# Patient Record
Sex: FEMALE | Race: WHITE | NOT HISPANIC OR LATINO | Employment: OTHER | URBAN - METROPOLITAN AREA
[De-identification: names, ages, dates, MRNs, and addresses within clinical notes are randomized per-mention and may not be internally consistent; named-entity substitution may affect disease eponyms.]

---

## 2017-02-10 ENCOUNTER — HOSPITAL ENCOUNTER (OUTPATIENT)
Dept: RADIOLOGY | Facility: HOSPITAL | Age: 82
Discharge: HOME | End: 2017-02-10
Attending: SURGERY
Payer: MEDICARE

## 2017-02-10 DIAGNOSIS — I25.10 ATHEROSCLEROTIC HEART DISEASE OF NATIVE CORONARY ARTERY WITHOUT ANGINA PECTORIS: ICD-10-CM

## 2017-02-10 DIAGNOSIS — I65.22 OCCLUSION AND STENOSIS OF LEFT CAROTID ARTERY: ICD-10-CM

## 2017-02-10 DIAGNOSIS — J44.9 CHRONIC OBSTRUCTIVE PULMONARY DISEASE (HCC): ICD-10-CM

## 2017-02-10 PROCEDURE — 93880 EXTRACRANIAL BILAT STUDY: CPT

## 2017-02-21 ENCOUNTER — GENERIC CONVERSION - ENCOUNTER (OUTPATIENT)
Dept: OTHER | Facility: OTHER | Age: 82
End: 2017-02-21

## 2017-03-13 ENCOUNTER — HOSPITAL ENCOUNTER (INPATIENT)
Facility: HOSPITAL | Age: 82
LOS: 3 days | Discharge: HOME | DRG: 203 | End: 2017-03-16
Attending: INTERNAL MEDICINE | Admitting: INTERNAL MEDICINE
Payer: MEDICARE

## 2017-03-13 ENCOUNTER — APPOINTMENT (EMERGENCY)
Dept: RADIOLOGY | Facility: HOSPITAL | Age: 82
DRG: 203 | End: 2017-03-13
Payer: MEDICARE

## 2017-03-13 DIAGNOSIS — J45.901 ACUTE BRONCHITIS WITH ASTHMA WITH ACUTE EXACERBATION: Primary | ICD-10-CM

## 2017-03-13 DIAGNOSIS — J20.9 ACUTE BRONCHITIS WITH ASTHMA WITH ACUTE EXACERBATION: Primary | ICD-10-CM

## 2017-03-13 PROBLEM — I48.91 ATRIAL FIBRILLATION (HCC): Status: ACTIVE | Noted: 2017-03-13

## 2017-03-13 PROBLEM — K21.9 GERD (GASTROESOPHAGEAL REFLUX DISEASE): Status: ACTIVE | Noted: 2017-03-13

## 2017-03-13 PROBLEM — E78.5 HYPERLIPEMIA: Status: ACTIVE | Noted: 2017-03-13

## 2017-03-13 LAB
ALBUMIN SERPL BCP-MCNC: 3.2 G/DL (ref 3.5–5)
ALP SERPL-CCNC: 121 U/L (ref 46–116)
ALT SERPL W P-5'-P-CCNC: 18 U/L (ref 12–78)
ANION GAP SERPL CALCULATED.3IONS-SCNC: 9 MMOL/L (ref 4–13)
AST SERPL W P-5'-P-CCNC: 26 U/L (ref 5–45)
BASOPHILS # BLD AUTO: 0 THOUSANDS/ΜL (ref 0–0.1)
BASOPHILS NFR BLD AUTO: 0 % (ref 0–1)
BILIRUB SERPL-MCNC: 0.2 MG/DL (ref 0.2–1)
BUN SERPL-MCNC: 7 MG/DL (ref 5–25)
CALCIUM SERPL-MCNC: 9.4 MG/DL (ref 8.3–10.1)
CHLORIDE SERPL-SCNC: 105 MMOL/L (ref 100–108)
CO2 SERPL-SCNC: 29 MMOL/L (ref 21–32)
CREAT SERPL-MCNC: 0.97 MG/DL (ref 0.6–1.3)
EOSINOPHIL # BLD AUTO: 0.1 THOUSAND/ΜL (ref 0–0.61)
EOSINOPHIL NFR BLD AUTO: 3 % (ref 0–6)
ERYTHROCYTE [DISTWIDTH] IN BLOOD BY AUTOMATED COUNT: 15.6 % (ref 11.6–15.1)
GFR SERPL CREATININE-BSD FRML MDRD: 55.3 ML/MIN/1.73SQ M
GLUCOSE SERPL-MCNC: 122 MG/DL (ref 65–140)
HCT VFR BLD AUTO: 39.3 % (ref 37–47)
HGB BLD-MCNC: 13 G/DL (ref 12–16)
LYMPHOCYTES # BLD AUTO: 0.8 THOUSANDS/ΜL (ref 0.6–4.47)
LYMPHOCYTES NFR BLD AUTO: 17 % (ref 14–44)
MCH RBC QN AUTO: 26.5 PG (ref 27–31)
MCHC RBC AUTO-ENTMCNC: 33 G/DL (ref 31.4–37.4)
MCV RBC AUTO: 81 FL (ref 82–98)
MONOCYTES # BLD AUTO: 0.3 THOUSAND/ΜL (ref 0.17–1.22)
MONOCYTES NFR BLD AUTO: 6 % (ref 4–12)
NEUTROPHILS # BLD AUTO: 3.7 THOUSANDS/ΜL (ref 1.85–7.62)
NEUTS SEG NFR BLD AUTO: 74 % (ref 43–75)
NRBC BLD AUTO-RTO: 0 /100 WBCS
PLATELET # BLD AUTO: 185 THOUSANDS/UL (ref 130–400)
PMV BLD AUTO: 9.3 FL (ref 8.9–12.7)
POTASSIUM SERPL-SCNC: 3.9 MMOL/L (ref 3.5–5.3)
PROT SERPL-MCNC: 7 G/DL (ref 6.4–8.2)
RBC # BLD AUTO: 4.89 MILLION/UL (ref 4.2–5.4)
SODIUM SERPL-SCNC: 143 MMOL/L (ref 136–145)
TROPONIN I SERPL-MCNC: <0.02 NG/ML
WBC # BLD AUTO: 4.9 THOUSAND/UL (ref 4.8–10.8)

## 2017-03-13 PROCEDURE — 94640 AIRWAY INHALATION TREATMENT: CPT

## 2017-03-13 PROCEDURE — 99285 EMERGENCY DEPT VISIT HI MDM: CPT

## 2017-03-13 PROCEDURE — 94760 N-INVAS EAR/PLS OXIMETRY 1: CPT

## 2017-03-13 PROCEDURE — 84484 ASSAY OF TROPONIN QUANT: CPT

## 2017-03-13 PROCEDURE — 96374 THER/PROPH/DIAG INJ IV PUSH: CPT

## 2017-03-13 PROCEDURE — 80053 COMPREHEN METABOLIC PANEL: CPT

## 2017-03-13 PROCEDURE — 93005 ELECTROCARDIOGRAM TRACING: CPT

## 2017-03-13 PROCEDURE — 71020 HB CHEST X-RAY 2VW FRONTAL&LATL: CPT

## 2017-03-13 PROCEDURE — 85025 COMPLETE CBC W/AUTO DIFF WBC: CPT

## 2017-03-13 PROCEDURE — 36415 COLL VENOUS BLD VENIPUNCTURE: CPT

## 2017-03-13 RX ORDER — METHYLPREDNISOLONE SODIUM SUCCINATE 125 MG/2ML
80 INJECTION, POWDER, LYOPHILIZED, FOR SOLUTION INTRAMUSCULAR; INTRAVENOUS ONCE
Status: COMPLETED | OUTPATIENT
Start: 2017-03-13 | End: 2017-03-13

## 2017-03-13 RX ORDER — CLOPIDOGREL BISULFATE 75 MG/1
75 TABLET ORAL DAILY
Status: DISCONTINUED | OUTPATIENT
Start: 2017-03-13 | End: 2017-03-16 | Stop reason: HOSPADM

## 2017-03-13 RX ORDER — HEPARIN SODIUM 5000 [USP'U]/ML
5000 INJECTION, SOLUTION INTRAVENOUS; SUBCUTANEOUS EVERY 8 HOURS SCHEDULED
Status: DISCONTINUED | OUTPATIENT
Start: 2017-03-13 | End: 2017-03-16 | Stop reason: HOSPADM

## 2017-03-13 RX ORDER — PREGABALIN 100 MG/1
300 CAPSULE ORAL 2 TIMES DAILY
Status: DISCONTINUED | OUTPATIENT
Start: 2017-03-13 | End: 2017-03-16 | Stop reason: HOSPADM

## 2017-03-13 RX ORDER — ALPRAZOLAM 0.5 MG/1
0.5 TABLET ORAL
Status: DISCONTINUED | OUTPATIENT
Start: 2017-03-13 | End: 2017-03-16 | Stop reason: HOSPADM

## 2017-03-13 RX ORDER — POLYETHYLENE GLYCOL 3350 17 G/17G
17 POWDER, FOR SOLUTION ORAL DAILY
Status: DISCONTINUED | OUTPATIENT
Start: 2017-03-14 | End: 2017-03-16 | Stop reason: HOSPADM

## 2017-03-13 RX ORDER — CLOPIDOGREL BISULFATE 75 MG/1
75 TABLET ORAL DAILY
COMMUNITY
End: 2018-01-10 | Stop reason: HOSPADM

## 2017-03-13 RX ORDER — ASPIRIN 81 MG/1
81 TABLET ORAL DAILY
Status: DISCONTINUED | OUTPATIENT
Start: 2017-03-13 | End: 2017-03-16 | Stop reason: HOSPADM

## 2017-03-13 RX ORDER — TOLTERODINE 2 MG/1
4 CAPSULE, EXTENDED RELEASE ORAL DAILY
Status: DISCONTINUED | OUTPATIENT
Start: 2017-03-13 | End: 2017-03-16 | Stop reason: HOSPADM

## 2017-03-13 RX ORDER — IPRATROPIUM BROMIDE AND ALBUTEROL SULFATE 2.5; .5 MG/3ML; MG/3ML
3 SOLUTION RESPIRATORY (INHALATION)
Status: DISCONTINUED | OUTPATIENT
Start: 2017-03-13 | End: 2017-03-14

## 2017-03-13 RX ORDER — HYDROCODONE BITARTRATE AND ACETAMINOPHEN 5; 325 MG/1; MG/1
1 TABLET ORAL EVERY 6 HOURS PRN
Status: DISCONTINUED | OUTPATIENT
Start: 2017-03-13 | End: 2017-03-16 | Stop reason: HOSPADM

## 2017-03-13 RX ORDER — METHYLPREDNISOLONE SODIUM SUCCINATE 40 MG/ML
40 INJECTION, POWDER, LYOPHILIZED, FOR SOLUTION INTRAMUSCULAR; INTRAVENOUS EVERY 8 HOURS SCHEDULED
Status: DISCONTINUED | OUTPATIENT
Start: 2017-03-13 | End: 2017-03-13

## 2017-03-13 RX ORDER — PANTOPRAZOLE SODIUM 40 MG/1
40 TABLET, DELAYED RELEASE ORAL
Status: DISCONTINUED | OUTPATIENT
Start: 2017-03-14 | End: 2017-03-16 | Stop reason: HOSPADM

## 2017-03-13 RX ORDER — ONDANSETRON 2 MG/ML
4 INJECTION INTRAMUSCULAR; INTRAVENOUS EVERY 6 HOURS PRN
Status: DISCONTINUED | OUTPATIENT
Start: 2017-03-13 | End: 2017-03-16 | Stop reason: HOSPADM

## 2017-03-13 RX ORDER — ATORVASTATIN CALCIUM 10 MG/1
10 TABLET, FILM COATED ORAL
Status: DISCONTINUED | OUTPATIENT
Start: 2017-03-13 | End: 2017-03-16 | Stop reason: HOSPADM

## 2017-03-13 RX ORDER — DOCUSATE SODIUM 100 MG/1
100 CAPSULE, LIQUID FILLED ORAL 2 TIMES DAILY
Status: DISCONTINUED | OUTPATIENT
Start: 2017-03-13 | End: 2017-03-16 | Stop reason: HOSPADM

## 2017-03-13 RX ORDER — METHYLPREDNISOLONE SODIUM SUCCINATE 40 MG/ML
20 INJECTION, POWDER, LYOPHILIZED, FOR SOLUTION INTRAMUSCULAR; INTRAVENOUS EVERY 8 HOURS SCHEDULED
Status: DISCONTINUED | OUTPATIENT
Start: 2017-03-13 | End: 2017-03-15

## 2017-03-13 RX ORDER — POTASSIUM CHLORIDE 750 MG/1
10 TABLET, EXTENDED RELEASE ORAL
Status: DISCONTINUED | OUTPATIENT
Start: 2017-03-13 | End: 2017-03-16 | Stop reason: HOSPADM

## 2017-03-13 RX ORDER — GUAIFENESIN 600 MG
1200 TABLET, EXTENDED RELEASE 12 HR ORAL 2 TIMES DAILY
Status: DISCONTINUED | OUTPATIENT
Start: 2017-03-13 | End: 2017-03-16 | Stop reason: HOSPADM

## 2017-03-13 RX ORDER — BENZONATATE 100 MG/1
100 CAPSULE ORAL 3 TIMES DAILY PRN
Status: DISCONTINUED | OUTPATIENT
Start: 2017-03-13 | End: 2017-03-16 | Stop reason: HOSPADM

## 2017-03-13 RX ADMIN — TOLTERODINE TARTRATE 4 MG: 2 CAPSULE, EXTENDED RELEASE ORAL at 18:37

## 2017-03-13 RX ADMIN — DOCUSATE SODIUM 100 MG: 100 CAPSULE, LIQUID FILLED ORAL at 18:35

## 2017-03-13 RX ADMIN — HEPARIN SODIUM 5000 UNITS: 5000 INJECTION, SOLUTION INTRAVENOUS; SUBCUTANEOUS at 18:36

## 2017-03-13 RX ADMIN — POTASSIUM CHLORIDE 10 MEQ: 750 TABLET, EXTENDED RELEASE ORAL at 18:39

## 2017-03-13 RX ADMIN — METHYLPREDNISOLONE SODIUM SUCCINATE 20 MG: 40 INJECTION, POWDER, FOR SOLUTION INTRAMUSCULAR; INTRAVENOUS at 18:46

## 2017-03-13 RX ADMIN — BENZONATATE 100 MG: 100 CAPSULE ORAL at 22:43

## 2017-03-13 RX ADMIN — GUAIFENESIN 1200 MG: 600 TABLET, EXTENDED RELEASE ORAL at 18:36

## 2017-03-13 RX ADMIN — ASPIRIN 81 MG: 81 TABLET, COATED ORAL at 18:34

## 2017-03-13 RX ADMIN — IPRATROPIUM BROMIDE AND ALBUTEROL SULFATE 3 ML: .5; 3 SOLUTION RESPIRATORY (INHALATION) at 14:26

## 2017-03-13 RX ADMIN — CLOPIDOGREL BISULFATE 75 MG: 75 TABLET, FILM COATED ORAL at 18:35

## 2017-03-13 RX ADMIN — ALPRAZOLAM 0.5 MG: 0.5 TABLET ORAL at 22:43

## 2017-03-13 RX ADMIN — IPRATROPIUM BROMIDE AND ALBUTEROL SULFATE 3 ML: .5; 3 SOLUTION RESPIRATORY (INHALATION) at 09:43

## 2017-03-13 RX ADMIN — METHYLPREDNISOLONE SODIUM SUCCINATE 80 MG: 125 INJECTION, POWDER, FOR SOLUTION INTRAMUSCULAR; INTRAVENOUS at 11:35

## 2017-03-13 RX ADMIN — METOPROLOL TARTRATE 25 MG: 25 TABLET ORAL at 18:46

## 2017-03-13 RX ADMIN — ATORVASTATIN CALCIUM 10 MG: 10 TABLET, FILM COATED ORAL at 18:35

## 2017-03-13 RX ADMIN — AZITHROMYCIN MONOHYDRATE 500 MG: 500 INJECTION, POWDER, LYOPHILIZED, FOR SOLUTION INTRAVENOUS at 18:35

## 2017-03-13 RX ADMIN — PREGABALIN 300 MG: 100 CAPSULE ORAL at 18:36

## 2017-03-13 RX ADMIN — HEPARIN SODIUM 5000 UNITS: 5000 INJECTION, SOLUTION INTRAVENOUS; SUBCUTANEOUS at 23:30

## 2017-03-14 ENCOUNTER — APPOINTMENT (INPATIENT)
Dept: PHYSICAL THERAPY | Facility: HOSPITAL | Age: 82
DRG: 203 | End: 2017-03-14
Payer: MEDICARE

## 2017-03-14 ENCOUNTER — APPOINTMENT (INPATIENT)
Dept: OCCUPATIONAL THERAPY | Facility: HOSPITAL | Age: 82
DRG: 203 | End: 2017-03-14
Payer: MEDICARE

## 2017-03-14 PROBLEM — E87.6 HYPOKALEMIA: Status: ACTIVE | Noted: 2017-03-14

## 2017-03-14 PROBLEM — M79.7 FIBROMYALGIA: Status: ACTIVE | Noted: 2017-03-14

## 2017-03-14 LAB
ALBUMIN SERPL BCP-MCNC: 3 G/DL (ref 3.5–5)
ALP SERPL-CCNC: 103 U/L (ref 46–116)
ALT SERPL W P-5'-P-CCNC: 17 U/L (ref 12–78)
ANION GAP SERPL CALCULATED.3IONS-SCNC: 8 MMOL/L (ref 4–13)
AST SERPL W P-5'-P-CCNC: 20 U/L (ref 5–45)
BASOPHILS # BLD AUTO: 0 THOUSANDS/ΜL (ref 0–0.1)
BASOPHILS NFR BLD AUTO: 0 % (ref 0–1)
BILIRUB SERPL-MCNC: 0.3 MG/DL (ref 0.2–1)
BILIRUB UR QL STRIP: ABNORMAL
BUN SERPL-MCNC: 12 MG/DL (ref 5–25)
CALCIUM SERPL-MCNC: 8.2 MG/DL (ref 8.3–10.1)
CHLORIDE SERPL-SCNC: 104 MMOL/L (ref 100–108)
CLARITY UR: CLEAR
CO2 SERPL-SCNC: 29 MMOL/L (ref 21–32)
COLOR UR: YELLOW
CREAT SERPL-MCNC: 1.01 MG/DL (ref 0.6–1.3)
EOSINOPHIL # BLD AUTO: 0 THOUSAND/ΜL (ref 0–0.61)
EOSINOPHIL NFR BLD AUTO: 0 % (ref 0–6)
ERYTHROCYTE [DISTWIDTH] IN BLOOD BY AUTOMATED COUNT: 16 % (ref 11.6–15.1)
FLUAV AG SPEC QL: NORMAL
FLUBV AG SPEC QL: NORMAL
GFR SERPL CREATININE-BSD FRML MDRD: 52.7 ML/MIN/1.73SQ M
GLUCOSE SERPL-MCNC: 129 MG/DL (ref 65–140)
GLUCOSE UR STRIP-MCNC: NEGATIVE MG/DL
HCT VFR BLD AUTO: 35.4 % (ref 37–47)
HGB BLD-MCNC: 11.5 G/DL (ref 12–16)
HGB UR QL STRIP.AUTO: NEGATIVE
KETONES UR STRIP-MCNC: ABNORMAL MG/DL
L PNEUMO1 AG UR QL IA.RAPID: NEGATIVE
LEUKOCYTE ESTERASE UR QL STRIP: NEGATIVE
LYMPHOCYTES # BLD AUTO: 0.9 THOUSANDS/ΜL (ref 0.6–4.47)
LYMPHOCYTES NFR BLD AUTO: 16 % (ref 14–44)
MAGNESIUM SERPL-MCNC: 1.7 MG/DL (ref 1.6–2.6)
MCH RBC QN AUTO: 26.1 PG (ref 27–31)
MCHC RBC AUTO-ENTMCNC: 32.6 G/DL (ref 31.4–37.4)
MCV RBC AUTO: 80 FL (ref 82–98)
MONOCYTES # BLD AUTO: 0.5 THOUSAND/ΜL (ref 0.17–1.22)
MONOCYTES NFR BLD AUTO: 9 % (ref 4–12)
NEUTROPHILS # BLD AUTO: 4.3 THOUSANDS/ΜL (ref 1.85–7.62)
NEUTS SEG NFR BLD AUTO: 75 % (ref 43–75)
NITRITE UR QL STRIP: NEGATIVE
NRBC BLD AUTO-RTO: 0 /100 WBCS
PH UR STRIP.AUTO: 5.5 [PH] (ref 5–9)
PLATELET # BLD AUTO: 201 THOUSANDS/UL (ref 130–400)
PMV BLD AUTO: 9.1 FL (ref 8.9–12.7)
POTASSIUM SERPL-SCNC: 3.4 MMOL/L (ref 3.5–5.3)
PROT SERPL-MCNC: 6.3 G/DL (ref 6.4–8.2)
PROT UR STRIP-MCNC: NEGATIVE MG/DL
RBC # BLD AUTO: 4.43 MILLION/UL (ref 4.2–5.4)
RSV B RNA SPEC QL NAA+PROBE: NORMAL
S PNEUM AG UR QL: NEGATIVE
SODIUM SERPL-SCNC: 141 MMOL/L (ref 136–145)
SP GR UR STRIP.AUTO: >=1.03 (ref 1–1.03)
UROBILINOGEN UR QL STRIP.AUTO: 0.2 E.U./DL
WBC # BLD AUTO: 5.7 THOUSAND/UL (ref 4.8–10.8)

## 2017-03-14 PROCEDURE — 87798 DETECT AGENT NOS DNA AMP: CPT | Performed by: NURSE PRACTITIONER

## 2017-03-14 PROCEDURE — 97161 PT EVAL LOW COMPLEX 20 MIN: CPT

## 2017-03-14 PROCEDURE — G8987 SELF CARE CURRENT STATUS: HCPCS

## 2017-03-14 PROCEDURE — G8979 MOBILITY GOAL STATUS: HCPCS

## 2017-03-14 PROCEDURE — 94640 AIRWAY INHALATION TREATMENT: CPT

## 2017-03-14 PROCEDURE — G8988 SELF CARE GOAL STATUS: HCPCS

## 2017-03-14 PROCEDURE — 87449 NOS EACH ORGANISM AG IA: CPT | Performed by: NURSE PRACTITIONER

## 2017-03-14 PROCEDURE — 81003 URINALYSIS AUTO W/O SCOPE: CPT | Performed by: NURSE PRACTITIONER

## 2017-03-14 PROCEDURE — 97165 OT EVAL LOW COMPLEX 30 MIN: CPT

## 2017-03-14 PROCEDURE — 83735 ASSAY OF MAGNESIUM: CPT | Performed by: NURSE PRACTITIONER

## 2017-03-14 PROCEDURE — 80053 COMPREHEN METABOLIC PANEL: CPT | Performed by: NURSE PRACTITIONER

## 2017-03-14 PROCEDURE — 85025 COMPLETE CBC W/AUTO DIFF WBC: CPT | Performed by: NURSE PRACTITIONER

## 2017-03-14 PROCEDURE — G8978 MOBILITY CURRENT STATUS: HCPCS

## 2017-03-14 PROCEDURE — 94760 N-INVAS EAR/PLS OXIMETRY 1: CPT

## 2017-03-14 RX ORDER — POTASSIUM CHLORIDE 20 MEQ/1
20 TABLET, EXTENDED RELEASE ORAL ONCE
Status: COMPLETED | OUTPATIENT
Start: 2017-03-14 | End: 2017-03-14

## 2017-03-14 RX ORDER — IPRATROPIUM BROMIDE AND ALBUTEROL SULFATE 2.5; .5 MG/3ML; MG/3ML
3 SOLUTION RESPIRATORY (INHALATION) EVERY 6 HOURS PRN
Status: DISCONTINUED | OUTPATIENT
Start: 2017-03-14 | End: 2017-03-16 | Stop reason: HOSPADM

## 2017-03-14 RX ADMIN — GUAIFENESIN 1200 MG: 600 TABLET, EXTENDED RELEASE ORAL at 17:41

## 2017-03-14 RX ADMIN — CLOPIDOGREL BISULFATE 75 MG: 75 TABLET, FILM COATED ORAL at 10:41

## 2017-03-14 RX ADMIN — HEPARIN SODIUM 5000 UNITS: 5000 INJECTION, SOLUTION INTRAVENOUS; SUBCUTANEOUS at 05:59

## 2017-03-14 RX ADMIN — AZITHROMYCIN MONOHYDRATE 500 MG: 500 INJECTION, POWDER, LYOPHILIZED, FOR SOLUTION INTRAVENOUS at 17:58

## 2017-03-14 RX ADMIN — HEPARIN SODIUM 5000 UNITS: 5000 INJECTION, SOLUTION INTRAVENOUS; SUBCUTANEOUS at 22:03

## 2017-03-14 RX ADMIN — METOPROLOL TARTRATE 25 MG: 25 TABLET ORAL at 10:40

## 2017-03-14 RX ADMIN — HEPARIN SODIUM 5000 UNITS: 5000 INJECTION, SOLUTION INTRAVENOUS; SUBCUTANEOUS at 14:55

## 2017-03-14 RX ADMIN — PANTOPRAZOLE SODIUM 40 MG: 40 TABLET, DELAYED RELEASE ORAL at 05:59

## 2017-03-14 RX ADMIN — TOLTERODINE TARTRATE 4 MG: 2 CAPSULE, EXTENDED RELEASE ORAL at 10:42

## 2017-03-14 RX ADMIN — GUAIFENESIN 1200 MG: 600 TABLET, EXTENDED RELEASE ORAL at 10:41

## 2017-03-14 RX ADMIN — METHYLPREDNISOLONE SODIUM SUCCINATE 20 MG: 40 INJECTION, POWDER, FOR SOLUTION INTRAMUSCULAR; INTRAVENOUS at 14:55

## 2017-03-14 RX ADMIN — HYDROCODONE BITARTRATE AND ACETAMINOPHEN 1 TABLET: 5; 325 TABLET ORAL at 02:53

## 2017-03-14 RX ADMIN — METOPROLOL TARTRATE 25 MG: 25 TABLET ORAL at 17:42

## 2017-03-14 RX ADMIN — METHYLPREDNISOLONE SODIUM SUCCINATE 20 MG: 40 INJECTION, POWDER, FOR SOLUTION INTRAMUSCULAR; INTRAVENOUS at 22:03

## 2017-03-14 RX ADMIN — ALPRAZOLAM 0.5 MG: 0.5 TABLET ORAL at 23:59

## 2017-03-14 RX ADMIN — POTASSIUM CHLORIDE 20 MEQ: 1500 TABLET, EXTENDED RELEASE ORAL at 15:11

## 2017-03-14 RX ADMIN — POTASSIUM CHLORIDE 10 MEQ: 750 TABLET, EXTENDED RELEASE ORAL at 10:41

## 2017-03-14 RX ADMIN — ATORVASTATIN CALCIUM 10 MG: 10 TABLET, FILM COATED ORAL at 17:40

## 2017-03-14 RX ADMIN — METHYLPREDNISOLONE SODIUM SUCCINATE 20 MG: 40 INJECTION, POWDER, FOR SOLUTION INTRAMUSCULAR; INTRAVENOUS at 05:59

## 2017-03-14 RX ADMIN — IPRATROPIUM BROMIDE AND ALBUTEROL SULFATE 3 ML: .5; 3 SOLUTION RESPIRATORY (INHALATION) at 07:39

## 2017-03-14 RX ADMIN — ASPIRIN 81 MG: 81 TABLET, COATED ORAL at 10:41

## 2017-03-14 RX ADMIN — IPRATROPIUM BROMIDE AND ALBUTEROL SULFATE 3 ML: .5; 3 SOLUTION RESPIRATORY (INHALATION) at 14:10

## 2017-03-14 RX ADMIN — PREGABALIN 300 MG: 100 CAPSULE ORAL at 10:42

## 2017-03-14 RX ADMIN — PREGABALIN 300 MG: 100 CAPSULE ORAL at 17:45

## 2017-03-15 PROCEDURE — 94150 VITAL CAPACITY TEST: CPT

## 2017-03-15 PROCEDURE — 94640 AIRWAY INHALATION TREATMENT: CPT

## 2017-03-15 PROCEDURE — 94760 N-INVAS EAR/PLS OXIMETRY 1: CPT

## 2017-03-15 RX ORDER — METHYLPREDNISOLONE 4 MG/1
4 TABLET ORAL 3 TIMES DAILY
Status: DISCONTINUED | OUTPATIENT
Start: 2017-03-15 | End: 2017-03-16 | Stop reason: HOSPADM

## 2017-03-15 RX ADMIN — POTASSIUM CHLORIDE 10 MEQ: 750 TABLET, EXTENDED RELEASE ORAL at 08:53

## 2017-03-15 RX ADMIN — GUAIFENESIN 1200 MG: 600 TABLET, EXTENDED RELEASE ORAL at 17:15

## 2017-03-15 RX ADMIN — HYDROCODONE BITARTRATE AND ACETAMINOPHEN 1 TABLET: 5; 325 TABLET ORAL at 06:02

## 2017-03-15 RX ADMIN — HEPARIN SODIUM 5000 UNITS: 5000 INJECTION, SOLUTION INTRAVENOUS; SUBCUTANEOUS at 05:58

## 2017-03-15 RX ADMIN — CLOPIDOGREL BISULFATE 75 MG: 75 TABLET, FILM COATED ORAL at 08:53

## 2017-03-15 RX ADMIN — HEPARIN SODIUM 5000 UNITS: 5000 INJECTION, SOLUTION INTRAVENOUS; SUBCUTANEOUS at 21:08

## 2017-03-15 RX ADMIN — ASPIRIN 81 MG: 81 TABLET, COATED ORAL at 08:53

## 2017-03-15 RX ADMIN — PANTOPRAZOLE SODIUM 40 MG: 40 TABLET, DELAYED RELEASE ORAL at 05:59

## 2017-03-15 RX ADMIN — METHYLPREDNISOLONE 4 MG: 4 TABLET ORAL at 16:54

## 2017-03-15 RX ADMIN — METOPROLOL TARTRATE 25 MG: 25 TABLET ORAL at 08:53

## 2017-03-15 RX ADMIN — GUAIFENESIN 1200 MG: 600 TABLET, EXTENDED RELEASE ORAL at 08:53

## 2017-03-15 RX ADMIN — PREGABALIN 300 MG: 100 CAPSULE ORAL at 17:15

## 2017-03-15 RX ADMIN — METHYLPREDNISOLONE 4 MG: 4 TABLET ORAL at 21:08

## 2017-03-15 RX ADMIN — AZITHROMYCIN MONOHYDRATE 500 MG: 500 INJECTION, POWDER, LYOPHILIZED, FOR SOLUTION INTRAVENOUS at 17:10

## 2017-03-15 RX ADMIN — IPRATROPIUM BROMIDE AND ALBUTEROL SULFATE 3 ML: .5; 3 SOLUTION RESPIRATORY (INHALATION) at 07:57

## 2017-03-15 RX ADMIN — ATORVASTATIN CALCIUM 10 MG: 10 TABLET, FILM COATED ORAL at 16:00

## 2017-03-15 RX ADMIN — METHYLPREDNISOLONE SODIUM SUCCINATE 20 MG: 40 INJECTION, POWDER, FOR SOLUTION INTRAMUSCULAR; INTRAVENOUS at 05:58

## 2017-03-15 RX ADMIN — ALPRAZOLAM 0.5 MG: 0.5 TABLET ORAL at 21:08

## 2017-03-15 RX ADMIN — PREGABALIN 300 MG: 100 CAPSULE ORAL at 08:53

## 2017-03-15 RX ADMIN — TOLTERODINE TARTRATE 4 MG: 2 CAPSULE, EXTENDED RELEASE ORAL at 08:53

## 2017-03-15 RX ADMIN — METOPROLOL TARTRATE 25 MG: 25 TABLET ORAL at 17:15

## 2017-03-15 RX ADMIN — HEPARIN SODIUM 5000 UNITS: 5000 INJECTION, SOLUTION INTRAVENOUS; SUBCUTANEOUS at 14:55

## 2017-03-16 VITALS
WEIGHT: 185.63 LBS | HEART RATE: 72 BPM | BODY MASS INDEX: 34.16 KG/M2 | TEMPERATURE: 97.5 F | SYSTOLIC BLOOD PRESSURE: 152 MMHG | RESPIRATION RATE: 18 BRPM | OXYGEN SATURATION: 92 % | HEIGHT: 62 IN | DIASTOLIC BLOOD PRESSURE: 78 MMHG

## 2017-03-16 PROBLEM — J45.41 MODERATE PERSISTENT ASTHMA WITH ACUTE EXACERBATION: Status: ACTIVE | Noted: 2017-03-16

## 2017-03-16 PROBLEM — J45.21 MILD INTERMITTENT ASTHMA WITH ACUTE EXACERBATION: Status: ACTIVE | Noted: 2017-03-16

## 2017-03-16 LAB
ATRIAL RATE: 81 BPM
P AXIS: 42 DEGREES
PR INTERVAL: 200 MS
QRS AXIS: -27 DEGREES
QRSD INTERVAL: 90 MS
QT INTERVAL: 366 MS
QTC INTERVAL: 425 MS
T WAVE AXIS: 35 DEGREES
VENTRICULAR RATE: 81 BPM

## 2017-03-16 PROCEDURE — 94760 N-INVAS EAR/PLS OXIMETRY 1: CPT

## 2017-03-16 RX ORDER — PREDNISONE 10 MG/1
TABLET ORAL
Qty: 9 TABLET | Refills: 0 | Status: ON HOLD | OUTPATIENT
Start: 2017-03-17 | End: 2017-12-16

## 2017-03-16 RX ORDER — ASPIRIN 81 MG/1
81 TABLET ORAL DAILY
Qty: 30 TABLET | Refills: 0 | Status: SHIPPED | OUTPATIENT
Start: 2017-03-16 | End: 2018-01-10 | Stop reason: HOSPADM

## 2017-03-16 RX ORDER — ALBUTEROL SULFATE 90 UG/1
2 AEROSOL, METERED RESPIRATORY (INHALATION) EVERY 4 HOURS PRN
Qty: 1 INHALER | Refills: 0 | Status: SHIPPED | OUTPATIENT
Start: 2017-03-16 | End: 2017-03-16 | Stop reason: HOSPADM

## 2017-03-16 RX ORDER — ATORVASTATIN CALCIUM 10 MG/1
10 TABLET, FILM COATED ORAL
Qty: 30 TABLET | Refills: 0 | Status: SHIPPED | OUTPATIENT
Start: 2017-03-16 | End: 2018-02-26 | Stop reason: ALTCHOICE

## 2017-03-16 RX ADMIN — PREGABALIN 300 MG: 100 CAPSULE ORAL at 08:38

## 2017-03-16 RX ADMIN — TOLTERODINE TARTRATE 4 MG: 2 CAPSULE, EXTENDED RELEASE ORAL at 08:38

## 2017-03-16 RX ADMIN — POTASSIUM CHLORIDE 10 MEQ: 750 TABLET, EXTENDED RELEASE ORAL at 08:38

## 2017-03-16 RX ADMIN — DOCUSATE SODIUM 100 MG: 100 CAPSULE, LIQUID FILLED ORAL at 08:38

## 2017-03-16 RX ADMIN — CLOPIDOGREL BISULFATE 75 MG: 75 TABLET, FILM COATED ORAL at 08:38

## 2017-03-16 RX ADMIN — GUAIFENESIN 1200 MG: 600 TABLET, EXTENDED RELEASE ORAL at 08:38

## 2017-03-16 RX ADMIN — METOPROLOL TARTRATE 25 MG: 25 TABLET ORAL at 08:38

## 2017-03-16 RX ADMIN — METHYLPREDNISOLONE 4 MG: 4 TABLET ORAL at 08:38

## 2017-03-16 RX ADMIN — HYDROCODONE BITARTRATE AND ACETAMINOPHEN 1 TABLET: 5; 325 TABLET ORAL at 03:26

## 2017-03-16 RX ADMIN — PANTOPRAZOLE SODIUM 40 MG: 40 TABLET, DELAYED RELEASE ORAL at 05:22

## 2017-03-16 RX ADMIN — HEPARIN SODIUM 5000 UNITS: 5000 INJECTION, SOLUTION INTRAVENOUS; SUBCUTANEOUS at 05:22

## 2017-03-16 RX ADMIN — ASPIRIN 81 MG: 81 TABLET, COATED ORAL at 08:38

## 2017-03-17 ENCOUNTER — GENERIC CONVERSION - ENCOUNTER (OUTPATIENT)
Dept: OTHER | Facility: OTHER | Age: 82
End: 2017-03-17

## 2017-08-02 ENCOUNTER — ALLSCRIPTS OFFICE VISIT (OUTPATIENT)
Dept: OTHER | Facility: OTHER | Age: 82
End: 2017-08-02

## 2017-08-25 ENCOUNTER — GENERIC CONVERSION - ENCOUNTER (OUTPATIENT)
Dept: OTHER | Facility: OTHER | Age: 82
End: 2017-08-25

## 2017-09-25 ENCOUNTER — TRANSCRIBE ORDERS (OUTPATIENT)
Dept: ADMINISTRATIVE | Facility: HOSPITAL | Age: 82
End: 2017-09-25

## 2017-09-25 ENCOUNTER — APPOINTMENT (OUTPATIENT)
Dept: LAB | Facility: HOSPITAL | Age: 82
End: 2017-09-25
Attending: INTERNAL MEDICINE
Payer: MEDICARE

## 2017-09-25 ENCOUNTER — ALLSCRIPTS OFFICE VISIT (OUTPATIENT)
Dept: OTHER | Facility: OTHER | Age: 82
End: 2017-09-25

## 2017-09-25 DIAGNOSIS — I89.0 OTHER LYMPHEDEMA: Primary | ICD-10-CM

## 2017-09-25 DIAGNOSIS — T14.8XXA OTHER INJURY OF UNSPECIFIED BODY REGION, INITIAL ENCOUNTER (CODE): ICD-10-CM

## 2017-09-25 DIAGNOSIS — I89.0 OTHER LYMPHEDEMA: ICD-10-CM

## 2017-09-25 DIAGNOSIS — I89.0 LYMPHEDEMA, NOT ELSEWHERE CLASSIFIED: ICD-10-CM

## 2017-09-25 DIAGNOSIS — Z01.818 ENCOUNTER FOR OTHER PREPROCEDURAL EXAMINATION: ICD-10-CM

## 2017-09-25 LAB
ALBUMIN SERPL BCP-MCNC: 3.4 G/DL (ref 3.5–5)
ALP SERPL-CCNC: 126 U/L (ref 46–116)
ALT SERPL W P-5'-P-CCNC: 19 U/L (ref 12–78)
ANION GAP SERPL CALCULATED.3IONS-SCNC: 8 MMOL/L (ref 4–13)
AST SERPL W P-5'-P-CCNC: 17 U/L (ref 5–45)
BASOPHILS # BLD AUTO: 0.1 THOUSANDS/ΜL (ref 0–0.1)
BASOPHILS NFR BLD AUTO: 1 % (ref 0–1)
BILIRUB SERPL-MCNC: 0.4 MG/DL (ref 0.2–1)
BUN SERPL-MCNC: 17 MG/DL (ref 5–25)
CALCIUM SERPL-MCNC: 8.7 MG/DL (ref 8.3–10.1)
CHLORIDE SERPL-SCNC: 100 MMOL/L (ref 100–108)
CO2 SERPL-SCNC: 28 MMOL/L (ref 21–32)
CREAT SERPL-MCNC: 0.74 MG/DL (ref 0.6–1.3)
EOSINOPHIL # BLD AUTO: 0.4 THOUSAND/ΜL (ref 0–0.61)
EOSINOPHIL NFR BLD AUTO: 6 % (ref 0–6)
ERYTHROCYTE [DISTWIDTH] IN BLOOD BY AUTOMATED COUNT: 15.5 % (ref 11.6–15.1)
GFR SERPL CREATININE-BSD FRML MDRD: 76 ML/MIN/1.73SQ M
GLUCOSE SERPL-MCNC: 89 MG/DL (ref 65–140)
HCT VFR BLD AUTO: 38.3 % (ref 37–47)
HGB BLD-MCNC: 12.3 G/DL (ref 12–16)
LYMPHOCYTES # BLD AUTO: 1.4 THOUSANDS/ΜL (ref 0.6–4.47)
LYMPHOCYTES NFR BLD AUTO: 22 % (ref 14–44)
MCH RBC QN AUTO: 25.9 PG (ref 27–31)
MCHC RBC AUTO-ENTMCNC: 32 G/DL (ref 31.4–37.4)
MCV RBC AUTO: 81 FL (ref 82–98)
MONOCYTES # BLD AUTO: 0.5 THOUSAND/ΜL (ref 0.17–1.22)
MONOCYTES NFR BLD AUTO: 8 % (ref 4–12)
NEUTROPHILS # BLD AUTO: 4 THOUSANDS/ΜL (ref 1.85–7.62)
NEUTS SEG NFR BLD AUTO: 63 % (ref 43–75)
NRBC BLD AUTO-RTO: 0 /100 WBCS
PLATELET # BLD AUTO: 185 THOUSANDS/UL (ref 130–400)
PMV BLD AUTO: 9.6 FL (ref 8.9–12.7)
POTASSIUM SERPL-SCNC: 4 MMOL/L (ref 3.5–5.3)
PROT SERPL-MCNC: 6.6 G/DL (ref 6.4–8.2)
RBC # BLD AUTO: 4.73 MILLION/UL (ref 4.2–5.4)
SODIUM SERPL-SCNC: 136 MMOL/L (ref 136–145)
WBC # BLD AUTO: 6.3 THOUSAND/UL (ref 4.8–10.8)

## 2017-09-25 PROCEDURE — 36415 COLL VENOUS BLD VENIPUNCTURE: CPT

## 2017-09-25 PROCEDURE — 85025 COMPLETE CBC W/AUTO DIFF WBC: CPT

## 2017-09-25 PROCEDURE — 80053 COMPREHEN METABOLIC PANEL: CPT

## 2017-10-02 ENCOUNTER — HOSPITAL ENCOUNTER (OUTPATIENT)
Dept: RADIOLOGY | Facility: HOSPITAL | Age: 82
Discharge: HOME | End: 2017-10-02
Attending: INTERNAL MEDICINE
Payer: MEDICARE

## 2017-10-02 ENCOUNTER — ALLSCRIPTS OFFICE VISIT (OUTPATIENT)
Dept: OTHER | Facility: OTHER | Age: 82
End: 2017-10-02

## 2017-10-02 DIAGNOSIS — I89.0 OTHER LYMPHEDEMA: ICD-10-CM

## 2017-10-02 PROCEDURE — A9585 GADOBUTROL INJECTION: HCPCS | Performed by: INTERNAL MEDICINE

## 2017-10-02 PROCEDURE — 73720 MRI LWR EXTREMITY W/O&W/DYE: CPT

## 2017-10-02 PROCEDURE — 74177 CT ABD & PELVIS W/CONTRAST: CPT

## 2017-10-02 PROCEDURE — 71260 CT THORAX DX C+: CPT

## 2017-10-02 RX ADMIN — IOHEXOL 100 ML: 350 INJECTION, SOLUTION INTRAVENOUS at 15:02

## 2017-10-02 RX ADMIN — GADOBUTROL 9 ML: 604.72 INJECTION INTRAVENOUS at 16:46

## 2017-10-03 NOTE — CONSULTS
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)   2  Lymphedema (457 1) (I89 0)   3  Drainage from wound (879 8) (T14 8XXA)    Plan  Several possibilities exist here with the draining sinus  This may be a sinus due to poor wound healing and may be draining lymphatic fluid given her history of lymph node sampling and rads  Lower on the differential is a fistulous process either to the bladder or the bowel - I do not think this is the case as neither urine nor fecal matter has come from the wound  She does not have any record of a foreign body in the area either  She is currently pending a CT scan of the abdomen and pelvis this afternoon  I will follow up these results on her next visit - she will make an appointment for either this upcoming Thursday or Monday  In the mean time, we have given her a sterile sample cup to collect any discharge when it happens as today, she did not have any fluid coming from the wound  Chief Complaint  Chief Complaint Free Text Note Form: Patient being seen for consult of lymphoderma and leaking incision  Call received from Dr Karlie Goncalves regarding draining sinus from lower abdomen  Consultation for surgical evaluation today  History of Present Illness  HPI: 81year old female with a history of 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, followed by resection and lymph node biopsy/sampling and chemo-rads, presenting with an area of drainage from the lower abdomen  Gordon Crenshaw states that this is an intermittent drainage and varies in color from clear to yellow sometimes  It is never a copious amount, but does not appear either urine or stool-like, but does have an odor to it  She states it has never yielded purulence or become infected  Otherwise, notes that the left swelling on the left is chronic  Review of Systems  Complete-Female:   Constitutional: no fever-and-no chills     Eyes: No complaints of eye pain, no red eyes, no eyesight problems, no History of Atelectasis (518 0) (J98 11)   3  History of Cellulitis (682 9) (L03 90)   4  History of Cervical Cancer (V10 41)   5  History of Fibromyalgia (729 1) (M79 7)   6  History of cardiomegaly (V12 59) (Z86 79)   7  History of cholelithiasis (V12 79) (Z87 19)   8  History of hiatal hernia (V12 79) (Z87 19)   9  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)   10  History of Pleural Effusion (511 9)   11  History of Pneumonia (V12 61)   12  History of Renal cyst, acquired (593 2) (N28 1)   13  History of Respiratory Failure With Hypercapnia (518 81)   14  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)   15  History of Septicemia (038 9) (A41 9)  Active Problems And Past Medical History Reviewed: The active problems and past medical history were reviewed and updated today  Surgical History  1  History of Appendectomy   2  History of Bronchoscopy (Diagnostic)   3  History of Enteroscopic Polypectomy   4  History of Hip Surgery   5  History of Knee Arthroplasty   6  History of Percutaneous Portal Vein Catheter Placement   7  History of Radiation Therapy   8  History of Vaginal Hysterectomy  Surgical History Reviewed: The surgical history was reviewed and updated today  Family History  Mother    1  Family history of Acute Myocardial Infarction (V17 3)   2  Family history of Pneumonia  Father    3  Family history of Prostate Cancer (V16 42)  Sister    4  Family history of Breast Cancer (V16 3)  Family History Reviewed: The family history was reviewed and updated today  Social History   · Denied: History of Alcohol use   · Always uses seat belt   · Never A Smoker   · Non-smoker (V49 89) (Z78 9)   · Retired from employment   ·  (V61 07) (Z63 4)  Social History Reviewed: The social history was reviewed and updated today  The social history was reviewed and is unchanged  Current Meds   1   Atorvastatin Calcium 10 MG Oral Tablet; TAKE 1 TABLET DAILY AS DIRECTED; Therapy: 44HQO7464 to (Hiram Smalls)  Requested for: 93TXX9970; Last   Rx:11Oct2016 Ordered   2  Lyrica 300 MG Oral Capsule; Take two times daily; Therapy: (Francis Blackmon) to Recorded   3  NexIUM 40 MG Oral Capsule Delayed Release; TAKE 1 CAPSULE Daily; Therapy: (Francis Blackmon) to Recorded   4  Plavix 75 MG Oral Tablet; TAKE 1 TABLET DAILY; Therapy: (Francis Blackmon) to Recorded   5  Potassium Chloride ER 10 MEQ Oral Capsule Extended Release; Take one capsule   daily; Therapy: (Recorded:99Fsb9569) to Recorded  Medication List Reviewed: The medication list was reviewed and updated today  Allergies  1  Penicillins   2  Adhesive Tape TAPE   3  fentanyl   4  Latex Gloves MISC   5  Penicillins  6  Latex    Vitals  Vital Signs    Recorded: 92BLA6888 08:18AM   Temperature 98 7 F   Respiration 18   Systolic 393   Diastolic 82   Height 5 ft 2 in   Weight 206 lb    BMI Calculated 37 68   BSA Calculated 1 94     Physical Exam    Constitutional   General appearance: No acute distress, well appearing and well nourished  Eyes   Conjunctiva and lids: No swelling, erythema or discharge  Ears, Nose, Mouth, and Throat   External inspection of ears and nose: Normal     Neck   Supple, symmetric, trachea midline, no masses   Pulmonary   Respiratory effort: No increased work of breathing or signs of respiratory distress  Auscultation of lungs: Clear to auscultation, equal breath sounds bilaterally, no wheezes, no rales, no rhonci  Cardiovascular   Auscultation of heart: Normal rate and rhythm, normal S1 and S2, without murmurs  Abdomen   Abdomen: Abnormal  -soft, ND, NT, but on the lower aspect just to the left of the midline is a 0 7cm x 0 7cm opening that is not currently draining, even when milked  No signs of redness or tenderness  Musculoskeletal   Extremities: Abnormal-Significant left lower extremity lymphedema with overlying skin changes     Skin   Skin and subcutaneous tissue: Abnormal  -As above  Neurologic   Cranial nerves: Cranial nerves 2-12 intact  Psychiatric   Orientation to person, place, and time: Normal     Mood and affect: Normal     Additional Exam:  Gait- walks with cane  Results/Data  Diagnostic Studies Reviewed:   CT Scan Review CT scan of abdomen and pelvis is pending  Diagnostic Review Cytology not collected yet        Future Appointments    Date/Time Provider Specialty Site   10/17/2017 03:15 PM Steffani Brittle, Micaela Stake, MD Hematology [facility]   10/12/2017 03:00 PM Nya Edwards MD General Surgery [address]     Signatures   Electronically signed by : Stacey Liu MD; Oct  2 2017  9:28AM EST                       (Author)

## 2017-10-09 ENCOUNTER — HOSPITAL ENCOUNTER (OUTPATIENT)
Dept: INFUSION CENTER | Facility: HOSPITAL | Age: 82
Discharge: HOME | End: 2017-10-09
Payer: MEDICARE

## 2017-10-09 PROCEDURE — 96523 IRRIG DRUG DELIVERY DEVICE: CPT

## 2017-10-09 RX ADMIN — Medication 300 UNITS: at 08:11

## 2017-10-10 ENCOUNTER — GENERIC CONVERSION - ENCOUNTER (OUTPATIENT)
Dept: OTHER | Facility: OTHER | Age: 82
End: 2017-10-10

## 2017-10-11 ENCOUNTER — GENERIC CONVERSION - ENCOUNTER (OUTPATIENT)
Dept: OTHER | Facility: OTHER | Age: 82
End: 2017-10-11

## 2017-10-12 ENCOUNTER — APPOINTMENT (OUTPATIENT)
Dept: LAB | Facility: HOSPITAL | Age: 82
End: 2017-10-12
Attending: SURGERY
Payer: MEDICARE

## 2017-10-12 ENCOUNTER — ALLSCRIPTS OFFICE VISIT (OUTPATIENT)
Dept: OTHER | Facility: OTHER | Age: 82
End: 2017-10-12

## 2017-10-12 ENCOUNTER — GENERIC CONVERSION - ENCOUNTER (OUTPATIENT)
Dept: OTHER | Facility: OTHER | Age: 82
End: 2017-10-12

## 2017-10-12 ENCOUNTER — HOSPITAL ENCOUNTER (OUTPATIENT)
Dept: RADIOLOGY | Facility: HOSPITAL | Age: 82
Discharge: HOME | End: 2017-10-12
Attending: SURGERY
Payer: MEDICARE

## 2017-10-12 ENCOUNTER — TRANSCRIBE ORDERS (OUTPATIENT)
Dept: ADMINISTRATIVE | Facility: HOSPITAL | Age: 82
End: 2017-10-12

## 2017-10-12 DIAGNOSIS — Z01.818 PREOP EXAMINATION: Primary | ICD-10-CM

## 2017-10-12 DIAGNOSIS — Z01.818 ENCOUNTER FOR OTHER PREPROCEDURAL EXAMINATION: ICD-10-CM

## 2017-10-12 DIAGNOSIS — Z01.818 PREOP EXAMINATION: ICD-10-CM

## 2017-10-12 LAB
ANION GAP SERPL CALCULATED.3IONS-SCNC: 7 MMOL/L (ref 4–13)
BUN SERPL-MCNC: 14 MG/DL (ref 5–25)
CALCIUM SERPL-MCNC: 8.8 MG/DL (ref 8.3–10.1)
CHLORIDE SERPL-SCNC: 102 MMOL/L (ref 100–108)
CO2 SERPL-SCNC: 28 MMOL/L (ref 21–32)
CREAT SERPL-MCNC: 0.85 MG/DL (ref 0.6–1.3)
ERYTHROCYTE [DISTWIDTH] IN BLOOD BY AUTOMATED COUNT: 15.9 % (ref 11.6–15.1)
GFR SERPL CREATININE-BSD FRML MDRD: 64 ML/MIN/1.73SQ M
GLUCOSE SERPL-MCNC: 114 MG/DL (ref 65–140)
HCT VFR BLD AUTO: 39.3 % (ref 37–47)
HGB BLD-MCNC: 12.7 G/DL (ref 12–16)
MCH RBC QN AUTO: 26.2 PG (ref 27–31)
MCHC RBC AUTO-ENTMCNC: 32.2 G/DL (ref 31.4–37.4)
MCV RBC AUTO: 82 FL (ref 82–98)
PLATELET # BLD AUTO: 170 THOUSANDS/UL (ref 130–400)
PMV BLD AUTO: 9.1 FL (ref 8.9–12.7)
POTASSIUM SERPL-SCNC: 3.8 MMOL/L (ref 3.5–5.3)
RBC # BLD AUTO: 4.82 MILLION/UL (ref 4.2–5.4)
SODIUM SERPL-SCNC: 137 MMOL/L (ref 136–145)
WBC # BLD AUTO: 5.9 THOUSAND/UL (ref 4.8–10.8)

## 2017-10-12 PROCEDURE — 93005 ELECTROCARDIOGRAM TRACING: CPT

## 2017-10-12 PROCEDURE — 36415 COLL VENOUS BLD VENIPUNCTURE: CPT

## 2017-10-12 PROCEDURE — 80048 BASIC METABOLIC PNL TOTAL CA: CPT

## 2017-10-12 PROCEDURE — 85027 COMPLETE CBC AUTOMATED: CPT

## 2017-10-12 PROCEDURE — 71010 HB CHEST X-RAY 1 VIEW FRONTAL: CPT

## 2017-10-13 LAB
ATRIAL RATE: 77 BPM
P AXIS: 34 DEGREES
PR INTERVAL: 202 MS
QRS AXIS: -23 DEGREES
QRSD INTERVAL: 88 MS
QT INTERVAL: 384 MS
QTC INTERVAL: 434 MS
T WAVE AXIS: 24 DEGREES
VENTRICULAR RATE: 77 BPM

## 2017-10-17 ENCOUNTER — ALLSCRIPTS OFFICE VISIT (OUTPATIENT)
Dept: OTHER | Facility: OTHER | Age: 82
End: 2017-10-17

## 2017-10-20 ENCOUNTER — ALLSCRIPTS OFFICE VISIT (OUTPATIENT)
Dept: OTHER | Facility: OTHER | Age: 82
End: 2017-10-20

## 2017-10-21 NOTE — PROGRESS NOTES
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)   2  Atherosclerotic peripheral vascular disease (440 20) (I70 209)    Plan   · Follow-up visit in 1 month Evaluation and Treatment  Follow-up  Status: Hold For -  Scheduling  Requested for: 81HPV1978    Discussion/Summary    Patient to continue with Neosporin and dressing daily patient call if any pain or signs of infection  The patient was counseled regarding instructions for management,-- patient and family education,-- importance of compliance with treatment  Chief Complaint  Patient is follow-up for infected ingrown nail  Finish course of antibiotics  Toe is dry there is no drainage  Pain is mostly resolved  History of Present Illness  HPI: [facility] [place] patient has been living in assisted living facility  Is unable to get to office  Cannot walk for long periodsis a history of vascular disease  Patient has significant edema left leg secondary to removal of lymph nodes  Patient has history of Merkel cell cancer  Patient has been on Lyrica for fibromyalgia  Has some burning and numbness in feet  Active Problems  1  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) (M21 969)   2  Allergic rhinitis (477 9) (J30 9)   3  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,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)   4  Asthma (493 90) (J45 909)   5  Atherosclerotic peripheral vascular disease (440 20) (I70 209)   6  Callus (700) (L84)   7  Cellulitis of toe of right foot (681 10) (L03 031)   8  Chronic obstructive pulmonary disease (496) (J44 9)   9  Drainage from wound (879 8) (T14 8XXA)   10  Edema (782 3) (R60 9)   11  Esophageal reflux (530 81) (K21 9)   12  Hyperlipidemia (272 4) (E78 5)   13  Ingrowing nail (703 0) (L60 0)   14  Limb pain (729 5) (M79 609)   15  Lymphedema (457 1) (I89 0)   16  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209 36) (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)   17  Obstructive sleep apnea (327 23) (G47 33)   18  Onychomycosis (110 1) (B35 1)   19   Open wound of abdomen (030 66 62 83  2) (S31 109A)   20  Pre-op testing (V72 84) (Z01 818)   21  Stenosis of left carotid artery (433 10) (I65 22)   22  Xerosis cutis (706 8) (L85 3)    Past Medical History   · History of Aortic sclerosis   · History of Atelectasis (518 0) (J98 11)   · History of Cellulitis (682 9) (L03 90)   · History of Cervical Cancer (V10 41)   · History of Fibromyalgia (729 1) (M79 7)   · History of cardiomegaly (V12 59) (Z86 79)   · History of cholelithiasis (V12 79) (Z87 19)   · History of hiatal hernia (V12 79) (Z87 19)   · 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)   · History of Pleural Effusion (511 9)   · History of Pneumonia (V12 61)   · History of Renal cyst, acquired (593 2) (N28 1)   · History of Respiratory Failure With Hypercapnia (518 81)   · 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)   · History of Septicemia (038 9) (A41 9)    Surgical History   · History of Appendectomy   · History of Bronchoscopy (Diagnostic)   · History of Enteroscopic Polypectomy   · History of Hip Surgery   · History of Knee Arthroplasty   · History of Percutaneous Portal Vein Catheter Placement   · History of Radiation Therapy   · History of Vaginal Hysterectomy    Family History  Mother    · Family history of Acute Myocardial Infarction (V17 3)   · Family history of Pneumonia  Father    · Family history of Prostate Cancer (V16 42)  Sister    · Family history of Breast Cancer (V16 3)    Social History   · Denied: History of Alcohol use   · Always uses seat belt   · Never A Smoker   · Non-smoker (V49 89) (Z78 9)   · Retired from employment   ·  (V61 07) (Z63 4)    Current Meds   1  Atorvastatin Calcium 10 MG Oral Tablet; TAKE 1 TABLET DAILY AS DIRECTED; Therapy: 69QJG6190 to (4566 57 51 26)  Requested for: 45UED3416; Last   Rx:51Vhc0985 Ordered   2  Doxycycline Hyclate 100 MG Oral Capsule; TAKE 1 CAPSULE EVERY 12 HOURS   DAILY;    Therapy: 41MCL2394 to [phone])  Requested for: 55NGI7303; Last   Rx:11Oct2017 Ordered   3  Lyrica 300 MG Oral Capsule; Take two times daily; Therapy: (Cassie Stager) to Recorded   4  NexIUM 40 MG Oral Capsule Delayed Release; TAKE 1 CAPSULE Daily; Therapy: (Cassie Stager) to Recorded   5  Plavix 75 MG Oral Tablet; TAKE 1 TABLET DAILY; Therapy: (Cassie Stager) to Recorded   6  Potassium Chloride ER 10 MEQ Oral Capsule Extended Release; Take one capsule   daily; Therapy: (Recorded:89Glk2060) to Recorded    The medication list was reviewed and updated today  Allergies  1  Penicillins   2  Adhesive Tape TAPE   3  fentanyl   4  Latex Gloves MISC   5  Penicillins  6  Latex    Vitals   Recorded: 70ACH4065 76:95LH   Systolic 939   Diastolic 93     Physical Exam    Constitutional: no acute distress, well appearing and well nourished  Cardiovascular: abnormal dorsalis pedis pulse,-- abnormal posterior tibialis pulse,-- dependence rubor,-- abnormal capillary refill-- and-- edema  Orthopedic/Biomechanical: abnormal foot type,-- hammertoe(s),-- discolored nails,-- subungual debris-- and-- nail tenderness  Skin: keratoses  Neurologic: decreased vibration sensation  Psychiatric: oriented to person, place, and time  Left Foot: Appearance: Normal except as noted: pes planus  Second toe deformities include hammer toe  Third toe deformities include hammer toe  Forth toe deformities include hammer toe  Fifth toe deformities include hammer toe  Evaluation of the great toe nail demonstrates an ingrown nail  All nails thick discolored dystrophic, positive subungual debris, positive pain on palpation  Special Tests: left leg +3 pitting edema right leg no edema negative calf tenderness negative Homans sign significant lymphedema left leg  Right Foot: Appearance: Normal except as noted: Marina Greene Second toe deformities include hammer toe  Third toe deformities include hammer toe  Forth toe deformities include hammer toe  Fifth toe deformities include hammer toe  Evaluation of the great toe nail demonstrates an ingrown nail  Right hallux resolving ingrown had no signs of infection no exudate no purulence no abscess  Special Tests: pus lymphedema right leg significant tenderness negative Homans sign bilateral legs  Skin is hairless and atrophic  Neurological Exam: Light touch was intact bilaterally  Vibratory sensation was decreased in both ankles  Response to monofilament test was intact bilaterally  Vascular Exam: performed Dorsalis pedis pulses were 1/4 bilaterally  Posterior tibial pulses were absent bilaterally  Capillary refill time was greater than 3 seconds bilaterally  Edema: moderate bilaterally,-- moderate on the right-- and-- severe on the left  Toenails: All of the toenails were elongated,-- hypertrophied,-- discolored,-- shown to have subungual debris-- and-- tender  Both first toenails were ingrown  Hyperkeratosis: present on both first sub metatarsals  Shoe Gear Evaluation: performed ()  Right Foot: width: d-- and-- length: 9  Left Foot: width: d-- and-- length: 9  Recommendation(s): athletic shoes-- and-- SAS style  Future Appointments    Date/Time Provider Specialty Site   10/25/2017 02:20 PM CORA Melchor  Cardiology [facility] CARDIOLOG[facility] [place]    02/20/2018 10:15 AM Selma Ny MD Hematology [facility]   11/09/2017 03:00 PM Shaheed Lester MD General Surgery [address]     Signatures   Electronically signed by :  Sami Mcguire DPM; Oct 20 2017  1:37PM EST                       (Author)

## 2017-10-25 ENCOUNTER — ALLSCRIPTS OFFICE VISIT (OUTPATIENT)
Dept: OTHER | Facility: OTHER | Age: 82
End: 2017-10-25

## 2017-10-26 NOTE — PROGRESS NOTES
Surgery Scheduling Form  Pre-Operative Risk Assessment:   Date Last Seen: 10/25/2017   Date of Surgery: 10/27/2017   [facility]   Surgeon Name: Dr Selvin Shaikh Office Number: [phone]  Fax Number: [phone]  Pulmonary: No Pulmonary Contraindication for planned surgical procedure   Cardiac: No Cardiac Contraindication for planned surgical procedure   Vascular: No Vascular Contraindication for planned surgical procedure   Physician Comments: No cardiac contraindication to proposed surgery on 10/27/2017   Further Testing Required: No   Anticoagulation: No, Please note patient is on clopidogrel   Anesthesia: Choice   Patient Approved for Surgery: Yes   Clearing Doctor: Delaney Becerril MD   Comments: Patient with known asymptomatic PVCs        Signatures   Electronically signed by : CORA Hopkins ; Oct 26 2017 12:27AM EST                       (Author)    Electronically signed by : CORA Hopkins ; Oct 26 2017 12:29AM EST                       (Author)

## 2017-10-26 NOTE — PROGRESS NOTES
Assessment  1  Preoperative cardiovascular examination (V72 81) (Z01 810)   2  Drainage from wound (879 8) (T14 8XXA)   3  Open wound of abdomen (879 2) (S31 109A)   4  Asymptomatic PVCs (427 69) (I49 3)   5  Paroxysmal atrial fibrillation (427 31) (I48 0)   6  Asthma (493 90) (J45 909)   7  Chronic obstructive pulmonary disease (496) (J44 9)   8  Esophageal reflux (530 81) (K21 9)   9  Hyperlipidemia (272 4) (E78 5)   10  Class 2 obesity in adult (278 00) (E66 9)   11  Class 2 obesity due to excess calories with body mass index (BMI) of 38 0 to 38 9 in    adult, unspecified whether serious comorbidity present (278 00,V85 38) (E66 09,Z68 38)   12  Atherosclerotic peripheral vascular disease (440 20) (I70 209)   13  Lymphedema (457 1) (I89 0)    Plan    1  No cardiac contraindication to anticipated surgery on 10/27/2017  2   Check with surgeon regarding adequate holding duration for clopidogrel  3   No need for PVC workup or other cardiac testing prior to surgery  Discussion/Summary    Low cardiac risk for proposed surgery on 10/27/2017  Asymptomatic PVCsRemote history of paroxysmal atrial fibrillation 31 years agoWell controlled essential hypertensionHistory of dyslipidemiaHistory of mild LVH with grade 1 diastolic dysfunction on 93/19/2603 echocardiogram and previously nonischemic nuclear stress tests  History of previously negative cardiac catheterization  History of metastatic Merkel cell carcinoma diagnosed 12 years ago with subsequent chemotherapy, radiation therapy, lymphadenectomy of left groin and complicating chronic lymphedema of left lower extremity for approximately 10 years  Chronic obesity, class 2History of bladder cancerHistory of fibromyalgia and degenerative joint diseaseHistory of chronic asthma with occasional exacerbation and bronchitisHistory of GERD/hiatal herniaHistory of severe stenosis of left internal carotid arteryKnown cerebral meningiomaKnown cholelithiasisCervical spondylosis and foraminal narrowing        The patient was counseled regarding diagnostic results,-- instructions for management,-- risk factor reductions,-- prognosis,-- patient and family education,-- impressions,-- risks and benefits of treatment options,-- importance of compliance with treatment  The patient has the current Goals: Assess cardiovascular risk for upcoming surgery  The patent has the current Barriers: None  Patient is able to Self-Care  The treatment plan was reviewed with the patient/guardian  The patient/guardian understands and agrees with the treatment plan     Self Referrals: No Referred by Dr Nela Sen      Chief Complaint  Patient is here today for Cardiac Clearance for Sinus Excision  Patient c/o hx of Left Leg Swelling  Patient said she has a history of AFIB  She recently had a Xray, Blood work and EKG  History of Present Illness  HPI: 81-year-old female is referred by Dr Nela Sen for preoperative cardiovascular assessment prior to wound exploration, resection of sinus, and wound closure at site of previous abdominal hysterectomy, which has been draining for several months  This is scheduled for 10/27/2017  denies any current chest discomfort, palpitations, dizziness, syncope, PND, orthopnea, new edema, recent sputum production, fever, or chills  She has had a recent increase in exertional dyspnea and wheezing, which she has been attributing to seasonal allergies, for which she uses nebulizers as needed, albuterol inhaler as needed, and Advair daily  The patient has had lymphedema of her left lower extremity for approximately 10 years following left inguinal lymphadenectomy for Merkel cell cancer done approximately 10 years ago  She also has chronic pain in her left lower extremity  patient claims a history of atrial fibrillation 31 years ago with family history of atrial fibrillation   She has no known history of CAD and has had prior stress testing and echocardiography with details listed below       Review of Systems    ROS reviewed  Positive for easy fatigue and lack of energy, progressive weight gain, intermittent heartburn, generalized osteoarthritis with chronic low back pain, chronic gait dysfunction, chronic urinary incontinence with occasional UTI last occurring about 1 year ago  All other systems negative, except as noted in the history of present illness  Active Problems  1  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) (M21 969)   2  Allergic rhinitis (477 9) (J30 9)   3  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,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)   4  Asthma (493 90) (J45 909)   5  Atherosclerotic peripheral vascular disease (440 20) (I70 209)   6  Callus (700) (L84)   7  Cellulitis of toe of right foot (681 10) (L03 031)   8  Chronic obstructive pulmonary disease (496) (J44 9)   9  Drainage from wound (879 8) (T14 8XXA)   10  Edema (782 3) (R60 9)   11  Esophageal reflux (530 81) (K21 9)   12  Hyperlipidemia (272 4) (E78 5)   13  Ingrowing nail (703 0) (L60 0)   14  Limb pain (729 5) (M79 609)   15  Lymphedema (457 1) (I89 0)   16  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209 36) (C4A 9)   17  Obstructive sleep apnea (327 23) (G47 33)   18  Onychomycosis (110 1) (B35 1)   19  Open wound of abdomen (879 2) (S31 109A)   20  Pre-op testing (V72 84) (Z01 818)   21  Stenosis of left carotid artery (433 10) (I65 22)   22   Xerosis cutis (706 8) (L85 3)    Past Medical History   · History of Aortic sclerosis   · History of Atelectasis (518 0) (J98 11)   · History of Cellulitis (682 9) (L03 90)   · History of Cervical Cancer (V10 41)   · History of Fibromyalgia (729 1) (M79 7)   · History of arthritis (V13 4) (Z87 39)   · History of atrial fibrillation (V12 59) (Z86 79)   · History of cardiomegaly (V12 59) (Z86 79)   · History of cataract (V12 49) (Z86 69)   · History of cholelithiasis (V12 79) (Z87 19)   · History of gastroesophageal reflux (GERD) (V12 79) (Z87 19)   · History of hiatal hernia (V12 79) (Z87 19)   · History of hypertension (V12 59) (Z86 79)   · History of malignant neoplasm (V10 90) (Z85 9)   · 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)   · History of Pleural Effusion (511 9)   · History of Pneumonia (V12 61)   · History of Renal cyst, acquired (593 2) (N28 1)   · History of Respiratory Failure With Hypercapnia (518 81)   · 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)   · History of Septicemia (038 9) (A41 9)    Hospitalized   The active problems and past medical history were reviewed and updated today  The patient was admitted to [address] [place] [address] March, 2017 with acute bronchitis causing exacerbation of asthma; history of hypertension, dyslipidemia, left carotid artery occlusion, chronic obesity, paroxysmal atrial fibrillation many years ago, GERD  Also with longstanding history of asthma and fibromyalgia  Patient was also admitted to [address] [place] [address] on 09/21/2016 with left ankle swelling and pain as well as syncope  She was found to have a meningioma on brain MRI with ultrasound of abdomen showing thickening of gallbladder with multiple gallstones and positive ultrasound Salvador sign  A cholecystectomy was recommended but the patient refused  CTA of the neck and brain showed near complete occlusion at origin of left internal carotid artery and degenerative spondylosis and bilateral foraminal stenosis C5-6 and C6-7  The the patient had had a recent echocardiogram which was normal and had a normal nuclear stress test   The patient was also admitted to [address] [place] [address] on 04/26/2016 with chest pain with history of negative cardiac catheterization several years earlier  She had prior chest pain evaluation in 2013 and October, 2011 she also had syncope in November, 2011 caused by septic shock with respiratory failure and type 2 non ST elevation myocardial infarction    Old records suggest a history of bladder cancer, penicillin allergy, hiatal hernia, anxiety-depression, questionable obstructive sleep apnea  Also with history of cervical cancer resulting hysterectomy in 1978 March, 2017 with acute bronchitis causing exacerbation of her asthma        Surgical History   · History of Appendectomy   · History of Bronchoscopy (Diagnostic)   · History of Enteroscopic Polypectomy   · History of Hip Surgery   · History of Knee Arthroplasty   · History of Percutaneous Portal Vein Catheter Placement   · History of Radiation Therapy   · History of Vaginal Hysterectomy    The surgical history was reviewed and updated today  appendectomy, Merkel cell carcinoma diagnosed 12 years ago with subsequent metastases to liver and kidneys treated with chemotherapy and radiation  Had recurrence in the the left groin with lymph node dissection with complicating chronic lymphedema and left lower extremity pain with more chemotherapy and radiation at that time  Family History  Mother    · Family history of Acute Myocardial Infarction (V17 3)   · Family history of hypertension (V17 49) (Z82 49)   · Family history of Pneumonia  Father    · Family history of malignant neoplasm (V16 9) (Z80 9)   · Family history of Prostate Cancer (V16 42)  Sister    · Family history of Breast Cancer (V16 3)   · Family history of hypertension (V17 49) (Z82 49)   · Family history of malignant neoplasm (V16 9) (Z80 9)    The family history was reviewed and updated today  Family history includes hypertension, cancer in multiple relatives, arthritis, neurofibromatosis diagnosis in grandson  Social History   · Denied: History of Alcohol use   · Always uses seat belt   · Never A Smoker   · Non-smoker (V49 89) (Z78 9)   · Retired from employment   · Sleeps 8 - 10 hours a day   ·  (V61 07) (Z63 4)  The social history was reviewed and updated today  No tobacco, alcohol, or illicit drug use  She is  for 2-1/2 years and resides at [facility]  Illicit drug abuse    She is a  for 2-1/2 years in resides at [facility]        Current Meds   1  Advair Diskus 250-50 MCG/DOSE Inhalation Aerosol Powder Breath Activated; INHALE 1   PUFF TWICE DAILY; Therapy: 75WYI3482 to Recorded   2  Aleve 220 MG Oral Tablet; Take 1 tablet daily; Therapy: 30CMX7045 to Recorded   3  Aspirin 81 MG Oral Tablet Delayed Release; TAKE 1 TABLET DAILY; Therapy: 50QEF2038 to Recorded   4  Atorvastatin Calcium 10 MG Oral Tablet; TAKE 1 TABLET DAILY AS DIRECTED; Therapy: 69TUL7576 to (Lex Casey)  Requested for: 47NMQ5904; Last   Rx:11Oct2016 Ordered   5  Claritin 10 MG Oral Tablet; TAKE 1 TABLET DAILY; Therapy: 36DKX0380 to Recorded   6  Lyrica 300 MG Oral Capsule; Take two times daily; Therapy: (Cheyanne Oliveira) to Recorded   7  Metoprolol Tartrate 25 MG Oral Tablet; TAKE 1 TABLET TWICE DAILY; Therapy: 42NKS9619 to ([phone] 41 68) Recorded   8  NexIUM 40 MG Oral Capsule Delayed Release; TAKE 1 CAPSULE Daily; Therapy: (Cheyanne Oliveira) to Recorded   9  Plavix 75 MG Oral Tablet; TAKE 1 TABLET DAILY; Therapy: (Cheyanne Oliveira) to Recorded   10  Ventolin  (90 Base) MCG/ACT Inhalation Aerosol Solution; INHALE 1 TO 2 PUFFS    EVERY 6 HOURS AS NEEDED; Therapy: 66RYW7275 to Recorded    The medication list was reviewed and updated today  Allergies  1  Penicillins   2  Adhesive Tape TAPE   3  fentanyl   4  Latex Gloves MISC   5  Penicillins  6  Latex    Vitals  Signs   Heart Rate: 97, Apical  Systolic: 588, LUE, Sitting  Diastolic: 64, LUE, Sitting  Height: 5 ft 2 in  Weight: 208 lb 6 oz  BMI Calculated: 38 11  BSA Calculated: 1 95  O2 Saturation: 97    Physical Exam    Constitutional   General appearance: Abnormal  -- Markedly obese white female in no acute distress  She is alert, oriented, and cooperative  Eyes   Conjunctiva and Sclera examination: Conjunctiva pink, sclera anicteric      Ears, Nose, Mouth, and Throat - Oropharynx: Clear, nares are clear, mucous membranes are moist    Neck   Neck and thyroid: Normal, supple, trachea midline, no thyromegaly  Pulmonary   Respiratory effort: No increased work of breathing or signs of respiratory distress  Auscultation of lungs: Abnormal  -- Diminished breath sounds with no rales, rhonchi, or wheezes  Cardiovascular   Auscultation of heart: Normal rate and rhythm, normal S1 and S2, no murmurs  Carotid pulses: Normal, 2+ bilaterally  Peripheral vascular exam: Normal pulses throughout, no tenderness, erythema or swelling  Pedal pulses: Abnormal  -- Normal pulses on right and difficult to palpate on the left  Examination of extremities for edema and/or varicosities: Abnormal  -- Diffuse lymphedema of left lower extremity  Abdomen   Abdomen: Abnormal  -- Markedly obese with old vertical hysterectomy scar  No tenderness, organomegaly, guarding  Liver and spleen: No hepatomegaly or splenomegaly  Musculoskeletal Gait and station: Normal gait  -- Digits and nails: Normal without clubbing or cyanosis  -- Inspection/palpation of joints, bones, and muscles: Normal, ROM normal     Skin - Skin and subcutaneous tissue: Normal without rashes or lesions  Skin is warm and well perfused, normal turgor  Neurologic - Cranial nerves: II - XII intact  -- Speech: Normal     Psychiatric - Orientation to person, place, and time: Normal -- Mood and affect: Normal       Results/Data  10/12/2017sinus rhythm with PVCsfrontal plane QRS axisfor LVHnormal EKGto 03/13/2017, new PVCs    (1) CBC/ PLT (NO DIFF) 12Oct2017 03:53PM Felecia Jackson Order Number: [id number]     Test Name Result Flag Reference   HEMATOCRIT 39 3 %  37 0-47 0   HEMOGLOBIN 12 7 g/dL  12 0-16 0   MCHC 32 2 g/dL  31 4-37 4   MCH 26 2 pg L 27 0-31 0   MCV 82 fL  82-98   PLATELET COUNT 540 Thousands/uL  130-400   RBC COUNT 4 82 Million/uL  4 20-5  40   RDW 15 9 % H 11 6-15 1   WBC COUNT 5 90 Thousand/uL  4 80-10 80   MPV 9 1 fL  8 9-12 7   HEMATOCRIT 39 3 %  37 0-47 0   HEMOGLOBIN 12 7 g/dL  12 0-16 0   MCHC 32 2 g/dL  31 4-37 4   MCH 26 2 pg L 27 0-31 0   MCV 82 fL  82-98   PLATELET COUNT 819 Thousands/uL  130-400   RBC COUNT 4 82 Million/uL  4 20-5  40   RDW 15 9 % H 11 6-15 1   WBC COUNT 5 90 Thousand/uL  4 80-10 80   MPV 9 1 fL  8 9-12 7                 (1) BASIC METABOLIC PROFILE [id number] 03:53PM Francy Hunt Order Number: [id number]     Test Name Result Flag Reference   GLUCOSE,RANDM 114 mg/dL     If the patient is fasting, the ADA then defines impaired fasting glucose as > 100 mg/dL and diabetes as > or equal to 123 mg/dL  Specimen collection should occur prior to Sulfasalazine administration due to the potential for falsely depressed results  Specimen collection should occur prior to Sulfapyridine administration due to the potential for falsely elevated results  SODIUM 137 mmol/L  136-145   POTASSIUM 3 8 mmol/L  3 5-5 3   CHLORIDE 102 mmol/L  100-108   CARBON DIOXIDE 28 mmol/L  21-32   ANION GAP (CALC) 7 mmol/L  4-13   BLOOD UREA NITROGEN 14 mg/dL  5-25   CREATININE 0 85 mg/dL  0 60-1 30   Standardized to IDMS reference method   CALCIUM 8 8 mg/dL  8 3-10 1   eGFR 64 ml/min/1 73[facility] Disease Education Program recommendations are as follows:  GFR calculation is accurate only with a steady state creatinine  Chronic Kidney disease less than 60 ml/min/1 73 sq  meters  Kidney failure less than 15 ml/min/1 73 sq  meters  GLUCOSE,RANDM 114 mg/dL     If the patient is fasting, the ADA then defines impaired fasting glucose as > 100 mg/dL and diabetes as > or equal to 123 mg/dL  Specimen collection should occur prior to Sulfasalazine administration due to the potential for falsely depressed results  Specimen collection should occur prior to Sulfapyridine administration due to the potential for falsely elevated results     SODIUM 137 mmol/L  136-145   POTASSIUM 3 8 mmol/L  3 5-5 3   CHLORIDE 102 mmol/L  100-108   CARBON DIOXIDE 28 mmol/L  21-32 ANION GAP (CALC) 7 mmol/L  4-13   BLOOD UREA NITROGEN 14 mg/dL  5-25   CREATININE 0 85 mg/dL  0 60-1 30   Standardized to IDMS reference method   CALCIUM 8 8 mg/dL  8 3-10 1   eGFR 64 ml/min/1 [address] [facility] [place] Disease Education Program recommendations are as follows:  GFR calculation is accurate only with a steady state creatinine  Chronic Kidney disease less than 60 ml/min/1 73 sq  meters  Kidney failure less than 15 ml/min/1 73 sq  meters  XR CHEST PA ONLY 12Oct2017 12:00AM Nydia Scales     Test Name Result Flag Reference   XR CHEST PA ONLY (Report)     CHEST- PA VIEW     INDICATION: Preoperative clearance      COMPARISON: 3/13/2017     VIEWS:  PA view     IMAGES: 3     FINDINGS: A right central catheter terminates at the caval atrial junction  Heart shadow is enlarged but unchanged from prior exam      The lungs are clear  No pneumothorax or pleural effusion  Visualized osseous structures appear within normal limits for the patient's age  IMPRESSION:     No active pulmonary disease  Workstation performed: [id number]     Signed by:   Micky Booker MD   10/13/17     * MRI TIBIA FIBULA LEFT W WO CONTRAST [id number] 03:12PM Aaronarmond Lawrence     Test Name Result Flag Reference   MRI TIBIA FIBULA LEFT W WO CONTRAST (Report)     MRI LEFT LOWER EXTREMITY WITH AND WITHOUT CONTRAST (Extremity)     INDICATION: I89 0: Lymphedema, not elsewhere classified  History taken directly from the electronic ordering system  81YEAR OLD FEMALE WITH HX OF MERKEL CELL OF POSTERIOR LEFT KNEE, STATUS POST RESECTION, RADIATION THERAPY AND CHEMOTHERAPY  PT WITH LT    KNEE, LOWER THIGH AN CALF SWELLING (?LYMPHEDEMA VS RECURRENCE)  Tumor was removed 10 years ago  COMPARISON: None       TECHNIQUE: MR examination of the left tibia fibula was performed with and without contrast  Precontrast pulse sequences:Coronal STIR, coronal T1-weighted, axial T1-weighted, axial T2 fat sat, axial T1 fat sat  Postcontrast pulse sequences: Axial T1 fat    sat, and sagittal T1 fat sat  (Please note the coronal images also include the contralateral lower extremity )      IV Contrast: 9 mL of Gadobutrol injection (SINGLE-DOSE)      FINDINGS:     SUBCUTANEOUS TISSUES: There is severe, diffuse subcutaneous edema in the left lower leg  A marker was placed on the left posterior lateral aspect of the proximal calf where tumor was resected  In this area, there is atrophy of the subcutaneous fat, and   nonenhancing T1 and T2 hypointense tissue consistent with scar  There is no evidence of tumor recurrence  (Series 9 images 10 through 13, and series 5 images 9 through 13 )     OSSEOUS STRUCTURES AND MARROW SIGNAL:   No fracture, dislocation or destructive osseous lesion  VISUALIZED MUSCULATURE: Unremarkable  OTHER SOFT TISSUES: Unremarkable  OTHER PERTINENT FINDINGS: None  PARTIALLY IMAGED CONTRALATERAL LOWER EXTREMITY: There are no abnormalities in the partially imaged contralateral lower extremity  IMPRESSION:     In the left lower leg, there is expected postop changes and scar in the proximal posterior lateral calf (series 9 images 10 through 13 and series 5 image 9 through 13  There is no evidence of tumor recurrence  There is diffuse subcutaneous edema throughout the left lower leg  Workstation performed: [id number]     Signed by:   Yessica Arizmendi MD   10/5/17     * CT CHEST ABDOMEN PELVIS W CONTRAST [id number] 02:30PM Conner Pratt     Test Name Result Flag Reference   CT CHEST ABDOMEN PELVIS W CONTRAST (Report)     CT CHEST, ABDOMEN AND PELVIS WITH IV CONTRAST     INDICATION: 81-year-old female with lymphedema and history of leaking from the hysterectomy incision site and Merkel cell carcinoma  COMPARISON: None  TECHNIQUE: CT examination of the chest, abdomen and pelvis was performed  Reformatted images were created in axial, sagittal, and coronal planes        Radiation dose length product (DLP) for this visit: 1027 1 mGy-cm   This examination, like all CT scans performed in the [facility], was performed utilizing techniques to minimize radiation dose exposure, including the use of iterative   reconstruction and automated exposure control  IV Contrast: 100 mL of iohexol (OMNIPAQUE)      Enteric Contrast: Enteric contrast was administered  FINDINGS:     CHEST     LUNGS: Bibasilar scarring versus atelectasis noted  No suspicious parenchymal or endobronchial lesions  PLEURA: No pleural effusions  Left hemidiaphragm elevation noted  HEART/GREAT VESSELS: Unremarkable for patient's age  MEDIASTINUM AND JASWINDER: No pathologic lymphadenopathy with small hiatal hernia noted  CHEST WALL AND LOWER NECK:  A right IJ catheter terminates in the distal SVC  No axillary lymphadenopathy  ABDOMEN     LIVER/BILIARY TREE: Unremarkable  GALLBLADDER: There are gallstone(s) within the gallbladder, without pericholecystic inflammatory changes  SPLEEN: Unremarkable  PANCREAS: Unremarkable  ADRENAL GLANDS: Unremarkable  KIDNEYS/URETERS: One or more simple renal cyst(s) is noted  Otherwise unremarkable kidneys  No hydronephrosis  STOMACH AND BOWEL: There is colonic diverticulosis without evidence of acute diverticulitis  Small duodenal diverticulum noted with no bowel obstruction  APPENDIX: The appendix is surgically absent  ABDOMINOPELVIC CAVITY: No ascites or free intraperitoneal air  No lymphadenopathy  VESSELS: Atherosclerotic changes are present  No evidence of aneurysm  PELVIS     REPRODUCTIVE ORGANS: Patient is status post hysterectomy  URINARY BLADDER: Unremarkable  ABDOMINAL WALL/INGUINAL REGIONS: No significant 3rd spacing of fluid throughout the subcutaneous soft tissues  No fluid collection in the lower abdominal wall the site of hysterectomy with evidence of a left inguinal herniorrhaphy       OSSEOUS STRUCTURES: Left hip arthroplasty noted  No suspicious lytic or blastic lesions  IMPRESSION:       1  No metastatic disease in the chest, abdomen, or pelvis  2  No significant anasarca throughout the subcutaneous fat of the abdomen or pelvis nor discrete fluid collection in the lower ventral abdominal wall  3  Gallstones  4  Scattered diverticulosis               Workstation performed: [id number]     Signed by:   Daisy Giron MD   10/6/17     Future Appointments    Date/Time Provider Specialty Site   11/06/2017 01:30 PM Chitra Mejia MD General Surgery [facility]   02/20/2018 10:15 AM Mounika Guerra MD Hematology [facility]     Signatures   Electronically signed by : CORA Arzate ; Oct 26 2017 12:19AM EST                       (Author)

## 2017-10-27 ENCOUNTER — ANESTHESIA (OUTPATIENT)
Dept: PERIOP | Facility: HOSPITAL | Age: 82
End: 2017-10-27
Payer: MEDICARE

## 2017-10-27 ENCOUNTER — ANESTHESIA EVENT (OUTPATIENT)
Dept: PERIOP | Facility: HOSPITAL | Age: 82
End: 2017-10-27
Payer: MEDICARE

## 2017-10-27 ENCOUNTER — HOSPITAL ENCOUNTER (OUTPATIENT)
Facility: HOSPITAL | Age: 82
Setting detail: OUTPATIENT SURGERY
Discharge: HOME | End: 2017-10-27
Attending: SURGERY | Admitting: SURGERY
Payer: MEDICARE

## 2017-10-27 VITALS
SYSTOLIC BLOOD PRESSURE: 178 MMHG | RESPIRATION RATE: 18 BRPM | OXYGEN SATURATION: 94 % | DIASTOLIC BLOOD PRESSURE: 85 MMHG | HEART RATE: 84 BPM | TEMPERATURE: 98.1 F

## 2017-10-27 DIAGNOSIS — S31.109A OPEN WOUND OF ABDOMINAL WALL WITHOUT PENETRATION INTO PERITONEAL CAVITY: ICD-10-CM

## 2017-10-27 LAB
ABO GROUP BLD: NORMAL
BLD GP AB SCN SERPL QL: NEGATIVE
RH BLD: NEGATIVE
SPECIMEN EXPIRATION DATE: NORMAL

## 2017-10-27 PROCEDURE — 86850 RBC ANTIBODY SCREEN: CPT | Performed by: SURGERY

## 2017-10-27 PROCEDURE — 86900 BLOOD TYPING SEROLOGIC ABO: CPT | Performed by: SURGERY

## 2017-10-27 PROCEDURE — 86901 BLOOD TYPING SEROLOGIC RH(D): CPT | Performed by: SURGERY

## 2017-10-27 PROCEDURE — 88305 TISSUE EXAM BY PATHOLOGIST: CPT | Performed by: SURGERY

## 2017-10-27 RX ORDER — BUPIVACAINE HYDROCHLORIDE 5 MG/ML
INJECTION, SOLUTION EPIDURAL; INTRACAUDAL AS NEEDED
Status: DISCONTINUED | OUTPATIENT
Start: 2017-10-27 | End: 2017-10-27 | Stop reason: HOSPADM

## 2017-10-27 RX ORDER — DIPHENHYDRAMINE HYDROCHLORIDE 50 MG/ML
12.5 INJECTION INTRAMUSCULAR; INTRAVENOUS ONCE AS NEEDED
Status: DISCONTINUED | OUTPATIENT
Start: 2017-10-27 | End: 2017-10-27 | Stop reason: HOSPADM

## 2017-10-27 RX ORDER — FENTANYL CITRATE/PF 50 MCG/ML
25 SYRINGE (ML) INJECTION AS NEEDED
Status: DISCONTINUED | OUTPATIENT
Start: 2017-10-27 | End: 2017-10-27 | Stop reason: HOSPADM

## 2017-10-27 RX ORDER — LIDOCAINE HYDROCHLORIDE 10 MG/ML
INJECTION, SOLUTION INFILTRATION; PERINEURAL AS NEEDED
Status: DISCONTINUED | OUTPATIENT
Start: 2017-10-27 | End: 2017-10-27 | Stop reason: SURG

## 2017-10-27 RX ORDER — ONDANSETRON 2 MG/ML
4 INJECTION INTRAMUSCULAR; INTRAVENOUS ONCE AS NEEDED
Status: COMPLETED | OUTPATIENT
Start: 2017-10-27 | End: 2017-10-27

## 2017-10-27 RX ORDER — GLYCOPYRROLATE 0.2 MG/ML
INJECTION INTRAMUSCULAR; INTRAVENOUS AS NEEDED
Status: DISCONTINUED | OUTPATIENT
Start: 2017-10-27 | End: 2017-10-27 | Stop reason: SURG

## 2017-10-27 RX ORDER — ALBUTEROL SULFATE 0.63 MG/3ML
1 SOLUTION RESPIRATORY (INHALATION) EVERY 6 HOURS PRN
COMMUNITY
End: 2018-03-15 | Stop reason: CLARIF

## 2017-10-27 RX ORDER — SODIUM CHLORIDE, SODIUM LACTATE, POTASSIUM CHLORIDE, CALCIUM CHLORIDE 600; 310; 30; 20 MG/100ML; MG/100ML; MG/100ML; MG/100ML
INJECTION, SOLUTION INTRAVENOUS CONTINUOUS PRN
Status: DISCONTINUED | OUTPATIENT
Start: 2017-10-27 | End: 2017-10-27 | Stop reason: SURG

## 2017-10-27 RX ORDER — HYDROMORPHONE HCL 110MG/55ML
0.2 PATIENT CONTROLLED ANALGESIA SYRINGE INTRAVENOUS
Status: DISCONTINUED | OUTPATIENT
Start: 2017-10-27 | End: 2017-10-27 | Stop reason: HOSPADM

## 2017-10-27 RX ORDER — KETAMINE HYDROCHLORIDE 50 MG/ML
INJECTION, SOLUTION, CONCENTRATE INTRAMUSCULAR; INTRAVENOUS AS NEEDED
Status: DISCONTINUED | OUTPATIENT
Start: 2017-10-27 | End: 2017-10-27 | Stop reason: SURG

## 2017-10-27 RX ORDER — EPHEDRINE SULFATE 50 MG/ML
INJECTION, SOLUTION INTRAVENOUS AS NEEDED
Status: DISCONTINUED | OUTPATIENT
Start: 2017-10-27 | End: 2017-10-27 | Stop reason: SURG

## 2017-10-27 RX ORDER — ALBUTEROL SULFATE 90 UG/1
2 AEROSOL, METERED RESPIRATORY (INHALATION) EVERY 6 HOURS PRN
COMMUNITY
End: 2017-12-16 | Stop reason: HOSPADM

## 2017-10-27 RX ORDER — SODIUM CHLORIDE, SODIUM LACTATE, POTASSIUM CHLORIDE, CALCIUM CHLORIDE 600; 310; 30; 20 MG/100ML; MG/100ML; MG/100ML; MG/100ML
75 INJECTION, SOLUTION INTRAVENOUS CONTINUOUS
Status: DISCONTINUED | OUTPATIENT
Start: 2017-10-27 | End: 2017-10-27 | Stop reason: HOSPADM

## 2017-10-27 RX ORDER — CLINDAMYCIN PHOSPHATE 600 MG/50ML
600 INJECTION INTRAVENOUS ONCE
Status: COMPLETED | OUTPATIENT
Start: 2017-10-27 | End: 2017-10-27

## 2017-10-27 RX ORDER — FENTANYL CITRATE 50 UG/ML
INJECTION, SOLUTION INTRAMUSCULAR; INTRAVENOUS AS NEEDED
Status: DISCONTINUED | OUTPATIENT
Start: 2017-10-27 | End: 2017-10-27 | Stop reason: SURG

## 2017-10-27 RX ORDER — ONDANSETRON 2 MG/ML
INJECTION INTRAMUSCULAR; INTRAVENOUS AS NEEDED
Status: DISCONTINUED | OUTPATIENT
Start: 2017-10-27 | End: 2017-10-27 | Stop reason: SURG

## 2017-10-27 RX ORDER — PROPOFOL 10 MG/ML
INJECTION, EMULSION INTRAVENOUS AS NEEDED
Status: DISCONTINUED | OUTPATIENT
Start: 2017-10-27 | End: 2017-10-27 | Stop reason: SURG

## 2017-10-27 RX ORDER — ROCURONIUM BROMIDE 10 MG/ML
INJECTION, SOLUTION INTRAVENOUS AS NEEDED
Status: DISCONTINUED | OUTPATIENT
Start: 2017-10-27 | End: 2017-10-27 | Stop reason: SURG

## 2017-10-27 RX ADMIN — CLINDAMYCIN PHOSPHATE 600 MG: 12 INJECTION, SOLUTION INTRAMUSCULAR; INTRAVENOUS at 08:27

## 2017-10-27 RX ADMIN — DEXAMETHASONE SODIUM PHOSPHATE 10 MG: 10 INJECTION INTRAMUSCULAR; INTRAVENOUS at 08:47

## 2017-10-27 RX ADMIN — KETAMINE HYDROCHLORIDE 50 MG: 50 INJECTION INTRAMUSCULAR; INTRAVENOUS at 08:50

## 2017-10-27 RX ADMIN — EPHEDRINE SULFATE 10 MG: 50 INJECTION, SOLUTION INTRAMUSCULAR; INTRAVENOUS; SUBCUTANEOUS at 08:35

## 2017-10-27 RX ADMIN — ONDANSETRON 4 MG: 2 INJECTION INTRAMUSCULAR; INTRAVENOUS at 09:05

## 2017-10-27 RX ADMIN — GLYCOPYRROLATE 0.8 MG: 0.2 INJECTION, SOLUTION INTRAMUSCULAR; INTRAVENOUS at 09:12

## 2017-10-27 RX ADMIN — FENTANYL CITRATE 50 MCG: 50 INJECTION, SOLUTION INTRAMUSCULAR; INTRAVENOUS at 08:40

## 2017-10-27 RX ADMIN — LIDOCAINE HYDROCHLORIDE 50 MG: 10 INJECTION, SOLUTION INFILTRATION; PERINEURAL at 08:28

## 2017-10-27 RX ADMIN — ROCURONIUM BROMIDE 50 MG: 10 INJECTION, SOLUTION INTRAVENOUS at 08:28

## 2017-10-27 RX ADMIN — KETAMINE HYDROCHLORIDE 50 MG: 50 INJECTION INTRAMUSCULAR; INTRAVENOUS at 08:27

## 2017-10-27 RX ADMIN — SUGAMMADEX 200 MG: 100 INJECTION, SOLUTION INTRAVENOUS at 09:32

## 2017-10-27 RX ADMIN — ONDANSETRON 4 MG: 2 INJECTION INTRAMUSCULAR; INTRAVENOUS at 10:37

## 2017-10-27 RX ADMIN — PROPOFOL 250 MG: 10 INJECTION, EMULSION INTRAVENOUS at 08:28

## 2017-10-27 RX ADMIN — FENTANYL CITRATE 50 MCG: 50 INJECTION, SOLUTION INTRAMUSCULAR; INTRAVENOUS at 08:27

## 2017-10-27 RX ADMIN — NEOSTIGMINE METHYLSULFATE 5 MG: 1 INJECTION, SOLUTION INTRAMUSCULAR; INTRAVENOUS; SUBCUTANEOUS at 09:12

## 2017-10-27 RX ADMIN — SODIUM CHLORIDE, SODIUM LACTATE, POTASSIUM CHLORIDE, AND CALCIUM CHLORIDE: .6; .31; .03; .02 INJECTION, SOLUTION INTRAVENOUS at 07:03

## 2017-10-27 NOTE — ANESTHESIA PREPROCEDURE EVALUATION
Review of Systems/Medical History  Patient summary reviewed  Chart reviewed  History of anesthetic complications (prolonged awakening)     Cardiovascular  EKG reviewed, Hyperlipidemia, Hypertension , Dysrhythmias (paroxsymal A-Fib), atrial fibrillation, PVD (carotid artery disease (left with severe stenosis)),   Comment: EF 60-70%, no valvular abnormalities noted on limited study  ,  Pulmonary  COPD , Asthma: poorly controlled , ,        GI/Hepatic    GERD ,   Comment: S/p appy          Endo/Other    Comment: H/o bryant cell cancer s/p resection and chemo x2   GYN    Hysterectomy,        Hematology    Coagulation disorder (plavix stopped 4 days ago) currently taking oral anticoagulants,    Musculoskeletal  Obesity ,   Comment: S/p left hip replacement      Neurology    Neuromuscular disease (fibromyalgia) ,    Psychology           Physical Exam    Airway    Mallampati score: III  TM Distance: <3 FB  Neck ROM: full     Dental       Cardiovascular  Rhythm: regular, Rate: normal,     Pulmonary  Breath sounds clear to auscultation,     Other Findings        Anesthesia Plan  ASA Score- 3       Anesthesia Type- general with ASA Monitors  Additional Monitors:   Airway Plan: ETT  Induction- intravenous  Informed Consent- Anesthetic plan and risks discussed with patient  I personally reviewed this patient with the CRNA  Discussed and agreed on the Anesthesia Plan with the CRNA  Sandee Ferrera

## 2017-10-27 NOTE — PROGRESS NOTES
100 mcg of ketamine administered  Unable to waste in [address] [place] [address] system -- patient discharged  8 mL ketamine 50mg/mL wasted in pharmacy by Gregory Johnson and Fernanda Loya 10/27/17 1773

## 2017-10-27 NOTE — ANESTHESIA POSTPROCEDURE EVALUATION
Post-Op Assessment Note      CV Status:  Stable    Mental Status:  Alert and awake    Hydration Status:  Stable    PONV Controlled:  Controlled    Airway Patency:  Patent and adequate    Post Op Vitals Reviewed: Yes          Staff: CRNA       Comments: oxygen NC 3 Liters          BP (!) (P) 175/89 (10/27/17 0945)    Temp (!) (P) 97 °F (36 1 °C) (10/27/17 0945)    Pulse (P) 88 (10/27/17 0945)   Resp (P) 18 (10/27/17 0945)    SpO2 (P) 94 % (10/27/17 0945)

## 2017-10-27 NOTE — OP NOTE
OPERATIVE REPORT  PATIENT NAME: Samuel Knutson    :  1935  MRN: [id number]  Pt Location: [facility] OR ROOM 01    SURGERY DATE: 10/27/2017    Surgeon(s) and Role:     * Sandee Cullen MD - Primary     * DEJA Ang - Assisting    Preop Diagnosis:  Open wound of abdominal wall without penetration into peritoneal cavity [S31 109A]    Post-Op Diagnosis Codes:     * Open wound of abdominal wall without penetration into peritoneal cavity [S31 109A]    Procedure(s) (LRB):  SINUS EXCISION AND REMOVAL OF FOREIGN BODY, ABDOMEN (WOUND EXPLORATION) (N/A)    Specimen(s):  ID Type Source Tests Collected by Time Destination   1 : abdominal sinus tract Tissue Soft Tissue, Other TISSUE EXAM Sandee Cullen MD 10/27/2017 0848        Estimated Blood Loss:   Minimal    Drains:  None    Anesthesia Type:   Choice    Operative Indications:  Open wound of abdominal wall without penetration into peritoneal cavity [S31 109A]  Sinus of the abdominal wall without penetration into peritoneal cavity    Operative Findings:  Mesh like foreign body at the very bottom of the sinus  Strange, as she has never had a mesh placed in the vicinity  Complications:   None    Procedure and Technique:  A brief time-out was held to ensure that the patient was indeed Franklyn Castleman  We noted that she had stopped her Plavix 3 days ago  We noted that she had taken her beta-blockers this morning  She was noted to be allergic to fentanyl latex and penicillin therefore she received 600 mg of IV clindamycin prior to the operation  We noted that she had her SCDs on and in place  We started with prepping and draping with the retraction of the large pannus that she had  We then anesthetized the area around the sinus, with Marcaine 0 5% without epinephrine  I then followed with a elliptical excision of the skin colon going down to the deepest part of the sinus    I noted at the very bottom of this sinus a material that the Bovie cautery could not penetrate very well and noted it had the consistency of a mesh  I did not note that there was any other foreign body in the area and carefully palpated the whole region and also noted that there was definitely no penetration into the peritoneal cavity  Total excision carried down to fascia with excision of total tissue of [id number] including skin, subcutaneous tissue, and sinus (having used scalpel and electrocautery)  I thoroughly irrigated the wound afterward then closed the wound in 3 layers using 3 0 Vicryl suture in a running fashion  I closed the skin using 4-0 Monocryl in a subcuticular fashion  I sealed the skin using skin glue  The patient tolerated the procedure well  I was present for the entire case  The sinus was sent off for permanent fixation and was the specimen      Patient Disposition:  PACU     SIGNATURE: Chang Han MD  DATE: October 27, 2017  TIME: 9:17 AM

## 2017-11-02 DIAGNOSIS — I89.0 LYMPHEDEMA, NOT ELSEWHERE CLASSIFIED: ICD-10-CM

## 2017-11-06 ENCOUNTER — GENERIC CONVERSION - ENCOUNTER (OUTPATIENT)
Dept: OTHER | Facility: OTHER | Age: 82
End: 2017-11-06

## 2017-11-20 ENCOUNTER — HOSPITAL ENCOUNTER (OUTPATIENT)
Dept: INFUSION CENTER | Facility: HOSPITAL | Age: 82
Discharge: HOME | End: 2017-11-20
Payer: MEDICARE

## 2017-11-20 PROCEDURE — 96523 IRRIG DRUG DELIVERY DEVICE: CPT

## 2017-11-20 RX ADMIN — Medication 300 UNITS: at 10:32

## 2017-12-11 ENCOUNTER — APPOINTMENT (EMERGENCY)
Dept: RADIOLOGY | Facility: HOSPITAL | Age: 82
DRG: 202 | End: 2017-12-11
Payer: MEDICARE

## 2017-12-11 ENCOUNTER — APPOINTMENT (INPATIENT)
Dept: RADIOLOGY | Facility: HOSPITAL | Age: 82
DRG: 202 | End: 2017-12-11
Attending: INTERNAL MEDICINE
Payer: MEDICARE

## 2017-12-11 ENCOUNTER — HOSPITAL ENCOUNTER (INPATIENT)
Facility: HOSPITAL | Age: 82
LOS: 5 days | Discharge: HOME | DRG: 202 | End: 2017-12-16
Attending: EMERGENCY MEDICINE | Admitting: FAMILY MEDICINE
Payer: MEDICARE

## 2017-12-11 DIAGNOSIS — J45.901 ASTHMA EXACERBATION: ICD-10-CM

## 2017-12-11 DIAGNOSIS — J18.9 PNEUMONIA: Primary | ICD-10-CM

## 2017-12-11 DIAGNOSIS — I89.0 LYMPHEDEMA OF LEFT LEG: ICD-10-CM

## 2017-12-11 DIAGNOSIS — J96.01 ACUTE RESPIRATORY FAILURE WITH HYPOXIA (HCC): ICD-10-CM

## 2017-12-11 DIAGNOSIS — Z95.828 PORT-A-CATH IN PLACE: ICD-10-CM

## 2017-12-11 DIAGNOSIS — R78.81 BACTEREMIA: ICD-10-CM

## 2017-12-11 DIAGNOSIS — J45.909 ASTHMA: ICD-10-CM

## 2017-12-11 LAB
ALBUMIN SERPL BCP-MCNC: 2.9 G/DL (ref 3.5–5)
ALP SERPL-CCNC: 117 U/L (ref 46–116)
ALT SERPL W P-5'-P-CCNC: 16 U/L (ref 12–78)
ANION GAP SERPL CALCULATED.3IONS-SCNC: 6 MMOL/L (ref 4–13)
APTT PPP: 43 SECONDS (ref 24–33)
AST SERPL W P-5'-P-CCNC: 16 U/L (ref 5–45)
ATRIAL RATE: 110 BPM
BASOPHILS # BLD AUTO: 0 THOUSANDS/ΜL (ref 0–0.1)
BASOPHILS NFR BLD AUTO: 0 % (ref 0–1)
BILIRUB SERPL-MCNC: 0.3 MG/DL (ref 0.2–1)
BUN SERPL-MCNC: 12 MG/DL (ref 5–25)
CALCIUM SERPL-MCNC: 8.6 MG/DL (ref 8.3–10.1)
CHLORIDE SERPL-SCNC: 105 MMOL/L (ref 100–108)
CO2 SERPL-SCNC: 33 MMOL/L (ref 21–32)
CREAT SERPL-MCNC: 0.99 MG/DL (ref 0.6–1.3)
EOSINOPHIL # BLD AUTO: 0.4 THOUSAND/ΜL (ref 0–0.61)
EOSINOPHIL NFR BLD AUTO: 6 % (ref 0–6)
ERYTHROCYTE [DISTWIDTH] IN BLOOD BY AUTOMATED COUNT: 16.5 % (ref 11.6–15.1)
FLUAV AG SPEC QL IA: NEGATIVE
FLUAV AG SPEC QL: NORMAL
FLUBV AG SPEC QL IA: NEGATIVE
FLUBV AG SPEC QL: NORMAL
GFR SERPL CREATININE-BSD FRML MDRD: 54 ML/MIN/1.73SQ M
GLUCOSE SERPL-MCNC: 108 MG/DL (ref 65–140)
HCT VFR BLD AUTO: 41.2 % (ref 37–47)
HGB BLD-MCNC: 13.3 G/DL (ref 12–16)
INR PPP: 0.98 (ref 0.86–1.16)
LACTATE SERPL-SCNC: 1 MMOL/L (ref 0.5–2)
LIPASE SERPL-CCNC: 73 U/L (ref 73–393)
LYMPHOCYTES # BLD AUTO: 0.7 THOUSANDS/ΜL (ref 0.6–4.47)
LYMPHOCYTES NFR BLD AUTO: 11 % (ref 14–44)
MCH RBC QN AUTO: 25.9 PG (ref 27–31)
MCHC RBC AUTO-ENTMCNC: 32.3 G/DL (ref 31.4–37.4)
MCV RBC AUTO: 80 FL (ref 82–98)
MONOCYTES # BLD AUTO: 0.4 THOUSAND/ΜL (ref 0.17–1.22)
MONOCYTES NFR BLD AUTO: 6 % (ref 4–12)
NEUTROPHILS # BLD AUTO: 4.8 THOUSANDS/ΜL (ref 1.85–7.62)
NEUTS SEG NFR BLD AUTO: 76 % (ref 43–75)
NRBC BLD AUTO-RTO: 0 /100 WBCS
PLATELET # BLD AUTO: 165 THOUSANDS/UL (ref 130–400)
PMV BLD AUTO: 9.5 FL (ref 8.9–12.7)
POTASSIUM SERPL-SCNC: 3.5 MMOL/L (ref 3.5–5.3)
PROT SERPL-MCNC: 6.5 G/DL (ref 6.4–8.2)
PROTHROMBIN TIME: 10.3 SECONDS (ref 9.4–11.7)
QRS AXIS: -16 DEGREES
QRSD INTERVAL: 82 MS
QT INTERVAL: 286 MS
QTC INTERVAL: 387 MS
RBC # BLD AUTO: 5.14 MILLION/UL (ref 4.2–5.4)
RSV B RNA SPEC QL NAA+PROBE: NORMAL
SODIUM SERPL-SCNC: 144 MMOL/L (ref 136–145)
T WAVE AXIS: 67 DEGREES
TROPONIN I SERPL-MCNC: 0.02 NG/ML
VENTRICULAR RATE: 110 BPM
WBC # BLD AUTO: 6.2 THOUSAND/UL (ref 4.8–10.8)

## 2017-12-11 PROCEDURE — 36415 COLL VENOUS BLD VENIPUNCTURE: CPT | Performed by: EMERGENCY MEDICINE

## 2017-12-11 PROCEDURE — 87081 CULTURE SCREEN ONLY: CPT | Performed by: INTERNAL MEDICINE

## 2017-12-11 PROCEDURE — 84484 ASSAY OF TROPONIN QUANT: CPT | Performed by: EMERGENCY MEDICINE

## 2017-12-11 PROCEDURE — 83605 ASSAY OF LACTIC ACID: CPT | Performed by: EMERGENCY MEDICINE

## 2017-12-11 PROCEDURE — 96374 THER/PROPH/DIAG INJ IV PUSH: CPT

## 2017-12-11 PROCEDURE — 87798 DETECT AGENT NOS DNA AMP: CPT | Performed by: EMERGENCY MEDICINE

## 2017-12-11 PROCEDURE — 99285 EMERGENCY DEPT VISIT HI MDM: CPT

## 2017-12-11 PROCEDURE — 94664 DEMO&/EVAL PT USE INHALER: CPT

## 2017-12-11 PROCEDURE — 93005 ELECTROCARDIOGRAM TRACING: CPT

## 2017-12-11 PROCEDURE — 87147 CULTURE TYPE IMMUNOLOGIC: CPT | Performed by: EMERGENCY MEDICINE

## 2017-12-11 PROCEDURE — 87186 SC STD MICRODIL/AGAR DIL: CPT | Performed by: EMERGENCY MEDICINE

## 2017-12-11 PROCEDURE — 93005 ELECTROCARDIOGRAM TRACING: CPT | Performed by: EMERGENCY MEDICINE

## 2017-12-11 PROCEDURE — 94640 AIRWAY INHALATION TREATMENT: CPT

## 2017-12-11 PROCEDURE — 87040 BLOOD CULTURE FOR BACTERIA: CPT | Performed by: EMERGENCY MEDICINE

## 2017-12-11 PROCEDURE — 83690 ASSAY OF LIPASE: CPT | Performed by: EMERGENCY MEDICINE

## 2017-12-11 PROCEDURE — 85025 COMPLETE CBC W/AUTO DIFF WBC: CPT | Performed by: EMERGENCY MEDICINE

## 2017-12-11 PROCEDURE — 94760 N-INVAS EAR/PLS OXIMETRY 1: CPT

## 2017-12-11 PROCEDURE — 96361 HYDRATE IV INFUSION ADD-ON: CPT

## 2017-12-11 PROCEDURE — 87400 INFLUENZA A/B EACH AG IA: CPT | Performed by: EMERGENCY MEDICINE

## 2017-12-11 PROCEDURE — 80053 COMPREHEN METABOLIC PANEL: CPT | Performed by: EMERGENCY MEDICINE

## 2017-12-11 PROCEDURE — 85610 PROTHROMBIN TIME: CPT | Performed by: EMERGENCY MEDICINE

## 2017-12-11 PROCEDURE — 85730 THROMBOPLASTIN TIME PARTIAL: CPT | Performed by: EMERGENCY MEDICINE

## 2017-12-11 PROCEDURE — 93971 EXTREMITY STUDY: CPT

## 2017-12-11 PROCEDURE — 71010 HB CHEST X-RAY 1 VIEW FRONTAL (PORTABLE): CPT

## 2017-12-11 PROCEDURE — 87077 CULTURE AEROBIC IDENTIFY: CPT | Performed by: EMERGENCY MEDICINE

## 2017-12-11 PROCEDURE — 96375 TX/PRO/DX INJ NEW DRUG ADDON: CPT

## 2017-12-11 RX ORDER — ACETAMINOPHEN 325 MG/1
650 TABLET ORAL EVERY 6 HOURS PRN
Status: DISCONTINUED | OUTPATIENT
Start: 2017-12-11 | End: 2017-12-16 | Stop reason: HOSPADM

## 2017-12-11 RX ORDER — METHYLPREDNISOLONE SODIUM SUCCINATE 125 MG/2ML
60 INJECTION, POWDER, LYOPHILIZED, FOR SOLUTION INTRAMUSCULAR; INTRAVENOUS EVERY 8 HOURS SCHEDULED
Status: DISCONTINUED | OUTPATIENT
Start: 2017-12-11 | End: 2017-12-14

## 2017-12-11 RX ORDER — PANTOPRAZOLE SODIUM 40 MG/1
40 TABLET, DELAYED RELEASE ORAL
Status: DISCONTINUED | OUTPATIENT
Start: 2017-12-12 | End: 2017-12-16 | Stop reason: HOSPADM

## 2017-12-11 RX ORDER — ONDANSETRON 2 MG/ML
4 INJECTION INTRAMUSCULAR; INTRAVENOUS EVERY 4 HOURS PRN
Status: DISCONTINUED | OUTPATIENT
Start: 2017-12-11 | End: 2017-12-16 | Stop reason: HOSPADM

## 2017-12-11 RX ORDER — PREGABALIN 100 MG/1
300 CAPSULE ORAL 2 TIMES DAILY
Status: DISCONTINUED | OUTPATIENT
Start: 2017-12-11 | End: 2017-12-16 | Stop reason: HOSPADM

## 2017-12-11 RX ORDER — ASPIRIN 81 MG/1
81 TABLET ORAL DAILY
Status: DISCONTINUED | OUTPATIENT
Start: 2017-12-11 | End: 2017-12-16 | Stop reason: HOSPADM

## 2017-12-11 RX ORDER — POTASSIUM CHLORIDE 750 MG/1
10 TABLET, EXTENDED RELEASE ORAL DAILY
Status: DISCONTINUED | OUTPATIENT
Start: 2017-12-11 | End: 2017-12-12

## 2017-12-11 RX ORDER — ALPRAZOLAM 0.5 MG/1
0.5 TABLET ORAL
Status: DISCONTINUED | OUTPATIENT
Start: 2017-12-11 | End: 2017-12-16 | Stop reason: HOSPADM

## 2017-12-11 RX ORDER — POLYETHYLENE GLYCOL 3350 17 G/17G
17 POWDER, FOR SOLUTION ORAL DAILY
Status: DISCONTINUED | OUTPATIENT
Start: 2017-12-11 | End: 2017-12-16 | Stop reason: HOSPADM

## 2017-12-11 RX ORDER — METHYLPREDNISOLONE SODIUM SUCCINATE 125 MG/2ML
125 INJECTION, POWDER, LYOPHILIZED, FOR SOLUTION INTRAMUSCULAR; INTRAVENOUS ONCE
Status: COMPLETED | OUTPATIENT
Start: 2017-12-11 | End: 2017-12-11

## 2017-12-11 RX ORDER — IPRATROPIUM BROMIDE AND ALBUTEROL SULFATE 2.5; .5 MG/3ML; MG/3ML
3 SOLUTION RESPIRATORY (INHALATION) ONCE
Status: COMPLETED | OUTPATIENT
Start: 2017-12-11 | End: 2017-12-11

## 2017-12-11 RX ORDER — CLOPIDOGREL BISULFATE 75 MG/1
75 TABLET ORAL DAILY
Status: DISCONTINUED | OUTPATIENT
Start: 2017-12-11 | End: 2017-12-16 | Stop reason: HOSPADM

## 2017-12-11 RX ORDER — ATORVASTATIN CALCIUM 10 MG/1
10 TABLET, FILM COATED ORAL
Status: DISCONTINUED | OUTPATIENT
Start: 2017-12-11 | End: 2017-12-16 | Stop reason: HOSPADM

## 2017-12-11 RX ORDER — IPRATROPIUM BROMIDE AND ALBUTEROL SULFATE 2.5; .5 MG/3ML; MG/3ML
3 SOLUTION RESPIRATORY (INHALATION)
Status: DISCONTINUED | OUTPATIENT
Start: 2017-12-11 | End: 2017-12-16 | Stop reason: HOSPADM

## 2017-12-11 RX ORDER — GUAIFENESIN/DEXTROMETHORPHAN 100-10MG/5
10 SYRUP ORAL EVERY 4 HOURS PRN
Status: DISCONTINUED | OUTPATIENT
Start: 2017-12-11 | End: 2017-12-16 | Stop reason: HOSPADM

## 2017-12-11 RX ORDER — MAGNESIUM HYDROXIDE/ALUMINUM HYDROXICE/SIMETHICONE 120; 1200; 1200 MG/30ML; MG/30ML; MG/30ML
30 SUSPENSION ORAL EVERY 6 HOURS PRN
Status: DISCONTINUED | OUTPATIENT
Start: 2017-12-11 | End: 2017-12-16 | Stop reason: HOSPADM

## 2017-12-11 RX ADMIN — POTASSIUM CHLORIDE 10 MEQ: 750 TABLET, EXTENDED RELEASE ORAL at 15:28

## 2017-12-11 RX ADMIN — METHYLPREDNISOLONE SODIUM SUCCINATE 125 MG: 125 INJECTION, POWDER, FOR SOLUTION INTRAMUSCULAR; INTRAVENOUS at 10:01

## 2017-12-11 RX ADMIN — ENOXAPARIN SODIUM 40 MG: 40 INJECTION SUBCUTANEOUS at 15:28

## 2017-12-11 RX ADMIN — IPRATROPIUM BROMIDE AND ALBUTEROL SULFATE 3 ML: .5; 3 SOLUTION RESPIRATORY (INHALATION) at 14:30

## 2017-12-11 RX ADMIN — ATORVASTATIN CALCIUM 10 MG: 10 TABLET, FILM COATED ORAL at 17:57

## 2017-12-11 RX ADMIN — GUAIFENESIN AND DEXTROMETHORPHAN 10 ML: 100; 10 SYRUP ORAL at 21:07

## 2017-12-11 RX ADMIN — METHYLPREDNISOLONE SODIUM SUCCINATE 60 MG: 125 INJECTION, POWDER, FOR SOLUTION INTRAMUSCULAR; INTRAVENOUS at 21:07

## 2017-12-11 RX ADMIN — ALPRAZOLAM 0.5 MG: 0.5 TABLET ORAL at 21:25

## 2017-12-11 RX ADMIN — SODIUM CHLORIDE 1000 ML: 0.9 INJECTION, SOLUTION INTRAVENOUS at 09:59

## 2017-12-11 RX ADMIN — CEFTRIAXONE 1000 MG: 1 INJECTION, SOLUTION INTRAVENOUS at 10:41

## 2017-12-11 RX ADMIN — AZITHROMYCIN MONOHYDRATE 500 MG: 500 INJECTION, POWDER, LYOPHILIZED, FOR SOLUTION INTRAVENOUS at 11:18

## 2017-12-11 RX ADMIN — IPRATROPIUM BROMIDE AND ALBUTEROL SULFATE 3 ML: .5; 3 SOLUTION RESPIRATORY (INHALATION) at 09:51

## 2017-12-11 RX ADMIN — METOPROLOL TARTRATE 25 MG: 25 TABLET ORAL at 17:57

## 2017-12-11 RX ADMIN — ASPIRIN 81 MG: 81 TABLET, COATED ORAL at 15:29

## 2017-12-11 RX ADMIN — IPRATROPIUM BROMIDE AND ALBUTEROL SULFATE 3 ML: .5; 3 SOLUTION RESPIRATORY (INHALATION) at 20:22

## 2017-12-11 RX ADMIN — CLOPIDOGREL BISULFATE 75 MG: 75 TABLET ORAL at 15:29

## 2017-12-11 RX ADMIN — PREGABALIN 300 MG: 100 CAPSULE ORAL at 17:57

## 2017-12-11 RX ADMIN — ALBUTEROL SULFATE 2.5 MG: 2.5 SOLUTION RESPIRATORY (INHALATION) at 09:50

## 2017-12-11 NOTE — PROGRESS NOTES
Sarah Leon had complained to the nursing staff that there was some drainage from her wound  There was some blue staining on her underwear  Upon clarification with Stacie Killian, she states that she did not have discharge from the wound but that she felt a little bit wet in the intertriginous regions today  She denies any discharge or any discoloration onto her underwear and states that the blue stain on her underwear today is when she accidentally spilled some laundry detergent on it  I have examined her wound in detail under multiple lights and also milked the wound and see that it is actually well-healing  I do not see any additional sinuses or other signs of drainage  Certainly the intertriginous region is a bit sweaty and wet and upon smelling the glove does not have any malodor characteristic of any infection, but certainly has that musty and sweaty odor that is often in countered in these regions  There is no need for any acute surgical interventions  I recommend some nystatin powder in the area of the intertriginous fold to keep it free of any Candida which she seems to have in the lateral aspects of the fold and also keep it dry  I have discussed these findings with Dr Chet Alfredo

## 2017-12-11 NOTE — RESPIRATORY THERAPY NOTE
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    Cardiac disease     a fib    Fibromyalgia     Fibromyalgia, primary     GERD (gastroesophageal reflux disease)     Hypokalemia      Social History     Social History    Marital status:      Spouse name: N/A    Number of children: N/A    Years of education: N/A     Social History Main Topics    Smoking status: Never Smoker    Smokeless tobacco: Never Used    Alcohol use No    Drug use: No    Sexual activity: Not Asked     Other Topics Concern    None     Social History Narrative    None       Subjective    Subjective Data: pt has been using neb at home 4 times a day     Objective    Physical Exam:   Assessment Type: Pre-treatment  General Appearance: Alert, Awake  Respiratory Pattern: Normal  Chest Assessment: Chest expansion symmetrical  Bilateral Breath Sounds: Clear, Diminished, Expiratory wheezes  R Breath Sounds: Diminished  L Breath Sounds: Clear, Diminished, Expiratory wheezes  Cough: Non-productive  O2 Device: 2l/m    Vitals:  Blood pressure 130/75, pulse 85, temperature (!) 97 4 °F (36 3 °C), temperature source Tympanic, resp  rate 17, height 5' 2" (1 575 m), weight 97 5 kg (214 lb 15 2 oz), SpO2 96 %, not currently breastfeeding  Imaging and other studies: I have personally reviewed pertinent reports        O2 Device: 2l/m     Plan    Respiratory Plan: Home Bronchodilator Patient pathway        Resp Comments: pt awake and alert no distress noted oseas tx well

## 2017-12-11 NOTE — H&P
History and Physical - [facility]    Patient Information: Sweta Hernandez 81 y o  female MRN: [id number]  Unit/Bed#: [address] [facility] Encounter: [id number]  Admitting Physician: Juan Antonio Beth MD  PCP: Renato Aguiar DO  Date of Admission:  12/11/17        Chief Complaint:   Shortness of breath    History of Present Illness:    Sweta Hernandez is a 81 y o  female with past medical history of Merkel cell lymphoma, atrial fibrillation, GERD, asthma, fibromyalgia, hypokalemia  Patient presented to the ER with worsening shortness of breath that started today when she woke up  Patient stated that she had been short of breath, coughing, and wheezy  No phlegm production  She had been having bilateral chest pain from the intensity of cough  No palpitations  Afebrile  No abd pain, nausea, vomiting, or diarrhea  No dysuria or polyuria  No headaches or dizziness  Patient will be admitted for management of asthma exacerbation  Review of Systems:  All 10 point review of systems was discussed and otherwise negative    Past Medical and Surgical History:     Past Medical History:   Diagnosis Date    Asthma     Cancer ([facility] [address] [facility] )     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    Cardiac disease     a fib    Fibromyalgia     Fibromyalgia, primary     GERD (gastroesophageal reflux disease)     Hypokalemia        Past Surgical History:   Procedure Laterality Date    APPENDECTOMY      CATARACT EXTRACTION      CHEST WALL BIOPSY N/A 10/27/2017    Procedure: SINUS EXCISION AND REMOVAL OF FOREIGN BODY, ABDOMEN (WOUND EXPLORATION); Surgeon: Loi Xiong MD;  Location: [address] [place] [address];  Service: General    EYE SURGERY Bilateral     cataracts     HYSTERECTOMY      JOINT REPLACEMENT Left     hip    LYMPHADENECTOMY         Meds/Allergies:    Prior to Admission medications    Medication Sig Start Date End Date Taking?  Authorizing Provider   albuterol (ACCUNEB) 0 63 MG/3ML nebulizer solution Take 1 ampule by nebulization every 6 (six) hours as needed for wheezing   Yes Historical Provider, MD   albuterol (PROVENTIL HFA,VENTOLIN HFA) 90 mcg/act inhaler Inhale 2 puffs every 6 (six) hours as needed for wheezing   Yes Historical Provider, MD   ALPRAZolam Soraya Dubonnet) 0 5 mg tablet Take 0 5 mg by mouth daily at bedtime as needed for anxiety  Yes Historical Provider, MD   clopidogrel (PLAVIX) 75 mg tablet Take 75 mg by mouth daily   Yes Historical Provider, MD   esomeprazole (NexIUM) 40 MG capsule Take 40 mg by mouth daily  Yes Historical Provider, MD   Fesoterodine Fumarate ER (TOVIAZ) 8 MG TB24 Take by mouth daily at bedtime     Yes Historical Provider, MD   metoprolol tartrate (LOPRESSOR) 25 mg tablet Take 25 mg by mouth 2 (two) times a day  Yes Historical Provider, MD   potassium chloride (K-DUR) 10 mEq tablet Take 10 mEq by mouth daily  Yes Historical Provider, MD   pregabalin (LYRICA) 300 MG capsule Take 300 mg by mouth 2 (two) times a day  Yes Historical Provider, MD   aspirin (ECOTRIN LOW STRENGTH) 81 mg EC tablet Take 1 tablet by mouth daily for 30 days  Patient taking differently: Take 81 mg by mouth daily Last dose 10/25/17  3/16/17 10/25/17  GILDA Miller   atorvastatin (LIPITOR) 10 mg tablet Take 1 tablet by mouth daily with dinner for 30 days 3/16/17 10/27/17  GILDA Miller   predniSONE 10 mg tablet 20mg PO daily for three days, then 10mg PO daily for 3 days 3/17/17   GILDA Miller     I have reviewed home medications with patient personally  Allergies: Allergies   Allergen Reactions    Fentanyl And Related Other (See Comments)     Passed out    Latex     Penicillins        Social History:     Marital Status:     History   Alcohol Use No     History   Smoking Status    Never Smoker   Smokeless Tobacco    Never Used     History   Drug Use No       Family History:  Asked and noncontributory    Physical Exam:     Vitals:   Blood Pressure: 130/75 (12/11/17 1228)  Pulse: 80 (12/11/17 1228)  Temperature: (!) 97 4 °F (36 3 °C) (12/11/17 1228)  Temp Source: Tympanic (12/11/17 1228)  Respirations: 18 (12/11/17 1228)  Height: 5' 2" (157 5 cm) (12/11/17 1228)  Weight - Scale: 97 5 kg (214 lb 15 2 oz) (12/11/17 1228)  SpO2: 93 % (12/11/17 1228)    General: Alert , Ox3  Head: Normocephalic atraumatic  Eyes: EDSON  Anicteric sclera  H&N: Supple neck  No palpable lymphadenopathy  Chest wheezy bilaterally  Heart: S1, S2 RRR  Abd: soft, Non tender, non distended  Extremities:  Left lower extremity edema  Skin: Intact, no rash  Neuro: Moving all 4 extremities  Additional Data:     Lab Results: I have personally reviewed pertinent reports  Results from last 7 days  Lab Units 12/11/17  0944   WBC Thousand/uL 6 20   HEMOGLOBIN g/dL 13 3   HEMATOCRIT % 41 2   PLATELETS Thousands/uL 165   NEUTROS PCT % 76*   LYMPHS PCT % 11*   MONOS PCT % 6   EOS PCT % 6       Results from last 7 days  Lab Units 12/11/17  0944   SODIUM mmol/L 144   POTASSIUM mmol/L 3 5   CHLORIDE mmol/L 105   CO2 mmol/L 33*   BUN mg/dL 12   CREATININE mg/dL 0 99   CALCIUM mg/dL 8 6   TOTAL PROTEIN g/dL 6 5   BILIRUBIN TOTAL mg/dL 0 30   ALK PHOS U/L 117*   ALT U/L 16   AST U/L 16   GLUCOSE RANDOM mg/dL 108       Results from last 7 days  Lab Units 12/11/17  0944   INR  0 98       Imaging: I have personally reviewed pertinent reports  Xr Chest 1 View Portable    Result Date: 12/11/2017  Narrative: CHEST INDICATION:  Chest pain and productive cough  COMPARISON:  10/12/2017  VIEWS:   AP frontal IMAGES:  1 FINDINGS:  A right-sided catheter terminates at the caval atrial junction  No pneumothorax  Cardiomediastinal silhouette appears unremarkable  Left basilar subsegmental atelectasis with hemidiaphragm elevation noted  Tiny left pleural effusion suspected  Upper lobe and right lung are clear  Visualized osseous structures appear within normal limits for the patient's age       Impression: Left basilar atelectasis with tiny pleural effusion and hemidiaphragm elevation  Workstation performed: [id number]     Assessment/Plan:    Hospital Problem List:     Active Problems:    * No active hospital problems  *      Plan for the Primary Problem(s):    Asthma exacerbation  Will start patient on IV Solu-Medrol, IV antibiotics, pulmonary toileting with albuterol Atrovent  Oxygen delivery to be titrated for pulse ox above 90%  Mucinex b i d     Carotid artery disease  Patient refused carotid end arterectomy  Continue aspirin and Plavix  Atrial fibrillation  Patient refused anticoagulation  Continue aspirin Plavix  Rate control with Lopressor  Left lower extremity edema  Patient is known to have history of lymphedema  Will check venous Doppler to rule out DVT  Hyperlipidemia  Continue Lipitor    GERD  Continue Protonix    Neuropathy  Continue Lyrica    VTE Prophylaxis: Enoxaparin (Lovenox)  / sequential compression device   Code Status:   POLST: POLST form is not discussed and not completed at this time  Anticipated Length of Stay:  Patient will be admitted on an Inpatient basis with an anticipated length of stay of  > 2 midnights  Justification for Hospital Stay:  Management of asthma exacerbation with IV steroids    Total Time for Visit, including Counseling / Coordination of Care: 30 minutes  Greater than 50% of this total time spent on direct patient counseling and coordination of care  ** Please Note: Dragon 360 Dictation voice to text software may have been used in the creation of this document   **

## 2017-12-12 PROBLEM — R78.81 BACTEREMIA: Status: ACTIVE | Noted: 2017-12-12

## 2017-12-12 PROBLEM — J45.901 ASTHMA EXACERBATION: Status: ACTIVE | Noted: 2017-12-12

## 2017-12-12 PROBLEM — I89.0 LYMPHEDEMA OF LEFT LEG: Status: ACTIVE | Noted: 2017-12-12

## 2017-12-12 PROBLEM — J96.01 ACUTE RESPIRATORY FAILURE WITH HYPOXIA (HCC): Status: ACTIVE | Noted: 2017-12-12

## 2017-12-12 LAB
ANION GAP SERPL CALCULATED.3IONS-SCNC: 6 MMOL/L (ref 4–13)
BUN SERPL-MCNC: 20 MG/DL (ref 5–25)
CALCIUM SERPL-MCNC: 8.6 MG/DL (ref 8.3–10.1)
CHLORIDE SERPL-SCNC: 106 MMOL/L (ref 100–108)
CO2 SERPL-SCNC: 32 MMOL/L (ref 21–32)
CREAT SERPL-MCNC: 0.95 MG/DL (ref 0.6–1.3)
ERYTHROCYTE [DISTWIDTH] IN BLOOD BY AUTOMATED COUNT: 15.8 % (ref 11.6–15.1)
GFR SERPL CREATININE-BSD FRML MDRD: 56 ML/MIN/1.73SQ M
GLUCOSE SERPL-MCNC: 187 MG/DL (ref 65–140)
HCT VFR BLD AUTO: 36.4 % (ref 37–47)
HGB BLD-MCNC: 11.7 G/DL (ref 12–16)
MCH RBC QN AUTO: 25.6 PG (ref 27–31)
MCHC RBC AUTO-ENTMCNC: 32.2 G/DL (ref 31.4–37.4)
MCV RBC AUTO: 80 FL (ref 82–98)
PLATELET # BLD AUTO: 155 THOUSANDS/UL (ref 130–400)
PMV BLD AUTO: 9.6 FL (ref 8.9–12.7)
POTASSIUM SERPL-SCNC: 3.5 MMOL/L (ref 3.5–5.3)
RBC # BLD AUTO: 4.58 MILLION/UL (ref 4.2–5.4)
SODIUM SERPL-SCNC: 144 MMOL/L (ref 136–145)
WBC # BLD AUTO: 7 THOUSAND/UL (ref 4.8–10.8)

## 2017-12-12 PROCEDURE — 80048 BASIC METABOLIC PNL TOTAL CA: CPT | Performed by: INTERNAL MEDICINE

## 2017-12-12 PROCEDURE — 85027 COMPLETE CBC AUTOMATED: CPT | Performed by: INTERNAL MEDICINE

## 2017-12-12 PROCEDURE — 94640 AIRWAY INHALATION TREATMENT: CPT

## 2017-12-12 PROCEDURE — 94760 N-INVAS EAR/PLS OXIMETRY 1: CPT

## 2017-12-12 RX ORDER — LEVALBUTEROL INHALATION SOLUTION 0.63 MG/3ML
0.63 SOLUTION RESPIRATORY (INHALATION) EVERY 2 HOUR PRN
Status: DISCONTINUED | OUTPATIENT
Start: 2017-12-12 | End: 2017-12-16 | Stop reason: HOSPADM

## 2017-12-12 RX ORDER — VANCOMYCIN HYDROCHLORIDE 1 G/200ML
15 INJECTION, SOLUTION INTRAVENOUS EVERY 12 HOURS
Status: DISCONTINUED | OUTPATIENT
Start: 2017-12-12 | End: 2017-12-13

## 2017-12-12 RX ORDER — POTASSIUM CHLORIDE 20 MEQ/1
20 TABLET, EXTENDED RELEASE ORAL DAILY
Status: DISCONTINUED | OUTPATIENT
Start: 2017-12-12 | End: 2017-12-16 | Stop reason: HOSPADM

## 2017-12-12 RX ADMIN — METHYLPREDNISOLONE SODIUM SUCCINATE 60 MG: 125 INJECTION, POWDER, FOR SOLUTION INTRAMUSCULAR; INTRAVENOUS at 14:24

## 2017-12-12 RX ADMIN — CLOPIDOGREL BISULFATE 75 MG: 75 TABLET ORAL at 09:58

## 2017-12-12 RX ADMIN — ENOXAPARIN SODIUM 40 MG: 40 INJECTION SUBCUTANEOUS at 09:58

## 2017-12-12 RX ADMIN — PREGABALIN 300 MG: 100 CAPSULE ORAL at 09:57

## 2017-12-12 RX ADMIN — POTASSIUM CHLORIDE 20 MEQ: 1500 TABLET, EXTENDED RELEASE ORAL at 09:57

## 2017-12-12 RX ADMIN — ASPIRIN 81 MG: 81 TABLET, COATED ORAL at 09:58

## 2017-12-12 RX ADMIN — GUAIFENESIN AND DEXTROMETHORPHAN 10 ML: 100; 10 SYRUP ORAL at 10:20

## 2017-12-12 RX ADMIN — VANCOMYCIN HYDROCHLORIDE 1000 MG: 1 INJECTION, SOLUTION INTRAVENOUS at 17:57

## 2017-12-12 RX ADMIN — METOPROLOL TARTRATE 25 MG: 25 TABLET ORAL at 09:58

## 2017-12-12 RX ADMIN — GUAIFENESIN AND DEXTROMETHORPHAN 10 ML: 100; 10 SYRUP ORAL at 22:02

## 2017-12-12 RX ADMIN — PREGABALIN 300 MG: 100 CAPSULE ORAL at 17:57

## 2017-12-12 RX ADMIN — IPRATROPIUM BROMIDE AND ALBUTEROL SULFATE 3 ML: .5; 3 SOLUTION RESPIRATORY (INHALATION) at 20:41

## 2017-12-12 RX ADMIN — ATORVASTATIN CALCIUM 10 MG: 10 TABLET, FILM COATED ORAL at 16:25

## 2017-12-12 RX ADMIN — IPRATROPIUM BROMIDE AND ALBUTEROL SULFATE 3 ML: .5; 3 SOLUTION RESPIRATORY (INHALATION) at 14:41

## 2017-12-12 RX ADMIN — AZITHROMYCIN FOR INJECTION INJECTION, POWDER, LYOPHILIZED, FOR SOLUTION 500 MG: 500 INJECTION INTRAVENOUS at 11:00

## 2017-12-12 RX ADMIN — ACETAMINOPHEN 650 MG: 325 TABLET, FILM COATED ORAL at 16:32

## 2017-12-12 RX ADMIN — PANTOPRAZOLE SODIUM 40 MG: 40 TABLET, DELAYED RELEASE ORAL at 06:23

## 2017-12-12 RX ADMIN — ACETAMINOPHEN 650 MG: 325 TABLET, FILM COATED ORAL at 09:57

## 2017-12-12 RX ADMIN — ALPRAZOLAM 0.5 MG: 0.5 TABLET ORAL at 22:26

## 2017-12-12 RX ADMIN — METOPROLOL TARTRATE 25 MG: 25 TABLET ORAL at 17:57

## 2017-12-12 RX ADMIN — METHYLPREDNISOLONE SODIUM SUCCINATE 60 MG: 125 INJECTION, POWDER, FOR SOLUTION INTRAMUSCULAR; INTRAVENOUS at 21:39

## 2017-12-12 RX ADMIN — IPRATROPIUM BROMIDE AND ALBUTEROL SULFATE 3 ML: .5; 3 SOLUTION RESPIRATORY (INHALATION) at 07:20

## 2017-12-12 RX ADMIN — METHYLPREDNISOLONE SODIUM SUCCINATE 60 MG: 125 INJECTION, POWDER, FOR SOLUTION INTRAMUSCULAR; INTRAVENOUS at 06:23

## 2017-12-12 RX ADMIN — Medication 300 UNITS: at 23:38

## 2017-12-12 NOTE — CASE MANAGEMENT
Initial Clinical Review    Admission: Date/Time/Statement: 12/11/17 @ 1048     Orders Placed This Encounter   Procedures    Inpatient Admission (expected length of stay for this patient is greater than two midnights)     Standing Status:   Standing     Number of Occurrences:   1     Order Specific Question:   Admitting Physician     Answer:   Rendell Meckel [[id number]]     Order Specific Question:   Level of Care     Answer:   Med Surg [16]     Order Specific Question:   Estimated length of stay     Answer:   More than 2 Midnights     Order Specific Question:   Certification     Answer:   I certify that inpatient services are medically necessary for this patient for a duration of greater than two midnights  See H&P and MD Progress Notes for additional information about the patient's course of treatment  ED: Date/Time/Mode of Arrival:   ED Arrival Information     Expected Arrival Acuity Means of Arrival Escorted By Service Admission Type    - 12/11/2017 08:57 Emergent Ambulance [facility] Emergency    Arrival Complaint    DIFFICULTY BREATHING          Chief Complaint:   Chief Complaint   Patient presents with    Shortness of Breath     weak, cough       History of Illness:  81 y o  female with past medical history of Merkel cell lymphoma, atrial fibrillation, GERD, asthma, fibromyalgia, hypokalemia  Patient presented to the ER with worsening shortness of breath that started today when she woke up  Patient stated that she had been short of breath, coughing, and wheezy  No phlegm production  She had been having bilateral chest pain from the intensity of cough  No palpitations  Afebrile  No abd pain, nausea, vomiting, or diarrhea  No dysuria or polyuria  No headaches or dizziness  Patient will be admitted for management of asthma exacerbation    ED Vital Signs:   ED Triage Vitals   Temperature Pulse Respirations Blood Pressure SpO2   12/11/17 0906 12/11/17 0901 12/11/17 0906 12/11/17 6051 12/11/17 0909   97 7 °F (36 5 °C) (!) 137 (!) 29 (!) 142/114 97 %      Temp Source Heart Rate Source Patient Position - Orthostatic VS BP Location FiO2 (%)   12/11/17 0906 12/11/17 1002 12/11/17 0909 12/11/17 0909 --   Tympanic Monitor Lying Left arm       Pain Score       12/11/17 1002       4        Wt Readings from Last 1 Encounters:   12/11/17 97 5 kg (214 lb 15 2 oz)       Abnormal Labs/Diagnostic Test Results: ALB 2 9   CO2 mmol/L 33*     ALK PHOS U/L 117*   CXR  Left basilar atelectasis with tiny pleural effusion and hemidiaphragm elevation  VASCULAR LOWER LIMB B/L NEG FOR DVT    ED Treatment:   Medication Administration from 12/11/2017 0857 to 12/11/2017 1224       Date/Time Order Dose Route Action Action by Comments     12/11/2017 0959 sodium chloride 0 9 % bolus 1,000 mL 1,000 mL [facility]venous [facility]      12/11/2017 4916 ipratropium-albuterol (DUO-NEB) 0 5-2 5 mg/mL inhalation solution 3 mL 3 mL Nebulization Given Flor Bernabe RN      12/11/2017 1001 methylPREDNISolone sodium succinate (Solu-MEDROL) injection 125 mg 125 mg Intravenous Given Flor Brenabe RN      12/11/2017 0950 albuterol inhalation solution 2 5 mg 2 5 mg Nebulization Given Flor Bernabe RN      12/11/2017 1118 azithromycin (ZITHROMAX) 500 mg in sodium chloride 0 9% 250mL IVPB 500 mg 500 mg Intravenous New Bag Flor Bernabe RN      12/11/2017 1118 cefTRIAXone (ROCEPHIN) IVPB (premix) 1,000 mg 0 mg Intravenous Stopped Flor Bernabe RN      12/11/2017 1041 cefTRIAXone (ROCEPHIN) IVPB (premix) 1,000 mg 1,000 mg Intravenous New Bag Mary Beth Baltazar RN           Past Medical/Surgical History:    Active Ambulatory Problems     Diagnosis Date Noted    Chest pain 04/26/2016    Hypertension 04/27/2016    Acute cholecystitis 09/21/2016    Abdominal pain 09/21/2016    Meningi[address] ([facility] [address] ) 09/21/2016    Syncope 09/21/2016    Hypertension, accelerated 09/24/2016    Sepsis ([facility] [address] ) 09/24/2016    Carotid stenosis, left 09/25/2016  Morbid obesity with BMI of 50 0-59 9, adult ([facility] ) 09/25/2016    Acute bronchitis 03/13/2017    GERD (gastroesophageal reflux disease) 03/13/2017    Hyperlipemia 03/13/2017    Atrial fibrillation ([facility] ) 03/13/2017    Fibromyalgia 03/14/2017    Hypokalemia 03/14/2017    Moderate persistent asthma with acute exacerbation 03/16/2017    Open wound of abdominal wall without penetration into peritoneal cavity 10/27/2017     Resolved Ambulatory Problems     Diagnosis Date Noted    No Resolved Ambulatory Problems     Past Medical History:   Diagnosis Date    Asthma     Cancer [facility])     Cardiac disease     Fibromyalgia     Fibromyalgia, primary     GERD (gastroesophageal reflux disease)     Hypokalemia        Admitting Diagnosis: Pneumonia [J18 9]  Asthma [J45 909]  Difficulty breathing [R06 89]    Age/Sex: 81 y o  female    [facility] for the Primary Problem(s):  Asthma exacerbation  Will start patient on IV Solu-Medrol, IV antibiotics, pulmonary toileting with albuterol Atrovent  Oxygen delivery to be titrated for pulse ox above 90%  Mucinex b i d   Carotid artery disease  Patient refused carotid end arterectomy  Continue aspirin and Plavix  Atrial fibrillation  Patient refused anticoagulation  Continue aspirin Plavix  Rate control with Lopressor  Left lower extremity edema  Patient is known to have history of lymphedema  Will check venous Doppler to rule out DVT  Hyperlipidemia  Continue Lipitor  GERD  Continue Protonix  Neuropathy  Continue Lyrica  VTE Prophylaxis: Enoxaparin (Lovenox)  / sequential compression device   Code Status: Fc  POLST: POLST form is not discussed and not completed at this time  Anticipated Length of Stay:  Patient will be admitted on an Inpatient basis with an anticipated length of stay of  > 2 midnights     Justification for Hospital Stay:  Management of asthma exacerbation with IV steroids       Admission Orders:  TELE MON  CBC BMP  AIRWAY CLEARANCE PROTOCOL  RESPIRATORY PROTOCOL  ACCES PORT A CATH  Scheduled Meds:   aspirin 81 mg Oral Daily   atorvastatin 10 mg Oral Daily With Dinner   azithromycin 500 mg Intravenous Q24H   clopidogrel 75 mg Oral Daily   enoxaparin 40 mg Subcutaneous Daily   ipratropium-albuterol 3 mL Nebulization Q6H   methylPREDNISolone sodium succinate 60 mg Intravenous Q8H [address]   metoprolol tartrate 25 mg Oral BID   pantoprazole 40 mg Oral Early Morning   polyethylene glycol 17 g Oral Daily   potassium chloride 20 mEq Oral Daily   pregabalin 300 mg Oral BID     Continuous Infusions:    PRN Meds:   acetaminophen    ALPRAZolam    aluminum-magnesium hydroxide-simethicone    dextromethorphan-guaiFENesin    levalbuterol    ondansetron

## 2017-12-12 NOTE — CONSULTS
Consultation - Pulmonary Medicine   Jose Alberto Han 81 y o  female MRN: [id number]  Unit/Bed#: [address] [address] Encounter: [id number]      Assessment:  Acute exacerbation of mild persistent asthma  Suspect this may been viral given her lack of improvement with outpatient azithromycin and Levaquin  Positive blood cultures possibly contaminant as patient not show any evidence of sepsis  History of Merkel cell cancer    Plan:   I concur with methylprednisolone 60 mg IV every 8 hours nebulizer treatments with DuoNeb every 6 hours  I discussed with Dr Delvin Spencer, hospitalist   Patient with reported positive blood cultures for gram-positive cocci in clusters  Check urine for strep pneumonia antigen and start IV vancomycin  Possibility this could be contaminant versus true bacteremia  Patient also has indwelling right Port-A-Cath  Patient also started on azithromycin  Will order peak flow measurements    History of Present Illness   Physician Requesting Consult: Carlos Sadler MD  Reason for Consult / Principal Problem: dyspnea, asthma exacerbation  Hx and PE limited by: none  HPI: Jose Alberto Han is a 81y o  year old female who presents with complaints of persistent cough sore throat wheezing for over 1 week  She was seen by her family physician last week and given a Z-Kenn and course of prednisone therapy no improvement then more recently she went to the doctor's in and was given Levaquin with no improvement  She denies any fever chills or chest pain  She has had wheezing and shortness of breath with any minimal activity  Chest x-ray was reviewed shows some slight chronic elevation of left diaphragm and no infiltrates patient has a history of mild persistent asthma  She does have a history of Merkel cell carcinoma diagnosed several years ago when she had a mass 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  She underwent radiation for that  She then was found have some metastasis in the groin region and perhaps lung    She was treated with chemotherapy and radiation for approximately 2 years  She has a history of asthma for several years  Also has history of hypertension and GERD  She states her Merkel cell tumor also involves the bladder  Patient is afebrile  White blood cell count is 6 2  Blood cultures reported gram-positive cocci in clusters  She does have a right-sided Port-A-Cath in place  Patient has been started on IV azithromycin and because the blood cultures are positive  vancomycin IV has been initiated pending results  This possibly could be a contaminant  On nebulizer treatments with DuoNeb every 6 hours have been ordered as well as methylprednisone 60 mg IV every 8 hours  Review of Systems   Constitutional:        Complains of wheezing, cough, shortness of breath  Cough is productive for white mucus  No hemoptysis or chest pain  No fever at home   HENT: Positive for sore throat  Negative for rhinorrhea  Eyes: Negative for pain and redness  Respiratory: Positive for cough, shortness of breath and wheezing  Cardiovascular: Negative for chest pain  Gastrointestinal: Negative for abdominal pain, diarrhea and nausea  Endocrine: Negative for polydipsia and polyphagia  Genitourinary: Negative for dysuria  Musculoskeletal: Negative for myalgias  Skin: Negative for rash  Neurological: Negative for dizziness and syncope  Psychiatric/Behavioral: Negative for confusion and hallucinations  Historical Information   Past Medical History:   Diagnosis Date    Asthma     Cancer ([facility] [address] )     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    Cardiac disease     a fib    Fibromyalgia     Fibromyalgia, primary     GERD (gastroesophageal reflux disease)     Hypokalemia     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, left groin, lung and bladder    Patient treated with chemotherapy and radiation     Past Surgical History:   Procedure Laterality Date    APPENDECTOMY      CATARACT EXTRACTION      CHEST WALL BIOPSY N/A 10/27/2017    Procedure: SINUS EXCISION AND REMOVAL OF FOREIGN BODY, ABDOMEN (WOUND EXPLORATION);   Surgeon: Anahy Page MD;  Location: [address] [place] [address];  Service: General    EYE SURGERY Bilateral     cataracts     HIP FRACTURE SURGERY Left     HYSTERECTOMY      JOINT REPLACEMENT Left     hip    LYMPHADENECTOMY      PORTACATH PLACEMENT Right      Social History   History   Alcohol Use No     History   Drug Use No     History   Smoking Status    Never Smoker   Smokeless Tobacco    Never Used         Family History:   Family History   Problem Relation Age of Onset    Pneumonia Mother     Heart disease Father        Meds/Allergies     Current Facility-Administered Medications:     acetaminophen (TYLENOL) tablet 650 mg, 650 mg, Oral, Q6H PRN, Esperanza Branham MD, 650 mg at 12/12/17 1632    ALPRAZolam (XANAX) tablet 0 5 mg, 0 5 mg, Oral, HS PRN, Esperanza Branham MD, 0 5 mg at 12/11/17 2125    aluminum-magnesium hydroxide-simethicone (MYLANTA) 200-200-20 mg/5 mL oral suspension 30 mL, 30 mL, Oral, Q6H PRN, Esperanza Branham MD    aspirin (ECOTRIN LOW STRENGTH) EC tablet 81 mg, 81 mg, Oral, Daily, Esperanza Branham MD, 81 mg at 12/12/17 0958    atorvastatin (LIPITOR) tablet 10 mg, 10 mg, Oral, Daily With Huey Galloway MD, 10 mg at 12/12/17 1625    azithromycin (ZITHROMAX) 500 mg in sodium chloride 0 9 % 250 mL IVPB, 500 mg, Intravenous, Q24H, Esperanza Branham MD, Last Rate: 250 mL/hr at 12/12/17 1100, 500 mg at 12/12/17 1100    clopidogrel (PLAVIX) tablet 75 mg, 75 mg, Oral, Daily, Esperanza Branham MD, 75 mg at 12/12/17 0958    dextromethorphan-guaiFENesin (ROBITUSSIN DM)  mg/5 mL oral syrup 10 mL, 10 mL, Oral, Q4H PRN, GILDA Melgar, 10 mL at 12/12/17 1020    enoxaparin (LOVENOX) subcutaneous injection 40 mg, 40 mg, Subcutaneous, Daily, Esperanza Branham MD, 40 mg at 12/12/17 0958    ipratropium-albuterol (DUO-NEB) 0 5-2 5 mg/mL inhalation solution 3 mL, 3 mL, Nebulization, Q6H, Esperanza Branham MD, 3 mL at 12/12/17 1441    levalbuterol (Leanord Chill) inhalation solution 0 63 mg, 0 63 mg, Nebulization, Q2H PRN, Bryanna Nelson MD    methylPREDNISolone sodium succinate (Solu-MEDROL) injection 60 mg, 60 mg, Intravenous, Q8H [facility], Esperanza Branham MD, 60 mg at 12/12/17 1424    metoprolol tartrate (LOPRESSOR) tablet 25 mg, 25 mg, Oral, BID, Esperanza Branham MD, 25 mg at 12/12/17 1757    ondansetron (ZOFRAN) injection 4 mg, 4 mg, Intravenous, Q4H PRN, Esperanza Branham MD    pantoprazole (PROTONIX) EC tablet 40 mg, 40 mg, Oral, Early Morning, Esperanza Branham MD, 40 mg at 12/12/17 9870    polyethylene glycol (MIRALAX) packet 17 g, 17 g, Oral, Daily, Esperanza Branham MD    potassium chloride (K-DUR,KLOR-CON) CR tablet 20 mEq, 20 mEq, Oral, Daily, Bryanna Nelson MD, 20 mEq at 12/12/17 0957    pregabalin (LYRICA) capsule 300 mg, 300 mg, Oral, BID, Esperanza Branham MD, 300 mg at 12/12/17 1757    vancomycin (VANCOCIN) IVPB (premix) 1,000 mg, 15 mg/kg (Adjusted), Intravenous, Q12H, Bryanna Nelson MD, Last Rate: 200 mL/hr at 12/12/17 1757, 1,000 mg at 12/12/17 1757    Prior to Admission medications    Medication Sig Start Date End Date Taking? Authorizing Provider   albuterol (ACCUNEB) 0 63 MG/3ML nebulizer solution Take 1 ampule by nebulization every 6 (six) hours as needed for wheezing   Yes Historical Provider, MD   albuterol (PROVENTIL HFA,VENTOLIN HFA) 90 mcg/act inhaler Inhale 2 puffs every 6 (six) hours as needed for wheezing   Yes Historical Provider, MD   ALPRAZolam Derek Hinds) 0 5 mg tablet Take 0 5 mg by mouth daily at bedtime as needed for anxiety  Yes Historical Provider, MD   clopidogrel (PLAVIX) 75 mg tablet Take 75 mg by mouth daily   Yes Historical Provider, MD   esomeprazole (NexIUM) 40 MG capsule Take 40 mg by mouth daily     Yes Historical Provider, MD   Fesoterodine Fumarate ER (TOVIAZ) 8 MG TB24 Take by mouth daily at bedtime     Yes Historical Provider, MD   metoprolol tartrate (LOPRESSOR) 25 mg tablet Take 25 mg by mouth 2 (two) times a day  Yes Historical Provider, MD   potassium chloride (K-DUR) 10 mEq tablet Take 10 mEq by mouth daily  Yes Historical Provider, MD   pregabalin (LYRICA) 300 MG capsule Take 300 mg by mouth 2 (two) times a day  Yes Historical Provider, MD   aspirin (ECOTRIN LOW STRENGTH) 81 mg EC tablet Take 1 tablet by mouth daily for 30 days  Patient taking differently: Take 81 mg by mouth daily Last dose 10/25/17  3/16/17 10/25/17  [facility], GILDA   atorvastatin (LIPITOR) 10 mg tablet Take 1 tablet by mouth daily with dinner for 30 days 3/16/17 10/27/17  [facility], GILDA   predniSONE 10 mg tablet 20mg PO daily for three days, then 10mg PO daily for 3 days 3/17/17   [facility], CRNP       Allergies   Allergen Reactions    Fentanyl And Related Other (See Comments)     Passed out    Latex     Penicillins        Objective   Vitals: Blood pressure 145/67, pulse 77, temperature 97 7 °F (36 5 °C), temperature source Oral, resp  rate 18, height 5' 2" (1 575 m), weight 97 5 kg (214 lb 15 2 oz), SpO2 94 %, not currently breastfeeding  ,Body mass index is 39 31 kg/m²  Intake/Output Summary (Last 24 hours) at 12/12/17 1910  Last data filed at 12/12/17 1300   Gross per 24 hour   Intake              720 ml   Output                0 ml   Net              720 ml     Invasive Devices     Central Venous Catheter Line            Port A Cath 12/11/17 Right Chest 1 day                Physical Exam   Constitutional: She is oriented to person, place, and time  Obese female who is awake alert no distress  On 3 L of oxygen O2 saturation 94%   HENT:   Head: Normocephalic  Nose: Nose normal    Mouth/Throat: Oropharynx is clear and moist  No oropharyngeal exudate  Eyes: Conjunctivae are normal    Neck: Neck supple  No JVD present  Cardiovascular: Normal rate, regular rhythm and normal heart sounds      Pulmonary/Chest: Effort normal    Lung sounds reveal expiratory wheezes bilaterally and few rhonchi   Abdominal: Soft  She exhibits no distension  There is no tenderness  There is no guarding  Musculoskeletal:   Mild edema lower extremities   Lymphadenopathy:     She has no cervical adenopathy  Neurological: She is alert and oriented to person, place, and time  Skin: Skin is warm and dry  Psychiatric: She has a normal mood and affect  Her behavior is normal        Lab Results: ABG: No results found for: PHART, CBB9RXR, PO2ART, GCE1GMG, M5MIVCIT, BEART, SOURCE, BNP: No results found for: BNP, CBC:   Lab Results   Component Value Date    WBC 7 00 12/12/2017    HGB 11 7 (L) 12/12/2017    HCT 36 4 (L) 12/12/2017    MCV 80 (L) 12/12/2017     12/12/2017    ADJUSTEDWBC 4 00 (L) 09/25/2016    MCH 25 6 (L) 12/12/2017    MCHC 32 2 12/12/2017    RDW 15 8 (H) 12/12/2017    MPV 9 6 12/12/2017    NRBC 0 12/11/2017   , CMP:   Lab Results   Component Value Date     12/12/2017    K 3 5 12/12/2017     12/12/2017    CO2 32 12/12/2017    ANIONGAP 6 12/12/2017    BUN 20 12/12/2017    CREATININE 0 95 12/12/2017    GLUCOSE 187 (H) 12/12/2017    CALCIUM 8 6 12/12/2017    AST 16 12/11/2017    ALT 16 12/11/2017    ALKPHOS 117 (H) 12/11/2017    PROT 6 5 12/11/2017    ALBUMIN 2 9 (L) 12/11/2017    BILITOT 0 30 12/11/2017    EGFR 56 12/12/2017   , PT/INR:   Lab Results   Component Value Date    INR 0 98 12/11/2017   , Troponin: No components found for: TROPONIN    Imaging Studies: I have personally reviewed pertinent reports  and I have personally reviewed pertinent films in PACS    EKG, Pathology, and Other Studies: I have personally reviewed pertinent reports  VTE Prophylaxis: Enoxaparin (Lovenox)    Code Status: Level 1 - Full Code    Counseling/Coordination of Care: Total floor / unit time spent today 35 minutes  Greater than 50% of total time was spent with the patient and / or family counseling and / or coordination of care   A description of the counseling / coordination of care:  I discussed diagnosis and treatment with patient  I personally reviewed patient's radiograph

## 2017-12-12 NOTE — SOCIAL WORK
DASH discussion completed  Discussed goals of making sure pt's  needs are met upon discharge, pt's preferences are taken into account, pt understands health condition, medications and symptoms to watch for after returning home and pt is aware of any follow up appointments recommended by hospital physician  PT LIVES INDEPENDENTLY, USES A CANE, USES [facility] FOR RX NEEDS, DOES NOT DRIVE,  RELIES ON FRIENDS OR THE BUS FOR TRANSPORTATION  DCP IS TO HOME WHEN STABLE  CM WILL FOLLOW FOR D/C NEEDS  BUNDLE LETTER GIVEN

## 2017-12-12 NOTE — PLAN OF CARE
Problem: DISCHARGE PLANNING - CARE MANAGEMENT  Goal: Discharge to post-acute care or home with appropriate resources  INTERVENTIONS:  - Conduct assessment to determine patient/family and health care team treatment goals, and need for post-acute services based on payer coverage, community resources, and patient preferences, and barriers to discharge  - Address psychosocial, clinical, and financial barriers to discharge as identified in assessment in conjunction with the patient/family and health care team  - Arrange appropriate level of post-acute services according to patient's   needs and preference and payer coverage in collaboration with the physician and health care team  - Communicate with and update the patient/family, physician, and health care team regarding progress on the discharge plan  - Arrange appropriate transportation to post-acute venues  dcp is to home when stable  Outcome: Progressing

## 2017-12-12 NOTE — PROGRESS NOTES
[facility] Internal Medicine Progress Note  Patient: Hank Sierra 81 y o  female   MRN: [id number]  PCP: Benito Aguilera DO  Unit/Bed#: [address] [place] [address] Encounter: [id number]  Date Of Visit: 17    Subjective:   Feeling similar cough/dyspena/not feeling well compared to yesterday  No chest pain, fever, chills, abd pain, N/V, dysuria, diarrhea    Objective:   Vitals:   Temp (24hrs), Av 1 °F (36 7 °C), Min:97 7 °F (36 5 °C), Max:98 7 °F (37 1 °C)    HR:  [52-94] 77  Resp:  [18-20] 18  BP: (131-187)/(63-92) 145/67  SpO2:  [90 %-96 %] 94 %  Body mass index is 39 31 kg/m²         Intake/Output Summary (Last 24 hours) at 17 1850  Last data filed at 17 1300   Gross per 24 hour   Intake              720 ml   Output                0 ml   Net              720 ml       Physical Exam:   General: Obese, NAD, Port A Cath at right chest - no sign of surround infx  HEENT: EOMI, anicteric, oral moist, no oral thrush or mucosal lesion, neck supple, no mass or JVD  Chest: CTAB, diffuse expiratory wheezes  Cardiac: RRR, S1/S2, No murmur  Abd: S/ND/NT/BS+  MSK: LLE chronic edema from lymphedema, distal pulses intact  Neuro: AAOx3, moving all extremities  Psychiatric: Mood with normal affect      Additional Data:     Labs:    Results from last 7 days  Lab Units 17  0633 17  0944   WBC Thousand/uL 7 00 6 20   HEMOGLOBIN g/dL 11 7* 13 3   HEMATOCRIT % 36 4* 41 2   PLATELETS Thousands/uL 155 165   NEUTROS PCT %  --  76*   LYMPHS PCT %  --  11*   MONOS PCT %  --  6   EOS PCT %  --  6       Results from last 7 days  Lab Units 17  0633 17  0944   SODIUM mmol/L 144 144   POTASSIUM mmol/L 3 5 3 5   CHLORIDE mmol/L 106 105   CO2 mmol/L 32 33*   BUN mg/dL 20 12   CREATININE mg/dL 0 95 0 99   CALCIUM mg/dL 8 6 8 6   TOTAL PROTEIN g/dL  --  6 5   BILIRUBIN TOTAL mg/dL  --  0 30   ALK PHOS U/L  --  117*   ALT U/L  --  16   AST U/L  --  16   GLUCOSE RANDOM mg/dL 187* 108       Results from last 7 days  Lab Units 12/11/17  0944   INR  0 98       Recent Results (from the past 24 hour(s))   Basic metabolic panel    Collection Time: 12/12/17  6:33 AM   Result Value Ref Range    Sodium 144 136 - 145 mmol/L    Potassium 3 5 3 5 - 5 3 mmol/L    Chloride 106 100 - 108 mmol/L    CO2 32 21 - 32 mmol/L    Anion Gap 6 4 - 13 mmol/L    BUN 20 5 - 25 mg/dL    Creatinine 0 95 0 60 - 1 30 mg/dL    Glucose 187 (H) 65 - 140 mg/dL    Calcium 8 6 8 3 - 10 1 mg/dL    eGFR 56 ml/min/1 73sq m   CBC (With Platelets)    Collection Time: 12/12/17  6:33 AM   Result Value Ref Range    WBC 7 00 4 80 - 10 80 Thousand/uL    RBC 4 58 4 20 - 5 40 Million/uL    Hemoglobin 11 7 (L) 12 0 - 16 0 g/dL    Hematocrit 36 4 (L) 37 0 - 47 0 %    MCV 80 (L) 82 - 98 fL    MCH 25 6 (L) 27 0 - 31 0 pg    MCHC 32 2 31 4 - 37 4 g/dL    RDW 15 8 (H) 11 6 - 15 1 %    Platelets 794 602 - 140 Thousands/uL    MPV 9 6 8 9 - 12 7 fL       Cultures:     Results from last 7 days  Lab Units 12/11/17  1004 12/11/17  0947 12/11/17  0944   GRAM STAIN RESULT   --  Gram positive cocci in clusters Gram positive cocci in clusters   INFLUENZA A PCR  None Detected  --   --    INFLUENZA B PCR  None Detected  --   --    RSV PCR  None Detected  --   --        Imaging:  Xr Chest 1 View Portable    Result Date: 12/11/2017  Narrative: CHEST INDICATION:  Chest pain and productive cough  COMPARISON:  10/12/2017  VIEWS:   AP frontal IMAGES:  1 FINDINGS:  A right-sided catheter terminates at the caval atrial junction  No pneumothorax  Cardiomediastinal silhouette appears unremarkable  Left basilar subsegmental atelectasis with hemidiaphragm elevation noted  Tiny left pleural effusion suspected  Upper lobe and right lung are clear  Visualized osseous structures appear within normal limits for the patient's age  Impression: Left basilar atelectasis with tiny pleural effusion and hemidiaphragm elevation   Workstation performed: [id number]     Vas Lower Limb Venous Duplex Study, Unilateral/limited    Result Date: 12/11/2017  Narrative:  [facility] REPORT CLINICAL: Indications: Edema, unspecified [R60 9]  Patient presents with left lower extremity edema x several months  The patient has history of malignancy  Clinical:  FINDINGS:  Left     Impression       CFV      Normal (Patent)     CONCLUSION:  Impression: RIGHT LOWER LIMB LIMITED: NORMAL Evaluation shows no evidence of thrombus in the common femoral vein  Doppler evaluation shows a normal response to augmentation maneuvers  LEFT LOWER LIMB: NORMAL No evidence of acute or chronic deep vein thrombosis No evidence of superficial thrombophlebitis noted  Doppler evaluation shows a normal response to augmentation maneuvers  Popliteal, posterior tibial and anterior tibial arterial Doppler waveforms are triphasic    SIGNATURE: Electronically Signed by: Crispin Serrano on 2017-12-11 06:10:35 PM    Imaging Reports Reviewed by myself    Last 24 Hours Medication List:     Current Facility-Administered Medications:     acetaminophen (TYLENOL) tablet 650 mg, 650 mg, Oral, Q6H PRN, Esperanza Branham MD, 650 mg at 12/12/17 1632    ALPRAZolam (XANAX) tablet 0 5 mg, 0 5 mg, Oral, HS PRN, Esperanza Branham MD, 0 5 mg at 12/11/17 2125    aluminum-magnesium hydroxide-simethicone (MYLANTA) 200-200-20 mg/5 mL oral suspension 30 mL, 30 mL, Oral, Q6H PRN, Esperanza Branham MD    aspirin (ECOTRIN LOW STRENGTH) EC tablet 81 mg, 81 mg, Oral, Daily, Esperanza Branham MD, 81 mg at 12/12/17 0958    atorvastatin (LIPITOR) tablet 10 mg, 10 mg, Oral, Daily With Alvin Kidd MD, 10 mg at 12/12/17 1625    azithromycin (ZITHROMAX) 500 mg in sodium chloride 0 9 % 250 mL IVPB, 500 mg, Intravenous, Q24H, Esperanza Branham MD, Last Rate: 250 mL/hr at 12/12/17 1100, 500 mg at 12/12/17 1100    clopidogrel (PLAVIX) tablet 75 mg, 75 mg, Oral, Daily, Esperanza Branham MD, 75 mg at 12/12/17 0958    dextromethorphan-guaiFENesin (ROBITUSSIN DM)  mg/5 mL oral syrup 10 mL, 10 mL, Oral, Q4H PRN, YAHIR MelgarNP, 10 mL at 12/12/17 1020    enoxaparin (LOVENOX) subcutaneous injection 40 mg, 40 mg, Subcutaneous, Daily, Esperanza Branham MD, 40 mg at 12/12/17 0958    ipratropium-albuterol (DUO-NEB) 0 5-2 5 mg/mL inhalation solution 3 mL, 3 mL, Nebulization, Q6H, Esperanza Branham MD, 3 mL at 12/12/17 1441    levalbuterol (XOPENEX) inhalation solution 0 63 mg, 0 63 mg, Nebulization, Q2H PRN, Deloria Ganser, MD    methylPREDNISolone sodium succinate (Solu-MEDROL) injection 60 mg, 60 mg, Intravenous, Q8H [address], Esperanza Branham MD, 60 mg at 12/12/17 1424    metoprolol tartrate (LOPRESSOR) tablet 25 mg, 25 mg, Oral, BID, Esperanza Branham MD, 25 mg at 12/12/17 1757    ondansetron (ZOFRAN) injection 4 mg, 4 mg, Intravenous, Q4H PRN, Esperanza Branham MD    pantoprazole (PROTONIX) EC tablet 40 mg, 40 mg, Oral, Early Morning, Esperanza Branham MD, 40 mg at 12/12/17 7413    polyethylene glycol (MIRALAX) packet 17 g, 17 g, Oral, Daily, Esperanza Branham MD    potassium chloride (K-DUR,KLOR-CON) CR tablet 20 mEq, 20 mEq, Oral, Daily, Deloria Ganser, MD, 20 mEq at 12/12/17 0957    pregabalin (LYRICA) capsule 300 mg, 300 mg, Oral, BID, Esperanza Branham MD, 300 mg at 12/12/17 1757    vancomycin (VANCOCIN) IVPB (premix) 1,000 mg, 15 mg/kg (Adjusted), Intravenous, Q12H, Deloria Ganser, MD, Last Rate: 200 mL/hr at 12/12/17 1757, 1,000 mg at 12/12/17 1757     Assessment and Plans:  Hospital Problem List:   Principal Problem:    Asthma exacerbation  Active Problems:    Acute bronchitis    Bacteremia    Acute respiratory failure with hypoxia (HCC)    Hypertension    Carotid stenosis, left    Hyperlipemia    Atrial fibrillation (HCC)    Lymphedema of left leg    81year old female with a history of Merkel cell lymphoma, Asthma, Atrial fibrillation, GERD, Fibromyalgia, Hypokalemia, who presented with worsening shortness of breath, coughing and wheezing       GPC bacteremia  Blood stains showing GPC in clutters from 2 bottles  Given her Port A Cath at right chest, will start vancomycin iv to cover port associated bacteremia until blood cx is available    Asthma exacerbation  Likely precipitated by acute bronchitis  Continue IV Solu-Medrol, bronchodilators, IV azithromycin, O2  Pulmonology consult appreciated     Atrial fibrillation  Pt refused anticoagulation  Continue metoprolol, aspirin, Plavix      Left lower extremity edema  Pt is known to have history of lymphedema  US LE venous no DVT    Left carotid stenosis  Pt refused carotid end arterectomy  Continue aspirin and Plavix      Hyperlipidemia  Continue Lipitor     GERD  Continue PPT     Neuropathy  Continue Lyrica    DVT Prophylaxis: on Lovenox sc, SCD  Code Status: Level 1 - Full Code  Disposition: anticipate d/c home once clinically improved  Patient Centered Rounds: I have performed bedside rounds with nursing staff today  Discussions with Specialists or Other Care Team Provider: Yes    Education and Discussions with Family / Patient:Yes    Time Spent for Care: 20 minutes  More than 50% of total time spent on counseling and coordination of care as described above      Current Length of Stay: 1 day(s)    Current Patient Status: Inpatient     Signed by:  Musa Still MD  Attending Hospitalist  Page#: [phone] (Hours 7am to 7pm)  12/12/2017 6:50 PM

## 2017-12-13 PROBLEM — Z85.821 HISTORY OF MERKEL CELL CARCINOMA: Status: ACTIVE | Noted: 2017-12-13

## 2017-12-13 PROBLEM — Z95.828 PORT-A-CATH IN PLACE: Status: ACTIVE | Noted: 2017-12-13

## 2017-12-13 LAB
ANION GAP SERPL CALCULATED.3IONS-SCNC: 4 MMOL/L (ref 4–13)
BUN SERPL-MCNC: 23 MG/DL (ref 5–25)
CALCIUM SERPL-MCNC: 8.4 MG/DL (ref 8.3–10.1)
CHLORIDE SERPL-SCNC: 107 MMOL/L (ref 100–108)
CO2 SERPL-SCNC: 31 MMOL/L (ref 21–32)
CREAT SERPL-MCNC: 0.95 MG/DL (ref 0.6–1.3)
ERYTHROCYTE [DISTWIDTH] IN BLOOD BY AUTOMATED COUNT: 16.4 % (ref 11.6–15.1)
GFR SERPL CREATININE-BSD FRML MDRD: 56 ML/MIN/1.73SQ M
GLUCOSE SERPL-MCNC: 184 MG/DL (ref 65–140)
HCT VFR BLD AUTO: 37.6 % (ref 37–47)
HGB BLD-MCNC: 12.1 G/DL (ref 12–16)
MCH RBC QN AUTO: 25.7 PG (ref 27–31)
MCHC RBC AUTO-ENTMCNC: 32.1 G/DL (ref 31.4–37.4)
MCV RBC AUTO: 80 FL (ref 82–98)
MRSA NOSE QL CULT: NORMAL
PLATELET # BLD AUTO: 171 THOUSANDS/UL (ref 130–400)
PMV BLD AUTO: 9.9 FL (ref 8.9–12.7)
POTASSIUM SERPL-SCNC: 4.2 MMOL/L (ref 3.5–5.3)
RBC # BLD AUTO: 4.69 MILLION/UL (ref 4.2–5.4)
SODIUM SERPL-SCNC: 142 MMOL/L (ref 136–145)
WBC # BLD AUTO: 11.9 THOUSAND/UL (ref 4.8–10.8)

## 2017-12-13 PROCEDURE — 87040 BLOOD CULTURE FOR BACTERIA: CPT | Performed by: INTERNAL MEDICINE

## 2017-12-13 PROCEDURE — 94668 MNPJ CHEST WALL SBSQ: CPT

## 2017-12-13 PROCEDURE — 85027 COMPLETE CBC AUTOMATED: CPT | Performed by: INTERNAL MEDICINE

## 2017-12-13 PROCEDURE — 94640 AIRWAY INHALATION TREATMENT: CPT

## 2017-12-13 PROCEDURE — 80048 BASIC METABOLIC PNL TOTAL CA: CPT | Performed by: INTERNAL MEDICINE

## 2017-12-13 PROCEDURE — 94760 N-INVAS EAR/PLS OXIMETRY 1: CPT

## 2017-12-13 RX ADMIN — METOPROLOL TARTRATE 25 MG: 25 TABLET ORAL at 17:37

## 2017-12-13 RX ADMIN — PREGABALIN 300 MG: 100 CAPSULE ORAL at 17:38

## 2017-12-13 RX ADMIN — POLYETHYLENE GLYCOL 3350 17 G: 17 POWDER, FOR SOLUTION ORAL at 10:02

## 2017-12-13 RX ADMIN — IPRATROPIUM BROMIDE AND ALBUTEROL SULFATE 3 ML: .5; 3 SOLUTION RESPIRATORY (INHALATION) at 13:01

## 2017-12-13 RX ADMIN — VANCOMYCIN HYDROCHLORIDE 1000 MG: 1 INJECTION, SOLUTION INTRAVENOUS at 05:31

## 2017-12-13 RX ADMIN — ACETAMINOPHEN 650 MG: 325 TABLET, FILM COATED ORAL at 00:07

## 2017-12-13 RX ADMIN — GUAIFENESIN AND DEXTROMETHORPHAN 10 ML: 100; 10 SYRUP ORAL at 22:06

## 2017-12-13 RX ADMIN — ASPIRIN 81 MG: 81 TABLET, COATED ORAL at 10:01

## 2017-12-13 RX ADMIN — ALPRAZOLAM 0.5 MG: 0.5 TABLET ORAL at 22:06

## 2017-12-13 RX ADMIN — GUAIFENESIN AND DEXTROMETHORPHAN 10 ML: 100; 10 SYRUP ORAL at 17:38

## 2017-12-13 RX ADMIN — METOPROLOL TARTRATE 25 MG: 25 TABLET ORAL at 10:00

## 2017-12-13 RX ADMIN — ENOXAPARIN SODIUM 40 MG: 40 INJECTION SUBCUTANEOUS at 10:02

## 2017-12-13 RX ADMIN — IPRATROPIUM BROMIDE AND ALBUTEROL SULFATE 3 ML: .5; 3 SOLUTION RESPIRATORY (INHALATION) at 07:27

## 2017-12-13 RX ADMIN — PANTOPRAZOLE SODIUM 40 MG: 40 TABLET, DELAYED RELEASE ORAL at 05:31

## 2017-12-13 RX ADMIN — ATORVASTATIN CALCIUM 10 MG: 10 TABLET, FILM COATED ORAL at 17:38

## 2017-12-13 RX ADMIN — METHYLPREDNISOLONE SODIUM SUCCINATE 60 MG: 125 INJECTION, POWDER, FOR SOLUTION INTRAMUSCULAR; INTRAVENOUS at 22:07

## 2017-12-13 RX ADMIN — POTASSIUM CHLORIDE 20 MEQ: 1500 TABLET, EXTENDED RELEASE ORAL at 10:01

## 2017-12-13 RX ADMIN — PREGABALIN 300 MG: 100 CAPSULE ORAL at 10:01

## 2017-12-13 RX ADMIN — GUAIFENESIN AND DEXTROMETHORPHAN 10 ML: 100; 10 SYRUP ORAL at 12:49

## 2017-12-13 RX ADMIN — IPRATROPIUM BROMIDE AND ALBUTEROL SULFATE 3 ML: .5; 3 SOLUTION RESPIRATORY (INHALATION) at 21:50

## 2017-12-13 RX ADMIN — METHYLPREDNISOLONE SODIUM SUCCINATE 60 MG: 125 INJECTION, POWDER, FOR SOLUTION INTRAMUSCULAR; INTRAVENOUS at 05:32

## 2017-12-13 RX ADMIN — CLOPIDOGREL BISULFATE 75 MG: 75 TABLET ORAL at 10:02

## 2017-12-13 RX ADMIN — METHYLPREDNISOLONE SODIUM SUCCINATE 60 MG: 125 INJECTION, POWDER, FOR SOLUTION INTRAMUSCULAR; INTRAVENOUS at 17:40

## 2017-12-13 NOTE — PHYSICIAN ADVISOR
Current patient class: Inpatient  The patient is currently on Hospital Day: 2      The patient was admitted to the hospital at [phone] on 12/11/17 for the following diagnosis:  Pneumonia [J18 9]  Asthma [J45 909]  Difficulty breathing [R06 89]       There is documentation in the medical record of an expected length of stay of at least 2 midnights  The patient is therefore expected to satisfy the 2 midnight benchmark and given the 2 midnight presumption is appropriate for INPATIENT ADMISSION  Given this expectation of a satisfying stay, CMS instructs us that the patient is most often appropriate for inpatient admission under part A provided medical necessity is documented in the chart  After review of the relevant documentation, labs, vital signs and test results, the patient is appropriate for INPATIENT ADMISSION  Admission to the hospital as an inpatient is a complex decision making process which requires the practitioner to consider the patients presenting complaint, history and physical examination and all relevant testing  With this in mind, in this case, the patient was deemed appropriate for INPATIENT ADMISSION  After review of the documentation and testing available at the time of the admission I concur with this clinical determination of medical necessity  Rationale is as follows: The patient is a 81 yrs old Female who presented to the ED at 12/11/2017  9:00 AM with a chief complaint of Shortness of Breath (weak, cough)     Patient is admitted to the hospital for exacerbation of asthma, with positive blood cultures  Patient was noted to require IV steroids, nebulizer treatments  Patient was admitted to the hospital with an expected length of stay at least 2 midnights, and the patient was placed on IV antibiotics for positive blood cultures and a indwelling catheter    Given all this, the patient is appropriate for inpatient admission    The patients vitals on arrival were ED Triage Vitals Temperature Pulse Respirations Blood Pressure SpO2   12/11/17 0906 12/11/17 0901 12/11/17 0906 12/11/17 0909 12/11/17 0909   97 7 °F (36 5 °C) (!) 137 (!) 29 (!) 142/114 97 %      Temp Source Heart Rate Source Patient Position - Orthostatic VS BP Location FiO2 (%)   12/11/17 0906 12/11/17 1002 12/11/17 0909 12/11/17 0909 --   Tympanic Monitor Lying Left arm       Pain Score       12/11/17 1002       4           Past Medical History:   Diagnosis Date    Asthma     Cancer ([facility] [address] )     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    Cardiac disease     a fib    Fibromyalgia     Fibromyalgia, primary     GERD (gastroesophageal reflux disease)     Hypokalemia     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, left groin, lung and bladder  Patient treated with chemotherapy and radiation     Past Surgical History:   Procedure Laterality Date    APPENDECTOMY      CATARACT EXTRACTION      CHEST WALL BIOPSY N/A 10/27/2017    Procedure: SINUS EXCISION AND REMOVAL OF FOREIGN BODY, ABDOMEN (WOUND EXPLORATION);   Surgeon: Viviana Marcano MD;  Location: [address] [place] [address];  Service: General    EYE SURGERY Bilateral     cataracts     HIP FRACTURE SURGERY Left     HYSTERECTOMY      JOINT REPLACEMENT Left     hip    LYMPHADENECTOMY      PORTACATH PLACEMENT Right            Consults have been placed to:   IP CONSULT TO PULMONOLOGY    Vitals:    12/12/17 1751 12/12/17 2000 12/12/17 2042 12/12/17 2240   BP: 145/67 162/76  154/63   Pulse: 77 75  80   Resp: 18 18  18   Temp: 97 7 °F (36 5 °C) 98 3 °F (36 8 °C)  98 °F (36 7 °C)   TempSrc: Oral Tympanic  Tympanic   SpO2: 94% 94% 90% 95%   Weight:       Height:           Most recent labs:    Recent Labs      12/11/17   0944  12/12/17   0633   WBC  6 20  7 00   HGB  13 3  11 7*   HCT  41 2  36 4*   PLT  165  155   K  3 5  3 5   NA  144  144   CALCIUM  8 6  8 6   BUN  12  20   CREATININE  0 99  0 95   LIPASE  73   --    INR  0 98   -- TROPONINI  0 02   --    AST  16   --    ALT  16   --    ALKPHOS  117*   --    BILITOT  0 30   --        Scheduled Meds:  aspirin 81 mg Oral Daily   atorvastatin 10 mg Oral Daily With Dinner   azithromycin 500 mg Intravenous Q24H   clopidogrel 75 mg Oral Daily   enoxaparin 40 mg Subcutaneous Daily   ipratropium-albuterol 3 mL Nebulization Q6H   methylPREDNISolone sodium succinate 60 mg Intravenous Q8H [address]   metoprolol tartrate 25 mg Oral BID   pantoprazole 40 mg Oral Early Morning   polyethylene glycol 17 g Oral Daily   potassium chloride 20 mEq Oral Daily   pregabalin 300 mg Oral BID   vancomycin 15 mg/kg (Adjusted) Intravenous Q12H     Continuous Infusions:   PRN Meds:   acetaminophen    ALPRAZolam    aluminum-magnesium hydroxide-simethicone    dextromethorphan-guaiFENesin    heparin lock flush    levalbuterol    ondansetron    Surgical procedures (if appropriate):

## 2017-12-13 NOTE — RESPIRATORY THERAPY NOTE
RT Protocol Note  Reynold Garcia 81 y o  female MRN: [id number]  Unit/Bed#: [address] [place] [address] Encounter: [id number]    Assessment    Principal Problem:    Asthma exacerbation  Active Problems:    Hypertension    Carotid stenosis, left    Acute bronchitis    Hyperlipemia    Atrial fibrillation ([facility] )    Lymphedema of left leg    Acute respiratory failure with hypoxia (HCC)    Bacteremia      Home Pulmonary Medications:  Albuterol mdi and actuator at home  Pt may benefit from acapella as she stated she cannot get up secretions  Md aware  Airway clearance plan completed    Past Medical History:   Diagnosis Date    Asthma     Cancer ([facility] )     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    Cardiac disease     a fib    Fibromyalgia     Fibromyalgia, primary     GERD (gastroesophageal reflux disease)     Hypokalemia     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, left groin, lung and bladder  Patient treated with chemotherapy and radiation     Social History     Social History    Marital status:      Spouse name: N/A    Number of children: N/A    Years of education: N/A     Social History Main Topics    Smoking status: Never Smoker    Smokeless tobacco: Never Used    Alcohol use No    Drug use: No    Sexual activity: Not Asked     Other Topics Concern    None     Social History Narrative    None       Subjective    Subjective Data: pt has been using neb at home 4 times a day     Objective    Physical Exam:        Vitals:  Blood pressure 145/67, pulse 77, temperature 97 7 °F (36 5 °C), temperature source Oral, resp  rate 18, height 5' 2" (1 575 m), weight 97 5 kg (214 lb 15 2 oz), SpO2 94 %, not currently breastfeeding  Imaging and other studies: I have personally reviewed pertinent reports        O2 Device: 2l/m     Plan    Respiratory Plan: No distress/Pulmonary history (md notified )  Airway Clearance Plan: Flutter     Resp Comments: no distress noted pt will call for prn tx if needed

## 2017-12-13 NOTE — ED PROVIDER NOTES
History  Chief Complaint   Patient presents with    Shortness of Breath     weak, cough     82F hx athma, nonsmoker c/o worsening cough and SOB despite 2 days of levaquin started at urgent care for clinical pneumonia  Prior to Admission Medications   Prescriptions Last Dose Informant Patient Reported? Taking? ALPRAZolam (XANAX) 0 5 mg tablet 12/10/2017 at Unknown time  Yes Yes   Sig: Take 0 5 mg by mouth daily at bedtime as needed for anxiety  Fesoterodine Fumarate ER (TOVIAZ) 8 MG TB24 12/10/2017 at Unknown time  Yes Yes   Sig: Take by mouth daily at bedtime     albuterol (ACCUNEB) 0 63 MG/3ML nebulizer solution 2017 at Unknown time Self Yes Yes   Sig: Take 1 ampule by nebulization every 6 (six) hours as needed for wheezing   albuterol (PROVENTIL HFA,VENTOLIN HFA) 90 mcg/act inhaler 12/10/2017 at Unknown time Self Yes Yes   Sig: Inhale 2 puffs every 6 (six) hours as needed for wheezing   aspirin (ECOTRIN LOW STRENGTH) 81 mg EC tablet   No No   Sig: Take 1 tablet by mouth daily for 30 days   Patient taking differently: Take 81 mg by mouth daily Last dose 10/25/17    atorvastatin (LIPITOR) 10 mg tablet   No No   Sig: Take 1 tablet by mouth daily with dinner for 30 days   clopidogrel (PLAVIX) 75 mg tablet 12/10/2017 at Unknown time  Yes Yes   Sig: Take 75 mg by mouth daily   esomeprazole (NexIUM) 40 MG capsule 2017 at Unknown time  Yes Yes   Sig: Take 40 mg by mouth daily  metoprolol tartrate (LOPRESSOR) 25 mg tablet 12/10/2017 at Unknown time  Yes Yes   Sig: Take 25 mg by mouth 2 (two) times a day  potassium chloride (K-DUR) 10 mEq tablet 12/10/2017 at Unknown time  Yes Yes   Sig: Take 10 mEq by mouth daily  predniSONE 10 mg tablet Unknown at Unknown time  No No   Simg PO daily for three days, then 10mg PO daily for 3 days   pregabalin (LYRICA) 300 MG capsule 12/10/2017 at Unknown time  Yes Yes   Sig: Take 300 mg by mouth 2 (two) times a day        Facility-Administered Medications: None       Past Medical History:   Diagnosis Date    Asthma     Cancer ([facility] [address] )     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    Cardiac disease     a fib    Fibromyalgia     Fibromyalgia, primary     GERD (gastroesophageal reflux disease)     Hypokalemia     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, left groin, lung and bladder  Patient treated with chemotherapy and radiation       Past Surgical History:   Procedure Laterality Date    APPENDECTOMY      CATARACT EXTRACTION      CHEST WALL BIOPSY N/A 10/27/2017    Procedure: SINUS EXCISION AND REMOVAL OF FOREIGN BODY, ABDOMEN (WOUND EXPLORATION); Surgeon: Amina France MD;  Location: [address] [place] [address];  Service: General    EYE SURGERY Bilateral     cataracts     HIP FRACTURE SURGERY Left     HYSTERECTOMY      JOINT REPLACEMENT Left     hip    LYMPHADENECTOMY      PORTACATH PLACEMENT Right        Family History   Problem Relation Age of Onset    Pneumonia Mother     Heart disease Father      I have reviewed and agree with the history as documented  Social History   Substance Use Topics    Smoking status: Never Smoker    Smokeless tobacco: Never Used    Alcohol use No        Review of Systems   Constitutional: Positive for chills and fever  Respiratory: Positive for cough and shortness of breath  Cardiovascular: Negative for chest pain  Gastrointestinal: Negative for abdominal pain  Musculoskeletal: Negative for back pain  Neurological: Negative for headaches  All other systems reviewed and are negative        Physical Exam  ED Triage Vitals   Temperature Pulse Respirations Blood Pressure SpO2   12/11/17 0906 12/11/17 0901 12/11/17 0906 12/11/17 0909 12/11/17 0909   97 7 °F (36 5 °C) (!) 137 (!) 29 (!) 142/114 97 %      Temp Source Heart Rate Source Patient Position - Orthostatic VS BP Location FiO2 (%)   12/11/17 0906 12/11/17 1002 12/11/17 0909 12/11/17 0909 -- Tympanic Monitor Lying Left arm       Pain Score       12/11/17 1002       4           Orthostatic Vital Signs  Vitals:    12/13/17 0727 12/13/17 1000 12/13/17 1106 12/13/17 1327   BP: 153/70 153/70 (!) 185/81 142/66   Pulse: 78 78 73 77   Patient Position - Orthostatic VS: Lying  Lying Lying       Physical Exam   Constitutional: She is oriented to person, place, and time  She appears well-developed  HENT:   Mouth/Throat: Oropharynx is clear and moist    Eyes: Conjunctivae are normal    Neck: Neck supple  Cardiovascular: Normal rate and regular rhythm  Pulmonary/Chest: She has wheezes (diffuse exp)  Slightly tachypneic   Abdominal: Soft  Bowel sounds are normal    Neurological: She is alert and oriented to person, place, and time  Skin: Skin is warm and dry  Psychiatric: She has a normal mood and affect  Vitals reviewed        ED Medications  Medications   ALPRAZolam (XANAX) tablet 0 5 mg (0 5 mg Oral Given 12/12/17 2226)   aspirin (ECOTRIN LOW STRENGTH) EC tablet 81 mg (81 mg Oral Given 12/13/17 1001)   atorvastatin (LIPITOR) tablet 10 mg (10 mg Oral Given 12/13/17 1738)   clopidogrel (PLAVIX) tablet 75 mg (75 mg Oral Given 12/13/17 1002)   pantoprazole (PROTONIX) EC tablet 40 mg (40 mg Oral Given 12/13/17 0531)   metoprolol tartrate (LOPRESSOR) tablet 25 mg (25 mg Oral Given 12/13/17 1737)   pregabalin (LYRICA) capsule 300 mg (300 mg Oral Given 12/13/17 1738)   methylPREDNISolone sodium succinate (Solu-MEDROL) injection 60 mg (60 mg Intravenous Given 12/13/17 1740)   ipratropium-albuterol (DUO-NEB) 0 5-2 5 mg/mL inhalation solution 3 mL (3 mL Nebulization Given 12/13/17 1301)   polyethylene glycol (MIRALAX) packet 17 g (17 g Oral Not Given 12/13/17 1003)   ondansetron (ZOFRAN) injection 4 mg (not administered)   aluminum-magnesium hydroxide-simethicone (MYLANTA) 200-200-20 mg/5 mL oral suspension 30 mL (not administered)   acetaminophen (TYLENOL) tablet 650 mg (650 mg Oral Given 12/13/17 0007) enoxaparin (LOVENOX) subcutaneous injection 40 mg (40 mg Subcutaneous Given 12/13/17 1002)   dextromethorphan-guaiFENesin (ROBITUSSIN DM)  mg/5 mL oral syrup 10 mL (10 mL Oral Given 12/13/17 1738)   potassium chloride (K-DUR,KLOR-CON) CR tablet 20 mEq (20 mEq Oral Given 12/13/17 1001)   levalbuterol (XOPENEX) inhalation solution 0 63 mg (not administered)   heparin lock flush 100 units/mL injection 300 Units (300 Units Intracatheter Given 12/12/17 2338)   sodium chloride 0 9 % bolus 1,000 mL (0 mL Intravenous Stopped 12/12/17 1900)   ipratropium-albuterol (DUO-NEB) 0 5-2 5 mg/mL inhalation solution 3 mL (3 mL Nebulization Given 12/11/17 0951)   methylPREDNISolone sodium succinate (Solu-MEDROL) injection 125 mg (125 mg Intravenous Given 12/11/17 1001)   albuterol inhalation solution 2 5 mg (2 5 mg Nebulization Given 12/11/17 0950)   azithromycin (ZITHROMAX) 500 mg in sodium chloride 0 9% 250mL IVPB 500 mg (0 mg Intravenous Stopped 12/12/17 1900)   cefTRIAXone (ROCEPHIN) IVPB (premix) 1,000 mg (0 mg Intravenous Stopped 12/11/17 1118)       Diagnostic Studies  Results Reviewed     Procedure Component Value Units Date/Time    Blood culture #1 [[id number]]  (Abnormal) Collected:  12/11/17 0944    Lab Status:  Preliminary result Specimen:  Blood from Line, Venous Updated:  12/13/17 1150     Blood Culture --      Staphylococcus coagulase negative (A)     Gram Stain Result Gram positive cocci in clusters    Narrative: Too young-will reincubate      Blood culture #2 [[id number]]  (Abnormal) Collected:  12/11/17 0947    Lab Status:  Preliminary result Specimen:  Blood from Line, Venous Updated:  12/13/17 1148     Blood Culture --      Staphylococcus coagulase negative (A)     Gram Stain Result Gram positive cocci in clusters    Narrative:        Too young-will reincubate    Influenza A/B and RSV by PCR (Indicated for patients > 2 mo of age) [[id number]]  (Normal) Collected:  12/11/17 1004    Lab Status:  Final result Specimen:  Nasopharyngeal from Nasopharyngeal Swab Updated:  12/11/17 1628     INFLU A PCR None Detected     INFLU B PCR None Detected     RSV PCR None Detected    Rapid Influenza Screen with Reflex PCR (indicated for patients <2mo of age) [[id number]]  (Normal) Collected:  12/11/17 1004    Lab Status:  Final result Specimen:  Nasopharyngeal from Nasopharyngeal Swab Updated:  12/11/17 1035     Rapid Influenza A Ag Negative     Rapid Influenza B Ag Negative    Lactic acid, plasma [[id number]]  (Normal) Collected:  12/11/17 0944    Lab Status:  Final result Specimen:  Blood from Line, Venous Updated:  12/11/17 1025     LACTIC ACID 1 0 mmol/L     Narrative:         Result may be elevated if tourniquet was used during collection  Comprehensive metabolic panel [[id number]]  (Abnormal) Collected:  12/11/17 0944    Lab Status:  Final result Specimen:  Blood from Line, Venous Updated:  12/11/17 1019     Sodium 144 mmol/L      Potassium 3 5 mmol/L      Chloride 105 mmol/L      CO2 33 (H) mmol/L      Anion Gap 6 mmol/L      BUN 12 mg/dL      Creatinine 0 99 mg/dL      Glucose 108 mg/dL      Calcium 8 6 mg/dL      AST 16 U/L      ALT 16 U/L      Alkaline Phosphatase 117 (H) U/L      Total Protein 6 5 g/dL      Albumin 2 9 (L) g/dL      Total Bilirubin 0 30 mg/dL      eGFR 54 ml/min/1 73sq m     Narrative:         National Kidney Disease Education Program recommendations are as follows:  GFR calculation is accurate only with a steady state creatinine  Chronic Kidney disease less than 60 ml/min/1 73 sq  meters  Kidney failure less than 15 ml/min/1 73 sq  meters      Lipase [[id number]]  (Normal) Collected:  12/11/17 0944    Lab Status:  Final result Specimen:  Blood from Line, Venous Updated:  12/11/17 1019     Lipase 73 u/L     Troponin I [[id number]]  (Normal) Collected:  12/11/17 0944    Lab Status:  Final result Specimen:  Blood from Line, Venous Updated:  12/11/17 1019     Troponin I 0 02 ng/mL     Narrative:         [employer] Chemistry analyzer 99% cutoff is > 0 04 ng/mL in network labs    o cTnI 99% cutoff is useful only when applied to patients in the clinical setting of myocardial ischemia  o cTnI 99% cutoff should be interpreted in the context of clinical history, ECG findings and possibly cardiac imaging to establish correct diagnosis  o cTnI 99% cutoff may be suggestive but clearly not indicative of a coronary event without the clinical setting of myocardial ischemia  Jorge Barrientos [[id number]]  (Normal) Collected:  12/11/17 0944    Lab Status:  Final result Specimen:  Blood from Line, Venous Updated:  12/11/17 1011     Protime 10 3 seconds      INR 0 98    APTT [[id number]]  (Abnormal) Collected:  12/11/17 0944    Lab Status:  Final result Specimen:  Blood from Line, Venous Updated:  12/11/17 1011     PTT 43 (H) seconds     Narrative: Therapeutic Heparin Range = 60-90 seconds    CBC and differential [[id number]]  (Abnormal) Collected:  12/11/17 0944    Lab Status:  Final result Specimen:  Blood from Line, Venous Updated:  12/11/17 0954     WBC 6 20 Thousand/uL      RBC 5 14 Million/uL      Hemoglobin 13 3 g/dL      Hematocrit 41 2 %      MCV 80 (L) fL      MCH 25 9 (L) pg      MCHC 32 3 g/dL      RDW 16 5 (H) %      MPV 9 5 fL      Platelets 600 Thousands/uL      nRBC 0 /100 WBCs      Neutrophils Relative 76 (H) %      Lymphocytes Relative 11 (L) %      Monocytes Relative 6 %      Eosinophils Relative 6 %      Basophils Relative 0 %      Neutrophils Absolute 4 80 Thousands/µL      Lymphocytes Absolute 0 70 Thousands/µL      Monocytes Absolute 0 40 Thousand/µL      Eosinophils Absolute 0 40 Thousand/µL      Basophils Absolute 0 00 Thousands/µL                  VAS lower limb venous duplex study, unilateral/limited   Final Result by Coco Shahid DO (12/11 1810)      XR chest 1 view portable   Final Result by Abby Stanford MD (12/11 1021)      Left basilar atelectasis with tiny pleural effusion and hemidiaphragm elevation  Workstation performed: [id number]                    Procedures  Procedures       Phone Contacts  ED Phone Contact    ED Course  ED Course                                MDM  Number of Diagnoses or Management Options  Asthma:   Pneumonia:   Diagnosis management comments: Pt started on steroids, nebs  CXR shows possible LLL infiltrate / small effusion  Started azithro/ctx after discussion with hospitalist    Admitted for further mgmt  The patient presented with a condition in which there was a high probability of imminent or life-threatening deterioration, and critical care services (excluding separately billable procedures) totalled 30-74 minutes  Disposition  Final diagnoses:   Pneumonia   Asthma     Time reflects when diagnosis was documented in both MDM as applicable and the Disposition within this note     Time User Action Codes Description Comment    12/11/2017 10:46 AM Cortez Moise Add [J18 9] Pneumonia     12/11/2017 10:46 AM Cortez Moise Add [J45 909] Asthma     12/12/2017  2:10 PM Trudee Divine Add [J96 01] Acute respiratory failure with hypoxia ([address] )     12/12/2017  2:10 PM Trudee Divine Add [J45 901] Asthma exacerbation     12/13/2017  8:54 AM Trudee Divine Add [R78 81] Bacteremia     12/13/2017  8:54 AM Trudee Divine Add [Q42 635] Port-a-cath in place       ED Disposition     ED Disposition Condition Comment    Admit  Case was discussed with Dr Yvon Palacios and the patient's admission status was agreed to be full inpt tele to the service of Dr Yvon Palacios          Follow-up Information    None       Current Discharge Medication List      CONTINUE these medications which have NOT CHANGED    Details   albuterol (ACCUNEB) 0 63 MG/3ML nebulizer solution Take 1 ampule by nebulization every 6 (six) hours as needed for wheezing      albuterol (PROVENTIL HFA,VENTOLIN HFA) 90 mcg/act inhaler Inhale 2 puffs every 6 (six) hours as needed for wheezing      ALPRAZolam (XANAX) 0 5 mg tablet Take 0 5 mg by mouth daily at bedtime as needed for anxiety  clopidogrel (PLAVIX) 75 mg tablet Take 75 mg by mouth daily      esomeprazole (NexIUM) 40 MG capsule Take 40 mg by mouth daily  Fesoterodine Fumarate ER (TOVIAZ) 8 MG TB24 Take by mouth daily at bedtime        metoprolol tartrate (LOPRESSOR) 25 mg tablet Take 25 mg by mouth 2 (two) times a day  potassium chloride (K-DUR) 10 mEq tablet Take 10 mEq by mouth daily  pregabalin (LYRICA) 300 MG capsule Take 300 mg by mouth 2 (two) times a day  aspirin (ECOTRIN LOW STRENGTH) 81 mg EC tablet Take 1 tablet by mouth daily for 30 days  Qty: 30 tablet, Refills: 0      atorvastatin (LIPITOR) 10 mg tablet Take 1 tablet by mouth daily with dinner for 30 days  Qty: 30 tablet, Refills: 0      predniSONE 10 mg tablet 20mg PO daily for three days, then 10mg PO daily for 3 days  Qty: 9 tablet, Refills: 0           No discharge procedures on file      ED Provider  Electronically Signed by           Casa Iyer DO  12/13/17 6893

## 2017-12-13 NOTE — PROGRESS NOTES
[facility] Internal Medicine Progress Note  Patient: Ben Martinze 81 y o  female   MRN: [id number]  PCP: Franky Valdes DO  Unit/Bed#: [address] [place] [address] Encounter: [id number]  Date Of Visit: 17    Subjective:   Feeling better of cough and dyspnea  Remain afebrile    Objective:   Vitals:   Temp (24hrs), Av 9 °F (36 6 °C), Min:96 9 °F (36 1 °C), Max:98 7 °F (37 1 °C)    HR:  [52-82] 73  Resp:  [18-20] 20  BP: (144-185)/(63-81) 185/81  SpO2:  [90 %-96 %] 90 %  Body mass index is 39 31 kg/m²  Intake/Output Summary (Last 24 hours) at 17 1113  Last data filed at 17 1300   Gross per 24 hour   Intake              480 ml   Output                0 ml   Net              480 ml       Physical Exam:   General: Obese, NAD, Ionxy-X-Ytbd in place at right chest, no surrounding infx sign  HEENT: EOMI, anicteric, oral moist, neck supple, no mass or JVD  Chest: CTAB, mild expiratory wheezes  Cardiac: RRR, S1/S2, No murmur  Abd: S/ND/NT/BS+  MSK: LLE chronic edema from lymphedema, pulses intact  Neuro: AAOx3, moving all extremities  Psychiatric: Mood with normal affect      Additional Data:     Labs:    Results from last 7 days  Lab Units 17  0505  17  0944   WBC Thousand/uL 11 90*  < > 6 20   HEMOGLOBIN g/dL 12 1  < > 13 3   HEMATOCRIT % 37 6  < > 41 2   PLATELETS Thousands/uL 171  < > 165   NEUTROS PCT %  --   --  76*   LYMPHS PCT %  --   --  11*   MONOS PCT %  --   --  6   EOS PCT %  --   --  6   < > = values in this interval not displayed      Results from last 7 days  Lab Units 17  0505  17  0944   SODIUM mmol/L 142  < > 144   POTASSIUM mmol/L 4 2  < > 3 5   CHLORIDE mmol/L 107  < > 105   CO2 mmol/L 31  < > 33*   BUN mg/dL 23  < > 12   CREATININE mg/dL 0 95  < > 0 99   CALCIUM mg/dL 8 4  < > 8 6   TOTAL PROTEIN g/dL  --   --  6 5   BILIRUBIN TOTAL mg/dL  --   --  0 30   ALK PHOS U/L  --   --  117*   ALT U/L  --   --  16   AST U/L  --   --  16   GLUCOSE RANDOM mg/dL 184*  < > 108   < > = values in this interval not displayed  Results from last 7 days  Lab Units 12/11/17  0944   INR  0 98       Recent Results (from the past 24 hour(s))   Basic metabolic panel    Collection Time: 12/13/17  5:05 AM   Result Value Ref Range    Sodium 142 136 - 145 mmol/L    Potassium 4 2 3 5 - 5 3 mmol/L    Chloride 107 100 - 108 mmol/L    CO2 31 21 - 32 mmol/L    Anion Gap 4 4 - 13 mmol/L    BUN 23 5 - 25 mg/dL    Creatinine 0 95 0 60 - 1 30 mg/dL    Glucose 184 (H) 65 - 140 mg/dL    Calcium 8 4 8 3 - 10 1 mg/dL    eGFR 56 ml/min/1 73sq m   CBC (With Platelets)    Collection Time: 12/13/17  5:05 AM   Result Value Ref Range    WBC 11 90 (H) 4 80 - 10 80 Thousand/uL    RBC 4 69 4 20 - 5 40 Million/uL    Hemoglobin 12 1 12 0 - 16 0 g/dL    Hematocrit 37 6 37 0 - 47 0 %    MCV 80 (L) 82 - 98 fL    MCH 25 7 (L) 27 0 - 31 0 pg    MCHC 32 1 31 4 - 37 4 g/dL    RDW 16 4 (H) 11 6 - 15 1 %    Platelets 025 692 - 500 Thousands/uL    MPV 9 9 8 9 - 12 7 fL       Cultures:     Results from last 7 days  Lab Units 12/11/17  1004 12/11/17  0947 12/11/17  0944   GRAM STAIN RESULT   --  Gram positive cocci in clusters Gram positive cocci in clusters   INFLUENZA A PCR  None Detected  --   --    INFLUENZA B PCR  None Detected  --   --    RSV PCR  None Detected  --   --        Imaging:  Xr Chest 1 View Portable    Result Date: 12/11/2017  Narrative: CHEST INDICATION:  Chest pain and productive cough  COMPARISON:  10/12/2017  VIEWS:   AP frontal IMAGES:  1 FINDINGS:  A right-sided catheter terminates at the caval atrial junction  No pneumothorax  Cardiomediastinal silhouette appears unremarkable  Left basilar subsegmental atelectasis with hemidiaphragm elevation noted  Tiny left pleural effusion suspected  Upper lobe and right lung are clear  Visualized osseous structures appear within normal limits for the patient's age  Impression: Left basilar atelectasis with tiny pleural effusion and hemidiaphragm elevation   Workstation performed: [id number]     Vas Lower Limb Venous Duplex Study, Unilateral/limited    Result Date: 12/11/2017  Narrative:  [facility] REPORT [facility]INICAL: Indications: Edema, unspecified [R60 9]  Patient presents with left lower extremity edema x several months  The patient has history of malignancy  Clinical:  FINDINGS:  Left     Impression       CFV      Normal (Patent)     CONCLUSION:  Impression: RIGHT LOWER LIMB LIMITED: NORMAL Evaluation shows no evidence of thrombus in the common femoral vein  Doppler evaluation shows a normal response to augmentation maneuvers  LEFT LOWER LIMB: NORMAL No evidence of acute or chronic deep vein thrombosis No evidence of superficial thrombophlebitis noted  Doppler evaluation shows a normal response to augmentation maneuvers  Popliteal, posterior tibial and anterior tibial arterial Doppler waveforms are triphasic    SIGNATURE: Electronically Signed by: Ronny Hwang on 2017-12-11 06:10:35 PM    Imaging Reports Reviewed by myself    Last 24 Hours Medication List:     Current Facility-Administered Medications:     acetaminophen (TYLENOL) tablet 650 mg, 650 mg, Oral, Q6H PRN, Esperanza Branham MD, 650 mg at 12/13/17 0007    ALPRAZolam (XANAX) tablet 0 5 mg, 0 5 mg, Oral, HS PRN, Esperanza Branham MD, 0 5 mg at 12/12/17 2226    aluminum-magnesium hydroxide-simethicone (MYLANTA) 200-200-20 mg/5 mL oral suspension 30 mL, 30 mL, Oral, Q6H PRN, Esperanza Branham MD    aspirin (ECOTRIN LOW STRENGTH) EC tablet 81 mg, 81 mg, Oral, Daily, Esperanza Branham MD, 81 mg at 12/13/17 1001    atorvastatin (LIPITOR) tablet 10 mg, 10 mg, Oral, Daily With Lauryn Chacko MD, 10 mg at 12/12/17 1625    azithromycin (ZITHROMAX) 500 mg in sodium chloride 0 9 % 250 mL IVPB, 500 mg, Intravenous, Q24H, Esperanza Branham MD, Last Rate: 250 mL/hr at 12/12/17 1100, 500 mg at 12/12/17 1100    clopidogrel (PLAVIX) tablet 75 mg, 75 mg, Oral, Daily, Esperanza Branham MD, 75 mg at 12/13/17 1002   dextromethorphan-guaiFENesin (ROBITUSSIN DM)  mg/5 mL oral syrup 10 mL, 10 mL, Oral, Q4H PRN, GILDA Melgar, 10 mL at 12/12/17 2202    enoxaparin (LOVENOX) subcutaneous injection 40 mg, 40 mg, Subcutaneous, Daily, Esperanza Branham MD, 40 mg at 12/13/17 1002    heparin lock flush 100 units/mL injection 300 Units, 300 Units, Intracatheter, Q1H PRN, Ce Hutchins MD, 300 Units at 12/12/17 2338    ipratropium-albuterol (DUO-NEB) 0 5-2 5 mg/mL inhalation solution 3 mL, 3 mL, Nebulization, Q6H, Esperanza Branham MD, 3 mL at 12/13/17 0727    levalbuterol (Norvel Peaks) inhalation solution 0 63 mg, 0 63 mg, Nebulization, Q2H PRN, Ce Hutchins MD    methylPREDNISolone sodium succinate (Solu-MEDROL) injection 60 mg, 60 mg, Intravenous, Q8H [facility], Esperanza Branham MD, 60 mg at 12/13/17 0532    metoprolol tartrate (LOPRESSOR) tablet 25 mg, 25 mg, Oral, BID, Esperanza Branham MD, 25 mg at 12/13/17 1000    ondansetron (ZOFRAN) injection 4 mg, 4 mg, Intravenous, Q4H PRN, Esperanza Branham MD    pantoprazole (PROTONIX) EC tablet 40 mg, 40 mg, Oral, Early Morning, Esperanza Branham MD, 40 mg at 12/13/17 0531    polyethylene glycol (MIRALAX) packet 17 g, 17 g, Oral, Daily, Esperanza Branham MD, 17 g at 12/13/17 1002    potassium chloride (K-DUR,KLOR-CON) CR tablet 20 mEq, 20 mEq, Oral, Daily, Ce Hutchins MD, 20 mEq at 12/13/17 1001    pregabalin (LYRICA) capsule 300 mg, 300 mg, Oral, BID, Esperanza Branham MD, 300 mg at 12/13/17 1001    vancomycin (VANCOCIN) IVPB (premix) 1,000 mg, 15 mg/kg (Adjusted), Intravenous, Q12H, Ce Hutchins MD, Last Rate: 200 mL/hr at 12/13/17 0531, 1,000 mg at 12/13/17 0531     Assessment and Plans:  Hospital Problem List:   Principal Problem:    Asthma exacerbation  Active Problems:    Acute bronchitis    Bacteremia    Acute respiratory failure with hypoxia (HCC)    Hypertension    Carotid stenosis, left    Hyperlipemia    Atrial fibrillation (HCC)    Lymphedema of left leg    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    81year old female with a history of Neuroendocrine tumor/Merkel cell carcinoma approximately 10 years ago with resection, lymph node sampling, radiation and chemotherapy, Port-a-Cath in place (for 10 years), Asthma, Atrial fibrillation, GERD, Fibromyalgia, Hypokalemia, who presented with worsening shortness of breath, coughing and wheezing  GPC bacteremia  Blood stains showing GPC in clutters from 2 bottles  Given her Port A Cath at right chest - vancomycin iv started on 12/12  F/u blood culture  Repeat blood culture on 12/13  Will obtain ID consult    Asthma exacerbation  Likely precipitated by acute bronchitis  Clinically improving with IV Solu-Medrol, bronchodilators, IV azithromycin, O2  Pulmonology consult appreciated     Atrial fibrillation  Pt refused anticoagulation  Continue metoprolol, aspirin, Plavix      Left lower extremity edema  Pt is known to have history of lymphedema  US LE venous no DVT    Left carotid stenosis  Pt refused carotid end arterectomy  Continue aspirin and Plavix      Hyperlipidemia  Continue Lipitor     GERD  Continue PPT     Neuropathy  Continue Lyrica    History of Neuroendocrine tumor/Merkel cell carcinoma   Seen by Hem/Onc Dr Alpa Ballesteros in 9/2017 - no evidence of recurrence    DVT Prophylaxis: on Lovenox sc, SCD  Code Status: Level 1 - Full Code  Disposition: anticipate d/c home once clinically improved  Patient Centered Rounds: I have performed bedside rounds with nursing staff today  Discussions with Specialists or Other Care Team Provider: Yes    Education and Discussions with Family / Patient:Yes    Time Spent for Care: 20 minutes  More than 50% of total time spent on counseling and coordination of care as described above      Current Length of Stay: 2 day(s)    Current Patient Status: Inpatient     Signed by:  Mere Dos Santos MD  Attending Hospitalist  Page#: [phone] (Hours 7am to 7pm)  12/13/2017 11:13 AM

## 2017-12-13 NOTE — CONSULTS
Consultation - Infectious Disease   Bassem Martinez 81 y o  female MRN: [id number]  Unit/Bed#: [address] [place] [address] Encounter: [id number]        History of Present Illness   Physician Requesting Consult: Andrew Ngo MD  Reason for Consult / Principal Problem:  Bacteremia in patient with the Port-A-Cath    HPI: Bassem Martinez is a 81y o  year old female who presented to [facility], on December 11, 2017, with complaints of worsening shortness of breath  Thought to have acute exacerbation of asthma, was hospitalized  Treated with Solu-Medrol, bronchodilators, and IV azithromycin/oxygen  The hospital course has been remarkable for lack of fever  Yesterday, Two of 2 blood cultures done on admission, were reported to show growth of gram-positive cocci in clusters  Vancomycin was added to her antimicrobial coverage  This morning, 2 more blood cultures were obtained  And Infectious Disease consult for further intervention  Patient reports feeling better since admission  She admits to having acute exacerbation of asthma whenever the weather changes  Two or 3 weeks ago she had been evaluated by her primary physician for one such episode, she was started on prednisone and given a Z-Kenn  She took the treatment as prescribed and felt better  However, she started to experience more wheezing, some chest discomfort, then went to an urgent care clinic on December 9  She was prescribed Levaquin  She took 2 doses did not feel better, decided to seek medical attention at [facility] on December 11  Throughout these episodes, she has had no fever, chills had some periods of diaphoresis  She denies any phlegm, hemoptysis or ongoing chest pain  She has had no nausea, vomiting or diarrhea  She has had urinary incontinence for several years, since her radiation treatment, denies any dysuria or hematuria now    Discussion with her nurse reveals that patient has had very difficult venipuncture, all or most of the blood withdrawal list through the Port-A-Cath  Today, he could not verify the site of withdrawal of the blood cultures, obtained on admission  The 2 sets of blood cultures done today have also been difficult to withdraw from peripheral veins  Recently, on October 27, 2017, she had surgery for open wound of abdominal wall, excision of the sinus and removal of a foreign body, done here at [facility]   She reports no symptoms pertaining to this surgery  The histopathology had not shown presence of any foreign body instead fibrous tissue  Patient has obesity, asthma, hypertension, dyslipidemia, severe left carotid artery disease, atrial fibrillation  About 8 years ago, 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, 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, had undergone resection of the tumor and excision of the regional lymph nodes , followed by cancer chemotherapy and radiation treatment  She reports that in the groin area, she had sustained burns from radiation  Following her treatment for this cancer, she has developed lymphedema of the left leg and urinary incontinence  About 5 years ago she had sustained left hip fracture, has had left hip arthroplasty  She has remote history of cancer of the cervix for which hysterectomy was done about 39 years ago  She had a positive methicillin-resistant Staphylococcus aureus surveillance culture in September 2016  She has remote allergy to penicillin which had caused a rash  She had become unresponsive with fentanyl patch and has history of latex allergy  Inpatient consult to Infectious Diseases  Consult performed by: Tania Arnett ordered by: Roland Pena          Review of Systems   Constitutional: Positive for activity change and diaphoresis  HENT: Positive for dental problem  Eyes: Negative  Respiratory: Positive for cough, shortness of breath and wheezing  Cardiovascular: Negative  Gastrointestinal: Negative  Endocrine: Negative  Genitourinary:        Urinary incontinence   Musculoskeletal: Negative  Skin: Positive for color change  Allergic/Immunologic: Negative  Neurological: Negative  Hematological: Negative  Psychiatric/Behavioral: Negative  Historical Information   Past Medical History:   Diagnosis Date    Asthma     Cancer ([address] )     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    Cardiac disease     a fib    Fibromyalgia     Fibromyalgia, primary     GERD (gastroesophageal reflux disease)     Hypokalemia     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, left groin, lung and bladder  Patient treated with chemotherapy and radiation     Past Surgical History:   Procedure Laterality Date    APPENDECTOMY      CATARACT EXTRACTION      CHEST WALL BIOPSY N/A 10/27/2017    Procedure: SINUS EXCISION AND REMOVAL OF FOREIGN BODY, ABDOMEN (WOUND EXPLORATION);   Surgeon: Dianna Love MD;  Location: [address] [place] [address];  Service: General    EYE SURGERY Bilateral     cataracts     HIP FRACTURE SURGERY Left     HYSTERECTOMY      JOINT REPLACEMENT Left     hip    LYMPHADENECTOMY      PORTACATH PLACEMENT Right      Social History   History   Alcohol Use No     History   Drug Use No     History   Smoking Status    Never Smoker   Smokeless Tobacco    Never Used     Family History:   Family History   Problem Relation Age of Onset    Pneumonia Mother     Heart disease Father        Meds/Allergies     Current Facility-Administered Medications:     acetaminophen (TYLENOL) tablet 650 mg, 650 mg, Oral, Q6H PRN, Esperanza Branham MD, 650 mg at 12/13/17 0007    ALPRAZolam (XANAX) tablet 0 5 mg, 0 5 mg, Oral, HS PRN, Esperanza Branham MD, 0 5 mg at 12/12/17 2226    aluminum-magnesium hydroxide-simethicone (MYLANTA) 200-200-20 mg/5 mL oral suspension 30 mL, 30 mL, Oral, Q6H PRN, Esperanza Branham MD    aspirin (ECOTRIN LOW STRENGTH) EC tablet 81 mg, 81 mg, Oral, Daily, Esperanza Branham MD, 81 mg at 12/13/17 1001    atorvastatin (LIPITOR) tablet 10 mg, 10 mg, Oral, Daily With Dinner, Esperanza Branham MD, 10 mg at 12/12/17 1625    azithromycin (ZITHROMAX) 500 mg in sodium chloride 0 9 % 250 mL IVPB, 500 mg, Intravenous, Q24H, Esperanza Branham MD, Last Rate: 250 mL/hr at 12/12/17 1100, 500 mg at 12/12/17 1100    clopidogrel (PLAVIX) tablet 75 mg, 75 mg, Oral, Daily, Esperanza Branham MD, 75 mg at 12/13/17 1002    dextromethorphan-guaiFENesin (ROBITUSSIN DM)  mg/5 mL oral syrup 10 mL, 10 mL, Oral, Q4H PRN, GILDA Melgar, 10 mL at 12/12/17 2202    enoxaparin (LOVENOX) subcutaneous injection 40 mg, 40 mg, Subcutaneous, Daily, Esperanza Branham MD, 40 mg at 12/13/17 1002    heparin lock flush 100 units/mL injection 300 Units, 300 Units, Intracatheter, Q1H PRN, Angelo Liu MD, 300 Units at 12/12/17 2338    ipratropium-albuterol (DUO-NEB) 0 5-2 5 mg/mL inhalation solution 3 mL, 3 mL, Nebulization, Q6H, Esperanza Branham MD, 3 mL at 12/13/17 0727    levalbuterol (XOPENEX) inhalation solution 0 63 mg, 0 63 mg, Nebulization, Q2H PRN, Angelo Liu MD    methylPREDNISolone sodium succinate (Solu-MEDROL) injection 60 mg, 60 mg, Intravenous, Q8H [address], Esperanza Branham MD, 60 mg at 12/13/17 0532    metoprolol tartrate (LOPRESSOR) tablet 25 mg, 25 mg, Oral, BID, Esperanza Branham MD, 25 mg at 12/13/17 1000    ondansetron (ZOFRAN) injection 4 mg, 4 mg, Intravenous, Q4H PRN, Esperanza Branham MD    pantoprazole (PROTONIX) EC tablet 40 mg, 40 mg, Oral, Early Morning, Esperanza Branham MD, 40 mg at 12/13/17 0531    polyethylene glycol (MIRALAX) packet 17 g, 17 g, Oral, Daily, Esperanza Branham MD, 17 g at 12/13/17 1002    potassium chloride (K-DUR,KLOR-CON) CR tablet 20 mEq, 20 mEq, Oral, Daily, Angelo Liu MD, 20 mEq at 12/13/17 1001    pregabalin (LYRICA) capsule 300 mg, 300 mg, Oral, BID, Esperanza Branham MD, 300 mg at 12/13/17 1001    vancomycin (VANCOCIN) IVPB (premix) 1,000 mg, 15 mg/kg (Adjusted), Intravenous, Q12H, Angel Luis Bright MD, Last Rate: 200 mL/hr at 12/13/17 0531, 1,000 mg at 12/13/17 0531  Allergies   Allergen Reactions    Fentanyl And Related Other (See Comments)     Passed out    Latex     Penicillins      all current active meds have been reviewed    PTA meds:    Prescriptions Prior to Admission   Medication Sig Dispense Refill Last Dose    albuterol (ACCUNEB) 0 63 MG/3ML nebulizer solution Take 1 ampule by nebulization every 6 (six) hours as needed for wheezing   12/11/2017 at Unknown time    albuterol (PROVENTIL HFA,VENTOLIN HFA) 90 mcg/act inhaler Inhale 2 puffs every 6 (six) hours as needed for wheezing   12/10/2017 at Unknown time    ALPRAZolam (XANAX) 0 5 mg tablet Take 0 5 mg by mouth daily at bedtime as needed for anxiety  12/10/2017 at Unknown time    clopidogrel (PLAVIX) 75 mg tablet Take 75 mg by mouth daily   12/10/2017 at Unknown time    esomeprazole (NexIUM) 40 MG capsule Take 40 mg by mouth daily  12/11/2017 at Unknown time    Fesoterodine Fumarate ER (TOVIAZ) 8 MG TB24 Take by mouth daily at bedtime     12/10/2017 at Unknown time    metoprolol tartrate (LOPRESSOR) 25 mg tablet Take 25 mg by mouth 2 (two) times a day  12/10/2017 at Unknown time    potassium chloride (K-DUR) 10 mEq tablet Take 10 mEq by mouth daily  12/10/2017 at Unknown time    pregabalin (LYRICA) 300 MG capsule Take 300 mg by mouth 2 (two) times a day     12/10/2017 at Unknown time    aspirin (ECOTRIN LOW STRENGTH) 81 mg EC tablet Take 1 tablet by mouth daily for 30 days (Patient taking differently: Take 81 mg by mouth daily Last dose 10/25/17 ) 30 tablet 0 10/24/2017 at 0800    atorvastatin (LIPITOR) 10 mg tablet Take 1 tablet by mouth daily with dinner for 30 days 30 tablet 0 10/27/2017 at 0630    predniSONE 10 mg tablet 20mg PO daily for three days, then 10mg PO daily for 3 days 9 tablet 0 Unknown at Unknown time    and discontinued meds:   Medications Discontinued During This Encounter   Medication Reason    albuterol inhalation solution 5 mg     potassium chloride (K-DUR,KLOR-CON) CR tablet 10 mEq     cefTRIAXone (ROCEPHIN) IVPB (premix) 2,000 mg        PHYSICAL EXAM:  Vitals:    12/13/17 0405 12/13/17 0727 12/13/17 1000 12/13/17 1106   BP: 153/70 153/70 153/70 (!) 185/81   Pulse: 82 78 78 73   Resp: 18 18  20   Temp: 97 6 °F (36 4 °C) 98 2 °F (36 8 °C)  (!) 96 9 °F (36 1 °C)   TempSrc: Tympanic Oral  Tympanic   SpO2: 92% 95%  90%   Weight:       Height:         Body mass index is 39 31 kg/m²  Weight (last 2 days)     Date/Time   Weight    12/11/17 1228  97 5 (214 95)            Physical Exam   This is an obese elderly woman who is alert and pleasant  She is oriented to time place and person  She has been afebrile throughout the hospitalization and the vital signs are stable  HEENT exam:  Normocephalic head, no scleral icterus or conjunctivitis  No facial swelling or tenderness  Her teeth are in very poor repair, areas, broken and with periodontal disease  There are no other oropharyngeal mucosal lesions  Neck:  Is supple with no jugular venous distention, lymphadenopathy, thyromegaly or focal tenderness  Chest:  There is a right infraclavicular Port-A-Cath in place, there is no surrounding inflammation, the catheter has been functioning well  The heart sounds are regular, there is no heart murmur  The lungs are clear  Abdomen:  Is obese and soft and nontender  There are multiple scars from previous surgeries  The suprapubic wound appears to be healing  There is no focal tenderness or palpable organomegaly  Bowel sounds are active  Lower extremities: There is a large scar in the left inguinal/femoral area, and over the lateral thigh  There is lymphedema of the left lower extremity, most prominent over the leg below-knee  There is hint of erythema, query chronic  There is no interdigital tinea pedis  The calf appears supple    There is no acute arthritis  Right lower extremity reveals no acute inflammation  Upper extremities: There is no acute arthritis of phlebitis  Intake/Output Summary (Last 24 hours) at 12/13/17 1124  Last data filed at 12/12/17 1300   Gross per 24 hour   Intake              480 ml   Output                0 ml   Net              480 ml       Invasive Devices:   Port A Cath 12/11/17 Right Chest (Active)   Reasons to continue Port Access Meds require central line;Treatment Therapy 12/13/2017  8:52 AM   Line Necessity Reviewed Yes, reviewed with provider 12/12/2017  8:00 PM   Site Assessment Clean;Dry; Intact 12/12/2017  8:00 PM   Line Status Blood return noted; Flushed;Saline locked 12/12/2017  8:00 PM   Dressing Type Chlorhexidine dressing 12/12/2017  8:00 PM   Dressing Status Clean;Dry; Intact 12/12/2017  8:00 PM   Dressing Intervention 7 day central line dressing changed 12/13/2017 12:26 AM   Dressing Change Due 12/21/17 12/13/2017 12:26 AM         Lab Results:   Recent Results (from the past 96 hour(s))   ECG 12 lead    Collection Time: 12/11/17  9:09 AM   Result Value Ref Range    Ventricular Rate 110 BPM    Atrial Rate 110 BPM    WV Interval  ms    QRSD Interval 82 ms    QT Interval 286 ms    QTC Interval 387 ms    P Axis  degrees    QRS Axis -16 degrees    T Wave Axis 67 degrees   CBC and differential    Collection Time: 12/11/17  9:44 AM   Result Value Ref Range    WBC 6 20 4 80 - 10 80 Thousand/uL    RBC 5 14 4 20 - 5 40 Million/uL    Hemoglobin 13 3 12 0 - 16 0 g/dL    Hematocrit 41 2 37 0 - 47 0 %    MCV 80 (L) 82 - 98 fL    MCH 25 9 (L) 27 0 - 31 0 pg    MCHC 32 3 31 4 - 37 4 g/dL    RDW 16 5 (H) 11 6 - 15 1 %    MPV 9 5 8 9 - 12 7 fL    Platelets 622 083 - 946 Thousands/uL    nRBC 0 /100 WBCs    Neutrophils Relative 76 (H) 43 - 75 %    Lymphocytes Relative 11 (L) 14 - 44 %    Monocytes Relative 6 4 - 12 %    Eosinophils Relative 6 0 - 6 %    Basophils Relative 0 0 - 1 %    Neutrophils Absolute 4 80 1 85 - 7 62 Thousands/µL    Lymphocytes Absolute 0 70 0 60 - 4 47 Thousands/µL    Monocytes Absolute 0 40 0 17 - 1 22 Thousand/µL    Eosinophils Absolute 0 40 0 00 - 0 61 Thousand/µL    Basophils Absolute 0 00 0 00 - 0 10 Thousands/µL   Comprehensive metabolic panel    Collection Time: 12/11/17  9:44 AM   Result Value Ref Range    Sodium 144 136 - 145 mmol/L    Potassium 3 5 3 5 - 5 3 mmol/L    Chloride 105 100 - 108 mmol/L    CO2 33 (H) 21 - 32 mmol/L    Anion Gap 6 4 - 13 mmol/L    BUN 12 5 - 25 mg/dL    Creatinine 0 99 0 60 - 1 30 mg/dL    Glucose 108 65 - 140 mg/dL    Calcium 8 6 8 3 - 10 1 mg/dL    AST 16 5 - 45 U/L    ALT 16 12 - 78 U/L    Alkaline Phosphatase 117 (H) 46 - 116 U/L    Total Protein 6 5 6 4 - 8 2 g/dL    Albumin 2 9 (L) 3 5 - 5 0 g/dL    Total Bilirubin 0 30 0 20 - 1 00 mg/dL    eGFR 54 ml/min/1 73sq m   Protime-INR    Collection Time: 12/11/17  9:44 AM   Result Value Ref Range    Protime 10 3 9 4 - 11 7 seconds    INR 0 98 0 86 - 1 16   APTT    Collection Time: 12/11/17  9:44 AM   Result Value Ref Range    PTT 43 (H) 24 - 33 seconds   Blood culture #1    Collection Time: 12/11/17  9:44 AM   Result Value Ref Range    Blood Culture      Gram Stain Result Gram positive cocci in clusters    Lactic acid, plasma    Collection Time: 12/11/17  9:44 AM   Result Value Ref Range    LACTIC ACID 1 0 0 5 - 2 0 mmol/L   Lipase    Collection Time: 12/11/17  9:44 AM   Result Value Ref Range    Lipase 73 73 - 393 u/L   Troponin I    Collection Time: 12/11/17  9:44 AM   Result Value Ref Range    Troponin I 0 02 <=0 04 ng/mL   Blood culture #2    Collection Time: 12/11/17  9:47 AM   Result Value Ref Range    Blood Culture      Gram Stain Result Gram positive cocci in clusters    Rapid Influenza Screen with Reflex PCR (indicated for patients <2mo of age)    Collection Time: 12/11/17 10:04 AM   Result Value Ref Range    Rapid Influenza A Ag Negative Negative, Indeterminate    Rapid Influenza B Ag Negative Negative, Indeterminate   Influenza A/B and RSV by PCR (Indicated for patients > 2 mo of age)    Collection Time: 12/11/17 10:04 AM   Result Value Ref Range    INFLU A PCR None Detected None Detected    INFLU B PCR None Detected None Detected    RSV PCR None Detected None Detected   Basic metabolic panel    Collection Time: 12/12/17  6:33 AM   Result Value Ref Range    Sodium 144 136 - 145 mmol/L    Potassium 3 5 3 5 - 5 3 mmol/L    Chloride 106 100 - 108 mmol/L    CO2 32 21 - 32 mmol/L    Anion Gap 6 4 - 13 mmol/L    BUN 20 5 - 25 mg/dL    Creatinine 0 95 0 60 - 1 30 mg/dL    Glucose 187 (H) 65 - 140 mg/dL    Calcium 8 6 8 3 - 10 1 mg/dL    eGFR 56 ml/min/1 73sq m   CBC (With Platelets)    Collection Time: 12/12/17  6:33 AM   Result Value Ref Range    WBC 7 00 4 80 - 10 80 Thousand/uL    RBC 4 58 4 20 - 5 40 Million/uL    Hemoglobin 11 7 (L) 12 0 - 16 0 g/dL    Hematocrit 36 4 (L) 37 0 - 47 0 %    MCV 80 (L) 82 - 98 fL    MCH 25 6 (L) 27 0 - 31 0 pg    MCHC 32 2 31 4 - 37 4 g/dL    RDW 15 8 (H) 11 6 - 15 1 %    Platelets 895 185 - 560 Thousands/uL    MPV 9 6 8 9 - 12 7 fL   Basic metabolic panel    Collection Time: 12/13/17  5:05 AM   Result Value Ref Range    Sodium 142 136 - 145 mmol/L    Potassium 4 2 3 5 - 5 3 mmol/L    Chloride 107 100 - 108 mmol/L    CO2 31 21 - 32 mmol/L    Anion Gap 4 4 - 13 mmol/L    BUN 23 5 - 25 mg/dL    Creatinine 0 95 0 60 - 1 30 mg/dL    Glucose 184 (H) 65 - 140 mg/dL    Calcium 8 4 8 3 - 10 1 mg/dL    eGFR 56 ml/min/1 73sq m   CBC (With Platelets)    Collection Time: 12/13/17  5:05 AM   Result Value Ref Range    WBC 11 90 (H) 4 80 - 10 80 Thousand/uL    RBC 4 69 4 20 - 5 40 Million/uL    Hemoglobin 12 1 12 0 - 16 0 g/dL    Hematocrit 37 6 37 0 - 47 0 %    MCV 80 (L) 82 - 98 fL    MCH 25 7 (L) 27 0 - 31 0 pg    MCHC 32 1 31 4 - 37 4 g/dL    RDW 16 4 (H) 11 6 - 15 1 %    Platelets 360 880 - 077 Thousands/uL    MPV 9 9 8 9 - 12 7 fL     Cultures    Results from last 7 days  Lab Units 12/11/17  1004 12/11/17  0947 12/11/17  0944   GRAM STAIN RESULT   --  Gram positive cocci in clusters Gram positive cocci in clusters   INFLUENZA A PCR  None Detected  --   --    INFLUENZA B PCR  None Detected  --   --    RSV PCR  None Detected  --   --            HEPATITIS: Lab Results   Component Value Date    HEPAIGM Non-reactive 09/22/2016    HEPBIGM Non-reactive 09/22/2016    HEPCAB Non-reactive 09/22/2016       PPD: No results found for: PPD    Urine Tox Screen: No results found for: PCP, TCA    I have personally reviewed pertinent labs  Imaging Studies:  Xr Chest 1 View Portable    Result Date: 12/11/2017  Narrative: CHEST INDICATION:  Chest pain and productive cough  COMPARISON:  10/12/2017  VIEWS:   AP frontal IMAGES:  1 FINDINGS:  A right-sided catheter terminates at the caval atrial junction  No pneumothorax  Cardiomediastinal silhouette appears unremarkable  Left basilar subsegmental atelectasis with hemidiaphragm elevation noted  Tiny left pleural effusion suspected  Upper lobe and right lung are clear  Visualized osseous structures appear within normal limits for the patient's age  Impression: Left basilar atelectasis with tiny pleural effusion and hemidiaphragm elevation  Workstation performed: [id number]     Vas Lower Limb Venous Duplex Study, Unilateral/limited    Result Date: 12/11/2017  Narrative:  [facility] REPORT [facility]ICAL: Indications: Edema, unspecified [R60 9]  Patient presents with left lower extremity edema x several months  The patient has history of malignancy  Clinical:  FINDINGS:  Left     Impression       CFV      Normal (Patent)     CONCLUSION:  Impression: RIGHT LOWER LIMB LIMITED: NORMAL Evaluation shows no evidence of thrombus in the common femoral vein  Doppler evaluation shows a normal response to augmentation maneuvers  LEFT LOWER LIMB: NORMAL No evidence of acute or chronic deep vein thrombosis No evidence of superficial thrombophlebitis noted   Doppler evaluation shows a normal response to augmentation maneuvers  Popliteal, posterior tibial and anterior tibial arterial Doppler waveforms are triphasic  SIGNATURE: Electronically Signed by: Jesús Saenz on 2017-12-11 06:10:35 PM      EKG, Pathology, and Other Studies: I have personally reviewed pertinent reports  Principal Problem:    Asthma exacerbation  Active Problems:    Hypertension    Carotid stenosis, left    Acute bronchitis    Hyperlipemia    Atrial fibrillation (HCC)    Lymphedema of left leg    Acute respiratory failure with hypoxia (HCC)    Bacteremia    History of Merkel cell carcinoma    Port-a-cath in place      Assessment/Plan   This is an elderly woman with hypertension, hyperlipidemia, vascular disease, left carotid artery disease, chronic atrial fibrillation, asthma, obesity, 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, status post resection and dissection of regional lymph nodes, with subsequent development of left lower extremity lymphedema, status post left hip replacement following fracture  Remote cancer of the cervix status post hysterectomy, 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, which has recently been excised, with gradual wound healing  She presented to the hospital with acute exacerbation of asthma  The blood cultures have yielded growth of gram-positive cocci in clusters, suggestive of staphylococcal infection  However, lack of signs of systemic sepsis, difficult lizbet punctures, make me suspect skin contamination rather than true infection  She has already had 2 different antimicrobial agents for treatment of bronchitis, in last 2 or 3 weeks  I recommend stopping azithromycin as well as vancomycin  Monitor her clinical course to  need for further intervention  Discussed with Daniele Frey, the hospitalist   Thank you for the consultation  The patient was counseled  Addendum:  I had requested the nurse to check about the original blood cultures  Apparently, both these cultures were done in the ER, prior to accessing the Port-A-Cath  The charge nurse could not reach the emergency room nurse who had taken care of her, on the day of admission  Since we know that peripheral venipuncture is challenging in her,  it is safe to interpret that there was skin contamination, which has resulted in the positive blood cultures

## 2017-12-13 NOTE — PROGRESS NOTES
affect  Her behavior is normal  Thought content normal         Labs: I have personally reviewed pertinent lab results  , ABG: No results found for: PHART, CBF5PYD, PO2ART, SWX0KBW, Z0MOSWHP, BEART, SOURCE, BNP: No results found for: BNP, CBC:   Lab Results   Component Value Date    WBC 11 90 (H) 12/13/2017    HGB 12 1 12/13/2017    HCT 37 6 12/13/2017    MCV 80 (L) 12/13/2017     12/13/2017    ADJUSTEDWBC 4 00 (L) 09/25/2016    MCH 25 7 (L) 12/13/2017    MCHC 32 1 12/13/2017    RDW 16 4 (H) 12/13/2017    MPV 9 9 12/13/2017    NRBC 0 12/11/2017   , CMP:   Lab Results   Component Value Date     12/13/2017    K 4 2 12/13/2017     12/13/2017    CO2 31 12/13/2017    ANIONGAP 4 12/13/2017    BUN 23 12/13/2017    CREATININE 0 95 12/13/2017    GLUCOSE 184 (H) 12/13/2017    CALCIUM 8 4 12/13/2017    AST 16 12/11/2017    ALT 16 12/11/2017    ALKPHOS 117 (H) 12/11/2017    PROT 6 5 12/11/2017    ALBUMIN 2 9 (L) 12/11/2017    BILITOT 0 30 12/11/2017    EGFR 56 12/13/2017   , PT/INR:   Lab Results   Component Value Date    INR 0 98 12/11/2017   , Troponin: No components found for: TROPONIN    Imaging and other studies: I have personally reviewed pertinent reports     and I have personally reviewed pertinent films in PACS

## 2017-12-14 LAB
ANION GAP SERPL CALCULATED.3IONS-SCNC: 4 MMOL/L (ref 4–13)
BUN SERPL-MCNC: 21 MG/DL (ref 5–25)
CALCIUM SERPL-MCNC: 8.5 MG/DL (ref 8.3–10.1)
CHLORIDE SERPL-SCNC: 105 MMOL/L (ref 100–108)
CO2 SERPL-SCNC: 32 MMOL/L (ref 21–32)
CREAT SERPL-MCNC: 1.05 MG/DL (ref 0.6–1.3)
ERYTHROCYTE [DISTWIDTH] IN BLOOD BY AUTOMATED COUNT: 16.9 % (ref 11.6–15.1)
GFR SERPL CREATININE-BSD FRML MDRD: 50 ML/MIN/1.73SQ M
GLUCOSE SERPL-MCNC: 163 MG/DL (ref 65–140)
HCT VFR BLD AUTO: 39.6 % (ref 37–47)
HGB BLD-MCNC: 12.7 G/DL (ref 12–16)
MCH RBC QN AUTO: 25.8 PG (ref 27–31)
MCHC RBC AUTO-ENTMCNC: 32.1 G/DL (ref 31.4–37.4)
MCV RBC AUTO: 80 FL (ref 82–98)
PLATELET # BLD AUTO: 171 THOUSANDS/UL (ref 130–400)
PMV BLD AUTO: 9.7 FL (ref 8.9–12.7)
POTASSIUM SERPL-SCNC: 4.3 MMOL/L (ref 3.5–5.3)
RBC # BLD AUTO: 4.93 MILLION/UL (ref 4.2–5.4)
SODIUM SERPL-SCNC: 141 MMOL/L (ref 136–145)
WBC # BLD AUTO: 9.5 THOUSAND/UL (ref 4.8–10.8)

## 2017-12-14 PROCEDURE — 85027 COMPLETE CBC AUTOMATED: CPT | Performed by: INTERNAL MEDICINE

## 2017-12-14 PROCEDURE — 94760 N-INVAS EAR/PLS OXIMETRY 1: CPT

## 2017-12-14 PROCEDURE — 80048 BASIC METABOLIC PNL TOTAL CA: CPT | Performed by: INTERNAL MEDICINE

## 2017-12-14 PROCEDURE — 94668 MNPJ CHEST WALL SBSQ: CPT

## 2017-12-14 PROCEDURE — 94640 AIRWAY INHALATION TREATMENT: CPT

## 2017-12-14 RX ORDER — AMLODIPINE BESYLATE 5 MG/1
5 TABLET ORAL DAILY
Status: DISCONTINUED | OUTPATIENT
Start: 2017-12-14 | End: 2017-12-16 | Stop reason: HOSPADM

## 2017-12-14 RX ORDER — METHYLPREDNISOLONE SODIUM SUCCINATE 40 MG/ML
40 INJECTION, POWDER, LYOPHILIZED, FOR SOLUTION INTRAMUSCULAR; INTRAVENOUS EVERY 8 HOURS SCHEDULED
Status: DISCONTINUED | OUTPATIENT
Start: 2017-12-14 | End: 2017-12-16 | Stop reason: HOSPADM

## 2017-12-14 RX ORDER — BENZONATATE 100 MG/1
100 CAPSULE ORAL 3 TIMES DAILY PRN
Status: DISCONTINUED | OUTPATIENT
Start: 2017-12-14 | End: 2017-12-16 | Stop reason: HOSPADM

## 2017-12-14 RX ADMIN — PANTOPRAZOLE SODIUM 40 MG: 40 TABLET, DELAYED RELEASE ORAL at 05:24

## 2017-12-14 RX ADMIN — ACETAMINOPHEN 650 MG: 325 TABLET, FILM COATED ORAL at 05:36

## 2017-12-14 RX ADMIN — ATORVASTATIN CALCIUM 10 MG: 10 TABLET, FILM COATED ORAL at 17:18

## 2017-12-14 RX ADMIN — METHYLPREDNISOLONE SODIUM SUCCINATE 40 MG: 40 INJECTION, POWDER, FOR SOLUTION INTRAMUSCULAR; INTRAVENOUS at 22:19

## 2017-12-14 RX ADMIN — METHYLPREDNISOLONE SODIUM SUCCINATE 40 MG: 40 INJECTION, POWDER, FOR SOLUTION INTRAMUSCULAR; INTRAVENOUS at 14:42

## 2017-12-14 RX ADMIN — PREGABALIN 300 MG: 100 CAPSULE ORAL at 17:18

## 2017-12-14 RX ADMIN — PREGABALIN 300 MG: 100 CAPSULE ORAL at 11:03

## 2017-12-14 RX ADMIN — METOPROLOL TARTRATE 25 MG: 25 TABLET ORAL at 11:04

## 2017-12-14 RX ADMIN — IPRATROPIUM BROMIDE AND ALBUTEROL SULFATE 3 ML: .5; 3 SOLUTION RESPIRATORY (INHALATION) at 19:45

## 2017-12-14 RX ADMIN — GUAIFENESIN AND DEXTROMETHORPHAN 10 ML: 100; 10 SYRUP ORAL at 05:36

## 2017-12-14 RX ADMIN — GUAIFENESIN AND DEXTROMETHORPHAN 10 ML: 100; 10 SYRUP ORAL at 22:19

## 2017-12-14 RX ADMIN — IPRATROPIUM BROMIDE AND ALBUTEROL SULFATE 3 ML: .5; 3 SOLUTION RESPIRATORY (INHALATION) at 08:17

## 2017-12-14 RX ADMIN — GUAIFENESIN AND DEXTROMETHORPHAN 10 ML: 100; 10 SYRUP ORAL at 11:20

## 2017-12-14 RX ADMIN — POTASSIUM CHLORIDE 20 MEQ: 1500 TABLET, EXTENDED RELEASE ORAL at 11:04

## 2017-12-14 RX ADMIN — ACETAMINOPHEN 650 MG: 325 TABLET, FILM COATED ORAL at 11:03

## 2017-12-14 RX ADMIN — CLOPIDOGREL BISULFATE 75 MG: 75 TABLET ORAL at 11:04

## 2017-12-14 RX ADMIN — ENOXAPARIN SODIUM 40 MG: 40 INJECTION SUBCUTANEOUS at 11:11

## 2017-12-14 RX ADMIN — ASPIRIN 81 MG: 81 TABLET, COATED ORAL at 11:04

## 2017-12-14 RX ADMIN — AMLODIPINE BESYLATE 5 MG: 5 TABLET ORAL at 11:11

## 2017-12-14 RX ADMIN — Medication 300 UNITS: at 05:24

## 2017-12-14 RX ADMIN — BENZONATATE 100 MG: 100 CAPSULE ORAL at 17:18

## 2017-12-14 RX ADMIN — METOPROLOL TARTRATE 25 MG: 25 TABLET ORAL at 17:18

## 2017-12-14 RX ADMIN — IPRATROPIUM BROMIDE AND ALBUTEROL SULFATE 3 ML: .5; 3 SOLUTION RESPIRATORY (INHALATION) at 13:53

## 2017-12-14 RX ADMIN — METHYLPREDNISOLONE SODIUM SUCCINATE 60 MG: 125 INJECTION, POWDER, FOR SOLUTION INTRAMUSCULAR; INTRAVENOUS at 05:24

## 2017-12-14 NOTE — PROGRESS NOTES
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  Patient had chemo radiation therapy  Blood cultures on admission with coag-negative staph likely contaminant    Plan: Will decrease methylprednisolone to 40 mg IV every 8 hours  I did order benzonatate 100 mg t i d  as needed for cough  Continue nebulizer treatments with DuoNeb  Peak flow rate measurements ordered      Subjective:   Patient feels better  She still has a cough that is primarily nonproductive but she is less short of breath    Objective:     Vitals: Blood pressure (!) 203/91, pulse 64, temperature 97 6 °F (36 4 °C), temperature source Oral, resp  rate 19, height 5' 2" (1 575 m), weight 97 5 kg (214 lb 15 2 oz), SpO2 95 %, not currently breastfeeding  ,Body mass index is 39 31 kg/m²  Intake/Output Summary (Last 24 hours) at 12/14/17 0956  Last data filed at 12/14/17 4769   Gross per 24 hour   Intake                0 ml   Output              300 ml   Net             -300 ml       Physical Exam: Physical Exam   Constitutional: She is oriented to person, place, and time  Patient is awake, alert, no distress, room air O2 saturation is 91%   HENT:   Head: Normocephalic  Nose: Nose normal    Mouth/Throat: Oropharynx is clear and moist  No oropharyngeal exudate  Eyes: Conjunctivae are normal    Neck: Neck supple  No JVD present  Cardiovascular: Normal rate, regular rhythm and normal heart sounds  Pulmonary/Chest: Effort normal  She has no rales  There are some expiratory wheezes in the lower  lung zones   Abdominal: Soft  She exhibits no distension   There is no tenderness  There is no guarding  Musculoskeletal:   Has edema left lower extremity  Patient with history of Merkel cell cancer in her left leg and past   Lymphadenopathy:     She has no cervical adenopathy  Neurological: She is alert and oriented to person, place, and time  Skin: Skin is warm and dry  Psychiatric: She has a normal mood and affect  Her behavior is normal  Thought content normal         Labs: I have personally reviewed pertinent lab results  , ABG: No results found for: PHART, KED3IEP, PO2ART, VWV6VPF, V6VAKVHS, BEART, SOURCE, BNP: No results found for: BNP, CBC: Lab Results   Component Value Date    WBC 9 50 12/14/2017    HGB 12 7 12/14/2017    HCT 39 6 12/14/2017    MCV 80 (L) 12/14/2017     12/14/2017    ADJUSTEDWBC 4 00 (L) 09/25/2016    MCH 25 8 (L) 12/14/2017    MCHC 32 1 12/14/2017    RDW 16 9 (H) 12/14/2017    MPV 9 7 12/14/2017    NRBC 0 12/11/2017   , CMP: Lab Results   Component Value Date     12/14/2017    K 4 3 12/14/2017     12/14/2017    CO2 32 12/14/2017    ANIONGAP 4 12/14/2017    BUN 21 12/14/2017    CREATININE 1 05 12/14/2017    GLUCOSE 163 (H) 12/14/2017    CALCIUM 8 5 12/14/2017    AST 16 12/11/2017    ALT 16 12/11/2017    ALKPHOS 117 (H) 12/11/2017    PROT 6 5 12/11/2017    ALBUMIN 2 9 (L) 12/11/2017    BILITOT 0 30 12/11/2017    EGFR 50 12/14/2017   , PT/INR:   Lab Results   Component Value Date    INR 0 98 12/11/2017   , Troponin: No components found for: TROPONIN    Imaging and other studies: I have personally reviewed pertinent reports     and I have personally reviewed pertinent films in PACS

## 2017-12-14 NOTE — PROGRESS NOTES
[facility] Progress Note  Patient: Modesto Lowry 81 y o  female   MRN: [id number]  PCP: Leti Drummond DO  Unit/Bed#: 2 [place] [address] Encounter: [id number]  Date Of Visit: 17    Subjective:   Dyspnea and cough improving   Afebrile  She does not use O2 at home    Objective:   Vitals:   Temp (24hrs), Av 9 °F (36 6 °C), Min:97 6 °F (36 4 °C), Max:98 2 °F (36 8 °C)    HR:  [64-77] 64  Resp:  [19-20] 19  BP: (142-203)/(66-91) 203/91  SpO2:  [93 %-96 %] 95 %  Body mass index is 39 31 kg/m²  Intake/Output Summary (Last 24 hours) at 17 1110  Last data filed at 17 1001   Gross per 24 hour   Intake                0 ml   Output              700 ml   Net             -700 ml       Physical Exam:  General: Obese, NAD, Ttvra-D-Hlnw in place at right chest, no surrounding infx sign  HEENT: EOMI, anicteric, oral moist, neck supple, no mass or JVD  Chest: CTAB, mild expiratory wheeze  Cardiac: RRR, S1/S2, No murmur  Abd: S/ND/NT/BS+  MSK: Chronic LLE edema from lymphedema, pedal pulses intact  Neuro: AAOx3, moving all extremities  Psychiatric: Mood with normal affect    Additional Data:     Labs:    Results from last 7 days  Lab Units 17  0532  17  0944   WBC Thousand/uL 9 50  < > 6 20   HEMOGLOBIN g/dL 12 7  < > 13 3   HEMATOCRIT % 39 6  < > 41 2   PLATELETS Thousands/uL 171  < > 165   NEUTROS PCT %  --   --  76*   LYMPHS PCT %  --   --  11*   MONOS PCT %  --   --  6   EOS PCT %  --   --  6   < > = values in this interval not displayed      Results from last 7 days  Lab Units 17  0532  17  0944   SODIUM mmol/L 141  < > 144   POTASSIUM mmol/L 4 3  < > 3 5   CHLORIDE mmol/L 105  < > 105   CO2 mmol/L 32  < > 33*   BUN mg/dL 21  < > 12   CREATININE mg/dL 1 05  < > 0 99   CALCIUM mg/dL 8 5  < > 8 6   TOTAL PROTEIN g/dL  --   --  6 5   BILIRUBIN TOTAL mg/dL  --   --  0 30   ALK PHOS U/L  --   --  117*   ALT U/L  --   --  16   AST U/L  --   --  16   GLUCOSE RANDOM mg/dL 163*  < > 108   < > = values in this interval not displayed  Results from last 7 days  Lab Units 12/11/17  0944   INR  0 98       Recent Results (from the past 24 hour(s))   Basic metabolic panel    Collection Time: 12/14/17  5:32 AM   Result Value Ref Range    Sodium 141 136 - 145 mmol/L    Potassium 4 3 3 5 - 5 3 mmol/L    Chloride 105 100 - 108 mmol/L    CO2 32 21 - 32 mmol/L    Anion Gap 4 4 - 13 mmol/L    BUN 21 5 - 25 mg/dL    Creatinine 1 05 0 60 - 1 30 mg/dL    Glucose 163 (H) 65 - 140 mg/dL    Calcium 8 5 8 3 - 10 1 mg/dL    eGFR 50 ml/min/1 73sq m   CBC (With Platelets)    Collection Time: 12/14/17  5:32 AM   Result Value Ref Range    WBC 9 50 4 80 - 10 80 Thousand/uL    RBC 4 93 4 20 - 5 40 Million/uL    Hemoglobin 12 7 12 0 - 16 0 g/dL    Hematocrit 39 6 37 0 - 47 0 %    MCV 80 (L) 82 - 98 fL    MCH 25 8 (L) 27 0 - 31 0 pg    MCHC 32 1 31 4 - 37 4 g/dL    RDW 16 9 (H) 11 6 - 15 1 %    Platelets 504 639 - 389 Thousands/uL    MPV 9 7 8 9 - 12 7 fL       Cultures:     Results from last 7 days  Lab Units 12/11/17  1004 12/11/17  0947 12/11/17  0944   BLOOD CULTURE   --    Staphylococcus coagulase negative*   Staphylococcus coagulase negative*   GRAM STAIN RESULT   --  Gram positive cocci in clusters Gram positive cocci in clusters   INFLUENZA A PCR  None Detected  --   --    INFLUENZA B PCR  None Detected  --   --    RSV PCR  None Detected  --   --        Imaging:  Xr Chest 1 View Portable    Result Date: 12/11/2017  Narrative: CHEST INDICATION:  Chest pain and productive cough  COMPARISON:  10/12/2017  VIEWS:   AP frontal IMAGES:  1 FINDINGS:  A right-sided catheter terminates at the caval atrial junction  No pneumothorax  Cardiomediastinal silhouette appears unremarkable  Left basilar subsegmental atelectasis with hemidiaphragm elevation noted  Tiny left pleural effusion suspected  Upper lobe and right lung are clear  Visualized osseous structures appear within normal limits for the patient's age  Impression: Left basilar atelectasis with tiny pleural effusion and hemidiaphragm elevation  Workstation performed: [id number]     Vas Lower Limb Venous Duplex Study, Unilateral/limited    Result Date: 12/11/2017  Narrative:  [facility] REPORT [facility]INICAL: Indications: Edema, unspecified [R60 9]  Patient presents with left lower extremity edema x several months  The patient has history of malignancy  Clinical:  FINDINGS:  Left     Impression       CFV      Normal (Patent)     CONCLUSION:  Impression: RIGHT LOWER LIMB LIMITED: NORMAL Evaluation shows no evidence of thrombus in the common femoral vein  Doppler evaluation shows a normal response to augmentation maneuvers  LEFT LOWER LIMB: NORMAL No evidence of acute or chronic deep vein thrombosis No evidence of superficial thrombophlebitis noted  Doppler evaluation shows a normal response to augmentation maneuvers  Popliteal, posterior tibial and anterior tibial arterial Doppler waveforms are triphasic    SIGNATURE: Electronically Signed by: Murali Bennett on 2017-12-11 06:10:35 PM    Imaging Reports Reviewed by myself    Last 24 Hours Medication List:     Current Facility-Administered Medications:     acetaminophen (TYLENOL) tablet 650 mg, 650 mg, Oral, Q6H PRN, Esperanza Branham MD, 650 mg at 12/14/17 1103    ALPRAZolam (XANAX) tablet 0 5 mg, 0 5 mg, Oral, HS PRN, Esperanza Branham MD, 0 5 mg at 12/13/17 2206    aluminum-magnesium hydroxide-simethicone (MYLANTA) 200-200-20 mg/5 mL oral suspension 30 mL, 30 mL, Oral, Q6H PRN, Esperanza Branham MD    amLODIPine (NORVASC) tablet 5 mg, 5 mg, Oral, Daily, Melanie Pettit MD    aspirin (ECOTRIN LOW STRENGTH) EC tablet 81 mg, 81 mg, Oral, Daily, Esperanza Branham MD, 81 mg at 12/14/17 1104    atorvastatin (LIPITOR) tablet 10 mg, 10 mg, Oral, Daily With Dinner, Esperanza Branham MD, 10 mg at 12/13/17 1738    benzonatate (TESSALON PERLES) capsule 100 mg, 100 mg, Oral, TID PRN, Mara Mendes DO    clopidogrel (PLAVIX) tablet 75 mg, 75 mg, Oral, Daily, Esperanza Branham MD, 75 mg at 12/14/17 1104    dextromethorphan-guaiFENesin (ROBITUSSIN DM)  mg/5 mL oral syrup 10 mL, 10 mL, Oral, Q4H PRN, YAHIR MelgarNP, 10 mL at 12/14/17 0536    enoxaparin (LOVENOX) subcutaneous injection 40 mg, 40 mg, Subcutaneous, Daily, Esperanza Branham MD, 40 mg at 12/13/17 1002    heparin lock flush 100 units/mL injection 300 Units, 300 Units, Intracatheter, Q1H PRN, Rivka Sparks MD, 300 Units at 12/14/17 0524    ipratropium-albuterol (DUO-NEB) 0 5-2 5 mg/mL inhalation solution 3 mL, 3 mL, Nebulization, Q6H, Esperanza Branham MD, 3 mL at 12/14/17 0817    levalbuterol (XOPENEX) inhalation solution 0 63 mg, 0 63 mg, Nebulization, Q2H PRN, Rivka Sparks MD    methylPREDNISolone sodium succinate (Solu-MEDROL) injection 40 mg, 40 mg, Intravenous, Q8H [address], Noemy Cons, DO    metoprolol tartrate (LOPRESSOR) tablet 25 mg, 25 mg, Oral, BID, Esperanza Branham MD, 25 mg at 12/14/17 1104    ondansetron (ZOFRAN) injection 4 mg, 4 mg, Intravenous, Q4H PRN, Esperanza Branham MD    pantoprazole (PROTONIX) EC tablet 40 mg, 40 mg, Oral, Early Morning, Esperanza Branham MD, 40 mg at 12/14/17 0524    polyethylene glycol (MIRALAX) packet 17 g, 17 g, Oral, Daily, Esperanza Branham MD, 17 g at 12/13/17 1002    potassium chloride (K-DUR,KLOR-CON) CR tablet 20 mEq, 20 mEq, Oral, Daily, Rivka Sparks MD, 20 mEq at 12/14/17 1104    pregabalin (LYRICA) capsule 300 mg, 300 mg, Oral, BID, Esperanza Branham MD, 300 mg at 12/14/17 1103     Assessment and Plans:  Hospital Problem List:   Principal Problem:    Asthma exacerbation  Active Problems:    Acute bronchitis    Bacteremia    Acute respiratory failure with hypoxia (HCC)    Hypertension    Carotid stenosis, left    Hyperlipemia    Atrial fibrillation (HCC)    Lymphedema of left leg    History of Merkel cell carcinoma    Port-a-cath in place    81year old female with a history of Neuroendocrine tumor/Merkel cell carcinoma approximately 8 years ago with resection, lymph node sampling, radiation and chemotherapy, Port-a-Cath in place (for 10 years), Asthma, Atrial fibrillation, GERD, Fibromyalgia, Hypokalemia, who presented with worsening shortness of breath, coughing and wheezing  Asthma exacerbation  Likely precipitated by acute bronchitis  Clinically improving with IV Solu-Medrol taper down to 40mg iv q8h, bronchodilators, O2 to keep SpO2>90%  Off azithromycin  C/w Tessalon, Robitussin DM  Encourage ambulation  Anticipate d/c home w/ prednisone taper within 24h if further improving and stabilized   Pulmonology consult appreciated    GPC bacteremia  Likely 2/2 contaminant from collection (poor iv access)  Blood stains showing staph coag neg from 2/2 bottles  Monitor off abx - she remains afebrile and normal WBC  ID consult appreciated     Atrial fibrillation  Pt refused anticoagulation  Continue metoprolol, aspirin, Plavix      Left lower extremity edema  Pt is known to have history of lymphedema  US LE venous no evidence of DVT    Left carotid stenosis  Pt refused carotid end arterectomy  Continue aspirin and Plavix      Hyperlipidemia  Continue Lipitor     GERD  Continue PPT     Neuropathy  Continue Lyrica    History of Neuroendocrine tumor/Merkel cell carcinoma   Seen by Hem/Onc Dr Toro Garcia in 9/2017 - no evidence of recurrence    DVT Prophylaxis: on Lovenox sc, SCD  Code Status: Level 1 - Full Code  Disposition: anticipate d/c home once clinically improved  Patient Centered Rounds: I have performed bedside rounds with nursing staff today  Discussions with Specialists or Other Care Team Provider: Yes    Education and Discussions with Family / Patient:Yes    Time Spent for Care: 20 minutes  More than 50% of total time spent on counseling and coordination of care as described above      Current Length of Stay: 3 day(s)    Current Patient Status: Inpatient     Signed by:  Susannah Sharif MD  Attending Hospitalist  Page#: [phone] (Hours 7am to 7pm)  12/14/2017 11:10 AM

## 2017-12-14 NOTE — PROGRESS NOTES
Progress Note - Infectious Disease   Donohue Precise 81 y o  female MRN: [id number]  Unit/Bed#: 2 [place] [address] Encounter: [id number]        Assessment:  Acute Exacerbation of Asthma ? Secondary to URI  BC x 2 with CNS, likely contaminants  Obesity    Plan:  Observe off abx  On Steroids    Subjective/Objective     Subjective:  Wheezing improved    HR:  [64-77] 76  Resp:  [19-20] 20  BP: (142-203)/(66-91) 166/75  SpO2:  [93 %-96 %] 93 %      Objective: see PE    Meds/Allergies     Current Facility-Administered Medications:     acetaminophen (TYLENOL) tablet 650 mg, 650 mg, Oral, Q6H PRN, Esperanza Branham MD, 650 mg at 12/14/17 1103    ALPRAZolam (XANAX) tablet 0 5 mg, 0 5 mg, Oral, HS PRN, Esperanza Branham MD, 0 5 mg at 12/13/17 2206    aluminum-magnesium hydroxide-simethicone (MYLANTA) 200-200-20 mg/5 mL oral suspension 30 mL, 30 mL, Oral, Q6H PRN, Esperanza Branham MD    amLODIPine (NORVASC) tablet 5 mg, 5 mg, Oral, Daily, Lydia Olson MD, 5 mg at 12/14/17 1111    aspirin (ECOTRIN LOW STRENGTH) EC tablet 81 mg, 81 mg, Oral, Daily, Esperanza Branham MD, 81 mg at 12/14/17 1104    atorvastatin (LIPITOR) tablet 10 mg, 10 mg, Oral, Daily With Ben Jennings MD, 10 mg at 12/13/17 1738    benzonatate (TESSALON PERLES) capsule 100 mg, 100 mg, Oral, TID PRN, Ok Preston, DO    clopidogrel (PLAVIX) tablet 75 mg, 75 mg, Oral, Daily, Esperanza Branham MD, 75 mg at 12/14/17 1104    dextromethorphan-guaiFENesin (ROBITUSSIN DM)  mg/5 mL oral syrup 10 mL, 10 mL, Oral, Q4H PRN, GILDA Melgar, 10 mL at 12/14/17 1120    enoxaparin (LOVENOX) subcutaneous injection 40 mg, 40 mg, Subcutaneous, Daily, Esperanza Branham MD, 40 mg at 12/14/17 1111    heparin lock flush 100 units/mL injection 300 Units, 300 Units, Intracatheter, Q1H PRN, Lydia Olson MD, 300 Units at 12/14/17 0524    ipratropium-albuterol (DUO-NEB) 0 5-2 5 mg/mL inhalation solution 3 mL, 3 mL, Nebulization, Q6H, Esperanza Branham MD, 3 mL at 12/14/17 0817   levalbuterol (XOPENEX) inhalation solution 0 63 mg, 0 63 mg, Nebulization, Q2H PRN, Angel Nunn MD    methylPREDNISolone sodium succinate (Solu-MEDROL) injection 40 mg, 40 mg, Intravenous, Q8H [facility], Rosalio Sood DO    metoprolol tartrate (LOPRESSOR) tablet 25 mg, 25 mg, Oral, BID, Esperanza Branham MD, 25 mg at 17 1104    ondansetron (ZOFRAN) injection 4 mg, 4 mg, Intravenous, Q4H PRN, Esperanza Branham MD    pantoprazole (PROTONIX) EC tablet 40 mg, 40 mg, Oral, Early Morning, Esperanza Branham MD, 40 mg at 17 0524    polyethylene glycol (MIRALAX) packet 17 g, 17 g, Oral, Daily, Esperanza Branham MD, 17 g at 17 1002    potassium chloride (K-DUR,KLOR-CON) CR tablet 20 mEq, 20 mEq, Oral, Daily, Angel Nunn MD, 20 mEq at 17 1104    pregabalin (LYRICA) capsule 300 mg, 300 mg, Oral, BID, Esperanza Branham MD, 300 mg at 17 1103  Allergies   Allergen Reactions    Fentanyl And Related Other (See Comments)     Passed out    Latex     Penicillins      all current active meds have been reviewed    all current active meds have been reviewed    Physical Exam: Physical Exam   Constitutional: She appears well-developed and well-nourished  obese   HENT:   Head: Normocephalic and atraumatic  Eyes: Pupils are equal, round, and reactive to light  No scleral icterus  Neck: Neck supple  Thyromegaly present  Cardiovascular: Normal heart sounds  Pulmonary/Chest: No respiratory distress  Abdominal: Soft  Musculoskeletal: Normal range of motion  Neurological: She is alert  Skin: Skin is warm and dry  Psychiatric: She has a normal mood and affect           Temp (24hrs), Av 1 °F (36 7 °C), Min:97 6 °F (36 4 °C), Max:98 8 °F (37 1 °C)  Current: Temperature: 98 8 °F (37 1 °C)    Invasive Devices     Central Venous Catheter Line            Port A Cath 17 Right Chest 3 days                            Lab, Imaging and other studies:      CBC: Lab Results   Component Value Date    WBC 9 50 12/14/2017    RBC 4 93 12/14/2017       Results from last 7 days  Lab Units 12/14/17  0532 12/13/17  0505 12/12/17  0633 12/11/17  0944   PLATELETS Thousands/uL 171 171 155 165     CMP: Lab Results   Component Value Date     12/14/2017     12/14/2017    CO2 32 12/14/2017    ANIONGAP 4 12/14/2017    BUN 21 12/14/2017    CREATININE 1 05 12/14/2017    GLUCOSE 163 (H) 12/14/2017    CALCIUM 8 5 12/14/2017    AST 16 12/11/2017    ALT 16 12/11/2017    ALKPHOS 117 (H) 12/11/2017    PROT 6 5 12/11/2017    ALBUMIN 2 9 (L) 12/11/2017    BILITOT 0 30 12/11/2017    EGFR 50 12/14/2017       Urinalysis: Lab Results   Component Value Date    COLORU Yellow 03/14/2017    CLARITYU Clear 03/14/2017    SPECGRAV >=1 030 03/14/2017    PHUR 5 5 03/14/2017    LEUKOCYTESUR Negative 03/14/2017    NITRITE Negative 03/14/2017    PROTEINUA Negative 03/14/2017    GLUCOSEU Negative 03/14/2017    KETONESU 15 (1+) (A) 03/14/2017    BILIRUBINUR Interference- unable to analyze (A) 03/14/2017    BLOODU Negative 03/14/2017       Lactic Acid:   Lab Results   Component Value Date    LACTICACID 1 0 12/11/2017        Cultures    Results from last 7 days  Lab Units 12/13/17  0653 12/13/17  0505 12/11/17  1525 12/11/17  1004 12/11/17  0947 12/11/17  0944   BLOOD CULTURE  No Growth at 24 hrs  No Growth at 24 hrs   --   --    Staphylococcus coagulase negative*   Staphylococcus coagulase negative*   GRAM STAIN RESULT   --   --   --   --  Gram positive cocci in clusters Gram positive cocci in clusters   MRSA CULTURE ONLY   --   --  No Methicillin Resistant Staphlyococcus aureus (MRSA) isolated  --   --   --    INFLUENZA A PCR   --   --   --  None Detected  --   --    INFLUENZA B PCR   --   --   --  None Detected  --   --    RSV PCR   --   --   --  None Detected  --   --        I have personally reviewed pertinent reports        Principal Problem:    Asthma exacerbation  Active Problems:    Hypertension    Carotid stenosis, left    Acute bronchitis Hyperlipemia    Atrial fibrillation (HCC)    Lymphedema of left leg    Acute respiratory failure with hypoxia (HCC)    Bacteremia    History of Merkel cell carcinoma    Port-a-cath in place

## 2017-12-15 PROCEDURE — 94761 N-INVAS EAR/PLS OXIMETRY MLT: CPT

## 2017-12-15 PROCEDURE — 94640 AIRWAY INHALATION TREATMENT: CPT

## 2017-12-15 PROCEDURE — 94668 MNPJ CHEST WALL SBSQ: CPT

## 2017-12-15 PROCEDURE — 94760 N-INVAS EAR/PLS OXIMETRY 1: CPT

## 2017-12-15 RX ADMIN — PANTOPRAZOLE SODIUM 40 MG: 40 TABLET, DELAYED RELEASE ORAL at 05:56

## 2017-12-15 RX ADMIN — ENOXAPARIN SODIUM 40 MG: 40 INJECTION SUBCUTANEOUS at 10:20

## 2017-12-15 RX ADMIN — ATORVASTATIN CALCIUM 10 MG: 10 TABLET, FILM COATED ORAL at 17:53

## 2017-12-15 RX ADMIN — IPRATROPIUM BROMIDE AND ALBUTEROL SULFATE 3 ML: .5; 3 SOLUTION RESPIRATORY (INHALATION) at 21:18

## 2017-12-15 RX ADMIN — BENZONATATE 100 MG: 100 CAPSULE ORAL at 17:53

## 2017-12-15 RX ADMIN — METOPROLOL TARTRATE 25 MG: 25 TABLET ORAL at 17:53

## 2017-12-15 RX ADMIN — PREGABALIN 300 MG: 100 CAPSULE ORAL at 10:19

## 2017-12-15 RX ADMIN — METHYLPREDNISOLONE SODIUM SUCCINATE 40 MG: 40 INJECTION, POWDER, FOR SOLUTION INTRAMUSCULAR; INTRAVENOUS at 05:56

## 2017-12-15 RX ADMIN — IPRATROPIUM BROMIDE AND ALBUTEROL SULFATE 3 ML: .5; 3 SOLUTION RESPIRATORY (INHALATION) at 07:22

## 2017-12-15 RX ADMIN — PREGABALIN 300 MG: 100 CAPSULE ORAL at 17:53

## 2017-12-15 RX ADMIN — ASPIRIN 81 MG: 81 TABLET, COATED ORAL at 10:19

## 2017-12-15 RX ADMIN — CLOPIDOGREL BISULFATE 75 MG: 75 TABLET ORAL at 10:19

## 2017-12-15 RX ADMIN — ACETAMINOPHEN 650 MG: 325 TABLET, FILM COATED ORAL at 00:44

## 2017-12-15 RX ADMIN — IPRATROPIUM BROMIDE AND ALBUTEROL SULFATE 3 ML: .5; 3 SOLUTION RESPIRATORY (INHALATION) at 14:01

## 2017-12-15 RX ADMIN — Medication 300 UNITS: at 15:25

## 2017-12-15 RX ADMIN — METHYLPREDNISOLONE SODIUM SUCCINATE 40 MG: 40 INJECTION, POWDER, FOR SOLUTION INTRAMUSCULAR; INTRAVENOUS at 21:47

## 2017-12-15 RX ADMIN — METHYLPREDNISOLONE SODIUM SUCCINATE 40 MG: 40 INJECTION, POWDER, FOR SOLUTION INTRAMUSCULAR; INTRAVENOUS at 15:12

## 2017-12-15 RX ADMIN — POTASSIUM CHLORIDE 20 MEQ: 1500 TABLET, EXTENDED RELEASE ORAL at 10:19

## 2017-12-15 RX ADMIN — METOPROLOL TARTRATE 25 MG: 25 TABLET ORAL at 10:20

## 2017-12-15 RX ADMIN — AMLODIPINE BESYLATE 5 MG: 5 TABLET ORAL at 10:20

## 2017-12-15 NOTE — CASE MANAGEMENT
Continued Stay Review    Date: 12/15/17    Vital Signs: BP (!) 180/91   Pulse 80   Temp 97 9 °F (36 6 °C) (Oral)   Resp 19   Ht 5' 2" (1 575 m)   Wt 97 5 kg (214 lb 15 2 oz)   SpO2 94%   BMI 39 31 kg/m²     Medications:   Scheduled Meds:   amLODIPine 5 mg Oral Daily   aspirin 81 mg Oral Daily   atorvastatin 10 mg Oral Daily With Dinner   clopidogrel 75 mg Oral Daily   enoxaparin 40 mg Subcutaneous Daily   ipratropium-albuterol 3 mL Nebulization Q6H   methylPREDNISolone sodium succinate 40 mg Intravenous Q8H [facility]   metoprolol tartrate 25 mg Oral BID   pantoprazole 40 mg Oral Early Morning   polyethylene glycol 17 g Oral Daily   potassium chloride 20 mEq Oral Daily   pregabalin 300 mg Oral BID     Continuous Infusions:    PRN Meds:   acetaminophen    ALPRAZolam    aluminum-magnesium hydroxide-simethicone    benzonatate    dextromethorphan-guaiFENesin    heparin lock flush    levalbuterol    ondansetron    Abnormal Labs/Diagnostic Results:     Age/Sex: 81 y o  female        81year old female with a history of Neuroendocrine tumor/Merkel cell carcinoma approximately 10 years ago with resection, lymph node sampling, radiation and chemotherapy, Port-a-Cath in place, Asthma, Atrial fibrillation, GERD, Fibromyalgia, Hypokalemia, who presented with worsening shortness of breath, coughing and wheezing       Asthma exacerbation  Likely precipitated by acute bronchitis  Clinically improving with IV Solu-Medrol taper down to 40mg iv q8h, bronchodilators, O2  Off azithromycin  C/w Eyad Pa DM  Encourage ambulation  Anticipate d/c within 24h  She may need home O2 after discharge, however, her insurer will not cover the first month O2  Wean O2, keep SpO2>90%  Pulmonology consult appreciated     GPC bacteremia  Likely 2/2 contaminant from collection (poor iv access)  Blood stains showing staph coag neg from 2/2 bottles  Monitor off abx - she remains afebrile and normal WBC  ID consult appreciated     Atrial fibrillation  Pt refused anticoagulation  Continue metoprolol, aspirin, Plavix      Left lower extremity edema  Pt is known to have history of lymphedema  US LE venous no evidence of DVT     Left carotid stenosis  Pt refused carotid end arterectomy  Continue aspirin and Plavix      Hyperlipidemia  Continue Lipitor     GERD  Continue PPT     Neuropathy  Continue Lyrica     History of Neuroendocrine tumor/Merkel cell carcinoma   Seen by Hem/Onc Dr Sayda Dash in 9/2017 - no evidence of recurrence     DVT Prophylaxis: on Lovenox sc, SCD  Code Status: Level 1 - Full Code  Disposition: anticipate d/c home once clinically improved

## 2017-12-15 NOTE — SOCIAL WORK
SPOKE WITH PT AT THE BEDSIDE WITH DR Keon Nieto THIS AM   PT IN NEED OF O2 LAST NIGHT AND IS CURRENTLY ON NC RIGHT NOW  WE DISCUSSED POSS NEED FOR HOME O2 FOR A BRIEF PERIOD OF TIME  D/W THEM THE POSS  THAT O2 WOULD BE DECLINED BY INSURANCE FOR THE ACUTE CAUSE FOR HYPOXIA  PT ADVISED OF $175 COST FOR 30 DAYS WITH POSS RETEST FOR MEDICARE COVERAGE  PT ADVISED THAT SHE IS UNABLE TO AFFORD THAT BUT THAT SHE HAS THE [facility] PROGRAM AND THAT THEY MAY BE ABLE TO HELP  CM CALLED THE CM AT THE [facility], THEY LOOKED INTO THE POSS  OF THIS BUT WILL NEED TO GO THROUGH FINANCIAL EVALUATIONS AND COULD TAKE TIME AND AT THE END OF THE DAY MAY BE THAT THEY DENY HER COVERAGE FOR THIS  CM WENT BACK TO SPEAK WITH THE PT, SHE IS CURRENTLY NOT WEARING O2  WILL CONTINUE TO FOLLOW IF O2 NEEDED

## 2017-12-15 NOTE — RESPIRATORY THERAPY NOTE
Home Oxygen Qualifying Test       Patient name: Nancy Alcantar        : 1935   Date of Test:  December 15, 2017  Diagnosis:      Home Oxygen Test:    **Medicare Guidelines require item(s) 1-5 on all ambulatory patients or 1 and 2 on on-ambulatory patients  1   Baseline SPO2 on Room Air at rest 93 %  If = or < 88% on room air add O2 via NC and titrate patient  Patient must be ambulated with O2 and titrated to > 88% with exertion  2   SPO2 on Oxygen at rest  95 %  3   SPO2 during exercise on Room Air 91 %  4   SPO2 during exercise on Oxygen  % at a liter flow of  lpm     5   Exercise performed:    [x] Walking     [] Stairs     [] Duration 6 (min )     [] Distance 500(ft )       []  Supplemental Home Oxygen is indicated  [x]  Client does not qualify for home oxygen        Enzo Cervantes RT

## 2017-12-16 VITALS
RESPIRATION RATE: 20 BRPM | TEMPERATURE: 97.9 F | HEART RATE: 78 BPM | DIASTOLIC BLOOD PRESSURE: 77 MMHG | WEIGHT: 214.95 LBS | SYSTOLIC BLOOD PRESSURE: 152 MMHG | BODY MASS INDEX: 39.56 KG/M2 | HEIGHT: 62 IN | OXYGEN SATURATION: 90 %

## 2017-12-16 LAB
BACTERIA BLD CULT: ABNORMAL
BACTERIA BLD CULT: ABNORMAL
GRAM STN SPEC: ABNORMAL
GRAM STN SPEC: ABNORMAL

## 2017-12-16 PROCEDURE — 94640 AIRWAY INHALATION TREATMENT: CPT

## 2017-12-16 PROCEDURE — 94760 N-INVAS EAR/PLS OXIMETRY 1: CPT

## 2017-12-16 PROCEDURE — 94668 MNPJ CHEST WALL SBSQ: CPT

## 2017-12-16 RX ORDER — GUAIFENESIN/DEXTROMETHORPHAN 100-10MG/5
10 SYRUP ORAL EVERY 4 HOURS PRN
Qty: 236 ML | Refills: 0 | Status: SHIPPED | OUTPATIENT
Start: 2017-12-16 | End: 2018-01-05

## 2017-12-16 RX ORDER — IPRATROPIUM BROMIDE AND ALBUTEROL SULFATE 2.5; .5 MG/3ML; MG/3ML
3 SOLUTION RESPIRATORY (INHALATION)
Qty: 360 ML | Refills: 0 | Status: SHIPPED | OUTPATIENT
Start: 2017-12-16 | End: 2018-01-15

## 2017-12-16 RX ORDER — AMLODIPINE BESYLATE 5 MG/1
5 TABLET ORAL DAILY
Qty: 30 TABLET | Refills: 0 | Status: SHIPPED | OUTPATIENT
Start: 2017-12-17 | End: 2018-02-26 | Stop reason: ALTCHOICE

## 2017-12-16 RX ORDER — PREDNISONE 10 MG/1
TABLET ORAL
Qty: 30 TABLET | Refills: 0 | Status: SHIPPED | OUTPATIENT
Start: 2017-12-16 | End: 2018-01-05

## 2017-12-16 RX ADMIN — Medication 300 UNITS: at 12:29

## 2017-12-16 RX ADMIN — BENZONATATE 100 MG: 100 CAPSULE ORAL at 07:44

## 2017-12-16 RX ADMIN — ASPIRIN 81 MG: 81 TABLET, COATED ORAL at 08:57

## 2017-12-16 RX ADMIN — GUAIFENESIN AND DEXTROMETHORPHAN 10 ML: 100; 10 SYRUP ORAL at 07:44

## 2017-12-16 RX ADMIN — IPRATROPIUM BROMIDE AND ALBUTEROL SULFATE 3 ML: .5; 3 SOLUTION RESPIRATORY (INHALATION) at 07:23

## 2017-12-16 RX ADMIN — ENOXAPARIN SODIUM 40 MG: 40 INJECTION SUBCUTANEOUS at 08:54

## 2017-12-16 RX ADMIN — METHYLPREDNISOLONE SODIUM SUCCINATE 40 MG: 40 INJECTION, POWDER, FOR SOLUTION INTRAMUSCULAR; INTRAVENOUS at 06:39

## 2017-12-16 RX ADMIN — PANTOPRAZOLE SODIUM 40 MG: 40 TABLET, DELAYED RELEASE ORAL at 06:39

## 2017-12-16 RX ADMIN — POTASSIUM CHLORIDE 20 MEQ: 1500 TABLET, EXTENDED RELEASE ORAL at 08:56

## 2017-12-16 RX ADMIN — AMLODIPINE BESYLATE 5 MG: 5 TABLET ORAL at 08:55

## 2017-12-16 RX ADMIN — METOPROLOL TARTRATE 25 MG: 25 TABLET ORAL at 08:54

## 2017-12-16 RX ADMIN — CLOPIDOGREL BISULFATE 75 MG: 75 TABLET ORAL at 08:55

## 2017-12-16 RX ADMIN — PREGABALIN 300 MG: 100 CAPSULE ORAL at 08:55

## 2017-12-16 NOTE — PROGRESS NOTES
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  This started in her left lower extremity  She does have chronic edema left lower leg  Blood cultures admission showed coag-negative staph which appears to have been a contaminant  Has some intermittent mild oxygen desaturation  O2 saturation at rest now 89%  Plan:  Patient is on IV methylprednisone 40 mg every 8 hours depending how she is doing tomorrow this can either be decreased or may be other consider can transition to oral prednisone  Continue nebulized treatments with DuoNeb every 6 hours        Subjective:   Patient states she is gradually improving  Still has some wheezing and cough    Objective:     Vitals: Blood pressure 165/72, pulse 77, temperature (!) 97 3 °F (36 3 °C), temperature source Tympanic, resp  rate 18, height 5' 2" (1 575 m), weight 97 5 kg (214 lb 15 2 oz), SpO2 95 %, not currently breastfeeding  ,Body mass index is 39 31 kg/m²  Intake/Output Summary (Last 24 hours) at 12/15/17 1937  Last data filed at 12/15/17 9505   Gross per 24 hour   Intake              240 ml   Output                0 ml   Net              240 ml       Physical Exam: Physical Exam   Constitutional: She is oriented to person, place, and time  Patient is awake, alert, no distress  HENT:   Head: Normocephalic  Nose: Nose normal    Mouth/Throat: Oropharynx is clear and moist  No oropharyngeal exudate  Eyes: Conjunctivae are normal    Neck: Neck supple  No JVD present  Cardiovascular: Normal rate, regular rhythm and normal heart sounds  Pulmonary/Chest: Effort normal    Lung sounds reveal some coarse expiratory wheezes in lower lung zones   Abdominal: Soft   She exhibits no distension  There is no guarding  Musculoskeletal:   Has mild edema left leg   Lymphadenopathy:     She has no cervical adenopathy  Neurological: She is alert and oriented to person, place, and time  Skin: Skin is warm  Psychiatric: She has a normal mood and affect  Her behavior is normal  Thought content normal         Labs: I have personally reviewed pertinent lab results  , ABG: No results found for: PHART, BSQ8UDE, PO2ART, RGE7GON, M8BWOJSD, BEART, SOURCE, BNP: No results found for: BNP, CBC: Lab Results   Component Value Date    WBC 9 50 12/14/2017    HGB 12 7 12/14/2017    HCT 39 6 12/14/2017    MCV 80 (L) 12/14/2017     12/14/2017    ADJUSTEDWBC 4 00 (L) 09/25/2016    MCH 25 8 (L) 12/14/2017    MCHC 32 1 12/14/2017    RDW 16 9 (H) 12/14/2017    MPV 9 7 12/14/2017    NRBC 0 12/11/2017   , CMP: Lab Results   Component Value Date     12/14/2017    K 4 3 12/14/2017     12/14/2017    CO2 32 12/14/2017    ANIONGAP 4 12/14/2017    BUN 21 12/14/2017    CREATININE 1 05 12/14/2017    GLUCOSE 163 (H) 12/14/2017    CALCIUM 8 5 12/14/2017    AST 16 12/11/2017    ALT 16 12/11/2017    ALKPHOS 117 (H) 12/11/2017    PROT 6 5 12/11/2017    ALBUMIN 2 9 (L) 12/11/2017    BILITOT 0 30 12/11/2017    EGFR 50 12/14/2017   , PT/INR:   Lab Results   Component Value Date    INR 0 98 12/11/2017   , Troponin: No components found for: TROPONIN    Imaging and other studies: I have personally reviewed pertinent reports     and I have personally reviewed pertinent films in PACS

## 2017-12-16 NOTE — SOCIAL WORK
CM SPOKE WITH PCP, PT STABLE FOR DC TO HOME TODAY  PER PT, SHE NEEDS TO BE HOME BEFORE 2PM OTHERWISE SHE WONT BE ABLE TO GET HER MEDICATION FROM THE PHARMACY  PT USES [facility]  PT ALSO NOTED THAT SHE NEEDS A ROLLING WALKER FOR GAIT INSTABILITY AND WEAKNESS SHE DOES NOT FEEL HER CANE WILL BE SAFE FOR HER  CM RECEIVED A SCRIPT FOR THE RW AND PROVIDED THIS FROM [facility]  DELIVERED TO THE PT ROOM  CM CALLED [facility], UNABLE TO PROVIDE WC SERVICES TODAY, WILL NEED ALTERNATE RIDE  [facility] AND [facility]EDULED  AT 1230  RECEIVED CALL FROM [employer], THEY ARE RUNNING BEHIND  WILL BE HERE PRIOR TO 2 AND WILL TAKE PT TO PHARMACY IF NEEDED ON THE WAY HOME    RN MADE AWARE OF THE DELAY

## 2017-12-16 NOTE — PROGRESS NOTES
Progress Note - Pulmonary   Tonny Trivedi 81 y o  female MRN: [id number]  Unit/Bed#: 2 [place] [address] Encounter: [id number]    Assessment:  Acute exacerbation of mild persistent asthma likely triggered by viral URI  Patient has improved  History of Merkel cell cancer shortness he started left lower extremity 10 years ago  Patient status post chemo and radiation therapy    Plan:  Patient will resume Advair 250 mcg Diskus 1 puff b i d  when she goes home and I discussed prednisone taper with [place] for when patient is discharged  She does have a ProAir inhaler at home as well as nebulizer with albuterol 2 5 mg and can use this 4 times a day  Patient can follow up in our office      Subjective:   Patient feels better  She is not have any shortness of breath at rest and cough has improved    Objective:     Vitals: Blood pressure 152/77, pulse 78, temperature 97 9 °F (36 6 °C), temperature source Tympanic, resp  rate 20, height 5' 2" (1 575 m), weight 97 5 kg (214 lb 15 2 oz), SpO2 90 %, not currently breastfeeding  ,Body mass index is 39 31 kg/m²  Intake/Output Summary (Last 24 hours) at 12/16/17 1405  Last data filed at 12/16/17 0010   Gross per 24 hour   Intake              120 ml   Output              300 ml   Net             -180 ml       Physical Exam: Physical Exam   Constitutional: She is oriented to person, place, and time  Patient is awake, alert, no distress  Room air O2 saturation is 92%   HENT:   Head: Normocephalic  Nose: Nose normal    Mouth/Throat: Oropharynx is clear and moist    Eyes: Conjunctivae are normal    Neck: Neck supple  No JVD present  Cardiovascular: Normal rate, regular rhythm and normal heart sounds  Pulmonary/Chest: Effort normal    Lung sounds reveal few faint expiratory wheezes lower lobes   Abdominal: Soft  She exhibits no distension  There is no rebound  Musculoskeletal: She exhibits no deformity  Lymphadenopathy:     She has no cervical adenopathy     Neurological: She is alert and oriented to person, place, and time  Skin: Skin is warm and dry  Psychiatric: She has a normal mood and affect  Her behavior is normal  Thought content normal         Labs: I have personally reviewed pertinent lab results  , ABG: No results found for: PHART, TQL2LUH, PO2ART, FSC6JTG, V8WARAPE, BEART, SOURCE, BNP: No results found for: BNP, CBC: Lab Results   Component Value Date    WBC 9 50 12/14/2017    HGB 12 7 12/14/2017    HCT 39 6 12/14/2017    MCV 80 (L) 12/14/2017     12/14/2017    ADJUSTEDWBC 4 00 (L) 09/25/2016    MCH 25 8 (L) 12/14/2017    MCHC 32 1 12/14/2017    RDW 16 9 (H) 12/14/2017    MPV 9 7 12/14/2017    NRBC 0 12/11/2017   , CMP: Lab Results   Component Value Date     12/14/2017    K 4 3 12/14/2017     12/14/2017    CO2 32 12/14/2017    ANIONGAP 4 12/14/2017    BUN 21 12/14/2017    CREATININE 1 05 12/14/2017    GLUCOSE 163 (H) 12/14/2017    CALCIUM 8 5 12/14/2017    AST 16 12/11/2017    ALT 16 12/11/2017    ALKPHOS 117 (H) 12/11/2017    PROT 6 5 12/11/2017    ALBUMIN 2 9 (L) 12/11/2017    BILITOT 0 30 12/11/2017    EGFR 50 12/14/2017   , PT/INR:   Lab Results   Component Value Date    INR 0 98 12/11/2017   , Troponin: No components found for: TROPONIN    Imaging and other studies: I have personally reviewed pertinent reports     and I have personally reviewed pertinent films in PACS

## 2017-12-16 NOTE — DISCHARGE SUMMARY
Discharge Summary - [facility] Internal Medicine  Patient Information: Ben Martinez 81 y o  female MRN: [id number]  Unit/Bed#: [address] Encounter: [id number]    Admission Date: 12/11/2017  Discharge Date: 12/16/2017    Admitting Physician: Amanuel Banuelos DO  Discharging Physician/Practitioner: Maday Ruff MD  PCP: Franky Valdes DO    Reason for Admission: Shortness of Breath (weak, cough)    Admission Diagnoses:  Pneumonia [J18 9]  Asthma [J45 909]  Difficulty breathing [R06 89]    Discharge Diagnoses:    Asthma exacerbation    Acute bronchitis    Acute respiratory failure with hypoxia ([facility] )    Hypertension    Carotid stenosis, left    Hyperlipemia    Atrial fibrillation ([facility] )    Lymphedema of left leg    History of Merkel cell carcinoma    Port-a-cath in place    Consultations During Hospital Stay:  Kaycee 5454 Course:     81year old female with a history of Neuroendocrine tumor/Merkel cell carcinoma approximately 10 years ago with resection, lymph node sampling, radiation and chemotherapy, Port-a-Cath in place, Asthma, Atrial fibrillation, GERD, Fibromyalgia, Hypokalemia, who presented with worsening shortness of breath, coughing and wheezing       Asthma exacerbation  Likely precipitated by acute bronchitis  Pulmonology consulted  Clinically improving with IV Solu-Medrol taper, bronchodilators  Wean off O2     Positive blood culture - Staphylococcus epidermidis  Likely 2/2 contaminant from collection (poor iv access)  ID consulted  Blood stains showing staph coag neg from 2/2 bottles  Monitor off abx - she remains afebrile and normal WBC     Atrial fibrillation  Pt refused anticoagulation  Continue metoprolol, aspirin, Plavix      Left lower extremity edema  Pt is known to have history of lymphedema  US LE venous no evidence of DVT     Left carotid stenosis  Pt refused carotid end arterectomy    Continue aspirin and Plavix      Hyperlipidemia  Continue Lipitor     GERD  Continue PPT     Neuropathy  Continue Lyrica     History of Neuroendocrine tumor/Merkel cell carcinoma   Seen by Hem/Onc Dr Sayda Dash in 9/2017 - no evidence of recurrence     DVT Prophylaxis: on Lovenox sc, SCD  Code Status: Level 1 - Full Code    Imaging while in hospital:  Xr Chest 1 View Portable    Result Date: 12/11/2017  Narrative: CHEST INDICATION:  Chest pain and productive cough  COMPARISON:  10/12/2017  VIEWS:   AP frontal IMAGES:  1 FINDINGS:  A right-sided catheter terminates at the caval atrial junction  No pneumothorax  Cardiomediastinal silhouette appears unremarkable  Left basilar subsegmental atelectasis with hemidiaphragm elevation noted  Tiny left pleural effusion suspected  Upper lobe and right lung are clear  Visualized osseous structures appear within normal limits for the patient's age  Impression: Left basilar atelectasis with tiny pleural effusion and hemidiaphragm elevation  Workstation performed: [id number]     Vas Lower Limb Venous Duplex Study, Unilateral/limited    Result Date: 12/11/2017  Narrative:  [facility] REPORT CLINICAL: Indications: Edema, unspecified [R60 9]  Patient presents with left lower extremity edema x several months  The patient has history of malignancy  Clinical:  FINDINGS:  Left     Impression       CFV      Normal (Patent)     CONCLUSION:  Impression: RIGHT LOWER LIMB LIMITED: NORMAL Evaluation shows no evidence of thrombus in the common femoral vein  Doppler evaluation shows a normal response to augmentation maneuvers  LEFT LOWER LIMB: NORMAL No evidence of acute or chronic deep vein thrombosis No evidence of superficial thrombophlebitis noted  Doppler evaluation shows a normal response to augmentation maneuvers  Popliteal, posterior tibial and anterior tibial arterial Doppler waveforms are triphasic    SIGNATURE: Electronically Signed by: Fidencio Bloch on 2017-12-11 06:10:35 PM      Allergies: Allergies   Allergen Reactions    Fentanyl And Related Other (See Comments)     Passed out    Latex     Penicillins        Discharge Medications:  Current Discharge Medication List      START taking these medications    Details   amLODIPine (NORVASC) 5 mg tablet Take 1 tablet by mouth daily for 30 days  Qty: 30 tablet, Refills: 0      dextromethorphan-guaiFENesin (ROBITUSSIN DM)  mg/5 mL syrup Take 10 mL by mouth every 4 (four) hours as needed for cough  Qty: 236 mL, Refills: 0      ipratropium-albuterol (DUO-NEB) 0 5-2 5 mg/mL Take 3 mL by nebulization every 6 (six) hours for 30 days  Qty: 360 mL, Refills: 0           Current Discharge Medication List      CONTINUE these medications which have CHANGED    Details   predniSONE 10 mg tablet 40mg PO daily for three days, 20mg PO daily for three days, then 10mg PO daily for 3 days  Qty: 30 tablet, Refills: 0           Current Discharge Medication List      CONTINUE these medications which have NOT CHANGED    Details   albuterol (ACCUNEB) 0 63 MG/3ML nebulizer solution Take 1 ampule by nebulization every 6 (six) hours as needed for wheezing      ALPRAZolam (XANAX) 0 5 mg tablet Take 0 5 mg by mouth daily at bedtime as needed for anxiety  clopidogrel (PLAVIX) 75 mg tablet Take 75 mg by mouth daily      esomeprazole (NexIUM) 40 MG capsule Take 40 mg by mouth daily  Fesoterodine Fumarate ER (TOVIAZ) 8 MG TB24 Take by mouth daily at bedtime        metoprolol tartrate (LOPRESSOR) 25 mg tablet Take 25 mg by mouth 2 (two) times a day  potassium chloride (K-DUR) 10 mEq tablet Take 10 mEq by mouth daily  pregabalin (LYRICA) 300 MG capsule Take 300 mg by mouth 2 (two) times a day        aspirin (ECOTRIN LOW STRENGTH) 81 mg EC tablet Take 1 tablet by mouth daily for 30 days  Qty: 30 tablet, Refills: 0      atorvastatin (LIPITOR) 10 mg tablet Take 1 tablet by mouth daily with dinner for 30 days  Qty: 30 tablet, Refills: 0 Current Discharge Medication List      STOP taking these medications       albuterol (PROVENTIL HFA,VENTOLIN HFA) 90 mcg/act inhaler Comments:   Reason for Stopping:               Medications were reconciliated and instructions were given to Ms Mariella Francois at bedside prior to the discharge  Discharge Day Visit/Exam:   Subjective:  /80   Pulse 58   Temp 98 1 °F (36 7 °C) (Oral)   Resp 20   Ht 5' 2" (1 575 m)   Wt 97 5 kg (214 lb 15 2 oz)   SpO2 95%   BMI 39 31 kg/m²   General: Obese, NAD, Port-A-Cath in place at right chest  HEENT: EOMI, anicteric, oral moist, neck supple, no mass or JVD  Chest: CTAB, mild expiratory wheeze  Cardiac: RRR, S1/S2, No murmur  Abd: S/ND/NT/BS+  MSK: Chronic LLE edema from lymphedema, pedal pulses intact  Neuro: AAOx3, moving all extremities  Psychiatric: Mood with normal affect    Patient Instructions: Activity: activity as tolerated  Diet: cardiac diet  Wound Care: none needed    Discharge instructions/Information to patient and family:(Discharge Medications and Follow up):  See after visit summary for information provided to patient and family  Provisions for Follow-Up Care:  See after visit summary for information related to follow-up care and any pertinent home health orders  Follow-up Informations:  Tj Valenzuela DO  [address]  [phone]    Follow up in 1 week(s)  Asthma exacerbation    Ruth Ann Srinivasan DO  [address] [facility]  [phone]    Follow up in 1 week(s)  Asthma exacerbation       Disposition: Home    Planned Readmission: No     Discharge Statement:  I spent 20 minutes discharging the patient  This time was spent on the day of discharge  I had direct contact with the patient on the day of discharge   Greater than 50% of the total time was spent examining patient, answering all patient questions, arranging and discussing plan of care with patient as well as directly providing post-discharge instructions  Additional time then spent on discharge activities  Discharge Medications:  See after visit summary for reconciled discharge medications provided to patient and family

## 2017-12-18 LAB
BACTERIA BLD CULT: NORMAL
BACTERIA BLD CULT: NORMAL

## 2018-01-05 ENCOUNTER — APPOINTMENT (EMERGENCY)
Dept: RADIOLOGY | Facility: HOSPITAL | Age: 83
DRG: 291 | End: 2018-01-05
Payer: MEDICARE

## 2018-01-05 ENCOUNTER — HOSPITAL ENCOUNTER (INPATIENT)
Facility: HOSPITAL | Age: 83
LOS: 5 days | Discharge: HOME HEALTH | DRG: 291 | End: 2018-01-10
Attending: EMERGENCY MEDICINE | Admitting: FAMILY MEDICINE
Payer: MEDICARE

## 2018-01-05 DIAGNOSIS — I50.9 CHF (CONGESTIVE HEART FAILURE) (HCC): Primary | ICD-10-CM

## 2018-01-05 DIAGNOSIS — I50.9 CONGESTIVE HEART FAILURE, UNSPECIFIED CONGESTIVE HEART FAILURE CHRONICITY, UNSPECIFIED CONGESTIVE HEART FAILURE TYPE: ICD-10-CM

## 2018-01-05 DIAGNOSIS — I50.9 ACUTE CONGESTIVE HEART FAILURE (HCC): ICD-10-CM

## 2018-01-05 DIAGNOSIS — J45.40 MODERATE PERSISTENT ASTHMA: ICD-10-CM

## 2018-01-05 DIAGNOSIS — J96.01 ACUTE RESPIRATORY FAILURE WITH HYPOXIA (HCC): ICD-10-CM

## 2018-01-05 LAB
ALBUMIN SERPL BCP-MCNC: 2.7 G/DL (ref 3.5–5)
ALP SERPL-CCNC: 97 U/L (ref 46–116)
ALT SERPL W P-5'-P-CCNC: 21 U/L (ref 12–78)
ANION GAP SERPL CALCULATED.3IONS-SCNC: 7 MMOL/L (ref 4–13)
APTT PPP: 25 SECONDS (ref 23–35)
AST SERPL W P-5'-P-CCNC: 20 U/L (ref 5–45)
ATRIAL RATE: 91 BPM
BASOPHILS # BLD AUTO: 0 THOUSANDS/ΜL (ref 0–0.1)
BASOPHILS NFR BLD AUTO: 0 % (ref 0–1)
BILIRUB SERPL-MCNC: 0.4 MG/DL (ref 0.2–1)
BUN SERPL-MCNC: 22 MG/DL (ref 5–25)
CALCIUM SERPL-MCNC: 9.2 MG/DL (ref 8.3–10.1)
CHLORIDE SERPL-SCNC: 105 MMOL/L (ref 100–108)
CO2 SERPL-SCNC: 30 MMOL/L (ref 21–32)
CREAT SERPL-MCNC: 1.01 MG/DL (ref 0.6–1.3)
DEPRECATED D DIMER PPP: 1920 NG/ML (FEU) (ref 190–520)
EOSINOPHIL # BLD AUTO: 0.6 THOUSAND/ΜL (ref 0–0.61)
EOSINOPHIL NFR BLD AUTO: 12 % (ref 0–6)
ERYTHROCYTE [DISTWIDTH] IN BLOOD BY AUTOMATED COUNT: 17.1 % (ref 11.6–15.1)
FLUAV AG SPEC QL IA: NEGATIVE
FLUBV AG SPEC QL IA: NEGATIVE
GFR SERPL CREATININE-BSD FRML MDRD: 52 ML/MIN/1.73SQ M
GLUCOSE SERPL-MCNC: 105 MG/DL (ref 65–140)
HCT VFR BLD AUTO: 39.6 % (ref 37–47)
HGB BLD-MCNC: 12.9 G/DL (ref 12–16)
INR PPP: 1.02 (ref 0.86–1.16)
LACTATE SERPL-SCNC: 1 MMOL/L (ref 0.5–2)
LYMPHOCYTES # BLD AUTO: 0.7 THOUSANDS/ΜL (ref 0.6–4.47)
LYMPHOCYTES NFR BLD AUTO: 14 % (ref 14–44)
MAGNESIUM SERPL-MCNC: 1.9 MG/DL (ref 1.6–2.6)
MCH RBC QN AUTO: 25.8 PG (ref 27–31)
MCHC RBC AUTO-ENTMCNC: 32.7 G/DL (ref 31.4–37.4)
MCV RBC AUTO: 79 FL (ref 82–98)
MONOCYTES # BLD AUTO: 0.4 THOUSAND/ΜL (ref 0.17–1.22)
MONOCYTES NFR BLD AUTO: 9 % (ref 4–12)
NEUTROPHILS # BLD AUTO: 3 THOUSANDS/ΜL (ref 1.85–7.62)
NEUTS SEG NFR BLD AUTO: 65 % (ref 43–75)
NRBC BLD AUTO-RTO: 0 /100 WBCS
NT-PROBNP SERPL-MCNC: 1170 PG/ML
P AXIS: 50 DEGREES
PLATELET # BLD AUTO: 152 THOUSANDS/UL (ref 130–400)
PLATELET # BLD AUTO: 160 THOUSANDS/UL (ref 130–400)
PMV BLD AUTO: 9.2 FL (ref 8.9–12.7)
PMV BLD AUTO: 9.4 FL (ref 8.9–12.7)
POTASSIUM SERPL-SCNC: 3.9 MMOL/L (ref 3.5–5.3)
PR INTERVAL: 162 MS
PROT SERPL-MCNC: 6.7 G/DL (ref 6.4–8.2)
PROTHROMBIN TIME: 10.7 SECONDS (ref 9.4–11.7)
QRS AXIS: -29 DEGREES
QRSD INTERVAL: 84 MS
QT INTERVAL: 346 MS
QTC INTERVAL: 425 MS
RBC # BLD AUTO: 5.03 MILLION/UL (ref 4.2–5.4)
SODIUM SERPL-SCNC: 142 MMOL/L (ref 136–145)
T WAVE AXIS: 49 DEGREES
TROPONIN I SERPL-MCNC: <0.02 NG/ML
TROPONIN I SERPL-MCNC: <0.02 NG/ML
TSH SERPL DL<=0.05 MIU/L-ACNC: 0.21 UIU/ML (ref 0.36–3.74)
VENTRICULAR RATE: 91 BPM
WBC # BLD AUTO: 4.7 THOUSAND/UL (ref 4.8–10.8)

## 2018-01-05 PROCEDURE — 94640 AIRWAY INHALATION TREATMENT: CPT

## 2018-01-05 PROCEDURE — 84484 ASSAY OF TROPONIN QUANT: CPT | Performed by: EMERGENCY MEDICINE

## 2018-01-05 PROCEDURE — 99285 EMERGENCY DEPT VISIT HI MDM: CPT

## 2018-01-05 PROCEDURE — 85025 COMPLETE CBC W/AUTO DIFF WBC: CPT | Performed by: EMERGENCY MEDICINE

## 2018-01-05 PROCEDURE — 87081 CULTURE SCREEN ONLY: CPT | Performed by: FAMILY MEDICINE

## 2018-01-05 PROCEDURE — 87798 DETECT AGENT NOS DNA AMP: CPT | Performed by: EMERGENCY MEDICINE

## 2018-01-05 PROCEDURE — 87040 BLOOD CULTURE FOR BACTERIA: CPT | Performed by: EMERGENCY MEDICINE

## 2018-01-05 PROCEDURE — 71275 CT ANGIOGRAPHY CHEST: CPT

## 2018-01-05 PROCEDURE — 84484 ASSAY OF TROPONIN QUANT: CPT | Performed by: NURSE PRACTITIONER

## 2018-01-05 PROCEDURE — 83735 ASSAY OF MAGNESIUM: CPT | Performed by: EMERGENCY MEDICINE

## 2018-01-05 PROCEDURE — 93005 ELECTROCARDIOGRAM TRACING: CPT

## 2018-01-05 PROCEDURE — 83605 ASSAY OF LACTIC ACID: CPT | Performed by: EMERGENCY MEDICINE

## 2018-01-05 PROCEDURE — 94760 N-INVAS EAR/PLS OXIMETRY 1: CPT

## 2018-01-05 PROCEDURE — 85730 THROMBOPLASTIN TIME PARTIAL: CPT | Performed by: EMERGENCY MEDICINE

## 2018-01-05 PROCEDURE — 85049 AUTOMATED PLATELET COUNT: CPT | Performed by: NURSE PRACTITIONER

## 2018-01-05 PROCEDURE — 71045 X-RAY EXAM CHEST 1 VIEW: CPT

## 2018-01-05 PROCEDURE — 80053 COMPREHEN METABOLIC PANEL: CPT | Performed by: EMERGENCY MEDICINE

## 2018-01-05 PROCEDURE — 85379 FIBRIN DEGRADATION QUANT: CPT | Performed by: EMERGENCY MEDICINE

## 2018-01-05 PROCEDURE — 36415 COLL VENOUS BLD VENIPUNCTURE: CPT | Performed by: EMERGENCY MEDICINE

## 2018-01-05 PROCEDURE — 84443 ASSAY THYROID STIM HORMONE: CPT | Performed by: NURSE PRACTITIONER

## 2018-01-05 PROCEDURE — 96374 THER/PROPH/DIAG INJ IV PUSH: CPT

## 2018-01-05 PROCEDURE — 83880 ASSAY OF NATRIURETIC PEPTIDE: CPT | Performed by: EMERGENCY MEDICINE

## 2018-01-05 PROCEDURE — 87400 INFLUENZA A/B EACH AG IA: CPT | Performed by: EMERGENCY MEDICINE

## 2018-01-05 PROCEDURE — 85610 PROTHROMBIN TIME: CPT | Performed by: EMERGENCY MEDICINE

## 2018-01-05 RX ORDER — PREGABALIN 100 MG/1
300 CAPSULE ORAL 2 TIMES DAILY
Status: DISCONTINUED | OUTPATIENT
Start: 2018-01-05 | End: 2018-01-10 | Stop reason: HOSPADM

## 2018-01-05 RX ORDER — IPRATROPIUM BROMIDE AND ALBUTEROL SULFATE 2.5; .5 MG/3ML; MG/3ML
3 SOLUTION RESPIRATORY (INHALATION) ONCE
Status: COMPLETED | OUTPATIENT
Start: 2018-01-05 | End: 2018-01-05

## 2018-01-05 RX ORDER — POLYETHYLENE GLYCOL 3350 17 G/17G
17 POWDER, FOR SOLUTION ORAL DAILY
Status: DISCONTINUED | OUTPATIENT
Start: 2018-01-05 | End: 2018-01-10 | Stop reason: HOSPADM

## 2018-01-05 RX ORDER — ACETAMINOPHEN 325 MG/1
650 TABLET ORAL EVERY 6 HOURS PRN
Status: DISCONTINUED | OUTPATIENT
Start: 2018-01-05 | End: 2018-01-10 | Stop reason: HOSPADM

## 2018-01-05 RX ORDER — IPRATROPIUM BROMIDE AND ALBUTEROL SULFATE 2.5; .5 MG/3ML; MG/3ML
3 SOLUTION RESPIRATORY (INHALATION)
Status: DISCONTINUED | OUTPATIENT
Start: 2018-01-05 | End: 2018-01-07

## 2018-01-05 RX ORDER — CLOPIDOGREL BISULFATE 75 MG/1
75 TABLET ORAL DAILY
Status: DISCONTINUED | OUTPATIENT
Start: 2018-01-05 | End: 2018-01-08

## 2018-01-05 RX ORDER — ALPRAZOLAM 0.5 MG/1
0.5 TABLET ORAL
Status: DISCONTINUED | OUTPATIENT
Start: 2018-01-05 | End: 2018-01-10 | Stop reason: HOSPADM

## 2018-01-05 RX ORDER — IPRATROPIUM BROMIDE AND ALBUTEROL SULFATE 2.5; .5 MG/3ML; MG/3ML
3 SOLUTION RESPIRATORY (INHALATION)
Status: DISCONTINUED | OUTPATIENT
Start: 2018-01-05 | End: 2018-01-05

## 2018-01-05 RX ORDER — TOLTERODINE 2 MG/1
4 CAPSULE, EXTENDED RELEASE ORAL DAILY
Status: DISCONTINUED | OUTPATIENT
Start: 2018-01-06 | End: 2018-01-10 | Stop reason: HOSPADM

## 2018-01-05 RX ORDER — METHYLPREDNISOLONE SODIUM SUCCINATE 125 MG/2ML
125 INJECTION, POWDER, LYOPHILIZED, FOR SOLUTION INTRAMUSCULAR; INTRAVENOUS ONCE
Status: COMPLETED | OUTPATIENT
Start: 2018-01-05 | End: 2018-01-05

## 2018-01-05 RX ORDER — FUROSEMIDE 10 MG/ML
40 INJECTION INTRAMUSCULAR; INTRAVENOUS ONCE
Status: COMPLETED | OUTPATIENT
Start: 2018-01-05 | End: 2018-01-05

## 2018-01-05 RX ORDER — ALBUTEROL SULFATE 2.5 MG/3ML
0.63 SOLUTION RESPIRATORY (INHALATION) EVERY 6 HOURS PRN
Status: DISCONTINUED | OUTPATIENT
Start: 2018-01-05 | End: 2018-01-05 | Stop reason: RX

## 2018-01-05 RX ORDER — POTASSIUM CHLORIDE 750 MG/1
10 TABLET, EXTENDED RELEASE ORAL DAILY
Status: DISCONTINUED | OUTPATIENT
Start: 2018-01-05 | End: 2018-01-10

## 2018-01-05 RX ORDER — ATORVASTATIN CALCIUM 10 MG/1
10 TABLET, FILM COATED ORAL
Status: DISCONTINUED | OUTPATIENT
Start: 2018-01-05 | End: 2018-01-10 | Stop reason: HOSPADM

## 2018-01-05 RX ORDER — AMLODIPINE BESYLATE 5 MG/1
5 TABLET ORAL DAILY
Status: DISCONTINUED | OUTPATIENT
Start: 2018-01-05 | End: 2018-01-08

## 2018-01-05 RX ORDER — PANTOPRAZOLE SODIUM 40 MG/1
40 TABLET, DELAYED RELEASE ORAL
Status: DISCONTINUED | OUTPATIENT
Start: 2018-01-06 | End: 2018-01-10 | Stop reason: HOSPADM

## 2018-01-05 RX ORDER — ASPIRIN 81 MG/1
81 TABLET ORAL DAILY
Status: DISCONTINUED | OUTPATIENT
Start: 2018-01-05 | End: 2018-01-08

## 2018-01-05 RX ORDER — LEVALBUTEROL INHALATION SOLUTION 0.63 MG/3ML
0.63 SOLUTION RESPIRATORY (INHALATION) EVERY 6 HOURS PRN
Status: DISCONTINUED | OUTPATIENT
Start: 2018-01-05 | End: 2018-01-10 | Stop reason: HOSPADM

## 2018-01-05 RX ORDER — DOCUSATE SODIUM 100 MG/1
100 CAPSULE, LIQUID FILLED ORAL 2 TIMES DAILY
Status: DISCONTINUED | OUTPATIENT
Start: 2018-01-05 | End: 2018-01-10 | Stop reason: HOSPADM

## 2018-01-05 RX ADMIN — IPRATROPIUM BROMIDE AND ALBUTEROL SULFATE 3 ML: .5; 3 SOLUTION RESPIRATORY (INHALATION) at 19:49

## 2018-01-05 RX ADMIN — ASPIRIN 81 MG: 81 TABLET, COATED ORAL at 16:56

## 2018-01-05 RX ADMIN — FLUTICASONE PROPIONATE AND SALMETEROL 1 PUFF: 50; 250 POWDER RESPIRATORY (INHALATION) at 21:58

## 2018-01-05 RX ADMIN — CLOPIDOGREL BISULFATE 75 MG: 75 TABLET ORAL at 16:56

## 2018-01-05 RX ADMIN — ATORVASTATIN CALCIUM 10 MG: 10 TABLET, FILM COATED ORAL at 16:56

## 2018-01-05 RX ADMIN — IPRATROPIUM BROMIDE AND ALBUTEROL SULFATE 3 ML: .5; 3 SOLUTION RESPIRATORY (INHALATION) at 11:29

## 2018-01-05 RX ADMIN — AMLODIPINE BESYLATE 5 MG: 5 TABLET ORAL at 16:56

## 2018-01-05 RX ADMIN — ALPRAZOLAM 0.5 MG: 0.5 TABLET ORAL at 21:58

## 2018-01-05 RX ADMIN — IPRATROPIUM BROMIDE AND ALBUTEROL SULFATE 3 ML: .5; 3 SOLUTION RESPIRATORY (INHALATION) at 12:24

## 2018-01-05 RX ADMIN — METHYLPREDNISOLONE SODIUM SUCCINATE 125 MG: 125 INJECTION, POWDER, FOR SOLUTION INTRAMUSCULAR; INTRAVENOUS at 11:29

## 2018-01-05 RX ADMIN — POTASSIUM CHLORIDE 10 MEQ: 750 TABLET, EXTENDED RELEASE ORAL at 16:56

## 2018-01-05 RX ADMIN — ENOXAPARIN SODIUM 40 MG: 40 INJECTION SUBCUTANEOUS at 17:02

## 2018-01-05 RX ADMIN — IOHEXOL 100 ML: 350 INJECTION, SOLUTION INTRAVENOUS at 12:12

## 2018-01-05 RX ADMIN — FUROSEMIDE 40 MG: 10 INJECTION, SOLUTION INTRAMUSCULAR; INTRAVENOUS at 13:02

## 2018-01-05 RX ADMIN — PREGABALIN 300 MG: 100 CAPSULE ORAL at 21:59

## 2018-01-05 RX ADMIN — METOPROLOL TARTRATE 25 MG: 25 TABLET ORAL at 21:58

## 2018-01-05 NOTE — ED NOTES
Pt reports feeling better than prior to hospital   O2 on, pt reports comfort with O2 on         Aziza Destin, RN  01/05/18 2305

## 2018-01-05 NOTE — RESPIRATORY THERAPY NOTE
RT Protocol Note  Santino Moctezuma 81 y o  female MRN: [id number]  Unit/Bed#: ICU 07 Encounter: [id number]    Assessment    Principal Problem:    CHF (congestive heart failure) ([facility] )  Active Problems:    Hypertension    Morbid obesity with BMI of 50 0-59 9, adult (HCC)    GERD (gastroesophageal reflux disease)    Hyperlipemia    Atrial fibrillation (HCC)    Fibromyalgia    Lymphedema of left leg    History of Merkel cell carcinoma    Moderate persistent asthma      Home Pulmonary Medications:  Albuterol neb    Past Medical History:   Diagnosis Date    Asthma     Cancer ([facility] )     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    Cardiac disease     a fib    Fibromyalgia     Fibromyalgia, primary     GERD (gastroesophageal reflux disease)     Hypertension     Hypokalemia     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[place] [address] )     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, left groin, lung and bladder  Patient treated with chemotherapy and radiation     Social History     Social History    Marital status:      Spouse name: N/A    Number of children: N/A    Years of education: N/A     Social History Main Topics    Smoking status: Never Smoker    Smokeless tobacco: Never Used      Comment: never smoke    Alcohol use No    Drug use: No    Sexual activity: Not Asked     Other Topics Concern    None     Social History Narrative    None       Subjective         Objective    Physical Exam:   Assessment Type: Assess only  General Appearance: Alert, Awake  Respiratory Pattern: Normal, Symmetrical, Spontaneous  Chest Assessment: Chest expansion symmetrical  Bilateral Breath Sounds: Clear, Diminished  Cough: None  O2 Device: NC    Vitals:  Blood pressure 169/74, pulse 95, temperature (!) 96 6 °F (35 9 °C), temperature source Temporal, resp  rate 20, height 5' 2" (1 575 m), weight 85 5 kg (188 lb 7 9 oz), SpO2 94 %, not currently breastfeeding            Imaging and other studies: I have personally reviewed pertinent reports        O2 Device: NC     Plan    Respiratory Plan: Mild Distress pathway (Change neb to QID pt said she will bite us if we wake her)

## 2018-01-05 NOTE — ED NOTES
O2 sat 88 % as patient is falling asleep  Oxygen turned up to 4 lpm via NC and patient reminded to breathe through nose  Sat increased to 94%       Antonio Brenner RN  01/05/18 0820

## 2018-01-05 NOTE — H&P
History and Physical - [facility] Internal Medicine    Patient Information: Jovanny Sigala 81 y o  female MRN: [id number]  Unit/Bed#: ICU 07 Encounter: [id number]  Admitting Physician: GILDA Kirk  PCP: Monica Chavez DO  Date of Admission:  01/05/18    Assessment/Plan:    Hospital Problem List:     Principal Problem:    CHF (congestive heart failure) ([address] )  Active Problems:    Hypertension    Morbid obesity with BMI of 50 0-59 9, adult (HCC)    GERD (gastroesophageal reflux disease)    Hyperlipemia    Atrial fibrillation (HCC)    Fibromyalgia    Lymphedema of left leg    History of Merkel cell carcinoma    Moderate persistent asthma      Plan for the Primary Problem(s):  SOB likely 2/2 Acute diastolic heart failure,  IN ED received Lasix 40mg IV x1 dose, will hold off anymore lasix for tonight and re-evaluate in a m  PCXR, mild congestive heart failure  BNP, 1170  Consult cardiology  First troponin <0 02, complete serial troponins  9/22/16 ECHO, EF 60-70% no regional wall motion abnormalities, mild concentric positive feet  Grade 1 diastolic dysfunction  Ordered repeat echo  Daily weights and strict I&Os  Ordered TSH and HbgA1C  Blood cultures , pending  Influenza/RSV pending        Plan for Additional Problems:   Hypertension,  Continue Norvasc and metoprolol    Hyperlipidemia,  Check lipid profile in a m  Continue atorvastatin    GERD,  Continue PPI    Fibromyalgia,  Continue supportive care  Continue Lyrica    Moderate persistent asthma without acute exacerbation,  In the ED she did receive Solu-Medrol 125 IV x1 and duo nebs x2  Continue albuterol  Reviewed the Dr Sofy Jenkins progress notes she is to be on Advair 250/50mcg 1 puff b i d        Atrial fibrillation,  She refuses anticoagulation  Continue metoprolol, aspirin and Plavix    Left lower extremity edema,  She states this is chronic due to her Merkel cell lymphoma resection    Left carotid stenosis,  In the past she has refused carotid endarterectomy  Continue aspirin and Plavix    History of Merkel cell carcinoma,  Seen by heme/onc in 09/2017, no evidence of recurrence    VTE Prophylaxis: Enoxaparin (Lovenox)  / sequential compression device   Code Status: full  POLST: POLST form is not discussed and not completed at this time  Anticipated Length of Stay:  Patient will be admitted on an Inpatient basis with an anticipated length of stay of  >2 midnights  Justification for Hospital Stay:  Acute CHF requiring IV Lasix    Total Time for Visit, including Counseling / Coordination of Care: 30 minutes  Greater than 50% of this total time spent on direct patient counseling and coordination of care  Chief Complaint:   Shortness of breath for 1 week with decreased p o  intake    History of Present Illness:    Pia Cantu is a 81 y o  female with PMH of fibromyalgia, 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, lymph node dissection sampling and radiation and chemotherapy, atrial fibrillation, GERD, moderate persistent asthma, and hypertension who presents with shortness of breath and decreased p o  intake for the last 7 days  She states she attempted to try to walk downstairs and had trouble breathing at the time in which she called 911  She states she is able to lie flat with 1 pillow on her left side  She complains of a nonproductive cough  She denies chest pain, abdominal pain, or headache  Denies nausea, vomiting, or diarrhea  Denies fevers, chills or rigor  She states her left leg is chronically edematous from her Merkel cell cancer  Review of Systems:    Review of Systems   Constitutional: Positive for activity change and appetite change  Negative for chills, diaphoresis, fatigue and fever  HENT: Positive for rhinorrhea  Negative for congestion, ear discharge, ear pain, facial swelling, nosebleeds, postnasal drip, sinus pain, sinus pressure, sore throat, trouble swallowing and voice change      Eyes: Negative for pain and redness  Respiratory: Positive for cough and shortness of breath  Negative for choking, chest tightness and wheezing  Cardiovascular: Negative for chest pain, palpitations and leg swelling  Gastrointestinal: Negative for abdominal distention, abdominal pain, constipation, diarrhea, nausea and vomiting  Genitourinary: Negative for difficulty urinating, dysuria, flank pain, frequency and urgency  Musculoskeletal: Negative for back pain, gait problem, neck pain and neck stiffness  Skin: Negative for color change, pallor, rash and wound  Neurological: Positive for dizziness  Negative for tremors, weakness, light-headedness and headaches  Psychiatric/Behavioral: Negative for agitation, behavioral problems, confusion, sleep disturbance and suicidal ideas  Past Medical and Surgical History:     Past Medical History:   Diagnosis Date    Asthma     Cancer ([facility] [address] )     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    Cardiac disease     a fib    Fibromyalgia     Fibromyalgia, primary     GERD (gastroesophageal reflux disease)     Hypertension     Hypokalemia     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, left groin, lung and bladder  Patient treated with chemotherapy and radiation       Past Surgical History:   Procedure Laterality Date    APPENDECTOMY      CATARACT EXTRACTION      CHEST WALL BIOPSY N/A 10/27/2017    Procedure: SINUS EXCISION AND REMOVAL OF FOREIGN BODY, ABDOMEN (WOUND EXPLORATION); Surgeon: Louann Jacome MD;  Location: [address] [place] [address];  Service: General    EYE SURGERY Bilateral     cataracts     HIP FRACTURE SURGERY Left     HYSTERECTOMY      JOINT REPLACEMENT Left     hip    LYMPHADENECTOMY      PORTACATH PLACEMENT Right        Meds/Allergies:    Prior to Admission medications    Medication Sig Start Date End Date Taking?  Authorizing Provider   albuterol (ACCUNEB) 0 63 MG/3ML nebulizer solution Take 1 ampule by nebulization every 6 (six) hours as needed for wheezing   Yes Historical Provider, MD   ALPRAZolam Jailene [place]) 0 5 mg tablet Take 0 5 mg by mouth daily at bedtime as needed for anxiety  Yes Historical Provider, MD   amLODIPine (NORVASC) 5 mg tablet Take 1 tablet by mouth daily for 30 days 12/17/17 1/16/18 Yes David Maya MD   clopidogrel (PLAVIX) 75 mg tablet Take 75 mg by mouth daily   Yes Historical Provider, MD   esomeprazole (NexIUM) 40 MG capsule Take 40 mg by mouth daily  Yes Historical Provider, MD   Fesoterodine Fumarate ER (TOVIAZ) 8 MG TB24 Take by mouth daily at bedtime     Yes Historical Provider, MD   ipratropium-albuterol (DUO-NEB) 0 5-2 5 mg/mL Take 3 mL by nebulization every 6 (six) hours for 30 days 12/16/17 1/15/18 Yes David Maya MD   metoprolol tartrate (LOPRESSOR) 25 mg tablet Take 25 mg by mouth 2 (two) times a day  Yes Historical Provider, MD   potassium chloride (K-DUR) 10 mEq tablet Take 10 mEq by mouth daily  Yes Historical Provider, MD   pregabalin (LYRICA) 300 MG capsule Take 300 mg by mouth 2 (two) times a day  Yes Historical Provider, MD   aspirin (ECOTRIN LOW STRENGTH) 81 mg EC tablet Take 1 tablet by mouth daily for 30 days  Patient taking differently: Take 81 mg by mouth daily Last dose 10/25/17  3/16/17 10/25/17  GILDA Ko   atorvastatin (LIPITOR) 10 mg tablet Take 1 tablet by mouth daily with dinner for 30 days 3/16/17 10/27/17  GILDA Ko   dextromethorphan-guaiFENesin (ROBITUSSIN DM)  mg/5 mL syrup Take 10 mL by mouth every 4 (four) hours as needed for cough 12/16/17 1/5/18  David Maya MD   predniSONE 10 mg tablet 40mg PO daily for three days, 20mg PO daily for three days, then 10mg PO daily for 3 days 12/16/17 1/5/18  David Maya MD     I have reviewed home medications with patient personally  Allergies:    Allergies   Allergen Reactions    Fentanyl And Related Other (See Comments)     Passed out    Latex     Penicillins Social History:     Marital Status:    Occupation: retired  Patient Pre-hospital Living Situation: lives in an apartment  Patient Pre-hospital Level of Mobility: walk with a cane  Patient Pre-hospital Diet Restrictions: none  Substance Use History:   History   Alcohol Use No     History   Smoking Status    Never Smoker   Smokeless Tobacco    Never Used     Comment: never smoke     History   Drug Use No       Family History:    Family History   Problem Relation Age of Onset    Pneumonia Mother     Heart disease Father        Physical Exam:     Vitals:   Blood Pressure: 169/74 (01/05/18 1500)  Pulse: 95 (01/05/18 1531)  Temperature: (!) 96 6 °F (35 9 °C) (01/05/18 1600)  Temp Source: Temporal (01/05/18 1600)  Respirations: 20 (01/05/18 1531)  Height: 5' 2" (157 5 cm) (01/05/18 1431)  Weight - Scale: 85 5 kg (188 lb 7 9 oz) (01/05/18 1431)  SpO2: 94 % (01/05/18 1531)    Physical Exam   Constitutional: She is oriented to person, place, and time  She appears well-developed and well-nourished  No distress  obese   HENT:   Head: Normocephalic and atraumatic  Neck: Normal range of motion  Neck supple  No JVD present  Cardiovascular: Normal rate, regular rhythm and normal heart sounds  Exam reveals no gallop and no friction rub  No murmur heard  Pulmonary/Chest: Effort normal  She has rales  Bibasilar rales and decreased throughout   Abdominal: Soft  Bowel sounds are normal  She exhibits no distension and no mass  There is no tenderness  There is no rebound and no guarding  Musculoskeletal: Normal range of motion  She exhibits edema  She exhibits no tenderness or deformity  LLE with chronic edema  RLE no edema noted   Neurological: She is alert and oriented to person, place, and time  Skin: Skin is warm and dry  No rash noted  She is not diaphoretic  No erythema  No pallor  Psychiatric: She has a normal mood and affect   Her behavior is normal  Judgment and thought content normal  Additional Data:     Lab Results: I have personally reviewed pertinent reports  Results from last 7 days  Lab Units 01/05/18  1108   WBC Thousand/uL 4 70*   HEMOGLOBIN g/dL 12 9   HEMATOCRIT % 39 6   PLATELETS Thousands/uL 160   NEUTROS PCT % 65   LYMPHS PCT % 14   MONOS PCT % 9   EOS PCT % 12*       Results from last 7 days  Lab Units 01/05/18  1108   SODIUM mmol/L 142   POTASSIUM mmol/L 3 9   CHLORIDE mmol/L 105   CO2 mmol/L 30   BUN mg/dL 22   CREATININE mg/dL 1 01   CALCIUM mg/dL 9 2   TOTAL PROTEIN g/dL 6 7   BILIRUBIN TOTAL mg/dL 0 40   ALK PHOS U/L 97   ALT U/L 21   AST U/L 20   GLUCOSE RANDOM mg/dL 105       Results from last 7 days  Lab Units 01/05/18  1121   INR  1 02       Imaging: I have personally reviewed pertinent reports  Xr Chest 1 View Portable    Result Date: 1/5/2018  Narrative: CHEST INDICATION:  Shortness of breath  Asthma  COMPARISON:  Chest radiographs December 11, 2017 VIEWS:   AP frontal IMAGES:  1 FINDINGS:     The heart is enlarged  Atherosclerotic changes in the aorta  Right internal jugular [id number] with tip at cavoatrial junction  Lung volumes diminished  Small bilateral pleural effusions  Elevation of left hemidiaphragm  Nita and pulmonary vessels diffusely enlarged  Visualized osseous structures appear within normal limits for the patient's age  Impression: Mild congestive heart failure  Workstation performed: [id number]     Xr Chest 1 View Portable    Result Date: 12/11/2017  Narrative: CHEST INDICATION:  Chest pain and productive cough  COMPARISON:  10/12/2017  VIEWS:   AP frontal IMAGES:  1 FINDINGS:  A right-sided catheter terminates at the caval atrial junction  No pneumothorax  Cardiomediastinal silhouette appears unremarkable  Left basilar subsegmental atelectasis with hemidiaphragm elevation noted  Tiny left pleural effusion suspected  Upper lobe and right lung are clear   Visualized osseous structures appear within normal limits for the patient's age  Impression: Left basilar atelectasis with tiny pleural effusion and hemidiaphragm elevation  Workstation performed: [id number]     [facility] Ed Chest Pe Study    Result Date: 1/5/2018  Narrative: CTA - CHEST WITH IV CONTRAST - PULMONARY ANGIOGRAM INDICATION: Shortness of breath  COMPARISON: 10/02/2017 TECHNIQUE: CTA examination of the chest was performed using angiographic technique according to a protocol specifically tailored to evaluate for pulmonary embolism  Reformatted images were created in axial, sagittal, and coronal planes  In addition, coronal 3D MIP postprocessing was performed on the acquisition scanner  Radiation dose length product (DLP) for this visit:  748 06 mGy-cm   This examination, like all CT scans performed in the [facility], was performed utilizing techniques to minimize radiation dose exposure, including the use of iterative  reconstruction and automated exposure control  IV Contrast:  100 mL of iohexol (OMNIPAQUE)          FINDINGS: PULMONARY ARTERIAL TREE:  No pulmonary embolus is seen  LUNGS:  Dependent atelectasis in the posterior aspects of both upper lobes and in both lower lobes  PLEURA:  Unremarkable  HEART/AORTA:  Unremarkable for patient's age  MEDIASTINUM AND JASWINDER:  Unremarkable  CHEST WALL AND LOWER NECK:       Normal  VISUALIZED STRUCTURES IN THE UPPER ABDOMEN:  Right renal cyst  OSSEOUS STRUCTURES:  No acute fracture or destructive osseous lesion  Degenerative changes in the shoulders  Impression: 1  No pulmonary embolism  2   Dependent atelectasis  Workstation performed: [id number]     Vas Lower Limb Venous Duplex Study, Unilateral/limited    Result Date: 12/11/2017  Narrative:  [facility] REPORT CLINICAL: Indications: Edema, unspecified [R60 9]  Patient presents with left lower extremity edema x several months  The patient has history of malignancy   Clinical:  FINDINGS:  Left     Impression       CFV      Normal (Patent) CONCLUSION:  Impression: RIGHT LOWER LIMB LIMITED: NORMAL Evaluation shows no evidence of thrombus in the common femoral vein  Doppler evaluation shows a normal response to augmentation maneuvers  LEFT LOWER LIMB: NORMAL No evidence of acute or chronic deep vein thrombosis No evidence of superficial thrombophlebitis noted  Doppler evaluation shows a normal response to augmentation maneuvers  Popliteal, posterior tibial and anterior tibial arterial Doppler waveforms are triphasic  SIGNATURE: Electronically Signed by: Ligia Kapadia on 2017-12-11 06:10:35 PM      EKG, Pathology, and Other Studies Reviewed on Admission:   · EKG: NSR    All[employer] Records Reviewed: Yes     ** Please Note: Dragon 360 Dictation voice to text software may have been used in the creation of this document   **

## 2018-01-05 NOTE — PLAN OF CARE
Plan of care initiated    [address] Discharge to home or other facility with appropriate resources Progressing        INFECTION - ADULT     Absence or prevention of progression during hospitalization Progressing     Absence of fever/infection during neutropenic period Progressing        Knowledge Deficit     Patient/family/caregiver demonstrates understanding of disease process, treatment plan, medications, and discharge instructions Progressing        PAIN - ADULT     Verbalizes/displays adequate comfort level or baseline comfort level Progressing        SAFETY ADULT     Patient will remain free of falls Progressing     Maintain or return to baseline ADL function Progressing     Maintain or return mobility status to optimal level Progressing

## 2018-01-05 NOTE — ED PROVIDER NOTES
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 Taking? ALPRAZolam (XANAX) 0 5 mg tablet   Yes Yes   Sig: Take 0 5 mg by mouth daily at bedtime as needed for anxiety  Fesoterodine Fumarate ER (TOVIAZ) 8 MG TB24   Yes Yes   Sig: Take by mouth daily at bedtime     albuterol (ACCUNEB) 0 63 MG/3ML nebulizer solution  Self Yes Yes   Sig: Take 1 ampule by nebulization every 6 (six) hours as needed for wheezing   amLODIPine (NORVASC) 5 mg tablet   No Yes   Sig: Take 1 tablet by mouth daily for 30 days   aspirin (ECOTRIN LOW STRENGTH) 81 mg EC tablet   No No   Sig: Take 1 tablet by mouth daily for 30 days   Patient taking differently: Take 81 mg by mouth daily Last dose 10/25/17    atorvastatin (LIPITOR) 10 mg tablet   No No   Sig: Take 1 tablet by mouth daily with dinner for 30 days   clopidogrel (PLAVIX) 75 mg tablet   Yes Yes   Sig: Take 75 mg by mouth daily   esomeprazole (NexIUM) 40 MG capsule   Yes Yes   Sig: Take 40 mg by mouth daily     ipratropium-albuterol (DUO-NEB) 0 5-2 5 mg/mL   No Yes   Sig: Take 3 mL by nebulization every 6 (six) hours for 30 days   metoprolol tartrate (LOPRESSOR) 25 mg tablet   Yes Yes   Sig: Take 25 mg by mouth 2 (two) times a day  potassium chloride (K-DUR) 10 mEq tablet   Yes Yes   Sig: Take 10 mEq by mouth daily  pregabalin (LYRICA) 300 MG capsule   Yes Yes   Sig: Take 300 mg by mouth 2 (two) times a day  Facility-Administered Medications: None       Past Medical History:   Diagnosis Date    Asthma     Cancer ([facility] [address] )     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    Cardiac disease     a fib    Fibromyalgia     Fibromyalgia, primary     GERD (gastroesophageal reflux disease)     Hypertension     Hypokalemia     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, left groin, lung and bladder  Patient treated with chemotherapy and radiation       Past Surgical History:   Procedure Laterality Date    APPENDECTOMY      CATARACT EXTRACTION      CHEST WALL BIOPSY N/A 10/27/2017    Procedure: SINUS EXCISION AND REMOVAL OF FOREIGN BODY, ABDOMEN (WOUND EXPLORATION); Surgeon: Pati Cosme MD;  Location: [address] [place] [address];  Service: General    EYE SURGERY Bilateral     cataracts     HIP FRACTURE SURGERY Left     HYSTERECTOMY      JOINT REPLACEMENT Left     hip    LYMPHADENECTOMY      PORTACATH PLACEMENT Right        Family History   Problem Relation Age of Onset    Pneumonia Mother     Heart disease Father      I have reviewed and agree with the history as documented  Social History   Substance Use Topics    Smoking status: Never Smoker    Smokeless tobacco: Never Used      Comment: never smoke    Alcohol use No        Review of Systems   All other systems reviewed and are negative        Physical Exam  ED Triage Vitals [01/05/18 1040]   Temperature Pulse Respirations Blood Pressure SpO2   98 °F (36 7 °C) 80 20 165/96 93 %      Temp Source Heart Rate Source Patient Position - Orthostatic VS BP Location FiO2 (%)   Oral Monitor Sitting Right arm --      Pain Score       7           Orthostatic Vital Signs  Vitals:    01/05/18 1800 01/05/18 1824 01/05/18 1949 01/05/18 2000   BP: 164/70      Pulse: 97 96 89    Patient Position - Orthostatic VS:    Sitting       Physical Exam   Constitutional: She is oriented to person, place, and time  She appears well-developed and well-nourished  HENT:   Head: Normocephalic and atraumatic  Eyes: EOM are normal  Pupils are equal, round, and reactive to light  Neck: Normal range of motion  Neck supple  Cardiovascular: Normal rate and regular rhythm  Pulmonary/Chest: Breath sounds normal  She is in respiratory distress  She has no wheezes  She has no rales  She exhibits no tenderness  Decreased breath sounds bilaterally  Abdominal: Soft  Bowel sounds are normal    Musculoskeletal: Normal range of motion  Neurological: She is alert and oriented to person, place, and time  Skin: Skin is warm and dry  Psychiatric: She has a normal mood and affect  Nursing note and vitals reviewed        ED Medications  Medications   ALPRAZolam (XANAX) tablet 0 5 mg (not administered)   amLODIPine (NORVASC) tablet 5 mg (5 mg Oral Given 1/5/18 1656)   aspirin (ECOTRIN LOW STRENGTH) EC tablet 81 mg (81 mg Oral Given 1/5/18 1656)   atorvastatin (LIPITOR) tablet 10 mg (10 mg Oral Given 1/5/18 1656)   clopidogrel (PLAVIX) tablet 75 mg (75 mg Oral Given 1/5/18 1656)   pantoprazole (PROTONIX) EC tablet 40 mg (not administered)   metoprolol tartrate (LOPRESSOR) tablet 25 mg (not administered)   potassium chloride (K-DUR,KLOR-CON) CR tablet 10 mEq (10 mEq Oral Given 1/5/18 1656)   pregabalin (LYRICA) capsule 300 mg (not administered)   tolterodine (DETROL LA) 24 hr capsule 4 mg (not administered)   docusate sodium (COLACE) capsule 100 mg (100 mg Oral Not Given 1/5/18 1702)   polyethylene glycol (MIRALAX) packet 17 g (17 g Oral Not Given 1/5/18 1657)   enoxaparin (LOVENOX) subcutaneous injection 40 mg (40 mg Subcutaneous Given 1/5/18 1702)   acetaminophen (TYLENOL) tablet 650 mg (not administered)   fluticasone-salmeterol (ADVAIR) 250-50 mcg/dose inhaler 1 puff (not administered)   ipratropium-albuterol (DUO-NEB) 0 5-2 5 mg/mL inhalation solution 3 mL (3 mL Nebulization Given 1/5/18 1949)   levalbuterol (XOPENEX) inhalation solution 0 63 mg (not administered)   ipratropium-albuterol (DUO-NEB) 0 5-2 5 mg/mL inhalation solution 3 mL (3 mL Nebulization Given 1/5/18 1224)   methylPREDNISolone sodium succinate (Solu-MEDROL) injection 125 mg (125 mg Intravenous Given 1/5/18 1129)   ipratropium-albuterol (DUO-NEB) 0 5-2 5 mg/mL inhalation solution 3 mL (3 mL Nebulization Given 1/5/18 1129)   iohexol (OMNIPAQUE) 350 MG/ML injection (MULTI-DOSE) 100 mL (100 mL Intravenous Given 1/5/18 1212)   furosemide (LASIX) injection 40 mg (40 mg Intravenous Given 1/5/18 1302)       Diagnostic Studies  Results Reviewed     Procedure Component Value Units Date/Time    Rapid Influenza Screen with Reflex PCR (indicated for patients <2mo of age) [[id number]]  (Normal) Collected:  01/05/18 1122    Lab Status:  Final result Specimen:  Nasopharyngeal from Nasopharyngeal Swab Updated:  01/05/18 1157     Rapid Influenza A Ag Negative     Rapid Influenza B Ag Negative    Influenza A/B and RSV by PCR (Indicated for patients > 2 mo of age) [[id number]] Collected:  01/05/18 1122    Lab Status:   In process Specimen:  Nasopharyngeal from Nasopharyngeal Swab Updated:  01/05/18 1157    Magnesium [[id number]]  (Normal) Collected:  01/05/18 1108    Lab Status:  Final result Specimen:  Blood from Arm, Left Updated:  01/05/18 1153     Magnesium 1 9 mg/dL     B-type natriuretic peptide [[id number]]  (Abnormal) Collected:  01/05/18 1108    Lab Status:  Final result Specimen:  Blood from Arm, Left Updated:  01/05/18 1153     NT-proBNP 1,170 (H) pg/mL     Troponin I [[id number]]  (Normal) Collected:  01/05/18 1108    Lab Status:  Final result Specimen:  Blood from Arm, Left Updated:  01/05/18 1153     Troponin I <0 02 ng/mL     Narrative:         [employer] Chemistry analyzer 99% cutoff is > 0 04 ng/mL in network labs    o cTnI 99% cutoff is useful only when applied to patients in the clinical setting of myocardial ischemia  o cTnI 99% cutoff should be interpreted in the context of clinical history, ECG findings and possibly cardiac imaging to establish correct diagnosis  o cTnI 99% cutoff may be suggestive but clearly not indicative of a coronary event without the clinical setting of myocardial ischemia  Lactic acid, plasma [[id number]]  (Normal) Collected:  01/05/18 1108    Lab Status:  Final result Specimen:  Blood from Arm, Left Updated:  01/05/18 1153     LACTIC ACID 1 0 mmol/L     Narrative:         Result may be elevated if tourniquet was used during collection  Comprehensive metabolic panel [[id number]]  (Abnormal) Collected:  01/05/18 1108    Lab Status:  Final result Specimen:  Blood from Arm, Left Updated:  01/05/18 1146     Sodium 142 mmol/L      Potassium 3 9 mmol/L      Chloride 105 mmol/L      CO2 30 mmol/L      Anion Gap 7 mmol/L      BUN 22 mg/dL      Creatinine 1 01 mg/dL      Glucose 105 mg/dL      Calcium 9 2 mg/dL      AST 20 U/L      ALT 21 U/L      Alkaline Phosphatase 97 U/L      Total Protein 6 7 g/dL      Albumin 2 7 (L) g/dL      Total Bilirubin 0 40 mg/dL      eGFR 52 ml/min/1 73sq m     Narrative:         National Kidney Disease Education Program recommendations are as follows:  GFR calculation is accurate only with a steady state creatinine  Chronic Kidney disease less than 60 ml/min/1 73 sq  meters  Kidney failure less than 15 ml/min/1 73 sq  meters      Protime-INR [[id number]]  (Normal) Collected:  01/05/18 1121    Lab Status:  Final result Specimen:  Blood from Arm, Left Updated:  01/05/18 1145     Protime 10 7 seconds      INR 1 02    D-Dimer [[id number]]  (Abnormal) Collected:  01/05/18 1121    Lab Status:  Final result Specimen:  Blood from Arm, Left Updated:  01/05/18 1145     D-Dimer, Quant 1,920 (H) ng/ml (FEU)     APTT [[id number]]  (Normal) Collected:  01/05/18 1121    Lab Status:  Final result Specimen:  Blood from Arm, Left Updated:  01/05/18 1145     PTT 25 seconds     Narrative: Therapeutic Heparin Range = 60-90 seconds    CBC and differential [[id number]]  (Abnormal) Collected:  01/05/18 1108    Lab Status:  Final result Specimen:  Blood from Arm, Left Updated:  01/05/18 1128     WBC 4 70 (L) Thousand/uL      RBC 5 03 Million/uL      Hemoglobin 12 9 g/dL      Hematocrit 39 6 %      MCV 79 (L) fL      MCH 25 8 (L) pg      MCHC 32 7 g/dL      RDW 17 1 (H) %      MPV 9 2 fL      Platelets 677 Thousands/uL      nRBC 0 /100 WBCs      Neutrophils Relative 65 %      Lymphocytes Relative 14 %      Monocytes Relative 9 %      Eosinophils Relative 12 (H) %      Basophils Relative 0 %      Neutrophils Absolute 3 00 Thousands/µL      Lymphocytes Absolute 0 70 Thousands/µL      Monocytes Absolute 0 40 Thousand/µL      Eosinophils Absolute 0 60 Thousand/µL      Basophils Absolute 0 00 Thousands/µL     Blood culture #2 [[id number]] Collected:  01/05/18 1115    Lab Status: In process Specimen:  Blood from Arm, Left Updated:  01/05/18 1126    Blood culture #1 [[id number]] Collected:  01/05/18 1107    Lab Status: In process Specimen:  Blood from Hand, Left Updated:  01/05/18 1117                 CTA ED chest PE study   Final Result by Bird Hughes MD (01/05 1225)         1  No pulmonary embolism  2   Dependent atelectasis  Workstation performed: [id number]         XR chest 1 view portable   Final Result by Jovanny Ortega DO (01/05 1217)   Mild congestive heart failure           Workstation performed: [id number]                    Procedures  ECG 12 Lead Documentation  Date/Time: 1/5/2018 10:55 AM  Performed by: Vaishnavi Garrett by: Marcela Puente     ECG reviewed by me, the ED Provider: yes    Patient location:  ED  Previous ECG:     Previous ECG:  Unavailable    Comparison to cardiac monitor: Yes    Interpretation:     Interpretation: non-specific    Rate:     ECG rate assessment: normal    Rhythm:     Rhythm: sinus rhythm    Ectopy:     Ectopy: none    QRS:     QRS axis:  Normal  Conduction:     Conduction: normal    ST segments:     ST segments:  Normal  T waves:     T waves: normal                 Phone Contacts  ED Phone Contact    ED Course  ED Course                                MDM  Number of Diagnoses or Management Options  CHF (congestive heart failure) ([facility] ):   Diagnosis management comments: AMI,NSTEMI, CHF, ANEMIA, PNEUMONIA, PNEUMOTHORAX, INFLUENZA, DYSPNEA,        Amount and/or Complexity of Data Reviewed  Clinical lab tests: ordered and reviewed  Tests in the radiology section of CPT®: ordered and reviewed  Tests in the medicine section of CPT®: reviewed and ordered    Patient Progress  Patient progress: stable    CritCare Time    Disposition  Final diagnoses:   CHF (congestive heart failure) ([facility] )     Time reflects when diagnosis was documented in both MDM as applicable and the Disposition within this note     Time User Action Codes Description Comment    1/5/2018 12:52 PM Lynda Arellano Add [I50 9] CHF (congestive heart failure) [facility])       ED Disposition     ED Disposition Condition Comment    Admit         Follow-up Information    None       Current Discharge Medication List      CONTINUE these medications which have NOT CHANGED    Details   albuterol (ACCUNEB) 0 63 MG/3ML nebulizer solution Take 1 ampule by nebulization every 6 (six) hours as needed for wheezing      ALPRAZolam (XANAX) 0 5 mg tablet Take 0 5 mg by mouth daily at bedtime as needed for anxiety  amLODIPine (NORVASC) 5 mg tablet Take 1 tablet by mouth daily for 30 days  Qty: 30 tablet, Refills: 0      clopidogrel (PLAVIX) 75 mg tablet Take 75 mg by mouth daily      esomeprazole (NexIUM) 40 MG capsule Take 40 mg by mouth daily        Fesoterodine Fumarate ER (TOVIAZ) 8 MG TB24 Take by mouth daily at bedtime        ipratropium-albuterol (DUO-NEB) 0 5-2 5 mg/mL Take 3 mL by nebulization every 6 (six) hours for 30 days  Qty: 360 mL, Refills: 0      metoprolol tartrate (LOPRESSOR) 25 mg tablet Take 25 mg by mouth 2 (two) times a day  potassium chloride (K-DUR) 10 mEq tablet Take 10 mEq by mouth daily  pregabalin (LYRICA) 300 MG capsule Take 300 mg by mouth 2 (two) times a day  aspirin (ECOTRIN LOW STRENGTH) 81 mg EC tablet Take 1 tablet by mouth daily for 30 days  Qty: 30 tablet, Refills: 0      atorvastatin (LIPITOR) 10 mg tablet Take 1 tablet by mouth daily with dinner for 30 days  Qty: 30 tablet, Refills: 0           No discharge procedures on file      ED Provider  Electronically Signed by           Sadie Merrill DO  01/05/18 6675

## 2018-01-05 NOTE — PLAN OF CARE
CARDIOVASCULAR - ADULT     Maintains optimal cardiac output and hemodynamic stability Progressing     Absence of cardiac dysrhythmias or at baseline rhythm Progressing        DISCHARGE PLANNING     Discharge to home or other facility with appropriate resources Progressing        INFECTION - ADULT     Absence or prevention of progression during hospitalization Progressing     Absence of fever/infection during neutropenic period Progressing        Knowledge Deficit     Patient/family/caregiver demonstrates understanding of disease process, treatment plan, medications, and discharge instructions Progressing        METABOLIC, FLUID AND ELECTROLYTES - ADULT     Electrolytes maintained within normal limits Progressing     Fluid balance maintained Progressing        PAIN - ADULT     Verbalizes/displays adequate comfort level or baseline comfort level Progressing        RESPIRATORY - ADULT     Achieves optimal ventilation and oxygenation Progressing        SAFETY ADULT     Patient will remain free of falls Progressing     Maintain or return to baseline ADL function Progressing     Maintain or return mobility status to optimal level Progressing

## 2018-01-06 ENCOUNTER — APPOINTMENT (INPATIENT)
Dept: NON INVASIVE DIAGNOSTICS | Facility: HOSPITAL | Age: 83
DRG: 291 | End: 2018-01-06
Payer: MEDICARE

## 2018-01-06 LAB
ANION GAP SERPL CALCULATED.3IONS-SCNC: 4 MMOL/L (ref 4–13)
BUN SERPL-MCNC: 26 MG/DL (ref 5–25)
CALCIUM SERPL-MCNC: 9.1 MG/DL (ref 8.3–10.1)
CHLORIDE SERPL-SCNC: 105 MMOL/L (ref 100–108)
CHOLEST SERPL-MCNC: 167 MG/DL (ref 50–200)
CO2 SERPL-SCNC: 34 MMOL/L (ref 21–32)
CREAT SERPL-MCNC: 1.19 MG/DL (ref 0.6–1.3)
ERYTHROCYTE [DISTWIDTH] IN BLOOD BY AUTOMATED COUNT: 17 % (ref 11.6–15.1)
EST. AVERAGE GLUCOSE BLD GHB EST-MCNC: 131 MG/DL
FLUAV AG SPEC QL: NORMAL
FLUBV AG SPEC QL: NORMAL
GFR SERPL CREATININE-BSD FRML MDRD: 43 ML/MIN/1.73SQ M
GLUCOSE SERPL-MCNC: 196 MG/DL (ref 65–140)
HBA1C MFR BLD: 6.2 % (ref 4.2–6.3)
HCT VFR BLD AUTO: 38.8 % (ref 37–47)
HDLC SERPL-MCNC: 53 MG/DL (ref 40–60)
HGB BLD-MCNC: 12.7 G/DL (ref 12–16)
LDLC SERPL CALC-MCNC: 97 MG/DL (ref 0–100)
MAGNESIUM SERPL-MCNC: 1.9 MG/DL (ref 1.6–2.6)
MCH RBC QN AUTO: 25.7 PG (ref 27–31)
MCHC RBC AUTO-ENTMCNC: 32.7 G/DL (ref 31.4–37.4)
MCV RBC AUTO: 79 FL (ref 82–98)
PHOSPHATE SERPL-MCNC: 3.2 MG/DL (ref 2.3–4.1)
PLATELET # BLD AUTO: 174 THOUSANDS/UL (ref 130–400)
PMV BLD AUTO: 9 FL (ref 8.9–12.7)
POTASSIUM SERPL-SCNC: 3.6 MMOL/L (ref 3.5–5.3)
RBC # BLD AUTO: 4.93 MILLION/UL (ref 4.2–5.4)
RSV B RNA SPEC QL NAA+PROBE: NORMAL
SODIUM SERPL-SCNC: 143 MMOL/L (ref 136–145)
TRIGL SERPL-MCNC: 84 MG/DL
WBC # BLD AUTO: 4.8 THOUSAND/UL (ref 4.8–10.8)

## 2018-01-06 PROCEDURE — 83036 HEMOGLOBIN GLYCOSYLATED A1C: CPT | Performed by: NURSE PRACTITIONER

## 2018-01-06 PROCEDURE — 80048 BASIC METABOLIC PNL TOTAL CA: CPT | Performed by: NURSE PRACTITIONER

## 2018-01-06 PROCEDURE — G8987 SELF CARE CURRENT STATUS: HCPCS

## 2018-01-06 PROCEDURE — 94760 N-INVAS EAR/PLS OXIMETRY 1: CPT

## 2018-01-06 PROCEDURE — 83735 ASSAY OF MAGNESIUM: CPT | Performed by: NURSE PRACTITIONER

## 2018-01-06 PROCEDURE — 97167 OT EVAL HIGH COMPLEX 60 MIN: CPT

## 2018-01-06 PROCEDURE — 85027 COMPLETE CBC AUTOMATED: CPT | Performed by: NURSE PRACTITIONER

## 2018-01-06 PROCEDURE — 84100 ASSAY OF PHOSPHORUS: CPT | Performed by: NURSE PRACTITIONER

## 2018-01-06 PROCEDURE — 80061 LIPID PANEL: CPT | Performed by: NURSE PRACTITIONER

## 2018-01-06 PROCEDURE — 94640 AIRWAY INHALATION TREATMENT: CPT

## 2018-01-06 PROCEDURE — 93306 TTE W/DOPPLER COMPLETE: CPT

## 2018-01-06 PROCEDURE — G8988 SELF CARE GOAL STATUS: HCPCS

## 2018-01-06 RX ORDER — MAGNESIUM SULFATE HEPTAHYDRATE 40 MG/ML
2 INJECTION, SOLUTION INTRAVENOUS ONCE
Status: COMPLETED | OUTPATIENT
Start: 2018-01-06 | End: 2018-01-08

## 2018-01-06 RX ORDER — FUROSEMIDE 10 MG/ML
40 INJECTION INTRAMUSCULAR; INTRAVENOUS ONCE
Status: COMPLETED | OUTPATIENT
Start: 2018-01-06 | End: 2018-01-06

## 2018-01-06 RX ADMIN — FLUTICASONE PROPIONATE AND SALMETEROL 1 PUFF: 50; 250 POWDER RESPIRATORY (INHALATION) at 21:48

## 2018-01-06 RX ADMIN — ASPIRIN 81 MG: 81 TABLET, COATED ORAL at 08:35

## 2018-01-06 RX ADMIN — IPRATROPIUM BROMIDE AND ALBUTEROL SULFATE 3 ML: .5; 3 SOLUTION RESPIRATORY (INHALATION) at 07:20

## 2018-01-06 RX ADMIN — PREGABALIN 300 MG: 100 CAPSULE ORAL at 21:40

## 2018-01-06 RX ADMIN — METOPROLOL TARTRATE 25 MG: 25 TABLET ORAL at 21:41

## 2018-01-06 RX ADMIN — POTASSIUM CHLORIDE 10 MEQ: 750 TABLET, EXTENDED RELEASE ORAL at 08:35

## 2018-01-06 RX ADMIN — Medication 300 UNITS: at 16:04

## 2018-01-06 RX ADMIN — ATORVASTATIN CALCIUM 10 MG: 10 TABLET, FILM COATED ORAL at 16:04

## 2018-01-06 RX ADMIN — CLOPIDOGREL BISULFATE 75 MG: 75 TABLET ORAL at 08:34

## 2018-01-06 RX ADMIN — FLUTICASONE PROPIONATE AND SALMETEROL 1 PUFF: 50; 250 POWDER RESPIRATORY (INHALATION) at 08:38

## 2018-01-06 RX ADMIN — DOCUSATE SODIUM 100 MG: 100 CAPSULE, LIQUID FILLED ORAL at 16:05

## 2018-01-06 RX ADMIN — FUROSEMIDE 40 MG: 10 INJECTION, SOLUTION INTRAMUSCULAR; INTRAVENOUS at 16:04

## 2018-01-06 RX ADMIN — IPRATROPIUM BROMIDE AND ALBUTEROL SULFATE 3 ML: .5; 3 SOLUTION RESPIRATORY (INHALATION) at 11:36

## 2018-01-06 RX ADMIN — IPRATROPIUM BROMIDE AND ALBUTEROL SULFATE 3 ML: .5; 3 SOLUTION RESPIRATORY (INHALATION) at 20:35

## 2018-01-06 RX ADMIN — TOLTERODINE TARTRATE 4 MG: 2 CAPSULE, EXTENDED RELEASE ORAL at 08:35

## 2018-01-06 RX ADMIN — METOPROLOL TARTRATE 25 MG: 25 TABLET ORAL at 08:35

## 2018-01-06 RX ADMIN — PREGABALIN 300 MG: 100 CAPSULE ORAL at 08:34

## 2018-01-06 RX ADMIN — AMLODIPINE BESYLATE 5 MG: 5 TABLET ORAL at 08:35

## 2018-01-06 RX ADMIN — ENOXAPARIN SODIUM 40 MG: 40 INJECTION SUBCUTANEOUS at 08:35

## 2018-01-06 RX ADMIN — IPRATROPIUM BROMIDE AND ALBUTEROL SULFATE 3 ML: .5; 3 SOLUTION RESPIRATORY (INHALATION) at 15:13

## 2018-01-06 RX ADMIN — ALPRAZOLAM 0.5 MG: 0.5 TABLET ORAL at 20:33

## 2018-01-06 RX ADMIN — MAGNESIUM SULFATE HEPTAHYDRATE 2 G: 40 INJECTION, SOLUTION INTRAVENOUS at 23:45

## 2018-01-06 RX ADMIN — PANTOPRAZOLE SODIUM 40 MG: 40 TABLET, DELAYED RELEASE ORAL at 06:30

## 2018-01-06 NOTE — PROGRESS NOTES
Report called to accepting 2[facility] nurse Mitesh Fischer  Informed that patient was admitted to ICU for tele overfllow  Pt to transfer on 3 l n/c with telemetry box [id number] applied

## 2018-01-06 NOTE — OCCUPATIONAL THERAPY NOTE
OT EVALUATION   01/06/18 1122   Pain Assessment   Pain Assessment No/denies pain   Pain Score No Pain   Diversional Activities Television   Home Living   Type of Home Apartment   Home Layout One level;Elevator;Performs ADLs on one level; Able to live on main level with bedroom/bathroom  ([address])   Bathroom Shower/Tub Tub/shower unit   Bathroom Toilet Standard   Bathroom Equipment Grab bars around [employer]; Other (Comment); Sock aid;Reacher  (rollator; does not use)   Additional Comments pt reports that she can drive but she does not have a car, relies on transportation or friends  Prior Function   Level of [place] Independent with ADLs and functional mobility   Lives With Alone   Receives Help From Friend(s)   ADL Assistance Independent   IADLs Independent   Falls in the last 6 months 0   Subjective   Subjective I feel so much better than when I came in  ADL   Where Assessed Chair   Eating Assistance 7  Independent   Grooming Assistance 5  Supervision/Setup   UB Dressing Assistance 4  Minimal Assistance   LB Dressing Assistance 2  Maximal Assistance   Bed Mobility   Rolling R Unable to assess  (pt OOB in B/S chair)   Supine to Sit Unable to assess  (pt OOB in B/S chair)   Transfers   Sit to Stand 4  Minimal assistance   Stand to Sit 4  Minimal assistance   Stand pivot 4  Minimal assistance   Functional Mobility   Functional Mobility 4  Minimal assistance   Additional Comments 10 ft   Additional items SPC   Balance   Static Sitting Fair +   Dynamic Sitting Fair   Static Standing Fair   Dynamic Standing Fair   Activity Tolerance   Activity Tolerance Patient tolerated treatment well   RUE Assessment   RUE Assessment (ROM: grossly WFL MMT: 4/5)   LUE Assessment   LUE Assessment (ROM: grossly WFL MMT: 4/5)   Cognition   Overall Cognitive Status WFL   Arousal/Participation Alert; Cooperative   Attention Within functional limits   Orientation Level Oriented X4   Following Commands Follows all commands and directions without difficulty   Assessment   Limitation Decreased ADL status; Decreased UE ROM; Decreased UE strength;Decreased endurance;Decreased self-care trans;Decreased high-level ADLs   Prognosis Good   Assessment Patient evaluated by Occupational Therapy  Patient admitted with Acute congestive heart failure ([facility] [address] )  The patients occupational profile, medical and therapy history includes a extensive additional review of physical, cognitive, or psychosocial history related to current functional performance  Comorbidities affecting functional mobility and ADLS include: afib, CHF, GERD and hyperlipidemia, L LE lymphedema, asthma, Merkel cell Cancer, fibromylagia and obesity  Prior to admission, patient was requiring assist for functional mobility with cane, independent with ADLS, independent with IADLS, living alone in single story home with zero steps to enter and ambulating household distance  The evaluation identifies the following performance deficits: weakness, decreased ROM, impaired balance, decreased endurance, increased fall risk, decreased ADLS, decreased activity tolerance and decreased strength, that result in activity limitations and/or participation restrictions  This evaluation requires clinical decision making of high complexity, because the patient presents with comorbidites that affect occupational performance and required significant modification of tasks or assistance with consideration of multiple treatment options  The Barthel Index was used as a functional outcome tool presenting with a score of 50, indicating marked limitations of functional mobility and ADLS  Patient will benefit from skilled Occupational Therapy services to address above deficits and facilitate a safe return to prior level of function     Goals   Patient Goals get better   STG Time Frame (1-7 days)   Short Term Goal #1 Patient will increase standing tolerance to 3 minutes during functional activity; Patient will increase bed mobility to supervision; Patient will increase functional mobility to and from bathroom with single point cane with min assist to increase performance with ADLS; Patient will tolerate 5 minutes of UE ROM/strengthening to increase general activity tolerance and performance in ADLS/IADLS; Patient will improve functional activity tolerance to 8 minutes of sustained functional tasks to increase participation in basic self-care and decrease assistance level  LTG Time Frame (8-14 days)   Long Term Goal #1 Patient will increase standing tolerance to 5 minutes during functional activity; Patient will increase bed mobility to independent; Patient will increase functional mobility to and from bathroom with single point cane with supervision to increase performance with ADLS; Patient will tolerate 8 minutes of UE ROM/strengthening to increase general activity tolerance and performance in ADLS/IADLS; Patient will improve functional activity tolerance to 12 minutes of sustained functional tasks to increase participation in basic self-care and decrease assistance level  Functional Transfer Goals   Pt Will Perform All Functional Transfers (STG: supervision LTG: independent)   ADL Goals   Pt Will Perform UE Dressing (STG: supervision LTG: independent)   Pt Will Perform LE Dressing (STG: MIN assist LTG: supervision; LE AE)   Plan   Treatment Interventions ADL retraining;Functional transfer training;UE strengthening/ROM; Endurance training;Patient/family training;Equipment evaluation/education; Activityengagement; Energy conservation   OT Frequency 2-3x/wk   Recommendation   OT Discharge Recommendation (STR )   Barthel Index   Feeding 10   Bathing 0   Grooming Score 0   Dressing Score 5   Bladder Score 10   Bowels Score 10   Toilet Use Score 5   Transfers (Bed/Chair) Score 10   Mobility (Level Surface) Score 0   Stairs Score 0   Barthel Index Score 50

## 2018-01-06 NOTE — PROGRESS NOTES
[facility] Progress Note  Patient: Inocencia Salinas 81 y o  female   MRN: [id number]  PCP: Reyna Edward DO  Unit/Bed#: [address] [place] [address] Encounter: [id number]  Date Of Visit: 01/06/18    Assessment:  Principal Problem:    Acute congestive heart failure ([facility] [address] )  Active Problems:    Hypertension    Morbid obesity with BMI of 50 0-59 9, adult (HCC)    GERD (gastroesophageal reflux disease)    Hyperlipemia    Atrial fibrillation (HCC)    Fibromyalgia    Lymphedema of left leg    History of Merkel cell carcinoma    Moderate persistent asthma    Plan:  · Acute on chronic diastolic heart failure:  Chest x-ray revealed mild congestive heart failure  BNP 1170  Troponin levels negative x3  Repeat echo revealed EF 70% with grade 1 diastolic dysfunction  Cardiology following, appreciate input  I&Os inaccurate due to urinary incontinence  Closely monitor daily weights  Per Cardiology, given additional dose of IV Lasix today and monitor results  Continue cardiac low sodium, fluid-restricted diet  · Dyspnea:  Likely due to acute on chronic diastolic heart failure  Improved with IV Lasix  Troponins negative x3  Continue supplemental oxygen at 3 liters/minute  Patient does not have supplemental oxygen at home, therefore, will need a home O2 eval prior to discharge  · Moderate persistent asthma:  No evidence of acute exacerbation  Continue Advair 1 puff BID, nebulizer treatments as needed and supplemental oxygen  · Hypertension:  Continue Norvasc and metoprolol  · Hyperlipidemia:  Lipid panel reviewed, LDL 97  Continue statin  · Atrial fibrillation:  Rate controlled with metoprolol  Patient refused anticoagulation  Continue aspirin and Plavix  · Left lower extremity chronic lymphedema:  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  Continue Venodyne compressive pumps as tolerated    · Left carotid stenosis:  Patient has refused carotid endarterectomy in the past   Continue on aspirin and Plavix  · History of Merkel cell carcinoma:  Seen by him on in 2017  No evidence of recurrence  Continue outpatient follow-up  VTE Pharmacologic Prophylaxis:   Pharmacologic: Enoxaparin (Lovenox)  Mechanical: Mechanical VTE prophylaxis in place  Patient Centered Rounds: I have performed bedside rounds with nursing staff today  Discussions with Specialists or Other Care Team Provider:  Nursing, case management, cardiology  Education and Discussions with Family / Patient:  I have answered all questions to the best of my ability  Time Spent for Care: 20 minutes  More than 50% of total time spent on counseling and coordination of care as described above  Current Length of Stay: 1 day(s)  Current Patient Status: Inpatient   Certification Statement: The patient will continue to require additional inpatient hospital stay due to Acute on chronic diastolic heart failure    Discharge Plan:  Patient is not medically stable for discharge today, likely in 24-48 hours pending progress  Code Status: Level 1 - Full Code    Subjective:   Overall, patient feels like she is improving after medications from yesterday  Reports mild shortness of breath with exertion  Requiring continuous supplemental oxygen  States she does not have oxygen at home  States left lower extremity is chronically swollen due to lymphedema and would like to have a venodyne pump applied  Requesting her right sided anterior chest wall Port-A-Cath be accessed after she had an IV infiltrate earlier today  Denies headache, lightheadedness, chest pain, abdominal pain, nausea, vomiting, diarrhea  Objective:   Vitals:   Temp (24hrs), Av °F (36 1 °C), Min:95 8 °F (35 4 °C), Max:97 7 °F (36 5 °C)    HR:  [74-97] 74  Resp:  [16-25] 20  BP: (118-184)/(58-78) 118/58  SpO2:  [92 %-95 %] 92 %  Body mass index is 34 48 kg/m²  Input and Output Summary (last 24 hours):        Intake/Output Summary (Last 24 hours) at 18 8085  Last data filed at 01/06/18 0801   Gross per 24 hour   Intake              840 ml   Output                0 ml   Net              840 ml       Physical Exam:     Physical Exam   Constitutional: She is oriented to person, place, and time  She appears well-developed  No distress  Obese pleasant female resting in bed on supplemental oxygen at 3 liters/minute   HENT:   Head: Normocephalic  Neck: Normal range of motion  Cardiovascular: Normal rate and regular rhythm  Pulmonary/Chest: Effort normal  No respiratory distress  She has decreased breath sounds in the right lower field and the left lower field  She has no wheezes  She has no rhonchi  She has no rales  Abdominal: Soft  Bowel sounds are normal  She exhibits no distension  There is no tenderness  Musculoskeletal: She exhibits edema (+3 LLE, trace RLE)  She exhibits no tenderness  Neurological: She is alert and oriented to person, place, and time  Skin: Skin is warm and dry  No rash noted  She is not diaphoretic  There is erythema (Left lower extremity from shin down)  Right-sided anterior chest wall Port-A-Cath, not accessed   Psychiatric: She has a normal mood and affect  Judgment normal    Nursing note and vitals reviewed  Additional Data:   Labs:    Results from last 7 days  Lab Units 01/06/18  0619  01/05/18  1108   WBC Thousand/uL 4 80  --  4 70*   HEMOGLOBIN g/dL 12 7  --  12 9   HEMATOCRIT % 38 8  --  39 6   PLATELETS Thousands/uL 174  < > 160   NEUTROS PCT %  --   --  65   LYMPHS PCT %  --   --  14   MONOS PCT %  --   --  9   EOS PCT %  --   --  12*   < > = values in this interval not displayed      Results from last 7 days  Lab Units 01/06/18  0619 01/05/18  1108   SODIUM mmol/L 143 142   POTASSIUM mmol/L 3 6 3 9   CHLORIDE mmol/L 105 105   CO2 mmol/L 34* 30   BUN mg/dL 26* 22   CREATININE mg/dL 1 19 1 01   CALCIUM mg/dL 9 1 9 2   TOTAL PROTEIN g/dL  --  6 7   BILIRUBIN TOTAL mg/dL  --  0 40   ALK PHOS U/L  --  97   ALT U/L  --  21   AST U/L  --  20   GLUCOSE RANDOM mg/dL 196* 105       Results from last 7 days  Lab Units 01/05/18  1121   INR  1 02       * I Have Reviewed All Lab Data Listed Above  * Additional Pertinent Lab Tests Reviewed: All Labs Within Last 24 Hours Reviewed    Imaging:    Imaging Reports Reviewed Today Include: Procedure: Xr Chest 1 View Portable    Result Date: 1/5/2018  Narrative: CHEST INDICATION:  Shortness of breath  Asthma  COMPARISON:  Chest radiographs December 11, 2017 VIEWS:   AP frontal IMAGES:  1 FINDINGS:     The heart is enlarged  Atherosclerotic changes in the aorta  Right internal jugular [id number] with tip at cavoatrial junction  Lung volumes diminished  Small bilateral pleural effusions  Elevation of left hemidiaphragm  Nita and pulmonary vessels diffusely enlarged  Visualized osseous structures appear within normal limits for the patient's age  Impression: Mild congestive heart failure  Workstation performed: [id number]     Procedure: Cta Ed Chest Pe Study    Result Date: 1/5/2018  Narrative: CTA - CHEST WITH IV CONTRAST - PULMONARY ANGIOGRAM INDICATION: Shortness of breath  COMPARISON: 10/02/2017 TECHNIQUE: CTA examination of the chest was performed using angiographic technique according to a protocol specifically tailored to evaluate for pulmonary embolism  Reformatted images were created in axial, sagittal, and coronal planes  In addition, coronal 3D MIP postprocessing was performed on the acquisition scanner  Radiation dose length product (DLP) for this visit:  748 06 mGy-cm   This examination, like all CT scans performed in the [facility], was performed utilizing techniques to minimize radiation dose exposure, including the use of iterative  reconstruction and automated exposure control  IV Contrast:  100 mL of iohexol (OMNIPAQUE)          FINDINGS: PULMONARY ARTERIAL TREE:  No pulmonary embolus is seen   LUNGS:  Dependent atelectasis in the posterior aspects of both upper lobes and in both lower lobes  PLEURA:  Unremarkable  HEART/AORTA:  Unremarkable for patient's age  MEDIASTINUM AND JASWINDER:  Unremarkable  CHEST WALL AND LOWER NECK:       Normal  VISUALIZED STRUCTURES IN THE UPPER ABDOMEN:  Right renal cyst  OSSEOUS STRUCTURES:  No acute fracture or destructive osseous lesion  Degenerative changes in the shoulders  Impression: 1  No pulmonary embolism  2   Dependent atelectasis  Workstation performed: [id number]       Cultures:   Blood Culture:   Lab Results   Component Value Date    BLOODCX No Growth at 24 hrs  01/05/2018    BLOODCX No Growth at 24 hrs  01/05/2018    BLOODCX No Growth After 5 Days  12/13/2017    BLOODCX No Growth After 5 Days  12/13/2017    BLOODCX Staphylococcus epidermidis (A) 12/11/2017    BLOODCX Staphylococcus epidermidis (A) 12/11/2017    BLOODCX No Growth After 5 Days  09/22/2016     Urine Culture:   Lab Results   Component Value Date    URINECX >100,000 cfu/ml Mixed Contaminants X5 09/21/2016     Sputum Culture: No components found for: SPUTUMCX  Wound Culture: No results found for: WOUNDCULT    Last 24 Hours Medication List:     amLODIPine 5 mg Oral Daily   aspirin 81 mg Oral Daily   atorvastatin 10 mg Oral Daily With Dinner   clopidogrel 75 mg Oral Daily   docusate sodium 100 mg Oral BID   enoxaparin 40 mg Subcutaneous Daily   fluticasone-salmeterol 1 puff Inhalation Q12H CORBIN   furosemide 40 mg Intravenous Once   ipratropium-albuterol 3 mL Nebulization 4x Daily   metoprolol tartrate 25 mg Oral BID   pantoprazole 40 mg Oral Early Morning   polyethylene glycol 17 g Oral Daily   potassium chloride 10 mEq Oral Daily   pregabalin 300 mg Oral BID   tolterodine 4 mg Oral Daily        Today, Patient Was Seen By: GILDA Lagunas    ** Please Note: Dragon 360 Dictation voice to text software may have been used in the creation of this document   **

## 2018-01-06 NOTE — CONSULTS
Consultation - Cardiology   Jb Orta 81 y o  female MRN: [id number]  Unit/Bed#: ICU 07 Encounter: [id number]  01/06/18  10:52 AM          Physician Requesting Consult: Myriam Rice DO  Reason for Consult / Principal Problem: CHF      Assessment:  1  Dyspnea - possible CHF with diastolic dysfunction  Chest Xray showed mild CHF  EKG was normal and troponin has been negative  - will review echocardiogram    Received IV Lasix yesterday  Will give additional dose now  Monitor daily weights and I+O's    2  Hypertension - amlodipine  3  Paroxysmal atrial fibrillation - currently in sinus rhythm  Continue ASA  4  ? 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  Was recently admitted for pneumonia  Also had a previous hospitalization in 11/2011 for septic shock/respiratory failure requiring mechanical ventilation  She has a history of paroxysmal atrial fibrillation but is not on anticouagulation  No known history of CAD or CHF  Echocardiogram in 9/2016 showed normal EF with grade 1 diastolic dysfunction  Stress test in 2016 showed no ischemia or infarction  CT chest done in ER because of elevated d-dimer showed no pulmonary embolism  Chest Xray showed mild CHF  EKG was normal and troponin has been negative  Review of Systems:    Review of Systems   Constitutional: Positive for diaphoresis  Negative for chills and fever     Respiratory: Positive for cough and shortness of breath  Cardiovascular: Positive for leg swelling  Negative for chest pain and palpitations  Musculoskeletal: Positive for gait problem  Skin: Negative for rash  All other systems reviewed and are negative  Historical Information   Past Medical History:   Diagnosis Date    Asthma     Cancer ([address] )     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    Cardiac disease     a fib    Fibromyalgia     Fibromyalgia, primary     GERD (gastroesophageal reflux disease)     Hypertension     Hypokalemia     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, left groin, lung and bladder  Patient treated with chemotherapy and radiation     Past Surgical History:   Procedure Laterality Date    APPENDECTOMY      CATARACT EXTRACTION      CHEST WALL BIOPSY N/A 10/27/2017    Procedure: SINUS EXCISION AND REMOVAL OF FOREIGN BODY, ABDOMEN (WOUND EXPLORATION);   Surgeon: Darin Germain MD;  Location: [address] [place] [address];  Service: General    EYE SURGERY Bilateral     cataracts     HIP FRACTURE SURGERY Left     HYSTERECTOMY      JOINT REPLACEMENT Left     hip    LYMPHADENECTOMY      PORTACATH PLACEMENT Right      History   Alcohol Use No     History   Drug Use No     History   Smoking Status    Never Smoker   Smokeless Tobacco    Never Used     Comment: never smoke       Family History:   Family History   Problem Relation Age of Onset    Pneumonia Mother     Heart disease Father        Meds/Allergies   current meds:   Current Facility-Administered Medications   Medication Dose Route Frequency    acetaminophen (TYLENOL) tablet 650 mg  650 mg Oral Q6H PRN    ALPRAZolam (XANAX) tablet 0 5 mg  0 5 mg Oral HS PRN    amLODIPine (NORVASC) tablet 5 mg  5 mg Oral Daily    aspirin (ECOTRIN LOW STRENGTH) EC tablet 81 mg  81 mg Oral Daily    atorvastatin (LIPITOR) tablet 10 mg  10 mg Oral Daily With Dinner    clopidogrel (PLAVIX) tablet 75 mg  75 mg Oral Daily    docusate sodium (COLACE) capsule 100 mg  100 mg Oral BID    enoxaparin (LOVENOX) subcutaneous injection 40 mg  40 mg Subcutaneous Daily    fluticasone-salmeterol (ADVAIR) 250-50 mcg/dose inhaler 1 puff  1 puff Inhalation Q12H [address]    ipratropium-albuterol (DUO-NEB) 0 5-2 5 mg/mL inhalation solution 3 mL  3 mL Nebulization 4x Daily    levalbuterol (XOPENEX) inhalation solution 0 63 mg  0 63 mg Nebulization Q6H PRN    metoprolol tartrate (LOPRESSOR) tablet 25 mg  25 mg Oral BID    pantoprazole (PROTONIX) EC tablet 40 mg  40 mg Oral Early Morning    polyethylene glycol (MIRALAX) packet 17 g  17 g Oral Daily    potassium chloride (K-DUR,KLOR-CON) CR tablet 10 mEq  10 mEq Oral Daily    pregabalin (LYRICA) capsule 300 mg  300 mg Oral BID    tolterodine (DETROL LA) 24 hr capsule 4 mg  4 mg Oral Daily     Allergies   Allergen Reactions    Fentanyl And Related Other (See Comments)     Passed out    Latex     Penicillins        Objective   Vitals: Blood pressure 134/59, pulse 74, temperature (!) 97 1 °F (36 2 °C), temperature source Temporal, resp  rate 20, height 5' 2" (1 575 m), weight 85 5 kg (188 lb 7 9 oz), SpO2 92 %, not currently breastfeeding , Body mass index is 34 48 kg/m²  Physical Exam   Constitutional: She appears healthy  No distress  HENT:   Nose: Nose normal    Mouth/Throat: Oropharynx is clear  Eyes: Conjunctivae are normal  Pupils are equal, round, and reactive to light  Neck: Neck supple  Cardiovascular: Normal rate and regular rhythm  Murmur heard  Systolic murmur is present with a grade of 2/6  at the upper left sternal border  Pulmonary/Chest: She has bibasilar rales and scattered wheezes  Abdominal: Soft  She exhibits no distension  There is no tenderness  Musculoskeletal: She exhibits edema (left leg non-pitting)  Neurological: She is alert and oriented to person, place, and time  Skin: Skin is warm and dry  No rash noted         Lab Results:     Troponins:   Results from last 7 days  Lab Units 18  1735 18  1108   TROPONIN I ng/mL <0 02 <0 02       CBC with diff:   Results from last 7 days  Lab Units 18  0619 18  1735 18  1108   WBC Thousand/uL 4 80  --  4 70*   HEMOGLOBIN g/dL 12 7  --  12 9   HEMATOCRIT % 38 8  --  39 6   MCV fL 79*  --  79*   PLATELETS Thousands/uL 174 152 160   MCH pg 25 7*  --  25 8*   MCHC g/dL 32 7  --  32 7   RDW % 17 0*  --  17 1*   MPV fL 9 0 9 4 9 2   NRBC AUTO /100 WBCs  --   --  0       CMP:   Results from last 7 days  Lab Units 18  0619 18  1108   SODIUM mmol/L 143 142   POTASSIUM mmol/L 3 6 3 9   CHLORIDE mmol/L 105 105   CO2 mmol/L 34* 30   ANION GAP mmol/L 4 7   BUN mg/dL 26* 22   CREATININE mg/dL 1 19 1 01   GLUCOSE RANDOM mg/dL 196* 105   CALCIUM mg/dL 9 1 9 2   AST U/L  --  20   ALT U/L  --  21   ALK PHOS U/L  --  97   TOTAL PROTEIN g/dL  --  6 7   ALBUMIN g/dL  --  2 7*   BILIRUBIN TOTAL mg/dL  --  0 40   EGFR ml/min/1 73sq m 43 52       Magnesium:   Results from last 7 days  Lab Units 18  0619 18  1108   MAGNESIUM mg/dL 1 9 1 9       Coags:   Results from last 7 days  Lab Units 18  1121   PTT seconds 25   INR  1 02       Lipid Profile:   Results from last 7 days  Lab Units 18  0619   CHOLESTEROL mg/dL 167   TRIGLYCERIDES mg/dL 84   HDL mg/dL 53   LDL CALC mg/dL 97         Cardiac testing:   Results for orders placed during the hospital encounter of 16   Echo complete with contrast if indicated    [address]  [address] [facility]  [phone]    Transthoracic Echocardiogram  2D, M-mode, Doppler, and Color Doppler    Study date:  22-Sep-2016    Patient: Jose Callejas  MR number: [id number]  Account number: [de-identified]  : 1935  Age: [de-identified] years  Gender: Female  Status: Routine  Location: Bedside  Height: 62 in  Weight: 196 7 lb  BP: 151/ 67 mmHg    Indications: Syncope    Diagnoses: R55  - Syncope and [facility]    Sonographer:  RAISA Becker  Primary Physician:  Roberto Novoa DO  Group:  [facility]  Interpreting Physician:  Juan J Price MD    SUMMARY    SUMMARY:  intact interatrial septum    LEFT VENTRICLE:  Systolic function was normal  Ejection fraction was estimated in the range of  60 % to 70 %  There were no regional wall motion abnormalities  There was mild concentric hypertrophy  Doppler parameters were consistent with abnormal left ventricular relaxation  (grade 1 diastolic dysfunction)  ATRIAL SEPTUM:  intact    MITRAL VALVE:  There was mild annular calcification  HISTORY: PRIOR HISTORY: Cancer, Asthma, Fibromyalgia, Cardiac disease, GERD,  Hypokalemia,    PROCEDURE: The procedure was performed at the bedside  This was a routine  study  The transthoracic approach was used  The study included complete 2D  imaging, M-mode, complete spectral Doppler, and color Doppler  The heart rate  was 70 bpm, at the start of the study  Images were obtained from the  parasternal, apical, subcostal, and suprasternal notch acoustic windows  Echocardiographic views were limited due to restricted patient mobility, poor  patient compliance, poor acoustic window availability, and decreased  penetration  This was a technically difficult study  LEFT VENTRICLE: Size was normal  Systolic function was normal  Ejection  fraction was estimated in the range of 60 % to 70 %  There were no regional  wall motion abnormalities  There was mild concentric hypertrophy  DOPPLER:  Doppler parameters were consistent with abnormal left ventricular relaxation  (grade 1 diastolic dysfunction)  RIGHT VENTRICLE: The size was normal  Systolic function was normal  Wall  thickness was normal     LEFT ATRIUM: Size was at the upper limits of normal     ATRIAL SEPTUM: intact    RIGHT ATRIUM: Size was normal     MITRAL VALVE: There was mild annular calcification  There was normal leaflet  separation   DOPPLER: There was no significant regurgitation  AORTIC VALVE: The valve was trileaflet  Leaflets exhibited mildly increased  thickness  DOPPLER: There was no evidence for stenosis  There was no  regurgitation  TRICUSPID VALVE: The valve structure was normal  There was normal leaflet  separation  DOPPLER: The transtricuspid velocity was within the normal range  There was no evidence for stenosis  There was no regurgitation  PULMONIC VALVE: Leaflets exhibited normal thickness, no calcification, and  normal cuspal separation  DOPPLER: The transpulmonic velocity was within the  normal range  There was no regurgitation  PERICARDIUM: There was no pericardial effusion  The pericardium was normal in  appearance  AORTA: The root exhibited normal size  SYSTEM MEASUREMENT TABLES    2D mode  AoR Diam 2D: 2 8 cm  LA Diam (2D): 4 cm  LA/Ao (2D): 1 43  FS (2D Teich): 25 %  IVSd (2D): 1 29 cm  LVDEV: 73 4 cm³  LVESV: 36 7 cm³  LVIDd(2D): 4 08 cm  LVISd (2D): 3 06 cm  LVOT Area 2D: 3 46 cm squared  LVPWd (2D): 1 16 cm  SV (Teich): 36 7 cm³    Apical four chamber  LVEF A4C: 58 %    Unspecified Scan Mode  ANNIE Cont Eq (Peak Pacheco): 2 93 cm squared  LVOT Diam : 2 1 cm  LVOT Vmax: 1210 mm/s  LVOT Vmax; Mean: 1210 mm/s  Peak Grad ; Mean: 6 mm[Hg]  MV Peak A Pacheco: 1100 mm/s  MV Peak E Pacheco   Mean: 809 mm/s  MVA (PHT): 3 44 cm squared  PHT: 64 ms  Max P mm[Hg]  V Max: 2430 mm/s  Vmax: 2250 mm/s  RA Area: 18 cm squared  RA Volume: 47 4 cm³  TAPSE: 2 1 cm    [employer] Accredited Echocardiography Laboratory    Prepared and electronically signed by    Marily Franco MD  Signed 23-Sep-2016 11:54:19       Results for orders placed during the hospital encounter of 16   NM myocardial perfusion spect (rx stress and/or rest)    [facility] [address]  [address] [facility] [phone]    Rest/Stress Gated SPECT Myocardial Perfusion Imaging After Regadenoson    Patient: Laquita Gutierrez SAVANNAH  MR number: [id number]  Account number: [de-identified]  : 1935  Age: 79 years  Gender: Female  Status: Outpatient  Location: Stress lab  Height: 65 in  Weight: 195 lb  BP: 160/ 90 mmHg    Allergies: FENTANYL AND RELATED, LATEX, PENICILLINS    Diagnosis: R07 9 - Chest pain, unspecified    Primary Physician:  Sonam Simmons DO  RN:  Luke Hou RN  Group:  [facility]  Report Prepared By[de-identified]  Luke Hou RN  Interpreting Physician:  Yanira Jain MD    INDICATIONS: Detection of coronary artery disease  HISTORY: The patient is a 79year old  female  Coronary artery disease  risk factors: family history of premature coronary artery disease  Co-morbidity: history of lung disease- asthma Medications: a beta blocker  PHYSICAL EXAM: Baseline physical exam screening: no wheezes audible  REST ECG: Normal sinus rhythm at 63 bpm  LAD noted  No ST-T changes present  The ECG showed 1° AV block  PROCEDURE: The study was performed in the stress lab  A regadenoson infusion  pharmacologic stress test was performed  Gated SPECT myocardial perfusion  imaging was performed after stress and at rest  Systolic blood pressure was 160  mmHg, at the start of the study  Diastolic blood pressure was 90 mmHg, at the  start of the study  The heart rate was 67 bpm, at the start of the study  IV  double checked  Regadenoson protocol:  Time HR bpm SBP mmHg DBP mmHg Symptoms ST change Rhythm/conduct  Baseline 09:38 63 160 90 none -- --  Immediate 09:52 78 130 70 [place] none --  1 min 09:53 79 164 80 subsiding none same as above  2 min 09:54 70 170 90 none -- --  3 min 09:55 71 166 90 none -- same as above  IV aminophylline ( 150 mg) was administered at the 2nd minute of  STRESS SUMMARY: Duration of pharmacologic stress was 3 min  Maximal heart rate  during stress was 79 bpm  The rate-pressure product for the peak heart rate and  blood pressure was 77990   There was no chest pain during stress  The stress  test was terminated due to protocol completion  Pre oxygen saturation: 88 %  Peak oxygen saturation: 93 %  The stress ECG was negative for ischemia and  normal  There were no stress arrhythmias or conduction abnormalities  ISOTOPE ADMINISTRATION:  Resting isotope administration Stress isotope administration  Agent Tetrofosmin Tetrofosmin  Dose 10 8 mCi 30 6 mCi  Date 04/27/2016 04/27/2016  Injection time 08:15 10:00  Imaging time 08:55 12:05  Injection-image interval 40 min 125 min  Time from 1st injection -- 3 5 hours    The radiopharmaceutical was injected at the peak effect of pharmacologic  stress  MYOCARDIAL PERFUSION IMAGING:  The image quality was fair  The patient kept both arms on the side during image  acquisition  Left ventricular size was normal     PERFUSION DEFECTS:  -  There was a moderate-sized, mild intensity, fixed myocardial perfusion  defect of the inferior and anterolateral wall likely due to diaphragm  attenuation  GATED SPECT:  The calculated left ventricular ejection fraction was 75 %  LVED was 62 mL  LVES was 16 mL  Left ventricular ejection fraction was within normal limits by  visual estimate  There was no left ventricular regional abnormality  SUMMARY:  -  Stress results: There was no chest pain during stress  -  ECG conclusions: The stress ECG was negative for ischemia and normal   -  Perfusion imaging: The image quality was fair  The patient kept both arms on  the side during image acquisition  Left ventricular size was normal  There was  a moderate-sized, mild intensity, fixed myocardial perfusion defect of the  inferior and anterolateral wall likely due to diaphragm attenuation  -  Gated  SPECT: The calculated left ventricular ejection fraction was 75 %  LVED was 62  mL  LVES was 16 mL  Left ventricular ejection fraction was within normal limits  by visual estimate  There was no left ventricular regional abnormality      IMPRESSIONS: Normal study  There was image artifact, without diagnostic  evidence for perfusion abnormality  Left ventricular systolic function was  normal     Prepared and signed by    Uyen Chamberlain MD  Signed 04/27/2016 13:59:19         EKG: Personally reviewed: NSR with PAC's and Q waves inferiorly      Imaging: I have personally reviewed pertinent films in PACS

## 2018-01-07 PROBLEM — D50.9 IRON DEFICIENCY ANEMIA: Status: ACTIVE | Noted: 2018-01-07

## 2018-01-07 LAB
ANION GAP SERPL CALCULATED.3IONS-SCNC: 0 MMOL/L (ref 4–13)
BUN SERPL-MCNC: 28 MG/DL (ref 5–25)
CALCIUM SERPL-MCNC: 8.7 MG/DL (ref 8.3–10.1)
CHLORIDE SERPL-SCNC: 103 MMOL/L (ref 100–108)
CO2 SERPL-SCNC: 38 MMOL/L (ref 21–32)
CREAT SERPL-MCNC: 1 MG/DL (ref 0.6–1.3)
ERYTHROCYTE [DISTWIDTH] IN BLOOD BY AUTOMATED COUNT: 17.3 % (ref 11.6–15.1)
FERRITIN SERPL-MCNC: 36 NG/ML (ref 8–388)
GFR SERPL CREATININE-BSD FRML MDRD: 53 ML/MIN/1.73SQ M
GLUCOSE SERPL-MCNC: 125 MG/DL (ref 65–140)
HCT VFR BLD AUTO: 35.4 % (ref 37–47)
HGB BLD-MCNC: 11.3 G/DL (ref 12–16)
IRON SATN MFR SERPL: 7 %
IRON SERPL-MCNC: 19 UG/DL (ref 50–170)
MAGNESIUM SERPL-MCNC: 2.6 MG/DL (ref 1.6–2.6)
MCH RBC QN AUTO: 25.3 PG (ref 27–31)
MCHC RBC AUTO-ENTMCNC: 32 G/DL (ref 31.4–37.4)
MCV RBC AUTO: 79 FL (ref 82–98)
MRSA NOSE QL CULT: NORMAL
PLATELET # BLD AUTO: 170 THOUSANDS/UL (ref 130–400)
PMV BLD AUTO: 9.3 FL (ref 8.9–12.7)
POTASSIUM SERPL-SCNC: 3.4 MMOL/L (ref 3.5–5.3)
RBC # BLD AUTO: 4.47 MILLION/UL (ref 4.2–5.4)
SODIUM SERPL-SCNC: 141 MMOL/L (ref 136–145)
T4 FREE SERPL-MCNC: 0.85 NG/DL (ref 0.76–1.46)
TIBC SERPL-MCNC: 264 UG/DL (ref 250–450)
TSH SERPL DL<=0.05 MIU/L-ACNC: 0.39 UIU/ML (ref 0.36–3.74)
WBC # BLD AUTO: 7 THOUSAND/UL (ref 4.8–10.8)

## 2018-01-07 PROCEDURE — G8979 MOBILITY GOAL STATUS: HCPCS

## 2018-01-07 PROCEDURE — 84443 ASSAY THYROID STIM HORMONE: CPT | Performed by: NURSE PRACTITIONER

## 2018-01-07 PROCEDURE — 93005 ELECTROCARDIOGRAM TRACING: CPT

## 2018-01-07 PROCEDURE — 85027 COMPLETE CBC AUTOMATED: CPT | Performed by: NURSE PRACTITIONER

## 2018-01-07 PROCEDURE — 94640 AIRWAY INHALATION TREATMENT: CPT

## 2018-01-07 PROCEDURE — 83735 ASSAY OF MAGNESIUM: CPT | Performed by: STUDENT IN AN ORGANIZED HEALTH CARE EDUCATION/TRAINING PROGRAM

## 2018-01-07 PROCEDURE — 80048 BASIC METABOLIC PNL TOTAL CA: CPT | Performed by: NURSE PRACTITIONER

## 2018-01-07 PROCEDURE — 84439 ASSAY OF FREE THYROXINE: CPT | Performed by: NURSE PRACTITIONER

## 2018-01-07 PROCEDURE — G8978 MOBILITY CURRENT STATUS: HCPCS

## 2018-01-07 PROCEDURE — 83550 IRON BINDING TEST: CPT | Performed by: NURSE PRACTITIONER

## 2018-01-07 PROCEDURE — 94760 N-INVAS EAR/PLS OXIMETRY 1: CPT

## 2018-01-07 PROCEDURE — 82728 ASSAY OF FERRITIN: CPT | Performed by: NURSE PRACTITIONER

## 2018-01-07 PROCEDURE — 83540 ASSAY OF IRON: CPT | Performed by: NURSE PRACTITIONER

## 2018-01-07 PROCEDURE — 97162 PT EVAL MOD COMPLEX 30 MIN: CPT

## 2018-01-07 RX ORDER — METOPROLOL TARTRATE 50 MG/1
50 TABLET, FILM COATED ORAL 2 TIMES DAILY
Status: DISCONTINUED | OUTPATIENT
Start: 2018-01-07 | End: 2018-01-10 | Stop reason: HOSPADM

## 2018-01-07 RX ORDER — ASCORBIC ACID 500 MG
500 TABLET ORAL DAILY
Status: DISCONTINUED | OUTPATIENT
Start: 2018-01-08 | End: 2018-01-10 | Stop reason: HOSPADM

## 2018-01-07 RX ORDER — LEVALBUTEROL 1.25 MG/.5ML
1.25 SOLUTION, CONCENTRATE RESPIRATORY (INHALATION)
Status: DISCONTINUED | OUTPATIENT
Start: 2018-01-07 | End: 2018-01-10 | Stop reason: HOSPADM

## 2018-01-07 RX ORDER — FERROUS SULFATE 325(65) MG
325 TABLET ORAL 2 TIMES DAILY WITH MEALS
Status: DISCONTINUED | OUTPATIENT
Start: 2018-01-07 | End: 2018-01-10 | Stop reason: HOSPADM

## 2018-01-07 RX ORDER — FUROSEMIDE 20 MG/1
20 TABLET ORAL DAILY
Status: DISCONTINUED | OUTPATIENT
Start: 2018-01-07 | End: 2018-01-10 | Stop reason: HOSPADM

## 2018-01-07 RX ORDER — POTASSIUM CHLORIDE 20 MEQ/1
40 TABLET, EXTENDED RELEASE ORAL EVERY 4 HOURS
Status: COMPLETED | OUTPATIENT
Start: 2018-01-07 | End: 2018-01-07

## 2018-01-07 RX ORDER — FOLIC ACID 1 MG/1
1 TABLET ORAL DAILY
Status: DISCONTINUED | OUTPATIENT
Start: 2018-01-08 | End: 2018-01-10 | Stop reason: HOSPADM

## 2018-01-07 RX ORDER — METOPROLOL TARTRATE 5 MG/5ML
5 INJECTION INTRAVENOUS ONCE
Status: COMPLETED | OUTPATIENT
Start: 2018-01-07 | End: 2018-01-07

## 2018-01-07 RX ORDER — POLYETHYLENE GLYCOL 3350 17 G/17G
17 POWDER, FOR SOLUTION ORAL DAILY PRN
Status: DISCONTINUED | OUTPATIENT
Start: 2018-01-07 | End: 2018-01-07

## 2018-01-07 RX ADMIN — METOROPROLOL TARTRATE 5 MG: 5 INJECTION, SOLUTION INTRAVENOUS at 16:53

## 2018-01-07 RX ADMIN — IPRATROPIUM BROMIDE 0.5 MG: 0.5 SOLUTION RESPIRATORY (INHALATION) at 21:27

## 2018-01-07 RX ADMIN — LEVALBUTEROL 1.25 MG: 1.25 SOLUTION, CONCENTRATE RESPIRATORY (INHALATION) at 14:20

## 2018-01-07 RX ADMIN — IPRATROPIUM BROMIDE AND ALBUTEROL SULFATE 3 ML: .5; 3 SOLUTION RESPIRATORY (INHALATION) at 11:24

## 2018-01-07 RX ADMIN — PANTOPRAZOLE SODIUM 40 MG: 40 TABLET, DELAYED RELEASE ORAL at 05:32

## 2018-01-07 RX ADMIN — ALPRAZOLAM 0.5 MG: 0.5 TABLET ORAL at 21:54

## 2018-01-07 RX ADMIN — PREGABALIN 300 MG: 100 CAPSULE ORAL at 09:43

## 2018-01-07 RX ADMIN — ATORVASTATIN CALCIUM 10 MG: 10 TABLET, FILM COATED ORAL at 16:44

## 2018-01-07 RX ADMIN — Medication 300 UNITS: at 16:52

## 2018-01-07 RX ADMIN — FLUTICASONE PROPIONATE AND SALMETEROL 1 PUFF: 50; 250 POWDER RESPIRATORY (INHALATION) at 09:42

## 2018-01-07 RX ADMIN — FLUTICASONE PROPIONATE AND SALMETEROL 1 PUFF: 50; 250 POWDER RESPIRATORY (INHALATION) at 21:46

## 2018-01-07 RX ADMIN — ENOXAPARIN SODIUM 40 MG: 40 INJECTION SUBCUTANEOUS at 09:41

## 2018-01-07 RX ADMIN — POTASSIUM CHLORIDE 40 MEQ: 1500 TABLET, EXTENDED RELEASE ORAL at 12:15

## 2018-01-07 RX ADMIN — LEVALBUTEROL 1.25 MG: 1.25 SOLUTION, CONCENTRATE RESPIRATORY (INHALATION) at 21:27

## 2018-01-07 RX ADMIN — FUROSEMIDE 20 MG: 20 TABLET ORAL at 16:52

## 2018-01-07 RX ADMIN — TOLTERODINE TARTRATE 4 MG: 2 CAPSULE, EXTENDED RELEASE ORAL at 09:43

## 2018-01-07 RX ADMIN — AMLODIPINE BESYLATE 5 MG: 5 TABLET ORAL at 09:41

## 2018-01-07 RX ADMIN — METOPROLOL TARTRATE 25 MG: 25 TABLET ORAL at 09:42

## 2018-01-07 RX ADMIN — CLOPIDOGREL BISULFATE 75 MG: 75 TABLET ORAL at 09:41

## 2018-01-07 RX ADMIN — POTASSIUM CHLORIDE 40 MEQ: 1500 TABLET, EXTENDED RELEASE ORAL at 16:45

## 2018-01-07 RX ADMIN — METOPROLOL TARTRATE 50 MG: 50 TABLET ORAL at 21:46

## 2018-01-07 RX ADMIN — ASPIRIN 81 MG: 81 TABLET, COATED ORAL at 09:41

## 2018-01-07 RX ADMIN — DOCUSATE SODIUM 100 MG: 100 CAPSULE, LIQUID FILLED ORAL at 09:41

## 2018-01-07 RX ADMIN — IPRATROPIUM BROMIDE 0.5 MG: 0.5 SOLUTION RESPIRATORY (INHALATION) at 14:20

## 2018-01-07 RX ADMIN — DOCUSATE SODIUM 100 MG: 100 CAPSULE, LIQUID FILLED ORAL at 16:45

## 2018-01-07 RX ADMIN — POTASSIUM CHLORIDE 10 MEQ: 750 TABLET, EXTENDED RELEASE ORAL at 09:43

## 2018-01-07 RX ADMIN — PREGABALIN 300 MG: 100 CAPSULE ORAL at 21:46

## 2018-01-07 RX ADMIN — IPRATROPIUM BROMIDE AND ALBUTEROL SULFATE 3 ML: .5; 3 SOLUTION RESPIRATORY (INHALATION) at 07:33

## 2018-01-07 NOTE — PHYSICAL THERAPY NOTE
PT EVALUATION       01/07/18 0830   Pain Assessment   Pain Assessment No/denies pain   Home Living   Type of Home Apartment   Home Layout One level;Elevator   Home Equipment (hurry cane, rollator)   Prior Function   Lives With Alone   Restrictions/Precautions   Other Precautions Fall Risk  (02)   Cognition   Overall Cognitive Status WFL   Orientation Level Oriented X4   RLE Assessment   RLE Assessment (ROM WFL, MMT 4/5)   LLE Assessment   LLE Assessment (ROM WFL, MMT 4/5)   Bed Mobility   Supine to Sit 7  Independent   Transfers   Sit to Stand 7  Independent   Stand to Sit 7  Independent   Stand pivot 5  Supervision   Ambulation/Elevation   Gait pattern (wide SHASHI )   Gait Assistance 5  Supervision   Distance 150 feet   Balance   Static Sitting Good   Static Standing Fair   Dynamic Standing Fair   Ambulatory Fair   Endurance Deficit   Endurance Deficit (Sp02 92-93% on 3 L 02 before and after exercise)   Assessment   Prognosis Good   Problem List Decreased strength;Decreased endurance; Impaired balance;Decreased mobility   Assessment Patient seen for Physical Therapy evaluation  Patient admitted with Acute congestive heart failure ([facility] [address] )  Comorbidities affecting patient's physical performance include: CHF, asthma, htn, afib, L LE lymphedema  Personal factors affecting patient at time of initial evaluation include: ambulating with assistive device  Prior to admission, patient was independent with functional mobility with cane  Please find objective findings from Physical Therapy assessment regarding body systems outlined above with impairments and limitations including weakness, impaired balance, decreased endurance, decreased activity tolerance and decreased functional mobility tolerance  The Barthel Index was used as a functional outcome tool presenting with a score of 70 today indicating moderate limitations of functional mobility and ADLS    Patient's clinical presentation is currently evolving as seen in patient's presentation of increased fall risk, new onset of impairment of functional mobility, decreased endurance and new onset of weakness  Pt would benefit from continued Physical Therapy treatment to address deficits as defined above and maximize level of functional mobility  As demonstrated by objective findings, the assigned level of complexity for this evaluation is moderate  Goals   Patient Goals "go home"   STG Expiration Date (1-7 days)   Short Term Goal #1 independent ambulation 250 feet with cane indoor surfaces   Short Term Goal #2 improve standing static balance to good  (improve transfers to independent)   LTG Expiration Date (1-2 weeks)   Long Term Goal #1 independent ambulation with cane 300 feet outdoor surfaces   Plan   Treatment/Interventions Functional transfer training;ADL retraining;LE strengthening/ROM; Therapeutic exercise; Endurance training;Patient/family training;Equipment eval/education; Bed mobility;Gait training   PT Frequency (3-5x/week)   Recommendation   Recommendation Other (Comment)  (home)   Equipment Recommended (pt has a cane and walker)   Barthel Index   Feeding 10   Bathing 0   Grooming Score 5   Dressing Score 5   Bladder Score 10   Bowels Score 10   Toilet Use Score 5   Transfers (Bed/Chair) Score 10   Mobility (Level Surface) Score 10   Stairs Score 5   Barthel Index Score 70

## 2018-01-07 NOTE — SOCIAL WORK
DASH discussion completed  Discussed goals of making sure pt's needs are met upon discharge, pt's preferences are taken into account, pt understands her health condition, medications and symptoms to watch for after returning home and pt is aware of any follow up appointments recommended by hospital physician  CM SPOKE WITH PT AT THE BEDSIDE  PT LIVES ALONE AND HAS A CANE AND WALKER AT HOME  SHE HAS THE [facility], BETTY WORTHINGTON IS HER CM  PT DOES NOT DRIVE BUT USES BUS OR HER FRIEND TAKERS HER WHERE SHE NEEDS TO GO   PT LEFT LAST STAY, DID NOT QUALIFY FOR O2 BUT MAY NEED IT THIS TIME  PT DOES NOT HAVE HHC THROUGH [facility], SEEN BY PT/OT AND RECOMMENDATION FOR HOME, NO SERVICES NEEDED AT THIS TIME  PT USES [facility] FOR HER MEDICATION  PT NOTED THAT SHE MAY NEED TRANSPORTATION HOME IF HER FRIEND IS UNABLE TO PICK HER UP, USED [facility] IN THE PAST AND SHE STATED THAT WORKED OUT FINE FOR HER THEN AND AGREEABLE IF NEEDED

## 2018-01-07 NOTE — PROGRESS NOTES
[facility] Progress Note  Patient: Harry Tate 81 y o  female   MRN: [id number]  PCP: Urmila Graves DO  Unit/Bed#: [address] [place] [address] Encounter: [id number]  Date Of Visit: 01/07/18    Assessment:  Principal Problem:    Acute congestive heart failure ([facility] [address] )  Active Problems:    Hypertension    Morbid obesity with BMI of 50 0-59 9, adult (HCC)    GERD (gastroesophageal reflux disease)    Hyperlipemia    Atrial fibrillation (HCC)    Fibromyalgia    Lymphedema of left leg    History of Merkel cell carcinoma    Moderate persistent asthma    Iron deficiency anemia    Plan:  · Acute on chronic diastolic heart failure:  Chest x-ray revealed mild congestive heart failure  BNP 1170  Troponin levels negative x3  Repeat echo revealed EF 70% with grade 1 diastolic dysfunction  Cardiology following, appreciate input  I&Os inaccurate due to urinary incontinence  Closely monitor daily weights  Per Cardiology, start Lasix 20 mg p o  Daily  Continue cardiac low sodium, fluid-restricted diet  · Dyspnea:  Multifactorial, likely due to acute on chronic diastolic heart failure and obesity  Improved with Lasix  Continue supplemental oxygen  Patient does not have supplemental oxygen at home, therefore, will need a home O2 eval prior to discharge  · Atrial fibrillation:  Rate uncontrolled with metoprolol 25mg BID  Continuous telemetry monitoring revealing atrial fibrillation  EKG revealed normal sinus rhythm with multiple PACs  Per Cardiology, give metoprolol 5 mg IV x1 and increase the Metoprolol to 50 mg BID  Patient refused anticoagulation  Continue aspirin and Plavix  · Hypokalemia:  K 3 4  Replete and monitor K in am    · Iron deficiency anemia:  Iron panel reviewed, iron 19, ferritin 36, iron sat 7  Hemoglobin 11 3, MCV 79, RDW 17 3  Initiate iron supplementation with ferrous sulfate 1 tab BID with meals as well as vitamin C and folate  Bowel regimen    · Left lower extremity chronic lymphedema:  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  Continue Venodyne compressive pumps as tolerated  · Moderate persistent asthma:  No evidence of acute exacerbation  Continue Advair 1 puff BID, nebulizer treatments as needed and supplemental oxygen  · Hypertension:  Continue Norvasc and metoprolol  · Hyperlipidemia:  Lipid panel reviewed, LDL 97  Continue statin  · Left carotid stenosis:  Patient has refused carotid endarterectomy in the past   Continue on aspirin and Plavix  · History of Merkel cell carcinoma:  Seen by him on in 2017  No evidence of recurrence  Continue outpatient follow-up  VTE Pharmacologic Prophylaxis:   Pharmacologic: Enoxaparin (Lovenox)  Mechanical: Mechanical VTE prophylaxis in place  Patient Centered Rounds: I have performed bedside rounds with nursing staff today  Discussions with Specialists or Other Care Team Provider:  Nursing, case management, cardiology  Education and Discussions with Family / Patient:  I have answered all questions to the best of my ability  Time Spent for Care: 20 minutes  More than 50% of total time spent on counseling and coordination of care as described above  Current Length of Stay: 2 day(s)  Current Patient Status: Inpatient   Certification Statement: The patient will continue to require additional inpatient hospital stay due to acute on chronic diastolic heart failure and uncontrolled atrial fibrillation    Discharge Plan:  Patient is not medically stable for discharge today, likely in 24-48 hours pending progress  Code Status: Level 1 - Full Code    Subjective:   Reported episode of shortness of breath after ambulating to and from the bathroom for which she required supplemental oxygen  Declines cough, headache, chest pain, abdominal pain  States she qualified for oxygen at home but does not have any due to insurance/pain mid issues      Objective:   Vitals:   Temp (24hrs), Av 6 °F (36 4 °C), Min:97 °F (36 1 °C), Max:98 1 °F (36 7 °C)    HR:  [] 115  Resp:  [20] 20  BP: (114-140)/(56-65) 114/56  SpO2:  [94 %-98 %] 96 %  Body mass index is 34 48 kg/m²  Input and Output Summary (last 24 hours): Intake/Output Summary (Last 24 hours) at 01/07/18 1655  Last data filed at 01/07/18 1640   Gross per 24 hour   Intake              350 ml   Output             2240 ml   Net            -1890 ml       Physical Exam:     Physical Exam   Constitutional: She is oriented to person, place, and time  She appears well-developed  No distress  A pleasant, obese female observed sitting upright in bed on 3 L via nasal cannula   HENT:   Head: Normocephalic  Neck: Normal range of motion  Cardiovascular: Normal rate and regular rhythm  Pulmonary/Chest: Effort normal  No respiratory distress  She has decreased breath sounds in the right upper field, the right lower field, the left upper field and the left lower field  She has no wheezes  She has no rhonchi  She has no rales  Abdominal: Soft  Bowel sounds are normal  She exhibits no distension  There is no tenderness  Obese   Musculoskeletal: Normal range of motion  She exhibits edema (+2 nonpitting LLE edema, trace RLE)  She exhibits no tenderness  Neurological: She is alert and oriented to person, place, and time  Skin: Skin is warm and dry  No rash noted  She is not diaphoretic  There is erythema (Chronic erythema to left lower extremity from the knee down)  Psychiatric: She has a normal mood and affect  Judgment normal    Nursing note and vitals reviewed  Additional Data:   Labs:    Results from last 7 days  Lab Units 01/07/18  0535  01/05/18  1108   WBC Thousand/uL 7 00  < > 4 70*   HEMOGLOBIN g/dL 11 3*  < > 12 9   HEMATOCRIT % 35 4*  < > 39 6   PLATELETS Thousands/uL 170  < > 160   NEUTROS PCT %  --   --  65   LYMPHS PCT %  --   --  14   MONOS PCT %  --   --  9   EOS PCT %  --   --  12*   < > = values in this interval not displayed      Results from last 7 days  Lab Units 01/07/18  0535  01/05/18  1108   SODIUM mmol/L 141  < > 142   POTASSIUM mmol/L 3 4*  < > 3 9   CHLORIDE mmol/L 103  < > 105   CO2 mmol/L 38*  < > 30   BUN mg/dL 28*  < > 22   CREATININE mg/dL 1 00  < > 1 01   CALCIUM mg/dL 8 7  < > 9 2   TOTAL PROTEIN g/dL  --   --  6 7   BILIRUBIN TOTAL mg/dL  --   --  0 40   ALK PHOS U/L  --   --  97   ALT U/L  --   --  21   AST U/L  --   --  20   GLUCOSE RANDOM mg/dL 125  < > 105   < > = values in this interval not displayed  Results from last 7 days  Lab Units 01/05/18  1121   INR  1 02       * I Have Reviewed All Lab Data Listed Above  * Additional Pertinent Lab Tests Reviewed: All Labs Within Last 24 Hours Reviewed    Imaging:    Imaging Reports Reviewed Today Include: Procedure: Xr Chest 1 View Portable    Result Date: 1/5/2018  Narrative: CHEST INDICATION:  Shortness of breath  Asthma  COMPARISON:  Chest radiographs December 11, 2017 VIEWS:   AP frontal IMAGES:  1 FINDINGS:     The heart is enlarged  Atherosclerotic changes in the aorta  Right internal jugular [id number] with tip at cavoatrial junction  Lung volumes diminished  Small bilateral pleural effusions  Elevation of left hemidiaphragm  Nita and pulmonary vessels diffusely enlarged  Visualized osseous structures appear within normal limits for the patient's age  Impression: Mild congestive heart failure  Workstation performed: [id number]     Procedure: Cta Ed Chest Pe Study    Result Date: 1/5/2018  Narrative: CTA - CHEST WITH IV CONTRAST - PULMONARY ANGIOGRAM INDICATION: Shortness of breath  COMPARISON: 10/02/2017 TECHNIQUE: CTA examination of the chest was performed using angiographic technique according to a protocol specifically tailored to evaluate for pulmonary embolism  Reformatted images were created in axial, sagittal, and coronal planes  In addition, coronal 3D MIP postprocessing was performed on the acquisition scanner    Radiation dose length product (DLP) for this visit:  748 06 mGy-cm    This examination, like all CT scans performed in the [facility], was performed utilizing techniques to minimize radiation dose exposure, including the use of iterative  reconstruction and automated exposure control  IV Contrast:  100 mL of iohexol (OMNIPAQUE)          FINDINGS: PULMONARY ARTERIAL TREE:  No pulmonary embolus is seen  LUNGS:  Dependent atelectasis in the posterior aspects of both upper lobes and in both lower lobes  PLEURA:  Unremarkable  HEART/AORTA:  Unremarkable for patient's age  MEDIASTINUM AND JASWINDER:  Unremarkable  CHEST WALL AND LOWER NECK:       Normal  VISUALIZED STRUCTURES IN THE UPPER ABDOMEN:  Right renal cyst  OSSEOUS STRUCTURES:  No acute fracture or destructive osseous lesion  Degenerative changes in the shoulders  Impression: 1  No pulmonary embolism  2   Dependent atelectasis  Workstation performed: [id number]       Cultures:   Blood Culture:   Lab Results   Component Value Date    BLOODCX No Growth at 48 hrs  01/05/2018    BLOODCX No Growth at 48 hrs  01/05/2018    BLOODCX No Growth After 5 Days  12/13/2017    BLOODCX No Growth After 5 Days  12/13/2017    BLOODCX Staphylococcus epidermidis (A) 12/11/2017    BLOODCX Staphylococcus epidermidis (A) 12/11/2017    BLOODCX No Growth After 5 Days   09/22/2016     Urine Culture:   Lab Results   Component Value Date    URINECX >100,000 cfu/ml Mixed Contaminants X5 09/21/2016     Sputum Culture: No components found for: SPUTUMCX  Wound Culture: No results found for: WOUNDCULT    Last 24 Hours Medication List:     amLODIPine 5 mg Oral Daily   aspirin 81 mg Oral Daily   atorvastatin 10 mg Oral Daily With Dinner   clopidogrel 75 mg Oral Daily   docusate sodium 100 mg Oral BID   enoxaparin 40 mg Subcutaneous Daily   ferrous sulfate 325 mg Oral BID With Meals   fluticasone-salmeterol 1 puff Inhalation Q12H [facility]   furosemide 20 mg Oral Daily   ipratropium 0 5 mg Nebulization TID   levalbuterol 1 25 mg Nebulization TID metoprolol tartrate 50 mg Oral BID   pantoprazole 40 mg Oral Early Morning   polyethylene glycol 17 g Oral Daily   potassium chloride 10 mEq Oral Daily   pregabalin 300 mg Oral BID   tolterodine 4 mg Oral Daily        Today, Patient Was Seen By: GILDA Nelson    ** Please Note: Dragon 360 Dictation voice to text software may have been used in the creation of this document   **

## 2018-01-07 NOTE — PROGRESS NOTES
Progress Note - Cardiology   Jb Orta 81 y o  female MRN: [id number]  Unit/Bed#: [address] [place] [address] Encounter: [id number]  01/07/18  5:37 PM        Assessment:  1  Dyspnea - possible CHF with diastolic dysfunction  Chest Xray showed mild CHF  EKG was normal and troponin has been negative  - echocardiogram showed a hyperdynamic left ventricle with ejection fraction of 70-75% with no regional wall motion abnormalities and elevated filling pressures  Mild pulmonary hypertension  Received IV Lasix yesterday with improvement of symptoms  Monitor daily weights and I+O's   2  Hypertension - amlodipine  3  Paroxysmal atrial fibrillation - returned to atrial fibrillation overnight  Will give IV Lopressor now and increase metoprolol to 50 mg b i d  May discontinue amlodipine if blood pressure reduces  4  ? 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  6  COPD - on advair and nebs        Plan:  As above      Subjective: Jb Orta denies any chest pain  Shortness of breath improving  Patient developed atrial fibrillation overnight         Meds/Allergies   current meds:   Current Facility-Administered Medications   Medication Dose Route Frequency    acetaminophen (TYLENOL) tablet 650 mg  650 mg Oral Q6H PRN    ALPRAZolam (XANAX) tablet 0 5 mg  0 5 mg Oral HS PRN    amLODIPine (NORVASC) tablet 5 mg  5 mg Oral Daily    [START ON 1/8/2018] ascorbic acid (VITAMIN C) tablet 500 mg  500 mg Oral Daily    aspirin (ECOTRIN LOW STRENGTH) EC tablet 81 mg  81 mg Oral Daily    atorvastatin (LIPITOR) tablet 10 mg  10 mg Oral Daily With Dinner    clopidogrel (PLAVIX) tablet 75 mg  75 mg Oral Daily    docusate sodium (COLACE) capsule 100 mg  100 mg Oral BID    enoxaparin (LOVENOX) subcutaneous injection 40 mg  40 mg Subcutaneous Daily    ferrous sulfate tablet 325 mg  325 mg Oral BID With Meals    fluticasone-salmeterol (ADVAIR) 250-50 mcg/dose inhaler 1 puff  1 puff Inhalation Q12H [facility]    [START ON 0/3/4577] folic acid (FOLVITE) tablet 1 mg  1 mg Oral Daily    furosemide (LASIX) tablet 20 mg  20 mg Oral Daily    heparin lock flush 100 units/mL injection 300 Units  300 Units Intracatheter Q1H PRN    ipratropium (ATROVENT) 0 02 % inhalation solution 0 5 mg  0 5 mg Nebulization TID    levalbuterol (XOPENEX) inhalation solution 0 63 mg  0 63 mg Nebulization Q6H PRN    levalbuterol (XOPENEX) inhalation solution 1 25 mg  1 25 mg Nebulization TID    metoprolol tartrate (LOPRESSOR) tablet 50 mg  50 mg Oral BID    pantoprazole (PROTONIX) EC tablet 40 mg  40 mg Oral Early Morning    polyethylene glycol (MIRALAX) packet 17 g  17 g Oral Daily    potassium chloride (K-DUR,KLOR-CON) CR tablet 10 mEq  10 mEq Oral Daily    pregabalin (LYRICA) capsule 300 mg  300 mg Oral BID    tolterodine (DETROL LA) 24 hr capsule 4 mg  4 mg Oral Daily     Allergies   Allergen Reactions    Fentanyl And Related Other (See Comments)     Passed out    Latex     Penicillins        Objective   Vitals: Blood pressure 114/56, pulse (!) 115, temperature 98 °F (36 7 °C), temperature source Oral, resp  rate 20, height 5' 2" (1 575 m), weight 85 5 kg (188 lb 7 9 oz), SpO2 96 %, not currently breastfeeding , Body mass index is 34 48 kg/m²  Intake/Output Summary (Last 24 hours) at 01/07/18 1737  Last data filed at 01/07/18 1640   Gross per 24 hour   Intake              350 ml   Output             2240 ml   Net            -1890 ml       Physical Exam   Constitutional: She appears healthy  No distress  HENT:   Nose: Nose normal    Mouth/Throat: Oropharynx is clear  Eyes: Conjunctivae are normal  Pupils are equal, round, and reactive to light  Neck: Neck supple  No JVD present  Cardiovascular: An irregularly irregular rhythm present  Tachycardia present  Exam reveals no distant heart sounds and no friction rub  Murmur heard     Systolic murmur is present with a grade of 2/6  at the upper left sternal border  Pulmonary/Chest: Effort normal and breath sounds normal  She has no wheezes  She has no rales  Abdominal: Soft  She exhibits no distension  There is no tenderness  Musculoskeletal: She exhibits edema (left leg lymphedema)  Neurological: She is alert and oriented to person, place, and time  Skin: Skin is warm and dry  No rash noted         Lab Results:     Troponins:   Results from last 7 days  Lab Units 01/05/18  1735 01/05/18  1108   TROPONIN I ng/mL <0 02 <0 02       Recent Results (from the past 24 hour(s))   TSH, 3rd generation    Collection Time: 01/07/18  5:35 AM   Result Value Ref Range    TSH 3RD GENERATON 0 386 0 358 - 3 740 uIU/mL   T4, free    Collection Time: 01/07/18  5:35 AM   Result Value Ref Range    Free T4 0 85 0 76 - 1 46 ng/dL   Basic metabolic panel    Collection Time: 01/07/18  5:35 AM   Result Value Ref Range    Sodium 141 136 - 145 mmol/L    Potassium 3 4 (L) 3 5 - 5 3 mmol/L    Chloride 103 100 - 108 mmol/L    CO2 38 (H) 21 - 32 mmol/L    Anion Gap 0 (L) 4 - 13 mmol/L    BUN 28 (H) 5 - 25 mg/dL    Creatinine 1 00 0 60 - 1 30 mg/dL    Glucose 125 65 - 140 mg/dL    Calcium 8 7 8 3 - 10 1 mg/dL    eGFR 53 ml/min/1 73sq m   CBC    Collection Time: 01/07/18  5:35 AM   Result Value Ref Range    WBC 7 00 4 80 - 10 80 Thousand/uL    RBC 4 47 4 20 - 5 40 Million/uL    Hemoglobin 11 3 (L) 12 0 - 16 0 g/dL    Hematocrit 35 4 (L) 37 0 - 47 0 %    MCV 79 (L) 82 - 98 fL    MCH 25 3 (L) 27 0 - 31 0 pg    MCHC 32 0 31 4 - 37 4 g/dL    RDW 17 3 (H) 11 6 - 15 1 %    Platelets 658 842 - 423 Thousands/uL    MPV 9 3 8 9 - 12 7 fL   Iron Saturation %    Collection Time: 01/07/18  5:35 AM   Result Value Ref Range    Iron Saturation 7 %    TIBC 264 250 - 450 ug/dL    Iron 19 (L) 50 - 170 ug/dL   Ferritin    Collection Time: 01/07/18  5:35 AM   Result Value Ref Range    Ferritin 36 8 - 388 ng/mL   Magnesium    Collection Time: 01/07/18  5:37 AM   Result Value Ref Range Magnesium 2 6 1 6 - 2 6 mg/dL   ECG 12 lead    Collection Time: 18 11:06 AM   Result Value Ref Range    Ventricular Rate 104 BPM    Atrial Rate 104 BPM    TN Interval  ms    QRSD Interval 88 ms    QT Interval 334 ms    QTC Interval 439 ms    P Axis  degrees    QRS Axis -15 degrees    T Wave Axis 40 degrees          Cardiac testing:   Results for orders placed during the hospital encounter of 18   Echo complete with contrast if indicated    Narrative [address]  [address] [phone]    Transthoracic Echocardiogram  2D, M-mode, Doppler, and Color Doppler    Study date:  2018    Patient: Arlen Sawyer  MR number: [id number]  Account number: [de-identified]  : 1935  Age: 81 years  Gender: Female  Status: Routine  Location: Bedside  Height: 62 in  Weight: 187 7 lb  BP: 134/ 59 mmHg    Indications: Shortness of Breath Evaluate for suspected cardiac source of embolism  Diagnoses: 786 05 - SHORTNESS OF BREATH    Sonographer:  Stephani Murry  Interpreting Physician:  Eber Weaver DO  Primary Physician:  Iesha Pride DO  Group:  [facility]    SUMMARY    LEFT VENTRICLE:  The cavity was small  Systolic function was hyperdynamic by visual assessment  Ejection fraction was estimated in the range of 70 % to 75 %  There were no regional wall motion abnormalities  There was mild concentric hypertrophy  Doppler parameters were consistent with abnormal left ventricular relaxation (grade 1 diastolic dysfunction)  Doppler parameters were consistent with elevated mean left atrial filling pressure  MITRAL VALVE:  There was mild regurgitation  AORTIC VALVE:  There was no evidence for stenosis  There was no regurgitation  TRICUSPID VALVE:  There was mild regurgitation  Pulmonary artery systolic pressure was mildly increased      HISTORY: PRIOR HISTORY: Shortness of Breath    PROCEDURE: The procedure was performed at the bedside  This was a routine study  The transthoracic approach was used  The study included complete 2D imaging, M-mode, complete spectral Doppler, and color Doppler  The heart rate was 77 bpm,  at the start of the study  This was a technically difficult study  LEFT VENTRICLE: The cavity was small  Systolic function was hyperdynamic by visual assessment  Ejection fraction was estimated in the range of 70 % to 75 %  There were no regional wall motion abnormalities  There was mild concentric  hypertrophy  DOPPLER: Doppler parameters were consistent with abnormal left ventricular relaxation (grade 1 diastolic dysfunction)  Doppler parameters were consistent with elevated mean left atrial filling pressure  RIGHT VENTRICLE: The size was normal  Systolic function was normal  DOPPLER: Systolic pressure was within the normal range  LEFT ATRIUM: Size was normal  No thrombus was identified  RIGHT ATRIUM: Size was normal     MITRAL VALVE: Valve structure was normal  There was normal leaflet separation  No echocardiographic evidence for prolapse  DOPPLER: The transmitral velocity was within the normal range  There was no evidence for stenosis  There was mild  regurgitation  AORTIC VALVE: The valve was trileaflet  Leaflets exhibited normal cuspal separation and sclerosis  DOPPLER: Transaortic velocity was within the normal range  There was no evidence for stenosis  There was no regurgitation  TRICUSPID VALVE: The valve structure was normal  There was normal leaflet separation  DOPPLER: The transtricuspid velocity was within the normal range  There was mild regurgitation  Pulmonary artery systolic pressure was mildly increased  Estimated peak PA pressure was 42 mmHg  PULMONIC VALVE: Leaflets exhibited normal thickness, no calcification, and normal cuspal separation  DOPPLER: The transpulmonic velocity was within the normal range  There was mild regurgitation  PERICARDIUM: There was no thickening   There was no pericardial effusion  AORTA: The root exhibited normal size  PULMONARY ARTERY: The size was normal  The morphology appeared normal     SYSTEM MEASUREMENT TABLES    2D mode  AoR Diam 2D: 3 1 cm  LA Diam (2D): 4 2 cm  LA/Ao (2D): 1 35  FS (2D Teich): 53 9 %  IVSd (2D): 1 08 cm  LVDEV: 73 4 cm³  LVESV: 10 9 cm³  LVIDd(2D): 4 08 cm  LVISd (2D): 1 88 cm  LVOT Area 2D: 3 46 cm squared  LVPWd (2D): 1 05 cm  SV (Teich): 62 5 cm³    Apical four chamber  LVEF A4C: 84 %    Unspecified Scan Mode  ANNIE Cont Eq (Peak Pacheco): 1 94 cm squared  ANNIE Cont Eq (VTI): 1 81 cm squared  LVOT (VTI): 26 8 cm  LVOT Diam : 2 1 cm  LVOT Vmax: 1210 mm/s  LVOT Vmax; Mean: 996 mm/s  Peak Grad ; Mean: 4 mm[Hg]  SV (LVOT): 81 cm³  VTI;Mean: 4 mm[Hg]  MV Peak A Pacheco: 1240 mm/s  MV Peak E Pacheco  Mean: 731 mm/s  RVSP: 36 mm[Hg]  Max P mm[Hg]  V Max: 2790 mm/s  Vmax: 2800 mm/s  TAPSE: 2 2 cm    [facility]    Prepared and electronically signed by    Snow Torres DO  Signed 2018 11:30:10       Results for orders placed during the hospital encounter of 16   NM myocardial perfusion spect (rx stress and/or rest)    [address] 6 [phone]    Rest/Stress Gated SPECT Myocardial Perfusion Imaging After Regadenoson    Patient: Josue Gomez  MR number: [id number]  Account number: [de-identified]  : 1935  Age: 79 years  Gender: Female  Status: Outpatient  Location: Stress lab  Height: 65 in  Weight: 195 lb  BP: 160/ 90 mmHg    Allergies: FENTANYL AND RELATED, LATEX, PENICILLINS    Diagnosis: R07 9 - Chest pain, unspecified    Primary Physician:  Leanna Montes De Oca DO  RN:  Abi Gutierrez RN  Group:  [facility]  Report Prepared By[de-identified]  Abi Gutierrez, RN  Interpreting Physician:  Becca Lopez MD    INDICATIONS: Detection of coronary artery disease      HISTORY: The patient is a 79year old  female  Coronary artery disease  risk factors: family history of premature coronary artery disease  Co-morbidity: history of lung disease- asthma Medications: a beta blocker  PHYSICAL EXAM: Baseline physical exam screening: no wheezes audible  REST ECG: Normal sinus rhythm at 63 bpm  LAD noted  No ST-T changes present  The ECG showed 1° AV block  PROCEDURE: The study was performed in the stress lab  A regadenoson infusion  pharmacologic stress test was performed  Gated SPECT myocardial perfusion  imaging was performed after stress and at rest  Systolic blood pressure was 160  mmHg, at the start of the study  Diastolic blood pressure was 90 mmHg, at the  start of the study  The heart rate was 67 bpm, at the start of the study  IV  double checked  Regadenoson protocol:  Time HR bpm SBP mmHg DBP mmHg Symptoms ST change Rhythm/conduct  Baseline 09:38 63 160 90 none -- --  Immediate 09:52 78 130 70 [place] none --  1 min 09:53 79 164 80 subsiding none same as above  2 min 09:54 70 170 90 none -- --  3 min 09:55 71 166 90 none -- same as above  IV aminophylline ( 150 mg) was administered at the 2nd minute of  STRESS SUMMARY: Duration of pharmacologic stress was 3 min  Maximal heart rate  during stress was 79 bpm  The rate-pressure product for the peak heart rate and  blood pressure was 97179  There was no chest pain during stress  The stress  test was terminated due to protocol completion  Pre oxygen saturation: 88 %  Peak oxygen saturation: 93 %  The stress ECG was negative for ischemia and  normal  There were no stress arrhythmias or conduction abnormalities      ISOTOPE ADMINISTRATION:  Resting isotope administration Stress isotope administration  Agent Tetrofosmin Tetrofosmin  Dose 10 8 mCi 30 6 mCi  Date 04/27/2016 04/27/2016  Injection time 08:15 10:00  Imaging time 08:55 12:05  Injection-image interval 40 min 125 min  Time from 1st injection -- 3 5 hours    The radiopharmaceutical was injected at the peak effect of pharmacologic  stress  MYOCARDIAL PERFUSION IMAGING:  The image quality was fair  The patient kept both arms on the side during image  acquisition  Left ventricular size was normal     PERFUSION DEFECTS:  -  There was a moderate-sized, mild intensity, fixed myocardial perfusion  defect of the inferior and anterolateral wall likely due to diaphragm  attenuation  GATED SPECT:  The calculated left ventricular ejection fraction was 75 %  LVED was 62 mL  LVES was 16 mL  Left ventricular ejection fraction was within normal limits by  visual estimate  There was no left ventricular regional abnormality  SUMMARY:  -  Stress results: There was no chest pain during stress  -  ECG conclusions: The stress ECG was negative for ischemia and normal   -  Perfusion imaging: The image quality was fair  The patient kept both arms on  the side during image acquisition  Left ventricular size was normal  There was  a moderate-sized, mild intensity, fixed myocardial perfusion defect of the  inferior and anterolateral wall likely due to diaphragm attenuation  -  Gated  SPECT: The calculated left ventricular ejection fraction was 75 %  LVED was 62  mL  LVES was 16 mL  Left ventricular ejection fraction was within normal limits  by visual estimate  There was no left ventricular regional abnormality  IMPRESSIONS: Normal study  There was image artifact, without diagnostic  evidence for perfusion abnormality  Left ventricular systolic function was  normal     Prepared and signed by    Uyen Chamberlain MD  Signed 04/27/2016 13:59:19           EKG/TELE: Personally reviewed  Atrial fibrillation with ventricular rate of 110-120    Imaging: I have personally reviewed pertinent reports

## 2018-01-07 NOTE — PLAN OF CARE
CARDIOVASCULAR - ADULT     Maintains optimal cardiac output and hemodynamic stability Progressing     Absence of cardiac dysrhythmias or at baseline rhythm Progressing        DISCHARGE PLANNING     Discharge to home or other facility with appropriate resources Progressing        INFECTION - ADULT     Absence or prevention of progression during hospitalization Progressing     Absence of fever/infection during neutropenic period Progressing        Knowledge Deficit     Patient/family/caregiver demonstrates understanding of disease process, treatment plan, medications, and discharge instructions Progressing        METABOLIC, FLUID AND ELECTROLYTES - ADULT     Electrolytes maintained within normal limits Progressing     Fluid balance maintained Progressing        PAIN - ADULT     Verbalizes/displays adequate comfort level or baseline comfort level Progressing        Potential for Falls     Patient will remain free of falls Progressing        RESPIRATORY - ADULT     Achieves optimal ventilation and oxygenation Progressing        SAFETY ADULT     Patient will remain free of falls Progressing     Maintain or return to baseline ADL function Progressing     Maintain or return mobility status to optimal level Progressing

## 2018-01-08 ENCOUNTER — APPOINTMENT (INPATIENT)
Dept: RADIOLOGY | Facility: HOSPITAL | Age: 83
DRG: 291 | End: 2018-01-08
Payer: MEDICARE

## 2018-01-08 PROBLEM — R06.02 SHORTNESS OF BREATH: Status: ACTIVE | Noted: 2018-01-08

## 2018-01-08 LAB
ANION GAP SERPL CALCULATED.3IONS-SCNC: 6 MMOL/L (ref 4–13)
ATRIAL RATE: 104 BPM
BILIRUB UR QL STRIP: NEGATIVE
BUN SERPL-MCNC: 19 MG/DL (ref 5–25)
CALCIUM SERPL-MCNC: 8.6 MG/DL (ref 8.3–10.1)
CHLORIDE SERPL-SCNC: 102 MMOL/L (ref 100–108)
CLARITY UR: NORMAL
CO2 SERPL-SCNC: 34 MMOL/L (ref 21–32)
COLOR UR: NORMAL
CREAT SERPL-MCNC: 0.98 MG/DL (ref 0.6–1.3)
ERYTHROCYTE [DISTWIDTH] IN BLOOD BY AUTOMATED COUNT: 17.4 % (ref 11.6–15.1)
GFR SERPL CREATININE-BSD FRML MDRD: 54 ML/MIN/1.73SQ M
GLUCOSE SERPL-MCNC: 98 MG/DL (ref 65–140)
GLUCOSE UR STRIP-MCNC: NEGATIVE MG/DL
HCT VFR BLD AUTO: 36.3 % (ref 37–47)
HGB BLD-MCNC: 11.8 G/DL (ref 12–16)
HGB UR QL STRIP.AUTO: NEGATIVE
KETONES UR STRIP-MCNC: NEGATIVE MG/DL
LEUKOCYTE ESTERASE UR QL STRIP: NEGATIVE
MCH RBC QN AUTO: 25.7 PG (ref 27–31)
MCHC RBC AUTO-ENTMCNC: 32.4 G/DL (ref 31.4–37.4)
MCV RBC AUTO: 79 FL (ref 82–98)
NITRITE UR QL STRIP: NEGATIVE
PH UR STRIP.AUTO: 5.5 [PH] (ref 5–9)
PLATELET # BLD AUTO: 180 THOUSANDS/UL (ref 130–400)
PMV BLD AUTO: 9.5 FL (ref 8.9–12.7)
POTASSIUM SERPL-SCNC: 4 MMOL/L (ref 3.5–5.3)
PROT UR STRIP-MCNC: NEGATIVE MG/DL
QRS AXIS: -15 DEGREES
QRSD INTERVAL: 88 MS
QT INTERVAL: 334 MS
QTC INTERVAL: 439 MS
RBC # BLD AUTO: 4.58 MILLION/UL (ref 4.2–5.4)
SODIUM SERPL-SCNC: 142 MMOL/L (ref 136–145)
SP GR UR STRIP.AUTO: <=1.005 (ref 1–1.03)
T WAVE AXIS: 40 DEGREES
UROBILINOGEN UR QL STRIP.AUTO: 0.2 E.U./DL
VENTRICULAR RATE: 104 BPM
WBC # BLD AUTO: 5.7 THOUSAND/UL (ref 4.8–10.8)

## 2018-01-08 PROCEDURE — 94640 AIRWAY INHALATION TREATMENT: CPT

## 2018-01-08 PROCEDURE — 81003 URINALYSIS AUTO W/O SCOPE: CPT | Performed by: NURSE PRACTITIONER

## 2018-01-08 PROCEDURE — 71045 X-RAY EXAM CHEST 1 VIEW: CPT

## 2018-01-08 PROCEDURE — 94760 N-INVAS EAR/PLS OXIMETRY 1: CPT

## 2018-01-08 PROCEDURE — 80048 BASIC METABOLIC PNL TOTAL CA: CPT | Performed by: NURSE PRACTITIONER

## 2018-01-08 PROCEDURE — 97110 THERAPEUTIC EXERCISES: CPT

## 2018-01-08 PROCEDURE — 94761 N-INVAS EAR/PLS OXIMETRY MLT: CPT

## 2018-01-08 PROCEDURE — 85027 COMPLETE CBC AUTOMATED: CPT | Performed by: NURSE PRACTITIONER

## 2018-01-08 RX ORDER — METHYLPREDNISOLONE SODIUM SUCCINATE 40 MG/ML
40 INJECTION, POWDER, LYOPHILIZED, FOR SOLUTION INTRAMUSCULAR; INTRAVENOUS EVERY 12 HOURS SCHEDULED
Status: DISCONTINUED | OUTPATIENT
Start: 2018-01-08 | End: 2018-01-08

## 2018-01-08 RX ORDER — METHYLPREDNISOLONE SODIUM SUCCINATE 40 MG/ML
20 INJECTION, POWDER, LYOPHILIZED, FOR SOLUTION INTRAMUSCULAR; INTRAVENOUS EVERY 12 HOURS SCHEDULED
Status: DISCONTINUED | OUTPATIENT
Start: 2018-01-08 | End: 2018-01-10 | Stop reason: HOSPADM

## 2018-01-08 RX ADMIN — ATORVASTATIN CALCIUM 10 MG: 10 TABLET, FILM COATED ORAL at 17:46

## 2018-01-08 RX ADMIN — IPRATROPIUM BROMIDE 0.5 MG: 0.5 SOLUTION RESPIRATORY (INHALATION) at 15:35

## 2018-01-08 RX ADMIN — ALPRAZOLAM 0.5 MG: 0.5 TABLET ORAL at 22:40

## 2018-01-08 RX ADMIN — ENOXAPARIN SODIUM 40 MG: 40 INJECTION SUBCUTANEOUS at 08:56

## 2018-01-08 RX ADMIN — FLUTICASONE PROPIONATE AND SALMETEROL 1 PUFF: 50; 250 POWDER RESPIRATORY (INHALATION) at 20:11

## 2018-01-08 RX ADMIN — IPRATROPIUM BROMIDE 0.5 MG: 0.5 SOLUTION RESPIRATORY (INHALATION) at 21:49

## 2018-01-08 RX ADMIN — FUROSEMIDE 20 MG: 20 TABLET ORAL at 08:55

## 2018-01-08 RX ADMIN — METOPROLOL TARTRATE 50 MG: 50 TABLET ORAL at 08:55

## 2018-01-08 RX ADMIN — AMLODIPINE BESYLATE 5 MG: 5 TABLET ORAL at 08:55

## 2018-01-08 RX ADMIN — PREGABALIN 300 MG: 100 CAPSULE ORAL at 20:11

## 2018-01-08 RX ADMIN — PANTOPRAZOLE SODIUM 40 MG: 40 TABLET, DELAYED RELEASE ORAL at 05:10

## 2018-01-08 RX ADMIN — DOCUSATE SODIUM 100 MG: 100 CAPSULE, LIQUID FILLED ORAL at 17:46

## 2018-01-08 RX ADMIN — Medication 300 UNITS: at 22:41

## 2018-01-08 RX ADMIN — CLOPIDOGREL BISULFATE 75 MG: 75 TABLET ORAL at 08:54

## 2018-01-08 RX ADMIN — Medication 325 MG: at 17:46

## 2018-01-08 RX ADMIN — LEVALBUTEROL 1.25 MG: 1.25 SOLUTION, CONCENTRATE RESPIRATORY (INHALATION) at 07:29

## 2018-01-08 RX ADMIN — OXYCODONE HYDROCHLORIDE AND ACETAMINOPHEN 500 MG: 500 TABLET ORAL at 08:55

## 2018-01-08 RX ADMIN — IPRATROPIUM BROMIDE 0.5 MG: 0.5 SOLUTION RESPIRATORY (INHALATION) at 07:29

## 2018-01-08 RX ADMIN — METHYLPREDNISOLONE SODIUM SUCCINATE 20 MG: 40 INJECTION, POWDER, FOR SOLUTION INTRAMUSCULAR; INTRAVENOUS at 20:11

## 2018-01-08 RX ADMIN — FLUTICASONE PROPIONATE AND SALMETEROL 1 PUFF: 50; 250 POWDER RESPIRATORY (INHALATION) at 08:54

## 2018-01-08 RX ADMIN — TOLTERODINE TARTRATE 4 MG: 2 CAPSULE, EXTENDED RELEASE ORAL at 08:54

## 2018-01-08 RX ADMIN — POTASSIUM CHLORIDE 10 MEQ: 750 TABLET, EXTENDED RELEASE ORAL at 08:55

## 2018-01-08 RX ADMIN — LEVALBUTEROL 1.25 MG: 1.25 SOLUTION, CONCENTRATE RESPIRATORY (INHALATION) at 21:49

## 2018-01-08 RX ADMIN — Medication 300 UNITS: at 05:11

## 2018-01-08 RX ADMIN — PREGABALIN 300 MG: 100 CAPSULE ORAL at 08:54

## 2018-01-08 RX ADMIN — METHYLPREDNISOLONE SODIUM SUCCINATE 40 MG: 40 INJECTION, POWDER, FOR SOLUTION INTRAMUSCULAR; INTRAVENOUS at 11:44

## 2018-01-08 RX ADMIN — Medication 325 MG: at 08:55

## 2018-01-08 RX ADMIN — ASPIRIN 81 MG: 81 TABLET, COATED ORAL at 08:55

## 2018-01-08 RX ADMIN — FOLIC ACID 1 MG: 1 TABLET ORAL at 08:55

## 2018-01-08 RX ADMIN — Medication 300 UNITS: at 11:48

## 2018-01-08 RX ADMIN — LEVALBUTEROL 1.25 MG: 1.25 SOLUTION, CONCENTRATE RESPIRATORY (INHALATION) at 15:35

## 2018-01-08 RX ADMIN — APIXABAN 5 MG: 5 TABLET, FILM COATED ORAL at 20:11

## 2018-01-08 RX ADMIN — METOPROLOL TARTRATE 50 MG: 50 TABLET ORAL at 20:11

## 2018-01-08 NOTE — PHYSICAL THERAPY NOTE
PT TREATMENT     01/08/18 1517   Pain Assessment   Pain Assessment No/denies pain   Restrictions/Precautions   Other Precautions Fall Risk   General   Chart Reviewed Yes   Family/Caregiver Present No   Subjective   Subjective "I feel shaky today"   Bed Mobility   Supine to Sit 5  Supervision   Sit to Supine 5  Supervision   Transfers   Sit to Stand 5  Supervision   Stand to Sit 5  Supervision   Additional Comments Pt first amb 15 feet to bathroom  Pt stood at sink to clean calazime ointment from her face with assist from PT  Pt mistakenly put the ointment on her face thinking it was a moisturizer - RN aware  Ambulation/Elevation   Gait pattern (generally unsteady)   Gait Assistance (SBA)   Additional items Verbal cues   Assistive Device (pt's hurrycane)   Distance 100 feet;pt using her hurrycane and holding onto rail in hallway  Balance   Static Sitting Fair +   Static Standing Fair   Dynamic Standing Fair -   Ambulatory Fair -   Assessment   Assessment Pt needing slightly increase assist today with amb due to "feeling shaky" - pt holding onto rail in hallway and needing stand by assist  Recommend trial of RW to pt, but pt declined  Pt will cont to benefit from skilled PT services  Plan   Treatment/Interventions ADL retraining;Functional transfer training;LE strengthening/ROM; Therapeutic exercise; Endurance training;Patient/family training;Bed mobility;Gait training   PT Frequency (3-5x/wk)   Recommendation   Recommendation (Home/home PT)   Equipment Recommended (cont amb with SPC or RW)

## 2018-01-08 NOTE — PLAN OF CARE
CARDIOVASCULAR - ADULT     Maintains optimal cardiac output and hemodynamic stability Progressing     Absence of cardiac dysrhythmias or at baseline rhythm Progressing        DISCHARGE PLANNING     Discharge to home or other facility with appropriate resources Progressing        DISCHARGE PLANNING - CARE MANAGEMENT     Discharge to post-acute care or home with appropriate resources Progressing        INFECTION - ADULT     Absence or prevention of progression during hospitalization Progressing     Absence of fever/infection during neutropenic period Progressing        Knowledge Deficit     Patient/family/caregiver demonstrates understanding of disease process, treatment plan, medications, and discharge instructions Progressing        METABOLIC, FLUID AND ELECTROLYTES - ADULT     Electrolytes maintained within normal limits Progressing     Fluid balance maintained Progressing        PAIN - ADULT     Verbalizes/displays adequate comfort level or baseline comfort level Progressing        Potential for Falls     Patient will remain free of falls Progressing        RESPIRATORY - ADULT     Achieves optimal ventilation and oxygenation Progressing        SAFETY ADULT     Patient will remain free of falls Progressing     Maintain or return to baseline ADL function Progressing     Maintain or return mobility status to optimal level Progressing

## 2018-01-08 NOTE — PROGRESS NOTES
[facility] Internal Medicine Progress Note  Patient: Sandie Cunningham 81 y o  female   MRN: [id number]  PCP: Marylene Pinion, DO  Unit/Bed#: [address] [place] [address] Encounter: [id number]  Date Of Visit: 01/08/18    Assessment:  Principal Problem:    Acute congestive heart failure ([facility] [address] )  Active Problems:    Hypertension    Morbid obesity with BMI of 50 0-59 9, adult (HCC)    GERD (gastroesophageal reflux disease)    Hyperlipemia    Atrial fibrillation (HCC)    Fibromyalgia    Lymphedema of left leg    History of Merkel cell carcinoma    Moderate persistent asthma    Iron deficiency anemia    Shortness of breath    Plan:  · Acute respiratory failure with hypoxia: In the setting of CHF exacerbation, dependent atelectasis, asthma, and poor ventilation due to obesity evidenced by worsening shortness of breath, respiratory distress, tachypnea  Mild improvement with IV Lasix and DuoNeb  Initiated low-dose IV steroids  Repeat CXR revealed diminished inspiration, no active pulmonary disease  Patient does not have supplemental oxygen at home, therefore, will need a home O2 eval prior to discharge  Encourage mobility and use of IS  · Acute on chronic diastolic heart failure: Initial CXR revealed mild congestive heart failure  BNP 1170  Troponin levels negative x3  Repeat echo revealed EF 70% with grade 1 diastolic dysfunction  Cardiology following, appreciate input  Now on Lasix 20 mg po daily  Repeat CXR revealed no active disease  Continue cardiac low sodium, fluid-restricted diet  · Paroxysmal Atrial fibrillation:  Continuous telemetry monitor with uncontrolled atrial fibrillation yesterday  Rate improved s/p Metoprolol 5 mg IV x1 yesterday  Increased Metoprolol to 50 mg BID  Initiate Eliquis 5 mg BID  Discontinue Aspirin and Plavix  Check an overnight sleep screen to evaluate for DAMON  · Left lower extremity chronic lymphedema:  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    Continue Venodyne compressive pumps as tolerated  · Moderate persistent asthma:  Continues with shortness of breath  Initiated low-dose IV steroids  Continue Advair 1 puff BID, nebulizer treatments as needed and supplemental oxygen  · Iron deficiency anemia:  Iron panel reviewed, iron 19, ferritin 36, iron sat 7  Hemoglobin 11 3, MCV 79, RDW 17 3  Initiated iron supplementation with ferrous sulfate 1 tab BID with meals as well as vitamin C and folate  Bowel regimen  · Hypokalemia:  Resolved  · Hypertension:  Continue Norvasc and metoprolol  · Hyperlipidemia:  Lipid panel reviewed, LDL 97  Continue statin  · Left carotid stenosis:  Patient has refused carotid endarterectomy in the past   Continue on aspirin and Plavix  · History of Merkel cell carcinoma:  Seen by heme/onc in September 2017  No evidence of recurrence  Continue outpatient follow-up  VTE Pharmacologic Prophylaxis:   Pharmacologic: Apixaban (Eliquis)  Mechanical: Mechanical VTE prophylaxis in place  Patient Centered Rounds: I have performed bedside rounds with nursing staff today  Discussions with Specialists or Other Care Team Provider:  Nursing, case management, cardiology  Education and Discussions with Family / Patient:  I have answered all questions to the best of my ability  Time Spent for Care: 20 minutes  More than 50% of total time spent on counseling and coordination of care as described above  Current Length of Stay: 3 day(s)  Current Patient Status: Inpatient   Certification Statement: The patient will continue to require additional inpatient hospital stay due to acute on chronic diastolic heart failure and uncontrolled atrial fibrillation    Discharge Plan:  Patient is not medically stable for discharge today, likely in 24-48 hours pending progress  Code Status: Level 1 - Full Code    Subjective:   Continues to report that she just does not feel right  States she has difficulty taking deep inspiration and feels like she can't get full breath    Declines any episodes of shortness of breath with ambulation  Intermittently using supplemental oxygen  Declines headache, lightheadedness, cough, chest pain, abdominal pain  Objective:   Vitals:   Temp (24hrs), Av °F (37 2 °C), Min:98 2 °F (36 8 °C), Max:100 1 °F (37 8 °C)    HR:  [] 88  Resp:  [18-20] 18  BP: (124-192)/(59-85) 124/59  SpO2:  [91 %-93 %] 91 %  Body mass index is 37 34 kg/m²  Input and Output Summary (last 24 hours): Intake/Output Summary (Last 24 hours) at 18 1737  Last data filed at 18 1401   Gross per 24 hour   Intake              780 ml   Output             1500 ml   Net             -720 ml       Physical Exam:     Physical Exam   Constitutional: She is oriented to person, place, and time  She appears well-developed  No distress  Pleasant, morbidly obese female resting out of bed in chair intermittently using supplemental oxygen at 2 liters/minute via nasal cannula   HENT:   Head: Normocephalic  Neck: Normal range of motion  Cardiovascular: Normal rate and regular rhythm  Pulmonary/Chest: No accessory muscle usage  No respiratory distress  She has decreased breath sounds in the right upper field, the right lower field, the left upper field and the left lower field  She has wheezes  She has no rhonchi  She has no rales  Very tight, diminished lung sounds      Abdominal: Soft  Bowel sounds are normal  She exhibits no distension  There is no tenderness  Musculoskeletal: Normal range of motion  She exhibits edema (+3 LLE 2/2 lymphedema, +1 RLE)  She exhibits no tenderness  Neurological: She is alert and oriented to person, place, and time  Skin: Skin is warm and dry  No rash noted  She is not diaphoretic  There is erythema (chronic LLE 2/2 lymphedema )  Psychiatric: She has a normal mood and affect  Judgment normal    Nursing note and vitals reviewed        Additional Data:   Labs:    Results from last 7 days  Lab Units 18  0521  18  1108 WBC Thousand/uL 5 70  < > 4 70*   HEMOGLOBIN g/dL 11 8*  < > 12 9   HEMATOCRIT % 36 3*  < > 39 6   PLATELETS Thousands/uL 180  < > 160   NEUTROS PCT %  --   --  65   LYMPHS PCT %  --   --  14   MONOS PCT %  --   --  9   EOS PCT %  --   --  12*   < > = values in this interval not displayed  Results from last 7 days  Lab Units 01/08/18  0521  01/05/18  1108   SODIUM mmol/L 142  < > 142   POTASSIUM mmol/L 4 0  < > 3 9   CHLORIDE mmol/L 102  < > 105   CO2 mmol/L 34*  < > 30   BUN mg/dL 19  < > 22   CREATININE mg/dL 0 98  < > 1 01   CALCIUM mg/dL 8 6  < > 9 2   TOTAL PROTEIN g/dL  --   --  6 7   BILIRUBIN TOTAL mg/dL  --   --  0 40   ALK PHOS U/L  --   --  97   ALT U/L  --   --  21   AST U/L  --   --  20   GLUCOSE RANDOM mg/dL 98  < > 105   < > = values in this interval not displayed  Results from last 7 days  Lab Units 01/05/18  1121   INR  1 02       * I Have Reviewed All Lab Data Listed Above  * Additional Pertinent Lab Tests Reviewed: All Labs Within Last 24 Hours Reviewed    Imaging:    Imaging Reports Reviewed Today Include: Procedure: Xr Chest 1 View Portable    Result Date: 1/5/2018  Narrative: CHEST INDICATION:  Shortness of breath  Asthma  COMPARISON:  Chest radiographs December 11, 2017 VIEWS:   AP frontal IMAGES:  1 FINDINGS:     The heart is enlarged  Atherosclerotic changes in the aorta  Right internal jugular [id number]-m-[id number] with tip at cavoatrial junction  Lung volumes diminished  Small bilateral pleural effusions  Elevation of left hemidiaphragm  Nita and pulmonary vessels diffusely enlarged  Visualized osseous structures appear within normal limits for the patient's age  Impression: Mild congestive heart failure  Workstation performed: [id number]     Procedure: Cta Ed Chest Pe Study    Result Date: 1/5/2018  Narrative: CTA - CHEST WITH IV CONTRAST - PULMONARY ANGIOGRAM INDICATION: Shortness of breath   COMPARISON: 10/02/2017 TECHNIQUE: CTA examination of the chest was performed using angiographic technique according to a protocol specifically tailored to evaluate for pulmonary embolism  Reformatted images were created in axial, sagittal, and coronal planes  In addition, coronal 3D MIP postprocessing was performed on the acquisition scanner  Radiation dose length product (DLP) for this visit:  748 06 mGy-cm   This examination, like all CT scans performed in the [facility], was performed utilizing techniques to minimize radiation dose exposure, including the use of iterative  reconstruction and automated exposure control  IV Contrast:  100 mL of iohexol (OMNIPAQUE)          FINDINGS: PULMONARY ARTERIAL TREE:  No pulmonary embolus is seen  LUNGS:  Dependent atelectasis in the posterior aspects of both upper lobes and in both lower lobes  PLEURA:  Unremarkable  HEART/AORTA:  Unremarkable for patient's age  MEDIASTINUM AND JASWINDER:  Unremarkable  CHEST WALL AND LOWER NECK:       Normal  VISUALIZED STRUCTURES IN THE UPPER ABDOMEN:  Right renal cyst  OSSEOUS STRUCTURES:  No acute fracture or destructive osseous lesion  Degenerative changes in the shoulders  Impression: 1  No pulmonary embolism  2   Dependent atelectasis  Workstation performed: [id number]       Cultures:   Blood Culture:   Lab Results   Component Value Date    BLOODCX No Growth at 72 hrs  01/05/2018    BLOODCX No Growth at 72 hrs  01/05/2018    BLOODCX No Growth After 5 Days  12/13/2017    BLOODCX No Growth After 5 Days  12/13/2017    BLOODCX Staphylococcus epidermidis (A) 12/11/2017    BLOODCX Staphylococcus epidermidis (A) 12/11/2017    BLOODCX No Growth After 5 Days   09/22/2016     Urine Culture:   Lab Results   Component Value Date    URINECX >100,000 cfu/ml Mixed Contaminants X5 09/21/2016     Sputum Culture: No components found for: SPUTUMCX  Wound Culture: No results found for: WOUNDCULT    Last 24 Hours Medication List:     amLODIPine 5 mg Oral Daily   ascorbic acid 500 mg Oral Daily   aspirin 81 mg Oral Daily   atorvastatin 10 mg Oral Daily With Dinner   clopidogrel 75 mg Oral Daily   docusate sodium 100 mg Oral BID   enoxaparin 40 mg Subcutaneous Daily   ferrous sulfate 325 mg Oral BID With Meals   fluticasone-salmeterol 1 puff Inhalation G14G [address]   folic acid 1 mg Oral Daily   furosemide 20 mg Oral Daily   ipratropium 0 5 mg Nebulization TID   levalbuterol 1 25 mg Nebulization TID   methylPREDNISolone sodium succinate 20 mg Intravenous Q12H CORBIN   metoprolol tartrate 50 mg Oral BID   pantoprazole 40 mg Oral Early Morning   polyethylene glycol 17 g Oral Daily   potassium chloride 10 mEq Oral Daily   pregabalin 300 mg Oral BID   tolterodine 4 mg Oral Daily        Today, Patient Was Seen By: GILDA Cummings    ** Please Note: Dragon 360 Dictation voice to text software may have been used in the creation of this document   **

## 2018-01-08 NOTE — PHYSICIAN ADVISOR
Current patient class: Inpatient  The patient is currently on Hospital Day: 4      The patient was admitted to the hospital  on 1/5/18 at [phone] for the following diagnosis:  CHF (congestive heart failure) ([facility]) [I50 9]  Difficulty breathing [R06 89]       There is documentation in the medical record of an expected length of stay of at least 2 midnights  The patient is therefore expected to satisfy the 2 midnight benchmark and given the 2 midnight presumption is appropriate for INPATIENT ADMISSION  Given this expectation of a satisfying stay, CMS instructs us that the patient is most often appropriate for inpatient admission under part A provided medical necessity is documented in the chart  After review of the relevant documentation, labs, vital signs and test results, the patient is appropriate for INPATIENT ADMISSION  Admission to the hospital as an inpatient is a complex decision making process which requires the practitioner to consider the patients presenting complaint, history and physical examination and all relevant testing  With this in mind, in this case, the patient was deemed appropriate for INPATIENT ADMISSION  After review of the documentation and testing available at the time of the admission I concur with this clinical determination of medical necessity  The patient does have an inpatient admission within the previous 30 days  The patient was admitted on 12/11/17 and discharged on 12/16/17 as an inpatient  The patient therefore required readmission review  In this case the patient should be considered a SEPARATE and UNRELATED INPATIENT ADMISSION  The patient had been discharged in stable condition with a completed care plan  There were no unresolved acute medical issues at the time of discharged which would have reasonably been expected to prompt this readmission      The patient's previous hospitalization from 12/11/17 through 12/16/17 was for an acute exacerbation of asthma as well as acute tracheobronchitis  The current admission is for acute on chronic diastolic CHF  These inpatient admissions are considered separate and unrelated inpatient admissions  Rationale is as follows: The patient is an 81year-old female who presented to the ED with the complaint of shortness of breath  She was diagnosed with acute on chronic diastolic CHF  She was treated with IV Lasix and was seen in consultation by Cardiology  She continues to require supplemental oxygen to main adequate oxygen saturation levels  She continues to require nebulizer treatments  The patient is appropriate for an inpatient admission  The patients vitals on arrival were ED Triage Vitals [01/05/18 1040]   Temperature Pulse Respirations Blood Pressure SpO2   98 °F (36 7 °C) 80 20 165/96 93 %      Temp Source Heart Rate Source Patient Position - Orthostatic VS BP Location FiO2 (%)   Oral Monitor Sitting Right arm --      Pain Score       7           Past Medical History:   Diagnosis Date    Asthma     Cancer (HCC)     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    Cardiac disease     a fib    Fibromyalgia     Fibromyalgia, primary     GERD (gastroesophageal reflux disease)     Hypertension     Hypokalemia     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, left groin, lung and bladder  Patient treated with chemotherapy and radiation     Past Surgical History:   Procedure Laterality Date    APPENDECTOMY      CATARACT EXTRACTION      CHEST WALL BIOPSY N/A 10/27/2017    Procedure: SINUS EXCISION AND REMOVAL OF FOREIGN BODY, ABDOMEN (WOUND EXPLORATION);   Surgeon: Rodríguez Rose MD;  Location: [address] [place] [address];  Service: General    EYE SURGERY Bilateral     cataracts     HIP FRACTURE SURGERY Left     HYSTERECTOMY      JOINT REPLACEMENT Left     hip    LYMPHADENECTOMY      PORTACATH PLACEMENT Right            Consults have been placed to:   IP CONSULT TO CARDIOLOGY  IP CONSULT TO NUTRITION SERVICES    Vitals:    01/08/18 0445 01/08/18 0718 01/08/18 0729 01/08/18 1346   BP: (!) 192/85 147/78  124/59   Pulse: 79 83  88   Resp: 20 18 18   Temp: 100 1 °F (37 8 °C) 98 8 °F (37 1 °C)  98 2 °F (36 8 °C)   TempSrc: Oral Oral  Oral   SpO2: 93% 91% 91% 91%   Weight: 92 6 kg (204 lb 2 3 oz)      Height:           Most recent labs:    Recent Labs      01/05/18   1735   01/08/18   0521   WBC   --    < >  5 70   HGB   --    < >  11 8*   HCT   --    < >  36 3*   PLT  152   < >  180   K   --    < >  4 0   NA   --    < >  142   CALCIUM   --    < >  8 6   BUN   --    < >  19   CREATININE   --    < >  0 98   TROPONINI  <0 02   --    --     < > = values in this interval not displayed         Scheduled Meds:  amLODIPine 5 mg Oral Daily   ascorbic acid 500 mg Oral Daily   aspirin 81 mg Oral Daily   atorvastatin 10 mg Oral Daily With Dinner   clopidogrel 75 mg Oral Daily   docusate sodium 100 mg Oral BID   enoxaparin 40 mg Subcutaneous Daily   ferrous sulfate 325 mg Oral BID With Meals   fluticasone-salmeterol 1 puff Inhalation OD [facility]   folic acid 1 mg Oral Daily   furosemide 20 mg Oral Daily   ipratropium 0 5 mg Nebulization TID   levalbuterol 1 25 mg Nebulization TID   methylPREDNISolone sodium succinate 20 mg Intravenous Q12H CORBIN   metoprolol tartrate 50 mg Oral BID   pantoprazole 40 mg Oral Early Morning   polyethylene glycol 17 g Oral Daily   potassium chloride 10 mEq Oral Daily   pregabalin 300 mg Oral BID   tolterodine 4 mg Oral Daily     Continuous Infusions:   PRN Meds:   acetaminophen    ALPRAZolam    heparin lock flush    levalbuterol    Surgical procedures (if appropriate):

## 2018-01-08 NOTE — RESPIRATORY THERAPY NOTE
Home Oxygen Qualifying Test       Patient name: Diana Jensen        : 1935   Date of Test:  2018  Diagnosis:      Home Oxygen Test:    **Medicare Guidelines require item(s) 1-5 on all ambulatory patients or 1 and 2 on on-ambulatory patients  1   Baseline SPO2 on Room Air at rest  94 %  If = or < 88% on room air add O2 via NC and titrate patient  Patient must be ambulated with O2 and titrated to > 88% with exertion  2   SPO2 on Oxygen at rest NA %  3   SPO2 during exercise on Room Air 92 %  4   SPO2 during exercise on Oxygen  NA % at a liter flow of NA lpm     5   Exercise performed:    [x] Walking     [] Stairs     [x] Duration 4 (min )     [] Distance  (ft )       []  Supplemental Home Oxygen is indicated  [x]  Client does not qualify for home oxygen        Luis Manuel Petersen, RT RRT/ACCS

## 2018-01-08 NOTE — CASE MANAGEMENT
Initial Clinical Review    Admission: Date/Time/Statement: 1/5/18 @ 1252     Orders Placed This Encounter   Procedures    Inpatient Admission (expected length of stay for this patient is greater than two midnights)     Standing Status:   Standing     Number of Occurrences:   1     Order Specific Question:   Admitting Physician     Answer:   Lisa Joshi [[id number]]     Order Specific Question:   Level of Care     Answer:   Med Surg [16]     Order Specific Question:   Estimated length of stay     Answer:   More than 2 Midnights     Order Specific Question:   Certification     Answer:   I certify that inpatient services are medically necessary for this patient for a duration of greater than two midnights  See H&P and MD Progress Notes for additional information about the patient's course of treatment  ED: Date/Time/Mode of Arrival:   ED Arrival Information     Expected Arrival Acuity Means of Arrival Escorted By Service Admission Type    - 1/5/2018 10:33 Urgent Ambulance [address] Urgent    Arrival Complaint    difficulty breathing          Chief Complaint:   Chief Complaint   Patient presents with    Shortness of Breath     pt c/o SOB x 1 week, worsening last night  pt states here 2 months ago for pneumonia  History of Illness:  81 y o  female with PMH of fibromyalgia, 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, lymph node dissection sampling and radiation and chemotherapy, atrial fibrillation, GERD, moderate persistent asthma, and hypertension who presents with shortness of breath and decreased p o  intake for the last 7 days  She states she attempted to try to walk downstairs and had trouble breathing at the time in which she called 911  She states she is able to lie flat with 1 pillow on her left side  She complains of a nonproductive cough  She denies chest pain, abdominal pain, or headache  Denies nausea, vomiting, or diarrhea    Denies fevers, chills or rigor  She states her left leg is chronically edematous from her Merkel cell cancer  ED Vital Signs:   ED Triage Vitals [01/05/18 1040]   Temperature Pulse Respirations Blood Pressure SpO2   98 °F (36 7 °C) 80 20 165/96 93 %      Temp Source Heart Rate Source Patient Position - Orthostatic VS BP Location FiO2 (%)   Oral Monitor Sitting Right arm --      Pain Score       7        Wt Readings from Last 1 Encounters:   01/08/18 92 6 kg (204 lb 2 3 oz)       Vital Signs (abnormal): Abnormal Labs/Diagnostic Test Results:   WBC Thousand/uL 4 70*   cxr  Mild congestive heart failure  cta  No pulmonary embolism  2   Dependent atelectasis  ED Treatment:   Medication Administration from 01/05/2018 1033 to 01/05/2018 1419       Date/Time Order Dose Route Action Action by Comments     01/05/2018 1224 ipratropium-albuterol (DUO-NEB) 0 5-2 5 mg/mL inhalation solution 3 mL 3 mL Nebulization Given Geri Arvizu RN      01/05/2018 1129 methylPREDNISolone sodium succinate (Solu-MEDROL) injection 125 mg 125 mg Intravenous Given Zahida Le RN      01/05/2018 1129 ipratropium-albuterol (DUO-NEB) 0 5-2 5 mg/mL inhalation solution 3 mL 3 mL Nebulization Given Zahida Le RN      01/05/2018 1212 iohexol (OMNIPAQUE) 350 MG/ML injection (MULTI-DOSE) 100 mL 100 mL Intravenous Given Ayde Sipel V      01/05/2018 1302 furosemide (LASIX) injection 40 mg 40 mg Intravenous Given Marianna Martin RN           Past Medical/Surgical History:    Active Ambulatory Problems     Diagnosis Date Noted    Chest pain 04/26/2016    Hypertension 04/27/2016    Acute cholecystitis 09/21/2016    Abdominal pain 09/21/2016    Meningioma ([facility] ) 09/21/2016    Syncope 09/21/2016    Hypertension, accelerated 09/24/2016    Sepsis ([facility] ) 09/24/2016    Carotid stenosis, left 09/25/2016    Morbid obesity with BMI of 50 0-59 9, adult ([facility] ) 09/25/2016    Acute bronchitis 03/13/2017    GERD (gastroesophageal reflux disease) 03/13/2017  Hyperlipemia 03/13/2017    Atrial fibrillation ([facility] ) 03/13/2017    Fibromyalgia 03/14/2017    Hypokalemia 03/14/2017    Moderate persistent asthma with acute exacerbation 03/16/2017    Open wound of abdominal wall without penetration into peritoneal cavity 10/27/2017    Asthma exacerbation 12/12/2017    Lymphedema of left leg 12/12/2017    Acute respiratory failure with hypoxia (HCC) 12/12/2017    Bacteremia 12/12/2017    History of Merkel cell carcinoma 12/13/2017    Port-a-cath in place 12/13/2017     Resolved Ambulatory Problems     Diagnosis Date Noted    No Resolved Ambulatory Problems     Past Medical History:   Diagnosis Date    Asthma     Cancer ([facility] )     Cardiac disease     Fibromyalgia     Fibromyalgia, primary     GERD (gastroesophageal reflux disease)     Hypertension     Hypokalemia     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HCC)        Admitting Diagnosis: CHF (congestive heart failure) ([facility]) [I50 9]  Difficulty breathing [R06 89]    Age/Sex: 81 y o  female    Assessment/Plan:   Principal Problem:    CHF (congestive heart failure) ([facility])  Active Problems:    Hypertension    Morbid obesity with BMI of 50 0-59 9, adult (HCC)    GERD (gastroesophageal reflux disease)    Hyperlipemia    Atrial fibrillation ([facility])    Fibromyalgia    Lymphedema of left leg    History of Merkel cell carcinoma    Moderate persistent asthma  Plan for the Primary Problem(s):  SOB likely 2/2 Acute diastolic heart failure,  IN ED received Lasix 40mg IV x1 dose, will hold off anymore lasix for tonight and re-evaluate in a m  PCXR, mild congestive heart failure  BNP, 1170  Consult cardiology  First troponin <0 02, complete serial troponins  9/22/16 ECHO, EF 60-70% no regional wall motion abnormalities, mild concentric positive feet  Grade 1 diastolic dysfunction     Ordered repeat echo  Daily weights and strict I&Os  Ordered TSH and HbgA1C  Blood cultures , pending  Influenza/RSV pending  Plan for Additional Problems: Hypertension,  Continue Norvasc and metoprolol  Hyperlipidemia,  Check lipid profile in a m  Continue atorvastatin  GERD,  Continue PPI  Fibromyalgia,  Continue supportive care  Continue Lyrica  Moderate persistent asthma without acute exacerbation,  In the ED she did receive Solu-Medrol 125 IV x1 and duo nebs x2  Continue albuterol  Reviewed the Dr Corby Colbert progress notes she is to be on Advair 250/50mcg 1 puff b i d  Atrial fibrillation,  She refuses anticoagulation  Continue metoprolol, aspirin and Plavix  Left lower extremity edema,  She states this is chronic due to her Merkel cell lymphoma resection   Left carotid stenosis,  In the past she has refused carotid endarterectomy  Continue aspirin and Plavix  History of Merkel cell carcinoma,  Seen by heme/onc in 09/2017, no evidence of recurrence  VTE Prophylaxis: Enoxaparin (Lovenox)  / sequential compression device   Code Status: full  POLST: POLST form is not discussed and not completed at this time  Anticipated Length of Stay:  Patient will be admitted on an Inpatient basis with an anticipated length of stay of  >2 midnights     Justification for Hospital Stay:  Acute CHF requiring IV Lasix     Admission Orders:  Tele mon  Bmp cbc ua cs  cxr  Consult  Cardio  oxygen  Scheduled Meds:   amLODIPine 5 mg Oral Daily   ascorbic acid 500 mg Oral Daily   aspirin 81 mg Oral Daily   atorvastatin 10 mg Oral Daily With Dinner   clopidogrel 75 mg Oral Daily   docusate sodium 100 mg Oral BID   enoxaparin 40 mg Subcutaneous Daily   ferrous sulfate 325 mg Oral BID With Meals   fluticasone-salmeterol 1 puff Inhalation R47M [address]   folic acid 1 mg Oral Daily   furosemide 20 mg Oral Daily   ipratropium 0 5 mg Nebulization TID   levalbuterol 1 25 mg Nebulization TID   methylPREDNISolone sodium succinate 40 mg Intravenous Q12H CORBIN   metoprolol tartrate 50 mg Oral BID   pantoprazole 40 mg Oral Early Morning   polyethylene glycol 17 g Oral Daily   potassium chloride 10 mEq Oral Daily   pregabalin 300 mg Oral BID   tolterodine 4 mg Oral Daily     Continuous Infusions:    PRN Meds:   acetaminophen    ALPRAZolam    heparin lock flush    levalbuterol

## 2018-01-08 NOTE — CONSULTS
Consult received and appreciated  Pt noted with 100% intakes per documentation, sleeping at multiple visits and receiving breathing tx  Will assess further po intakes and need for diet education at review date  Recommend continuing current Cardiac Step 1 diet d/t REL+1,  LLE+3 edema noted       Paula Lewis, [address] [place] [address], [address] [place] [address] [phone]

## 2018-01-08 NOTE — PROGRESS NOTES
Progress Note - Cardiology   Santino Moctezuma 81 y o  female MRN: [id number]  Unit/Bed#: [address] Encounter: [id number]    Assessment and Plan:   1  Shortness of breath  Probably multifactorial causes  The most likely etiology is underlying obesity hypoventilation syndrome along with some asthma  She does have high LV filling pressure but she has normal LV systolic function  Agree with keeping her on diuretics  Recurrent episodes of atrial fibrillation may be causing some shortness of breath  2   Recurrent multiple episodes of atrial fibrillation  She need to be on antithrombotic therapy  Metoprolol was increased yesterday  May benefit from low-dose amiodarone if continues to have episodes of atrial fibrillation  Discussed with patient about antithrombotic therapy  After extensive discussion she is willing to try newer drugs  She does not want to take warfarin due to previous bad experience by her father  3  Morbid obesity  Rule out sleep apnea consider sleep screen  4  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  5  History of COPD and asthma  Plan  Since she has no history of stents will DC Plavix  Start her on Eliquis 5 mg twice a day  Issues related to Eliquis discussed with patient at length  She understand the risk of bleeding and its expense and no antidote  Continue metoprolol  And titrate up the dose  DC Norvasc  If continues to have episodes of AFib will add amiodarone  Continue other Rx as before  Subjective / Objective:   Patient seen and evaluated  Patient monitor shows multiple episodes of atrial fibrillation  Currently she is sinus with frequent PACs  Patient not on antithrombotic therapy as her father many years ago at had spinal hemorrhage  She does not want to go back on Coumadin but would consider other medications  She is breathing better but still not close to her baseline      Vitals:    01/08/18 1346   BP: 124/59   Pulse: 88   Resp: 18   Temp: 98 2 °F (36 8 °C)   SpO2: 91%     Vitals:    01/05/18 1559 01/08/18 0445   Weight: 85 5 kg (188 lb 7 9 oz) 92 6 kg (204 lb 2 3 oz)     Orthostatic Blood Pressures    Flowsheet Row Most Recent Value   Blood Pressure  124/59 filed at 01/08/2018 1346   Patient Position - Orthostatic VS  Lying filed at 01/08/2018 1346             Intake/Output Summary (Last 24 hours) at 01/08/18 1823  Last data filed at 01/08/18 1801   Gross per 24 hour   Intake              780 ml   Output             1750 ml   Net             -970 ml        Invasive Devices     Central Venous Catheter Line            Port A Cath 01/06/18 Right Chest 2 days              Body mass index is 37 34 kg/m²  Physical Exam:   Physical Exam    Neurologic:  Alert & oriented x 3,  no focal deficits noted   Constitutional:  Well developed, well nourished,  With no acute distress moderately obese  Eyes:  PERRL, conjunctiva normal   HENT:  Atraumatic, external ears normal, nose normal,   NECK: Normal range of motion, no tenderness, neck is supple , No JVP  Respiratory:  Bilateral air entry mostly clear to auscultation  Cardiovascular: S1-S2 regular with a 2/6 ejection systolic murmur  S4 is heard  GI:  Soft, nondistended, normal bowel sounds, nontender, no hepatosplenomegaly appreciated  Musculoskeletal:  No tenderness, no deformities      Extremities:  Mild edema and distal pulses are present  Psychiatric:  Speech and behavior appropriate           Medications, Allergies:       amLODIPine 5 mg Oral Daily   apixaban 2 5 mg Oral BID   ascorbic acid 500 mg Oral Daily   aspirin 81 mg Oral Daily   atorvastatin 10 mg Oral Daily With Dinner   docusate sodium 100 mg Oral BID   ferrous sulfate 325 mg Oral BID With Meals   fluticasone-salmeterol 1 puff Inhalation H39U [address]   folic acid 1 mg Oral Daily   furosemide 20 mg Oral Daily   ipratropium 0 5 mg Nebulization TID   levalbuterol 1 25 mg Nebulization TID   methylPREDNISolone sodium succinate 20 mg Intravenous Q12H [address] metoprolol tartrate 50 mg Oral BID   pantoprazole 40 mg Oral Early Morning   polyethylene glycol 17 g Oral Daily   potassium chloride 10 mEq Oral Daily   pregabalin 300 mg Oral BID   tolterodine 4 mg Oral Daily       acetaminophen 650 mg Q6H PRN   ALPRAZolam 0 5 mg HS PRN   heparin lock flush 300 Units Q1H PRN   levalbuterol 0 63 mg Q6H PRN     Allergies   Allergen Reactions    Fentanyl And Related Other (See Comments)     Passed out    Latex     Penicillins        VTE Pharmacologic Prophylaxis:   Eliquis    Labs:   Troponins:    Results from last 7 days  Lab Units 01/05/18  1735 01/05/18  1108   TROPONIN I ng/mL <0 02 <0 02       CBC with diff:    Results from last 7 days  Lab Units 01/08/18  0521 01/07/18  0535 01/06/18  0619 01/05/18  1735 01/05/18  1108   WBC Thousand/uL 5 70 7 00 4 80  --  4 70*   HEMOGLOBIN g/dL 11 8* 11 3* 12 7  --  12 9   HEMATOCRIT % 36 3* 35 4* 38 8  --  39 6   MCV fL 79* 79* 79*  --  79*   PLATELETS Thousands/uL 180 170 174 152 160   MCH pg 25 7* 25 3* 25 7*  --  25 8*   MCHC g/dL 32 4 32 0 32 7  --  32 7   RDW % 17 4* 17 3* 17 0*  --  17 1*   MPV fL 9 5 9 3 9 0 9 4 9 2   NRBC AUTO /100 WBCs  --   --   --   --  0       CMP:    Results from last 7 days  Lab Units 01/08/18  0521 01/07/18  0535 01/06/18  0619 01/05/18  1108   SODIUM mmol/L 142 141 143 142   POTASSIUM mmol/L 4 0 3 4* 3 6 3 9   CHLORIDE mmol/L 102 103 105 105   CO2 mmol/L 34* 38* 34* 30   ANION GAP mmol/L 6 0* 4 7   BUN mg/dL 19 28* 26* 22   CREATININE mg/dL 0 98 1 00 1 19 1 01   GLUCOSE RANDOM mg/dL 98 125 196* 105   CALCIUM mg/dL 8 6 8 7 9 1 9 2   AST U/L  --   --   --  20   ALT U/L  --   --   --  21   ALK PHOS U/L  --   --   --  97   TOTAL PROTEIN g/dL  --   --   --  6 7   ALBUMIN g/dL  --   --   --  2 7*   BILIRUBIN TOTAL mg/dL  --   --   --  0 40   EGFR ml/min/1 73sq m 54 53 43 52       Magnesium:    Results from last 7 days  Lab Units 01/07/18  0537 01/06/18  0619 01/05/18  1108   MAGNESIUM mg/dL 2 6 1 9 1 9 Coags:    Results from last 7 days  Lab Units 01/05/18  1121   PTT seconds 25   INR  1 02     TSH:    Results from last 7 days  Lab Units 01/07/18  0535 01/05/18  1735   TSH 3RD GENERATON uIU/mL 0 386 0 214*     Lipid Profile:    Results from last 7 days  Lab Units 01/06/18  0619   CHOLESTEROL mg/dL 167   TRIGLYCERIDES mg/dL 84   HDL mg/dL 53   LDL CALC mg/dL 97     Hgb A1c:    Results from last 7 days  Lab Units 01/06/18  0619   HEMOGLOBIN A1C % 6 2       Imaging & Testing   I have personally reviewed pertinent reports  Xr Chest Portable    Result Date: 1/8/2018  Narrative: CHEST INDICATION:  Shortness of breath COMPARISON:  Chest radiographs January 5, 2018 and CT pulmonary angiogram January 5, 2018  VIEWS:   AP frontal IMAGES:  1 FINDINGS:     The heart is enlarged  Atherosclerotic changes in the aorta  Right internal jugular [id number] with tip at cavoatrial junction  Lung volumes diminished  Elevation of left hemidiaphragm  No focal pneumonia  Visualized osseous structures appear within normal limits for the patient's age  Impression: Diminished inspiration  No active pulmonary disease  Workstation performed: [id number]     Xr Chest 1 View Portable    Result Date: 1/5/2018  Narrative: CHEST INDICATION:  Shortness of breath  Asthma  COMPARISON:  Chest radiographs December 11, 2017 VIEWS:   AP frontal IMAGES:  1 FINDINGS:     The heart is enlarged  Atherosclerotic changes in the aorta  Right internal jugular Asbblm-[id number] with tip at cavoatrial junction  Lung volumes diminished  Small bilateral pleural effusions  Elevation of left hemidiaphragm  Nita and pulmonary vessels diffusely enlarged  Visualized osseous structures appear within normal limits for the patient's age  Impression: Mild congestive heart failure  Workstation performed: [id number]     Xr Chest 1 View Portable    Result Date: 12/11/2017  Narrative: CHEST INDICATION:  Chest pain and productive cough   COMPARISON: 10/12/2017  VIEWS:   AP frontal IMAGES:  1 FINDINGS:  A right-sided catheter terminates at the caval atrial junction  No pneumothorax  Cardiomediastinal silhouette appears unremarkable  Left basilar subsegmental atelectasis with hemidiaphragm elevation noted  Tiny left pleural effusion suspected  Upper lobe and right lung are clear  Visualized osseous structures appear within normal limits for the patient's age  Impression: Left basilar atelectasis with tiny pleural effusion and hemidiaphragm elevation  Workstation performed: [id number]     [address] Ed Chest Pe Study    Result Date: 1/5/2018  Narrative: CTA - CHEST WITH IV CONTRAST - PULMONARY ANGIOGRAM INDICATION: Shortness of breath  COMPARISON: 10/02/2017 TECHNIQUE: CTA examination of the chest was performed using angiographic technique according to a protocol specifically tailored to evaluate for pulmonary embolism  Reformatted images were created in axial, sagittal, and coronal planes  In addition, coronal 3D MIP postprocessing was performed on the acquisition scanner  Radiation dose length product (DLP) for this visit:  748 06 mGy-cm   This examination, like all CT scans performed in the [facility], was performed utilizing techniques to minimize radiation dose exposure, including the use of iterative  reconstruction and automated exposure control  IV Contrast:  100 mL of iohexol (OMNIPAQUE)          FINDINGS: PULMONARY ARTERIAL TREE:  No pulmonary embolus is seen  LUNGS:  Dependent atelectasis in the posterior aspects of both upper lobes and in both lower lobes  PLEURA:  Unremarkable  HEART/AORTA:  Unremarkable for patient's age  MEDIASTINUM AND JASWINDER:  Unremarkable  CHEST WALL AND LOWER NECK:       Normal  VISUALIZED STRUCTURES IN THE UPPER ABDOMEN:  Right renal cyst  OSSEOUS STRUCTURES:  No acute fracture or destructive osseous lesion  Degenerative changes in the shoulders  Impression: 1  No pulmonary embolism   2   Dependent atelectasis  Workstation performed: [id number]     Vas Lower Limb Venous Duplex Study, Unilateral/limited    Result Date: 2017  Narrative:  [facility] REPORT CLINICAL: Indications: Edema, unspecified [R60 9]  Patient presents with left lower extremity edema x several months  The patient has history of malignancy  Clinical:  FINDINGS:  Left     Impression       CFV      Normal (Patent)     CONCLUSION:  Impression: RIGHT LOWER LIMB LIMITED: NORMAL Evaluation shows no evidence of thrombus in the common femoral vein  Doppler evaluation shows a normal response to augmentation maneuvers  LEFT LOWER LIMB: NORMAL No evidence of acute or chronic deep vein thrombosis No evidence of superficial thrombophlebitis noted  Doppler evaluation shows a normal response to augmentation maneuvers  Popliteal, posterior tibial and anterior tibial arterial Doppler waveforms are triphasic  SIGNATURE: Electronically Signed by: Eugenie Nielsen on 2017 06:10:35 PM    EKG / Monitor: Personally reviewed  Currently she is sinus with PACs  She has multiple episodes overnight with AFib with rapid ventricular rate  Cardiac testing:   Results for orders placed during the hospital encounter of 18   Echo complete with contrast if indicated    Narrative [address]  [address] [facility] [address]  [phone]    Transthoracic Echocardiogram  2D, M-mode, Doppler, and Color Doppler    Study date:  2018    Patient: Jennifer Austin  MR number: [id number]  Account number: [de-identified]  : 1935  Age: 81 years  Gender: Female  Status: Routine  Location: Bedside  Height: 62 in  Weight: 187 7 lb  BP: 134/ 59 mmHg    Indications: Shortness of Breath Evaluate for suspected cardiac source of embolism      Diagnoses: 786 05 - SHORTNESS OF BREATH    Sonographer:  Zhang Altamirano  Interpreting Physician:  Abel Street DO  Primary Physician:  Israel Paz DO  Group:    [facility]    SUMMARY    LEFT VENTRICLE:  The cavity was small  Systolic function was hyperdynamic by visual assessment  Ejection fraction was estimated in the range of 70 % to 75 %  There were no regional wall motion abnormalities  There was mild concentric hypertrophy  Doppler parameters were consistent with abnormal left ventricular relaxation (grade 1 diastolic dysfunction)  Doppler parameters were consistent with elevated mean left atrial filling pressure  MITRAL VALVE:  There was mild regurgitation  AORTIC VALVE:  There was no evidence for stenosis  There was no regurgitation  TRICUSPID VALVE:  There was mild regurgitation  Pulmonary artery systolic pressure was mildly increased  HISTORY: PRIOR HISTORY: Shortness of Breath    PROCEDURE: The procedure was performed at the bedside  This was a routine study  The transthoracic approach was used  The study included complete 2D imaging, M-mode, complete spectral Doppler, and color Doppler  The heart rate was 77 bpm,  at the start of the study  This was a technically difficult study  LEFT VENTRICLE: The cavity was small  Systolic function was hyperdynamic by visual assessment  Ejection fraction was estimated in the range of 70 % to 75 %  There were no regional wall motion abnormalities  There was mild concentric  hypertrophy  DOPPLER: Doppler parameters were consistent with abnormal left ventricular relaxation (grade 1 diastolic dysfunction)  Doppler parameters were consistent with elevated mean left atrial filling pressure  RIGHT VENTRICLE: The size was normal  Systolic function was normal  DOPPLER: Systolic pressure was within the normal range  LEFT ATRIUM: Size was normal  No thrombus was identified  RIGHT ATRIUM: Size was normal     MITRAL VALVE: Valve structure was normal  There was normal leaflet separation  No echocardiographic evidence for prolapse  DOPPLER: The transmitral velocity was within the normal range  There was no evidence for stenosis  There was mild  regurgitation  AORTIC VALVE: The valve was trileaflet  Leaflets exhibited normal cuspal separation and sclerosis  DOPPLER: Transaortic velocity was within the normal range  There was no evidence for stenosis  There was no regurgitation  TRICUSPID VALVE: The valve structure was normal  There was normal leaflet separation  DOPPLER: The transtricuspid velocity was within the normal range  There was mild regurgitation  Pulmonary artery systolic pressure was mildly increased  Estimated peak PA pressure was 42 mmHg  PULMONIC VALVE: Leaflets exhibited normal thickness, no calcification, and normal cuspal separation  DOPPLER: The transpulmonic velocity was within the normal range  There was mild regurgitation  PERICARDIUM: There was no thickening  There was no pericardial effusion  AORTA: The root exhibited normal size  PULMONARY ARTERY: The size was normal  The morphology appeared normal     SYSTEM MEASUREMENT TABLES    2D mode  AoR Diam 2D: 3 1 cm  LA Diam (2D): 4 2 cm  LA/Ao (2D): 1 35  FS (2D Teich): 53 9 %  IVSd (2D): 1 08 cm  LVDEV: 73 4 cm³  LVESV: 10 9 cm³  LVIDd(2D): 4 08 cm  LVISd (2D): 1 88 cm  LVOT Area 2D: 3 46 cm squared  LVPWd (2D): 1 05 cm  SV (Teich): 62 5 cm³    Apical four chamber  LVEF A4C: 84 %    Unspecified Scan Mode  ANNIE Cont Eq (Peak Pacheco): 1 94 cm squared  ANNIE Cont Eq (VTI): 1 81 cm squared  LVOT (VTI): 26 8 cm  LVOT Diam : 2 1 cm  LVOT Vmax: 1210 mm/s  LVOT Vmax; Mean: 996 mm/s  Peak Grad ; Mean: 4 mm[Hg]  SV (LVOT): 81 cm³  VTI;Mean: 4 mm[Hg]  MV Peak A Pacheco: 1240 mm/s  MV Peak E Pacheco   Mean: 731 mm/s  RVSP: 36 mm[Hg]  Max P mm[Hg]  V Max: 2790 mm/s  Vmax: 2800 mm/s  TAPSE: 2 2 cm    [employer] Accredited Echocardiography Laboratory    Prepared and electronically signed by    Jodie Beltran DO  Signed 2018 11:30:10         Dr Srinivasan Mckenzie MD [facility] [place]      "This note has been constructed using a voice recognition system  Therefore there may be syntax, spelling, and/or grammatical errors   Please call if you have any questions  "

## 2018-01-09 LAB
ANION GAP SERPL CALCULATED.3IONS-SCNC: 0 MMOL/L (ref 4–13)
BUN SERPL-MCNC: 17 MG/DL (ref 5–25)
CALCIUM SERPL-MCNC: 8.8 MG/DL (ref 8.3–10.1)
CHLORIDE SERPL-SCNC: 100 MMOL/L (ref 100–108)
CO2 SERPL-SCNC: 38 MMOL/L (ref 21–32)
CREAT SERPL-MCNC: 0.89 MG/DL (ref 0.6–1.3)
ERYTHROCYTE [DISTWIDTH] IN BLOOD BY AUTOMATED COUNT: 17.2 % (ref 11.6–15.1)
GFR SERPL CREATININE-BSD FRML MDRD: 61 ML/MIN/1.73SQ M
GLUCOSE SERPL-MCNC: 184 MG/DL (ref 65–140)
HCT VFR BLD AUTO: 36.9 % (ref 37–47)
HGB BLD-MCNC: 11.9 G/DL (ref 12–16)
MCH RBC QN AUTO: 25.1 PG (ref 27–31)
MCHC RBC AUTO-ENTMCNC: 32.2 G/DL (ref 31.4–37.4)
MCV RBC AUTO: 78 FL (ref 82–98)
PLATELET # BLD AUTO: 196 THOUSANDS/UL (ref 130–400)
PMV BLD AUTO: 8.8 FL (ref 8.9–12.7)
POTASSIUM SERPL-SCNC: 4.2 MMOL/L (ref 3.5–5.3)
RBC # BLD AUTO: 4.73 MILLION/UL (ref 4.2–5.4)
SODIUM SERPL-SCNC: 138 MMOL/L (ref 136–145)
WBC # BLD AUTO: 4.1 THOUSAND/UL (ref 4.8–10.8)

## 2018-01-09 PROCEDURE — 97110 THERAPEUTIC EXERCISES: CPT

## 2018-01-09 PROCEDURE — 85027 COMPLETE CBC AUTOMATED: CPT | Performed by: NURSE PRACTITIONER

## 2018-01-09 PROCEDURE — 94640 AIRWAY INHALATION TREATMENT: CPT

## 2018-01-09 PROCEDURE — 97116 GAIT TRAINING THERAPY: CPT

## 2018-01-09 PROCEDURE — 80048 BASIC METABOLIC PNL TOTAL CA: CPT | Performed by: NURSE PRACTITIONER

## 2018-01-09 PROCEDURE — 94760 N-INVAS EAR/PLS OXIMETRY 1: CPT

## 2018-01-09 RX ORDER — LORATADINE 10 MG/1
10 TABLET ORAL
Status: DISCONTINUED | OUTPATIENT
Start: 2018-01-09 | End: 2018-01-10 | Stop reason: HOSPADM

## 2018-01-09 RX ADMIN — OXYCODONE HYDROCHLORIDE AND ACETAMINOPHEN 500 MG: 500 TABLET ORAL at 08:39

## 2018-01-09 RX ADMIN — IPRATROPIUM BROMIDE 0.5 MG: 0.5 SOLUTION RESPIRATORY (INHALATION) at 07:10

## 2018-01-09 RX ADMIN — FLUTICASONE PROPIONATE AND SALMETEROL 1 PUFF: 50; 250 POWDER RESPIRATORY (INHALATION) at 21:14

## 2018-01-09 RX ADMIN — LEVALBUTEROL 1.25 MG: 1.25 SOLUTION, CONCENTRATE RESPIRATORY (INHALATION) at 07:10

## 2018-01-09 RX ADMIN — METOPROLOL TARTRATE 50 MG: 50 TABLET ORAL at 21:13

## 2018-01-09 RX ADMIN — APIXABAN 5 MG: 5 TABLET, FILM COATED ORAL at 08:39

## 2018-01-09 RX ADMIN — FUROSEMIDE 20 MG: 20 TABLET ORAL at 08:39

## 2018-01-09 RX ADMIN — METHYLPREDNISOLONE SODIUM SUCCINATE 20 MG: 40 INJECTION, POWDER, FOR SOLUTION INTRAMUSCULAR; INTRAVENOUS at 21:14

## 2018-01-09 RX ADMIN — LEVALBUTEROL 1.25 MG: 1.25 SOLUTION, CONCENTRATE RESPIRATORY (INHALATION) at 13:50

## 2018-01-09 RX ADMIN — ALPRAZOLAM 0.5 MG: 0.5 TABLET ORAL at 21:13

## 2018-01-09 RX ADMIN — LORATADINE 10 MG: 10 TABLET ORAL at 21:35

## 2018-01-09 RX ADMIN — PREGABALIN 300 MG: 100 CAPSULE ORAL at 21:12

## 2018-01-09 RX ADMIN — Medication 325 MG: at 16:53

## 2018-01-09 RX ADMIN — LEVALBUTEROL 1.25 MG: 1.25 SOLUTION, CONCENTRATE RESPIRATORY (INHALATION) at 19:24

## 2018-01-09 RX ADMIN — POTASSIUM CHLORIDE 10 MEQ: 750 TABLET, EXTENDED RELEASE ORAL at 08:39

## 2018-01-09 RX ADMIN — IPRATROPIUM BROMIDE 0.5 MG: 0.5 SOLUTION RESPIRATORY (INHALATION) at 19:25

## 2018-01-09 RX ADMIN — IPRATROPIUM BROMIDE 0.5 MG: 0.5 SOLUTION RESPIRATORY (INHALATION) at 13:50

## 2018-01-09 RX ADMIN — DOCUSATE SODIUM 100 MG: 100 CAPSULE, LIQUID FILLED ORAL at 17:00

## 2018-01-09 RX ADMIN — PREGABALIN 300 MG: 100 CAPSULE ORAL at 08:39

## 2018-01-09 RX ADMIN — FOLIC ACID 1 MG: 1 TABLET ORAL at 08:39

## 2018-01-09 RX ADMIN — Medication 300 UNITS: at 05:01

## 2018-01-09 RX ADMIN — Medication 325 MG: at 08:39

## 2018-01-09 RX ADMIN — METOPROLOL TARTRATE 50 MG: 50 TABLET ORAL at 08:39

## 2018-01-09 RX ADMIN — PANTOPRAZOLE SODIUM 40 MG: 40 TABLET, DELAYED RELEASE ORAL at 05:01

## 2018-01-09 RX ADMIN — FLUTICASONE PROPIONATE AND SALMETEROL 1 PUFF: 50; 250 POWDER RESPIRATORY (INHALATION) at 08:38

## 2018-01-09 RX ADMIN — Medication 300 UNITS: at 21:13

## 2018-01-09 RX ADMIN — ATORVASTATIN CALCIUM 10 MG: 10 TABLET, FILM COATED ORAL at 16:53

## 2018-01-09 RX ADMIN — METHYLPREDNISOLONE SODIUM SUCCINATE 20 MG: 40 INJECTION, POWDER, FOR SOLUTION INTRAMUSCULAR; INTRAVENOUS at 08:39

## 2018-01-09 RX ADMIN — APIXABAN 5 MG: 5 TABLET, FILM COATED ORAL at 17:00

## 2018-01-09 RX ADMIN — TOLTERODINE TARTRATE 4 MG: 2 CAPSULE, EXTENDED RELEASE ORAL at 08:39

## 2018-01-09 NOTE — PHYSICAL THERAPY NOTE
PT TREATMENT     01/09/18 1451   Restrictions/Precautions   Other Precautions Contact/isolation;O2;Fall Risk   General   Chart Reviewed Yes   Family/Caregiver Present No   Cognition   Overall Cognitive Status WFL   Arousal/Participation Cooperative   Following Commands Follows all commands and directions without difficulty   Subjective   Subjective "My knee starts to swell up after awhile"   Bed Mobility   Supine to Sit 7  Independent   Sit to Supine 4  Minimal assistance   Additional items LE management   Transfers   Sit to Stand 6  Modified independent   Stand to Sit 6  Modified independent   Ambulation/Elevation   Gait pattern Antalgic  (becomes more antalgic with distance 2/2 left knee swelling)   Gait Assistance 5  Supervision   Assistive Device (hurricane)   Distance 150 feet with brief standing rest half way to check O2   Balance   Ambulatory Fair   Activity Tolerance   Activity Tolerance Patient limited by fatigue  (O2 on RA: 88% to begin walk; 84%  at half way; 89% back in r)   Exercises   Hip Flexion Sitting;10 reps;Right;5 reps; Left   Knee AROM Long Arc Quad Sitting;10 reps;Right;5 reps; Left   Ankle Pumps Sitting;15 reps;Bilateral  (heel toe raises)   Assessment   Prognosis Good   Problem List Decreased strength;Decreased endurance; Impaired balance;Decreased mobility   Assessment Pt  desats with gait on RA  May benefit from home O2 assessment before discharge  Pt  back in bed at end of session with all needs in reach  Will benefit from continued PT   Goals   Patient Goals Non specific   Plan   Treatment/Interventions Functional transfer training;LE strengthening/ROM; Therapeutic exercise; Endurance training;Patient/family training;Equipment eval/education; Bed mobility;Gait training;Spoke to MD   Progress Progressing toward goals   Recommendation   Recommendation (home with home PT;  Home O2 assessment )   in bed at end of session

## 2018-01-09 NOTE — PROGRESS NOTES
[facility] Progress Note  Patient: Priyanka Parker 81 y o  female   MRN: [id number]  PCP: Marky Adhikari DO  Unit/Bed#: [address] [place] [address] Encounter: [id number]  Date Of Visit: 18    Subjective:   Improving sob  Leg swelling improving  No cp or resting sob    Objective:   Vitals:   Temp (24hrs), Av °F (36 7 °C), Min:97 5 °F (36 4 °C), Max:98 6 °F (37 °C)    HR:  [66-79] 66  Resp:  [18-20] 18  BP: (132-148)/(60-85) 132/60  SpO2:  [92 %-95 %] 93 %  Body mass index is 37 09 kg/m²  Intake/Output Summary (Last 24 hours) at 18 1513  Last data filed at 18 1301   Gross per 24 hour   Intake              960 ml   Output             1350 ml   Net             -390 ml       Physical Exam:   General: Chronic ill, NAD  HEENT: EOMI, anicteric, oral moist, no oral thrush or mucosal lesion, neck supple, no mass or JVD  Chest: CTAB, BS decreased, mild exp wheezing  Cardiac: RRR, S1/S2, No murmur  Abd: S/ND/NT/BS+  MSK: 2+ b/l LE pitting edema, erythema and chronic dermatitis change, pulses intact  Neuro: AAOx3, moving all extremities  Psychiatric: Mood with normal affect    Additional Data:     Labs:    Results from last 7 days  Lab Units 18  0506  18  1108   WBC Thousand/uL 4 10*  < > 4 70*   HEMOGLOBIN g/dL 11 9*  < > 12 9   HEMATOCRIT % 36 9*  < > 39 6   PLATELETS Thousands/uL 196  < > 160   NEUTROS PCT %  --   --  65   LYMPHS PCT %  --   --  14   MONOS PCT %  --   --  9   EOS PCT %  --   --  12*   < > = values in this interval not displayed      Results from last 7 days  Lab Units 18  0506  18  1108   SODIUM mmol/L 138  < > 142   POTASSIUM mmol/L 4 2  < > 3 9   CHLORIDE mmol/L 100  < > 105   CO2 mmol/L 38*  < > 30   BUN mg/dL 17  < > 22   CREATININE mg/dL 0 89  < > 1 01   CALCIUM mg/dL 8 8  < > 9 2   TOTAL PROTEIN g/dL  --   --  6 7   BILIRUBIN TOTAL mg/dL  --   --  0 40   ALK PHOS U/L  --   --  97   ALT U/L  --   --  21   AST U/L  --   --  20   GLUCOSE RANDOM mg/dL 184*  < > 105   < > = values in this interval not displayed  Results from last 7 days  Lab Units 01/05/18  1121   INR  1 02       Recent Results (from the past 24 hour(s))   Basic metabolic panel    Collection Time: 01/09/18  5:06 AM   Result Value Ref Range    Sodium 138 136 - 145 mmol/L    Potassium 4 2 3 5 - 5 3 mmol/L    Chloride 100 100 - 108 mmol/L    CO2 38 (H) 21 - 32 mmol/L    Anion Gap 0 (L) 4 - 13 mmol/L    BUN 17 5 - 25 mg/dL    Creatinine 0 89 0 60 - 1 30 mg/dL    Glucose 184 (H) 65 - 140 mg/dL    Calcium 8 8 8 3 - 10 1 mg/dL    eGFR 61 ml/min/1 73sq m   CBC    Collection Time: 01/09/18  5:06 AM   Result Value Ref Range    WBC 4 10 (L) 4 80 - 10 80 Thousand/uL    RBC 4 73 4 20 - 5 40 Million/uL    Hemoglobin 11 9 (L) 12 0 - 16 0 g/dL    Hematocrit 36 9 (L) 37 0 - 47 0 %    MCV 78 (L) 82 - 98 fL    MCH 25 1 (L) 27 0 - 31 0 pg    MCHC 32 2 31 4 - 37 4 g/dL    RDW 17 2 (H) 11 6 - 15 1 %    Platelets 046 779 - 324 Thousands/uL    MPV 8 8 (L) 8 9 - 12 7 fL       Cultures:     Results from last 7 days  Lab Units 01/05/18  1122 01/05/18  1115 01/05/18  1107   BLOOD CULTURE   --  No Growth After 4 Days  No Growth After 4 Days  INFLUENZA A PCR  None Detected  --   --    INFLUENZA B PCR  None Detected  --   --    RSV PCR  None Detected  --   --        Imaging:  Xr Chest Portable    Result Date: 1/8/2018  Narrative: CHEST INDICATION:  Shortness of breath COMPARISON:  Chest radiographs January 5, 2018 and CT pulmonary angiogram January 5, 2018  VIEWS:   AP frontal IMAGES:  1 FINDINGS:     The heart is enlarged  Atherosclerotic changes in the aorta  Right internal jugular Seznnn-k-Fejt with tip at cavoatrial junction  Lung volumes diminished  Elevation of left hemidiaphragm  No focal pneumonia  Visualized osseous structures appear within normal limits for the patient's age  Impression: Diminished inspiration  No active pulmonary disease   Workstation performed: [id number]     Xr Chest 1 View Portable    Result Date: 1/5/2018  Narrative: CHEST INDICATION:  Shortness of breath  Asthma  COMPARISON:  Chest radiographs December 11, 2017 VIEWS:   AP frontal IMAGES:  1 FINDINGS:     The heart is enlarged  Atherosclerotic changes in the aorta  Right internal jugular Wkrkvl-l-Fhhe with tip at cavoatrial junction  Lung volumes diminished  Small bilateral pleural effusions  Elevation of left hemidiaphragm  Nita and pulmonary vessels diffusely enlarged  Visualized osseous structures appear within normal limits for the patient's age  Impression: Mild congestive heart failure  Workstation performed: [id number]     Xr Chest 1 View Portable    Result Date: 12/11/2017  Narrative: CHEST INDICATION:  Chest pain and productive cough  COMPARISON:  10/12/2017  VIEWS:   AP frontal IMAGES:  1 FINDINGS:  A right-sided catheter terminates at the caval atrial junction  No pneumothorax  Cardiomediastinal silhouette appears unremarkable  Left basilar subsegmental atelectasis with hemidiaphragm elevation noted  Tiny left pleural effusion suspected  Upper lobe and right lung are clear  Visualized osseous structures appear within normal limits for the patient's age  Impression: Left basilar atelectasis with tiny pleural effusion and hemidiaphragm elevation  Workstation performed: [id number]     [facility] Chest Pe Study    Result Date: 1/5/2018  Narrative: CTA - CHEST WITH IV CONTRAST - PULMONARY ANGIOGRAM INDICATION: Shortness of breath  COMPARISON: 10/02/2017 TECHNIQUE: CTA examination of the chest was performed using angiographic technique according to a protocol specifically tailored to evaluate for pulmonary embolism  Reformatted images were created in axial, sagittal, and coronal planes  In addition, coronal 3D MIP postprocessing was performed on the acquisition scanner  Radiation dose length product (DLP) for this visit:  748 06 mGy-cm     This examination, like all CT scans performed in the [facility], was performed utilizing techniques to minimize radiation dose exposure, including the use of iterative  reconstruction and automated exposure control  IV Contrast:  100 mL of iohexol (OMNIPAQUE)          FINDINGS: PULMONARY ARTERIAL TREE:  No pulmonary embolus is seen  LUNGS:  Dependent atelectasis in the posterior aspects of both upper lobes and in both lower lobes  PLEURA:  Unremarkable  HEART/AORTA:  Unremarkable for patient's age  MEDIASTINUM AND JASWINDER:  Unremarkable  CHEST WALL AND LOWER NECK:       Normal  VISUALIZED STRUCTURES IN THE UPPER ABDOMEN:  Right renal cyst  OSSEOUS STRUCTURES:  No acute fracture or destructive osseous lesion  Degenerative changes in the shoulders  Impression: 1  No pulmonary embolism  2   Dependent atelectasis  Workstation performed: [id number]     Vas Lower Limb Venous Duplex Study, Unilateral/limited    Result Date: 12/11/2017  Narrative:  [facility] REPORT CLINICAL: Indications: Edema, unspecified [R60 9]  Patient presents with left lower extremity edema x several months  The patient has history of malignancy  Clinical:  FINDINGS:  Left     Impression       CFV      Normal (Patent)     CONCLUSION:  Impression: RIGHT LOWER LIMB LIMITED: NORMAL Evaluation shows no evidence of thrombus in the common femoral vein  Doppler evaluation shows a normal response to augmentation maneuvers  LEFT LOWER LIMB: NORMAL No evidence of acute or chronic deep vein thrombosis No evidence of superficial thrombophlebitis noted  Doppler evaluation shows a normal response to augmentation maneuvers  Popliteal, posterior tibial and anterior tibial arterial Doppler waveforms are triphasic    SIGNATURE: Electronically Signed by: Oneal Zambrano on 2017-12-11 06:10:35 PM    Imaging Reports Reviewed by myself    Last 24 Hours Medication List:     Current Facility-Administered Medications:     acetaminophen (TYLENOL) tablet 650 mg, 650 mg, Oral, Q6H PRN, GILDA Conn   ALPRAZolam Marvie See) tablet 0 5 mg, 0 5 mg, Oral, HS PRN, YAHIR TorresNP, 0 5 mg at 01/08/18 2240    apixaban (ELIQUIS) tablet 5 mg, 5 mg, Oral, BID, GILDA Phillip, 5 mg at 01/09/18 6438    ascorbic acid (VITAMIN C) tablet 500 mg, 500 mg, Oral, Daily, GILDA Phillip, 500 mg at 01/09/18 1049    atorvastatin (LIPITOR) tablet 10 mg, 10 mg, Oral, Daily With Dinner, YAHIR TorresNP, 10 mg at 01/08/18 1746    docusate sodium (COLACE) capsule 100 mg, 100 mg, Oral, BID, GILDA Juárez, 100 mg at 01/08/18 1746    ferrous sulfate tablet 325 mg, 325 mg, Oral, BID With Meals, YAHIR NelsonNP, 325 mg at 01/09/18 2123    fluticasone-salmeterol (ADVAIR) 250-50 mcg/dose inhaler 1 puff, 1 puff, Inhalation, Q12H [address], Skip Leonardo, CRNP, 1 puff at 82/71/48 2992    folic acid (FOLVITE) tablet 1 mg, 1 mg, Oral, Daily, GILDA Nelson, 1 mg at 01/09/18 4217    furosemide (LASIX) tablet 20 mg, 20 mg, Oral, Daily, GILDA Phillip, 20 mg at 01/09/18 0839    heparin lock flush 100 units/mL injection 300 Units, 300 Units, Intracatheter, Q1H PRN, YAHIR NelsonNP, 300 Units at 01/09/18 0501    ipratropium (ATROVENT) 0 02 % inhalation solution 0 5 mg, 0 5 mg, Nebulization, TID, GILDA Nelson, 0 5 mg at 01/09/18 1350    levalbuterol (XOPENEX) inhalation solution 0 63 mg, 0 63 mg, Nebulization, Q6H PRN, GILDA Torres    levalbuterol [facility]) inhalation solution 1 25 mg, 1 25 mg, Nebulization, TID, GLIDA Nelson, 1 25 mg at 01/09/18 1350    methylPREDNISolone sodium succinate (Solu-MEDROL) injection 20 mg, 20 mg, Intravenous, Q12H [address], GILDA Nelson, 20 mg at 01/09/18 2165    metoprolol tartrate (LOPRESSOR) tablet 50 mg, 50 mg, Oral, BID, GILDA Nelson, 50 mg at 01/09/18 0839    pantoprazole (PROTONIX) EC tablet 40 mg, 40 mg, Oral, Early Morning, GILDA Juárez, 40 mg at 01/09/18 0501    polyethylene glycol (MIRALAX) packet 17 g, 17 g, Oral, Daily, Ledy M Almaraz, CRNP    potassium chloride (K-DUR,KLOR-CON) CR tablet 10 mEq, 10 mEq, Oral, Daily, Ledy M Almaraz, CRNP, 10 mEq at 01/09/18 6505    pregabalin (LYRICA) capsule 300 mg, 300 mg, Oral, BID, Ledy M Almaraz, CRNP, 300 mg at 01/09/18 8659    tolterodine (DETROL LA) 24 hr capsule 4 mg, 4 mg, Oral, Daily, Ledy M Almaraz, CRNP, 4 mg at 01/09/18 3216     Assessment and Plans:  Hospital Problem List:   Principal Problem:    Acute congestive heart failure ([facility] [address] )  Active Problems:    Hypertension    Morbid obesity with BMI of 50 0-59 9, adult (HCC)    GERD (gastroesophageal reflux disease)    Hyperlipemia    Atrial fibrillation (HCC)    Fibromyalgia    Lymphedema of left leg    History of Merkel cell carcinoma    Moderate persistent asthma    Iron deficiency anemia    Shortness of breath    1  Acute respiratory failure with hypoxia: Multifactorial 2/2 CHF exacerbation, dependent atelectasis, Asthma, poor ventilation due to obesity  Slowly improving with less dyspnea and wean of O2 at daytime  2  Acute on chronic diastolic heart failure: Initial CXR revealed mild congestive heart failure  BNP 1170  Troponin levels negative x3  Repeat TTE - EF 70% with grade 1 diastolic dysfunction  Cardiology consult following  Now on Lasix 20 mg po daily  Repeat CXR revealed no active disease  3  Moderate persistent asthma: Improving with iv steroids  Continue Advair BID, duo-neb  4  Paroxysmal Atrial fibrillation: Rate control with metoprolol  New start Eliquis 5mg bid  No sign of bleeding  5  Left lower extremity chronic lymphedema: 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  Continue Venodyne compressive pumps as tolerated  6  Iron deficiency anemia:  Iron panel reviewed, iron 19, ferritin 36, iron sat 7  Hemoglobin 11 3, MCV 79, RDW 17 3    Initiated iron supplementation with ferrous sulfate 1 tab BID with meals as well as vitamin C and folate  Bowel regimen  7  Hypokalemia: Resolved  8  Hypertension: Continue Norvasc and metoprolol  9  Hyperlipidemia: Lipid panel reviewed, LDL 97  Continue statin  10  Left carotid stenosis:  Patient has refused carotid endarterectomy in the past  Continue on aspirin and Plavix  11  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  No evidence of recurrence  Continue outpatient follow-up  DVT Prophylaxis: on Eliquis  Code Status: Level 1 - Full Code  Disposition: anticipate d/c home once clinically improved  Patient Centered Rounds: I have performed bedside rounds with nursing staff today  Discussions with Specialists or Other Care Team Provider: Yes    Education and Discussions with Family / Patient:Yes    Time Spent for Care: 20 minutes  More than 50% of total time spent on counseling and coordination of care as described above      Current Length of Stay: 4 day(s)    Current Patient Status: Inpatient     Signed by:  Ale Pacheco MD  Attending Hospitalist  Page#: [phone] (Hours 7am to 7pm)  1/9/2018 3:13 PM

## 2018-01-09 NOTE — MISCELLANEOUS
Message  Pt was scheduled for 9/27 for 1:45, moved schedule around and made 10:15, now pt called back she can not make appt 9/27 due to transportation with the bus service needing 3 days notice  I have rescheduled her to the next avail day in [place] with Hiram Myles it is 10/25 @ 10:15  I explained to give us a call and see if we have any cancelations so we may be able to move her up  I have also put a note in allscripts  Active Problems    1  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) (M21 969)   2  Allergic rhinitis (477 9) (J30 9)   3  Asthma (493 90) (J45 909)   4  Atherosclerotic peripheral vascular disease (440 20) (I70 209)   5  Callus (700) (L84)   6  Chronic obstructive pulmonary disease (496) (J44 9)   7  Coronary artery disease (414 00) (I25 10)   8  Edema (782 3) (R60 9)   9  Esophageal reflux (530 81) (K21 9)   10  Hyperlipidemia (272 4) (E78 5)   11  Limb pain (729 5) (M79 609)   12  Obstructive sleep apnea (327 23) (G47 33)   13  Onychomycosis (110 1) (B35 1)   14  Xerosis cutis (706 8) (L85 3)    Current Meds   1  Advair Diskus 250-50 MCG/DOSE Inhalation Aerosol Powder Breath Activated; Therapy: (Tereasa Moyock) to Recorded   2  Advair HFA AERO; Therapy: (Recorded:[id number]) to Recorded   3  Albuterol Sulfate (2 5 MG/3ML) 0 083% Inhalation Nebulization Solution; Therapy: (Tereasa Moyock) to Recorded   4  Flonase 50 MCG/ACT SUSP (Fluticasone Propionate); Therapy: (Tereasa Moyock) to Recorded   5  Lyrica CAPS; Therapy: ([phone]) to Recorded   6  NexIUM 40 MG Oral Capsule Delayed Release (Esomeprazole Magnesium); Therapy: (Tereasa Moyock) to Recorded   7  NexIUM CPDR (Esomeprazole Magnesium); Therapy: ([phone]) to Recorded   8  Potassium Chloride ER 10 MEQ Oral Capsule Extended Release; Therapy: (Tereasa Moyock) to Recorded   9  ProAir  (90 Base) MCG/ACT Inhalation Aerosol Solution; Therapy: (Tereasa Moyock) to Recorded   10   Toprol XL TB24 (Metoprolol Succinate ER); Therapy: ([phone]) to Recorded   11  Toviaz TB24;    Therapy: ([id number]) to Recorded   12  Triamterene-HCTZ 37 5-25 MG Oral Capsule; Therapy: (Ximena Kyrgyz) to Recorded   13  Xanax 0 5 MG Oral Tablet (ALPRAZolam); Therapy: (Ximena Kyrgyz) to Recorded    Allergies    1  Penicillins   2  Adhesive Tape TAPE   3  Latex Gloves MISC   4  Penicillins    5   Latex    Signatures   Electronically signed by : Saray Santo, ; Sep 26 2016  1:30PM EST                       (Author)

## 2018-01-09 NOTE — PLAN OF CARE
Problem: RESPIRATORY - ADULT  Goal: Achieves optimal ventilation and oxygenation  INTERVENTIONS:  - Assess for changes in respiratory sta  - Initiate smoking cessation education as indicated  - Respiratory Therapy support as indicated   Outcome: Progressing  poc due 1/12/18 1462 Aliyah Vaughan RRT

## 2018-01-09 NOTE — OCCUPATIONAL THERAPY NOTE
OT TREATMENT       01/09/18 1508   Restrictions/Precautions   Other Precautions Contact/isolation;O2;Fall Risk   Pain Assessment   Pain Assessment No/denies pain   Bed Mobility   Supine to Sit 7  Independent   Sit to Supine 5  Supervision   Transfers   Sit to Stand 5  Supervision   Stand to Sit 5  Supervision   Stand pivot 5  Supervision   Therapeutic Excerise-Strength   UE Strength Yes   Right Upper Extremity- Strength   R Shoulder Flexion; Extension;ABduction; External rotation; Internal rotation;Horizontal ABduction   R Elbow Elbow flexion;Elbow extension   R Wrist Wrist flexion;Wrist extension   R Position Standing;Seated   R Weight/Reps/Sets 20 reps x 2 sets   Left Upper Extremity-Strength   L Shoulder Flexion; Extension;ABduction; External rotation; Internal rotation;Horizontal ABduction   L Elbow Elbow flexion;Elbow extension   L Wrist Wrist extension;Wrist flexion   L Position Standing  (and seated)   L Weights/Reps/Sets 20 reps x 2 sets   Neuromuscular Education   Trunk Control good   Cognition   Overall Cognitive Status WFL   Activity Tolerance   Activity Tolerance Patient tolerated treatment well   Medical Staff Made Aware yes   Assessment   Assessment Pt just returned to room from walking and only agreeable to exercises at bedside  Strength, balance and functional activity tolerance improving with 2L continuous O2 via NC  Standing tolerance 5 minutes x 2 with Shaan  Plan   Treatment Interventions ADL retraining;Functional transfer training;UE strengthening/ROM; Endurance training;Patient/family training;Equipment evaluation/education; Activityengagement; Energy conservation   OT Frequency 3-5x/wk   Recommendation   OT Discharge Recommendation (home with services)     Patient returned to bed with all needs within reach

## 2018-01-09 NOTE — PROGRESS NOTES
History of Present Illness  Care Coordination Encounter Information:   Type of Encounter: Telephonic    Spoke to Patient  Care Coordination  Nurse [address] [place] [address] [address]   The reason for call is to discuss outreach for follow up/needed services and coordination of meeting care plan treatment goals  Jovany Myers has spoken with the [employer]ging on 10/20/16  She believes the application process is complete  She was again told there is a 6 month lag in the approval process  Jovany Myers did receive the "Five Wishes" document I sent her but she has not filled it out yet  Jovany Myers complains of pain in her right areola for 3 weeks  She denies erythema, discharge or lesions  The pain increases with movement of right arm  I called Dr Faith Lewis office  They will send a prescription for right diagnostic and left screening mammogram  I called and scheduled this mammogram appointment to be done at [facility] on 11/14/16 at 11:15 am  I have provided this information to Jovany Myers so she can schedule with [facility]  Active Problems    1  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) (M21 969)   2  Allergic rhinitis (477 9) (J30 9)   3  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,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)   4  Asthma (493 90) (J45 909)   5  Atherosclerotic peripheral vascular disease (440 20) (I70 209)   6  Callus (700) (L84)   7  Chronic obstructive pulmonary disease (496) (J44 9)   8  Edema (782 3) (R60 9)   9  Esophageal reflux (530 81) (K21 9)   10  Hyperlipidemia (272 4) (E78 5)   11  Limb pain (729 5) (M79 609)   12  Obstructive sleep apnea (327 23) (G47 33)   13  Onychomycosis (110 1) (B35 1)   14  Stenosis of left carotid artery (433 10) (I65 22)   15  Xerosis cutis (706 8) (L85 3)    Past Medical History    1  History of Aortic sclerosis (440 0) (I70 0)   2  History of Atelectasis (518 0) (J98 11)   3  History of Cellulitis (682 9) (L03 90)   4  History of Cervical Cancer (V10 41)   5  History of Fibromyalgia (729 1) (M79 7)   6  History of cardiomegaly (V12 59) (Z86 79)   7  History of cholelithiasis (V12 79) (Z87 19)   8  History of hiatal hernia (V12 79) (Z87 19)   9  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)   10  History of Pleural Effusion (511 9)   11  History of Pneumonia (V12 61)   12  History of Renal cyst, acquired (593 2) (N28 1)   13  History of Respiratory Failure With Hypercapnia (518 81)   14  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)   15  History of Septicemia (038 9) (A41 9)    Surgical History    1  History of Appendectomy   2  History of Bronchoscopy (Diagnostic)   3  History of Enteroscopic Polypectomy   4  History of Hip Surgery   5  History of Knee Arthroplasty   6  History of Percutaneous Portal Vein Catheter Placement   7  History of Radiation Therapy   8  History of Vaginal Hysterectomy    Family History  Mother    1  Family history of Acute Myocardial Infarction (V17 3)   2  Family history of Pneumonia  Father    3  Family history of Prostate Cancer (V16 42)  Sister    4  Family history of Breast Cancer (V16 3)    Social History    · Never A Smoker   · Non-smoker (V49 89) (Z78 9)   · Occupation:    Current Meds    1  Atorvastatin Calcium 10 MG Oral Tablet; TAKE 1 TABLET DAILY AS DIRECTED; Therapy: 66DEO3727 to [phone])  Requested for: 60BBW0234; Last   Rx:11Oct2016 Ordered   2  Clopidogrel Bisulfate 75 MG Oral Tablet; Take 1 tablet daily; Therapy: 25LMM9506 to (Last Rx:11Oct2016)  Requested for: 32LJN1155 Ordered    3  Advair Diskus 250-50 MCG/DOSE Inhalation Aerosol Powder Breath Activated; Therapy: (Evia Jeff) to Recorded   4  Advair HFA AERO; Therapy: ([phone]) to Recorded   5  Albuterol Sulfate (2 5 MG/3ML) 0 083% Inhalation Nebulization Solution; Therapy: (Evia Jeff) to Recorded   6  Aspirin 81 MG TABS; Therapy: (Recorded:11Oct2016) to Recorded   7  Atorvastatin Calcium 10 MG Oral Tablet;    Therapy: (Recorded:11Oct2016) to Recorded   8  Flonase 50 MCG/ACT SUSP (Fluticasone Propionate); Therapy: (Konstantin Selvin) to Recorded   9  LevoFLOXacin 500 MG Oral Tablet; Therapy: (Recorded:11Oct2016) to Recorded   10  Lisinopril 30 MG Oral Tablet; Therapy: (Recorded:11Oct2016) to Recorded   11  Lyrica CAPS; Therapy: ([phone]) to Recorded   12  MetroNIDAZOLE 500 MG Oral Tablet; Therapy: (Recorded:11Oct2016) to Recorded   13  NexIUM 40 MG Oral Capsule Delayed Release (Esomeprazole Magnesium); Therapy: (Konstantin Selvin) to Recorded   14  NexIUM CPDR (Esomeprazole Magnesium); Therapy: ([phone]) to Recorded   15  Plavix 75 MG Oral Tablet (Clopidogrel Bisulfate); Therapy: (Recorded:11Oct2016) to Recorded   16  Potassium Chloride ER 10 MEQ Oral Capsule Extended Release; Therapy: (Konstantinlisa Dimasrad) to Recorded   17  ProAir  (90 Base) MCG/ACT Inhalation Aerosol Solution; Therapy: (Konstantinlisa Dimasrad) to Recorded   18  Toprol XL TB24 (Metoprolol Succinate ER); Therapy: ([phone]) to Recorded   19  Toviaz TB24;    Therapy: (WLKXMKKF:[id number]) to Recorded   20  Triamterene-HCTZ 37 5-25 MG Oral Capsule; Therapy: (Konstantinlisa Dimasrad) to Recorded   21  Xanax 0 5 MG Oral Tablet (ALPRAZolam); Therapy: (Konstantin Dimasrad) to Recorded    Allergies    1  Penicillins   2  Adhesive Tape TAPE   3  Latex Gloves MISC   4  Penicillins    5  Latex    End of Encounter Meds    1  Atorvastatin Calcium 10 MG Oral Tablet; TAKE 1 TABLET DAILY AS DIRECTED; Therapy: 61XSE2110 to (Navi Alcantar)  Requested for: 41XAR4886; Last   Rx:11Oct2016 Ordered   2  Clopidogrel Bisulfate 75 MG Oral Tablet; Take 1 tablet daily; Therapy: 71PVO8427 to (Last Rx:11Oct2016)  Requested for: 76WUW9404 Ordered    3  Advair Diskus 250-50 MCG/DOSE Inhalation Aerosol Powder Breath Activated; Therapy: (Konstantin Montalvo) to Recorded   4  Advair HFA AERO; Therapy: ([phone]) to Recorded   5  Albuterol Sulfate (2 5 MG/3ML) 0 083% Inhalation Nebulization Solution; Therapy: (Loyd Singer) to Recorded   6  Aspirin 81 MG TABS; Therapy: (Recorded:11Oct2016) to Recorded   7  Atorvastatin Calcium 10 MG Oral Tablet; Therapy: (Recorded:11Oct2016) to Recorded   8  Flonase 50 MCG/ACT SUSP (Fluticasone Propionate); Therapy: (Loyd Singer) to Recorded   9  LevoFLOXacin 500 MG Oral Tablet; Therapy: (Recorded:11Oct2016) to Recorded   10  Lisinopril 30 MG Oral Tablet; Therapy: (Recorded:11Oct2016) to Recorded   11  Lyrica CAPS; Therapy: ([id number]) to Recorded   12  MetroNIDAZOLE 500 MG Oral Tablet; Therapy: (Recorded:11Oct2016) to Recorded   13  NexIUM 40 MG Oral Capsule Delayed Release (Esomeprazole Magnesium); Therapy: (Loyd Ruiz) to Recorded   14  NexIUM CPDR (Esomeprazole Magnesium); Therapy: ([id number]) to Recorded   15  Plavix 75 MG Oral Tablet (Clopidogrel Bisulfate); Therapy: (Recorded:11Oct2016) to Recorded   16  Potassium Chloride ER 10 MEQ Oral Capsule Extended Release; Therapy: (Loyd Ruiz) to Recorded   17  ProAir  (90 Base) MCG/ACT Inhalation Aerosol Solution; Therapy: (Loyd Ruiz) to Recorded   18  Toprol XL TB24 (Metoprolol Succinate ER); Therapy: ([id number]) to Recorded   19  Toviaz TB24;    Therapy: (HXSBMFIC:78KMK1281) to Recorded   20  Triamterene-HCTZ 37 5-25 MG Oral Capsule; Therapy: (Loyd Ruiz) to Recorded   21  Xanax 0 5 MG Oral Tablet (ALPRAZolam);     Therapy: (Loyd Ruiz) to Recorded    Future Appointments    Date/Time Provider Specialty Site   01/24/2017 10:45 AM Martínez Carter MD Vascular Surgery [facility] VASCULAR     Patient Care Team    Care Team Member Role Specialty Office Number   May Billingsley MD  Cardiology [phone]   [address] [place] [address]  Pulmonary Medicine [phone]   Oscar Medina MD  Vascular Surgery [phone]   Radha Gordon John Cardona MD  Vascular Surgery [phone]   [address] [place] [address] [phone]     Signatures   Electronically signed by :  Yessenia Morgan RN; Oct 31 2016 11:20AM EST                       (Author)

## 2018-01-09 NOTE — PLAN OF CARE
Problem: PHYSICAL THERAPY ADULT  Goal: Performs mobility at highest level of function for planned discharge setting  See evaluation for individualized goals  Outcome: Progressing  Prognosis: Good  Problem List: Decreased strength, Decreased endurance, Impaired balance, Decreased mobility  Assessment: Pt  desats with gait on RA  May benefit from home O2 assessment before discharge  Pt  back in bed at end of session with all needs in reach  Will benefit from continued PT        Recommendation:  (home with home PT; Home O2 assessment )          See flowsheet documentation for full assessment

## 2018-01-09 NOTE — CASE MANAGEMENT
Continued Stay Review    Date:     Vital Signs: /60   Pulse 66   Temp 98 4 °F (36 9 °C) (Oral)   Resp 18   Ht 5' 2" (1 575 m)   Wt 92 kg (202 lb 12 8 oz)   SpO2 93%   Breastfeeding? No   BMI 37 09 kg/m²     Medications:   Scheduled Meds:   apixaban 5 mg Oral BID   ascorbic acid 500 mg Oral Daily   atorvastatin 10 mg Oral Daily With Dinner   docusate sodium 100 mg Oral BID   ferrous sulfate 325 mg Oral BID With Meals   fluticasone-salmeterol 1 puff Inhalation Z31I [address]   folic acid 1 mg Oral Daily   furosemide 20 mg Oral Daily   ipratropium 0 5 mg Nebulization TID   levalbuterol 1 25 mg Nebulization TID   methylPREDNISolone sodium succinate 20 mg Intravenous Q12H CORBIN   metoprolol tartrate 50 mg Oral BID   pantoprazole 40 mg Oral Early Morning   polyethylene glycol 17 g Oral Daily   potassium chloride 10 mEq Oral Daily   pregabalin 300 mg Oral BID   tolterodine 4 mg Oral Daily     Continuous Infusions:    PRN Meds:   acetaminophen    ALPRAZolam    heparin lock flush    levalbuterol    Abnormal Labs/Diagnostic Results:     Age/Sex: 81 y o  female     Assessment/Plan:   Assessment and Plans:  Hospital Problem List:   Principal Problem:    Acute congestive heart failure ([facility] [address] )  Active Problems:    Hypertension    Morbid obesity with BMI of 50 0-59 9, adult (HCC)    GERD (gastroesophageal reflux disease)    Hyperlipemia    Atrial fibrillation (HCC)    Fibromyalgia    Lymphedema of left leg    History of Merkel cell carcinoma    Moderate persistent asthma    Iron deficiency anemia    Shortness of breath        DVT Prophylaxis: on Eliquis  Code Status: Level 1 - Full Code  Disposition: anticipate d/c home once clinically improved      Patient Centered Rounds: I have performed bedside rounds with nursing staff today    Discharge Plan:

## 2018-01-09 NOTE — PROGRESS NOTES
Progress Note - Cardiology   Molly Lock 81 y o  female MRN: [id number]  Unit/Bed#: [address] Encounter: [id number]    Assessment and Plan:   1  Shortness of breath  Probably multifactorial causes  The most likely etiology is underlying obesity hypoventilation syndrome along with some asthma  She does have high LV filling pressure but she has normal LV systolic function  Maybe on discharge she can be on low-dose Aldactone  2  Recurrent multiple episodes of atrial fibrillation  Beta-blocker has helped  Patient's heart rate is now acceptable  Continue Eliquis  All her questions regarding Eliquis answered  3  Morbid obesity  Rule out sleep apnea, patient refused sleep screen  4  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  5  History of COPD and asthma  Plan  Since she has no history of stents will DC Plavix  Start her on Eliquis 5 mg twice a day  Issues related to Eliquis discussed with patient at length  She understand the risk of bleeding and its expense and no antidote  Continue metoprolol  And titrate up the dose  If continues to have episodes of AFib will add amiodarone  Continue other Rx as before  Consider low-dose diuretics like Aldactone on discharge or low-dose Lasix  Concur with other Rx provided  Subjective / Objective:   Patient seen and evaluated  No new complaints  Denies any chest pain or any shortness of breath  He tolerated her Eliquis okay  Her breathing is better  No fever no chills no nausea no vomiting no other significant complaint    Blood pressure is acceptable  Vitals:    01/09/18 1415   BP: 132/60   Pulse: 66   Resp: 18   Temp: 98 4 °F (36 9 °C)   SpO2: 93%     Vitals:    01/08/18 0445 01/09/18 0536   Weight: 92 6 kg (204 lb 2 3 oz) 92 kg (202 lb 12 8 oz)     Orthostatic Blood Pressures    Flowsheet Row Most Recent Value   Blood Pressure  132/60 filed at 01/09/2018 1415   Patient Position - Orthostatic VS  Lying filed at 01/09/2018 1415    Intake/Output Summary (Last 24 hours) at 01/09/18 1834  Last data filed at 01/09/18 1801   Gross per 24 hour   Intake             1440 ml   Output             1400 ml   Net               40 ml        Invasive Devices     Central Venous Catheter Line            Port A Cath 01/06/18 Right Chest 3 days              Body mass index is 37 09 kg/m²  Physical Exam:   Physical Exam    Alert awake oriented not any distress  No JVP no carotid bruit  Chest bilateral air entry anteriorly clear to auscultation with decreased air entry at bases  S1-S2 regular with a 2/6 ejection systolic murmur  Abdominal obese bowel sounds are positive   Extremities have trace to mild edema  No focal neurological loss            Medications, Allergies:       apixaban 5 mg Oral BID   ascorbic acid 500 mg Oral Daily   atorvastatin 10 mg Oral Daily With Dinner   docusate sodium 100 mg Oral BID   ferrous sulfate 325 mg Oral BID With Meals   fluticasone-salmeterol 1 puff Inhalation N36M [facility]   folic acid 1 mg Oral Daily   furosemide 20 mg Oral Daily   ipratropium 0 5 mg Nebulization TID   levalbuterol 1 25 mg Nebulization TID   methylPREDNISolone sodium succinate 20 mg Intravenous Q12H [facility]   metoprolol tartrate 50 mg Oral BID   pantoprazole 40 mg Oral Early Morning   polyethylene glycol 17 g Oral Daily   potassium chloride 10 mEq Oral Daily   pregabalin 300 mg Oral BID   tolterodine 4 mg Oral Daily       acetaminophen 650 mg Q6H PRN   ALPRAZolam 0 5 mg HS PRN   heparin lock flush 300 Units Q1H PRN   levalbuterol 0 63 mg Q6H PRN     Allergies   Allergen Reactions    Fentanyl And Related Other (See Comments)     Passed out    Latex     Penicillins        VTE Pharmacologic Prophylaxis:   Eliquis    Labs:   Troponins:    Results from last 7 days  Lab Units 01/05/18  1735 01/05/18  1108   TROPONIN I ng/mL <0 02 <0 02       CBC with diff:    Results from last 7 days  Lab Units 01/09/18  0506 01/08/18  0521 01/07/18  0535 01/06/18  4661 01/05/18  1735 01/05/18  1108   WBC Thousand/uL 4 10* 5 70 7 00 4 80  --  4 70*   HEMOGLOBIN g/dL 11 9* 11 8* 11 3* 12 7  --  12 9   HEMATOCRIT % 36 9* 36 3* 35 4* 38 8  --  39 6   MCV fL 78* 79* 79* 79*  --  79*   PLATELETS Thousands/uL 196 180 170 174 152 160   MCH pg 25 1* 25 7* 25 3* 25 7*  --  25 8*   MCHC g/dL 32 2 32 4 32 0 32 7  --  32 7   RDW % 17 2* 17 4* 17 3* 17 0*  --  17 1*   MPV fL 8 8* 9 5 9 3 9 0 9 4 9 2   NRBC AUTO /100 WBCs  --   --   --   --   --  0       CMP:    Results from last 7 days  Lab Units 01/09/18  0506 01/08/18  0521 01/07/18  0535 01/06/18  0619 01/05/18  1108   SODIUM mmol/L 138 142 141 143 142   POTASSIUM mmol/L 4 2 4 0 3 4* 3 6 3 9   CHLORIDE mmol/L 100 102 103 105 105   CO2 mmol/L 38* 34* 38* 34* 30   ANION GAP mmol/L 0* 6 0* 4 7   BUN mg/dL 17 19 28* 26* 22   CREATININE mg/dL 0 89 0 98 1 00 1 19 1 01   GLUCOSE RANDOM mg/dL 184* 98 125 196* 105   CALCIUM mg/dL 8 8 8 6 8 7 9 1 9 2   AST U/L  --   --   --   --  20   ALT U/L  --   --   --   --  21   ALK PHOS U/L  --   --   --   --  97   TOTAL PROTEIN g/dL  --   --   --   --  6 7   ALBUMIN g/dL  --   --   --   --  2 7*   BILIRUBIN TOTAL mg/dL  --   --   --   --  0 40   EGFR ml/min/1 73sq m 61 54 53 43 52       Magnesium:    Results from last 7 days  Lab Units 01/07/18  0537 01/06/18  0619 01/05/18  1108   MAGNESIUM mg/dL 2 6 1 9 1 9     Coags:    Results from last 7 days  Lab Units 01/05/18  1121   PTT seconds 25   INR  1 02     TSH:    Results from last 7 days  Lab Units 01/07/18  0535 01/05/18  1735   TSH 3RD GENERATON uIU/mL 0 386 0 214*     Lipid Profile:    Results from last 7 days  Lab Units 01/06/18  0619   CHOLESTEROL mg/dL 167   TRIGLYCERIDES mg/dL 84   HDL mg/dL 53   LDL CALC mg/dL 97     Hgb A1c:    Results from last 7 days  Lab Units 01/06/18  0619   HEMOGLOBIN A1C % 6 2       Imaging & Testing   I have personally reviewed pertinent reports      Xr Chest Portable    Result Date: 1/8/2018  Narrative: CHEST INDICATION: Shortness of breath COMPARISON:  Chest radiographs January 5, 2018 and CT pulmonary angiogram January 5, 2018  VIEWS:   AP frontal IMAGES:  1 FINDINGS:     The heart is enlarged  Atherosclerotic changes in the aorta  Right internal jugular [id number] with tip at cavoatrial junction  Lung volumes diminished  Elevation of left hemidiaphragm  No focal pneumonia  Visualized osseous structures appear within normal limits for the patient's age  Impression: Diminished inspiration  No active pulmonary disease  Workstation performed: [id number]     Xr Chest 1 View Portable    Result Date: 1/5/2018  Narrative: CHEST INDICATION:  Shortness of breath  Asthma  COMPARISON:  Chest radiographs December 11, 2017 VIEWS:   AP frontal IMAGES:  1 FINDINGS:     The heart is enlarged  Atherosclerotic changes in the aorta  Right internal jugular [id number] with tip at cavoatrial junction  Lung volumes diminished  Small bilateral pleural effusions  Elevation of left hemidiaphragm  Nita and pulmonary vessels diffusely enlarged  Visualized osseous structures appear within normal limits for the patient's age  Impression: Mild congestive heart failure  Workstation performed: [id number]     Xr Chest 1 View Portable    Result Date: 12/11/2017  Narrative: CHEST INDICATION:  Chest pain and productive cough  COMPARISON:  10/12/2017  VIEWS:   AP frontal IMAGES:  1 FINDINGS:  A right-sided catheter terminates at the caval atrial junction  No pneumothorax  Cardiomediastinal silhouette appears unremarkable  Left basilar subsegmental atelectasis with hemidiaphragm elevation noted  Tiny left pleural effusion suspected  Upper lobe and right lung are clear  Visualized osseous structures appear within normal limits for the patient's age  Impression: Left basilar atelectasis with tiny pleural effusion and hemidiaphragm elevation   Workstation performed: [id number]     [facility] Ed Chest Pe Study    Result Date: 1/5/2018  Narrative: CTA - CHEST WITH IV CONTRAST - PULMONARY ANGIOGRAM INDICATION: Shortness of breath  COMPARISON: 10/02/2017 TECHNIQUE: CTA examination of the chest was performed using angiographic technique according to a protocol specifically tailored to evaluate for pulmonary embolism  Reformatted images were created in axial, sagittal, and coronal planes  In addition, coronal 3D MIP postprocessing was performed on the acquisition scanner  Radiation dose length product (DLP) for this visit:  748 06 mGy-cm   This examination, like all CT scans performed in the [facility], was performed utilizing techniques to minimize radiation dose exposure, including the use of iterative  reconstruction and automated exposure control  IV Contrast:  100 mL of iohexol (OMNIPAQUE)          FINDINGS: PULMONARY ARTERIAL TREE:  No pulmonary embolus is seen  LUNGS:  Dependent atelectasis in the posterior aspects of both upper lobes and in both lower lobes  PLEURA:  Unremarkable  HEART/AORTA:  Unremarkable for patient's age  MEDIASTINUM AND JASWINDER:  Unremarkable  CHEST WALL AND LOWER NECK:       Normal  VISUALIZED STRUCTURES IN THE UPPER ABDOMEN:  Right renal cyst  OSSEOUS STRUCTURES:  No acute fracture or destructive osseous lesion  Degenerative changes in the shoulders  Impression: 1  No pulmonary embolism  2   Dependent atelectasis  Workstation performed: [id number]     Vas Lower Limb Venous Duplex Study, Unilateral/limited    Result Date: 12/11/2017  Narrative:  [facility] REPORT CLINICAL: Indications: Edema, unspecified [R60 9]  Patient presents with left lower extremity edema x several months  The patient has history of malignancy  Clinical:  FINDINGS:  Left     Impression       CFV      Normal (Patent)     CONCLUSION:  Impression: RIGHT LOWER LIMB LIMITED: NORMAL Evaluation shows no evidence of thrombus in the common femoral vein  Doppler evaluation shows a normal response to augmentation maneuvers    LEFT LOWER LIMB: NORMAL No evidence of acute or chronic deep vein thrombosis No evidence of superficial thrombophlebitis noted  Doppler evaluation shows a normal response to augmentation maneuvers  Popliteal, posterior tibial and anterior tibial arterial Doppler waveforms are triphasic  SIGNATURE: Electronically Signed by: Sherri Bruno on 2017 06:10:35 PM    EKG / Monitor: Personally reviewed  Currently she is sinus with PACs  Cardiac testing:   Results for orders placed during the hospital encounter of 18   Echo complete with contrast if indicated    Narrative [address]  [address] [facility] [address]  [phone]    Transthoracic Echocardiogram  2D, M-mode, Doppler, and Color Doppler    Study date:  2018    Patient: Maggie Cornejo  MR number: [id number]  Account number: [de-identified]  : 1935  Age: 81 years  Gender: Female  Status: Routine  Location: Bedside  Height: 62 in  Weight: 187 7 lb  BP: 134/ 59 mmHg    Indications: Shortness of Breath Evaluate for suspected cardiac source of embolism  Diagnoses: 786 05 - SHORTNESS OF BREATH    Sonographer:  Mandy Lovell  Interpreting Physician:  Radha Byers DO  Primary Physician:  Olvin Akins DO  Group:  [facility]    SUMMARY    LEFT VENTRICLE:  The cavity was small  Systolic function was hyperdynamic by visual assessment  Ejection fraction was estimated in the range of 70 % to 75 %  There were no regional wall motion abnormalities  There was mild concentric hypertrophy  Doppler parameters were consistent with abnormal left ventricular relaxation (grade 1 diastolic dysfunction)  Doppler parameters were consistent with elevated mean left atrial filling pressure  MITRAL VALVE:  There was mild regurgitation  AORTIC VALVE:  There was no evidence for stenosis  There was no regurgitation  TRICUSPID VALVE:  There was mild regurgitation    Pulmonary artery systolic pressure was mildly increased  HISTORY: PRIOR HISTORY: Shortness of Breath    PROCEDURE: The procedure was performed at the bedside  This was a routine study  The transthoracic approach was used  The study included complete 2D imaging, M-mode, complete spectral Doppler, and color Doppler  The heart rate was 77 bpm,  at the start of the study  This was a technically difficult study  LEFT VENTRICLE: The cavity was small  Systolic function was hyperdynamic by visual assessment  Ejection fraction was estimated in the range of 70 % to 75 %  There were no regional wall motion abnormalities  There was mild concentric  hypertrophy  DOPPLER: Doppler parameters were consistent with abnormal left ventricular relaxation (grade 1 diastolic dysfunction)  Doppler parameters were consistent with elevated mean left atrial filling pressure  RIGHT VENTRICLE: The size was normal  Systolic function was normal  DOPPLER: Systolic pressure was within the normal range  LEFT ATRIUM: Size was normal  No thrombus was identified  RIGHT ATRIUM: Size was normal     MITRAL VALVE: Valve structure was normal  There was normal leaflet separation  No echocardiographic evidence for prolapse  DOPPLER: The transmitral velocity was within the normal range  There was no evidence for stenosis  There was mild  regurgitation  AORTIC VALVE: The valve was trileaflet  Leaflets exhibited normal cuspal separation and sclerosis  DOPPLER: Transaortic velocity was within the normal range  There was no evidence for stenosis  There was no regurgitation  TRICUSPID VALVE: The valve structure was normal  There was normal leaflet separation  DOPPLER: The transtricuspid velocity was within the normal range  There was mild regurgitation  Pulmonary artery systolic pressure was mildly increased  Estimated peak PA pressure was 42 mmHg  PULMONIC VALVE: Leaflets exhibited normal thickness, no calcification, and normal cuspal separation   DOPPLER: The transpulmonic velocity was within the normal range  There was mild regurgitation  PERICARDIUM: There was no thickening  There was no pericardial effusion  AORTA: The root exhibited normal size  PULMONARY ARTERY: The size was normal  The morphology appeared normal     SYSTEM MEASUREMENT TABLES    2D mode  AoR Diam 2D: 3 1 cm  LA Diam (2D): 4 2 cm  LA/Ao (2D): 1 35  FS (2D Teich): 53 9 %  IVSd (2D): 1 08 cm  LVDEV: 73 4 cm³  LVESV: 10 9 cm³  LVIDd(2D): 4 08 cm  LVISd (2D): 1 88 cm  LVOT Area 2D: 3 46 cm squared  LVPWd (2D): 1 05 cm  SV (Teich): 62 5 cm³    Apical four chamber  LVEF A4C: 84 %    Unspecified Scan Mode  ANNIE Cont Eq (Peak Pacheco): 1 94 cm squared  ANNIE Cont Eq (VTI): 1 81 cm squared  LVOT (VTI): 26 8 cm  LVOT Diam : 2 1 cm  LVOT Vmax: 1210 mm/s  LVOT Vmax; Mean: 996 mm/s  Peak Grad ; Mean: 4 mm[Hg]  SV (LVOT): 81 cm³  VTI;Mean: 4 mm[Hg]  MV Peak A Pacheco: 1240 mm/s  MV Peak E Pacheco  Mean: 731 mm/s  RVSP: 36 mm[Hg]  Max P mm[Hg]  V Max: 2790 mm/s  Vmax: 2800 mm/s  TAPSE: 2 2 cm    [facility]    Prepared and electronically signed by    Zeb Buckley DO  Signed 2018 11:30:10         Dr Navi Peacock MD [facility]      "This note has been constructed using a voice recognition system  Therefore there may be syntax, spelling, and/or grammatical errors   Please call if you have any questions  "

## 2018-01-10 VITALS
HEART RATE: 76 BPM | BODY MASS INDEX: 37.01 KG/M2 | RESPIRATION RATE: 20 BRPM | SYSTOLIC BLOOD PRESSURE: 126 MMHG | TEMPERATURE: 97.4 F | HEIGHT: 62 IN | DIASTOLIC BLOOD PRESSURE: 68 MMHG | OXYGEN SATURATION: 94 % | WEIGHT: 201.1 LBS

## 2018-01-10 PROBLEM — E66.9 OBESITY (BMI 30-39.9): Status: ACTIVE | Noted: 2018-01-10

## 2018-01-10 PROBLEM — J96.01 ACUTE RESPIRATORY FAILURE WITH HYPOXIA (HCC): Status: ACTIVE | Noted: 2018-01-05

## 2018-01-10 LAB
BACTERIA BLD CULT: NORMAL
BACTERIA BLD CULT: NORMAL

## 2018-01-10 PROCEDURE — 94640 AIRWAY INHALATION TREATMENT: CPT

## 2018-01-10 PROCEDURE — 94760 N-INVAS EAR/PLS OXIMETRY 1: CPT

## 2018-01-10 PROCEDURE — 94761 N-INVAS EAR/PLS OXIMETRY MLT: CPT

## 2018-01-10 RX ORDER — METOPROLOL TARTRATE 50 MG/1
50 TABLET, FILM COATED ORAL 2 TIMES DAILY
Qty: 60 TABLET | Refills: 0 | Status: SHIPPED | OUTPATIENT
Start: 2018-01-10 | End: 2018-02-26 | Stop reason: ALTCHOICE

## 2018-01-10 RX ORDER — PREDNISONE 10 MG/1
TABLET ORAL
Qty: 20 TABLET | Refills: 0 | Status: SHIPPED | OUTPATIENT
Start: 2018-01-10 | End: 2018-01-23

## 2018-01-10 RX ORDER — FERROUS SULFATE 325(65) MG
325 TABLET ORAL 2 TIMES DAILY WITH MEALS
Qty: 60 TABLET | Refills: 2 | Status: SHIPPED | OUTPATIENT
Start: 2018-01-10 | End: 2018-01-23

## 2018-01-10 RX ORDER — FOLIC ACID 1 MG/1
1 TABLET ORAL DAILY
Qty: 30 TABLET | Refills: 2 | Status: SHIPPED | OUTPATIENT
Start: 2018-01-11 | End: 2018-01-23

## 2018-01-10 RX ORDER — POLYETHYLENE GLYCOL 3350 17 G/17G
17 POWDER, FOR SOLUTION ORAL DAILY PRN
Qty: 30 EACH | Refills: 0 | Status: SHIPPED | OUTPATIENT
Start: 2018-01-10 | End: 2018-01-23

## 2018-01-10 RX ORDER — FUROSEMIDE 20 MG/1
20 TABLET ORAL DAILY
Qty: 30 TABLET | Refills: 0 | Status: SHIPPED | OUTPATIENT
Start: 2018-01-11 | End: 2018-02-26 | Stop reason: ALTCHOICE

## 2018-01-10 RX ADMIN — APIXABAN 5 MG: 5 TABLET, FILM COATED ORAL at 09:46

## 2018-01-10 RX ADMIN — METOPROLOL TARTRATE 50 MG: 50 TABLET ORAL at 09:46

## 2018-01-10 RX ADMIN — Medication 300 UNITS: at 11:42

## 2018-01-10 RX ADMIN — PREGABALIN 300 MG: 100 CAPSULE ORAL at 09:45

## 2018-01-10 RX ADMIN — IPRATROPIUM BROMIDE 0.5 MG: 0.5 SOLUTION RESPIRATORY (INHALATION) at 07:58

## 2018-01-10 RX ADMIN — FOLIC ACID 1 MG: 1 TABLET ORAL at 09:46

## 2018-01-10 RX ADMIN — TOLTERODINE TARTRATE 4 MG: 2 CAPSULE, EXTENDED RELEASE ORAL at 09:45

## 2018-01-10 RX ADMIN — METHYLPREDNISOLONE SODIUM SUCCINATE 20 MG: 40 INJECTION, POWDER, FOR SOLUTION INTRAMUSCULAR; INTRAVENOUS at 09:46

## 2018-01-10 RX ADMIN — OXYCODONE HYDROCHLORIDE AND ACETAMINOPHEN 500 MG: 500 TABLET ORAL at 09:45

## 2018-01-10 RX ADMIN — LEVALBUTEROL 1.25 MG: 1.25 SOLUTION, CONCENTRATE RESPIRATORY (INHALATION) at 07:58

## 2018-01-10 RX ADMIN — PANTOPRAZOLE SODIUM 40 MG: 40 TABLET, DELAYED RELEASE ORAL at 06:01

## 2018-01-10 RX ADMIN — FLUTICASONE PROPIONATE AND SALMETEROL 1 PUFF: 50; 250 POWDER RESPIRATORY (INHALATION) at 09:45

## 2018-01-10 RX ADMIN — FUROSEMIDE 20 MG: 20 TABLET ORAL at 09:45

## 2018-01-10 NOTE — NURSING NOTE
Patient wheeled off unit with PCA and friend  Patient verbalized understanding of discharge instructions and medication changes

## 2018-01-10 NOTE — PLAN OF CARE
Problem: DISCHARGE PLANNING - CARE MANAGEMENT  Goal: Discharge to post-acute care or home with appropriate resources  INTERVENTIONS:  - Conduct assessment to determine patient/family and health care team treatment goals, and need for post-acute services based on payer coverage, community resources, and patient preferences, and barriers to discharge  - Address psychosocial, clinical, and financial barriers to discharge as identified in assessment in conjunction with the patient/family and health care team  - Arrange appropriate level of post-acute services according to patient's   needs and preference and payer coverage in collaboration with the physician and health care team  - Communicate with and update the patient/family, physician, and health care team regarding progress on the discharge plan  - Arrange appropriate transportation to post-acute venues  Carlos Aleman [address] THERAPY  Outcome: Progressing  Referral made to [facility] for [facility] PT  Call placed to [facility] to arrange homemaker services

## 2018-01-10 NOTE — DISCHARGE INSTRUCTIONS
Discharge Instruction:  Self-monitoring including daily weight, blood pressure and heart rate in the morning and evening, record the data in a calendar or log book, and present the data to the providers for appropriate medication adjustment  Follow up with your PCP within 1 week  Hospital Course:  1  Acute respiratory failure with hypoxia: Multifactorial 2/2 CHF exacerbation, dependent atelectasis, Asthma, poor ventilation due to obesity  Slowly improving with less dyspnea and wean of O2  Home O2 eval completed prior to discharge - she does not qualify for home O2 therapy  2  Acute on chronic diastolic heart failure: Initial CXR revealed mild congestive heart failure  BNP 1170  Troponin levels negative x3  Repeat TTE - EF 70% with grade 1 diastolic dysfunction  Cardiology consult following  Now on Lasix 20 mg po daily, metoprolol, statins  Repeat CXR revealed no active disease  3  Moderate persistent asthma: Improving with iv steroids switched to prednisone taper  Continue home Advair BID, duo-neb  Wean off O2   4  Paroxysmal Atrial fibrillation: Rate control with metoprolol  New start Eliquis 5mg bid  No sign of bleeding  5  Left lower extremity chronic lymphedema: 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  Continue Venodyne compressive pumps as tolerated  6  Iron deficiency anemia: Iron panel reviewed, iron 19, ferritin 36, iron sat 7  Hemoglobin 11 3, MCV 79, RDW 17 3  Initiated iron supplementation with ferrous sulfate 1 tab BID with meals as well as folate  Bowel regimen for constipation prophylaxis  7  Hypokalemia: Resolved  8  Hypertension: Continue Norvasc and metoprolol  9  Hyperlipidemia: Lipid panel reviewed, LDL 97  Continue statin  10  Left carotid stenosis:  Patient has refused carotid endarterectomy in the past  Discontinue aspirin and Plavix (she is now on Eliquis)  11  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  No evidence of recurrence   Continue outpatient follow-up

## 2018-01-10 NOTE — DISCHARGE SUMMARY
Discharge Summary - [address] [facility]  Patient Information: Weldon Eans 81 y o  female MRN: [id number]  Unit/Bed#: [address] Encounter: [id number]    Admission Date: 1/5/2018  Discharge Date: 1/10/2018    Admitting Physician: Latricia Rodriguez DO  Discharging Physician/Practitioner: Christopher Ace MD  PCP: Ze Olvera DO    Reason for Admission: Shortness of Breath (pt c/o SOB x 1 week, worsening last night  pt states here 2 months ago for pneumonia )      Admission Diagnoses:  CHF (congestive heart failure) ([facility] ) [I50 9]  Difficulty breathing [R06 89]    Discharge Diagnoses:    Acute respiratory failure with hypoxia ([facility] )    Acute congestive heart failure (HCC)    Atrial fibrillation (HCC)    Moderate persistent asthma    Hypertension    Obesity (BMI 30-39  9)    GERD (gastroesophageal reflux disease)    Hyperlipemia    Fibromyalgia    Lymphedema of left leg    History of Merkel cell carcinoma    Iron deficiency anemia    Shortness of breath    Consultations During Hospital Stay:  IP CONSULT TO CARDIOLOGY  IP CONSULT TO Franco Course:     1  Acute respiratory failure with hypoxia: Multifactorial 2/2 CHF exacerbation, dependent atelectasis, Asthma, poor ventilation due to obesity  Slowly improving with less dyspnea and wean of O2  Home O2 eval completed prior to discharge - she does not qualify for home O2 therapy  2  Acute on chronic diastolic heart failure: Initial CXR revealed mild congestive heart failure  BNP 1170  Troponin levels negative x3  Repeat TTE - EF 70% with grade 1 diastolic dysfunction  Cardiology consult following  Now on Lasix 20 mg po daily, metoprolol, statins  Repeat CXR revealed no active disease  3  Moderate persistent asthma: Improving with iv steroids switched to prednisone taper  Continue home Advair BID, duo-neb  Wean off O2   4  Paroxysmal Atrial fibrillation: Rate control with metoprolol  New start Eliquis 5mg bid  No sign of bleeding    5  Left lower extremity chronic lymphedema: 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  Continue Venodyne compressive pumps as tolerated  6  Iron deficiency anemia: Iron panel reviewed, iron 19, ferritin 36, iron sat 7  Hemoglobin 11 3, MCV 79, RDW 17 3  Initiated iron supplementation with ferrous sulfate 1 tab BID with meals as well as folate  Bowel regimen for constipation prophylaxis  7  Hypokalemia: Resolved  8  Hypertension: Continue Norvasc and metoprolol  9  Hyperlipidemia: Lipid panel reviewed, LDL 97  Continue statin  10  Left carotid stenosis:  Patient has refused carotid endarterectomy in the past  Discontinue aspirin and Plavix (she is now on Eliquis)  11  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  No evidence of recurrence  Continue outpatient follow-up  Imaging while in hospital:  Xr Chest Portable    Result Date: 1/8/2018  Narrative: CHEST INDICATION:  Shortness of breath COMPARISON:  Chest radiographs January 5, 2018 and CT pulmonary angiogram January 5, 2018  VIEWS:   AP frontal IMAGES:  1 FINDINGS:     The heart is enlarged  Atherosclerotic changes in the aorta  Right internal jugular Upknok-h-Clzk with tip at cavoatrial junction  Lung volumes diminished  Elevation of left hemidiaphragm  No focal pneumonia  Visualized osseous structures appear within normal limits for the patient's age  Impression: Diminished inspiration  No active pulmonary disease  Workstation performed: [id number]     Xr Chest 1 View Portable    Result Date: 1/5/2018  Narrative: CHEST INDICATION:  Shortness of breath  Asthma  COMPARISON:  Chest radiographs December 11, 2017 VIEWS:   AP frontal IMAGES:  1 FINDINGS:     The heart is enlarged  Atherosclerotic changes in the aorta  Right internal jugular Acvpwg-m-Innb with tip at cavoatrial junction  Lung volumes diminished  Small bilateral pleural effusions  Elevation of left hemidiaphragm  Nita and pulmonary vessels diffusely enlarged   Visualized osseous structures appear within normal limits for the patient's age  Impression: Mild congestive heart failure  Workstation performed: [id number]     Cta Ed Chest Pe Study    Result Date: 1/5/2018  Narrative: CTA - CHEST WITH IV CONTRAST - PULMONARY ANGIOGRAM INDICATION: Shortness of breath  COMPARISON: 10/02/2017 TECHNIQUE: CTA examination of the chest was performed using angiographic technique according to a protocol specifically tailored to evaluate for pulmonary embolism  Reformatted images were created in axial, sagittal, and coronal planes  In addition, coronal 3D MIP postprocessing was performed on the acquisition scanner  Radiation dose length product (DLP) for this visit:  748 06 mGy-cm   This examination, like all CT scans performed in the [facility] [place], was performed utilizing techniques to minimize radiation dose exposure, including the use of iterative  reconstruction and automated exposure control  IV Contrast:  100 mL of iohexol (OMNIPAQUE)          FINDINGS: PULMONARY ARTERIAL TREE:  No pulmonary embolus is seen  LUNGS:  Dependent atelectasis in the posterior aspects of both upper lobes and in both lower lobes  PLEURA:  Unremarkable  HEART/AORTA:  Unremarkable for patient's age  MEDIASTINUM AND JASWINDER:  Unremarkable  CHEST WALL AND LOWER NECK:       Normal  VISUALIZED STRUCTURES IN THE UPPER ABDOMEN:  Right renal cyst  OSSEOUS STRUCTURES:  No acute fracture or destructive osseous lesion  Degenerative changes in the shoulders  Impression: 1  No pulmonary embolism  2   Dependent atelectasis  Workstation performed: [id number]     Vas Lower Limb Venous Duplex Study, Unilateral/limited    Result Date: 12/11/2017  Narrative:  [facility] REPORT CLINICAL: Indications: Edema, unspecified [R60 9]  Patient presents with left lower extremity edema x several months  The patient has history of malignancy   Clinical:  FINDINGS:  Left     Impression       CFV      Normal (Patent)     CONCLUSION:  Impression: RIGHT LOWER LIMB LIMITED: NORMAL Evaluation shows no evidence of thrombus in the common femoral vein  Doppler evaluation shows a normal response to augmentation maneuvers  LEFT LOWER LIMB: NORMAL No evidence of acute or chronic deep vein thrombosis No evidence of superficial thrombophlebitis noted  Doppler evaluation shows a normal response to augmentation maneuvers  Popliteal, posterior tibial and anterior tibial arterial Doppler waveforms are triphasic  SIGNATURE: Electronically Signed by: Sidra Purcell on 2017-12-11 06:10:35 PM      Allergies: Allergies   Allergen Reactions    Fentanyl And Related Other (See Comments)     Passed out    Latex     Penicillins        Discharge Medications:  Current Discharge Medication List      START taking these medications    Details   apixaban (ELIQUIS) 5 mg Take 1 tablet by mouth 2 (two) times a day for 30 days  Qty: 60 tablet, Refills: 0      ferrous sulfate 325 (65 Fe) mg tablet Take 1 tablet by mouth 2 (two) times a day with meals for 30 days  Qty: 60 tablet, Refills: 2      fluticasone-salmeterol (ADVAIR) 250-50 mcg/dose inhaler Inhale 1 puff every 12 (twelve) hours This is home medication  Refills: 0      folic acid (FOLVITE) 1 mg tablet Take 1 tablet by mouth daily for 30 days  Qty: 30 tablet, Refills: 2      furosemide (LASIX) 20 mg tablet Take 1 tablet by mouth daily for 30 days  Qty: 30 tablet, Refills: 0      polyethylene glycol (MIRALAX) 17 g packet Take 17 g by mouth daily as needed (constipation) for up to 30 days  Qty: 30 each, Refills: 0      predniSONE 10 mg tablet Take prednisone 40mg oral daily, taper down 10mg every other day until completion    Qty: 20 tablet, Refills: 0           Current Discharge Medication List      CONTINUE these medications which have CHANGED    Details   metoprolol tartrate (LOPRESSOR) 50 mg tablet Take 1 tablet by mouth 2 (two) times a day for 30 days  Qty: 60 tablet, Refills: 0 Current Discharge Medication List      CONTINUE these medications which have NOT CHANGED    Details   albuterol (ACCUNEB) 0 63 MG/3ML nebulizer solution Take 1 ampule by nebulization every 6 (six) hours as needed for wheezing      ALPRAZolam (XANAX) 0 5 mg tablet Take 0 5 mg by mouth daily at bedtime as needed for anxiety  amLODIPine (NORVASC) 5 mg tablet Take 1 tablet by mouth daily for 30 days  Qty: 30 tablet, Refills: 0      esomeprazole (NexIUM) 40 MG capsule Take 40 mg by mouth daily  Fesoterodine Fumarate ER (TOVIAZ) 8 MG TB24 Take by mouth daily at bedtime        ipratropium-albuterol (DUO-NEB) 0 5-2 5 mg/mL Take 3 mL by nebulization every 6 (six) hours for 30 days  Qty: 360 mL, Refills: 0      potassium chloride (K-DUR) 10 mEq tablet Take 10 mEq by mouth daily  pregabalin (LYRICA) 300 MG capsule Take 300 mg by mouth 2 (two) times a day  atorvastatin (LIPITOR) 10 mg tablet Take 1 tablet by mouth daily with dinner for 30 days  Qty: 30 tablet, Refills: 0           Current Discharge Medication List      STOP taking these medications       clopidogrel (PLAVIX) 75 mg tablet Comments:   Reason for Stopping:         aspirin (ECOTRIN LOW STRENGTH) 81 mg EC tablet Comments:   Reason for Stopping:               Medications were reconciliated and instructions were given to Ms Garcia Bienvenido at bedside prior to the discharge  Discharge Day Visit/Exam:   Subjective:  BP (!) 176/97   Pulse 80   Temp (!) 97 2 °F (36 2 °C) (Oral)   Resp 20   Ht 5' 2" (1 575 m)   Wt 91 2 kg (201 lb 1 6 oz)   SpO2 95%   Breastfeeding? No   BMI 36 78 kg/m²       Patient Instructions: Activity: activity as tolerated  Diet: cardiac diet  Wound Care: none needed    Discharge instructions/Information to patient and family:(Discharge Medications and Follow up):  See after visit summary for information provided to patient and family        Provisions for Follow-Up Care:  See after visit summary for information related to follow-up care and any pertinent home health orders  Follow-up Informations:  Rach Garcia DO  [address] [facility]  [phone]    Follow up       Disposition: Home    Planned Readmission: No     Discharge Statement:  I spent 30 minutes discharging the patient  This time was spent on the day of discharge  I had direct contact with the patient on the day of discharge  Greater than 50% of the total time was spent examining patient, answering all patient questions, arranging and discussing plan of care with patient as well as directly providing post-discharge instructions  Additional time then spent on discharge activities  Discharge Medications:  See after visit summary for reconciled discharge medications provided to patient and family

## 2018-01-10 NOTE — SOCIAL WORK
SW met with pt to reassess discharge needs  Pt will be returning home to her apartment at [facility]  Home oxygen qualifier will be done today  If needed SW/CM will arrange for home oxygen  Spoke with pt about home care needs  Pt said that her [facility] , Loree Rodriguez, offered her services in the past and she declined them  Now she feels the services would be helpful because she is feeling weak  SW will contact Loree Rodriguez for pt  SW also offered home PT to help pt regain strength  Pt agreeable with therapy at home well  Pt had no agency preference  Referral will be made to [facility]  Plan discussed with Dr Izaiah Rodriguez  SW will follow to assist with planning through discharge

## 2018-01-10 NOTE — RESPIRATORY THERAPY NOTE
Home Oxygen Qualifying Test       Patient name: Ahsan Allen        : 1935   Date of Test:  January 10, 2018  Diagnosis: CHF     Home Oxygen Test:    **Medicare Guidelines require item(s) 1-5 on all ambulatory patients or 1 and 2 on on-ambulatory patients  1   Baseline SPO2 on Room Air at rest 93 %  If = or < 88% on room air add O2 via NC and titrate patient  Patient must be ambulated with O2 and titrated to > 88% with exertion  2   SPO2 on Oxygen at rest NA %  3   SPO2 during exercise on Room Air 92-93 %  4   SPO2 during exercise on Oxygen  NA % at a liter flow of NA lpm     5   Exercise performed:    [] Walking     [] Stairs     [] Duration  (min )     [x] Distance 400 (ft )       []  Supplemental Home Oxygen is indicated  [x]  Client does not qualify for home oxygen        Ras Cox, RTRRT/ACCS

## 2018-01-10 NOTE — PLAN OF CARE
CARDIOVASCULAR - ADULT     Maintains optimal cardiac output and hemodynamic stability Progressing     Absence of cardiac dysrhythmias or at baseline rhythm Progressing        DISCHARGE PLANNING     Discharge to home or other facility with appropriate resources Progressing        DISCHARGE PLANNING - CARE MANAGEMENT     Discharge to post-acute care or home with appropriate resources Progressing        INFECTION - ADULT     Absence or prevention of progression during hospitalization Progressing     Absence of fever/infection during neutropenic period Progressing        Knowledge Deficit     Patient/family/caregiver demonstrates understanding of disease process, treatment plan, medications, and discharge instructions Progressing        METABOLIC, FLUID AND ELECTROLYTES - ADULT     Electrolytes maintained within normal limits Progressing     Fluid balance maintained Progressing        Nutrition/Hydration-ADULT     Nutrient/Hydration intake appropriate for improving, restoring or maintaining nutritional needs Progressing        PAIN - ADULT     Verbalizes/displays adequate comfort level or baseline comfort level Progressing        Potential for Falls     Patient will remain free of falls Progressing        RESPIRATORY - ADULT     Achieves optimal ventilation and oxygenation Progressing        SAFETY ADULT     Patient will remain free of falls Progressing     Maintain or return to baseline ADL function Progressing     Maintain or return mobility status to optimal level Progressing

## 2018-01-12 VITALS
TEMPERATURE: 98.7 F | SYSTOLIC BLOOD PRESSURE: 142 MMHG | BODY MASS INDEX: 37.91 KG/M2 | RESPIRATION RATE: 18 BRPM | DIASTOLIC BLOOD PRESSURE: 82 MMHG | HEIGHT: 62 IN | WEIGHT: 206 LBS

## 2018-01-12 VITALS
OXYGEN SATURATION: 97 % | HEIGHT: 62 IN | WEIGHT: 208.38 LBS | BODY MASS INDEX: 38.35 KG/M2 | SYSTOLIC BLOOD PRESSURE: 118 MMHG | DIASTOLIC BLOOD PRESSURE: 64 MMHG | HEART RATE: 97 BPM

## 2018-01-13 VITALS — DIASTOLIC BLOOD PRESSURE: 93 MMHG | SYSTOLIC BLOOD PRESSURE: 155 MMHG

## 2018-01-14 NOTE — MISCELLANEOUS
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)   4  Asthma (493 90) (J45 909)   5  Atherosclerotic peripheral vascular disease (440 20) (I70 209)   6  Callus (700) (L84)   7  Chronic obstructive pulmonary disease (496) (J44 9)   8  Edema (782 3) (R60 9)   9  Esophageal reflux (530 81) (K21 9)   10  Hyperlipidemia (272 4) (E78 5)   11  Limb pain (729 5) (M79 609)   12  Obstructive sleep apnea (327 23) (G47 33)   13  Onychomycosis (110 1) (B35 1)   14  Stenosis of left carotid artery (433 10) (I65 22)   15  Xerosis cutis (706 8) (L85 3)    Current Meds   1  Advair Diskus 250-50 MCG/DOSE Inhalation Aerosol Powder Breath Activated; Therapy: (Pecolia Hazy) to Recorded   2  Advair HFA AERO; Therapy: (Recorded:21Oct2014) to Recorded   3  Albuterol Sulfate (2 5 MG/3ML) 0 083% Inhalation Nebulization Solution; Therapy: (Pecolia Hazy) to Recorded   4  Atorvastatin Calcium 10 MG Oral Tablet; TAKE 1 TABLET DAILY AS DIRECTED; Therapy: [id number] to [phone])  Requested for: [id number]; Last   Rx:11Oct2016 Ordered   5  Atorvastatin Calcium 10 MG Oral Tablet; Therapy: (Recorded:11Oct2016) to Recorded   6  Flonase 50 MCG/ACT SUSP (Fluticasone Propionate); Therapy: (Pecolia Hazy) to Recorded   7  Lyrica CAPS; Take 1 capsule twice daily; Therapy: (Recorded:10Aqm3776) to Recorded   8  NexIUM 40 MG Oral Capsule Delayed Release (Esomeprazole Magnesium); Therapy: (Pecolia Hazy) to Recorded   9  Norco  MG Oral Tablet (Hydrocodone-Acetaminophen); TAKE 1 TO 2 TABLETS   EVERY 4 TO 6 HOURS AS NEEDED FOR PAIN;   Therapy: [id number] to Recorded   10   Plavix 75 MG Oral Tablet (Clopidogrel Bisulfate); Therapy: (Recorded:23Tdd7901) to Recorded   11  Potassium Chloride ER 10 MEQ Oral Capsule Extended Release; Therapy: ([facility]) to Recorded   12  ProAir  (90 Base) MCG/ACT Inhalation Aerosol Solution; Therapy: ([facility]) to Recorded   13  Toprol XL TB24 (Metoprolol Succinate ER); Therapy: (25-62-29-72) to Recorded   14  Toviaz TB24; TAKE TABLET; Therapy: (Recorded:50Wnb7422) to Recorded   15  Xanax 0 5 MG Oral Tablet (ALPRAZolam); Therapy: ([facility]) to Recorded    Allergies    1  Penicillins   2  Adhesive Tape TAPE   3  Latex Gloves MISC   4  Penicillins    5   Latex    Signatures   Electronically signed by : Roverto Serra, ; Feb 21 2017  1:25PM EST                       (Author)

## 2018-01-14 NOTE — MISCELLANEOUS
Message   Recorded as Task   Date: 10/09/2017 11:45 AM, Created By: Lani Best   Task Name: Call Back   Assigned To: Nanette Valencia   Regarding Patient: Kamila Maradiaga, Status: In Progress   Comment:    Anat Elias - 09 Oct 2017 11:45 AM     TASK CREATED  Caller: Self; Other; [phone] (Home); [phone] (Work)  Pt called and said that she was referred to Physical Therapy at [facility] but they said that they don't do leg therapy  They tried referring her to [facility] but she said that due to transportation issues that won't work  She is asking if she can go to [facility] that is located near to her home  She can be reched at [phone]  Nanette Valencia - 09 Oct 2017 11:59 AM     TASK EDITED  I called [facility] and the therapist that does the leg therapy is out for a few months  They are referring patients to [facility]  Nanette Valencia - 09 Oct 2017 12:05 PM     TASK EDITED  I patient wants to go to [facility] jadon is fine  Please just put in new referral    Lani Best - 09 Oct 2017 2:18 PM     TASK IN [address] [place] [address] - 09 Oct 2017 2:53 PM     TASK REASSIGNED: Previously Assigned To Anat Elias  Left msg to call me back with dates/times that would work to schedule appt at [address] [place] [address] - 10 Oct 2017 8:30 AM     TASK REPLIED TO: Previously Assigned To Lani Best  I tried scheduling pt with [facility] in [place] but they do not do leg therapy, only in [facility] which is too far for the patient  Please advise, thanks   Nanette Valencia - 10 Oct 2017 9:26 AM     TASK EDITED  spoke with patient, she does not drive and cannot afford transportation anywhere out of [place]  if she can figure out any transportation she will call us back  Active Problems    1  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) (M21 969)   2  Allergic rhinitis (477 9) (J30 9)   3  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,440  9) (I25 10)   4  Asthma (493 90) (J45 909)   5  Atherosclerotic peripheral vascular disease (440 20) (I70 209)   6  Callus (700) (L84)   7  Chronic obstructive pulmonary disease (496) (J44 9)   8  Drainage from wound (879 8) (T14 8XXA)   9  Edema (782 3) (R60 9)   10  Esophageal reflux (530 81) (K21 9)   11  Hyperlipidemia (272 4) (E78 5)   12  Limb pain (729 5) (M79 609)   13  Lymphedema (457 1) (I89 0)   14  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209 36) (C4A 9)   15  Obstructive sleep apnea (327 23) (G47 33)   16  Onychomycosis (110 1) (B35 1)   17  Stenosis of left carotid artery (433 10) (I65 22)   18  Xerosis cutis (706 8) (L85 3)    Current Meds   1  Atorvastatin Calcium 10 MG Oral Tablet; TAKE 1 TABLET DAILY AS DIRECTED; Therapy: [id number] to (34 [phone] 38)  Requested for: [id number]; Last   Rx:11Oct2016 Ordered   2  Lyrica 300 MG Oral Capsule; Take two times daily; Therapy: (Makayla Myers) to Recorded   3  NexIUM 40 MG Oral Capsule Delayed Release (Esomeprazole Magnesium); TAKE 1   CAPSULE Daily; Therapy: (Makayla Many) to Recorded   4  Plavix 75 MG Oral Tablet (Clopidogrel Bisulfate); TAKE 1 TABLET DAILY; Therapy: (Makayla Many) to Recorded   5  Potassium Chloride ER 10 MEQ Oral Capsule Extended Release; Take one capsule   daily; Therapy: (Recorded:50Mww9169) to Recorded    Allergies    1  Penicillins   2  Adhesive Tape TAPE   3  fentanyl   4  Latex Gloves MISC   5  Penicillins    6   Latex    Signatures   Electronically signed by : Breanne Johnston RN; Oct 10 2017 10:26AM EST                       (Author)

## 2018-01-15 NOTE — PROGRESS NOTES
History of Present Illness  Care Coordination Encounter Information:   Type of Encounter: Telephonic    Spoke to Other   DIANA FROM THE OFFICE ON AGING  Care Coordination  Nurse Ramila Bello:   The reason for call is to discuss outreach for follow up/needed services and coordination of meeting care plan treatment goals  Diana from the Office on Aging called to say that Manuela Mcknight may not be illegible for free programs as she is not home bound  She will encourage Manuela Mcknight to apply for the [facility] program however, there is a wait list  The [facility] program would supply Manuela Mcknight with $600/ month for needed services and Me[employer] on Whe[employer] and aide service could be used  Yoselin Ricciyari states that Manuela Mcknight is "thinking about" accepting Meals on [employer]  The cost is $7 25 /day  I will discuss this as well when I next speak with her  Active Problems    1  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) (M21 969)   2  Allergic rhinitis (477 9) (J30 9)   3  Asthma (493 90) (J45 909)   4  Atherosclerotic peripheral vascular disease (440 20) (I70 209)   5  Callus (700) (L84)   6  Chronic obstructive pulmonary disease (496) (J44 9)   7  Coronary artery disease (414 00) (I25 10)   8  Edema (782 3) (R60 9)   9  Esophageal reflux (530 81) (K21 9)   10  Hyperlipidemia (272 4) (E78 5)   11  Limb pain (729 5) (M79 609)   12  Obstructive sleep apnea (327 23) (G47 33)   13  Onychomycosis (110 1) (B35 1)   14  Xerosis cutis (706 8) (L85 3)    Past Medical History    1  History of Aortic sclerosis (440 0) (I70 0)   2  History of Atelectasis (518 0) (J98 11)   3  History of Cellulitis (682 9) (L03 90)   4  History of Cervical Cancer (V10 41)   5  History of Fibromyalgia (729 1) (M79 7)   6  History of cardiomegaly (V12 59) (Z86 79)   7  History of cholelithiasis (V12 79) (Z87 19)   8  History of hiatal hernia (V12 79) (Z87 19)   9  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)   10  History of Pleural Effusion (511 9)   11   History of Pneumonia (V12 61)   12  History of Renal cyst, acquired (593 2) (N28 1)   13  History of Respiratory Failure With Hypercapnia (518 81)   14  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)   15  History of Septicemia (038 9) (A41 9)    Surgical History    1  History of Appendectomy   2  History of Bronchoscopy (Diagnostic)   3  History of Enteroscopic Polypectomy   4  History of Hip Surgery   5  History of Knee Arthroplasty   6  History of Percutaneous Portal Vein Catheter Placement   7  History of Radiation Therapy   8  History of Vaginal Hysterectomy    Family History  Mother    1  Family history of Acute Myocardial Infarction (V17 3)   2  Family history of Pneumonia  Father    3  Family history of Prostate Cancer (V16 42)  Sister    4  Family history of Breast Cancer (V16 3)    Social History    · Never A Smoker   · Non-smoker (V49 89) (Z78 9)   · Occupation:    Current Meds    1  Advair Diskus 250-50 MCG/DOSE Inhalation Aerosol Powder Breath Activated; Therapy: (Maximiano Fogo) to Recorded   2  Advair HFA AERO; Therapy: (Recorded:21Oct2014) to Recorded   3  Albuterol Sulfate (2 5 MG/3ML) 0 083% Inhalation Nebulization Solution; Therapy: (Maximiano Fogo) to Recorded   4  Flonase 50 MCG/ACT SUSP (Fluticasone Propionate); Therapy: (Maximiano Fogo) to Recorded   5  Lyrica CAPS; Therapy: ([id number]) to Recorded   6  NexIUM 40 MG Oral Capsule Delayed Release (Esomeprazole Magnesium); Therapy: (Maximiano Fogo) to Recorded   7  NexIUM CPDR (Esomeprazole Magnesium); Therapy: ([id number]) to Recorded   8  Potassium Chloride ER 10 MEQ Oral Capsule Extended Release; Therapy: (Maximiano Fogo) to Recorded   9  ProAir  (90 Base) MCG/ACT Inhalation Aerosol Solution; Therapy: (Maximiano Fogo) to Recorded   10  Toprol XL TB24 (Metoprolol Succinate ER); Therapy: ([id number]) to Recorded   11  Toviaz TB24;    Therapy: (XRLPLZIF:30SZM5307) to Recorded   12  Triamterene-HCTZ 37 5-25 MG Oral Capsule; Therapy: (Genita Jump) to Recorded   13  Xanax 0 5 MG Oral Tablet (ALPRAZolam); Therapy: (Genita Jump) to Recorded    Allergies    1  Penicillins   2  Adhesive Tape TAPE   3  Latex Gloves MISC   4  Penicillins    5  Latex    End of Encounter Meds    1  Advair Diskus 250-50 MCG/DOSE Inhalation Aerosol Powder Breath Activated; Therapy: (Genita Jump) to Recorded   2  Advair HFA AERO; Therapy: (Recorded:21Oct2014) to Recorded   3  Albuterol Sulfate (2 5 MG/3ML) 0 083% Inhalation Nebulization Solution; Therapy: (Genita Jump) to Recorded   4  Flonase 50 MCG/ACT SUSP (Fluticasone Propionate); Therapy: (Genita Jump) to Recorded   5  Lyrica CAPS; Therapy: (13 [phone]) to Recorded   6  NexIUM 40 MG Oral Capsule Delayed Release (Esomeprazole Magnesium); Therapy: (Genita Jump) to Recorded   7  NexIUM CPDR (Esomeprazole Magnesium); Therapy: (14 372 361) to Recorded   8  Potassium Chloride ER 10 MEQ Oral Capsule Extended Release; Therapy: (Genita Jump) to Recorded   9  ProAir  (90 Base) MCG/ACT Inhalation Aerosol Solution; Therapy: (Genita Jump) to Recorded   10  Toprol XL TB24 (Metoprolol Succinate ER); Therapy: (45 372 361) to Recorded   11  Toviaz TB24;    Therapy: (STRPXDYB:[id number]) to Recorded   12  Triamterene-HCTZ 37 5-25 MG Oral Capsule; Therapy: (Genita Jump) to Recorded   13  Xanax 0 5 MG Oral Tablet (ALPRAZolam); Therapy: (Genita Jump) to Recorded    Future Appointments    Date/Time Provider Specialty Site   10/11/2016 10:15 AM Daniele Castro MD Vascular Surgery [facility] VASCULAR     Patient Care Team    Care Team Member Role Specialty Office Number   Dory Nirali WYATT  Cardiology [phone]   [address] [place] [address]  Pulmonary Medicine [phone]     Signatures   Electronically signed by :  Heather Duarte RN; Sep 30 2016  1:21PM EST                       (Author)

## 2018-01-15 NOTE — CONSULTS
I had the pleasure of evaluating your patient, [facility]  My full evaluation follows:      Chief Complaint  Chief Complaint:   The patient presents to the office today with History of Merkel cell carcinoma, follow-up  History of Present Illness  20-year-old female recently referred for continued oncology surveillance  Approximately 10 years ago, Mrs Jose Faria was found to have a lesion 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  Although the pathology results were not entirely clear, patient was treated for high-grade neuroendocrine tumor/Merkel cell and received resection (possibly with positive margins) and lymph node dissection of the left groin  Apparently 2 lymph nodes were positive  Patient subsequently received radiation to the groin and tumor bed and also received intravenous chemotherapy (specifics not available)  Patient was followed at [facility] for many years without evidence of recurrence  Mrs Jose Faria lives in [place], [place] and is in need of a new oncologist      Patient states feeling +/-, about the same as before  Patient believes that the left lower extremity swelling is the same as before  Activities are limited because of the swelling  Pain is controlled with 1 or 2 Aleve a day  Patient has a history of asthma and has dyspnea on exertion  No shortness of breath at rest, cough or sputum  Patient had a hysterectomy years ago (cervical cancer, specifics not available) and required a (? abdominal, suprapubic?) catheter for a number of months afterwards  Patient is been seen by Dr Ivan Jacobs and workup is ongoing  Review of Systems    Constitutional: feeling tired, but as noted in HPI  Eyes: No complaints of eye pain, no red eyes, no eyesight problems, no discharge, no dry eyes, no itching of eyes  ENT: no complaints of earache, no loss of hearing, no nose bleeds, no nasal discharge, no sore throat, no hoarseness     Cardiovascular: No complaints of slow heart rate, no fast heart rate, no chest pain, no palpitations, no leg claudication, no lower extremity edema  Respiratory: No complaints of shortness of breath, no wheezing, no cough, no SOB on exertion, no orthopnea, no PND  Gastrointestinal: No complaints of abdominal pain, no constipation, no nausea or vomiting, no diarrhea, no bloody stools  Genitourinary: as noted in HPI  Musculoskeletal: limb pain and limb swelling, but as noted in HPI  Integumentary: No complaints of skin rash or lesions, no itching, no skin wounds, no breast pain or lump  Neurological: No complaints of headache, no confusion, no convulsions, no numbness, no dizziness or fainting, no tingling, no limb weakness, no difficulty walking  Psychiatric: Not suicidal, no sleep disturbance, no anxiety or depression, no change in personality, no emotional problems  Endocrine: No complaints of proptosis, no hot flashes, no muscle weakness, no deepening of the voice, no feelings of weakness  Hematologic/Lymphatic: No complaints of swollen glands, no swollen glands in the neck, does not bleed easily, does not bruise easily  Active Problems     1  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) (M21 969)   2  Allergic rhinitis (477 9) (J30 9)   3  Callus (700) (L84)   4  Cellulitis of toe of right foot (681 10) (L03 031)   5  Edema (782 3) (R60 9)   6  Ingrowing nail (703 0) (L60 0)   7  Limb pain (729 5) (M79 609)   8  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209 36) (C4A 9)   9  Obstructive sleep apnea (327 23) (G47 33)   10  Onychomycosis (110 1) (B35 1)   11  Pre-op testing (V72 84) (Z01 818)   12  Stenosis of left carotid artery (433 10) (I65 22)   13   Xerosis cutis (706 8) (L85 3)    Asthma (493 90) (J45 909)       Esophageal reflux (530 81) (K21 9)       Chronic obstructive pulmonary disease (496) (J44 9)       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) (I25 10)       Hyperlipidemia (272 4) (E78 5)       Atherosclerotic peripheral vascular disease (440 20) (I70 209)       Lymphedema (457 1) (I89 0)       Drainage from wound (879 8) (T14 8XXA)       Open wound of abdomen (879 2) (S31 109A)          Past Medical History    · History of Aortic sclerosis   · History of Atelectasis (518 0) (J98 11)   · History of Cellulitis (682 9) (L03 90)   · History of Cervical Cancer (V10 41)   · History of Fibromyalgia (729 1) (M79 7)   · History of cardiomegaly (V12 59) (Z86 79)   · History of cholelithiasis (V12 79) (Z87 19)   · History of hiatal hernia (V12 79) (Z87 19)   · 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)   · History of Pleural Effusion (511 9)   · History of Pneumonia (V12 61)   · History of Renal cyst, acquired (593 2) (N28 1)   · History of Respiratory Failure With Hypercapnia (518 81)   · 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)   · History of Septicemia (038 9) (A41 9)    Surgical History    · History of Appendectomy   · History of Bronchoscopy (Diagnostic)   · History of Enteroscopic Polypectomy   · History of Hip Surgery   · History of Knee Arthroplasty   · History of Percutaneous Portal Vein Catheter Placement   · History of Radiation Therapy   · History of Vaginal Hysterectomy    Family History    · Family history of Acute Myocardial Infarction (V17 3)   · Family history of Pneumonia    · Family history of Prostate Cancer (V16 42)    · Family history of Breast Cancer (V16 3)    Social History    · Denied: History of Alcohol use   · Always uses seat belt   · Never A Smoker   · Non-smoker (V49 89) (Z78 9)   · Retired from employment   ·  (V61 07) (Z63 4)    Current Meds   1  Atorvastatin Calcium 10 MG Oral Tablet; TAKE 1 TABLET DAILY AS DIRECTED; Therapy: 20QXV3972 to [phone]  Requested for: 98AEO7085; Last   Rx:11Oct2016 Ordered   2  Doxycycline Hyclate 100 MG Oral Capsule; TAKE 1 CAPSULE EVERY 12 HOURS DAILY; Therapy: 34JDE4961 to ()  Requested for: 59UMJ7544; Last   Rx:11Oct2017 Ordered   3  Lyrica 300 MG Oral Capsule;  Take two times daily; Therapy: (Torie Yancey) to Recorded   4  NexIUM 40 MG Oral Capsule Delayed Release; TAKE 1 CAPSULE Daily; Therapy: (Torie Yancey) to Recorded   5  Plavix 75 MG Oral Tablet; TAKE 1 TABLET DAILY; Therapy: (Torie Yancey) to Recorded   6  Potassium Chloride ER 10 MEQ Oral Capsule Extended Release; Take one capsule   daily; Therapy: (Recorded:72Yzl8986) to Recorded    Allergies    1  Penicillins   2  Adhesive Tape TAPE   3  fentanyl   4  Latex Gloves MISC   5  Penicillins    6  Latex    Vitals   Recorded: 28GMS4024 03:57PM   Temperature 98 7 F   Heart Rate 77   Respiration 16   Systolic 275   Diastolic 80   Height 5 ft 2 in   Weight 206 lb 8 oz   BMI Calculated 37 77   BSA Calculated 1 94   O2 Saturation 93     Physical Exam    Constitutional Elderly female, no respiratory distress  Eyes   Conjunctiva and lids: No swelling, erythema or discharge  Pupils and irises: Equal, round and reactive to light  Ears, Nose, Mouth, and Throat   External inspection of ears and nose: Normal     Nasal mucosa, septum, and turbinates: Normal without edema or erythema  Oropharynx: Normal with no erythema, edema, exudate or lesions  Pulmonary Distant breath sounds bilaterally, scattered rhonchi  Cardiovascular   Palpation of heart: Normal PMI, no thrills  Auscultation of heart: Normal rate and rhythm, normal S1 and S2, without murmurs  Examination of extremities for edema and/or varicosities: Abnormal   3+ left-sided edema, no cord, pulses are decreased  Carotid pulses: Normal     Abdomen Obese, cannot palpate liver or spleen, +bowel sounds, no rigidity rebound  Lymphatic No adenopathy in the neck, supraclavicular region, axilla groin bilaterally  Musculoskeletal   Gait and station: Normal     Digits and nails: Normal without clubbing or cyanosis      Inspection/palpation of joints, bones, and muscles: Normal     Skin Well healed scar behind the left knee and left groin, no palpable adenopathy  Neurologic   Cranial nerves: Cranial nerves 2-12 intact  Reflexes: 2+ and symmetric  Sensation: No sensory loss  Psychiatric   Orientation to person, place, and time: Normal     Mood and affect: Normal     Additional Exam:  Patient has a small opening in the lower abdomen/pelvis - same as before, no discharge or bleeding, no clear fluid, no signs of infection  Results/Data    Results   Radiology 10/12/17 chest x-ray demonstrated no active pulmonary disease  10/2/17 MRI of tibia/fibula impression stated that there was postoperative changes and scar in the proximal posterior lateral calf but no evidence of tumor recurrence  Patient had diffuse of cutaneous edema throughout the left lower leg  10/2/17 CAT scan of the chest and abdomen/pelvis did not demonstrate any metastatic disease and no significant anasarca throughout the subcutaneous fat of abdomen or pelvis  Patient had gallstones and scattered diverticulosis  Lab Results 10/12/17 WBC = 5 9 hemoglobin = 12 7 hematocrit = 39 3 platelet = 400 BUN = 14 creatinine = 0 85  Assessment    1  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209 36) (C4A 9)   2  Lymphedema (457 1) (I89 0)    Plan   Lymphedema    · (1) CBC/PLT/DIFF; Status:Active; Requested for:45Vcr5096; Perform:[facility]; WLI:29QOO6770;WCAORLQ; For:Lymphedema; Ordered By:Milton Vora;   · (1) COMPREHENSIVE METABOLIC PANEL; Status:Active; Requested for:51Lkr8321; Perform:[facility]; OED:31STP6816;AOOVCTD; For:Lymphedema; Ordered By:Milton Vora;   · Follow Up After Tests Complete Evaluation and Treatment  Follow-up  Status: Complete   Done: 06OLR6160 10:15AM   Ordered; For: Lymphedema; Ordered By: Marisa Ramirez Performed:  Due: 57RJB8852;  Last Updated By: Grisel Cunningham; 10/17/2017 4:27:25 PM    Lymphedema (457 1) (I89 0)       1 - Maya Hedrick MD, [facility] (General [facility]) Co-Management  *  Status: Active  Requested for: 91EDY8043  Ordered; For: Lymphedema; Ordered By: Nila Connell  Performed:   Due: 61TCK0356  *1 - [facility] Co-Management  *  Status: Active  Requested for: 51TRI5385  Ordered; For: Lymphedema; Ordered By: Nila Connell  Performed:   Due: 45VAV5611     Discussion/Summary    52-year-old female diagnosed with a neuroendocrine tumor/Merkel cell approximately 10 years ago with resection, lymph node sampling, radiation and chemotherapy  Mrs Luisa Ly feels relatively well and clinically there are no signs for recurrence  Recent scans also do not demonstrate any evidence of recurrent Merkel cell  Surveillance is ongoing  Patient is to return in 4 months  Because of the lymphedema, patient will be referred to the lymphedema team  Patient will continue with routine port flushes  Patient will also follow-up with Dr Tom Palacios  I had spoke to him briefly - he had reviewed the scans  There is a questionable metallic object in the abdominal wall that may be the cause for the discharge  Evaluation is ongoing  Patient will continue her routine health maintenance and medical care with Dr Marge Lantigua  Mrs Luisa Ly knows to call the office if she has any other oncology questions or concerns  Carefully review your medication list and verify that the list is accurate and up-to-date  Please call the hematology/oncology office if there are medications missing from the list, medications on the list that you are not currently taking or if there is a dosage or instruction that is different from how you're taking a medication  Thank you very much for allowing me to participate in the care of this patient  If you have any questions, please do not hesitate to contact me        Signatures   Electronically signed by : Dalton Awad MD; Nov 1 2017  8:20PM EST                       (Author)

## 2018-01-16 NOTE — CONSULTS
I had the pleasure of evaluating your patient, [facility]  My full evaluation follows:      Chief Complaint  History of Merkel cell carcinoma, new patient      History of Present Illness  This is an 81-year-old female referred for continued oncology surveillance  Approximately 10 years ago, Mrs Fidel Hunt was found to have a lesion 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  Although the pathology results were not entirely clear, patient was treated for high-grade neuroendocrine tumor/Merkel cell and received resection (possibly with positive margins) and lymph node dissection of the left groin  Apparently 2 lymph nodes were positive  Patient subsequently received radiation to the groin and tumor bed and also received intravenous chemotherapy (specifics not available)  Patient was followed at [facility] for many years without evidence of recurrence  Mrs Fidel Hunt lives in [place], [place] and is in need of a new oncologist      Patient states feeling +/-  Patient believes that the left lower extremity swelling is worse than before  Activities are limited because of the swelling  Pain is controlled with 1 or 2 Aleve a day  Patient has a history of asthma and has dyspnea on exertion  No shortness of breath at rest, cough or sputum  Patient had a hysterectomy years ago (cervical cancer, specifics not available) and required a (? abdominal, suprapubic?) catheter for a number of months afterwards  Patient states that recently had fluid (but not blood) coming from the old catheter site  Review of Systems    Constitutional: feeling tired, but as noted in HPI  Eyes: No complaints of eye pain, no red eyes, no eyesight problems, no discharge, no dry eyes, no itching of eyes  ENT: no complaints of earache, no loss of hearing, no nose bleeds, no nasal discharge, no sore throat, no hoarseness  Cardiovascular: No complaints of slow heart rate, no fast heart rate, no chest pain, no palpitations, no leg claudication, no lower extremity edema  Respiratory: No complaints of shortness of breath, no wheezing, no cough, no SOB on exertion, no orthopnea, no PND  Gastrointestinal: No complaints of abdominal pain, no constipation, no nausea or vomiting, no diarrhea, no bloody stools  Genitourinary: as noted in HPI  Musculoskeletal: limb pain and limb swelling, but as noted in HPI  Integumentary: No complaints of skin rash or lesions, no itching, no skin wounds, no breast pain or lump  Neurological: No complaints of headache, no confusion, no convulsions, no numbness, no dizziness or fainting, no tingling, no limb weakness, no difficulty walking  Psychiatric: Not suicidal, no sleep disturbance, no anxiety or depression, no change in personality, no emotional problems  Endocrine: No complaints of proptosis, no hot flashes, no muscle weakness, no deepening of the voice, no feelings of weakness  Hematologic/Lymphatic: No complaints of swollen glands, no swollen glands in the neck, does not bleed easily, does not bruise easily  Active Problems    1  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) (M21 969)   2  Allergic rhinitis (477 9) (J30 9)   3  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,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)   4  Asthma (493 90) (J45 909)   5  Atherosclerotic peripheral vascular disease (440 20) (I70 209)   6  Callus (700) (L84)   7  Chronic obstructive pulmonary disease (496) (J44 9)   8  Edema (782 3) (R60 9)   9  Esophageal reflux (530 81) (K21 9)   10  Hyperlipidemia (272 4) (E78 5)   11  Limb pain (729 5) (M79 609)   12  Obstructive sleep apnea (327 23) (G47 33)   13  Onychomycosis (110 1) (B35 1)   14  Stenosis of left carotid artery (433 10) (I65 22)   15   Xerosis cutis (706 8) (L85 3)    Past Medical History    · History of Aortic sclerosis   · History of Atelectasis (518 0) (J98 11)   · History of Cellulitis (682 9) (L03 90)   · History of Cervical Cancer (V10 41)   · History of Fibromyalgia (729 1) (M79 7)   · History of cardiomegaly (V12 59) (Z86 79)   · History of cholelithiasis (V12 79) (Z87 19)   · History of hiatal hernia (V12 79) (Z87 19)   · 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)   · History of Pleural Effusion (511 9)   · History of Pneumonia (V12 61)   · History of Renal cyst, acquired (593 2) (N28 1)   · History of Respiratory Failure With Hypercapnia (518 81)   · 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)   · History of Septicemia (038 9) (A41 9)    The active problems and past medical history were reviewed and updated today  As above, fibromyalgia, hypertension, asthma, atrial fibrillation, left lower extremity chronic edema, obesity, normal childhood illnesses and vaccinations  Ob/gyn:  Mammograms reportedly up-to-date and WNL, KENNEDI for cervical cancer, both ovaries left in place, no postoperative complications        Surgical History    · History of Appendectomy   · History of Bronchoscopy (Diagnostic)   · History of Enteroscopic Polypectomy   · History of Hip Surgery   · History of Knee Arthroplasty   · History of Percutaneous Portal Vein Catheter Placement   · History of Radiation Therapy   · History of Vaginal Hysterectomy    The surgical history was reviewed and updated today  KENNEDI as above, appendectomy, knee surgery as discussed above + lymph node dissection, no transfusions        Family History    · Family history of Acute Myocardial Infarction (V17 3)   · Family history of Pneumonia    · Family history of Prostate Cancer (I96 12)    · Family history of Breast Cancer (V16 3)    The family history was reviewed and updated today  Four children alive and well, 1 daughter recently diagnosed with colon cancer (NOS), no clear familial or genetic diseases        Social History    · Never A Smoker   · Non-smoker (V49 89) (Z78 9)   · Occupation:   ·   The social history was reviewed and updated today     Lives alone, , no tobacco, alcohol or drug abuse, no toxic exposure Current Meds   1  Atorvastatin Calcium 10 MG Oral Tablet; TAKE 1 TABLET DAILY AS DIRECTED; Therapy: 96PHJ4737 to (Irma Bullock)  Requested for: 69GKK9407; Last   Rx:11Oct2016 Ordered   2  Lyrica 300 MG Oral Capsule; Take two times daily; Therapy: (Ferris Bullion) to Recorded   3  NexIUM 40 MG Oral Capsule Delayed Release; TAKE 1 CAPSULE Daily; Therapy: (Ferris Bullion) to Recorded   4  Plavix 75 MG Oral Tablet; TAKE 1 TABLET DAILY; Therapy: (Ferris Bullion) to Recorded   5  Potassium Chloride ER 10 MEQ Oral Capsule Extended Release; Take one capsule   daily; Therapy: (Recorded:98Hbd7692) to Recorded    Allergies    1  Penicillins   2  Adhesive Tape TAPE   3  Latex Gloves MISC   4  Penicillins    5  Latex    Vitals   Recorded: 79PXR2513 02:44PM   Temperature 98 F   Heart Rate 72   Respiration 16   Systolic 354   Diastolic 78   Height 5 ft 2 in   Weight 208 lb 2 oz   BMI Calculated 38 07   BSA Calculated 1 94   O2 Saturation 95     Physical Exam    Constitutional Elderly female, no respiratory distress  Eyes   Conjunctiva and lids: No swelling, erythema or discharge  Pupils and irises: Equal, round and reactive to light  Ears, Nose, Mouth, and Throat   External inspection of ears and nose: Normal     Nasal mucosa, septum, and turbinates: Normal without edema or erythema  Oropharynx: Normal with no erythema, edema, exudate or lesions  Pulmonary Distant breath sounds bilaterally, scattered rhonchi  Cardiovascular   Palpation of heart: Normal PMI, no thrills  Auscultation of heart: Normal rate and rhythm, normal S1 and S2, without murmurs  Examination of extremities for edema and/or varicosities: Abnormal   3+ left-sided edema, no cord, pulses are decreased  Carotid pulses: Normal     Abdomen Obese, cannot palpate liver or spleen, +bowel sounds, no rigidity rebound  Lymphatic No adenopathy in the neck, supraclavicular region, axilla groin bilaterally  Musculoskeletal   Gait and station: Normal     Digits and nails: Normal without clubbing or cyanosis  Inspection/palpation of joints, bones, and muscles: Normal     Skin Well healed scar behind the left knee and left groin, no palpable adenopathy  Neurologic   Cranial nerves: Cranial nerves 2-12 intact  Reflexes: 2+ and symmetric  Sensation: No sensory loss  Psychiatric   Orientation to person, place, and time: Normal     Mood and affect: Normal     Additional Exam:  Patient has a small opening in the lower abdomen/pelvis, no discharge or bleeding, no clear fluid, no signs of infection  Results/Data  (1) CBC/PLT/DIFF 72Hxf0323 03:33PM Kareem Carmona Order Number: [id number]     Test Name Result Flag Reference   WBC COUNT 6 30 Thousand/uL  4 80-10 80   RBC COUNT 4 73 Million/uL  4 20-5 40   HEMOGLOBIN 12 3 g/dL  12 0-16 0   HEMATOCRIT 38 3 %  37 0-47 0   MCV 81 fL L 82-98   MCH 25 9 pg L 27 0-31 0   MCHC 32 0 g/dL  31 4-37 4   RDW 15 5 % H 11 6-15 1   MPV 9 6 fL  8 9-12 7   PLATELET COUNT 830 Thousands/uL  130-400   nRBC AUTOMATED 0 /100 WBCs     NEUTROPHILS RELATIVE PERCENT 63 %  43-75   LYMPHOCYTES RELATIVE PERCENT 22 %  14-44   MONOCYTES RELATIVE PERCENT 8 %  4-12   EOSINOPHILS RELATIVE PERCENT 6 %  0-6   BASOPHILS RELATIVE PERCENT 1 %  0-1   NEUTROPHILS ABSOLUTE COUNT 4 00 Thousands/? ??L  1 85-7 62   LYMPHOCYTES ABSOLUTE COUNT 1 40 Thousands/? ??L  0 60-4 47   MONOCYTES ABSOLUTE COUNT 0 50 Thousand/? ??L  0 17-1 22   EOSINOPHILS ABSOLUTE COUNT 0 40 Thousand/? ??L  0 00-0 61   BASOPHILS ABSOLUTE COUNT 0 10 Thousands/? ??L  0 00-0 10     (1) COMPREHENSIVE METABOLIC PANEL 47EGK7891 61:27OQ Kareem Carmona Order Number: [id number]     Test Name Result Flag Reference   GLUCOSE,RANDM 89 mg/dL     If the patient is fasting, the ADA then defines impaired fasting glucose as > 100 mg/dL and diabetes as > or equal to 123 mg/dL    Specimen collection should occur prior to Sulfasalazine administration due to the potential for falsely depressed results  Specimen collection should occur prior to Sulfapyridine administration due to the potential for falsely elevated results  SODIUM 136 mmol/L  136-145   POTASSIUM 4 0 mmol/L  3 5-5 3   CHLORIDE 100 mmol/L  100-108   CARBON DIOXIDE 28 mmol/L  21-32   ANION GAP (CALC) 8 mmol/L  4-13   BLOOD UREA NITROGEN 17 mg/dL  5-25   CREATININE 0 74 mg/dL  0 60-1 30   Standardized to IDMS reference method   CALCIUM 8 7 mg/dL  8 3-10 1   BILI, TOTAL 0 40 mg/dL  0 20-1 00   ALK PHOSPHATAS 126 U/L H    ALT (SGPT) 19 U/L  12-78   Specimen collection should occur prior to Sulfasalazine administration due to the potential for falsely depressed results  AST(SGOT) 17 U/L  5-45   Specimen collection should occur prior to Sulfasalazine administration due to the potential for falsely depressed results  ALBUMIN 3 4 g/dL L 3 5-5 0   TOTAL PROTEIN 6 6 g/dL  6 4-8 2   eGFR 76 ml/min/1 73sq m     National Kidney Disease Education Program recommendations are as follows:  GFR calculation is accurate only with a steady state creatinine  Chronic Kidney disease less than 60 ml/min/1 73 sq  meters  Kidney failure less than 15 ml/min/1 73 sq  meters  Results   Radiology No recent test results available  Lab Results No recent test results available  Assessment    1  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209 36) (C4A 9)   2  Lymphedema (457 1) (I89 0)    Plan   Lymphedema    · (1) CBC/PLT/DIFF; Status:Resulted - Requires Verification;   Done: 88RJU8425 03:33PM   WYL:53DPI1624; Ordered; For:Lymphedema; Ordered By:Milton Campoverde;   · (1) COMPREHENSIVE METABOLIC PANEL; Status:Resulted - Requires Verification;    Done: 34VCU5908 03:33PM   HUS:97UTO3255; Ordered; For:Lymphedema; Ordered By:Milton Campoverde;   · * CT CHEST ABDOMEN PELVIS W CONTRAST; Status:Need Information - Financial  Authorization;  Requested HIK:[id number] 02:45PM;    Perform:[facility]; JVB:53UFK5010; Last Updated Aung Drake; 9/25/2017 3:03:08 PM;Ordered; For:Lymphedema; Ordered By:Milton Todd;   · * MRI KNEE LEFT W WO CONTRAST; Status:Need Information - Financial  Authorization,Retrospective By Protocol Authorization; Requested IAX:[id number]  03:45PM;    Perform:[facility]; Order Comments:81year-old female with history of Merkel cell to the posterior knee status post resection, radiotherapy and chemotherapy  Patient with left knee, lower thigh and calf swelling (? Lymphedema versus recurrence)  Can patient be scanned down to the ankle?; Due:53Viy6897; Last Updated Aung Drake; 9/25/2017 3:03:52 PM;Ordered; For:Lymphedema; Ordered By:Milton Todd;   · Follow-up visit in 3 weeks Evaluation and Treatment  Follow-up  Status: Complete  Done:  81NFE6148 03:15PM   Ordered; For: Lymphedema; Ordered By: Ross Borrero Performed:  Due: 06SHU8220; Last Updated By: Charli Bruce; 9/25/2017 3:28:09 PM    1 - Citlaly Banda MD, Lokesh Novak (General Surgery) Co-Management  *  Status: Active  Requested for: 12XDF6975  Ordered; For: Lymphedema; Ordered By: Ross Borrero  Performed:   Due: 65PNV0670  *1 -  PHYSICAL [address] [place] [address] Co-Management  *  Status: Active  Requested for: 90ZVP8121  Ordered; For: Lymphedema; Ordered By: Ross Borrero  Performed:   Due: 74IFM1014     Discussion/Summary    25-year-old female diagnosed with a neuroendocrine tumor/Merkel cell approximately 10 years ago with resection, lymph node sampling, radiation and chemotherapy  Mrs Haskins Shown is in need of a new oncologist  Patient states feeling +/-; pain is relatively controlled but the patient is worried about the lower extremity swelling  We discussed options  The patient is to go for blood work and CAT scans now (Looking for evidence of disease progression)  Patient will also undergo a repeat MRI of the left lower extremity   Patient will be referred to physical therapy for lymphedema treatment  Patient will be set up for port flushes routinely  At this time, Mrs Mary Kate Duvall is to return in 3 weeks but this may change depending upon the above  Patient will continue her routine health maintenance and medical care with Dr Silivna Fields  The etiology for the discharge from the previous catheter site/fistula is not clear  Hopefully the CT scans will help clarify  I spoke with Dr Malina Guillen, surgery about this  He will see the patient as soon as possible  He requests that we have the patient try and collect some of the fluid if possible (for cytology)  Mrs Mary Kate Duvall knows to call the office if she has any other oncology questions or concerns  Carefully review your medication list and verify that the list is accurate and up-to-date  Please call the hematology/oncology office if there are medications missing from the list, medications on the list that you are not currently taking or if there is a dosage or instruction that is different from how you're taking a medication  Thank you very much for allowing me to participate in the care of this patient  If you have any questions, please do not hesitate to contact me        Signatures   Electronically signed by : Ximena Esparza MD; Sep 27 2017  5:42PM EST                       (Author)

## 2018-01-17 NOTE — PROGRESS NOTES
History of Present Illness  Care Coordination Encounter Information:   Type of Encounter: Telephonic   Last Office Visit: 10/7/16   Spoke to Patient  Care Coordination  Nurse [address] [place] [address] [address]   The reason for call is to discuss outreach for follow up/needed services and coordination of meeting care plan treatment goals  Halina Villagran has seen Vascular  Surgery is recommended but she refuses as she has history of anesthesia issues  She is frightened  She enjoys her independence but is anxious regarding recent events showing a decline in her health status  She has no POA, Living Will or completed Five Wishes document  I will send her the Five Wishes document today  We spent some time discussing these documents and the ability to make your own decisions vs  someone making them for her when she is unable to do so  I have provided the Office on Aging number again to her  She believes she has an appointment with Latonya Rutledge from this office on 10/20/16  Active Problems    1  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) (M21 969)   2  Allergic rhinitis (477 9) (J30 9)   3  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,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)   4  Asthma (493 90) (J45 909)   5  Atherosclerotic peripheral vascular disease (440 20) (I70 209)   6  Callus (700) (L84)   7  Chronic obstructive pulmonary disease (496) (J44 9)   8  Edema (782 3) (R60 9)   9  Esophageal reflux (530 81) (K21 9)   10  Hyperlipidemia (272 4) (E78 5)   11  Limb pain (729 5) (M79 609)   12  Obstructive sleep apnea (327 23) (G47 33)   13  Onychomycosis (110 1) (B35 1)   14  Stenosis of left carotid artery (433 10) (I65 22)   15  Xerosis cutis (706 8) (L85 3)    Past Medical History    1  History of Aortic sclerosis (440 0) (I70 0)   2  History of Atelectasis (518 0) (J98 11)   3  History of Cellulitis (682 9) (L03 90)   4  History of Cervical Cancer (V10 41)   5  History of Fibromyalgia (729 1) (M79 7)   6  History of cardiomegaly (V12 59) (Z86 79)   7   History of cholelithiasis (V12 79) (Z87 19)   8  History of hiatal hernia (V12 79) (Z87 19)   9  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)   10  History of Pleural Effusion (511 9)   11  History of Pneumonia (V12 61)   12  History of Renal cyst, acquired (593 2) (N28 1)   13  History of Respiratory Failure With Hypercapnia (518 81)   14  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)   15  History of Septicemia (038 9) (A41 9)    Surgical History    1  History of Appendectomy   2  History of Bronchoscopy (Diagnostic)   3  History of Enteroscopic Polypectomy   4  History of Hip Surgery   5  History of Knee Arthroplasty   6  History of Percutaneous Portal Vein Catheter Placement   7  History of Radiation Therapy   8  History of Vaginal Hysterectomy    Family History  Mother    1  Family history of Acute Myocardial Infarction (V17 3)   2  Family history of Pneumonia  Father    3  Family history of Prostate Cancer (V16 42)  Sister    4  Family history of Breast Cancer (V16 3)    Social History    · Never A Smoker   · Non-smoker (V49 89) (Z78 9)   · Occupation:    Current Meds    1  Atorvastatin Calcium 10 MG Oral Tablet; TAKE 1 TABLET DAILY AS DIRECTED; Therapy: 66OAZ4082 to (Claudell Cahill)  Requested for: 42GYZ3684; Last   Rx:11Oct2016 Ordered   2  Clopidogrel Bisulfate 75 MG Oral Tablet; Take 1 tablet daily; Therapy: 40XAQ7185 to (Last Rx:11Oct2016)  Requested for: 33HFL1047 Ordered    3  Advair Diskus 250-50 MCG/DOSE Inhalation Aerosol Powder Breath Activated; Therapy: (Shabana Shelton) to Recorded   4  Advair HFA AERO; Therapy: ([phone]) to Recorded   5  Albuterol Sulfate (2 5 MG/3ML) 0 083% Inhalation Nebulization Solution; Therapy: (Shabana Shelton) to Recorded   6  Aspirin 81 MG TABS; Therapy: (Recorded:11Oct2016) to Recorded   7  Atorvastatin Calcium 10 MG Oral Tablet; Therapy: (Recorded:11Oct2016) to Recorded   8   Flonase 50 MCG/ACT SUSP (Fluticasone Propionate); Therapy: (Bashirle Sox) to Recorded   9  LevoFLOXacin 500 MG Oral Tablet; Therapy: (Recorded:11Oct2016) to Recorded   10  Lisinopril 30 MG Oral Tablet; Therapy: (Recorded:11Oct2016) to Recorded   11  Lyrica CAPS; Therapy: (474 5764) to Recorded   12  MetroNIDAZOLE 500 MG Oral Tablet; Therapy: (Recorded:11Oct2016) to Recorded   13  NexIUM 40 MG Oral Capsule Delayed Release (Esomeprazole Magnesium); Therapy: (Bashirle Sox) to Recorded   14  NexIUM CPDR (Esomeprazole Magnesium); Therapy: (474 5764) to Recorded   15  Plavix 75 MG Oral Tablet (Clopidogrel Bisulfate); Therapy: (Recorded:11Oct2016) to Recorded   16  Potassium Chloride ER 10 MEQ Oral Capsule Extended Release; Therapy: (Bashirle Sox) to Recorded   17  ProAir  (90 Base) MCG/ACT Inhalation Aerosol Solution; Therapy: (Bashirle Sox) to Recorded   18  Toprol XL TB24 (Metoprolol Succinate ER); Therapy: (474 5764) to Recorded   19  Toviaz TB24;    Therapy: (HNLVFVSY:40MMB1672) to Recorded   20  Triamterene-HCTZ 37 5-25 MG Oral Capsule; Therapy: (Bashirle Sox) to Recorded   21  Xanax 0 5 MG Oral Tablet (ALPRAZolam); Therapy: (Delle Sox) to Recorded    Allergies    1  Penicillins   2  Adhesive Tape TAPE   3  Latex Gloves MISC   4  Penicillins    5  Latex    End of Encounter Meds    1  Atorvastatin Calcium 10 MG Oral Tablet; TAKE 1 TABLET DAILY AS DIRECTED; Therapy: 93UAK4466 to (Lori Blount)  Requested for: 69BZR0363; Last   Rx:11Oct2016 Ordered   2  Clopidogrel Bisulfate 75 MG Oral Tablet; Take 1 tablet daily; Therapy: 33MGY8485 to (Last Rx:11Oct2016)  Requested for: 84RRJ7120 Ordered    3  Advair Diskus 250-50 MCG/DOSE Inhalation Aerosol Powder Breath Activated; Therapy: (Bashirle Sox) to Recorded   4  Advair HFA AERO; Therapy: ([phone]) to Recorded   5   Albuterol Sulfate (2 5 MG/3ML) 0 083% Inhalation Nebulization Solution; Therapy: (Henry Nav) to Recorded   6  Aspirin 81 MG TABS; Therapy: (Recorded:11Oct2016) to Recorded   7  Atorvastatin Calcium 10 MG Oral Tablet; Therapy: (Recorded:11Oct2016) to Recorded   8  Flonase 50 MCG/ACT SUSP (Fluticasone Propionate); Therapy: (Henry Nav) to Recorded   9  LevoFLOXacin 500 MG Oral Tablet; Therapy: (Recorded:11Oct2016) to Recorded   10  Lisinopril 30 MG Oral Tablet; Therapy: (Recorded:11Oct2016) to Recorded   11  Lyrica CAPS; Therapy: ([phone]) to Recorded   12  MetroNIDAZOLE 500 MG Oral Tablet; Therapy: (Recorded:11Oct2016) to Recorded   13  NexIUM 40 MG Oral Capsule Delayed Release (Esomeprazole Magnesium); Therapy: (Henry Nav) to Recorded   14  NexIUM CPDR (Esomeprazole Magnesium); Therapy: ([phone]) to Recorded   15  Plavix 75 MG Oral Tablet (Clopidogrel Bisulfate); Therapy: (Recorded:11Oct2016) to Recorded   16  Potassium Chloride ER 10 MEQ Oral Capsule Extended Release; Therapy: (Henry Nav) to Recorded   17  ProAir  (90 Base) MCG/ACT Inhalation Aerosol Solution; Therapy: (Henry Nav) to Recorded   18  Toprol XL TB24 (Metoprolol Succinate ER); Therapy: ([phone]) to Recorded   19  Toviaz TB24;    Therapy: (SETEQKGS:73DVB6948) to Recorded   20  Triamterene-HCTZ 37 5-25 MG Oral Capsule; Therapy: (Henry Nav) to Recorded   21  Xanax 0 5 MG Oral Tablet (ALPRAZolam);     Therapy: (Henry Nav) to Recorded    Future Appointments    Date/Time Provider Specialty Site   01/24/2017 10:45 AM Joellen Rae MD Vascular Surgery [facility] VASCULAR     Patient Care Team    Care Team Member Role Specialty Office Number   Angeles Ibarra MD  Cardiology [phone]   [address] [place] [address]  Pulmonary Medicine [phone]   eVlia العلي MD  Vascular Surgery [phone]   Joellen Rae MD  Vascular Surgery [phone]   Elisa Jeffery [address] [facility] [phone]     Signatures   Electronically signed by :  Juan Sparrow RN; Oct 18 2016  9:45AM EST                       (Author)

## 2018-01-18 NOTE — PROGRESS NOTES
History of Present Illness  Care Coordination Encounter Information:   Type of Encounter: Telephonic    Spoke to Patient  Care Coordination SL Nurse [facility]:   The reason for call is to discuss outreach for follow up/needed services and coordination of meeting care plan treatment goals  Alonzo Rahman is doing well today with no increase in symptoms  She checks her blood pressure twice daily  Today it is 125-65  No heightened asthma symptoms  Today I encourage Alonzo Rahman to call Mu Cooley from the Office on Aging to apply for the [employer]  There is a waiting list  Alonzo Rahman currently refuses Meals on Wheels  She prefers her TV dinners that food stamps pay for  She has a PCP appointment with Dr Carly Gann on 10/7, a vascular appointment with Dr Stan Aguiar on 10/11  She still needs to schedule with Neurology  Active Problems    1  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) (M21 969)   2  Allergic rhinitis (477 9) (J30 9)   3  Asthma (493 90) (J45 909)   4  Atherosclerotic peripheral vascular disease (440 20) (I70 209)   5  Callus (700) (L84)   6  Chronic obstructive pulmonary disease (496) (J44 9)   7  Coronary artery disease (414 00) (I25 10)   8  Edema (782 3) (R60 9)   9  Esophageal reflux (530 81) (K21 9)   10  Hyperlipidemia (272 4) (E78 5)   11  Limb pain (729 5) (M79 609)   12  Obstructive sleep apnea (327 23) (G47 33)   13  Onychomycosis (110 1) (B35 1)   14  Xerosis cutis (706 8) (L85 3)    Past Medical History    1  History of Aortic sclerosis (440 0) (I70 0)   2  History of Atelectasis (518 0) (J98 11)   3  History of Cellulitis (682 9) (L03 90)   4  History of Cervical Cancer (V10 41)   5  History of Fibromyalgia (729 1) (M79 7)   6  History of cardiomegaly (V12 59) (Z86 79)   7  History of cholelithiasis (V12 79) (Z87 19)   8  History of hiatal hernia (V12 79) (Z87 19)   9  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)   10  History of Pleural Effusion (511 9)   11  History of Pneumonia (V12 61)   12  History of Renal cyst, acquired (593 2) (N28 1)   13  History of Respiratory Failure With Hypercapnia (518 81)   14  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)   15  History of Septicemia (038 9) (A41 9)    Surgical History    1  History of Appendectomy   2  History of Bronchoscopy (Diagnostic)   3  History of Enteroscopic Polypectomy   4  History of Hip Surgery   5  History of Knee Arthroplasty   6  History of Percutaneous Portal Vein Catheter Placement   7  History of Radiation Therapy   8  History of Vaginal Hysterectomy    Family History  Mother    1  Family history of Acute Myocardial Infarction (V17 3)   2  Family history of Pneumonia  Father    3  Family history of Prostate Cancer (V16 42)  Sister    4  Family history of Breast Cancer (V16 3)    Social History    · Never A Smoker   · Non-smoker (V49 89) (Z78 9)   · Occupation:    Current Meds    1  Advair Diskus 250-50 MCG/DOSE Inhalation Aerosol Powder Breath Activated; Therapy: (Marlon Spence) to Recorded   2  Advair HFA AERO; Therapy: (Recorded:21Oct2014) to Recorded   3  Albuterol Sulfate (2 5 MG/3ML) 0 083% Inhalation Nebulization Solution; Therapy: (Marlon Spence) to Recorded   4  Flonase 50 MCG/ACT SUSP (Fluticasone Propionate); Therapy: (Marlon Spence) to Recorded   5  Lyrica CAPS; Therapy: (Mg [place]) to Recorded   6  NexIUM 40 MG Oral Capsule Delayed Release (Esomeprazole Magnesium); Therapy: (Marlon Spence) to Recorded   7  NexIUM CPDR (Esomeprazole Magnesium); Therapy: (Mg [place]) to Recorded   8  Potassium Chloride ER 10 MEQ Oral Capsule Extended Release; Therapy: (Marlon Spence) to Recorded   9  ProAir  (90 Base) MCG/ACT Inhalation Aerosol Solution; Therapy: (Marlon Spence) to Recorded   10  Toprol XL TB24 (Metoprolol Succinate ER); Therapy: (Mg [place]) to Recorded   11  Toviaz TB24;    Therapy: ([id number]) to Recorded   12   Triamterene-HCTZ 37 5-25 MG Oral Capsule; Therapy: (Johnny Valdez) to Recorded   13  Xanax 0 5 MG Oral Tablet (ALPRAZolam); Therapy: (Johnny Delaneyer) to Recorded    Allergies    1  Penicillins   2  Adhesive Tape TAPE   3  Latex Gloves MISC   4  Penicillins    5  Latex    End of Encounter Meds    1  Advair Diskus 250-50 MCG/DOSE Inhalation Aerosol Powder Breath Activated; Therapy: (Johnny Valdez) to Recorded   2  Advair HFA AERO; Therapy: (Recorded:21Oct2014) to Recorded   3  Albuterol Sulfate (2 5 MG/3ML) 0 083% Inhalation Nebulization Solution; Therapy: (Johnny Valdez) to Recorded   4  Flonase 50 MCG/ACT SUSP (Fluticasone Propionate); Therapy: (Johnny Valdez) to Recorded   5  Lyrica CAPS; Therapy: ([id number]) to Recorded   6  NexIUM 40 MG Oral Capsule Delayed Release (Esomeprazole Magnesium); Therapy: (Johnny Valdez) to Recorded   7  NexIUM CPDR (Esomeprazole Magnesium); Therapy: (641 [id number]) to Recorded   8  Potassium Chloride ER 10 MEQ Oral Capsule Extended Release; Therapy: (Johnny Valdez) to Recorded   9  ProAir  (90 Base) MCG/ACT Inhalation Aerosol Solution; Therapy: (Johnny Valdez) to Recorded   10  Toprol XL TB24 (Metoprolol Succinate ER); Therapy: ([id number] [id number]) to Recorded   11  Toviaz TB24;    Therapy: ([id number]) to Recorded   12  Triamterene-HCTZ 37 5-25 MG Oral Capsule; Therapy: (Johnny Valdez) to Recorded   13  Xanax 0 5 MG Oral Tablet (ALPRAZolam); Therapy: (Johnny Valdez) to Recorded    Future Appointments    Date/Time Provider Specialty Site   10/11/2016 10:15 AM Rylee Cortes MD Vascular Surgery [facility] VASCULAR     Patient Care Team    Care Team Member Role Specialty Office Number   Delaney Roca MD  Cardiology [phone]   [address] [place] [address]  Pulmonary Medicine [phone]     Signatures   Electronically signed by :  Solo Maloney RN; Oct  4 2016 10:07AM EST (Author)

## 2018-01-22 VITALS
TEMPERATURE: 98.7 F | HEIGHT: 62 IN | SYSTOLIC BLOOD PRESSURE: 160 MMHG | WEIGHT: 206.5 LBS | BODY MASS INDEX: 38 KG/M2 | HEART RATE: 77 BPM | OXYGEN SATURATION: 93 % | DIASTOLIC BLOOD PRESSURE: 80 MMHG | RESPIRATION RATE: 16 BRPM

## 2018-01-22 VITALS
HEIGHT: 62 IN | RESPIRATION RATE: 18 BRPM | TEMPERATURE: 99 F | BODY MASS INDEX: 38.09 KG/M2 | SYSTOLIC BLOOD PRESSURE: 126 MMHG | DIASTOLIC BLOOD PRESSURE: 84 MMHG | WEIGHT: 207 LBS

## 2018-01-22 VITALS
BODY MASS INDEX: 38.3 KG/M2 | HEIGHT: 62 IN | TEMPERATURE: 98 F | DIASTOLIC BLOOD PRESSURE: 78 MMHG | RESPIRATION RATE: 16 BRPM | WEIGHT: 208.13 LBS | OXYGEN SATURATION: 95 % | HEART RATE: 72 BPM | SYSTOLIC BLOOD PRESSURE: 120 MMHG

## 2018-01-22 VITALS
HEIGHT: 62 IN | TEMPERATURE: 98.8 F | WEIGHT: 208 LBS | BODY MASS INDEX: 38.28 KG/M2 | RESPIRATION RATE: 18 BRPM | SYSTOLIC BLOOD PRESSURE: 142 MMHG | DIASTOLIC BLOOD PRESSURE: 88 MMHG

## 2018-01-23 ENCOUNTER — APPOINTMENT (EMERGENCY)
Dept: RADIOLOGY | Facility: HOSPITAL | Age: 83
DRG: 205 | End: 2018-01-23
Payer: MEDICARE

## 2018-01-23 ENCOUNTER — HOSPITAL ENCOUNTER (INPATIENT)
Facility: HOSPITAL | Age: 83
LOS: 3 days | Discharge: HOME HEALTH | DRG: 205 | End: 2018-01-26
Attending: EMERGENCY MEDICINE | Admitting: FAMILY MEDICINE
Payer: MEDICARE

## 2018-01-23 DIAGNOSIS — J18.9 PNEUMONIA: ICD-10-CM

## 2018-01-23 DIAGNOSIS — J96.11 CHRONIC RESPIRATORY FAILURE WITH HYPOXIA (HCC): ICD-10-CM

## 2018-01-23 DIAGNOSIS — R06.00 DOE (DYSPNEA ON EXERTION): Primary | ICD-10-CM

## 2018-01-23 PROBLEM — J96.20 ACUTE ON CHRONIC RESPIRATORY FAILURE (HCC): Status: ACTIVE | Noted: 2018-01-05

## 2018-01-23 LAB
ALBUMIN SERPL BCP-MCNC: 2.6 G/DL (ref 3.5–5)
ALP SERPL-CCNC: 102 U/L (ref 46–116)
ALT SERPL W P-5'-P-CCNC: 15 U/L (ref 12–78)
ANION GAP SERPL CALCULATED.3IONS-SCNC: 2 MMOL/L (ref 4–13)
AST SERPL W P-5'-P-CCNC: 16 U/L (ref 5–45)
BASOPHILS # BLD AUTO: 0 THOUSANDS/ΜL (ref 0–0.1)
BASOPHILS NFR BLD AUTO: 1 % (ref 0–1)
BILIRUB SERPL-MCNC: 0.3 MG/DL (ref 0.2–1)
BUN SERPL-MCNC: 25 MG/DL (ref 5–25)
CALCIUM SERPL-MCNC: 8.2 MG/DL (ref 8.3–10.1)
CHLORIDE SERPL-SCNC: 105 MMOL/L (ref 100–108)
CO2 SERPL-SCNC: 36 MMOL/L (ref 21–32)
CREAT SERPL-MCNC: 1.04 MG/DL (ref 0.6–1.3)
EOSINOPHIL # BLD AUTO: 0.3 THOUSAND/ΜL (ref 0–0.61)
EOSINOPHIL NFR BLD AUTO: 5 % (ref 0–6)
ERYTHROCYTE [DISTWIDTH] IN BLOOD BY AUTOMATED COUNT: 18.3 % (ref 11.6–15.1)
GFR SERPL CREATININE-BSD FRML MDRD: 51 ML/MIN/1.73SQ M
GLUCOSE SERPL-MCNC: 127 MG/DL (ref 65–140)
HCT VFR BLD AUTO: 37.4 % (ref 37–47)
HGB BLD-MCNC: 11.8 G/DL (ref 12–16)
LYMPHOCYTES # BLD AUTO: 0.7 THOUSANDS/ΜL (ref 0.6–4.47)
LYMPHOCYTES NFR BLD AUTO: 11 % (ref 14–44)
MCH RBC QN AUTO: 24.9 PG (ref 27–31)
MCHC RBC AUTO-ENTMCNC: 31.5 G/DL (ref 31.4–37.4)
MCV RBC AUTO: 79 FL (ref 82–98)
MONOCYTES # BLD AUTO: 0.5 THOUSAND/ΜL (ref 0.17–1.22)
MONOCYTES NFR BLD AUTO: 7 % (ref 4–12)
NEUTROPHILS # BLD AUTO: 4.9 THOUSANDS/ΜL (ref 1.85–7.62)
NEUTS SEG NFR BLD AUTO: 76 % (ref 43–75)
NRBC BLD AUTO-RTO: 0 /100 WBCS
NT-PROBNP SERPL-MCNC: 517 PG/ML
PLATELET # BLD AUTO: 164 THOUSANDS/UL (ref 130–400)
PMV BLD AUTO: 9.3 FL (ref 8.9–12.7)
POTASSIUM SERPL-SCNC: 3.3 MMOL/L (ref 3.5–5.3)
PROT SERPL-MCNC: 5.6 G/DL (ref 6.4–8.2)
RBC # BLD AUTO: 4.73 MILLION/UL (ref 4.2–5.4)
SODIUM SERPL-SCNC: 143 MMOL/L (ref 136–145)
TROPONIN I SERPL-MCNC: <0.02 NG/ML
WBC # BLD AUTO: 6.5 THOUSAND/UL (ref 4.8–10.8)

## 2018-01-23 PROCEDURE — 85025 COMPLETE CBC W/AUTO DIFF WBC: CPT | Performed by: EMERGENCY MEDICINE

## 2018-01-23 PROCEDURE — 87040 BLOOD CULTURE FOR BACTERIA: CPT | Performed by: EMERGENCY MEDICINE

## 2018-01-23 PROCEDURE — 93005 ELECTROCARDIOGRAM TRACING: CPT

## 2018-01-23 PROCEDURE — 99285 EMERGENCY DEPT VISIT HI MDM: CPT

## 2018-01-23 PROCEDURE — 80053 COMPREHEN METABOLIC PANEL: CPT | Performed by: EMERGENCY MEDICINE

## 2018-01-23 PROCEDURE — 83880 ASSAY OF NATRIURETIC PEPTIDE: CPT | Performed by: EMERGENCY MEDICINE

## 2018-01-23 PROCEDURE — 36415 COLL VENOUS BLD VENIPUNCTURE: CPT | Performed by: EMERGENCY MEDICINE

## 2018-01-23 PROCEDURE — 94760 N-INVAS EAR/PLS OXIMETRY 1: CPT

## 2018-01-23 PROCEDURE — 94640 AIRWAY INHALATION TREATMENT: CPT

## 2018-01-23 PROCEDURE — 96374 THER/PROPH/DIAG INJ IV PUSH: CPT

## 2018-01-23 PROCEDURE — 94664 DEMO&/EVAL PT USE INHALER: CPT

## 2018-01-23 PROCEDURE — 71046 X-RAY EXAM CHEST 2 VIEWS: CPT

## 2018-01-23 PROCEDURE — 84484 ASSAY OF TROPONIN QUANT: CPT | Performed by: EMERGENCY MEDICINE

## 2018-01-23 PROCEDURE — 87081 CULTURE SCREEN ONLY: CPT | Performed by: INTERNAL MEDICINE

## 2018-01-23 RX ORDER — METOPROLOL TARTRATE 50 MG/1
50 TABLET, FILM COATED ORAL 2 TIMES DAILY
Status: DISCONTINUED | OUTPATIENT
Start: 2018-01-23 | End: 2018-01-26 | Stop reason: HOSPADM

## 2018-01-23 RX ORDER — POTASSIUM CHLORIDE 750 MG/1
10 TABLET, EXTENDED RELEASE ORAL DAILY
Status: DISCONTINUED | OUTPATIENT
Start: 2018-01-24 | End: 2018-01-26 | Stop reason: HOSPADM

## 2018-01-23 RX ORDER — CEFEPIME HYDROCHLORIDE 2 G/1
1000 INJECTION, POWDER, FOR SOLUTION INTRAVENOUS ONCE
Status: DISCONTINUED | OUTPATIENT
Start: 2018-01-23 | End: 2018-01-23 | Stop reason: SDUPTHER

## 2018-01-23 RX ORDER — PANTOPRAZOLE SODIUM 40 MG/1
40 TABLET, DELAYED RELEASE ORAL
Status: DISCONTINUED | OUTPATIENT
Start: 2018-01-24 | End: 2018-01-26 | Stop reason: HOSPADM

## 2018-01-23 RX ORDER — ATORVASTATIN CALCIUM 10 MG/1
10 TABLET, FILM COATED ORAL
Status: DISCONTINUED | OUTPATIENT
Start: 2018-01-23 | End: 2018-01-26 | Stop reason: HOSPADM

## 2018-01-23 RX ORDER — FUROSEMIDE 20 MG/1
20 TABLET ORAL DAILY
Status: DISCONTINUED | OUTPATIENT
Start: 2018-01-24 | End: 2018-01-26 | Stop reason: HOSPADM

## 2018-01-23 RX ORDER — IPRATROPIUM BROMIDE AND ALBUTEROL SULFATE 2.5; .5 MG/3ML; MG/3ML
3 SOLUTION RESPIRATORY (INHALATION)
Status: DISCONTINUED | OUTPATIENT
Start: 2018-01-23 | End: 2018-01-26 | Stop reason: HOSPADM

## 2018-01-23 RX ORDER — POTASSIUM CHLORIDE 750 MG/1
10 TABLET, FILM COATED, EXTENDED RELEASE ORAL DAILY
Status: DISCONTINUED | OUTPATIENT
Start: 2018-01-24 | End: 2018-01-23 | Stop reason: CLARIF

## 2018-01-23 RX ORDER — METHYLPREDNISOLONE SODIUM SUCCINATE 125 MG/2ML
80 INJECTION, POWDER, LYOPHILIZED, FOR SOLUTION INTRAMUSCULAR; INTRAVENOUS ONCE
Status: COMPLETED | OUTPATIENT
Start: 2018-01-23 | End: 2018-01-23

## 2018-01-23 RX ORDER — AMLODIPINE BESYLATE 5 MG/1
5 TABLET ORAL DAILY
Status: DISCONTINUED | OUTPATIENT
Start: 2018-01-24 | End: 2018-01-26 | Stop reason: HOSPADM

## 2018-01-23 RX ORDER — POTASSIUM CHLORIDE 20 MEQ/1
40 TABLET, EXTENDED RELEASE ORAL ONCE
Status: COMPLETED | OUTPATIENT
Start: 2018-01-23 | End: 2018-01-23

## 2018-01-23 RX ORDER — PREGABALIN 100 MG/1
300 CAPSULE ORAL 2 TIMES DAILY
Status: DISCONTINUED | OUTPATIENT
Start: 2018-01-23 | End: 2018-01-26 | Stop reason: HOSPADM

## 2018-01-23 RX ORDER — ONDANSETRON 2 MG/ML
4 INJECTION INTRAMUSCULAR; INTRAVENOUS EVERY 6 HOURS PRN
Status: DISCONTINUED | OUTPATIENT
Start: 2018-01-23 | End: 2018-01-26 | Stop reason: HOSPADM

## 2018-01-23 RX ORDER — ACETAMINOPHEN 325 MG/1
650 TABLET ORAL EVERY 6 HOURS PRN
Status: DISCONTINUED | OUTPATIENT
Start: 2018-01-23 | End: 2018-01-26 | Stop reason: HOSPADM

## 2018-01-23 RX ORDER — VANCOMYCIN HYDROCHLORIDE 1 G/200ML
1000 INJECTION, SOLUTION INTRAVENOUS ONCE
Status: COMPLETED | OUTPATIENT
Start: 2018-01-23 | End: 2018-01-23

## 2018-01-23 RX ORDER — ALPRAZOLAM 0.5 MG/1
0.5 TABLET ORAL
Status: DISCONTINUED | OUTPATIENT
Start: 2018-01-23 | End: 2018-01-26 | Stop reason: HOSPADM

## 2018-01-23 RX ADMIN — METOPROLOL TARTRATE 50 MG: 50 TABLET ORAL at 20:05

## 2018-01-23 RX ADMIN — ATORVASTATIN CALCIUM 10 MG: 10 TABLET, FILM COATED ORAL at 20:05

## 2018-01-23 RX ADMIN — ALPRAZOLAM 0.5 MG: 0.5 TABLET ORAL at 20:22

## 2018-01-23 RX ADMIN — APIXABAN 5 MG: 5 TABLET, FILM COATED ORAL at 20:06

## 2018-01-23 RX ADMIN — CEFEPIME HYDROCHLORIDE 1000 MG: 1 INJECTION, SOLUTION INTRAVENOUS at 16:43

## 2018-01-23 RX ADMIN — POTASSIUM CHLORIDE 40 MEQ: 1500 TABLET, EXTENDED RELEASE ORAL at 20:22

## 2018-01-23 RX ADMIN — VANCOMYCIN HYDROCHLORIDE 1000 MG: 1 INJECTION, SOLUTION INTRAVENOUS at 20:07

## 2018-01-23 RX ADMIN — IPRATROPIUM BROMIDE AND ALBUTEROL SULFATE 3 ML: .5; 3 SOLUTION RESPIRATORY (INHALATION) at 14:54

## 2018-01-23 RX ADMIN — METHYLPREDNISOLONE SODIUM SUCCINATE 80 MG: 125 INJECTION, POWDER, FOR SOLUTION INTRAMUSCULAR; INTRAVENOUS at 14:53

## 2018-01-23 RX ADMIN — PREGABALIN 300 MG: 100 CAPSULE ORAL at 20:05

## 2018-01-23 RX ADMIN — IPRATROPIUM BROMIDE AND ALBUTEROL SULFATE 3 ML: .5; 3 SOLUTION RESPIRATORY (INHALATION) at 19:30

## 2018-01-23 NOTE — PLAN OF CARE
DISCHARGE PLANNING     Discharge to home or other facility with appropriate resources Progressing        INFECTION - ADULT     Absence or prevention of progression during hospitalization Progressing     Absence of fever/infection during neutropenic period Progressing        Knowledge Deficit     Patient/family/caregiver demonstrates understanding of disease process, treatment plan, medications, and discharge instructions Progressing        PAIN - ADULT     Verbalizes/displays adequate comfort level or baseline comfort level Progressing        Potential for Falls     Patient will remain free of falls Progressing        RESPIRATORY - ADULT     Achieves optimal ventilation and oxygenation Progressing        SAFETY ADULT     Maintain or return to baseline ADL function Progressing     Maintain or return mobility status to optimal level Progressing     Patient will remain free of falls Progressing

## 2018-01-23 NOTE — H&P
History and Physical - [facility]    Patient Information: Aimee Fierro 81 y o  female MRN: [id number]  Unit/Bed#: [address]  Encounter: [id number]  Admitting Physician: GILDA Obrien  PCP: John De Anda DO  Date of Admission:  01/23/18    Assessment/Plan:    Hospital Problem List:     Principal Problem:    Acute on chronic respiratory failure [facility])  Active Problems:    Hypertension    Carotid stenosis, left    Obesity (BMI 30-39  9)    Hyperlipemia    Atrial fibrillation (HCC)    Fibromyalgia    Hypokalemia    Lymphedema of left leg    History of Merkel cell carcinoma    Moderate persistent asthma    Iron deficiency anemia      Plan for the Primary Problem(s):  Acute on chronic respiratory failure with hypoxia: Multifactorial, likely worsened by lung infiltrates and trace pleural effusions with underlying chronic CHF, asthma, and poor ventilation due to obesity  ? DAMON  Will treat with nebulizer treatments and supplemental oxygen  Hold off on ABX until CT chest results  No indication for steroids  Patient does not have supplemental oxygen at home, therefore, will need a home O2 eval prior to discharge  Consider over night sleep study for possible DAMON  Encourage mobility and use of IS  Consult pulmonology for further recommendations  Plan for Additional Problems:   · Lung Infiltrates: CXR revealed patchy lung infiltrates - finding may represent developing pneumonia and continued follow-up is suggested  Given Cefepime and Vancomycin in ED for suspected HCAP  Check CT chest WO contrast for better evaluation of infiltrates  Will hold off with further ABX until results of CT chest  Encourage mobility and use of IS  Consult pulmonology for further recommendations  · Chronic diastolic heart failure: Initial CXR revealed patchy lung infiltrates and trace pleural effusions - findings may represent developing pneumonia and continued follow-up is suggested   in a morbidly obese female  Troponin level negative  Echocardiogram from January 2018 revealed EF 70%, no regional wall motion abnormalities, mild concentric hypertrophy, grade 1 diastolic dysfunction, elevated mean left atrial filling pressure  On Lasix 20 mg po daily  Continue cardiac low sodium, fluid-restricted diet  · Paroxysmal Atrial fibrillation:  On Metoprolol 50 mg BID and Eliquis 5 mg BID  Will need an overnight sleep screen to evaluate for DAMON  · Left lower extremity chronic lymphedema:  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  Continue Venodyne compressive pumps as tolerated and Lasix 20 mg daily  · Moderate persistent asthma: No indication for IV steroids  Continue Advair 1 puff BID, nebulizer treatments as needed and supplemental oxygen  · Iron deficiency anemia:  Recent iron panel reviewed, iron 19, ferritin 36, iron sat 7  HGB stable  Continue iron supplementation BID with folate and bowel regimen  · Hypokalemia:  K 3 3, replete and monitor K in am  Check serum Mag    · Hypertension:  Continue Norvasc and Metoprolol  · Hyperlipidemia: Recent lipid panel reviewed, LDL 97  Continue statin  · Left carotid stenosis:  Patient refused carotid endarterectomy in the past  On Eliquis  · History of Merkel cell carcinoma:  Seen by heme/onc in September 2017  No evidence of recurrence  Continue outpatient follow-up      VTE Prophylaxis: Apixaban (Eliquis)  / sequential compression device   Code Status: Full Code  POLST: POLST form is not discussed and not completed at this time  Anticipated Length of Stay:  Patient will be admitted on an Inpatient basis with an anticipated length of stay of  Greater than 2 midnights  Justification for Hospital Stay: Acute on Chronic resp failure with hypoxia, CHF exacerbation, possible PNA    Total Time for Visit, including Counseling / Coordination of Care: 1 hour  Greater than 50% of this total time spent on direct patient counseling and coordination of care      Chief Complaint: Shortness of breath     History of Present Illness:    Harry Tate is a 81 y o  female with a PMH including atrial fibrillation, HTN, HLD, diastolic heart failure, CONRADO, asthma, morbid obesity, GERD, fibromyalgia, 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 who presents with shortness of breath  States shortness of breath began 4 days ago  Denies fever, chills, cough, or sputum production  Reports diaphoresis  Does not have home oxygen  Utilized her nebulizer treatment 3x/day without improvement  States her VNA came for her routine visit today and advised her to seek medical attention for further evaluation of shortness of breath  Denies HA, lightheadedness, dizziness, chest pain, abdominal pain, nausea, vomiting, or diarrhea  Review of Systems:    Review of Systems   Constitutional: Positive for diaphoresis  Negative for appetite change, chills and fever  HENT: Negative for congestion, postnasal drip, rhinorrhea and sore throat  Eyes: Negative for photophobia and visual disturbance  Respiratory: Positive for cough and shortness of breath  Negative for chest tightness and wheezing  Cardiovascular: Positive for leg swelling  Negative for chest pain and palpitations  Gastrointestinal: Negative for abdominal distention, abdominal pain, constipation, diarrhea, nausea and vomiting  Genitourinary: Negative for difficulty urinating, dysuria and hematuria  Musculoskeletal: Negative for arthralgias, gait problem and myalgias  Skin: Negative for rash and wound  Neurological: Negative for dizziness, weakness, light-headedness, numbness and headaches  Psychiatric/Behavioral: Negative for confusion  The patient is not nervous/anxious          Past Medical and Surgical History:     Past Medical History:   Diagnosis Date    Asthma     Cancer ([facility] [address] )     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    Cardiac disease     a fib    Fibromyalgia     Fibromyalgia, primary     GERD (gastroesophageal reflux disease)     Hypertension     Hypokalemia     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, left groin, lung and bladder  Patient treated with chemotherapy and radiation       Past Surgical History:   Procedure Laterality Date    APPENDECTOMY      CATARACT EXTRACTION      CHEST WALL BIOPSY N/A 10/27/2017    Procedure: SINUS EXCISION AND REMOVAL OF FOREIGN BODY, ABDOMEN (WOUND EXPLORATION); Surgeon: Aroldo Mead MD;  Location: [address] [place] [address];  Service: General    EYE SURGERY Bilateral     cataracts     HIP FRACTURE SURGERY Left     HYSTERECTOMY      JOINT REPLACEMENT Left     hip    LYMPHADENECTOMY      PORTACATH PLACEMENT Right        Meds/Allergies:    Prior to Admission medications    Medication Sig Start Date End Date Taking? Authorizing Provider   albuterol (ACCUNEB) 0 63 MG/3ML nebulizer solution Take 1 ampule by nebulization every 6 (six) hours as needed for wheezing   Yes Historical Provider, MD   ALPRAZolam Bowman Niece) 0 5 mg tablet Take 0 5 mg by mouth daily at bedtime as needed for anxiety  Yes Historical Provider, MD   apixaban (ELIQUIS) 5 mg Take 1 tablet by mouth 2 (two) times a day for 30 days 1/10/18 2/9/18 Yes Rufina Barton MD   esomeprazole (NexIUM) 40 MG capsule Take 40 mg by mouth daily  Yes Historical Provider, MD   Fesoterodine Fumarate ER (TOVIAZ) 8 MG TB24 Take by mouth daily at bedtime     Yes Historical Provider, MD   fluticasone-salmeterol (ADVAIR) 250-50 mcg/dose inhaler Inhale 1 puff every 12 (twelve) hours This is home medication 1/10/18  Yes Rufina Barton MD   furosemide (LASIX) 20 mg tablet Take 1 tablet by mouth daily for 30 days 1/11/18 2/10/18 Yes Rufina Barton MD   metoprolol tartrate (LOPRESSOR) 50 mg tablet Take 1 tablet by mouth 2 (two) times a day for 30 days 1/10/18 2/9/18 Yes Rufina Barton MD   potassium chloride (K-DUR) 10 mEq tablet Take 10 mEq by mouth daily     Yes Historical Provider, MD pregabalin (LYRICA) 300 MG capsule Take 300 mg by mouth 2 (two) times a day  Yes Historical Provider, MD   amLODIPine (NORVASC) 5 mg tablet Take 1 tablet by mouth daily for 30 days 12/17/17 1/23/18  Dia Rubinstein, MD   atorvastatin (LIPITOR) 10 mg tablet Take 1 tablet by mouth daily with dinner for 30 days 3/16/17 1/23/18  Gerarda Husbands, CRNP   ferrous sulfate 325 (65 Fe) mg tablet Take 1 tablet by mouth 2 (two) times a day with meals for 30 days 1/10/18 1/23/18  Dia Rubinstein, MD   folic acid (FOLVITE) 1 mg tablet Take 1 tablet by mouth daily for 30 days 1/11/18 1/23/18  Dia Rubinstein, MD   polyethylene glycol (MIRALAX) 17 g packet Take 17 g by mouth daily as needed (constipation) for up to 30 days 1/10/18 1/23/18  Dia Rubinstein, MD   predniSONE 10 mg tablet Take prednisone 40mg oral daily, taper down 10mg every other day until completion  1/10/18 1/23/18  Dia Rubinstein, MD     I have reviewed home medications with patient personally  Allergies: Allergies   Allergen Reactions    Fentanyl And Related Other (See Comments)     Passed out    Latex     Penicillins        Social History:     Marital Status:     Occupation: Retired   Patient Pre-hospital Living Situation: Lives alone   Patient Pre-hospital Level of Mobility: Independent with use of cane   Patient Pre-hospital Diet Restrictions: None  Substance Use History:   History   Alcohol Use No     History   Smoking Status    Never Smoker   Smokeless Tobacco    Never Used     Comment: never smoke     History   Drug Use No       Family History:    Family History   Problem Relation Age of Onset    Pneumonia Mother     Heart disease Father        Physical Exam:     Vitals:   Blood Pressure: 152/67 (01/23/18 1716)  Pulse: 78 (01/23/18 1946)  Temperature: 97 9 °F (36 6 °C) (01/23/18 1716)  Temp Source: Oral (01/23/18 1716)  Respirations: 18 (01/23/18 1946)  Height: 5' 2" (157 5 cm) (01/23/18 1716)  Weight - Scale: 92 9 kg (204 lb 12 9 oz) (01/23/18 1716)  SpO2: 93 % (01/23/18 1949)    Physical Exam   Constitutional: She is oriented to person, place, and time  She appears well-developed  No distress  HENT:   Head: Normocephalic  Neck: Normal range of motion  Cardiovascular: Normal rate and regular rhythm  Pulmonary/Chest: Effort normal and breath sounds normal  No respiratory distress  She has no wheezes  She has no rhonchi  She has no rales  Abdominal: Soft  Bowel sounds are normal  She exhibits no distension  There is no tenderness  Musculoskeletal: Normal range of motion  She exhibits edema (+1 BLE)  She exhibits no tenderness  Neurological: She is alert and oriented to person, place, and time  She has normal reflexes  Skin: Skin is warm and dry  No rash noted  She is not diaphoretic  Psychiatric: She has a normal mood and affect  Judgment normal    Nursing note and vitals reviewed  Additional Data:   Labs:    Results from last 7 days  Lab Units 01/23/18  1453   WBC Thousand/uL 6 50   HEMOGLOBIN g/dL 11 8*   HEMATOCRIT % 37 4   PLATELETS Thousands/uL 164   NEUTROS PCT % 76*   LYMPHS PCT % 11*   MONOS PCT % 7   EOS PCT % 5       Results from last 7 days  Lab Units 01/23/18  1453   SODIUM mmol/L 143   POTASSIUM mmol/L 3 3*   CHLORIDE mmol/L 105   CO2 mmol/L 36*   BUN mg/dL 25   CREATININE mg/dL 1 04   CALCIUM mg/dL 8 2*   TOTAL PROTEIN g/dL 5 6*   BILIRUBIN TOTAL mg/dL 0 30   ALK PHOS U/L 102   ALT U/L 15   AST U/L 16   GLUCOSE RANDOM mg/dL 127           * I Have Reviewed All Lab Data Listed Above  * Additional Pertinent Lab Tests Reviewed: All Labs Within Last 24 Hours Reviewed    Imaging:  Imaging Reports Reviewed Today Include: Procedure: Xr Chest 2 Views    Result Date: 1/23/2018  Narrative: CHEST - DUAL ENERGY INDICATION:  Shortness of breath for 4 days    Generalized weakness COMPARISON:  1/8/2018 VIEWS:  PA (including soft tissue/bone algorithms) and lateral projections IMAGES:  4 FINDINGS:  Infusion port catheter device present on the right  Cardiomediastinal silhouette appears unremarkable  Limited inspiration  Minimal left pleural and perhaps minimal right pleural effusions are evident  There is some patchy infiltrate in the right and left lung bases and right perihilar region  No pneumothorax  Visualized osseous structures appear within normal limits for the patient's age  Impression: Patchy lung infiltrates and trace pleural effusions  Findings may represent developing pneumonia and continued follow-up is suggested  Workstation performed: [id number]       EKG, Pathology, and Other Studies Reviewed on Admission:   · EKG: Sinus rhythm with occasional Premature ventricular complexes and Premature atrial complexes, QTc 418    Allscripts Records Reviewed: Yes     ** Please Note: Dragon 360 Dictation voice to text software may have been used in the creation of this document   **

## 2018-01-23 NOTE — ED PROVIDER NOTES
History  Chief Complaint   Patient presents with    Shortness of Breath     patient c/o sob (worse with exertion) x 4 days and generalized weakness  Patient is an 71-year-old female that presents with a complaint of increasing shortness of breath and dyspnea on exertion for several days  Patient is also complaining of some generalized weakness, patient denies any new cough or cold symptoms  Patient denies any chest pain associated with shortness of breath  Patient denies any fevers or chills, no nausea vomiting or diarrhea, no abdominal pain  Patient denies any alleviating factors with this dyspnea on exertion  Patient states the symptoms worsening that is why she came to the emergency room for evaluation  Prior to Admission Medications   Prescriptions Last Dose Informant Patient Reported? Taking? ALPRAZolam (XANAX) 0 5 mg tablet 1/22/2018 at 2000  Yes Yes   Sig: Take 0 5 mg by mouth daily at bedtime as needed for anxiety  Fesoterodine Fumarate ER (TOVIAZ) 8 MG TB24 1/22/2018 at 2000  Yes Yes   Sig: Take by mouth daily at bedtime     albuterol (ACCUNEB) 0 63 MG/3ML nebulizer solution 1/23/2018 at 1200 Self Yes Yes   Sig: Take 1 ampule by nebulization every 6 (six) hours as needed for wheezing   amLODIPine (NORVASC) 5 mg tablet Unknown at Unknown time  No No   Sig: Take 1 tablet by mouth daily for 30 days   apixaban (ELIQUIS) 5 mg 1/23/2018 at 1000  No Yes   Sig: Take 1 tablet by mouth 2 (two) times a day for 30 days   atorvastatin (LIPITOR) 10 mg tablet Unknown at Unknown time  No No   Sig: Take 1 tablet by mouth daily with dinner for 30 days   esomeprazole (NexIUM) 40 MG capsule 1/23/2018 at 0800  Yes Yes   Sig: Take 40 mg by mouth daily     fluticasone-salmeterol (ADVAIR) 250-50 mcg/dose inhaler 1/23/2018 at 0800  No Yes   Sig: Inhale 1 puff every 12 (twelve) hours This is home medication   furosemide (LASIX) 20 mg tablet 1/22/2018 at 0800  No Yes   Sig: Take 1 tablet by mouth daily for 30 days   metoprolol tartrate (LOPRESSOR) 50 mg tablet 1/23/2018 at 1000  No Yes   Sig: Take 1 tablet by mouth 2 (two) times a day for 30 days   potassium chloride (K-DUR) 10 mEq tablet 1/23/2018 at 1000  Yes Yes   Sig: Take 10 mEq by mouth daily  pregabalin (LYRICA) 300 MG capsule 1/23/2018 at 1000  Yes Yes   Sig: Take 300 mg by mouth 2 (two) times a day  Facility-Administered Medications: None       Past Medical History:   Diagnosis Date    Asthma     Cancer ([facility] [address] )     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    Cardiac disease     a fib    Fibromyalgia     Fibromyalgia, primary     GERD (gastroesophageal reflux disease)     Hypertension     Hypokalemia     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, left groin, lung and bladder  Patient treated with chemotherapy and radiation       Past Surgical History:   Procedure Laterality Date    APPENDECTOMY      CATARACT EXTRACTION      CHEST WALL BIOPSY N/A 10/27/2017    Procedure: SINUS EXCISION AND REMOVAL OF FOREIGN BODY, ABDOMEN (WOUND EXPLORATION); Surgeon: Lowella Boeck, MD;  Location: [address] [place] [address];  Service: General    EYE SURGERY Bilateral     cataracts     HIP FRACTURE SURGERY Left     HYSTERECTOMY      JOINT REPLACEMENT Left     hip    LYMPHADENECTOMY      PORTACATH PLACEMENT Right        Family History   Problem Relation Age of Onset    Pneumonia Mother     Heart disease Father      I have reviewed and agree with the history as documented  Social History   Substance Use Topics    Smoking status: Never Smoker    Smokeless tobacco: Never Used      Comment: never smoke    Alcohol use No        Review of Systems   Constitutional: Negative for chills, fever and unexpected weight change  HENT: Negative for facial swelling and trouble swallowing  Respiratory: Positive for cough and shortness of breath  Negative for chest tightness, wheezing and stridor      Cardiovascular: Negative for chest pain and leg swelling  Gastrointestinal: Negative for abdominal pain, nausea and vomiting  Genitourinary: Negative for dysuria and flank pain  Musculoskeletal: Negative for back pain, neck pain and neck stiffness  Skin: Negative  Neurological: Negative for dizziness, weakness, light-headedness and numbness  Hematological: Negative  Psychiatric/Behavioral: Negative  Physical Exam  ED Triage Vitals   Temperature Pulse Respirations Blood Pressure SpO2   01/23/18 1353 01/23/18 1345 01/23/18 1345 01/23/18 1345 01/23/18 1345   98 3 °F (36 8 °C) 82 22 139/63 91 %      Temp Source Heart Rate Source Patient Position - Orthostatic VS BP Location FiO2 (%)   01/23/18 1353 01/23/18 2029 01/23/18 2029 01/23/18 2029 --   Oral Monitor Lying Left arm       Pain Score       01/23/18 1731       No Pain           Orthostatic Vital Signs  Vitals:    01/25/18 1831 01/25/18 2052 01/26/18 0701 01/26/18 0735   BP: 122/58 117/54 143/65    Pulse: 72 68 74 74   Patient Position - Orthostatic VS:  Lying Lying        Physical Exam   Constitutional: She is oriented to person, place, and time  She appears well-developed and well-nourished  HENT:   Head: Normocephalic and atraumatic  Eyes: EOM are normal  Pupils are equal, round, and reactive to light  Neck: No JVD present  No tracheal deviation present  Cardiovascular: Normal rate and regular rhythm  Pulmonary/Chest: Effort normal  She has decreased breath sounds in the right middle field, the right lower field, the left middle field and the left lower field  She has wheezes in the right middle field  She has rhonchi in the right middle field, the right lower field, the left middle field and the left lower field  She has no rales  Abdominal: Soft  She exhibits no distension  There is no tenderness  Musculoskeletal: Normal range of motion  Neurological: She is alert and oriented to person, place, and time  Skin: Skin is warm and dry  Psychiatric: She has a normal mood and affect  Nursing note and vitals reviewed  ED Medications  Medications   methylPREDNISolone sodium succinate (Solu-MEDROL) injection 80 mg (80 mg Intravenous Given 1/23/18 1453)   vancomycin (VANCOCIN) IVPB (premix) 1,000 mg (1,000 mg Intravenous New Bag 1/23/18 2007)   cefepime (MAXIPIME) IVPB (premix) 1,000 mg (1,000 mg Intravenous New Bag 1/23/18 1643)   potassium chloride (K-DUR,KLOR-CON) CR tablet 40 mEq (40 mEq Oral Given 1/23/18 2022)   dextrose 5 % infusion (50 mL/hr Intravenous New Bag 1/24/18 1340)       Diagnostic Studies  Results Reviewed     Procedure Component Value Units Date/Time    Blood culture #2 [[id number]] Collected:  01/23/18 1643    Lab Status:  Preliminary result Specimen:  Blood from Arm, Left Updated:  01/26/18 2301     Blood Culture No Growth at 72 hrs  Blood culture #1 [[id number]] Collected:  01/23/18 1913    Lab Status:  Preliminary result Specimen:  Blood from Arm, Right Updated:  01/26/18 2201     Blood Culture No Growth at 72 hrs  B-type natriuretic peptide [[id number]]  (Abnormal) Collected:  01/23/18 1453    Lab Status:  Final result Specimen:  Blood from Central Venous Line Updated:  01/23/18 1523     NT-proBNP 517 (H) pg/mL     Troponin I [[id number]]  (Normal) Collected:  01/23/18 1453    Lab Status:  Final result Specimen:  Blood from Central Venous Line Updated:  01/23/18 1521     Troponin I <0 02 ng/mL     Narrative:         [employer] Chemistry analyzer 99% cutoff is > 0 04 ng/mL in network labs    o cTnI 99% cutoff is useful only when applied to patients in the clinical setting of myocardial ischemia  o cTnI 99% cutoff should be interpreted in the context of clinical history, ECG findings and possibly cardiac imaging to establish correct diagnosis  o cTnI 99% cutoff may be suggestive but clearly not indicative of a coronary event without the clinical setting of myocardial ischemia      Comprehensive metabolic panel [[id number]] (Abnormal) Collected:  01/23/18 1453    Lab Status:  Final result Specimen:  Blood from Central Venous Line Updated:  01/23/18 1516     Sodium 143 mmol/L      Potassium 3 3 (L) mmol/L      Chloride 105 mmol/L      CO2 36 (H) mmol/L      Anion Gap 2 (L) mmol/L      BUN 25 mg/dL      Creatinine 1 04 mg/dL      Glucose 127 mg/dL      Calcium 8 2 (L) mg/dL      AST 16 U/L      ALT 15 U/L      Alkaline Phosphatase 102 U/L      Total Protein 5 6 (L) g/dL      Albumin 2 6 (L) g/dL      Total Bilirubin 0 30 mg/dL      eGFR 51 ml/min/1 73sq m     Narrative:         National Kidney Disease Education Program recommendations are as follows:  GFR calculation is accurate only with a steady state creatinine  Chronic Kidney disease less than 60 ml/min/1 73 sq  meters  Kidney failure less than 15 ml/min/1 73 sq  meters  CBC and differential [[id number]]  (Abnormal) Collected:  01/23/18 1453    Lab Status:  Final result Specimen:  Blood from Central Venous Line Updated:  01/23/18 1500     WBC 6 50 Thousand/uL      RBC 4 73 Million/uL      Hemoglobin 11 8 (L) g/dL      Hematocrit 37 4 %      MCV 79 (L) fL      MCH 24 9 (L) pg      MCHC 31 5 g/dL      RDW 18 3 (H) %      MPV 9 3 fL      Platelets 978 Thousands/uL      nRBC 0 /100 WBCs      Neutrophils Relative 76 (H) %      Lymphocytes Relative 11 (L) %      Monocytes Relative 7 %      Eosinophils Relative 5 %      Basophils Relative 1 %      Neutrophils Absolute 4 90 Thousands/µL      Lymphocytes Absolute 0 70 Thousands/µL      Monocytes Absolute 0 50 Thousand/µL      Eosinophils Absolute 0 30 Thousand/µL      Basophils Absolute 0 00 Thousands/µL                  CT chest wo contrast   Final Result by Jakub Reyna MD (01/24 1200)      Minimal new groundglass opacity anterior right upper lobe may represent postinflammatory disease or early infiltrate in the appropriate clinical scenario        Minimal bibasilar, lingula, and right upper lobe left greater than right subsegmental atelectasis  This is recurrent or slightly progressive since January 5, 2018  No effusions  Workstation performed: [id number]         XR chest 2 views   Final Result by Perez Aj MD (01/23 3580)      Patchy lung infiltrates and trace pleural effusions  Findings may represent developing pneumonia and continued follow-up is suggested  Workstation performed: [id number]                    Procedures  ECG 12 Lead Documentation  Date/Time: 1/23/2018 1:45 PM  Performed by: Margarita Hyatt  Authorized by: Margarita Hyatt     ECG reviewed by me, the ED Provider: yes    Patient location:  ED  Previous ECG:     Previous ECG:  Unavailable    Comparison to cardiac monitor: Yes    Interpretation:     Interpretation: abnormal    Rate:     ECG rate:  86    ECG rate assessment: normal    Rhythm:     Rhythm: sinus rhythm    Ectopy:     Ectopy: PAC and PVCs    QRS:     QRS axis:  Normal  Conduction:     Conduction: normal    ST segments:     ST segments:  Normal  T waves:     T waves: normal    Q waves:     Q waves:  III and aVF           Phone Contacts  ED Phone Contact    ED Course  ED Course                                MDM  Number of Diagnoses or Management Options  FERNANDES (dyspnea on exertion):   Pneumonia:   Diagnosis management comments: Patient's x-ray showed there might be some infiltrate at her lower lobe  Patient did have low O2 saturations  Discussed the findings with the patient and decision to place patient in admission to the hospital   Patient states understanding is in agreement with the assessment plan         Amount and/or Complexity of Data Reviewed  Clinical lab tests: ordered and reviewed  Tests in the radiology section of CPT®: ordered and reviewed      CritCare Time    Disposition  Final diagnoses:   FERNANDES (dyspnea on exertion)   Pneumonia     Time reflects when diagnosis was documented in both MDM as applicable and the Disposition within this note     Time User Action Codes Description Comment    1/23/2018  4:01 PM Shelah Dance Add [R06 09] FERNANDES (dyspnea on exertion)     1/23/2018  4:01 PM Shelah Dance Add [J18 9] Pneumonia     1/23/2018  6:47 PM Tete Frederick Add [J96 11] Chronic respiratory failure with hypoxia [facility])       ED Disposition     ED Disposition Condition Comment    Admit  Case was discussed with Elliot   and the patient's admission status was agreed to be Admission Status: inpatient status to the service of Dr Lee Lantigua     Follow-up Information     Follow up With Specialties Details Why Contact Info    Carmen Peacock DO Family Medicine Follow up Please call and be seen within 7 days of discharge  [address] [place] [address]  [phone]      Jailyn Enrique DO Pulmonology, Critical Care Medicine, Neurology, Pulmonary Disease Follow up Please call and schedule an appointment  [address] [place] [address]  [phone]          Discharge Medication List as of 1/26/2018 12:28 PM      CONTINUE these medications which have NOT CHANGED    Details   albuterol (ACCUNEB) 0 63 MG/3ML nebulizer solution Take 1 ampule by nebulization every 6 (six) hours as needed for wheezing, Historical Med      ALPRAZolam (XANAX) 0 5 mg tablet Take 0 5 mg by mouth daily at bedtime as needed for anxiety  , Until Discontinued, Historical Med      apixaban (ELIQUIS) 5 mg Take 1 tablet by mouth 2 (two) times a day for 30 days, Starting Wed 1/10/2018, Until Fri 2/9/2018, Print      esomeprazole (NexIUM) 40 MG capsule Take 40 mg by mouth daily  , Until Discontinued, Historical Med      Fesoterodine Fumarate ER (TOVIAZ) 8 MG TB24 Take by mouth daily at bedtime  , Historical Med      fluticasone-salmeterol (ADVAIR) 250-50 mcg/dose inhaler Inhale 1 puff every 12 (twelve) hours This is home medication, Starting Wed 1/10/2018, No Print      furosemide (LASIX) 20 mg tablet Take 1 tablet by mouth daily for 30 days, Starting Thu 1/11/2018, Until Sat 2/10/2018, Print      metoprolol tartrate (LOPRESSOR) 50 mg tablet Take 1 tablet by mouth 2 (two) times a day for 30 days, Starting Wed 1/10/2018, Until Fri 2/9/2018, Print      potassium chloride (K-DUR) 10 mEq tablet Take 10 mEq by mouth daily  , Until Discontinued, Historical Med      pregabalin (LYRICA) 300 MG capsule Take 300 mg by mouth 2 (two) times a day , Until Discontinued, Historical Med      amLODIPine (NORVASC) 5 mg tablet Take 1 tablet by mouth daily for 30 days, Starting Sun 12/17/2017, Until Tue 1/23/2018, Print      atorvastatin (LIPITOR) 10 mg tablet Take 1 tablet by mouth daily with dinner for 30 days, Starting Thu 3/16/2017, Until Tue 1/23/2018, Print             Outpatient Discharge Orders  Discharge Diet     Activity as tolerated     Call provider for:  persistent nausea or vomiting     Call provider for:  severe uncontrolled pain     Call provider for:  redness, tenderness, or signs of infection (pain, swelling, redness, odor or green/yellow discharge around incision site)     Call provider for: active or persistent bleeding     Call provider for:  difficulty breathing, headache or visual disturbances     Call provider for:  hives     Call provider for:  persistent dizziness or light-headedness     Call provider for:  extreme fatigue         ED Provider  Electronically Signed by           Irving Swanson MD  01/27/18 0508

## 2018-01-23 NOTE — ED NOTES
Pt pulseox 86% on RA, O2 via NC applied, pt quickly amilcar to 92%  Pt less anxious  Will continue to monitor pt       Dez Rodriguez, ROSANNE  01/23/18 1871

## 2018-01-24 ENCOUNTER — APPOINTMENT (INPATIENT)
Dept: RADIOLOGY | Facility: HOSPITAL | Age: 83
DRG: 205 | End: 2018-01-24
Payer: MEDICARE

## 2018-01-24 ENCOUNTER — HOSPITAL ENCOUNTER (OUTPATIENT)
Dept: INFUSION CENTER | Facility: HOSPITAL | Age: 83
Discharge: HOME | End: 2018-01-24

## 2018-01-24 LAB
ANION GAP SERPL CALCULATED.3IONS-SCNC: 5 MMOL/L (ref 4–13)
BUN SERPL-MCNC: 21 MG/DL (ref 5–25)
CALCIUM SERPL-MCNC: 9 MG/DL (ref 8.3–10.1)
CHLORIDE SERPL-SCNC: 110 MMOL/L (ref 100–108)
CO2 SERPL-SCNC: 35 MMOL/L (ref 21–32)
CREAT SERPL-MCNC: 0.73 MG/DL (ref 0.6–1.3)
ERYTHROCYTE [DISTWIDTH] IN BLOOD BY AUTOMATED COUNT: 17.4 % (ref 11.6–15.1)
GFR SERPL CREATININE-BSD FRML MDRD: 77 ML/MIN/1.73SQ M
GLUCOSE SERPL-MCNC: 190 MG/DL (ref 65–140)
HCT VFR BLD AUTO: 38.8 % (ref 37–47)
HGB BLD-MCNC: 12.5 G/DL (ref 12–16)
L PNEUMO1 AG UR QL IA.RAPID: NEGATIVE
MAGNESIUM SERPL-MCNC: 1.9 MG/DL (ref 1.6–2.6)
MCH RBC QN AUTO: 25.2 PG (ref 27–31)
MCHC RBC AUTO-ENTMCNC: 32.2 G/DL (ref 31.4–37.4)
MCV RBC AUTO: 78 FL (ref 82–98)
PHOSPHATE SERPL-MCNC: 2.5 MG/DL (ref 2.3–4.1)
PLATELET # BLD AUTO: 170 THOUSANDS/UL (ref 130–400)
PMV BLD AUTO: 9.5 FL (ref 8.9–12.7)
POTASSIUM SERPL-SCNC: 4.5 MMOL/L (ref 3.5–5.3)
RBC # BLD AUTO: 4.95 MILLION/UL (ref 4.2–5.4)
S PNEUM AG UR QL: NEGATIVE
SODIUM SERPL-SCNC: 150 MMOL/L (ref 136–145)
WBC # BLD AUTO: 4.7 THOUSAND/UL (ref 4.8–10.8)

## 2018-01-24 PROCEDURE — 87449 NOS EACH ORGANISM AG IA: CPT | Performed by: NURSE PRACTITIONER

## 2018-01-24 PROCEDURE — G8987 SELF CARE CURRENT STATUS: HCPCS

## 2018-01-24 PROCEDURE — 99232 SBSQ HOSP IP/OBS MODERATE 35: CPT | Performed by: INTERNAL MEDICINE

## 2018-01-24 PROCEDURE — 94760 N-INVAS EAR/PLS OXIMETRY 1: CPT

## 2018-01-24 PROCEDURE — 97535 SELF CARE MNGMENT TRAINING: CPT

## 2018-01-24 PROCEDURE — G8988 SELF CARE GOAL STATUS: HCPCS

## 2018-01-24 PROCEDURE — 83735 ASSAY OF MAGNESIUM: CPT | Performed by: NURSE PRACTITIONER

## 2018-01-24 PROCEDURE — 97167 OT EVAL HIGH COMPLEX 60 MIN: CPT

## 2018-01-24 PROCEDURE — 71250 CT THORAX DX C-: CPT

## 2018-01-24 PROCEDURE — 85027 COMPLETE CBC AUTOMATED: CPT | Performed by: NURSE PRACTITIONER

## 2018-01-24 PROCEDURE — 84100 ASSAY OF PHOSPHORUS: CPT | Performed by: NURSE PRACTITIONER

## 2018-01-24 PROCEDURE — 94640 AIRWAY INHALATION TREATMENT: CPT

## 2018-01-24 PROCEDURE — 80048 BASIC METABOLIC PNL TOTAL CA: CPT | Performed by: NURSE PRACTITIONER

## 2018-01-24 RX ORDER — HYDRALAZINE HYDROCHLORIDE 20 MG/ML
5 INJECTION INTRAMUSCULAR; INTRAVENOUS EVERY 6 HOURS PRN
Status: DISCONTINUED | OUTPATIENT
Start: 2018-01-24 | End: 2018-01-26 | Stop reason: HOSPADM

## 2018-01-24 RX ORDER — DEXTROSE MONOHYDRATE 50 MG/ML
50 INJECTION, SOLUTION INTRAVENOUS ONCE
Status: COMPLETED | OUTPATIENT
Start: 2018-01-24 | End: 2018-01-24

## 2018-01-24 RX ADMIN — METOPROLOL TARTRATE 50 MG: 50 TABLET ORAL at 18:06

## 2018-01-24 RX ADMIN — IPRATROPIUM BROMIDE AND ALBUTEROL SULFATE 3 ML: .5; 3 SOLUTION RESPIRATORY (INHALATION) at 19:43

## 2018-01-24 RX ADMIN — METOPROLOL TARTRATE 50 MG: 50 TABLET ORAL at 10:16

## 2018-01-24 RX ADMIN — ATORVASTATIN CALCIUM 10 MG: 10 TABLET, FILM COATED ORAL at 18:05

## 2018-01-24 RX ADMIN — FLUTICASONE PROPIONATE AND SALMETEROL 1 PUFF: 50; 250 POWDER RESPIRATORY (INHALATION) at 10:12

## 2018-01-24 RX ADMIN — PREGABALIN 300 MG: 100 CAPSULE ORAL at 18:05

## 2018-01-24 RX ADMIN — PREGABALIN 300 MG: 100 CAPSULE ORAL at 10:17

## 2018-01-24 RX ADMIN — DEXTROSE 50 ML/HR: 5 SOLUTION INTRAVENOUS at 13:40

## 2018-01-24 RX ADMIN — APIXABAN 5 MG: 5 TABLET, FILM COATED ORAL at 10:13

## 2018-01-24 RX ADMIN — FLUTICASONE PROPIONATE AND SALMETEROL 1 PUFF: 50; 250 POWDER RESPIRATORY (INHALATION) at 21:52

## 2018-01-24 RX ADMIN — IPRATROPIUM BROMIDE AND ALBUTEROL SULFATE 3 ML: .5; 3 SOLUTION RESPIRATORY (INHALATION) at 13:36

## 2018-01-24 RX ADMIN — PANTOPRAZOLE SODIUM 40 MG: 40 TABLET, DELAYED RELEASE ORAL at 05:49

## 2018-01-24 RX ADMIN — ACETAMINOPHEN 650 MG: 325 TABLET, FILM COATED ORAL at 16:22

## 2018-01-24 RX ADMIN — ALPRAZOLAM 0.5 MG: 0.5 TABLET ORAL at 21:51

## 2018-01-24 RX ADMIN — APIXABAN 5 MG: 5 TABLET, FILM COATED ORAL at 18:05

## 2018-01-24 RX ADMIN — AMLODIPINE BESYLATE 5 MG: 5 TABLET ORAL at 10:13

## 2018-01-24 RX ADMIN — POTASSIUM CHLORIDE 10 MEQ: 750 TABLET, EXTENDED RELEASE ORAL at 10:16

## 2018-01-24 RX ADMIN — FUROSEMIDE 20 MG: 20 TABLET ORAL at 10:14

## 2018-01-24 NOTE — PHYSICIAN ADVISOR
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 who presents with shortness of breath  States shortness of breath began 4 days ago  Denies fever, chills, cough, or sputum production  Reports diaphoresis  Does not have home oxygen  Utilized her nebulizer treatment 3x/day without improvement  States her VNA came for her routine visit today and advised her to seek medical attention for further evaluation of shortness of breath  Denies HA, lightheadedness, dizziness, chest pain, abdominal pain, nausea, vomiting, or diarrhea  Vital signs in the ER are as follows ED Triage Vitals   Temperature Pulse Respirations Blood Pressure SpO2   01/23/18 1353 01/23/18 1345 01/23/18 1345 01/23/18 1345 01/23/18 1345   98 3 °F (36 8 °C) 82 22 139/63 91 %      Temp Source Heart Rate Source Patient Position - Orthostatic VS BP Location FiO2 (%)   01/23/18 1353 01/23/18 2029 01/23/18 2029 01/23/18 2029 --   Oral Monitor Lying Left arm       Pain Score       01/23/18 1731       No Pain             Treatment in the ER included: basic labs, imaging, IV steroids and IV antibiotics  CXR: Impression: Patchy lung infiltrates and trace pleural effusions  Findings may represent developing pneumonia and continued follow-up is suggested  The patient is admitted as INPATIENT  and has remained hospitalized for 1 day(s)  Last vital signs were Blood pressure (!) 177/83, pulse 68, temperature 97 5 °F (36 4 °C), temperature source Oral, resp  rate 20, height 5' 2" (1 575 m), weight 96 4 kg (212 lb 7 oz), SpO2 95 %, not currently breastfeeding  Treatment includes: CT chest, pulmonary consult, nebs, O2, I S  Results include:     CT chest: Minimal new groundglass opacity anterior right upper lobe may represent postinflammatory disease or early infiltrate in the appropriate clinical scenario  Minimal bibasilar, lingula, and right upper lobe left greater than right subsegmental atelectasis   This is recurrent or slightly progressive since January 5, 2018     0  Lab Value Date/Time   TROPONINI <0 02 01/23/2018 1453   TROPONINI <0 02 01/05/2018 1735   TROPONINI <0 02 01/05/2018 1108   TROPONINI 0 02 12/11/2017 0944   TROPONINI <0 02 03/13/2017 0930   TROPONINI 0 04 (H) 09/21/2016 1111   TROPONINI <0 02 04/27/2016 0307   TROPONINI 0 02 04/26/2016 2133   TROPONINI <0 02 04/26/2016 1818   TROPONINI <0 02 04/26/2016 1244                 Results from last 7 days  Lab Units 01/24/18  0609 01/23/18  1453   WBC Thousand/uL 4 70* 6 50   HEMOGLOBIN g/dL 12 5 11 8*   HEMATOCRIT % 38 8 37 4   PLATELETS Thousands/uL 170 164         Results from last 7 days  Lab Units 01/24/18  0609 01/23/18  1453   SODIUM mmol/L 150* 143   POTASSIUM mmol/L 4 5 3 3*   CHLORIDE mmol/L 110* 105   CO2 mmol/L 35* 36*   BUN mg/dL 21 25   CREATININE mg/dL 0 73 1 04   GLUCOSE RANDOM mg/dL 190* 127   CALCIUM mg/dL 9 0 8 2*       Recent Cultures:      Results from last 7 days  Lab Units 01/24/18  0548   LEGIONELLA URINARY ANTIGEN  Negative       The patient is appropriate for  Inpatient Admission  The rationale is as follows: The patient is an 82 y/o female who presented with increased SOB, thought to be multi-factorial  She was hypoxic in ED with O2 sat of 86% on RA  She failed outpatient treatment with her nebs  She required CT chest, pulmonary consult, nebs, O2, I S  Possible PNA, CHF, Obesity hypoventilation as etiology    There is documentation by the admitting physician that the patient would require greater than two midnight stay based on medical necessity  Hospitalization is necessary to prevent further deterioration of her clinical condition  After review of the medical chart, labs, imaging, and notes - I agree that the patient meets criteria for INPATIENT ADMISSION  The patient had a hospitalization within the last 30 days - after reviewing the chart from that admission it was determined that the two admissions were for the same condition  Both admissions were felt to be Acute respiratory failure with hypoxia - multifactorial in nature  These two admissions are therefore related

## 2018-01-24 NOTE — CASE MANAGEMENT
Initial Clinical Review    Admission: Date/Time/Statement: 1/23/18 @ 1604     Orders Placed This Encounter   Procedures    Inpatient Admission (expected length of stay for this patient is greater than two midnights)     Standing Status:   Standing     Number of Occurrences:   1     Order Specific Question:   Admitting Physician     Answer:   Gilberto Cevallos [[id number]]     Order Specific Question:   Level of Care     Answer:   Med Surg [16]     Order Specific Question:   Estimated length of stay     Answer:   More than 2 Midnights     Order Specific Question:   Certification     Answer:   I certify that inpatient services are medically necessary for this patient for a duration of greater than two midnights  See H&P and MD Progress Notes for additional information about the patient's course of treatment  ED: Date/Time/Mode of Arrival:   ED Arrival Information     Expected Arrival Acuity Means of Arrival Escorted By Service Admission Type    - 1/23/2018 13:36 Urgent Ambulance [facility] Urgent    Arrival Complaint    difficulty breathing          Chief Complaint:   Chief Complaint   Patient presents with    Shortness of Breath     patient c/o sob (worse with exertion) x 4 days and generalized weakness  History of Illness: Caryn Ricci is a 81 y o  female with a PMH including atrial fibrillation, HTN, HLD, diastolic heart failure, CONRADO, asthma, morbid obesity, GERD, fibromyalgia, 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 who presents with shortness of breath  States shortness of breath began 4 days ago  Denies fever, chills, cough, or sputum production  Reports diaphoresis  Does not have home oxygen  Utilized her nebulizer treatment 3x/day without improvement  States her VNA came for her routine visit today and advised her to seek medical attention for further evaluation of shortness of breath   Denies HA, lightheadedness, dizziness, chest pain, abdominal pain, nausea, vomiting, or diarrhea  ED Vital Signs:   ED Triage Vitals   Temperature Pulse Respirations Blood Pressure SpO2   01/23/18 1353 01/23/18 1345 01/23/18 1345 01/23/18 1345 01/23/18 1345   98 3 °F (36 8 °C) 82 22 139/63 91 %      Temp Source Heart Rate Source Patient Position - Orthostatic VS BP Location FiO2 (%)   01/23/18 1353 01/23/18 2029 01/23/18 2029 01/23/18 2029 --   Oral Monitor Lying Left arm       Pain Score       01/23/18 1731       No Pain        Wt Readings from Last 1 Encounters:   01/24/18 96 4 kg (212 lb 7 oz)       Abnormal Labs/Diagnostic Test Results:   POTASSIUM mmol/L 3 3*     CO2 mmol/L 36*     CALCIUM mg/dL 8 2*   TOTAL PROTEIN g/dL 5 6*     CXR Patchy lung infiltrates and trace pleural effusions  Findings may represent developing pneumonia and continued follow-up is suggested          ED Treatment:   Medication Administration from 01/23/2018 1336 to 01/23/2018 1706       Date/Time Order Dose Route Action Action by Comments     01/23/2018 1454 ipratropium-albuterol (DUO-NEB) 0 5-2 5 mg/mL inhalation solution 3 mL 3 mL Nebulization Given Janna Singh RN      01/23/2018 1453 methylPREDNISolone sodium succinate (Solu-MEDROL) injection 80 mg 80 mg Intravenous Given Janna Singh RN      01/23/2018 1643 cefepime (MAXIPIME) IVPB (premix) 1,000 mg 1,000 mg Intravenous New Bag Janna Singh RN           Past Medical/Surgical History:    Active Ambulatory Problems     Diagnosis Date Noted    Chest pain 04/26/2016    Hypertension 04/27/2016    Acute cholecystitis 09/21/2016    Abdominal pain 09/21/2016    [facility] 09/21/2016    Syncope 09/21/2016    Hypertension, accelerated 09/24/2016    Sepsis ([facility] ) 09/24/2016    Carotid stenosis, left 09/25/2016    Obesity (BMI 30-39 9) 01/10/2018    Acute bronchitis 03/13/2017    GERD (gastroesophageal reflux disease) 03/13/2017    Hyperlipemia 03/13/2017    Atrial fibrillation ([facility] ) 03/13/2017    Fibromyalgia 03/14/2017    Hypokalemia 03/14/2017    Moderate persistent asthma with acute exacerbation 03/16/2017    Open wound of abdominal wall without penetration into peritoneal cavity 10/27/2017    Asthma exacerbation 12/12/2017    Lymphedema of left leg 12/12/2017    Acute on chronic respiratory failure ([facility] ) 01/05/2018    Bacteremia 12/12/2017    History of Merkel cell carcinoma 12/13/2017    Port-a-cath in place 12/13/2017    Acute congest[facility] ) 01/05/2018    Moderate persistent asthma 01/05/2018    Iron deficiency anemia 01/07/2018    Shortness of breath 01/08/2018     Resolved Ambulatory Problems     Diagnosis Date Noted    No Resolved Ambulatory Problems     Past Medical History:   Diagnosis Date    Asthma     Cancer [facility])     Cardiac disease     Fibromyalgia     Fibromyalgia, primary     GERD (gastroesophageal reflux disease)     Hypertension     Hypokalemia     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HCC)        Admitting Diagnosis: Shortness of breath [R06 02]  Pneumonia [J18 9]  FERNANDES (dyspnea on exertion) [R06 09]    Age/Sex: 81 y o  female    Assessment/Plan:   Principal Problem:    Acute on chronic respiratory failure (HCC)  Active Problems:    Hypertension    Carotid stenosis, left    Obesity (BMI 30-39  9)    Hyperlipemia    Atrial fibrillation (HCC)    Fibromyalgia    Hypokalemia    Lymphedema of left leg    History of Merkel cell carcinoma    Moderate persistent asthma    Iron deficiency anemia  Plan for the Primary Problem(s):  Acute on chronic respiratory failure with hypoxia: Multifactorial, likely worsened by lung infiltrates and trace pleural effusions with underlying chronic CHF, asthma, and poor ventilation due to obesity  ? DAMON  Will treat with nebulizer treatments and supplemental oxygen  Hold off on ABX until CT chest results  No indication for steroids  Patient does not have supplemental oxygen at home, therefore, will need a home O2 eval prior to discharge   Consider over night sleep study for possible DAMON  Encourage mobility and use of IS  Consult pulmonology for further recommendations  Plan for Additional Problems:   · Lung Infiltrates: CXR revealed patchy lung infiltrates - finding may represent developing pneumonia and continued follow-up is suggested  Given Cefepime and Vancomycin in ED for suspected HCAP  Check CT chest WO contrast for better evaluation of infiltrates  Will hold off with further ABX until results of CT chest  Encourage mobility and use of IS  Consult pulmonology for further recommendations  · Chronic diastolic heart failure: Initial CXR revealed patchy lung infiltrates and trace pleural effusions - findings may represent developing pneumonia and continued follow-up is suggested    in a morbidly obese female  Troponin level negative  Echocardiogram from January 2018 revealed EF 70%, no regional wall motion abnormalities, mild concentric hypertrophy, grade 1 diastolic dysfunction, elevated mean left atrial filling pressure  On Lasix 20 mg po daily  Continue cardiac low sodium, fluid-restricted diet  · Paroxysmal Atrial fibrillation:  On Metoprolol 50 mg BID and Eliquis 5 mg BID  Will need an overnight sleep screen to evaluate for DAMON  · Left lower extremity chronic lymphedema:  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   Continue Venodyne compressive pumps as tolerated and Lasix 20 mg daily  · Moderate persistent asthma: No indication for IV steroids  Continue Advair 1 puff BID, nebulizer treatments as needed and supplemental oxygen  · Iron deficiency anemia:  Recent iron panel reviewed, iron 19, ferritin 36, iron sat 7  HGB stable  Continue iron supplementation BID with folate and bowel regimen  · Hypokalemia:  K 3 3, replete and monitor K in am  Check serum Mag    · Hypertension:  Continue Norvasc and Metoprolol  · Hyperlipidemia: Recent lipid panel reviewed, LDL 97   Continue statin    · Left carotid stenosis:  Patient refused carotid endarterectomy in the past  On Eliquis    · History of Merkel cell carcinoma:  Seen by heme/onc in September 2017   No evidence of recurrence   Continue outpatient follow-up   VTE Prophylaxis: Apixaban (Eliquis)  / sequential compression device   Code Status: Full Code  POLST: POLST form is not discussed and not completed at this time  Anticipated Length of Stay:  Patient will be admitted on an Inpatient basis with an anticipated length of stay of  Greater than 2 midnights     Justification for Hospital Stay: Acute on Chronic resp failure with hypoxia, CHF exacerbation, possible PNA    Admission Orders:  TELE MON  CONSULT PULNONARY  PT OT  SPUTUM GSCS  CT CHEST   AIRWAY CLEARANCE PROTOCOL  DAILY WEIGHT I/O  IS  ACCESS PORT A CATH  Scheduled Meds:   amLODIPine 5 mg Oral Daily   apixaban 5 mg Oral BID   atorvastatin 10 mg Oral Daily With Dinner   fluticasone-salmeterol 1 puff Inhalation Q12H [address]   furosemide 20 mg Oral Daily   ipratropium-albuterol 3 mL Nebulization Q6H   metoprolol tartrate 50 mg Oral BID   pantoprazole 40 mg Oral Early Morning   potassium chloride 10 mEq Oral Daily   pregabalin 300 mg Oral BID     Continuous Infusions:    PRN Meds:   acetaminophen    ALPRAZolam    ondansetron

## 2018-01-24 NOTE — OCCUPATIONAL THERAPY NOTE
Occupational Therapy Evaluation/Treatment     01/24/18 0855   Note Type   Note type Eval/Treat   Restrictions/Precautions   Other Precautions Contact/isolation; Chair Alarm;O2   Pain Assessment   Pain Assessment No/denies pain   Home Living   Type of Home Apartment  ([facility])   Home Layout One level;Elevator   Bathroom Shower/Tub (walk-in tub)   Bathroom Toilet Standard   Bathroom Equipment Grab bars in shower; Shower chair;Grab bars around toilet   [address] Walker;Cane;Reacher;Sock aid;Long-handled shoehorn;Quad cane   Prior Function   Level of [place] Independent with ADLs and functional mobility; Needs assistance with IADLs   Lives With Alone   Receives Help From Family;Friend(s)  (VNA nurse and PT 3x/week before hospitalization)   ADL Assistance Independent   IADLs Needs assistance   Falls in the last 6 months 0   Comments Pt ambulated without AD in her apt, with QC outside PTA  Pt can make simple meals, does light cleaning and laundry independently  Friend or daughter assist w/ meals; dtr does shopping     Psychosocial   Psychosocial (WDL) WDL   Subjective   Subjective "My breathing is my main concern "   ADL   Where Assessed Chair   Eating Assistance 7  Independent   Grooming Assistance 5  Supervision/Setup   UB Bathing Assistance 5  Supervision/Setup   LB Bathing Assistance 4  Minimal Assistance   [address] 5  Supervision/Setup   LB Dressing Assistance 4  [address]  5  Supervision/Setup   Bed Mobility   Supine to Sit 5  Supervision   Sit to Supine 5  Supervision   Transfers   Sit to Stand 5  Supervision   Stand to Sit 5  Supervision   Stand pivot 5  Supervision   Sliding Board transfer 5  Supervision   Functional Mobility   Functional Mobility 5  Supervision   Balance   Static Sitting Good   Dynamic Sitting Fair +   Static Standing Fair +   Dynamic Standing Fair   Ambulatory Fair  (with RW)   Activity Tolerance Activity Tolerance Patient limited by fatigue  (dyspnea with exertion)   Nurse Made Aware yes   RUE Assessment   RUE Assessment WFL  (gross strength 4/5)   LUE Assessment   LUE Assessment WFL  (gross strength 4/5)   Hand Function   Gross Motor Coordination Functional   Fine Motor Coordination Functional  (chronic small tremor L>R hand)   Sensation   Light Touch No apparent deficits   Proprioception   Proprioception No apparent deficits   Vision-Basic Assessment   Current Vision Wears glasses only for reading   Perception   Motor Planning Appears intact   Cognition   Overall Cognitive Status WFL   Arousal/Participation Alert; Cooperative   Attention Attends with cues to redirect   Orientation Level Oriented X4   Memory Within functional limits   Following Commands Follows multistep commands with increased time or repetition   Assessment   Limitation Decreased ADL status; Decreased UE strength;Decreased endurance;Decreased self-care trans;Decreased high-level ADLs  (decreased balance)   Prognosis Good   Assessment Patient evaluated by Occupational Therapy  Patient admitted with Acute on chronic respiratory failure ([facility] )  The patients occupational profile, medical and therapy history includes a extensive additional review of physical, cognitive, or psychosocial history related to current functional performance  Comorbidities affecting functional mobility and ADLS include: atrial fibrillation, HTN, HLD, diastolic heart failure, CONRADO, asthma, morbid obesity, GERD, fibromyalgia, Merkel Cell Lymphoma  Prior to admission, patient was independent with functional mobility with QC outdoors, independent with ADLS, requiring assist for IADLS and ambulating household distance, living alone in a senior housing apartment building with elevator    The evaluation identifies the following performance deficits: weakness, impaired balance, decreased endurance, increased fall risk, decreased ADLS, decreased IADLS, decreased activity tolerance, SOB upon exertion and decreased strength, that result in activity limitations and/or participation restrictions  This evaluation requires clinical decision making of high complexity, because the patient presents with comorbidites that affect occupational performance and required significant modification of tasks or assistance with consideration of multiple treatment options  The Barthel Index was used as a functional outcome tool presenting with a score of 55, indicating marked limitations of functional mobility and ADLS  Patient will benefit from skilled Occupational Therapy services to address above deficits and facilitate a safe return to prior level of function  Goals   Patient Goals go home, not rehab   [facility] Time Frame 3-5   Short Term Goal #1 Patient will perform functional mobility to and from bathroom with rolling walker and supervision, in order to increase stamina to tolerate ADL's; Patient will stand at sink x 3 minutes and perform ADL activities, without signs of fatigue, in order to increase stamina to return to prior level of function; Patient will tolerate 10 minutes of UE strengthening seated OOB, without signs of fatigue, in order to increase stamina to tolerate ADL's  LTG Time Frame 10-14   Long Term Goal #1 Patient will perform functional mobility to and from bathroom with QC and independently, in order to increase stamina to tolerate ADL's; Patient will stand at sink x 5 minutes and perform ADL activities, without signs of fatigue, in order to increase stamina to return to prior level of function; Patient will tolerate 15 minutes of UE strengthening seated OOB, without signs of fatigue, in order to increase stamina to tolerate ADL's  Functional Transfer Goals   Pt Will Perform All Functional Transfers With mod indep; With assistive devices  (LTG - Independent)   ADL Goals   Pt Will Perform All ADL's In chair; With stand by assist;With good judgment/safety  (LTG - Independent) Plan   Treatment Interventions ADL retraining;Functional transfer training;UE strengthening/ROM; Endurance training;Patient/family training;Equipment evaluation/education; Compensatory technique education; Energy conservation   OT Frequency 3-5x/wk   Additional Treatment Session   Start Time 0845   End Time 0900   Treatment Assessment Pt seen for ADL training  Supine to sit Supervision with cues  Pt able to ambulate to the bathroom with Supervision and RW  Toileting Supervision with verbal cues for safety  Pt able to stand at sink to wash hands and face with Supervision and set-up for 2 minutes  Ambulated back to chair 12 feet  Pt with SOB, despite continuous 2L O2 via NC, SpO2 90%  Pt instructed in deep, pursed lip breathing with min verbal and visual cues  Cont OT per POC  Recommendation   OT Discharge Recommendation (Home with services)   Barthel Index   Feeding 10   Bathing 0   Grooming Score 0   Dressing Score 5   Bladder Score 10   Bowels Score 10   Toilet Use Score 5   Transfers (Bed/Chair) Score 10   Mobility (Level Surface) Score 0   Stairs Score 5   Barthel Index Score 55     Patient left OOB in chair with all needs within reach, tab alarm in place

## 2018-01-24 NOTE — SOCIAL WORK
DASH discussion completed  Discussed goals of making sure pt's needs are met upon discharge, pt's preferences are taken into account, pt understands her health condition, medications and symptoms to watch for after returning home and pt is aware of any follow up appointments recommended by hospital physician  PT LIVES ALONE AND HAS A CANE AND WALKER AT HOME  SHE HAS THE [facility], BETTY WORTHINGTON IS HER CM  PT DOES NOT DRIVE BUT USES BUS OR HER FRIEND TAKERS HER WHERE SHE NEEDS TO GO   PT USES THE [facility] FOR RX NEEDS  PT IS CURRENT WITH [facility], CM WILL FOLLOW FOR D/C NEEDS

## 2018-01-24 NOTE — PROGRESS NOTES
[facility] Internal Medicine Progress Note  Patient: Bird Bowen 81 y o  female   MRN: [id number]  PCP: Dev Florence DO  Unit/Bed#: [address] [address] Encounter: [id number]  Date Of Visit: 01/24/18    Assessment:  Principal Problem:    Acute on chronic respiratory failure ([facility] [address] )  Active Problems:    Hypertension    Carotid stenosis, left    Obesity (BMI 30-39  9)    Hyperlipemia    Atrial fibrillation (HCC)    Fibromyalgia    Hypokalemia    Lymphedema of left leg    History of Merkel cell carcinoma    Moderate persistent asthma    Iron deficiency anemia    Plan:  Acute on chronic respiratory failure with hypoxia: Multifactorial, likely worsened by patchy lung infiltrates and trace pleural effusions with underlying chronic CHF, asthma, and poor ventilation due to obesity  ? DAMON  CT chest revealed no effusions, minimal new ground-glass opacity of the anterior right upper lobe which may represent post inflammatory disease or early infiltrate, and minimal bibasilar, lingula, and right upper lobe left greater than right subsegmental atelectasis  Obtain a swallow eval   Consult pulmonology for further recommendations  Will treat with nebulizer treatments and supplemental oxygen  Initially treated with IV cefepime and vancomycin in ED for suspected HCAP  Will hold off on further ABX until pulmonology evaluation  No indication for steroids  Patient does not have supplemental oxygen at home, therefore, will need a home O2 eval prior to discharge  Consider over night sleep study for possible DAMON  Encourage mobility and use of IS  Chronic diastolic heart failure: Initial CXR revealed patchy lung infiltrates    in a morbidly obese female  Troponin level negative  Echocardiogram from January 2018 revealed EF 70%, no regional wall motion abnormalities, mild concentric hypertrophy, grade 1 diastolic dysfunction, elevated mean left atrial filling pressure  CT of chest revealed no effusions  On Lasix 20 mg po daily  Continue cardiac diet  Paroxysmal Atrial fibrillation:  On Metoprolol 50 mg BID and Eliquis 5 mg BID  Will need an overnight sleep screen to evaluate for DAMON  Moderate persistent asthma: No indication for IV steroids  Continue Advair 1 puff BID, nebulizer treatments as needed and supplemental oxygen  Pulmonology to formally evaluate  Iron deficiency anemia:  Recent iron panel reviewed, iron 19, ferritin 36, iron sat 7  HGB stable, 12 5  Continue iron supplementation BID with folate and bowel regimen  Left lower extremity chronic lymphedema:  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   Continue Venodyne compressive pumps as tolerated and Lasix 20 mg daily  Hypokalemia:  K 3 3->4 5  Mag 1 9  Monitor in a m  [facility] Hypertension:  Continue Norvasc and Metoprolol  Hyperlipidemia: Recent lipid panel reviewed, LDL 97   Continue statin  Left carotid stenosis:  Patient refused carotid endarterectomy in the past  On Eliquis      History of Merkel cell carcinoma:  Seen by heme/onc in September 2017   No evidence of recurrence   Continue outpatient follow-up      VTE Pharmacologic Prophylaxis:   Pharmacologic: Apixaban (Eliquis)  Mechanical: Mechanical VTE prophylaxis in place  Patient Centered Rounds: I have performed bedside rounds with nursing staff today  Discussions with Specialists or Other Care Team Provider:  Nursing, case management  Education and Discussions with Family / Patient:  I have answered all questions to the best of my ability  Will discuss plan of care with daughter  Time Spent for Care: 20 minutes  More than 50% of total time spent on counseling and coordination of care as described above      Current Length of Stay: 1 day(s)  Current Patient Status: Inpatient   Certification Statement: The patient will continue to require additional inpatient hospital stay due to Shortness of breath requiring supplemental oxygen and scheduled breathing treatments and further evaluation by pulmonology    Discharge Plan:  Patient is not medically stable for discharge today, likely in 72 hours pending progress  Code Status: Level 1 - Full Code    Subjective:   Patient continues with shortness of breath even at rest   She is requiring oxygen at 4 L via nasal cannula and she is not on home oxygen  Reports she cannot lie flat without feeling short of breath  At home she sleeps elevated on pillows  Continues with a dry nonproductive cough  States she did not make any urine overnight but has gone early this morning  Denies headache, chest pain, abdominal pain, vomiting, diarrhea  Objective:   Vitals:   Temp (24hrs), Av 9 °F (36 6 °C), Min:97 5 °F (36 4 °C), Max:98 3 °F (36 8 °C)    HR:  [68-82] 68  Resp:  [18-24] 20  BP: (122-177)/(58-83) 177/83  SpO2:  [91 %-98 %] 95 %  Body mass index is 38 86 kg/m²  Input and Output Summary (last 24 hours): Intake/Output Summary (Last 24 hours) at 18 1241  Last data filed at 18 0330   Gross per 24 hour   Intake                0 ml   Output              100 ml   Net             -100 ml       Physical Exam:     Physical Exam   Constitutional: She is oriented to person, place, and time  She appears well-developed  No distress  Pleasant female  Morbidly obese  Resting in bed on 4 L nasal cannula with conversational dyspnea   HENT:   Head: Normocephalic  Neck: Normal range of motion  Cardiovascular: Normal rate and regular rhythm  Pulmonary/Chest: Effort normal  No respiratory distress  She has decreased breath sounds in the right upper field, the right lower field, the left upper field and the left lower field  She has no wheezes  She has no rhonchi  She has no rales  Abdominal: Soft  Bowel sounds are normal  She exhibits no distension  There is no tenderness  Obese   Musculoskeletal: She exhibits edema (+1 left lower extremity with PVD discoloration)  She exhibits no tenderness     Neurological: She is alert and oriented to person, place, and time  Skin: Skin is warm and dry  No rash noted  She is not diaphoretic  There is pallor  Dry flaky lower extremities with left lower extremity PVD discoloration   Psychiatric: She has a normal mood and affect  Judgment normal    Nursing note and vitals reviewed  Additional Data:   Labs:    Results from last 7 days  Lab Units 01/24/18  0609 01/23/18  1453   WBC Thousand/uL 4 70* 6 50   HEMOGLOBIN g/dL 12 5 11 8*   HEMATOCRIT % 38 8 37 4   PLATELETS Thousands/uL 170 164   NEUTROS PCT %  --  76*   LYMPHS PCT %  --  11*   MONOS PCT %  --  7   EOS PCT %  --  5       Results from last 7 days  Lab Units 01/24/18  0609 01/23/18  1453   SODIUM mmol/L 150* 143   POTASSIUM mmol/L 4 5 3 3*   CHLORIDE mmol/L 110* 105   CO2 mmol/L 35* 36*   BUN mg/dL 21 25   CREATININE mg/dL 0 73 1 04   CALCIUM mg/dL 9 0 8 2*   TOTAL PROTEIN g/dL  --  5 6*   BILIRUBIN TOTAL mg/dL  --  0 30   ALK PHOS U/L  --  102   ALT U/L  --  15   AST U/L  --  16   GLUCOSE RANDOM mg/dL 190* 127           * I Have Reviewed All Lab Data Listed Above  * Additional Pertinent Lab Tests Reviewed: All Labs Within Last 24 Hours Reviewed    Imaging:    Imaging Reports Reviewed Today Include: Procedure: Xr Chest 2 Views    Result Date: 1/23/2018  Narrative: CHEST - DUAL ENERGY INDICATION:  Shortness of breath for 4 days  Generalized weakness COMPARISON:  1/8/2018 VIEWS:  PA (including soft tissue/bone algorithms) and lateral projections IMAGES:  4 FINDINGS:  Infusion port catheter device present on the right  Cardiomediastinal silhouette appears unremarkable  Limited inspiration  Minimal left pleural and perhaps minimal right pleural effusions are evident  There is some patchy infiltrate in the right and left lung bases and right perihilar region  No pneumothorax  Visualized osseous structures appear within normal limits for the patient's age  Impression: Patchy lung infiltrates and trace pleural effusions    Findings may represent developing pneumonia and continued follow-up is suggested  Workstation performed: [id number]     Procedure: Ct Chest Wo Contrast    Result Date: 1/24/2018  Narrative: CT CHEST WITHOUT IV CONTRAST INDICATION:  Shortness of breath  History taken directly from the electronic ordering system  COMPARISON: CT chest 1/5/2018 TECHNIQUE: CT examination of the chest was performed without intravenous contrast   Reformatted images were created in axial, sagittal, and coronal planes  Radiation dose length product (DLP) for this visit:  575 37 mGy-cm   This examination, like all CT scans performed in the [facility], was performed utilizing techniques to minimize radiation dose exposure, including the use of iterative  reconstruction and automated exposure control  FINDINGS: LUNGS:  Minimal bibasilar, lingular, and right upper lobe subsegmental atelectasis left greater than right is recurrent or slightly progressive since January 5, 2018  Dependent hypoventilatory changes in the upper lobes  Minimal groundglass subpleural opacity anterior right upper lobe image 14 series 3 and image 71 series 601  No pneumothorax or discrete mass  PLEURA:  Unremarkable  HEART/GREAT VESSELS:  Heart is not enlarged  No pericardial effusion  Aortic and coronary artery calcification  MEDIASTINUM AND JASWINDER:  Unremarkable  CHEST WALL AND LOWER NECK:   Right chest wall hailee catheter in place tip in the distal SVC  Normal  VISUALIZED STRUCTURES IN THE UPPER ABDOMEN:  Stable simple cyst upper pole right kidney  OSSEOUS STRUCTURES:  Mild thoracolumbar scoliosis  Degenerative changes bilateral glenohumeral joints  Impression: Minimal new groundglass opacity anterior right upper lobe may represent postinflammatory disease or early infiltrate in the appropriate clinical scenario  Minimal bibasilar, lingula, and right upper lobe left greater than right subsegmental atelectasis    This is recurrent or slightly progressive since January 5, 2018  No effusions  Workstation performed: [id number]       Cultures:   Blood Culture:   Lab Results   Component Value Date    BLOODCX No Growth After 5 Days  01/05/2018    BLOODCX No Growth After 5 Days  01/05/2018    BLOODCX No Growth After 5 Days  12/13/2017    BLOODCX No Growth After 5 Days  12/13/2017    BLOODCX Staphylococcus epidermidis (A) 12/11/2017    BLOODCX Staphylococcus epidermidis (A) 12/11/2017    BLOODCX No Growth After 5 Days  09/22/2016     Urine Culture:   Lab Results   Component Value Date    URINECX >100,000 cfu/ml Mixed Contaminants X5 09/21/2016     Sputum Culture: No components found for: SPUTUMCX  Wound Culture: No results found for: WOUNDCULT    Last 24 Hours Medication List:     amLODIPine 5 mg Oral Daily   apixaban 5 mg Oral BID   atorvastatin 10 mg Oral Daily With Dinner   dextrose 50 mL/hr Intravenous Once   fluticasone-salmeterol 1 puff Inhalation Q12H CORBIN   furosemide 20 mg Oral Daily   ipratropium-albuterol 3 mL Nebulization Q6H   metoprolol tartrate 50 mg Oral BID   pantoprazole 40 mg Oral Early Morning   potassium chloride 10 mEq Oral Daily   pregabalin 300 mg Oral BID        Today, Patient Was Seen By: GILDA Barnett    ** Please Note: Dragon 360 Dictation voice to text software may have been used in the creation of this document   **

## 2018-01-24 NOTE — PHYSICAL THERAPY NOTE
Physical therapy attempted x2, deferred due to respiratory treatment and CT scan  Will re-attempt at later time

## 2018-01-25 LAB
ANION GAP SERPL CALCULATED.3IONS-SCNC: 3 MMOL/L (ref 4–13)
ARTERIAL PATENCY WRIST A: YES
ATRIAL RATE: 78 BPM
BASE EXCESS BLDA CALC-SCNC: 1.4 MMOL/L
BASOPHILS # BLD AUTO: 0 THOUSANDS/ΜL (ref 0–0.1)
BASOPHILS NFR BLD AUTO: 0 % (ref 0–1)
BODY TEMPERATURE: 97.2 DEGREES FEHRENHEIT
BUN SERPL-MCNC: 20 MG/DL (ref 5–25)
CALCIUM SERPL-MCNC: 8.4 MG/DL (ref 8.3–10.1)
CHLORIDE SERPL-SCNC: 105 MMOL/L (ref 100–108)
CO2 SERPL-SCNC: 34 MMOL/L (ref 21–32)
CREAT SERPL-MCNC: 0.76 MG/DL (ref 0.6–1.3)
CRP SERPL QL: 26.5 MG/L
EOSINOPHIL # BLD AUTO: 0 THOUSAND/ΜL (ref 0–0.61)
EOSINOPHIL NFR BLD AUTO: 0 % (ref 0–6)
ERYTHROCYTE [DISTWIDTH] IN BLOOD BY AUTOMATED COUNT: 18.5 % (ref 11.6–15.1)
GFR SERPL CREATININE-BSD FRML MDRD: 74 ML/MIN/1.73SQ M
GLUCOSE SERPL-MCNC: 111 MG/DL (ref 65–140)
HCO3 BLDA-SCNC: 27.9 MMOL/L (ref 22–28)
HCT VFR BLD AUTO: 36.4 % (ref 37–47)
HGB BLD-MCNC: 11.7 G/DL (ref 12–16)
LYMPHOCYTES # BLD AUTO: 0.7 THOUSANDS/ΜL (ref 0.6–4.47)
LYMPHOCYTES NFR BLD AUTO: 10 % (ref 14–44)
MAGNESIUM SERPL-MCNC: 1.8 MG/DL (ref 1.6–2.6)
MCH RBC QN AUTO: 25.3 PG (ref 27–31)
MCHC RBC AUTO-ENTMCNC: 32 G/DL (ref 31.4–37.4)
MCV RBC AUTO: 79 FL (ref 82–98)
MONOCYTES # BLD AUTO: 0.4 THOUSAND/ΜL (ref 0.17–1.22)
MONOCYTES NFR BLD AUTO: 6 % (ref 4–12)
MRSA NOSE QL CULT: NORMAL
NASAL CANNULA: 4
NEUTROPHILS # BLD AUTO: 6.3 THOUSANDS/ΜL (ref 1.85–7.62)
NEUTS SEG NFR BLD AUTO: 84 % (ref 43–75)
NRBC BLD AUTO-RTO: 0 /100 WBCS
O2 CT BLDA-SCNC: 17.2 ML/DL (ref 16–23)
OXYHGB MFR BLDA: 95.9 % (ref 94–97)
P AXIS: 50 DEGREES
PCO2 BLDA: 52.6 MM HG (ref 36–44)
PCO2 TEMP ADJ BLDA: 50.8 MM HG (ref 36–44)
PH BLD: 7.35 [PH] (ref 7.35–7.45)
PH BLDA: 7.34 [PH] (ref 7.35–7.45)
PLATELET # BLD AUTO: 134 THOUSANDS/UL (ref 130–400)
PMV BLD AUTO: 10.2 FL (ref 8.9–12.7)
PO2 BLD: 83.7 MM HG (ref 75–129)
PO2 BLDA: 88.1 MM HG (ref 75–129)
POTASSIUM SERPL-SCNC: 3.8 MMOL/L (ref 3.5–5.3)
PR INTERVAL: 184 MS
QRS AXIS: -20 DEGREES
QRSD INTERVAL: 86 MS
QT INTERVAL: 350 MS
QTC INTERVAL: 418 MS
RBC # BLD AUTO: 4.61 MILLION/UL (ref 4.2–5.4)
SODIUM SERPL-SCNC: 142 MMOL/L (ref 136–145)
SPECIMEN SOURCE: ABNORMAL
T WAVE AXIS: 51 DEGREES
VENTRICULAR RATE: 86 BPM
WBC # BLD AUTO: 7.5 THOUSAND/UL (ref 4.8–10.8)

## 2018-01-25 PROCEDURE — 94760 N-INVAS EAR/PLS OXIMETRY 1: CPT

## 2018-01-25 PROCEDURE — 80048 BASIC METABOLIC PNL TOTAL CA: CPT | Performed by: NURSE PRACTITIONER

## 2018-01-25 PROCEDURE — 92610 EVALUATE SWALLOWING FUNCTION: CPT

## 2018-01-25 PROCEDURE — 86140 C-REACTIVE PROTEIN: CPT | Performed by: INTERNAL MEDICINE

## 2018-01-25 PROCEDURE — 82805 BLOOD GASES W/O2 SATURATION: CPT | Performed by: INTERNAL MEDICINE

## 2018-01-25 PROCEDURE — 94640 AIRWAY INHALATION TREATMENT: CPT

## 2018-01-25 PROCEDURE — 85025 COMPLETE CBC W/AUTO DIFF WBC: CPT | Performed by: NURSE PRACTITIONER

## 2018-01-25 PROCEDURE — G8979 MOBILITY GOAL STATUS: HCPCS

## 2018-01-25 PROCEDURE — G8978 MOBILITY CURRENT STATUS: HCPCS

## 2018-01-25 PROCEDURE — 36600 WITHDRAWAL OF ARTERIAL BLOOD: CPT

## 2018-01-25 PROCEDURE — 97163 PT EVAL HIGH COMPLEX 45 MIN: CPT

## 2018-01-25 PROCEDURE — G8998 SWALLOW D/C STATUS: HCPCS

## 2018-01-25 PROCEDURE — 99232 SBSQ HOSP IP/OBS MODERATE 35: CPT | Performed by: NURSE PRACTITIONER

## 2018-01-25 PROCEDURE — 99232 SBSQ HOSP IP/OBS MODERATE 35: CPT | Performed by: INTERNAL MEDICINE

## 2018-01-25 PROCEDURE — G8996 SWALLOW CURRENT STATUS: HCPCS

## 2018-01-25 PROCEDURE — 83735 ASSAY OF MAGNESIUM: CPT | Performed by: NURSE PRACTITIONER

## 2018-01-25 PROCEDURE — G8997 SWALLOW GOAL STATUS: HCPCS

## 2018-01-25 RX ADMIN — PREGABALIN 300 MG: 100 CAPSULE ORAL at 18:31

## 2018-01-25 RX ADMIN — IPRATROPIUM BROMIDE AND ALBUTEROL SULFATE 3 ML: .5; 3 SOLUTION RESPIRATORY (INHALATION) at 14:05

## 2018-01-25 RX ADMIN — FUROSEMIDE 20 MG: 20 TABLET ORAL at 08:17

## 2018-01-25 RX ADMIN — APIXABAN 5 MG: 5 TABLET, FILM COATED ORAL at 08:17

## 2018-01-25 RX ADMIN — IPRATROPIUM BROMIDE AND ALBUTEROL SULFATE 3 ML: .5; 3 SOLUTION RESPIRATORY (INHALATION) at 07:28

## 2018-01-25 RX ADMIN — METOPROLOL TARTRATE 50 MG: 50 TABLET ORAL at 18:31

## 2018-01-25 RX ADMIN — PANTOPRAZOLE SODIUM 40 MG: 40 TABLET, DELAYED RELEASE ORAL at 06:37

## 2018-01-25 RX ADMIN — IPRATROPIUM BROMIDE AND ALBUTEROL SULFATE 3 ML: .5; 3 SOLUTION RESPIRATORY (INHALATION) at 19:24

## 2018-01-25 RX ADMIN — FLUTICASONE PROPIONATE AND SALMETEROL 1 PUFF: 50; 250 POWDER RESPIRATORY (INHALATION) at 08:18

## 2018-01-25 RX ADMIN — PREGABALIN 300 MG: 100 CAPSULE ORAL at 08:21

## 2018-01-25 RX ADMIN — ATORVASTATIN CALCIUM 10 MG: 10 TABLET, FILM COATED ORAL at 18:32

## 2018-01-25 RX ADMIN — AMLODIPINE BESYLATE 5 MG: 5 TABLET ORAL at 08:17

## 2018-01-25 RX ADMIN — ALPRAZOLAM 0.5 MG: 0.5 TABLET ORAL at 20:18

## 2018-01-25 RX ADMIN — Medication 300 UNITS: at 08:31

## 2018-01-25 RX ADMIN — APIXABAN 5 MG: 5 TABLET, FILM COATED ORAL at 18:32

## 2018-01-25 RX ADMIN — POTASSIUM CHLORIDE 10 MEQ: 750 TABLET, EXTENDED RELEASE ORAL at 08:17

## 2018-01-25 RX ADMIN — METOPROLOL TARTRATE 50 MG: 50 TABLET ORAL at 08:17

## 2018-01-25 NOTE — PLAN OF CARE
DISCHARGE PLANNING     Discharge to home or other facility with appropriate resources Progressing        DISCHARGE PLANNING - CARE MANAGEMENT     Discharge to post-acute care or home with appropriate resources Progressing        INFECTION - ADULT     Absence or prevention of progression during hospitalization Progressing     Absence of fever/infection during neutropenic period Progressing        Knowledge Deficit     Patient/family/caregiver demonstrates understanding of disease process, treatment plan, medications, and discharge instructions Progressing        PAIN - ADULT     Verbalizes/displays adequate comfort level or baseline comfort level Progressing        Potential for Falls     Patient will remain free of falls Progressing        RESPIRATORY - ADULT     Achieves optimal ventilation and oxygenation Progressing        SAFETY ADULT     Maintain or return to baseline ADL function Progressing     Maintain or return mobility status to optimal level Progressing     Patient will remain free of falls Progressing

## 2018-01-25 NOTE — PROGRESS NOTES
[facility] Internal Medicine Progress Note  Patient: Nilam Dempsey 81 y o  female   MRN: [id number]  PCP: Monica Rodriguez DO  Unit/Bed#: [address] [place] [address] Encounter: [id number]  Date Of Visit: 01/25/18    Assessment:    Principal Problem:    Acute on chronic respiratory failure ([address] )  Active Problems:    Hypertension    Carotid stenosis, left    Obesity (BMI 30-39  9)    Hyperlipemia    Atrial fibrillation (HCC)    Fibromyalgia    Hypokalemia    Lymphedema of left leg    History of Merkel cell carcinoma    Moderate persistent asthma    Iron deficiency anemia      Plan:  Acute on chronic respiratory failure with hypoxemia,  Multifactorial, likely worsened by patchy lung infiltrates and trace pleural effusions with underlying chronic CHF, asthma, and obesity that may be causing an element of a alveolar hypoventilation  She refuses overnight pulse oximetry evaluation  CT chest, Minimal new groundglass opacity anterior right upper lobe may represent postinflammatory disease or early infiltrate   Pulmonology following and recommending no antibiotics or steroids at this time  CRP, 26 5  ABG, pending  Swallow evaluation ordered  Continue Advair, DuoNeb, and supplemental oxygen  Ordered a home O2 evaluation  Encourage IS and OOB    Chronic diastolic heart failure,  Troponin negative  January 2018, echo EF 70%, no regional wall motion abnormalities, mild concentric hypertrophy, grade 1 diastolic dysfunction, elevated mean left arterial filling pressures  CT of chest revealed no effusions  Continue Lasix 20 mg p o   Daily    Proximal atrial fibrillation,  Continue metoprolol and Eliquis    Moderate persistent asthma,  Continue Advair and, nebulizer treatments, and supplemental oxygen  Pulmonology following    Iron deficiency anemia,  Continue iron supplementation b i d  with folate and bowel regiment    Left lower extremity chronic lymphedema,  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    Hypokalemia,  Resolved    Hyperlipidemia,  Continue statin    Left carotid stenosis,  Continue Eliquis  She has refused carotid endarterectomy in the past    History of Merkel cell carcinoma,  Seen by heme/onc in 2017  No evidence of recurrence  Continue outpatient follow-up          VTE Pharmacologic Prophylaxis:   Pharmacologic: Apixaban (Eliquis)  Mechanical VTE Prophylaxis in Place: Yes    Patient Centered Rounds: I have performed bedside rounds with nursing staff today  Discussions with Specialists or Other Care Team Provider: Spoke with Dr Zari Ken concerning plan of care  Education and Discussions with Family / Patient: spoke with patient concerning plan of care    Time Spent for Care: 30 minutes  More than 50% of total time spent on counseling and coordination of care as described above  Current Length of Stay: 2 day(s)    Current Patient Status: Inpatient   Certification Statement: The patient will continue to require additional inpatient hospital stay due to SOB requiring supplemental oxygen    Discharge Plan:  Not anticipated today    Code Status: Level 1 - Full Code      Subjective:   She is observed sitting in chair, offering no complaints of pain or discomfort at present  She states she feels extremely short of breath without the use of oxygen  Denies chest pain, abdominal pain, or headache  Denies nausea, vomiting, or diarrhea  Objective:     Vitals:   Temp (24hrs), Av 4 °F (36 3 °C), Min:97 2 °F (36 2 °C), Max:97 6 °F (36 4 °C)    HR:  [61-73] 73  Resp:  [18-22] 20  BP: (106-132)/(51-63) 132/63  SpO2:  [93 %-96 %] 96 %  Body mass index is 38 86 kg/m²  Input and Output Summary (last 24 hours):        Intake/Output Summary (Last 24 hours) at 18 1105  Last data filed at 18 2235   Gross per 24 hour   Intake              970 ml   Output              500 ml   Net              470 ml       Physical Exam:     Physical Exam   Constitutional: She is oriented to person, place, and time  She appears well-developed and well-nourished  No distress  obese   HENT:   Head: Normocephalic and atraumatic  Neck: Normal range of motion  Neck supple  Cardiovascular: Normal rate  Exam reveals no gallop and no friction rub  No murmur heard  Pulmonary/Chest: Effort normal and breath sounds normal  No respiratory distress  She has no wheezes  She has no rales  She exhibits no tenderness  Decreased at the bases   Abdominal: Soft  Bowel sounds are normal  She exhibits no distension and no mass  There is no tenderness  There is no rebound and no guarding  Musculoskeletal: She exhibits edema  She exhibits no tenderness or deformity  Chronic lymphedema noted on the left  RLE without edema   Neurological: She is alert and oriented to person, place, and time  Skin: Skin is warm and dry  No rash noted  She is not diaphoretic  No erythema  No pallor  Psychiatric: She has a normal mood and affect  Her behavior is normal  Judgment and thought content normal        Additional Data:     Labs:      Results from last 7 days  Lab Units 01/25/18  0459   WBC Thousand/uL 7 50   HEMOGLOBIN g/dL 11 7*   HEMATOCRIT % 36 4*   PLATELETS Thousands/uL 134   NEUTROS PCT % 84*   LYMPHS PCT % 10*   MONOS PCT % 6   EOS PCT % 0       Results from last 7 days  Lab Units 01/25/18  0459  01/23/18  1453   SODIUM mmol/L 142  < > 143   POTASSIUM mmol/L 3 8  < > 3 3*   CHLORIDE mmol/L 105  < > 105   CO2 mmol/L 34*  < > 36*   BUN mg/dL 20  < > 25   CREATININE mg/dL 0 76  < > 1 04   CALCIUM mg/dL 8 4  < > 8 2*   TOTAL PROTEIN g/dL  --   --  5 6*   BILIRUBIN TOTAL mg/dL  --   --  0 30   ALK PHOS U/L  --   --  102   ALT U/L  --   --  15   AST U/L  --   --  16   GLUCOSE RANDOM mg/dL 111  < > 127   < > = values in this interval not displayed  * I Have Reviewed All Lab Data Listed Above  * Additional Pertinent Lab Tests Reviewed:  [address] Admission Reviewed    Imaging:    Imaging Reports Reviewed Today Include: PCXR, CT chest  Imaging Personally Reviewed by Myself Includes:  none    Recent Cultures (last 7 days):       Results from last 7 days  Lab Units 01/24/18  0548 01/23/18  1913 01/23/18  1643   BLOOD CULTURE   --  No Growth at 24 hrs  No Growth at 24 hrs  LEGIONELLA URINARY ANTIGEN  Negative  --   --        Last 24 Hours Medication List:     amLODIPine 5 mg Oral Daily   apixaban 5 mg Oral BID   atorvastatin 10 mg Oral Daily With Dinner   fluticasone-salmeterol 1 puff Inhalation Q12H [address]   furosemide 20 mg Oral Daily   ipratropium-albuterol 3 mL Nebulization Q6H   metoprolol tartrate 50 mg Oral BID   pantoprazole 40 mg Oral Early Morning   potassium chloride 10 mEq Oral Daily   pregabalin 300 mg Oral BID        Today, Patient Was Seen By: GILDA Phillips    ** Please Note: Dragon 360 Dictation voice to text software may have been used in the creation of this document   **

## 2018-01-25 NOTE — SPEECH THERAPY NOTE
Speech Language/Pathology     01/25/18 1124   Swallow Information   Current Risks for Dysphagia & Aspiration Respiratory compromise;General debilitation   Current Symptoms/Concerns Current Pneumonia  (possible developing pneumonia )   Current Diet Regular; Thin liquid   Baseline Diet Regular; Thin liquids   Baseline Assessment   Behavior/Cognition Alert; Cooperative; Interactive   Speech/Language Status (WNL )   Patient Positioning Upright in bed   Swallow Mechanism Exam   Labial Symmetry WFL   Labial Strength WFL   Labial ROM WFL   Labial Sensation WFL   Facial Symmetry WFL   Facial Strength WFL   Facial ROM WFL   Facial Sensation WFL   Lingual Symmetry WFL   Lingual Strength WFL   Lingual ROM WFL   Lingual Sensation WFL   Velum WFL   Gag (did not assess )   Mandible WFL   Dentition Adequate   Volitional Cough Strong   Tracheostomy No   Consistencies Assessed and Performance   Materials Admnistered Regular/Solid; Thin liquid   Materials Adminstered Comment (see above )   Oral Stage WFL   Oral Stage Comment (Timing of oral prep and transit of solids WNL )   Phargngeal Stage WFL   Pharyngeal Stage Comment (No s/s aspiration following any swallow solid/liquid )   Swallow Mechanics WFL   Esophageal Concerns Hx GERDS  (Pt  reports GERD is well controlled with Nexium )   Strategies and Efficacy (single sips of liquid, smaller bites of food )   Summary   Swallow Summary (Swallow skills are safe/WFL for regular consistency/thin liq)   Recommendations   Risk for Aspiration None   Recommendations Consider oral diet   Diet Solid Recommendation Regular consistency   Diet Liquid Recommendation Thin liquid   Recommended Form of Meds Whole; With thin liquid   General Precautions Aspiration precautions;Upright as possible for all oral intake;Remain upright for 45 mins after meals   Compensatory Swallowing Strategies External pacing   Further Evaluations (not required )   Results Reviewed with RN;PT/Family/Caregiver   Treatment Recommendations   Duration of treatment (Treatment not required )   Follow up treatments (not required )   Dysphagia Goals Patient will tolerate recommended diet without observed clinical signs of oral/pharngeal dysphagia   Speech Therapy Prognosis   Prognosis Good   Prognosis Considerations Patient Participation Level;Previous Level of Function

## 2018-01-25 NOTE — PROGRESS NOTES
Progress Note - Pulmonary   Nick Pro 81 y o  female MRN: [id number]  Unit/Bed#: 2 [place] [address] Encounter: [id number]    Assessment:  Dyspnea with mild hypoxemia  Arterial blood gas today on 4 L oxygen showed pH is 7 35, pCO2 51 and PO2 of 88 mm Hg  Hypercapnia likely due to component of obesity alveolar hypoventilation  C-reactive protein mildly elevated 26 5 but this is nonspecific  Mild persistent asthma which appears to be stable without any exacerbation at this time  Chronic diastolic CHF  History of Merkel cell carcinoma and has chronic lymphedema left lower extremity related to this    Plan:  Check oxygen saturation on room air at rest and with ambulation to assess oxygen requirements  Continue Advair 250 mcg 1 puff b i d  and neb treatments with DuoNeb every 6 hours      Subjective:   Patient is not having any cough and is not short of breath at rest   On 3 5 L of oxygen O2 saturation is 95-96%    Objective:     Vitals: Blood pressure 122/58, pulse 72, temperature (!) 96 8 °F (36 °C), temperature source Tympanic, resp  rate 18, height 5' 2" (1 575 m), weight 96 4 kg (212 lb 8 4 oz), SpO2 95 %, not currently breastfeeding  ,Body mass index is 38 87 kg/m²  Intake/Output Summary (Last 24 hours) at 01/25/18 1840  Last data filed at 01/24/18 2235   Gross per 24 hour   Intake                0 ml   Output              200 ml   Net             -200 ml       Physical Exam: Physical Exam   Constitutional:   Patient is awake, alert, no distress  On 3 5 liters/miinute of oxygen is 95-96%   HENT:   Head: Normocephalic  Nose: Nose normal    Mouth/Throat: Oropharynx is clear and moist    Eyes: Conjunctivae are normal    Neck: Neck supple  No JVD present  Cardiovascular: Normal rate, regular rhythm and normal heart sounds  Lung sounds are clear   Pulmonary/Chest: Effort normal    Abdominal: Soft  She exhibits no distension  There is no tenderness  There is no guarding     Musculoskeletal:   Has chronic lymphedema left lower extremity  Right leg normal   Lymphadenopathy:     She has no cervical adenopathy  Skin: Skin is warm and dry  No rash noted  No erythema  Psychiatric: She has a normal mood and affect  Her behavior is normal  Thought content normal         Labs: I have personally reviewed pertinent lab results  , ABG: Lab Results   Component Value Date    PHART 7 343 (L) 01/25/2018    XNP1PLU 52 6 (H) 01/25/2018    PO2ART 88 1 01/25/2018    DUT6QHU 27 9 01/25/2018    BEART 1 4 01/25/2018    SOURCE Radial, Left 01/25/2018   , BNP: No results found for: BNP, CBC: Lab Results   Component Value Date    WBC 7 50 01/25/2018    HGB 11 7 (L) 01/25/2018    HCT 36 4 (L) 01/25/2018    MCV 79 (L) 01/25/2018     01/25/2018    ADJUSTEDWBC 4 00 (L) 09/25/2016    MCH 25 3 (L) 01/25/2018    MCHC 32 0 01/25/2018    RDW 18 5 (H) 01/25/2018    MPV 10 2 01/25/2018    NRBC 0 01/25/2018   , CMP: Lab Results   Component Value Date     01/25/2018    K 3 8 01/25/2018     01/25/2018    CO2 34 (H) 01/25/2018    ANIONGAP 3 (L) 01/25/2018    BUN 20 01/25/2018    CREATININE 0 76 01/25/2018    GLUCOSE 111 01/25/2018    CALCIUM 8 4 01/25/2018    AST 16 01/23/2018    ALT 15 01/23/2018    ALKPHOS 102 01/23/2018    PROT 5 6 (L) 01/23/2018    BILITOT 0 30 01/23/2018    EGFR 74 01/25/2018   , PT/INR:   Lab Results   Component Value Date    INR 1 02 01/05/2018   , Troponin: No results found for: TROPONIN    Imaging and other studies: I have personally reviewed pertinent reports     and I have personally reviewed pertinent films in PACS

## 2018-01-25 NOTE — CONSULTS
Consultation - Pulmonary Medicine   Caryn Ricci 81 y o  female MRN: [id number]  Unit/Bed#: 2 [place] [address] Encounter: [id number]      Assessment:  Dyspnea with mild hypoxemia  Does not appear to have any active pulmonary infection or asthma exacerbation at this time  Patient does have morbid obesity and this may be causing an element of alveolar hypoventilation  Mild persistent asthma which appears to be stable at this time  Chronic diastolic CHF  History of Merkel cell carcinoma as described in HPI  Patient has chronic lymphedema of left lower extremity secondary to this      Plan:   I will order arterial blood gas to assess pCO2 and assess degree of ventilation  For now no need for any IV steroids and would continue Advair 250 mcg puff b i d  and nebulizer treatments as ordered  Monitor pulse oximetry  No need for antibiotic therapy at this time  Will check C reactive protein level    History of Present Illness   Physician Requesting Consult: Yuli Jung MD  Reason for Consult / Principal Problem:  Dyspnea and hypoxemia  Hx and PE limited by:  None  HPI: Caryn Ricci is a 81y o  year old female who presents with complaints of shortness of breath for the past 4 days  She states she has had slowly increasing shortness of breath with activity  She was not having any chest pain, cough or wheezing  She is not on any oxygen therapy at home  She uses either her albuterol inhaler nebulizer with albuterol and this not seem to help her breathing  She denies any fever or chills  On presentation ER her O2 saturation ranged from 88-91%  She is on 4 L of oxygen now and O2 saturation is 94%  Patient had recent admission to the hospital from 15 to 1/10/18 with complaints of dyspnea and was be treated for suspected acute on chronic diastolic CHF  She does have history of paroxysmal atrial fibrillation and hypertension    She did have a CTA of the chest done on January 5th which showed no evidence of any pulmonary embolism and some minimal dependent atelectasis  Echocardiogram done January 6 showed hyperdynamic left ventricle with estimated ejection fraction of 70 75%  There is evidence of grade 1 diastolic dysfunction, mild mitral regurgitation, and estimated pulmonary pressure 42 mm Hg  She was started on Eliquis then and also is on metoprolol 50 mg twice a day  She was also treated for mild asthma exacerbation and and placed on a tapering dose of prednisone which she completed  Initial CBC shows white count 6 5  Serum sodium elevated at 150 today  Serum troponin not elevated and   A CT of chest without contrast was done  This shows minimal small ground-glass infiltrate periphery of left upper lobe and some minimal atelectasis bilaterally  Patient denies any cough  She does have mild persistent asthma and does use Advair 251 puff b i d  she is receiving nebulizer treatments of albuterol ipratropium every 6 hours  She has a history of Merkel cell carcinoma diagnosed several years ago is present is a mass 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  She did have radiation therapy for that  Later she was found have some metastasis into going and perhaps lung  She was then treated with chemo and radiation for about 2 years  She does have a history of asthma for several years  She does have a right-sided Port-A-Cath in place          Review of Systems   Constitutional:        Patient complains of shortness of Breath past 4 days mostly with activity  No fever chills cough or chest pain  No hemoptysis   HENT: Negative for congestion, rhinorrhea and sore throat  Eyes: Negative for pain and redness  Respiratory: Positive for shortness of breath  Negative for chest tightness and wheezing  Cardiovascular: Negative for chest pain  Has chronic lymph edema left lower extremity   Gastrointestinal: Negative for abdominal pain, diarrhea, nausea and vomiting  Endocrine: Negative for polydipsia and polyphagia  Genitourinary: Negative for dysuria  Musculoskeletal: Negative for joint swelling and myalgias  Skin: Negative for rash  Neurological: Negative for syncope  Psychiatric/Behavioral: Negative for confusion  Historical Information   Past Medical History:   Diagnosis Date    Asthma     Cancer ([facility] [address] )     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    Cardiac disease     a fib    Fibromyalgia     Fibromyalgia, primary     GERD (gastroesophageal reflux disease)     Hypertension     Hypokalemia     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, left groin, lung and bladder  Patient treated with chemotherapy and radiation     Past Surgical History:   Procedure Laterality Date    APPENDECTOMY      CATARACT EXTRACTION      CHEST WALL BIOPSY N/A 10/27/2017    Procedure: SINUS EXCISION AND REMOVAL OF FOREIGN BODY, ABDOMEN (WOUND EXPLORATION);   Surgeon: Naty Roberto MD;  Location: [address] [place] [address];  Service: General    EYE SURGERY Bilateral     cataracts     HIP FRACTURE SURGERY Left     HYSTERECTOMY      JOINT REPLACEMENT Left     hip    LYMPHADENECTOMY      PORTACATH PLACEMENT Right      Social History   History   Alcohol Use No     History   Drug Use No     History   Smoking Status    Never Smoker   Smokeless Tobacco    Never Used     Comment: never smoke         Family History:   Family History   Problem Relation Age of Onset    Pneumonia Mother     Heart disease Father        Meds/Allergies     Current Facility-Administered Medications:     acetaminophen (TYLENOL) tablet 650 mg, 650 mg, Oral, Q6H PRN, [place], CRNP, 650 mg at 01/24/18 1622    ALPRAZolam [place]) tablet 0 5 mg, 0 5 mg, Oral, HS PRN, [place], CRNP, 0 5 mg at 01/23/18 2022    amLODIPine (NORVASC) tablet 5 mg, 5 mg, Oral, Daily, YAHIR PhillipNP, 5 mg at 01/24/18 1013    apixaban (ELIQUIS) tablet 5 mg, 5 mg, Oral, BID, [place] Claire, CRNP, 5 mg at 01/24/18 1805    atorvastatin (LIPITOR) tablet 10 mg, 10 mg, Oral, Daily With RachelGILDA Mario, 10 mg at 01/24/18 1805    fluticasone-salmeterol (ADVAIR) 250-50 mcg/dose inhaler 1 puff, 1 puff, Inhalation, Q12H [facility], GILDA Jimenez, 1 puff at 01/24/18 1012    furosemide (LASIX) tablet 20 mg, 20 mg, Oral, Daily, GILDA Phillip, 20 mg at 01/24/18 1014    hydrALAZINE (APRESOLINE) injection 5 mg, 5 mg, Intravenous, Q6H PRN, GILDA Jimenez    ipratropium-albuterol (DUO-NEB) 0 5-2 5 mg/mL inhalation solution 3 mL, 3 mL, Nebulization, Q6H, Allyson Birmingham MD, 3 mL at 01/24/18 1943    metoprolol tartrate (LOPRESSOR) tablet 50 mg, 50 mg, Oral, BID, GILDA Phillip, 50 mg at 01/24/18 1806    ondansetron [facility]) injection 4 mg, 4 mg, Intravenous, Q6H PRN, GILDA Jimenez    pantoprazole (PROTONIX) EC tablet 40 mg, 40 mg, Oral, Early Morning, GILDA Jimenez, 40 mg at 01/24/18 0549    potassium chloride (K-DUR,KLOR-CON) CR tablet 10 mEq, 10 mEq, Oral, Daily, GILDA Phillip, 10 mEq at 01/24/18 1016    pregabalin (LYRICA) capsule 300 mg, 300 mg, Oral, BID, GILDA Phillip, 300 mg at 01/24/18 1805    Facility-Administered Medications Ordered in Other Encounters:     heparin lock flush 100 units/mL injection 300 Units, 300 Units, Intracatheter, Q1H PRN, Khris Richardson MD    Prior to Admission medications    Medication Sig Start Date End Date Taking? Authorizing Provider   albuterol (ACCUNEB) 0 63 MG/3ML nebulizer solution Take 1 ampule by nebulization every 6 (six) hours as needed for wheezing   Yes Historical Provider, MD   ALPRAZolam Mary Graceanny Lio) 0 5 mg tablet Take 0 5 mg by mouth daily at bedtime as needed for anxiety     Yes Historical Provider, MD   apixaban (ELIQUIS) 5 mg Take 1 tablet by mouth 2 (two) times a day for 30 days 1/10/18 2/9/18 Yes Gretchen Hummel MD   esomeprazole (NexIUM) 40 MG capsule Take 40 mg by mouth daily  Yes Historical Provider, MD   Fesoterodine Fumarate ER (TOVIAZ) 8 MG TB24 Take by mouth daily at bedtime     Yes Historical Provider, MD   fluticasone-salmeterol (ADVAIR) 250-50 mcg/dose inhaler Inhale 1 puff every 12 (twelve) hours This is home medication 1/10/18  Yes Rufina Barton MD   furosemide (LASIX) 20 mg tablet Take 1 tablet by mouth daily for 30 days 1/11/18 2/10/18 Yes Rufina Barton MD   metoprolol tartrate (LOPRESSOR) 50 mg tablet Take 1 tablet by mouth 2 (two) times a day for 30 days 1/10/18 2/9/18 Yes Rufina Barton MD   potassium chloride (K-DUR) 10 mEq tablet Take 10 mEq by mouth daily  Yes Historical Provider, MD   pregabalin (LYRICA) 300 MG capsule Take 300 mg by mouth 2 (two) times a day  Yes Historical Provider, MD   amLODIPine (NORVASC) 5 mg tablet Take 1 tablet by mouth daily for 30 days 12/17/17 1/23/18  Rufina Barton MD   atorvastatin (LIPITOR) 10 mg tablet Take 1 tablet by mouth daily with dinner for 30 days 3/16/17 1/23/18  GILDA Lewis       Allergies   Allergen Reactions    Fentanyl And Related Other (See Comments)     Passed out    Latex     Penicillins        Objective   Vitals: Blood pressure 123/60, pulse 63, temperature 97 6 °F (36 4 °C), temperature source Oral, resp  rate 22, height 5' 2" (1 575 m), weight 96 4 kg (212 lb 7 oz), SpO2 93 %, not currently breastfeeding  ,Body mass index is 38 86 kg/m²  Intake/Output Summary (Last 24 hours) at 01/24/18 1956  Last data filed at 01/24/18 1506   Gross per 24 hour   Intake              970 ml   Output              400 ml   Net              570 ml     Invasive Devices     Central Venous Catheter Line            Port A Cath 01/23/18 Right Chest 1 day                Physical Exam   Constitutional: She is oriented to person, place, and time  Obese white female who is awake, alert, no acute distress  O2 saturation on 4 L is 94%   HENT:   Head: Normocephalic     Nose: Nose normal    Mouth/Throat: Oropharynx is clear and moist    Eyes: Conjunctivae are normal  No scleral icterus  Neck: Neck supple  No JVD present  Cardiovascular: Normal rate, regular rhythm and normal heart sounds  Pulmonary/Chest: Effort normal    Lung sounds are slightly decreased but are clear to auscultation  No wheezes, crackles or rhonchi   Abdominal: Soft  She exhibits no distension  There is no guarding  Musculoskeletal:   Has lymphedema of left lower extremity  Right lower extremity without any edema   Lymphadenopathy:     She has no cervical adenopathy  Neurological: She is alert and oriented to person, place, and time  Skin: Skin is warm and dry  No erythema  Psychiatric: She has a normal mood and affect  Her behavior is normal  Thought content normal        Lab Results: ABG: No results found for: PHART, ATU5ELW, PO2ART, LAD6RKE, B2OHVCBI, BEART, SOURCE, BNP: No results found for: BNP, CBC: Lab Results   Component Value Date    WBC 4 70 (L) 01/24/2018    HGB 12 5 01/24/2018    HCT 38 8 01/24/2018    MCV 78 (L) 01/24/2018     01/24/2018    ADJUSTEDWBC 4 00 (L) 09/25/2016    MCH 25 2 (L) 01/24/2018    MCHC 32 2 01/24/2018    RDW 17 4 (H) 01/24/2018    MPV 9 5 01/24/2018    NRBC 0 01/23/2018   , CMP: Lab Results   Component Value Date     (H) 01/24/2018    K 4 5 01/24/2018     (H) 01/24/2018    CO2 35 (H) 01/24/2018    ANIONGAP 5 01/24/2018    BUN 21 01/24/2018    CREATININE 0 73 01/24/2018    GLUCOSE 190 (H) 01/24/2018    CALCIUM 9 0 01/24/2018    AST 16 01/23/2018    ALT 15 01/23/2018    ALKPHOS 102 01/23/2018    PROT 5 6 (L) 01/23/2018    BILITOT 0 30 01/23/2018    EGFR 77 01/24/2018   , PT/INR:   Lab Results   Component Value Date    INR 1 02 01/05/2018   , Troponin: No results found for: TROPONIN    Imaging Studies: I have personally reviewed pertinent reports  and I have personally reviewed pertinent films in PACS    EKG, Pathology, and Other Studies: I have personally reviewed pertinent reports      VTE Prophylaxis: Sequential compression device (Venodyne)     Code Status: Level 1 - Full Code    Counseling/Coordination of Care: Total floor / unit time spent today 30 minutes  Greater than 50% of total time was spent with the patient and / or family counseling and / or coordination of care   A description of the counseling / coordination of care: I reviewed patient's chart, CT scan of chest, I discussed CT findings with patient

## 2018-01-25 NOTE — OCCUPATIONAL THERAPY NOTE
Occupational Therapy    Patient refused OT this afternoon, despite several attempts to encourage her and provide her with education regarding need to increase activity level to facilitate return to PLOF  Will continue to follow

## 2018-01-25 NOTE — PHYSICAL THERAPY NOTE
PT EVALUATION     01/25/18 0950   Pain Assessment   Pain Assessment No/denies pain   Home Living   Type of Home Apartment   Home Layout One level;Elevator   Bathroom Equipment Shower chair   Home Equipment Walker;Cane   Additional Comments patient using quad cane out of home only   Prior Function   Level of [place] Independent with ADLs and functional mobility   Lives With Alone   Receives Help From Family;Friend(s)   ADL Assistance Independent   IADLs Needs assistance   Comments PT 3x/week   Restrictions/Precautions   Other Precautions Contact/isolation;O2   General   Additional Pertinent History chart reviewed, patient admitted with SOB, weakness/FERNANDES  Patient continues with SOB and requiring O2 for activity, SpO2 at 95% on 4 liters O2 with gait   Family/Caregiver Present No   Cognition   Overall Cognitive Status WFL   Arousal/Participation Cooperative   Attention Within functional limits   Orientation Level Oriented X4   Following Commands Follows all commands and directions without difficulty   RLE Assessment   RLE Assessment (ROM WFL, strength 3+/4-)   LLE Assessment   LLE Assessment (ROM WFL, strength 3+/4-)   Coordination   Movements are Fluid and Coordinated 1   Bed Mobility   Supine to Sit 7  Independent   Sit to Supine 7  Independent   Transfers   Sit to Stand 7  Independent   Stand to Sit 7  Independent   Ambulation/Elevation   Gait Assistance 5  Supervision   Additional items Verbal cues   Assistive Device (none)   Distance 80 feet  with change in direction  trendelenburg type gait which patient reports as chronic   Balance   Static Sitting Good   Dynamic Sitting Fair +   Static Standing Fair +   Dynamic Standing Fair   Ambulatory Fair   Activity Tolerance   Activity Tolerance Patient limited by fatigue  (limited by SOB)   Nurse Made Aware yes   Assessment   Prognosis Good   Problem List Decreased strength;Decreased range of motion;Decreased endurance; Impaired balance;Decreased mobility Assessment Patient seen for Physical Therapy evaluation  Patient admitted with Acute on chronic respiratory failure ([facility])  Comorbidities affecting patient's physical performance include: asthma, hypertension and obesity,fibromyalgia  Personal factors affecting patient at time of initial evaluation include: inability to ambulate household distances, inability to navigate community distances, inability to navigate level surfaces without external assistance, inability to perform dynamic tasks in community, inability to perform physical activity, inability to perform ADLS and inability to perform IADLS   Prior to admission, patient was independent with functional mobility without assistive device, independent with ADLS, independent with IADLS, ambulating household distance and ambulating community distances  Please find objective findings from Physical Therapy assessment regarding body systems outlined above with impairments and limitations including weakness, impaired balance, decreased endurance, gait deviations, decreased activity tolerance, decreased functional mobility tolerance, fall risk and SOB upon exertion  The Barthel Index was used as a functional outcome tool presenting with a score of 60 today indicating moderate limitations of functional mobility and ADLS  Patient's clinical presentation is currently unstable/unpredictable as seen in patient's presentation of increased fall risk, new onset of impairment of functional mobility, decreased endurance and new onset of weakness  Pt would benefit from continued Physical Therapy treatment to address deficits as defined above and maximize level of functional mobility  As demonstrated by objective findings, the assigned level of complexity for this evaluation is high     Goals   Patient Goals go home   STG Expiration Date (1 to 7 days)   Short Term Goal #1 transfers and gait with supervision to independent   Short Term Goal #2 gait endurance to 120 feet, strength 4-/5   LTG Expiration Date (8 to 14 days)   Long Term Goal #1 transfers and gait independently   Long Term Goal #2 gait endurance to unlimited functional household distances   Plan   Treatment/Interventions ADL retraining;Functional transfer training;LE strengthening/ROM; Therapeutic exercise; Endurance training;Patient/family training;Equipment eval/education;Gait training   PT Frequency (3-5x/week)   Recommendation   Recommendation (home with services as needed)   Barthel Index   Feeding 10   Bathing 0   Grooming Score 0   Dressing Score 5   Bladder Score 10   Bowels Score 10   Toilet Use Score 5   Transfers (Bed/Chair) Score 15   Mobility (Level Surface) Score 0   Stairs Score 5   Barthel Index Score 60

## 2018-01-26 VITALS
DIASTOLIC BLOOD PRESSURE: 65 MMHG | TEMPERATURE: 98.1 F | WEIGHT: 216.44 LBS | RESPIRATION RATE: 16 BRPM | SYSTOLIC BLOOD PRESSURE: 143 MMHG | HEART RATE: 74 BPM | HEIGHT: 62 IN | BODY MASS INDEX: 39.83 KG/M2 | OXYGEN SATURATION: 94 %

## 2018-01-26 PROBLEM — E87.6 HYPOKALEMIA: Status: RESOLVED | Noted: 2017-03-14 | Resolved: 2018-01-26

## 2018-01-26 PROBLEM — J96.20 ACUTE ON CHRONIC RESPIRATORY FAILURE (HCC): Status: RESOLVED | Noted: 2018-01-05 | Resolved: 2018-01-26

## 2018-01-26 LAB
ANION GAP SERPL CALCULATED.3IONS-SCNC: 1 MMOL/L (ref 4–13)
BUN SERPL-MCNC: 18 MG/DL (ref 5–25)
CALCIUM SERPL-MCNC: 8.2 MG/DL (ref 8.3–10.1)
CHLORIDE SERPL-SCNC: 106 MMOL/L (ref 100–108)
CO2 SERPL-SCNC: 38 MMOL/L (ref 21–32)
CREAT SERPL-MCNC: 0.8 MG/DL (ref 0.6–1.3)
ERYTHROCYTE [DISTWIDTH] IN BLOOD BY AUTOMATED COUNT: 18.2 % (ref 11.6–15.1)
GFR SERPL CREATININE-BSD FRML MDRD: 69 ML/MIN/1.73SQ M
GLUCOSE SERPL-MCNC: 85 MG/DL (ref 65–140)
HCT VFR BLD AUTO: 35.6 % (ref 37–47)
HGB BLD-MCNC: 11.3 G/DL (ref 12–16)
MCH RBC QN AUTO: 25.3 PG (ref 27–31)
MCHC RBC AUTO-ENTMCNC: 31.8 G/DL (ref 31.4–37.4)
MCV RBC AUTO: 80 FL (ref 82–98)
PLATELET # BLD AUTO: 132 THOUSANDS/UL (ref 130–400)
PMV BLD AUTO: 10.1 FL (ref 8.9–12.7)
POTASSIUM SERPL-SCNC: 3.8 MMOL/L (ref 3.5–5.3)
RBC # BLD AUTO: 4.47 MILLION/UL (ref 4.2–5.4)
SODIUM SERPL-SCNC: 145 MMOL/L (ref 136–145)
WBC # BLD AUTO: 6.1 THOUSAND/UL (ref 4.8–10.8)

## 2018-01-26 PROCEDURE — 99239 HOSP IP/OBS DSCHRG MGMT >30: CPT | Performed by: NURSE PRACTITIONER

## 2018-01-26 PROCEDURE — 85027 COMPLETE CBC AUTOMATED: CPT | Performed by: NURSE PRACTITIONER

## 2018-01-26 PROCEDURE — 80048 BASIC METABOLIC PNL TOTAL CA: CPT | Performed by: NURSE PRACTITIONER

## 2018-01-26 PROCEDURE — 97110 THERAPEUTIC EXERCISES: CPT

## 2018-01-26 PROCEDURE — 94761 N-INVAS EAR/PLS OXIMETRY MLT: CPT

## 2018-01-26 PROCEDURE — 94760 N-INVAS EAR/PLS OXIMETRY 1: CPT

## 2018-01-26 RX ADMIN — POTASSIUM CHLORIDE 10 MEQ: 750 TABLET, EXTENDED RELEASE ORAL at 10:08

## 2018-01-26 RX ADMIN — METOPROLOL TARTRATE 50 MG: 50 TABLET ORAL at 10:07

## 2018-01-26 RX ADMIN — PREGABALIN 300 MG: 100 CAPSULE ORAL at 10:13

## 2018-01-26 RX ADMIN — IPRATROPIUM BROMIDE AND ALBUTEROL SULFATE 3 ML: .5; 3 SOLUTION RESPIRATORY (INHALATION) at 07:36

## 2018-01-26 RX ADMIN — AMLODIPINE BESYLATE 5 MG: 5 TABLET ORAL at 09:56

## 2018-01-26 RX ADMIN — FLUTICASONE PROPIONATE AND SALMETEROL 1 PUFF: 50; 250 POWDER RESPIRATORY (INHALATION) at 10:04

## 2018-01-26 RX ADMIN — FUROSEMIDE 20 MG: 20 TABLET ORAL at 10:05

## 2018-01-26 RX ADMIN — PANTOPRAZOLE SODIUM 40 MG: 40 TABLET, DELAYED RELEASE ORAL at 06:32

## 2018-01-26 RX ADMIN — APIXABAN 5 MG: 5 TABLET, FILM COATED ORAL at 10:02

## 2018-01-26 NOTE — PLAN OF CARE
Problem: RESPIRATORY - ADULT  Goal: Achieves optimal ventilation and oxygenation  INTERVENTIONS:  - Assess for changes in respiratory status  - Assess for changes in mentation and behavior  - Position to facilitate oxygenation and minimize respiratory effort  - Oxygen administration by appropriate delivery method based on oxygen saturation (per order) or ABGs  - Encourage broncho-pulmonary hygiene including cough, deep breathe, Incentive Spirometry  - Assess and instruct to report SOB or any respiratory difficulty  - Respiratory Therapy support as indicated   Home oxygen evaluation completed  Outcome: Progressing  Home oxygen evaluation completed

## 2018-01-26 NOTE — PLAN OF CARE
Problem: PHYSICAL THERAPY ADULT  Goal: Performs mobility at highest level of function for planned discharge setting  See evaluation for individualized goals  Outcome: Progressing  Prognosis: Good  Problem List: Decreased strength, Decreased range of motion, Decreased endurance, Impaired balance, Decreased mobility  Assessment: Pt doing well with bed mob, trans and amb w/wout SPC  Mild antalgia LLE improved with use of SPC  Pt will cont to benefit from skilled PT services  Recommendation:  (Home with services a needed)          See flowsheet documentation for full assessment

## 2018-01-26 NOTE — NURSING NOTE
Patient discharged via wheelchair accompanied by Rn  Port a cath was de-accessed by nursing supervisor intact  Discharge instructions given to patient  No further questions at this time  Previously immunized with pneumonia vaccine  Non-smoker

## 2018-01-26 NOTE — SOCIAL WORK
PT TO D/C TO HOME WITH [facility], QUALIFIES FOR HOME O2, REFERRAL MADE TO [facility], WILL BE DELIVERED TO PT ROOM, IMM DISCUSSED, PT AGREEABLE WITH DCP

## 2018-01-26 NOTE — RESPIRATORY THERAPY NOTE
Home Oxygen Qualifying Test       Patient name: Lotus Mcnulty        : 1935   Date of Test:  2018  Diagnosis:      Home Oxygen Test:    **Medicare Guidelines require item(s) 1-5 on all ambulatory patients or 1 and 2 on on-ambulatory patients  1   Baseline SPO2 on Room Air at rest 87 %  If = or < 88% on room air add O2 via NC and titrate patient  Patient must be ambulated with O2 and titrated to > 88% with exertion  2   SPO2 on Oxygen at rest 94 %  3   SPO2 during exercise on Room Air 88 %  4   SPO2 during exercise on Oxygen  93% at a liter flow of 2 lpm     5   Exercise performed:    [x] Walking     [] Stairs     [] Duration 6 (min )     [] Distance  500 (ft )       [x]  Supplemental Home Oxygen is indicated  []  Client does not qualify for home oxygen        Sarah Figueroa, RT

## 2018-01-26 NOTE — PHYSICAL THERAPY NOTE
PT TREATMENT     01/26/18 1038   Pain Assessment   Pain Assessment No/denies pain   Restrictions/Precautions   Other Precautions Fall Risk;Contact/isolation; Bed Alarm; Chair Alarm;O2   General   Chart Reviewed Yes   Family/Caregiver Present No   Subjective   Subjective "I just walked with respiratory"   Bed Mobility   Supine to Sit 7  Independent   Sit to Supine 7  Independent   Transfers   Sit to Stand 5  Supervision   Stand to Sit 5  Supervision   Ambulation/Elevation   Gait pattern Antalgic  (LLE)   Gait Assistance 5  Supervision   Assistive Device None;SPC   Distance 50 feet;50 feet;2L O2;SAO2 94-96% with rest and amb   Activity Tolerance   Activity Tolerance Patient limited by fatigue  (and SOB)   Assessment   Assessment Pt doing well with bed mob, trans and amb w/wout SPC  Mild antalgia LLE improved with use of SPC  Pt will cont to benefit from skilled PT services  Plan   Treatment/Interventions ADL retraining;Functional transfer training;LE strengthening/ROM; Therapeutic exercise; Endurance training;Patient/family training;Gait training   PT Frequency (3-5x/wk)   Recommendation   Recommendation (Home with services a needed)   Equipment Recommended (Pt has a SPC)

## 2018-01-26 NOTE — DISCHARGE SUMMARY
Discharge Summary - [address] [facility]    Patient Information: Jovanny Sigala 81 y o  female MRN: [id number]  Unit/Bed#: [address]  Encounter: [id number]    Discharging Physician / Practitioner: GILDA Kirk  PCP: Monica Chavez DO  Admission Date: 1/23/2018  Discharge Date: 01/26/18    Reason for Admission:  Acute on chronic respiratory failure    Discharge Diagnoses:     Principal Problem (Resolved):    Acute on chronic respiratory failure [facility])  Active Problems:    Hypertension    Carotid stenosis, left    Obesity (BMI 30-39  9)    Hyperlipemia    Atrial fibrillation (HCC)    Fibromyalgia    Lymphedema of left leg    History of Merkel cell carcinoma    Moderate persistent asthma    Iron deficiency anemia  Resolved Problems:    Hypokalemia      Consultations During Hospital Stay:  Occupational therapist  Physical therapy  Respiratory therapy  Pulmonology  Speech and language  Case Management    Procedures Performed:     CXR, Patchy lung infiltrates and trace pleural effusions  Findings may represent developing pneumonia and continued follow-up is suggested  CT chest, Minimal new groundglass opacity anterior right upper lobe may represent postinflammatory disease or early infiltrate in the appropriate clinical scenario  Minimal bibasilar, lingula, and right upper lobe left greater than right subsegmental atelectasis  This is recurrent or slightly progressive since January 5, 2018  No effusions  Significant Findings:     · none    Incidental Findings:   · none     Test Results Pending at Discharge (will require follow up):    · Blood cultures     Outpatient Tests Requested:  · none    Complications:  none    Hospital Course:     Jovanny Sigala is a 81 y o  female patient who originally presented to the hospital on 1/23/2018 with a PMH of atrial fibrillation, HTN, HLD, diastolic heart failure, CONRADO, asthma, morbid obesity, GERD, fibromyalgia, 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 who presents with shortness of breath  She states her shortness of breath began approximately 4 days ago in which she used her nebulizer treatments 3 times daily without improvement  She states [facility] came for routine visit today and advised her to seek medical attention for further evaluation of shortness of breath  She was admitted to [facility] with acute on chronic respiratory failure with hypoxemia which is multifactorial likely worsened by patchy lung infiltrates and trace pleural effusion with underlying chronic CHF, asthma and poor ventilation due to obesity  CT chest revealed no effusions, minimal new ground-glass opacity of the anterior right upper lobe which may represent post inflammation disease or early infiltrate and minimal bibasilar, lingula, and right upper lobe left greater than right sub segmental atelectasis  A swallow evaluation was completed and she remained on a regular diet with thin liquids  Initially she was treated with IV cefepime and vancomycin in the ED for suspected HCAP, no antibiotics or steroids were given during this stay  She was given nebulizer treatment and supplemental oxygen  Pulmonology was consulted  She refuses overnight pulse oximetry evaluation  CRP, 26 5 but nonspecific  ABG obtained on 4 liters oxygen showed a pH of 7 35, pCO2 of 51 and a PO2 of 88 mm Hg  Hypercapnia likely is due to a component of obesity alveolar hypoventilation  A home evaluation was completed, showing SPO2 on room air at rest was 87%, SPO2 on oxygen at rest 94%,   SPO2 during exercise on room air was 88%, and SP O2 during exercise on oxygen was 93% at a liter flow of 2 LPM   She was sent home on oxygen to use 2 l/min continuously  She was instructed to follow up with her PCP within 7 days of discharge and to call outpatient pulmonologist for a follow-up appointment      Condition at Discharge: stable     Discharge Day Visit / Exam:     Subjective:  She is observed sitting in bed offering no complaints of pain or discomfort at present  She states her breathing feels much better today and is ready to go home  Denies chest pain, abdominal pain, or headache  Denies nausea, vomiting, or diarrhea  Vitals: Blood Pressure: 143/65 (01/26/18 0701)  Pulse: 74 (01/26/18 0735)  Temperature: 98 1 °F (36 7 °C) (01/26/18 0701)  Temp Source: Oral (01/26/18 0701)  Respirations: 16 (01/26/18 0735)  Height: 5' 2" (157 5 cm) (01/23/18 1716)  Weight - Scale: 98 2 kg (216 lb 7 oz) (01/26/18 0340)  SpO2: 94 % (01/26/18 0735)  Exam:   Physical Exam   Constitutional: She is oriented to person, place, and time  She appears well-developed and well-nourished  No distress  Obese   HENT:   Head: Normocephalic and atraumatic  Neck: Normal range of motion  Neck supple  Cardiovascular: Normal rate, regular rhythm and normal heart sounds  Exam reveals no gallop and no friction rub  No murmur heard  Pulmonary/Chest: Effort normal and breath sounds normal  No respiratory distress  She has no wheezes  She has no rales  She exhibits no tenderness  Abdominal: Soft  Bowel sounds are normal  She exhibits no distension and no mass  There is no tenderness  There is no rebound and no guarding  Musculoskeletal: She exhibits edema and tenderness  She exhibits no deformity  Chronic lymphedema on the LLE  RLE with out edema   Neurological: She is alert and oriented to person, place, and time  Skin: Skin is warm and dry  No rash noted  She is not diaphoretic  No erythema  No pallor  Psychiatric: She has a normal mood and affect  Her behavior is normal  Judgment and thought content normal        Discharge instructions/Information to patient and family:   See after visit summary for information provided to patient and family  Provisions for Follow-Up Care:  See after visit summary for information related to follow-up care and any pertinent home health orders        Disposition:     Home    For Discharges to [facility]:   · Not Applicable to this Patient - Not Applicable to this Patient    Planned Readmission:  Not anticipated     Discharge Statement:  I spent 35 minutes discharging the patient  This time was spent on the day of discharge  I had direct contact with the patient on the day of discharge  Greater than 50% of the total time was spent examining patient, answering all patient questions, arranging and discussing plan of care with patient as well as directly providing post-discharge instructions  Additional time then spent on discharge activities  Discharge Medications:  See after visit summary for reconciled discharge medications provided to patient and family  ** Please Note: Dragon 360 Dictation voice to text software may have been used in the creation of this document   **

## 2018-01-28 LAB
BACTERIA BLD CULT: NORMAL
BACTERIA BLD CULT: NORMAL

## 2018-02-17 DIAGNOSIS — I89.0 LYMPHEDEMA, NOT ELSEWHERE CLASSIFIED: ICD-10-CM

## 2018-02-20 ENCOUNTER — OFFICE VISIT (OUTPATIENT)
Dept: HEMATOLOGY ONCOLOGY | Facility: MEDICAL CENTER | Age: 83
End: 2018-02-20
Payer: MEDICARE

## 2018-02-20 VITALS
SYSTOLIC BLOOD PRESSURE: 138 MMHG | BODY MASS INDEX: 38.09 KG/M2 | HEIGHT: 62 IN | TEMPERATURE: 98.6 F | WEIGHT: 207 LBS | RESPIRATION RATE: 16 BRPM | DIASTOLIC BLOOD PRESSURE: 72 MMHG | HEART RATE: 71 BPM

## 2018-02-20 DIAGNOSIS — C4A.9 MERKEL CELL CARCINOMA (HCC): Primary | ICD-10-CM

## 2018-02-20 PROCEDURE — 99214 OFFICE O/P EST MOD 30 MIN: CPT | Performed by: INTERNAL MEDICINE

## 2018-02-20 NOTE — PROGRESS NOTES
Molly Lock  1935  [address] [facility] [place] [id number]    DISCUSSION  SUMMARY:    71-year-old female diagnosed with a neuroendocrine tumor/Merkel cell approximately 10 years ago with resection, lymph node sampling, radiation and chemotherapy  issues: 1  Merkel cell  From a cancer perspective, Mrs Jaskaran Coronel feels relatively well and clinically there are no signs for recurrence  Previous/recent scans did not demonstrate any evidence of recurrent Merkel cell  Surveillance is ongoing  Patient is to return in 6 months  2  Left lower extremity lymphedema - chronic  Patient has been referred to the lymphedema team     3  Recent cardiac event  Specifics are not presently available  Patient will follow-up with Dr Green Blizzard as directed  4  Abdominal discharge  Patient had this for many years  Patient was recently seen by surgery and a fistulous track was removed  Pathology results did not demonstrate any evidence for malignancy  The discharge has stopped  Patient will follow-up with Dr Redd Or as directed  Patient is to return in 6 months, earlier if issues  Patient knows to call the hematology/oncology office if there are any other questions or concerns  Carefully review your medication list and verify that the list is accurate and up-to-date  Please call the hematology/oncology office if there are medications missing from the list, medications on the list that you are not currently taking or if there is a dosage or instruction that is different from how you're taking that medication      Patient goals and areas of care: cancer surveillance  Patient is able to self-care   _____________________________________________________________________________________    Chief Complaint   Patient presents with    Follow-up     history of Merkel cell carcinoma treated 11 years ago, follow-up     History of Present Illness:    50-year-old female who was recently referred for continued oncology surveillance  Approximately 10 years ago, Mrs Lizy Barnes was found to have a lesion 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  Although the pathology results were not entirely clear (or available), patient was treated for high-grade neuroendocrine tumor/Merkel cell and received resection (possibly with positive margins) and lymph node dissection of the left groin  Apparently 2 lymph nodes were positive  Patient subsequently received radiation to the groin and tumor bed and also received intravenous chemotherapy (specifics not available)  Patient was followed at [facility] for many years without evidence of recurrence  Mrs Lizy Barnes lives in [place], [place] and is in need of a new oncologist [facility] oncologist recently retired)  Patient is back for follow-up  Mrs Lizy Barnes states feeling +/-  Patient was recently admitted to the hospital with significant cardiac issues(?cardiac arrest?) and was in the ICU  Patient subsequently improved and was discharged  Mrs Lizy Barnes states feeling okay, better than before but still with fatigue and minimal dyspnea on exertion  No shortness of breath at rest  No fevers, chills or sweats  No chest pain or pressure  No problems with excessive bruising or bleeding  Left lower extremity swelling is the same as before  Activities are limited but no different than before  Pain is controlled usually with Aleve  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  Mrs Lizy Barnes by Dr Dolores Negrete - area in question was resected out  There was no evidence of malignancy  Patient is very happy that the discharge has now stopped  Review of Systems   Constitutional: Positive for fatigue  HENT: Negative  Eyes: Negative  Respiratory: Negative  Cardiovascular: Positive for leg swelling  Gastrointestinal: Negative  Endocrine: Negative  Genitourinary: Negative      Musculoskeletal: Positive for arthralgias  Skin: Negative  Allergic/Immunologic: Negative  Neurological: Negative  Hematological: Negative  Psychiatric/Behavioral: Negative  All other systems reviewed and are negative  Patient Active Problem List   Diagnosis    Chest pain    Hypertension    Acute cholecystitis    Abdominal pain    Meningioma (HCC)    Syncope    Hypertension, accelerated    [facility]    Carotid stenosis, left    Obesity (BMI 30-39  9)    Acute bronchitis    GERD (gastroesophageal reflux disease)    Hyperlipemia    Atrial fibrillation (HCC)    Fibromyalgia    Moderate persistent asthma with acute exacerbation    Open wound of abdominal wall without penetration into peritoneal cavity    Asthma exacerbation    Lymphedema of left leg    Bacteremia    History of Merkel cell carcinoma    Port-a-cath in place    Acute congestive heart failure (HCC)    Moderate persistent asthma    Iron deficiency anemia    Shortness of breath     Past Medical History:   Diagnosis Date    Asthma     Cancer ([facility] )     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    Cardiac disease     a fib    Fibromyalgia     Fibromyalgia, primary     GERD (gastroesophageal reflux disease)     Hypertension     Hypokalemia     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, left groin, lung and bladder  Patient treated with chemotherapy and radiation     Past Surgical History:   Procedure Laterality Date    APPENDECTOMY      CATARACT EXTRACTION      CHEST WALL BIOPSY N/A 10/27/2017    Procedure: SINUS EXCISION AND REMOVAL OF FOREIGN BODY, ABDOMEN (WOUND EXPLORATION);   Surgeon: Hao Eduardo MD;  Location: [address] [place] [address];  Service: General    EYE SURGERY Bilateral     cataracts     HIP FRACTURE SURGERY Left     HYSTERECTOMY      JOINT REPLACEMENT Left     hip    LYMPHADENECTOMY      PORTACATH PLACEMENT Right      Family History   Problem Relation Age of Onset    Pneumonia Mother     Heart disease Father      Social History     Social History    Marital status:      Spouse name: N/A    Number of children: N/A    Years of education: N/A     Occupational History    Not on file  Social History Main Topics    Smoking status: Never Smoker    Smokeless tobacco: Never Used      Comment: never smoke    Alcohol use No    Drug use: No    Sexual activity: Not on file     Other Topics Concern    Not on file     Social History Narrative    No narrative on file       Current Outpatient Prescriptions:     albuterol (ACCUNEB) 0 63 MG/3ML nebulizer solution, Take 1 ampule by nebulization every 6 (six) hours as needed for wheezing, Disp: , Rfl:     ALPRAZolam (XANAX) 0 5 mg tablet, Take 0 5 mg by mouth daily at bedtime as needed for anxiety  , Disp: , Rfl:     esomeprazole (NexIUM) 40 MG capsule, Take 40 mg by mouth daily  , Disp: , Rfl:     Fesoterodine Fumarate ER (TOVIAZ) 8 MG TB24, Take by mouth daily at bedtime  , Disp: , Rfl:     fluticasone-salmeterol (ADVAIR) 250-50 mcg/dose inhaler, Inhale 1 puff every 12 (twelve) hours This is home medication, Disp: , Rfl: 0    potassium chloride (K-DUR) 10 mEq tablet, Take 10 mEq by mouth daily  , Disp: , Rfl:     pregabalin (LYRICA) 300 MG capsule, Take 300 mg by mouth 2 (two) times a day , Disp: , Rfl:     amLODIPine (NORVASC) 5 mg tablet, Take 1 tablet by mouth daily for 30 days, Disp: 30 tablet, Rfl: 0    apixaban (ELIQUIS) 5 mg, Take 1 tablet by mouth 2 (two) times a day for 30 days, Disp: 60 tablet, Rfl: 0    atorvastatin (LIPITOR) 10 mg tablet, Take 1 tablet by mouth daily with dinner for 30 days, Disp: 30 tablet, Rfl: 0    furosemide (LASIX) 20 mg tablet, Take 1 tablet by mouth daily for 30 days, Disp: 30 tablet, Rfl: 0    metoprolol tartrate (LOPRESSOR) 50 mg tablet, Take 1 tablet by mouth 2 (two) times a day for 30 days, Disp: 60 tablet, Rfl: 0  No current facility-administered medications for this visit  Facility-Administered Medications Ordered in Other Visits:     [START ON 2/21/2018] heparin lock flush 100 units/mL injection 300 Units, 300 Units, Intracatheter, Q1H PRN, Jorge Michele MD    Allergies   Allergen Reactions    Fentanyl And Related Other (See Comments)     Passed out    Latex     Other     Penicillins        Vitals:    02/20/18 1258   BP: 138/72   Pulse: 71   Resp: 16   Temp: 98 6 °F (37 °C)     Physical Exam   Constitutional: She is oriented to person, place, and time  She appears well-developed and well-nourished  HENT:   Head: Normocephalic and atraumatic  Right Ear: External ear normal    Left Ear: External ear normal    Nose: Nose normal    Mouth/Throat: Oropharynx is clear and moist    Eyes: Conjunctivae and EOM are normal  Pupils are equal, round, and reactive to light  Neck: Normal range of motion  Neck supple  Cardiovascular: Normal rate, regular rhythm, normal heart sounds and intact distal pulses  Pulmonary/Chest: Effort normal    Decreased breath sounds bilaterally, scattered rhonchi, no rales   Abdominal: Soft  Bowel sounds are normal    Obese, nontender, + bowel sounds, cannot palpate liver or spleen, well-healed lower abdominal/pelvic scar   Musculoskeletal: Normal range of motion  She exhibits edema  2-3 + left lower extremity edema, same as before, no obvious cords, pulses are decreased bilaterally   Neurological: She is alert and oriented to person, place, and time  She has normal reflexes  Skin: Skin is warm  Skin has relatively good color, warm, few ecchymoses, no petechiae   Psychiatric: She has a normal mood and affect   Her behavior is normal  Judgment and thought content normal    Lymphatics: No adenopathy in the neck, supraclavicular region, axilla and groin bilaterally    Performance Status: 1 - Symptomatic but completely ambulatory    Labs:    1/26/18 BUN = 18 creatinine = 0 8 calcium = 8 2 WBC = 6 1 hemoglobin = 11 3 hematocrit = 35 6 MCV = 80 RDW = 18 2 platelet = 634    Imaging    10/12/17 chest x-ray demonstrated no active pulmonary disease  10/2/17 MRI of tibia/fibula impression stated that there was postoperative changes and scar in the proximal posterior lateral calf but no evidence of tumor recurrence  Patient had diffuse of cutaneous edema throughout the left lower leg  10/2/17 CAT scan of the chest and abdomen/pelvis did not demonstrate any metastatic disease and no significant anasarca throughout the subcutaneous fat of abdomen or pelvis  Patient had gallstones and scattered diverticulosis  Pathology    Case Report   Surgical Pathology Report                         Case: [id number]                                    Authorizing Provider: May Peace MD       Collected:           10/27/2017 0848               Ordering Location:     [facility] Received:            10/27/2017 1409                                      Operating Room                                                                Pathologist:           Rayray Streeter MD                                                               Specimen:    Soft Tissue, Other, abdominal sinus tract                                                  Final Diagnosis   A   Soft Tissue, Other, abdominal sinus tract:  -Benign skin and subcutaneous tissue with mild chronic inflammation and reactive fibrosis   -No evidence of malignancy       Electronically signed by Rayray Streeter MD on 10/31/2017

## 2018-02-21 ENCOUNTER — HOSPITAL ENCOUNTER (OUTPATIENT)
Dept: INFUSION CENTER | Facility: HOSPITAL | Age: 83
Discharge: HOME | End: 2018-02-21
Payer: MEDICARE

## 2018-02-21 PROCEDURE — 96523 IRRIG DRUG DELIVERY DEVICE: CPT

## 2018-02-21 RX ADMIN — Medication 300 UNITS: at 12:43

## 2018-02-26 ENCOUNTER — HOSPITAL ENCOUNTER (EMERGENCY)
Facility: HOSPITAL | Age: 83
Discharge: HOME | End: 2018-02-26
Attending: EMERGENCY MEDICINE
Payer: MEDICARE

## 2018-02-26 ENCOUNTER — APPOINTMENT (EMERGENCY)
Dept: RADIOLOGY | Facility: HOSPITAL | Age: 83
End: 2018-02-26
Payer: MEDICARE

## 2018-02-26 VITALS
HEART RATE: 68 BPM | RESPIRATION RATE: 18 BRPM | WEIGHT: 201 LBS | DIASTOLIC BLOOD PRESSURE: 65 MMHG | BODY MASS INDEX: 36.76 KG/M2 | SYSTOLIC BLOOD PRESSURE: 145 MMHG | OXYGEN SATURATION: 95 % | TEMPERATURE: 98.2 F

## 2018-02-26 DIAGNOSIS — J45.909 ASTHMA: Primary | ICD-10-CM

## 2018-02-26 DIAGNOSIS — I50.9 CHF (CONGESTIVE HEART FAILURE) (HCC): ICD-10-CM

## 2018-02-26 LAB
ALBUMIN SERPL BCP-MCNC: 2.9 G/DL (ref 3.5–5)
ALP SERPL-CCNC: 95 U/L (ref 46–116)
ALT SERPL W P-5'-P-CCNC: 13 U/L (ref 12–78)
ANION GAP SERPL CALCULATED.3IONS-SCNC: 3 MMOL/L (ref 4–13)
APTT PPP: 27 SECONDS (ref 23–35)
AST SERPL W P-5'-P-CCNC: 28 U/L (ref 5–45)
ATRIAL RATE: 88 BPM
BASOPHILS # BLD AUTO: 0 THOUSANDS/ΜL (ref 0–0.1)
BASOPHILS NFR BLD AUTO: 0 % (ref 0–1)
BILIRUB SERPL-MCNC: 0.3 MG/DL (ref 0.2–1)
BUN SERPL-MCNC: 11 MG/DL (ref 5–25)
CALCIUM SERPL-MCNC: 9.2 MG/DL (ref 8.3–10.1)
CHLORIDE SERPL-SCNC: 103 MMOL/L (ref 100–108)
CO2 SERPL-SCNC: 39 MMOL/L (ref 21–32)
CREAT SERPL-MCNC: 0.83 MG/DL (ref 0.6–1.3)
EOSINOPHIL # BLD AUTO: 0.3 THOUSAND/ΜL (ref 0–0.61)
EOSINOPHIL NFR BLD AUTO: 5 % (ref 0–6)
ERYTHROCYTE [DISTWIDTH] IN BLOOD BY AUTOMATED COUNT: 18.7 % (ref 11.6–15.1)
GFR SERPL CREATININE-BSD FRML MDRD: 66 ML/MIN/1.73SQ M
GLUCOSE SERPL-MCNC: 119 MG/DL (ref 65–140)
HCT VFR BLD AUTO: 41.4 % (ref 37–47)
HGB BLD-MCNC: 13.1 G/DL (ref 12–16)
INR PPP: 1.01 (ref 0.86–1.16)
LYMPHOCYTES # BLD AUTO: 1.8 THOUSANDS/ΜL (ref 0.6–4.47)
LYMPHOCYTES NFR BLD AUTO: 33 % (ref 14–44)
MCH RBC QN AUTO: 25.5 PG (ref 27–31)
MCHC RBC AUTO-ENTMCNC: 31.6 G/DL (ref 31.4–37.4)
MCV RBC AUTO: 81 FL (ref 82–98)
MONOCYTES # BLD AUTO: 0.4 THOUSAND/ΜL (ref 0.17–1.22)
MONOCYTES NFR BLD AUTO: 7 % (ref 4–12)
NEUTROPHILS # BLD AUTO: 3 THOUSANDS/ΜL (ref 1.85–7.62)
NEUTS SEG NFR BLD AUTO: 54 % (ref 43–75)
NRBC BLD AUTO-RTO: 0 /100 WBCS
NT-PROBNP SERPL-MCNC: 521 PG/ML
P AXIS: 29 DEGREES
PLATELET # BLD AUTO: 119 THOUSANDS/UL (ref 130–400)
PMV BLD AUTO: 9.9 FL (ref 8.9–12.7)
POTASSIUM SERPL-SCNC: 3.9 MMOL/L (ref 3.5–5.3)
PR INTERVAL: 168 MS
PROT SERPL-MCNC: 6.3 G/DL (ref 6.4–8.2)
PROTHROMBIN TIME: 10.6 SECONDS (ref 9.4–11.7)
QRS AXIS: -27 DEGREES
QRSD INTERVAL: 90 MS
QT INTERVAL: 364 MS
QTC INTERVAL: 440 MS
RBC # BLD AUTO: 5.13 MILLION/UL (ref 4.2–5.4)
SODIUM SERPL-SCNC: 145 MMOL/L (ref 136–145)
T WAVE AXIS: 16 DEGREES
TROPONIN I SERPL-MCNC: <0.02 NG/ML
VENTRICULAR RATE: 88 BPM
WBC # BLD AUTO: 5.6 THOUSAND/UL (ref 4.8–10.8)

## 2018-02-26 PROCEDURE — 84484 ASSAY OF TROPONIN QUANT: CPT | Performed by: EMERGENCY MEDICINE

## 2018-02-26 PROCEDURE — 36415 COLL VENOUS BLD VENIPUNCTURE: CPT | Performed by: EMERGENCY MEDICINE

## 2018-02-26 PROCEDURE — 85730 THROMBOPLASTIN TIME PARTIAL: CPT | Performed by: EMERGENCY MEDICINE

## 2018-02-26 PROCEDURE — 71045 X-RAY EXAM CHEST 1 VIEW: CPT

## 2018-02-26 PROCEDURE — 99285 EMERGENCY DEPT VISIT HI MDM: CPT

## 2018-02-26 PROCEDURE — 85025 COMPLETE CBC W/AUTO DIFF WBC: CPT | Performed by: EMERGENCY MEDICINE

## 2018-02-26 PROCEDURE — 93010 ELECTROCARDIOGRAM REPORT: CPT | Performed by: INTERNAL MEDICINE

## 2018-02-26 PROCEDURE — 93005 ELECTROCARDIOGRAM TRACING: CPT | Performed by: EMERGENCY MEDICINE

## 2018-02-26 PROCEDURE — 80053 COMPREHEN METABOLIC PANEL: CPT | Performed by: EMERGENCY MEDICINE

## 2018-02-26 PROCEDURE — 83880 ASSAY OF NATRIURETIC PEPTIDE: CPT | Performed by: EMERGENCY MEDICINE

## 2018-02-26 PROCEDURE — 85610 PROTHROMBIN TIME: CPT | Performed by: EMERGENCY MEDICINE

## 2018-02-26 RX ORDER — FUROSEMIDE 40 MG/1
40 TABLET ORAL DAILY
COMMUNITY

## 2018-02-26 RX ORDER — PREDNISONE 20 MG/1
60 TABLET ORAL ONCE
Status: COMPLETED | OUTPATIENT
Start: 2018-02-26 | End: 2018-02-26

## 2018-02-26 RX ORDER — ATORVASTATIN CALCIUM 10 MG/1
10 TABLET, FILM COATED ORAL DAILY
COMMUNITY
End: 2018-03-09 | Stop reason: HOSPADM

## 2018-02-26 RX ORDER — AMLODIPINE BESYLATE 5 MG/1
5 TABLET ORAL DAILY
COMMUNITY

## 2018-02-26 RX ORDER — PREDNISONE 20 MG/1
40 TABLET ORAL DAILY
Qty: 8 TABLET | Refills: 0 | Status: SHIPPED | OUTPATIENT
Start: 2018-02-26 | End: 2018-03-02

## 2018-02-26 RX ORDER — METOPROLOL TARTRATE 50 MG/1
50 TABLET, FILM COATED ORAL EVERY 12 HOURS SCHEDULED
COMMUNITY
End: 2019-03-15 | Stop reason: ALTCHOICE

## 2018-02-26 RX ADMIN — PREDNISONE 60 MG: 20 TABLET ORAL at 13:01

## 2018-02-26 NOTE — DISCHARGE INSTRUCTIONS
Asthma   WHAT YOU NEED TO KNOW:   Asthma is a lung disease that makes breathing difficult  Chronic inflammation and reactions to triggers narrow the airways in the lungs  Asthma can become life-threatening if it is not managed  DISCHARGE INSTRUCTIONS:   Seek care immediately if:   · You have severe shortness of breath  · Your lips or nails turn blue or gray  · The skin around your neck and ribs pulls in with each breath  · You have shortness of breath, even after you take your short-term medicine as directed  · Your peak flow numbers are in the red zone of your AAP  Contact your healthcare provider if:   · You run out of medicine before your next refill is due  · Your symptoms get worse  · You need to take more medicine than usual to control your symptoms  · You have questions or concerns about your condition or care  Medicines:   · Medicines  decrease inflammation, open airways, and make it easier to breathe  Medicines may be inhaled, taken as a pill, or injected  Short-term medicines relieve your symptoms quickly  Long-term medicines are used to prevent future attacks  You may also need medicine to help control your allergies  Ask your healthcare provider for more information about the medicine you are given and how to take it safely  · Take your medicine as directed  Contact your healthcare provider if you think your medicine is not helping or if you have side effects  Tell him or her if you are allergic to any medicine  Keep a list of the medicines, vitamins, and herbs you take  Include the amounts, and when and why you take them  Bring the list or the pill bottles to follow-up visits  Carry your medicine list with you in case of an emergency  Follow up with your healthcare provider as directed: You will need to return to make sure your medicine is working and your symptoms are controlled   You may be referred to an asthma specialist  Zayda Vilchis may be asked to keep a record of your peak flow values and bring it with you to your appointments  Write down your questions so you remember to ask them  Manage your symptoms and prevent future attacks:   · Follow your Asthma Action Plan (AAP)  This is a written plan that you and your healthcare provider create  It explains which medicine you need and when to change doses if necessary  It also explains how you can monitor symptoms and use a peak flow meter  The meter measures how well your lungs are working  · Manage other health conditions , such as allergies, acid reflux, and sleep apnea  · Identify and avoid triggers  These may include pets, dust mites, mold, and cockroaches  · Do not smoke or be around others who smoke  Nicotine and other chemicals in cigarettes and cigars can cause lung damage  Ask your healthcare provider for information if you currently smoke and need help to quit  E-cigarettes or smokeless tobacco still contain nicotine  Talk to your healthcare provider before you use these products  · Ask about the flu vaccine  The flu can make your asthma worse  You may need a yearly flu shot  © 2017 [address]  Information is for End User's use only and may not be sold, redistributed or otherwise used for commercial purposes  All illustrations and images included in CareNotes® are the copyrighted property of [employer]  or [address]  The above information is an  only  It is not intended as medical advice for individual conditions or treatments  Talk to your doctor, nurse or pharmacist before following any medical regimen to see if it is safe and effective for you

## 2018-02-26 NOTE — ED PROCEDURE NOTE
PROCEDURE  ECG 12 Lead Documentation  Date/Time: 2/26/2018 11:12 AM  Performed by: Kimberlee Bhatti  Authorized by: Kimberlee Bhatti     ECG reviewed by me, the ED Provider: yes    Patient location:  ED  Interpretation:     Interpretation: abnormal    Rate:     ECG rate:  88    ECG rate assessment: normal    Rhythm:     Rhythm: sinus rhythm    Ectopy:     Ectopy: PVCs    QRS:     QRS axis:  Normal    QRS intervals:  Normal  ST segments:     ST segments:  Normal  T waves:     T waves: normal           Dragan Agustin DO  02/26/18 1113

## 2018-02-26 NOTE — ED PROVIDER NOTES
History  Chief Complaint   Patient presents with    Shortness of Breath     pt sob since thursay, visiting nurse saw pt today and called pcp then call 911 for pt  pt was told to taske extra lasix and duoneb given by nurse prior to medics     Patient presents for evaluation of dyspnea  Patient states she had increased SOB and decreased exercise [place] since Thursday  Visiting nurse came today and she was sent to the ER  Dyspnea at baseline on home O2  No fever or chest pain  History provided by:  Patient   used: No    Shortness of Breath   Associated symptoms: no chest pain        Prior to Admission Medications   Prescriptions Last Dose Informant Patient Reported? Taking? ALPRAZolam (XANAX) 0 5 mg tablet   Yes No   Sig: Take 0 5 mg by mouth daily at bedtime as needed for anxiety  Fesoterodine Fumarate ER (TOVIAZ) 8 MG TB24   Yes No   Sig: Take by mouth daily at bedtime     albuterol (ACCUNEB) 0 63 MG/3ML nebulizer solution  Self Yes No   Sig: Take 1 ampule by nebulization every 6 (six) hours as needed for wheezing   amLODIPine (NORVASC) 5 mg tablet   No No   Sig: Take 1 tablet by mouth daily for 30 days   apixaban (ELIQUIS) 5 mg   No No   Sig: Take 1 tablet by mouth 2 (two) times a day for 30 days   atorvastatin (LIPITOR) 10 mg tablet   No No   Sig: Take 1 tablet by mouth daily with dinner for 30 days   esomeprazole (NexIUM) 40 MG capsule   Yes No   Sig: Take 40 mg by mouth daily  fluticasone-salmeterol (ADVAIR) 250-50 mcg/dose inhaler   No No   Sig: Inhale 1 puff every 12 (twelve) hours This is home medication   furosemide (LASIX) 20 mg tablet   No No   Sig: Take 1 tablet by mouth daily for 30 days   metoprolol tartrate (LOPRESSOR) 50 mg tablet   No No   Sig: Take 1 tablet by mouth 2 (two) times a day for 30 days   potassium chloride (K-DUR) 10 mEq tablet   Yes No   Sig: Take 10 mEq by mouth daily     pregabalin (LYRICA) 300 MG capsule   Yes No   Sig: Take 300 mg by mouth 2 (two) times a day  Facility-Administered Medications: None       Past Medical History:   Diagnosis Date    Asthma     Cancer ([facility] [address] )     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    Cardiac disease     a fib    Fibromyalgia     Fibromyalgia, primary     GERD (gastroesophageal reflux disease)     Hypertension     Hypokalemia     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, left groin, lung and bladder  Patient treated with chemotherapy and radiation       Past Surgical History:   Procedure Laterality Date    APPENDECTOMY      CATARACT EXTRACTION      CHEST WALL BIOPSY N/A 10/27/2017    Procedure: SINUS EXCISION AND REMOVAL OF FOREIGN BODY, ABDOMEN (WOUND EXPLORATION); Surgeon: Louann Jacome MD;  Location: [address] [place] [address];  Service: General    EYE SURGERY Bilateral     cataracts     HIP FRACTURE SURGERY Left     HYSTERECTOMY      JOINT REPLACEMENT Left     hip    LYMPHADENECTOMY      PORTACATH PLACEMENT Right        Family History   Problem Relation Age of Onset    Pneumonia Mother     Heart disease Father      I have reviewed and agree with the history as documented  Social History   Substance Use Topics    Smoking status: Never Smoker    Smokeless tobacco: Never Used      Comment: never smoke    Alcohol use No        Review of Systems   Respiratory: Positive for shortness of breath  Cardiovascular: Negative for chest pain  All other systems reviewed and are negative        Physical Exam  ED Triage Vitals [18 1029]   Temp Pulse Respirations Blood Pressure SpO2   -- 68 18 (!) 160/106 95 %      Temp src Heart Rate Source Patient Position - Orthostatic VS BP Location FiO2 (%)   -- Monitor Lying Left arm --      Pain Score       --           Orthostatic Vital Signs  Vitals:    18 1029   BP: (!) 160/106   Pulse: 68   Patient Position - Orthostatic VS: Lying       Physical Exam   Constitutional: She is oriented to person, place, and time  She appears distressed  HENT:   Mouth/Throat: Oropharynx is clear and moist    Eyes: Pupils are equal, round, and reactive to light  Neck: Normal range of motion  Cardiovascular: Normal rate, regular rhythm and intact distal pulses  Pulmonary/Chest: She is in respiratory distress  She has rales  Mild respiratory distress with bilateral lower rales  Abdominal: Soft  There is no tenderness  Musculoskeletal: Normal range of motion  Neurological: She is alert and oriented to person, place, and time  Skin: Capillary refill takes less than 2 seconds  She is not diaphoretic  Nursing note and vitals reviewed  ED Medications  Medications - No data to display    Diagnostic Studies  Results Reviewed     Procedure Component Value Units Date/Time    CBC and differential [[id number]]     Lab Status:  No result Specimen:  Blood     Protime-INR [[id number]]     Lab Status:  No result Specimen:  Blood     APTT [[id number]]     Lab Status:  No result Specimen:  Blood     Comprehensive metabolic panel [[id number]]     Lab Status:  No result Specimen:  Blood     Troponin I [[id number]]     Lab Status:  No result Specimen:  Blood     B-type natriuretic peptide [[id number]]     Lab Status:  No result Specimen:  Blood                  XR chest 1 view portable    (Results Pending)              Procedures  Procedures       Phone Contacts  ED Phone Contact    ED Course  ED Course                                MDM  Number of Diagnoses or Management Options  Asthma:   CHF (congestive heart failure) ([facility] [address] ):   Diagnosis management comments: Pulse ox 95% on NC indicating adequate oxygenation  CXR: NAD as read by me    Wheezing resolved and patient feeling better  Troponin negative  BNP same as last discharge and lower than prior admission  Will treat with steroids and have her follow up with PCP  Since symptoms reversible with dounebs likely more asthma than CHF          Amount and/or Complexity of Data Reviewed  Clinical lab tests: ordered and reviewed  Tests in the radiology section of CPT®: reviewed  Decide to obtain previous medical records or to obtain history from someone other than the patient: yes  Review and summarize past medical records: yes  Independent visualization of images, tracings, or specimens: yes    Patient Progress  Patient progress: improved    CritCare Time    Disposition  Final diagnoses:   None     ED Disposition     None      Follow-up Information    None       Patient's Medications   Discharge Prescriptions    No medications on file     No discharge procedures on file      ED Provider  Electronically Signed by           Aly Chong DO  02/26/18 1300

## 2018-03-07 ENCOUNTER — HOSPITAL ENCOUNTER (INPATIENT)
Facility: HOSPITAL | Age: 83
LOS: 2 days | Discharge: HOME HEALTH | DRG: 065 | End: 2018-03-09
Attending: EMERGENCY MEDICINE | Admitting: INTERNAL MEDICINE
Payer: MEDICARE

## 2018-03-07 ENCOUNTER — APPOINTMENT (EMERGENCY)
Dept: RADIOLOGY | Facility: HOSPITAL | Age: 83
DRG: 065 | End: 2018-03-07
Payer: MEDICARE

## 2018-03-07 ENCOUNTER — APPOINTMENT (INPATIENT)
Dept: RADIOLOGY | Facility: HOSPITAL | Age: 83
DRG: 065 | End: 2018-03-07
Payer: MEDICARE

## 2018-03-07 DIAGNOSIS — R55 SYNCOPE, UNSPECIFIED SYNCOPE TYPE: ICD-10-CM

## 2018-03-07 DIAGNOSIS — E78.5 HYPERLIPIDEMIA, UNSPECIFIED HYPERLIPIDEMIA TYPE: ICD-10-CM

## 2018-03-07 DIAGNOSIS — I65.22 CAROTID STENOSIS, LEFT: ICD-10-CM

## 2018-03-07 DIAGNOSIS — I63.9 CVA (CEREBRAL VASCULAR ACCIDENT) (HCC): ICD-10-CM

## 2018-03-07 DIAGNOSIS — I65.22 LEFT CAROTID STENOSIS: ICD-10-CM

## 2018-03-07 DIAGNOSIS — R53.1 WEAKNESS: Primary | ICD-10-CM

## 2018-03-07 PROBLEM — R41.82 ALTERED MENTAL STATUS: Status: ACTIVE | Noted: 2018-03-07
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
## 2018-03-07 PROBLEM — I48.0 PAROXYSMAL ATRIAL FIBRILLATION (HCC): Status: ACTIVE | Noted: 2017-03-13

## 2018-03-07 LAB
ABO GROUP BLD: NORMAL
ANION GAP SERPL CALCULATED.3IONS-SCNC: 4 MMOL/L (ref 4–13)
APTT PPP: 26 SECONDS (ref 24–33)
ATRIAL RATE: 79 BPM
BLD GP AB SCN SERPL QL: NEGATIVE
BUN SERPL-MCNC: 17 MG/DL (ref 5–25)
CALCIUM SERPL-MCNC: 9.2 MG/DL (ref 8.3–10.1)
CHLORIDE SERPL-SCNC: 99 MMOL/L (ref 100–108)
CK MB SERPL-MCNC: 1.4 NG/ML (ref 0–5)
CK MB SERPL-MCNC: <1 % (ref 0–2.5)
CK SERPL-CCNC: 444 U/L (ref 26–192)
CO2 SERPL-SCNC: 41 MMOL/L (ref 21–32)
CREAT SERPL-MCNC: 1.07 MG/DL (ref 0.6–1.3)
ERYTHROCYTE [DISTWIDTH] IN BLOOD BY AUTOMATED COUNT: 18 % (ref 11.6–15.1)
GFR SERPL CREATININE-BSD FRML MDRD: 49 ML/MIN/1.73SQ M
GLUCOSE SERPL-MCNC: 106 MG/DL (ref 65–140)
GLUCOSE SERPL-MCNC: 107 MG/DL (ref 65–140)
HCT VFR BLD AUTO: 40.6 % (ref 37–47)
HGB BLD-MCNC: 12.7 G/DL (ref 12–16)
INR PPP: 1.03 (ref 0.86–1.16)
MAGNESIUM SERPL-MCNC: 1.7 MG/DL (ref 1.6–2.6)
MCH RBC QN AUTO: 25 PG (ref 27–31)
MCHC RBC AUTO-ENTMCNC: 31.3 G/DL (ref 31.4–37.4)
MCV RBC AUTO: 80 FL (ref 82–98)
P AXIS: 75 DEGREES
PHOSPHATE SERPL-MCNC: 2.4 MG/DL (ref 2.3–4.1)
PLATELET # BLD AUTO: 149 THOUSANDS/UL (ref 130–400)
PMV BLD AUTO: 10.6 FL (ref 8.9–12.7)
POTASSIUM SERPL-SCNC: 3.4 MMOL/L (ref 3.5–5.3)
PR INTERVAL: 178 MS
PROTHROMBIN TIME: 10.8 SECONDS (ref 9.4–11.7)
QRS AXIS: -16 DEGREES
QRSD INTERVAL: 90 MS
QT INTERVAL: 400 MS
QTC INTERVAL: 458 MS
RBC # BLD AUTO: 5.06 MILLION/UL (ref 4.2–5.4)
RH BLD: NEGATIVE
SODIUM SERPL-SCNC: 144 MMOL/L (ref 136–145)
SPECIMEN EXPIRATION DATE: NORMAL
T WAVE AXIS: 33 DEGREES
VENTRICULAR RATE: 79 BPM
WBC # BLD AUTO: 8.7 THOUSAND/UL (ref 4.8–10.8)

## 2018-03-07 PROCEDURE — 86901 BLOOD TYPING SEROLOGIC RH(D): CPT | Performed by: EMERGENCY MEDICINE

## 2018-03-07 PROCEDURE — 99223 1ST HOSP IP/OBS HIGH 75: CPT | Performed by: INTERNAL MEDICINE

## 2018-03-07 PROCEDURE — 36415 COLL VENOUS BLD VENIPUNCTURE: CPT | Performed by: EMERGENCY MEDICINE

## 2018-03-07 PROCEDURE — 82550 ASSAY OF CK (CPK): CPT | Performed by: INTERNAL MEDICINE

## 2018-03-07 PROCEDURE — 85610 PROTHROMBIN TIME: CPT | Performed by: EMERGENCY MEDICINE

## 2018-03-07 PROCEDURE — 70496 CT ANGIOGRAPHY HEAD: CPT

## 2018-03-07 PROCEDURE — 99285 EMERGENCY DEPT VISIT HI MDM: CPT

## 2018-03-07 PROCEDURE — 71045 X-RAY EXAM CHEST 1 VIEW: CPT

## 2018-03-07 PROCEDURE — 83735 ASSAY OF MAGNESIUM: CPT | Performed by: EMERGENCY MEDICINE

## 2018-03-07 PROCEDURE — A9585 GADOBUTROL INJECTION: HCPCS | Performed by: EMERGENCY MEDICINE

## 2018-03-07 PROCEDURE — 82948 REAGENT STRIP/BLOOD GLUCOSE: CPT

## 2018-03-07 PROCEDURE — 86850 RBC ANTIBODY SCREEN: CPT | Performed by: EMERGENCY MEDICINE

## 2018-03-07 PROCEDURE — 93010 ELECTROCARDIOGRAM REPORT: CPT | Performed by: INTERNAL MEDICINE

## 2018-03-07 PROCEDURE — 70553 MRI BRAIN STEM W/O & W/DYE: CPT

## 2018-03-07 PROCEDURE — 86900 BLOOD TYPING SEROLOGIC ABO: CPT | Performed by: EMERGENCY MEDICINE

## 2018-03-07 PROCEDURE — 85730 THROMBOPLASTIN TIME PARTIAL: CPT | Performed by: EMERGENCY MEDICINE

## 2018-03-07 PROCEDURE — 70450 CT HEAD/BRAIN W/O DYE: CPT

## 2018-03-07 PROCEDURE — 80048 BASIC METABOLIC PNL TOTAL CA: CPT | Performed by: EMERGENCY MEDICINE

## 2018-03-07 PROCEDURE — 93005 ELECTROCARDIOGRAM TRACING: CPT | Performed by: EMERGENCY MEDICINE

## 2018-03-07 PROCEDURE — 70498 CT ANGIOGRAPHY NECK: CPT

## 2018-03-07 PROCEDURE — 84100 ASSAY OF PHOSPHORUS: CPT | Performed by: EMERGENCY MEDICINE

## 2018-03-07 PROCEDURE — 96360 HYDRATION IV INFUSION INIT: CPT

## 2018-03-07 PROCEDURE — 85027 COMPLETE CBC AUTOMATED: CPT | Performed by: EMERGENCY MEDICINE

## 2018-03-07 PROCEDURE — 82553 CREATINE MB FRACTION: CPT | Performed by: INTERNAL MEDICINE

## 2018-03-07 RX ORDER — ACETAMINOPHEN 325 MG/1
650 TABLET ORAL EVERY 6 HOURS PRN
Status: DISCONTINUED | OUTPATIENT
Start: 2018-03-07 | End: 2018-03-09 | Stop reason: HOSPADM

## 2018-03-07 RX ORDER — ASPIRIN 325 MG
325 TABLET ORAL ONCE
Status: COMPLETED | OUTPATIENT
Start: 2018-03-07 | End: 2018-03-07

## 2018-03-07 RX ORDER — PREGABALIN 100 MG/1
300 CAPSULE ORAL 2 TIMES DAILY
Status: DISCONTINUED | OUTPATIENT
Start: 2018-03-07 | End: 2018-03-09 | Stop reason: HOSPADM

## 2018-03-07 RX ORDER — OXYBUTYNIN CHLORIDE 5 MG/1
5 TABLET, EXTENDED RELEASE ORAL DAILY
Status: DISCONTINUED | OUTPATIENT
Start: 2018-03-07 | End: 2018-03-09 | Stop reason: HOSPADM

## 2018-03-07 RX ORDER — METOPROLOL TARTRATE 50 MG/1
50 TABLET, FILM COATED ORAL EVERY 12 HOURS SCHEDULED
Status: DISCONTINUED | OUTPATIENT
Start: 2018-03-07 | End: 2018-03-09 | Stop reason: HOSPADM

## 2018-03-07 RX ORDER — ALPRAZOLAM 0.5 MG/1
0.5 TABLET ORAL
Status: DISCONTINUED | OUTPATIENT
Start: 2018-03-07 | End: 2018-03-09 | Stop reason: HOSPADM

## 2018-03-07 RX ORDER — SODIUM CHLORIDE 9 MG/ML
125 INJECTION, SOLUTION INTRAVENOUS CONTINUOUS
Status: DISCONTINUED | OUTPATIENT
Start: 2018-03-07 | End: 2018-03-07

## 2018-03-07 RX ORDER — PANTOPRAZOLE SODIUM 40 MG/1
40 TABLET, DELAYED RELEASE ORAL
Status: DISCONTINUED | OUTPATIENT
Start: 2018-03-07 | End: 2018-03-09 | Stop reason: HOSPADM

## 2018-03-07 RX ORDER — ATORVASTATIN CALCIUM 40 MG/1
40 TABLET, FILM COATED ORAL
Status: DISCONTINUED | OUTPATIENT
Start: 2018-03-07 | End: 2018-03-09 | Stop reason: HOSPADM

## 2018-03-07 RX ORDER — ALBUTEROL SULFATE 2.5 MG/3ML
0.63 SOLUTION RESPIRATORY (INHALATION) EVERY 6 HOURS PRN
Status: DISCONTINUED | OUTPATIENT
Start: 2018-03-07 | End: 2018-03-09 | Stop reason: HOSPADM

## 2018-03-07 RX ORDER — POTASSIUM CHLORIDE 20 MEQ/1
40 TABLET, EXTENDED RELEASE ORAL ONCE
Status: COMPLETED | OUTPATIENT
Start: 2018-03-07 | End: 2018-03-07

## 2018-03-07 RX ADMIN — APIXABAN 5 MG: 5 TABLET, FILM COATED ORAL at 15:13

## 2018-03-07 RX ADMIN — PANTOPRAZOLE SODIUM 40 MG: 40 TABLET, DELAYED RELEASE ORAL at 15:13

## 2018-03-07 RX ADMIN — POTASSIUM CHLORIDE 40 MEQ: 1500 TABLET, EXTENDED RELEASE ORAL at 15:11

## 2018-03-07 RX ADMIN — IOHEXOL 85 ML: 350 INJECTION, SOLUTION INTRAVENOUS at 08:21

## 2018-03-07 RX ADMIN — SODIUM CHLORIDE 125 ML/HR: 0.9 INJECTION, SOLUTION INTRAVENOUS at 08:26

## 2018-03-07 RX ADMIN — OXYBUTYNIN CHLORIDE 5 MG: 5 TABLET, FILM COATED, EXTENDED RELEASE ORAL at 15:14

## 2018-03-07 RX ADMIN — ACETAMINOPHEN 650 MG: 325 TABLET, FILM COATED ORAL at 22:02

## 2018-03-07 RX ADMIN — METOPROLOL TARTRATE 50 MG: 50 TABLET ORAL at 15:12

## 2018-03-07 RX ADMIN — ATORVASTATIN CALCIUM 40 MG: 40 TABLET, FILM COATED ORAL at 22:03

## 2018-03-07 RX ADMIN — PREGABALIN 300 MG: 100 CAPSULE ORAL at 15:12

## 2018-03-07 RX ADMIN — ASPIRIN 325 MG: 325 TABLET ORAL at 09:33

## 2018-03-07 RX ADMIN — FLUTICASONE PROPIONATE AND SALMETEROL 1 PUFF: 50; 250 POWDER RESPIRATORY (INHALATION) at 15:13

## 2018-03-07 RX ADMIN — ALPRAZOLAM 0.5 MG: 0.5 TABLET ORAL at 22:03

## 2018-03-07 RX ADMIN — GADOBUTROL 9 ML: 604.72 INJECTION INTRAVENOUS at 13:46

## 2018-03-07 NOTE — H&P
History and Physical - [facility]  Patient Information: Lu Hazel 81 y o  female MRN: [id number]  Unit/Bed#: [address] [place] [address] Encounter: [id number]  Admitting Physician: Kleber Olson MD  PCP: Ramon Machado DO  Date of Admission:  3/7/2018    Chief Complaint: Altered Mental Status (Pt st she went to the bathroom at 2110 last night and awoke this am on the bathroom floor  Sts she feels like she can't talk properly and the left side of her face feels different)    History of Present Illness: The history is provided by the patient  81 y o  female, lives alone but independenly, with a history of asthma, atrial fibrillation on Eliquis, fibromyalgia, hypokalemia, GERD, 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, left lower extremity lymphedema and weakness at baseline, presented to the ER after possible syncopal episode last night  Patient lives alone at home  She recalls that she went to the bathroom last night at about 9pm but woke up at about 6am in the morning on the floor  She did not recall the events in between  She tried hard to manage to get up from the floor and activated her Life Alert device  She was brought in by EMS  As she reports "sluured speech and tingling sensation on her left face, stroke alert was activated  STAT CTA head/cervical showing (similar compared to 9/23/2016) - 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 artery origin unchanged from prior CT angiogram dated 9/23/2016  MRI is pending  The patient will be admitted for further eval and mgmt  Otherwise, she denied headache, visual blurring, facial droop, new extremity weakness or sensation loss  Her mental status appears to be at her baseline  Of notes, the patient was admitted in 9/2016 for similar episode  Vascular surgery consulted  The patient was also followed as outpatient but she declined any vascular intervention       Of notes, she was admitted btw 1/5/18 to 1/10/18 with Acute respiratory failure with hypoxia 2/2 CHF exacerbation, dependent atelectasis, Asthma, poor ventilation due to obesity and Acute on chronic diastolic heart failure  Review of Systems:  10 point remainder of review of systems negative, see HPI  Constitutional: Negative for fatigue, fever, chills, appetite change and unexpected weight change  HEENT: Negative for ear pain, congestion, rhinorrhea, neck pain, neck stiffness, postnasal drip, sinus pressure and ear discharge  Eyes: Negative for photophobia and visual disturbance  Respiratory: Negative for cough and shortness of breath  Cardiovascular: Negative for chest pain  Gastrointestinal: Negative for nausea, abdominal pain, diarrhea, constipation and blood in stool  Genitourinary: Negative for dysuria and difficulty urinating  Skin: Negative for rash  Neurological: Positive for syncope, Negative for headaches and worsening weakness  Psychiatric/Behavioral: Negative for sleep disturbance  Negative for suicidal ideas, self-injury and dysphoric mood  Past Medical and Surgical History:   Past Medical History:   Diagnosis Date    Asthma     Cancer (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    Cardiac disease     a fib    Fibromyalgia     Fibromyalgia, primary     GERD (gastroesophageal reflux disease)     Hypertension     Hypokalemia     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, left groin, lung and bladder  Patient treated with chemotherapy and radiation       Past Surgical History:   Procedure Laterality Date    APPENDECTOMY      CATARACT EXTRACTION      CHEST WALL BIOPSY N/A 10/27/2017    Procedure: SINUS EXCISION AND REMOVAL OF FOREIGN BODY, ABDOMEN (WOUND EXPLORATION);   Surgeon: Lisandro Orosco MD;  Location: [address] [place] [address];  Service: General    EYE SURGERY Bilateral     cataracts     HIP FRACTURE SURGERY Left     HYSTERECTOMY      JOINT REPLACEMENT Left     hip    LYMPHADENECTOMY      PORTACATH PLACEMENT Right        Family History:  Family History   Problem Relation Age of Onset    Pneumonia Mother     Heart disease Father        Meds/Allergies:  Prescriptions Prior to Admission   Medication Sig Dispense Refill Last Dose    albuterol (ACCUNEB) 0 63 MG/3ML nebulizer solution Take 1 ampule by nebulization every 6 (six) hours as needed for wheezing   Taking    ALPRAZolam (XANAX) 0 5 mg tablet Take 0 5 mg by mouth daily at bedtime as needed for anxiety  Taking    amLODIPine (NORVASC) 5 mg tablet Take 5 mg by mouth daily       apixaban (ELIQUIS) 5 mg Take 5 mg by mouth 2 (two) times a day       atorvastatin (LIPITOR) 10 mg tablet Take 10 mg by mouth daily       esomeprazole (NexIUM) 40 MG capsule Take 40 mg by mouth daily  Taking    Fesoterodine Fumarate ER (TOVIAZ) 8 MG TB24 Take by mouth daily at bedtime     Taking    fluticasone-salmeterol (ADVAIR) 250-50 mcg/dose inhaler Inhale 1 puff every 12 (twelve) hours This is home medication  0 Taking    furosemide (LASIX) 20 mg tablet Take 20 mg by mouth daily       metoprolol tartrate (LOPRESSOR) 50 mg tablet Take 50 mg by mouth every 12 (twelve) hours       potassium chloride (K-DUR) 10 mEq tablet Take 10 mEq by mouth daily  Taking    pregabalin (LYRICA) 300 MG capsule Take 300 mg by mouth 2 (two) times a day  Taking       Allergies   Allergen Reactions    Fentanyl And Related Other (See Comments)     Passed out    Latex     Other     Penicillins        Current Medications:  Current Facility-Administered Medications   Medication Dose Route Frequency Provider Last Rate Last Dose    potassium chloride (K-DUR,KLOR-CON) CR tablet 40 mEq  40 mEq Oral Once Donald Lane MD        sodium chloride 0 9 % infusion  125 mL/hr Intravenous Continuous Vahid Villasenor  mL/hr at 03/07/18 0826 125 mL/hr at 03/07/18 9780       Immunizations: There is no immunization history on file for this patient    History:  Social History     Social History  Marital status:      Spouse name: N/A    Number of children: N/A    Years of education: N/A     Occupational History    Not on file  Social History Main Topics    Smoking status: Never Smoker    Smokeless tobacco: Never Used      Comment: never smoke    Alcohol use No    Drug use: No    Sexual activity: Not on file     Other Topics Concern    Not on file     Social History Narrative    No narrative on file        Substance Use History:   History   Alcohol Use No     History   Smoking Status    Never Smoker   Smokeless Tobacco    Never Used     Comment: never smoke     History   Drug Use No       Physical Exam:   Vitals:   Blood Pressure: 157/69 (03/07/18 0934)  Pulse: 86 (03/07/18 0940)  Temperature: 99 7 °F (37 6 °C) (03/07/18 0814)  Respirations: (!) 24 (03/07/18 0940)  Weight - Scale: 90 3 kg (199 lb) (03/07/18 0826)  SpO2: 96 % (03/07/18 0940)  Body mass index is 36 4 kg/m²    General: Cooperative, NAD  HEENT: EOMI, anicteric, oral moist, no oral thrush or mucosal lesion, neck supple, no mass or JVD  Chest: CTAB, BS diminished, no wheeze/rales  Cardiac: RRR, S1/S2, No murmur  Abd: S/ND/NT/BS+  MSK: No LE pitting edema, pulses intact  Neuro: AAOx3, moving all extremities  Psychiatric: Mood with normal affect    Lab Results:     Results from last 7 days  Lab Units 03/07/18  0820   WBC Thousand/uL 8 70   HEMOGLOBIN g/dL 12 7   HEMATOCRIT % 40 6   PLATELETS Thousands/uL 149       Results from last 7 days  Lab Units 03/07/18  0820   SODIUM mmol/L 144   POTASSIUM mmol/L 3 4*   CHLORIDE mmol/L 99*   CO2 mmol/L 41*   BUN mg/dL 17   CREATININE mg/dL 1 07   CALCIUM mg/dL 9 2   GLUCOSE RANDOM mg/dL 107       Results from last 7 days  Lab Units 03/07/18  0820   INR  1 03       Recent Results (from the past 24 hour(s))   Fingerstick Glucose (POCT)    Collection Time: 03/07/18  8:02 AM   Result Value Ref Range    POC Glucose 106 65 - 140 mg/dl   APTT    Collection Time: 03/07/18  8:20 AM   Result Value Ref Range    PTT 26 24 - 33 seconds   Basic metabolic panel    Collection Time: 03/07/18  8:20 AM   Result Value Ref Range    Sodium 144 136 - 145 mmol/L    Potassium 3 4 (L) 3 5 - 5 3 mmol/L    Chloride 99 (L) 100 - 108 mmol/L    CO2 41 (H) 21 - 32 mmol/L    Anion Gap 4 4 - 13 mmol/L    BUN 17 5 - 25 mg/dL    Creatinine 1 07 0 60 - 1 30 mg/dL    Glucose 107 65 - 140 mg/dL    Calcium 9 2 8 3 - 10 1 mg/dL    eGFR 49 ml/min/1 73sq m   CBC    Collection Time: 03/07/18  8:20 AM   Result Value Ref Range    WBC 8 70 4 80 - 10 80 Thousand/uL    RBC 5 06 4 20 - 5 40 Million/uL    Hemoglobin 12 7 12 0 - 16 0 g/dL    Hematocrit 40 6 37 0 - 47 0 %    MCV 80 (L) 82 - 98 fL    MCH 25 0 (L) 27 0 - 31 0 pg    MCHC 31 3 (L) 31 4 - 37 4 g/dL    RDW 18 0 (H) 11 6 - 15 1 %    Platelets 955 310 - 817 Thousands/uL    MPV 10 6 8 9 - 12 7 fL   Protime-INR    Collection Time: 03/07/18  8:20 AM   Result Value Ref Range    Protime 10 8 9 4 - 11 7 seconds    INR 1 03 0 86 - 1 16   Type and screen    Collection Time: 03/07/18  8:20 AM   Result Value Ref Range    ABO Grouping O     Rh Factor Negative     Antibody Screen Negative     Specimen Expiration Date [id number]    Magnesium    Collection Time: 03/07/18  8:20 AM   Result Value Ref Range    Magnesium 1 7 1 6 - 2 6 mg/dL   Phosphorus    Collection Time: 03/07/18  8:20 AM   Result Value Ref Range    Phosphorus 2 4 2 3 - 4 1 mg/dL       Imaging:  X-ray Chest 1 View Portable    Result Date: 3/7/2018  Narrative: CHEST INDICATION:  Stroke  COMPARISON:  Chest x-ray from 2/26/2018 EXAM PERFORMED/VIEWS:  XR CHEST PORTABLE FINDINGS:  Right-sided Mediport line tip overlies the SVC  There is stable cardiomegaly  Stable central fullness of the pulmonary vasculature  Left hemidiaphragm is mildly elevated  The lungs are clear  No pneumothorax or pleural effusion  Osseous structures appear within normal limits for patient age       Impression: Stable cardiomegaly and central fullness of the pulmonary vasculature  No acute pulmonary disease  Workstation performed: [id number]     Xr Chest 1 View Portable    Result Date: 2/26/2018  Narrative: CHEST INDICATION: SOB COMPARISON:  1/23/2018  EXAM PERFORMED/VIEWS:  XR CHEST PORTABLE 1 image FINDINGS:  A right-sided Port-A-Cath terminates at the caval atrial junction  Heart shadow is enlarged but unchanged from prior exam  Right basilar subsegmental atelectasis noted  Upper lobe and left lung are clear  No pleural effusions or pneumothorax  Osseous structures appear within normal limits for patient age  Impression: Right basilar atelectasis  Workstation performed: [id number]     Ct Stroke Alert Brain    Result Date: 3/7/2018  Narrative: CT BRAIN - STROKE ALERT PROTOCOL INDICATION:  50-year-old female with  slurred speech and left sided facial weakness  COMPARISON: CT brain 9/23/2016; MRI brain 9/21/2016  TECHNIQUE:  CT examination of the brain was performed  In addition to axial images, coronal reformatted images were created and submitted for interpretation  Radiation dose length product (DLP) for this visit:  1160 25 mGy-cm   This examination, like all CT scans performed in the [facility], was performed utilizing techniques to minimize radiation dose exposure, including the use of iterative reconstruction and automated exposure control  IMAGE QUALITY:  Diagnostic  FINDINGS:  PARENCHYMA:  Decreased attenuation is noted in the supratentorial white matter demonstrating an appearance most consistent with moderate microangiopathic change  No intracranial mass, mass effect or midline shift  No acute intracranial hemorrhage  No CT signs of acute infarction  Chronic vasogenic edema in the left frontal lobe secondary to a 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 described on prior MRI  VENTRICLES AND EXTRA-AXIAL SPACES:  Normal for patient's age   VISUALIZED ORBITS AND PARANASAL SINUSES:  Orbits appear normal   Mild scattered sinus mucosal thickening is noted  No fluid levels are seen  CALVARIUM AND EXTRACRANIAL SOFT TISSUES:   Normal      Impression: No acute intracranial abnormality  Microangiopathic changes  Findings were directly discussed with Sakina Staton on 3/7/2018 8:23 AM  Workstation performed: [id number]     Cta Stroke Alert (head/neck)    Result Date: 3/7/2018  Narrative: CTA NECK AND BRAIN WITH CONTRAST INDICATION: Acute onset of slurred speech and left facial paresthesias at approximately 9:00 PM last night  COMPARISON:   CT brain performed today  Previous CT, MRI and CT angiography performed September 2016  TECHNIQUE: Post contrast imaging was performed after administration of iodinated contrast through the neck and brain  Post contrast axial 0 625 mm images timed to opacify the arterial system  3D rendering was performed on an independent workstation  MIP reconstructions performed  Coronal reconstructions were performed of the noncontrast portion of the brain  Radiation dose length product (DLP) for this visit:  380 11 mGy-cm   This examination, like all CT scans performed in the [facility], was performed utilizing techniques to minimize radiation dose exposure, including the use of iterative  reconstruction and automated exposure control  IV Contrast:  85 mL of iohexol (OMNIPAQUE)  IMAGE QUALITY:   Diagnostic FINDINGS: CERVICAL VASCULATURE AORTIC ARCH AND GREAT VESSELS:  Mild calcification of the aortic arch  No stenosis of the great vessels  Normal subclavian vasculature  RIGHT VERTEBRAL ARTERY CERVICAL SEGMENT:  Normal origin  The vessel is normal in caliber throughout the neck  LEFT VERTEBRAL ARTERY CERVICAL SEGMENT:  Normal origin  The vessel is normal in caliber throughout the neck  RIGHT EXTRACRANIAL CAROTID SEGMENT:  Mild tortuosity of the right common carotid artery  Normal bifurcation  No stenosis    Cervical internal carotid artery demonstrates tortuosity as well without stenosis  LEFT EXTRACRANIAL CAROTID SEGMENT:  Mild tortuosity of the common carotid artery  Mild atherosclerotic disease of the distal common carotid artery with calcified and noncalcified plaque formation extending into the bifurcation  There is severe stenosis  of the left internal carotid artery origin best seen on series 2 image 167  Point of maximal stenosis measures approximately 1 mm  The left internal carotid artery is otherwise decreased in caliber throughout the neck  This is unchanged compared to 9/23/2016 CT angiography  Given the diffuse decreased caliber of the left internal carotid artery, comparison to the mid right cervical internal carotid artery which measures 6 mm, corresponds to a stenosis of at least 85%  NASCET criteria was used to determine the degree of internal carotid artery diameter stenosis  INTRACRANIAL VASCULATURE INTERNAL CAROTID ARTERIES:  Mild calcification of the cavernous internal carotid arteries bilaterally  Again the left intracranial internal carotid artery is diffusely decreased in caliber compared to the opposite right  ANTERIOR CIRCULATION:  Symmetric A1 segments and anterior cerebral arteries with normal enhancement  Normal anterior communicating artery  MIDDLE CEREBRAL ARTERY CIRCULATION:  M1 segments are patent bilaterally  Mild intracranial atherosclerotic disease involving the left middle cerebral artery branches with no occlusive disease  DISTAL VERTEBRAL ARTERIES:  Distal left vertebral artery is dominant compared to the opposite right side which is mildly hypoplastic above the foramen magnum  No focal stenosis  BASILAR ARTERY:  Basilar artery is normal in caliber  Normal superior cerebellar arteries  POSTERIOR CEREBRAL ARTERIES: Normal posterior communicating arteries  DURAL VENOUS SINUSES:  Normal  NON VASCULAR ANATOMY BONY STRUCTURES:  No acute osseous abnormality    Extensive diffuse paranasal sinus mucosal thickening with evidence of prior funduscopic sinus surgery  No air-fluid levels to suggest acute sinusitis  Mild osteomalacia of the maxilla  Mild diffuse cervical spondylitic degenerative change  SOFT TISSUES OF THE NECK:  No discrete soft tissue mass or adenopathy  Mild heterogeneity of the thyroid gland with small subcentimeter nodules noted  There is a Port-A-Cath within the right chest extending to the SVC, below the scope of this exam  THORACIC INLET:  Unremarkable  Impression: 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 artery origin unchanged from prior CT angiogram dated 9/23/2016  This measures at least 85% compared to the mid to distal cervical internal carotid artery  Vascular surgery consultation recommended  Suggestion of mild intracranial atherosclerotic disease involving the left anterior MCA territory  No acute occlusive disease of the cervical or intracranial vasculature  I personally discussed this study with Lesvia Ramírez on 3/7/2018 at 8:25 AM  Workstation performed: [id number]       X-ray chest 1 view portable   Final Result      Stable cardiomegaly and central fullness of the pulmonary vasculature  No acute pulmonary disease  Workstation performed: [id number]         CTA stroke alert (head/neck)   Final Result      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 artery origin unchanged from prior CT angiogram dated 9/23/2016  This measures at least 85% compared to the mid to distal cervical internal carotid artery  Vascular surgery consultation recommended  Suggestion of mild intracranial atherosclerotic disease involving the left anterior MCA territory  No acute occlusive disease of the cervical or intracranial vasculature  I personally discussed this study with Lesvia Ramírez on 3/7/2018 at 8:25 AM             Workstation performed: [id number]         CT stroke alert brain   Final Result      No acute intracranial abnormality  Microangiopathic changes        Findings were directly discussed with Fei Hardy on 3/7/2018 8:23 AM       Workstation performed: [id number]         MRI inpatient order    (Results Pending)       EKG, Pathology, and Other Studies Reviewed on Admission:     Allscripts Records Reviewed: Yes     Assessment/Plan:  Hospital Problem List:   Principal Problem:    Syncope  Active Problems:    Fall    Carotid stenosis, left    Paroxysmal atrial fibrillation (HCC)    Hypertension    Obesity (BMI 30-39  9)    GERD (gastroesophageal reflux disease)    Hyperlipemia    Fibromyalgia    Hypokalemia    History of Merkel cell carcinoma    81 y o  female, lives alone but independenly, with a history of asthma, atrial fibrillation on Eliquis, fibromyalgia, hypokalemia, GERD, 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, left lower extremity lymphedema and weakness at baseline, presented to the ER after possible syncopal episode last night  Patient lives alone at home  She recalls that she went to the bathroom last night at about 9pm but woke up at about 6am in the morning on the floor  She did not recall the events in between  She tried hard to manage to get up from the floor and activated her Life Alert device  She was brought in by EMS  As she reports "sluured speech and tingling sensation on her left face, stroke alert was activated  STAT CTA head/cervical showing (similar compared to 9/23/2016) - 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 artery origin unchanged from prior CT angiogram dated 9/23/2016  MRI is pending  The patient will be admitted for further eval and mgmt  Otherwise, she denied headache, visual blurring, facial droop, new extremity weakness or sensation loss  Her mental status appears to be at her baseline  · Syncope, Fall: Unclear etiology, concern is TIA/CVA in a patient with severe left carotid stenosis and PAFIB  Admit to medicine w/ tele  Neurology and Vascular surgery consult  Continue Eliquis  PT/OT/Swallow and speech eval   · Carotid stenosis, left:  Of notes, the patient was admitted in 9/2016 for syncope  Vascular surgery consulted  The patient was also followed as outpatient but she declined left carotid endarterectomy in the past    · Paroxysmal atrial fibrillation: Continue bb, Eliquis  · Hypertension: Continue bb, hold amlodipine  · Hyperlipemia: Continue statin  · GERD: Continue PPI  · Fibromyalgia: Continue Lyrica  · Hypokalemia: KCl supplement  · Moderate persistent asthma, Chronic respiratory failure with hypoxia: Continue home inhalers  Titrate O2 to keep SpO2>90%  · Iron deficiency anemia: Continue iron supplement  · Left lower extremity chronic lymphedema: 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  Continue Venodyne compressive pumps as tolerated  Hold home Lasix for now  · 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  No evidence of recurrence  Continue outpatient follow-up  Component      Latest Ref Rng & Units 1/7/2018   TSH 3RD GENERATON      0 358 - 3 740 uIU/mL 0 386   Free T4      0 76 - 1 46 ng/dL 0 85     DVT Prophylaxis: on Eliquis  Code Status: Full Code  Disposition: anticipate d/c home once clinically improved    Anticipated Length of Stay:  Patient will be admitted on an Inpatient basis with an anticipated length of stay of  > 2 midnights  Total Time for Visit, including Counseling/Coordination of Care: 45 minutes  Greater than 50% of this total time spent on direct patient counseling and coordination of care      Signed by:  Prem Rendon MD  Attending Hospitalist  Page#: [phone] (Hours 7am to 7pm)  3/7/2018 10:15 AM

## 2018-03-07 NOTE — ED PROVIDER NOTES
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, left lower extremity lymphedema and weakness at baseline, presents to the ER for evaluation of possible stroke  Patient lives alone at home  Patient states that at 9:10 p m  last night she recalls going to the bathroom, and she recalls waking up this morning at 6:00 a m  on the bathroom floor  She has no recollections of the events in between  Patient then activated her Life Alert and EMS arrived to bring patient to the ER  Patient states that she now has some slurred speech and tingling sensation in the left side of her face  Patient is worried that she may have had a stroke  Patient denies any previous similar symptoms  Patient denies any chest pain, shortness of breath, nausea, vomiting, diarrhea, dysuria  Altered Mental Status   Presenting symptoms: no confusion    Associated symptoms: no abdominal pain, no agitation, no fever, no headaches, no nausea, no palpitations, no rash and no weakness        Prior to Admission Medications   Prescriptions Last Dose Informant Patient Reported? Taking? ALPRAZolam (XANAX) 0 5 mg tablet   Yes No   Sig: Take 0 5 mg by mouth daily at bedtime as needed for anxiety     Fesoterodine Fumarate ER (TOVIAZ) 8 MG TB24   Yes No   Sig: Take by mouth daily at bedtime     albuterol (ACCUNEB) 0 63 MG/3ML nebulizer solution  Self Yes No   Sig: Take 1 ampule by nebulization every 6 (six) hours as needed for wheezing   amLODIPine (NORVASC) 5 mg tablet   Yes No   Sig: Take 5 mg by mouth daily   apixaban (ELIQUIS) 5 mg   Yes No   Sig: Take 5 mg by mouth 2 (two) times a day   atorvastatin (LIPITOR) 10 mg tablet   Yes No   Sig: Take 10 mg by mouth daily   esomeprazole (NexIUM) 40 MG capsule   Yes No   Sig: Take 40 mg by mouth daily  fluticasone-salmeterol (ADVAIR) 250-50 mcg/dose inhaler   No No   Sig: Inhale 1 puff every 12 (twelve) hours This is home medication   furosemide (LASIX) 20 mg tablet   Yes No   Sig: Take 20 mg by mouth daily   metoprolol tartrate (LOPRESSOR) 50 mg tablet   Yes No   Sig: Take 50 mg by mouth every 12 (twelve) hours   potassium chloride (K-DUR) 10 mEq tablet   Yes No   Sig: Take 10 mEq by mouth daily  pregabalin (LYRICA) 300 MG capsule   Yes No   Sig: Take 300 mg by mouth 2 (two) times a day  Facility-Administered Medications: None       Past Medical History:   Diagnosis Date    Asthma     Cancer ([facility] [address] )     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    Cardiac disease     a fib    Fibromyalgia     Fibromyalgia, primary     GERD (gastroesophageal reflux disease)     Hypertension     Hypokalemia     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, left groin, lung and bladder  Patient treated with chemotherapy and radiation       Past Surgical History:   Procedure Laterality Date    APPENDECTOMY      CATARACT EXTRACTION      CHEST WALL BIOPSY N/A 10/27/2017    Procedure: SINUS EXCISION AND REMOVAL OF FOREIGN BODY, ABDOMEN (WOUND EXPLORATION); Surgeon: Mahendra Castaneda MD;  Location: [address] [place] [address];  Service: General    EYE SURGERY Bilateral     cataracts     HIP FRACTURE SURGERY Left     HYSTERECTOMY      JOINT REPLACEMENT Left     hip    LYMPHADENECTOMY      PORTACATH PLACEMENT Right        Family History   Problem Relation Age of Onset    Pneumonia Mother     Heart disease Father      I have reviewed and agree with the history as documented      Social History   Substance Use Topics    Smoking status: Never Smoker    Smokeless tobacco: Never Used      Comment: never smoke    Alcohol use No        Review of Systems   Constitutional: Negative for activity change, appetite change, chills and fever  HENT: Negative for congestion and ear pain  Eyes: Negative for pain and discharge  Respiratory: Negative for cough, chest tightness, shortness of breath, wheezing and stridor  Cardiovascular: Negative for chest pain and palpitations  Gastrointestinal: Negative for abdominal distention, abdominal pain, constipation, diarrhea and nausea  Endocrine: Negative for cold intolerance  Genitourinary: Negative for dysuria, frequency and urgency  Musculoskeletal: Negative for arthralgias and back pain  Skin: Negative for color change and rash  Allergic/Immunologic: Negative for environmental allergies and food allergies  Neurological: Positive for facial asymmetry and speech difficulty  Negative for dizziness, weakness, numbness and headaches  Hematological: Negative for adenopathy  Psychiatric/Behavioral: Negative for agitation, behavioral problems and confusion  The patient is not nervous/anxious  All other systems reviewed and are negative  Physical Exam  ED Triage Vitals [03/07/18 0814]   Temperature Pulse Respirations Blood Pressure SpO2   99 7 °F (37 6 °C) 73 18 132/74 (!) 89 %      Temp src Heart Rate Source Patient Position - Orthostatic VS BP Location FiO2 (%)   -- -- -- -- --      Pain Score       No Pain           Orthostatic Vital Signs  Vitals:    03/07/18 0814   BP: 132/74   Pulse: 73       Physical Exam   Constitutional: She is oriented to person, place, and time  She appears well-developed and well-nourished  HENT:   Head: Normocephalic and atraumatic  Mouth/Throat: Oropharynx is clear and moist    Eyes: Conjunctivae and EOM are normal    Neck: Normal range of motion  Neck supple  Cardiovascular: Normal rate, regular rhythm, normal heart sounds and intact distal pulses  Pulmonary/Chest: Effort normal and breath sounds normal    Abdominal: Soft  Bowel sounds are normal  She exhibits no distension  There is no tenderness  Musculoskeletal: Normal range of motion  Neurological: She is alert and oriented to person, place, and time  Patient is alert and oriented x3  Visual field intact bilateral   Finger-to-nose intact bilateral   Mild tingling sensation noted to the left side of face  Otherwise no other sensory deficits noted  No pronator drift noted bilateral upper extremity  Patient with baseline chronic left lower extremity lymphedema and weakness secondary to a Merkel cell cancer of the left groin  No weakness noted to right lower extremity  Mild dysarthria noted on exam   NIH stroke score upon arrival to the ER is 4     Skin: Skin is warm and dry  Psychiatric: She has a normal mood and affect  Her behavior is normal  Judgment and thought content normal    Nursing note and vitals reviewed  ED Medications  Medications   sodium chloride 0 9 % infusion (125 mL/hr Intravenous New Bag 3/7/18 0826)   iohexol (OMNIPAQUE) 350 MG/ML injection (MULTI-DOSE) 85 mL (85 mL Intravenous Given 3/7/18 0821)   aspirin tablet 325 mg (325 mg Oral Given 3/7/18 0933)       Diagnostic Studies  Results Reviewed     Procedure Component Value Units Date/Time    APTT [[id number]]  (Normal) Collected:  03/07/18 0820    Lab Status:  Final result Specimen:  Blood from Arm, Left Updated:  03/07/18 0851     PTT 26 seconds     Narrative:          Therapeutic Heparin Range = 60-90 seconds    Protime-INR [[id number]]  (Normal) Collected:  03/07/18 0820    Lab Status:  Final result Specimen:  Blood from Arm, Left Updated:  03/07/18 0851     Protime 10 8 seconds      INR 1 29    Basic metabolic panel [[id number]]  (Abnormal) Collected:  03/07/18 0820    Lab Status:  Final result Specimen:  Blood from Arm, Left Updated:  03/07/18 0844     Sodium 144 mmol/L      Potassium 3 4 (L) mmol/L      Chloride 99 (L) mmol/L      CO2 41 (H) mmol/L      Anion Gap 4 mmol/L      BUN 17 mg/dL Creatinine 1 07 mg/dL      Glucose 107 mg/dL      Calcium 9 2 mg/dL      eGFR 49 ml/min/1 73sq m     Narrative:         National Kidney Disease Education Program recommendations are as follows:  GFR calculation is accurate only with a steady state creatinine  Chronic Kidney disease less than 60 ml/min/1 73 sq  meters  Kidney failure less than 15 ml/min/1 73 sq  meters  Phosphorus [[id number]]  (Normal) Collected:  03/07/18 0820    Lab Status:  Final result Specimen:  Blood from Arm, Left Updated:  03/07/18 0844     Phosphorus 2 4 mg/dL     Magnesium [[id number]]  (Normal) Collected:  03/07/18 0820    Lab Status:  Final result Specimen:  Blood from Arm, Left Updated:  03/07/18 0844     Magnesium 1 7 mg/dL     CBC [[id number]]  (Abnormal) Collected:  03/07/18 0820    Lab Status:  Final result Specimen:  Blood from Arm, Left Updated:  03/07/18 0829     WBC 8 70 Thousand/uL      RBC 5 06 Million/uL      Hemoglobin 12 7 g/dL      Hematocrit 40 6 %      MCV 80 (L) fL      MCH 25 0 (L) pg      MCHC 31 3 (L) g/dL      RDW 18 0 (H) %      Platelets 325 Thousands/uL      MPV 10 6 fL     UA w Reflex to Microscopic w Reflex to Culture [[id number]]     Lab Status:  No result Specimen:  Urine                  X-ray chest 1 view portable   Final Result by Tevin Benavides MD (03/07 0926)      Stable cardiomegaly and central fullness of the pulmonary vasculature  No acute pulmonary disease  Workstation performed: [id number]         CTA stroke alert (head/neck)   Final Result by Tom Garcia DO (03/07 4122)      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 artery origin unchanged from prior CT angiogram dated 9/23/2016  This measures at least 85% compared to the mid to distal cervical internal carotid artery  Vascular surgery consultation recommended  Suggestion of mild intracranial atherosclerotic disease involving the left anterior MCA territory    No acute occlusive disease of the cervical or intracranial vasculature  I personally discussed this study with Nick Barrientos on 3/7/2018 at 8:25 AM             Workstation performed: [id number]         CT stroke alert brain   Final Result by Ros Guadarrama MD (03/07 0729)      No acute intracranial abnormality  Microangiopathic changes  Findings were directly discussed with Nick Barrientos on 3/7/2018 8:23 AM       Workstation performed: [id number]         MRI inpatient order    (Results Pending)              Procedures  ECG 12 Lead Documentation  Date/Time: 3/7/2018 8:27 AM  Performed by: Leighton Vargas  Authorized by: Leighton Vargas     Indications / Diagnosis:  Weakness  ECG reviewed by me, the ED Provider: yes    Patient location:  ED  Previous ECG:     Previous ECG:  Compared to current    Similarity:  No change  Comments:      Sinus rhythm, rate 79, normal axis, normal intervals, PACs noted that are unchanged from previous EKG on 02/26/2018  No acute ST elevations noted      CriticalCare Time  Performed by: Leighton Vargas  Authorized by: Leighton Vargas     Critical care provider statement:     Critical care time (minutes):  30    Critical care was necessary to treat or prevent imminent or life-threatening deterioration of the following conditions:  CNS failure or compromise    Critical care was time spent personally by me on the following activities:  Blood draw for specimens, obtaining history from patient or surrogate, development of treatment plan with patient or surrogate, discussions with consultants, discussions with primary provider, evaluation of patient's response to treatment, examination of patient, review of old charts, re-evaluation of patient's condition, ordering and review of radiographic studies, ordering and review of laboratory studies, ordering and performing treatments and interventions and interpretation of cardiac output measurements           Phone Contacts  ED Phone Contact    ED Course  ED Course as of Mar 07 0940   Wed Mar 07, 2018   0808 Finger stick glucose: 106    0809 Case discussed with neurologist on call, Dr Otoniel Belcher, who recommends CT brain and CTA head/neck if GFR is within normal range  Admit for further CVA workup     0820 Case discussed with Dr Giovanni Siegel, CT brain is unremarkable  1074 Case discussed with radiologist, Dr Raghav Rodriguez who notes old infarct and stenosis however no new acute findings  NIH Stroke Scale    Flowsheet Row Most Recent Value   Level of Consciousness (1a )  0 Filed at: 03/07/2018 0804   LOC Questions (1b )  0 Filed at: 03/07/2018 0804   LOC Commands (1c )  0 Filed at: 03/07/2018 0804   Best Gaze (2 )  0 Filed at: 03/07/2018 0804   Visual (3 )  0 Filed at: 03/07/2018 0804   Facial Palsy (4 )  1 Filed at: 03/07/2018 0804   Motor Arm, Left (5a )  0 Filed at: 03/07/2018 5335   Motor Arm, Right (5b )  0 Filed at: 03/07/2018 7046   Motor Leg, Left (6a )  1 [Patient with chronic lymphedema and baseline left lower extremity weakness ] Filed at: 03/07/2018 0804   Motor Leg, Right (6b )  0 Filed at: 03/07/2018 0804   Limb Ataxia (7 )  0 Filed at: 03/07/2018 0188   Sensory (8 )  1 Filed at: 03/07/2018 0804   Best Language (9 )  0 Filed at: 03/07/2018 0804   Dysarthria (10 )  1 Filed at: 03/07/2018 6415   Extinction and Inattention (11 ) (Formerly Neglect)  0 Filed at: 03/07/2018 0804   Total  4 Filed at: 03/07/2018 5202                        MDM  Number of Diagnoses or Management Options  CVA (cerebral vascular accident) [facility]): new and requires workup  Weakness: new and requires workup  Diagnosis management comments: Stroke alert activated as patient's symptoms are within 24 hours of onset  Obtain blood work, CT brain, CTA head/neck to rule out any acute infarcts and thrombosis  Continue to monitor patient         Amount and/or Complexity of Data Reviewed  Clinical lab tests: reviewed and ordered  Tests in the radiology section of CPT®: ordered and reviewed  Tests in the medicine section of CPT®: reviewed and ordered  Review and summarize past medical records: yes  Discuss the patient with other providers: yes  Independent visualization of images, tracings, or specimens: yes    Risk of Complications, Morbidity, and/or Mortality  General comments: Patient presenting with stroke-like symptoms  Her last known normal was 9:10 p m  last night  No acute findings noted on CT brain and CTA head/neck  Case was discussed with neurologist and patient is admitted for further evaluation of CVA  MRI brain is ordered and will be done on an inpatient basis  Patient agrees with admission plans  Patient Progress  Patient progress: stable    The patient presented with a condition in which there was a high probability of imminent or life-threatening deterioration, and critical care services (excluding separately billable procedures) totalled 30-74 minutes  Disposition  Final diagnoses:   Weakness   CVA (cerebral vascular accident) [facility])     Time reflects when diagnosis was documented in both MDM as applicable and the Disposition within this note     Time User Action Codes Description Comment    3/7/2018  8:05 AM Yusuf Peña Add [R53 1] Weakness     3/7/2018  9:31 AM Khang Villasenor Add [I63 9] CVA (cerebral vascular accident) [facility])       ED Disposition     ED Disposition Condition Comment    Admit  Case was discussed with Dr Jose Sullivan and the patient's admission status was agreed to be Admission Status: inpatient status to the service of Dr Jose Sullivan  Follow-up Information    None       Patient's Medications   Discharge Prescriptions    No medications on file     No discharge procedures on file      ED Provider  Electronically Signed by           Briseida Quinn DO  03/07/18 6164

## 2018-03-07 NOTE — PLAN OF CARE
Problem: RESPIRATORY - ADULT  Goal: Achieves optimal ventilation and oxygenation  INTERVENTIONS:  - Assess for changes in respiratory status  - Assess for changes in mentation and behavior  - Position to facilitate oxygenation and minimize respiratory effort  - Oxygen administration by appropriate delivery method based on oxygen saturation (per order) or ABGs  - Encourage broncho-pulmonary hygiene including cough, deep breathe, Incentive Spirometry  - Assess and instruct to report SOB or any respiratory difficulty  - Respiratory Therapy support as indicated  Outcome: Progressing      Comments: Patient ordered for as needed nebulizer treatments  Will update POC x 3 days 3/10/18  Elba Asp, RRT

## 2018-03-07 NOTE — PROGRESS NOTES
Dear Brigido Pineda,  My name is Molly and I am a Registered Nurse and Care Coordinator for [address]  We recently spoke  on the phone regarding your hospital stay and you opted to receive follow-up phone calls from me  Because of the reason that you were in the hospital, Medicare has placed you in a program called [facility]  One of the benefits  of the program is that you can have a nurse call regularly to answer any questions or concerns you may have  My phone number is listed below in case you would need to contact me        Sincerely,     Jaja [address]  [phone]          Electronically signed by:Trena Saha RN  Mar 17 2017  3:12PM EST

## 2018-03-07 NOTE — PLAN OF CARE
Activity Intolerance/Impaired Mobility     Mobility/activity is maintained at optimum level for patient Progressing        Communication Impairment     Ability to express needs and understand communication [address] Discharge to home or other facility with appropriate resources Progressing        INFECTION - ADULT     Absence or prevention of progression during hospitalization Progressing     Absence of fever/infection during neutropenic period Progressing        Knowledge Deficit     Patient/family/caregiver demonstrates understanding of disease process, treatment plan, medications, and discharge instructions Progressing        Neurological Deficit     Neurological status is stable or improving Progressing        Nutrition     Nutrition/Hydration status is improving Progressing        PAIN - ADULT     Verbalizes/displays adequate comfort level or baseline comfort level Progressing        Potential for Aspiration     Non-ventilated patient's risk of aspiration is minimized Progressing     Ventilated patient's risk of aspiration is minimized Progressing        Potential for Falls     Patient will remain free of falls Progressing        SAFETY ADULT     Maintain or return to baseline ADL function Progressing     Maintain or return mobility status to optimal level Progressing

## 2018-03-08 PROBLEM — I50.32 CHRONIC DIASTOLIC CHF (CONGESTIVE HEART FAILURE), NYHA CLASS 1 (HCC): Status: ACTIVE | Noted: 2018-03-08

## 2018-03-08 PROBLEM — I63.50 RIGHT PONTINE STROKE (HCC): Status: ACTIVE | Noted: 2018-03-08

## 2018-03-08 PROCEDURE — 97535 SELF CARE MNGMENT TRAINING: CPT

## 2018-03-08 PROCEDURE — G8996 SWALLOW CURRENT STATUS: HCPCS

## 2018-03-08 PROCEDURE — 97167 OT EVAL HIGH COMPLEX 60 MIN: CPT

## 2018-03-08 PROCEDURE — 99232 SBSQ HOSP IP/OBS MODERATE 35: CPT | Performed by: INTERNAL MEDICINE

## 2018-03-08 PROCEDURE — 92610 EVALUATE SWALLOWING FUNCTION: CPT

## 2018-03-08 PROCEDURE — G8998 SWALLOW D/C STATUS: HCPCS

## 2018-03-08 PROCEDURE — G8988 SELF CARE GOAL STATUS: HCPCS

## 2018-03-08 PROCEDURE — G8997 SWALLOW GOAL STATUS: HCPCS

## 2018-03-08 PROCEDURE — 99222 1ST HOSP IP/OBS MODERATE 55: CPT | Performed by: PHYSICIAN ASSISTANT

## 2018-03-08 PROCEDURE — G8978 MOBILITY CURRENT STATUS: HCPCS

## 2018-03-08 PROCEDURE — 94760 N-INVAS EAR/PLS OXIMETRY 1: CPT

## 2018-03-08 PROCEDURE — 97163 PT EVAL HIGH COMPLEX 45 MIN: CPT

## 2018-03-08 PROCEDURE — G8979 MOBILITY GOAL STATUS: HCPCS

## 2018-03-08 PROCEDURE — G8987 SELF CARE CURRENT STATUS: HCPCS

## 2018-03-08 PROCEDURE — 99222 1ST HOSP IP/OBS MODERATE 55: CPT | Performed by: PSYCHIATRY & NEUROLOGY

## 2018-03-08 RX ADMIN — ATORVASTATIN CALCIUM 40 MG: 40 TABLET, FILM COATED ORAL at 22:03

## 2018-03-08 RX ADMIN — METOPROLOL TARTRATE 50 MG: 50 TABLET ORAL at 00:39

## 2018-03-08 RX ADMIN — FLUTICASONE PROPIONATE AND SALMETEROL 1 PUFF: 50; 250 POWDER RESPIRATORY (INHALATION) at 00:41

## 2018-03-08 RX ADMIN — APIXABAN 5 MG: 5 TABLET, FILM COATED ORAL at 08:56

## 2018-03-08 RX ADMIN — OXYBUTYNIN CHLORIDE 5 MG: 5 TABLET, FILM COATED, EXTENDED RELEASE ORAL at 09:00

## 2018-03-08 RX ADMIN — FLUTICASONE PROPIONATE AND SALMETEROL 1 PUFF: 50; 250 POWDER RESPIRATORY (INHALATION) at 09:00

## 2018-03-08 RX ADMIN — PREGABALIN 300 MG: 100 CAPSULE ORAL at 21:53

## 2018-03-08 RX ADMIN — METOPROLOL TARTRATE 50 MG: 50 TABLET ORAL at 21:53

## 2018-03-08 RX ADMIN — METOPROLOL TARTRATE 50 MG: 50 TABLET ORAL at 08:56

## 2018-03-08 RX ADMIN — ALPRAZOLAM 0.5 MG: 0.5 TABLET ORAL at 21:53

## 2018-03-08 RX ADMIN — APIXABAN 5 MG: 5 TABLET, FILM COATED ORAL at 18:32

## 2018-03-08 RX ADMIN — PREGABALIN 300 MG: 100 CAPSULE ORAL at 08:56

## 2018-03-08 RX ADMIN — PANTOPRAZOLE SODIUM 40 MG: 40 TABLET, DELAYED RELEASE ORAL at 06:30

## 2018-03-08 RX ADMIN — FLUTICASONE PROPIONATE AND SALMETEROL 1 PUFF: 50; 250 POWDER RESPIRATORY (INHALATION) at 21:55

## 2018-03-08 NOTE — OCCUPATIONAL THERAPY NOTE
Occupational Therapy Evaluation/Treatment     03/08/18 1138   Note Type   Note type Eval/Treat   Restrictions/Precautions   Other Precautions Contact/isolation; Chair Alarm; Bed Alarm;O2;Fall Risk;Pain   Pain Assessment   Pain Assessment 0-10   Pain Score 7   Pain Type Chronic pain  (fibromyalgia)   Pain Location Generalized   Hospital Pain Intervention(s) Repositioned; Ambulation/increased activity; Distraction; Emotional support   Response to Interventions some relief   Home Living   Type of Home Apartment  ([facility])   Home Layout One level;Elevator   Bathroom Shower/Tub Tub/shower unit   Bathroom Toilet Standard   Bathroom Equipment Grab bars in shower; Shower chair   [address] Cane;Walker   Prior Function   Level of [place] Needs assistance with ADLs and functional mobility   Lives With Alone   Receives Help From Home health  (2x/week for showering)   ADL Assistance Needs assistance  (Indep with sponge bathing while standing at sink and dressin)   IADLs Independent   Falls in the last 6 months 1 to 4   Comments uses SPC in home, RW outside   Psychosocial   Psychosocial (WDL) WDL   Subjective   Subjective "I've been in and out of here 5 times in the past few months "   ADL   Where Assessed Chair   Eating Assistance 5  Supervision/Setup   Eating Deficit (mostly using non-dominant hand at this time)   Grooming Assistance 4  Minimal Assistance   UB Bathing Assistance 4  Minimal Assistance   LB Bathing Assistance 3  Moderate Assistance   UB Dressing Assistance 4  Minimal [facility] 3  Moderate [address]  4  Minimal Assistance   Bed Mobility   Supine to Sit 4  Minimal assistance   Additional items Assist x 1   Transfers   Sit to Stand 5  Supervision   Stand to Sit 5  Supervision   Stand pivot 4  Minimal assistance   Additional items Assist x 1   Balance   Static Sitting Fair +   Dynamic Sitting [address] Dynamic Standing Poor +   Ambulatory Fair -  (with RW)   Activity Tolerance   Activity Tolerance Patient limited by fatigue;Patient limited by pain   Nurse Made Aware Magi Alonso RN   RUE Assessment   RUE Assessment WFL   LUE Assessment   LUE Assessment X   LUE Overall AROM   L Shoulder Flexion 60   LUE Strength   L Shoulder Flexion 3-/5   LUE Overall Strength (3+/5 elbow to hand)   Hand Function   Gross Motor Coordination Impaired  (Finger to nose and diadochokinesis intact, but slow LUE)   Fine Motor Coordination Impaired  (opposition and pinch patterns intact but slow/deliberate LUE)   Sensation   Light Touch No apparent deficits   Proprioception   Proprioception No apparent deficits   Vision-Basic Assessment   Current Vision Wears glasses only for reading   Visual History Cataracts; Corrective eye surgery   Vision - Complex Assessment   Ocular Range of Motion [facility]   Perception   Inattention/Neglect Appears intact   Spatial Orientation Appears intact   Motor Planning Appears intact   Cognition   Overall Cognitive Status WFL   Arousal/Participation Alert; Cooperative   Attention Attends with cues to redirect   Orientation Level Oriented X4   Memory Within functional limits   Following Commands Follows multistep commands with increased time or repetition   Assessment   Limitation Decreased ADL status; Decreased UE ROM; Decreased UE strength;Decreased endurance;Decreased fine motor control;Decreased self-care trans;Decreased high-level ADLs  (decreased balance)   Prognosis Good   Assessment Patient evaluated by Occupational Therapy  Patient admitted with Right pontine stroke ([facility] [address] )  The patients occupational profile, medical and therapy history includes a extensive additional review of physical, cognitive, or psychosocial history related to current functional performance    Comorbidities affecting functional mobility and ADLS include: asthma, atrial fibrillation on Eliquis, fibromyalgia, hypokalemia, GERD, 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, left lower extremity lymphedema, syncope, CHF, multiple hospital admissions in past 6 months  Prior to admission, patient was independent with functional mobility with SPC in home/RW outside, requiring assist for ADLS, independent with IADLS and ambulating household distance, living alone in a senior [facility] building with elevator  The evaluation identifies the following performance deficits: weakness, decreased ROM, impaired balance, decreased endurance, decreased coordination, increased fall risk, new onset of impairment of functional mobility, decreased ADLS, decreased IADLS, pain, decreased activity tolerance, SOB upon exertion and decreased strength, that result in activity limitations and/or participation restrictions  This evaluation requires clinical decision making of high complexity, because the patient presents with comorbidites that affect occupational performance and required significant modification of tasks or assistance with consideration of multiple treatment options  The Barthel Index was used as a functional outcome tool presenting with a score of 50, indicating marked limitations of functional mobility and ADLS  Patient will benefit from skilled Occupational Therapy services to address above deficits and facilitate a safe return to prior level of function  Goals   Patient Goals go home   STG Time Frame 3-5   Short Term Goal #1 Patient will increase standing tolerance to 4 minutes during functional activity; Patient will increase bed mobility to supervision;  Patient will increase functional mobility to and from bathroom with rolling walker with min assist to increase performance with ADLS; Patient will tolerate 10 minutes of UE ROM/strengthening/coordination to increase general activity tolerance and performance in ADLS/IADLS; Patient will improve functional activity tolerance to 10 minutes of sustained functional tasks to increase participation in basic self-care and decrease assistance level  LTG Time Frame 10-14   Long Term Goal #1 Patient will increase standing tolerance to 8 minutes during functional activity; Patient will increase bed mobility to independent; Patient will increase functional mobility to and from bathroom with rolling walker independently to increase performance with ADLS; Patient will tolerate 15 minutes of UE ROM/strengthening/coordination to increase general activity tolerance and performance in ADLS/IADLS; Patient will improve functional activity tolerance to 15 minutes of sustained functional tasks to increase participation in basic self-care and decrease assistance level  Functional Transfer Goals   Pt Will Perform All Functional Transfers With mod indep; With assistive devices; With good judgment/safety  (LTG - Independent with AD)   ADL Goals   Pt Will Perform Grooming Standing at sink; With stand by assist  (LTG - Independent )   Pt Will Perform Bathing Standing at sink; With min assist  (LTG - Independent )   Pt Will Perform UE Dressing At edge of bed; With stand by assist  (LTG - Independent )   Pt Will Perform LE Dressing At edge of bed; With stand by assist  (LTG - Independent )   Pt Will Perform Toileting With stand by assist  (LTG - Independent )   Plan   Treatment Interventions ADL retraining;Functional transfer training;UE strengthening/ROM; Endurance training;Patient/family training;Equipment evaluation/education; Neuromuscular reeducation; Fine motor coordination activities; Compensatory technique education; Energy conservation   OT Frequency 3-5x/wk   Additional Treatment Session   Start Time 1120   End Time 1138   Treatment Assessment Pt seen for ADL training  Functional ambulation to the bathroom with Shaan and RW  Toileting Shaan and cues  Pt able to stand at sink to wash hands and face with Shaan for steadying  Returned to chair for rest due to SOB despite 3L continuous O2 via NC  Pt able to open containers and setup lunch meal with Shaan  Feeding self supervision using right non-dominant hand more because of IV in LUE and decreased LUE GMC/FMC  Pt encouraged to use LUE as much as possible to faciliate motor return  Cont OT per POC  Recommendation   OT Discharge Recommendation (home with services vs STR, pending progress)   Barthel Index   Feeding 5   Bathing 0   Grooming Score 0   Dressing Score 5   Bladder Score 10   Bowels Score 10   Toilet Use Score 5   Transfers (Bed/Chair) Score 10   Mobility (Level Surface) Score 0   Stairs Score 5   Barthel Index Score 50     Modified [place] Scale   Modified [place] Scale 4       Patient left OOB in chair with all needs within reach, tab alarm in place

## 2018-03-08 NOTE — SPEECH THERAPY NOTE
Speech Language/Pathology  Bedside Swallowing Evaluation     03/08/18 1148   Swallow Information   Current Risks for Dysphagia & Aspiration Respiratory compromise;New Neuro event   Current Symptoms/Concerns (no signs/symptoms of dysphagia per RN and OT )   Current Diet Regular; Thin liquid   Baseline Diet Regular; Thin liquids   Baseline Assessment   Behavior/Cognition Alert; Cooperative; Interactive   Speech/Language Status (WNL)   Patient Positioning Upright in chair   Swallow Mechanism Exam   Labial Symmetry WFL   Labial Strength WFL   Labial ROM WFL   Labial Sensation WFL   Facial Symmetry WFL   Facial Strength WFL   Facial ROM WFL   Facial Sensation WFL   Lingual Symmetry WFL   Lingual Strength WFL   Lingual ROM WFL   Lingual Sensation WFL   Velum WFL   Gag (did not assess )   Mandible WFL   Dentition Partial dentition   Volitional Cough Strong   Tracheostomy No   Consistencies Assessed and Performance   Materials Admnistered Regular/Solid; Thin liquid   Materials Adminstered Comment (see above )   Oral Stage Moderate impaired; With limited texture   Oral Stage Comment Delay in oral preparation for solids due to missing molars  Phargngeal Stage WFL   Pharyngeal Stage Comment No delay in the initiation of the swallow, adequate laryngeal elevation, no coughing or choking during or following the swallow, clear voice quality following all swallows  Swallow Mechanics WFL   Esophageal Concerns Hx GERDS   Strategies and Efficacy smaller bites to reduce oral prep time for solids  Summary   Swallow Summary Swallow skills are safe/WFL for regular solids and thin liquids     Recommendations   Risk for Aspiration None   Recommendations Consider oral diet   Diet Solid Recommendation Regular consistency   Diet Liquid Recommendation Thin liquid   Recommended Form of Meds As desired   Compensatory Swallowing Strategies (smaller bites of solid food, alternate liquids with solids )   Further Evaluations (none required )   Results Reviewed with RN;PT/Family/Caregiver   Treatment Recommendations   Duration of treatment Treatment of dysphagia is not required     Follow up treatments (not required )   Dysphagia Goals Patient will tolerate recommended diet without observed clinical signs of oral/pharngeal dysphagia   Speech Therapy Prognosis   Prognosis Good   Prognosis Considerations Patient Participation Level;Previous Level of Function

## 2018-03-08 NOTE — ASSESSMENT & PLAN NOTE
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

## 2018-03-08 NOTE — RESPIRATORY THERAPY NOTE
RT Protocol Note  Jeny Sirpura 81 y o  female MRN: [id number]  Unit/Bed#: [address] [place] [address] Encounter: [id number]    Assessment    Principal Problem:    Syncope  Active Problems:    Hypertension    Carotid stenosis, left    Obesity (BMI 30-39  9)    GERD (gastroesophageal reflux disease)    Hyperlipemia    Paroxysmal atrial fibrillation (HCC)    Fibromyalgia    Hypokalemia    History of Merkel cell carcinoma    Fall      Home Pulmonary Medications:  ACCUNEB Q6 prn, Spiriva    Past Medical History:   Diagnosis Date    Asthma     Cancer ([facility] [address] )     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    Cardiac disease     a fib    Fibromyalgia     Fibromyalgia, primary     GERD (gastroesophageal reflux disease)     Hypertension     Hypokalemia     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, left groin, lung and bladder  Patient treated with chemotherapy and radiation     Social History     Social History    Marital status:      Spouse name: N/A    Number of children: N/A    Years of education: N/A     Social History Main Topics    Smoking status: Never Smoker    Smokeless tobacco: Never Used      Comment: never smoke    Alcohol use No    Drug use: No    Sexual activity: No     Other Topics Concern    None     Social History Narrative    None       Subjective    Subjective Data: Pt states her breathing is good today    Objective    Physical Exam:   Assessment Type: Assess only  General Appearance: Alert, Awake  Respiratory Pattern: Normal  Chest Assessment: Chest expansion symmetrical  Bilateral Breath Sounds: Diminished  R Breath Sounds: Diminished  L Breath Sounds: Diminished  Cough: None  O2 Device: 2l/m NC    Vitals:  Blood pressure 128/59, pulse 66, temperature 97 5 °F (36 4 °C), temperature source Tympanic, resp  rate 18, weight 91 5 kg (201 lb 11 5 oz), SpO2 95 %, not currently breastfeeding            Imaging and other studies: I have personally reviewed pertinent reports        O2 Device: 2l/m NC     Plan             Resp Comments: pt awake and alert no distress noted no tx indicated at this time

## 2018-03-08 NOTE — ASSESSMENT & PLAN NOTE
High-grade L ICA stenosis  --patient previously evaluated and refused elective L CEA in October 2016  --obtain carotid duplex  --await neurology consult/opinion regarding etiology of symptoms  --continue statin therapy  --continue Eliquis secondary to PAF (ASA/plavix discontinued during last hospitalization and changed to Eliquis)  --patient now agreeable to L CEA  --will d/w Dr Patria Cox  --will follow w/formal recommendations to follow after review of carotid duplex and neurology consult  --discussed at length with Internal Medicine  Thank you for allowing us to participate in the care of this patient

## 2018-03-08 NOTE — ASSESSMENT & PLAN NOTE
Faint increased signal right gena common possible ischemia  --await neurology consult  --continue medical management

## 2018-03-08 NOTE — PLAN OF CARE
Problem: SLP ADULT - SWALLOWING, IMPAIRED  Goal: Initial SLP swallow eval performed  Outcome: Completed Date Met: 03/08/18

## 2018-03-08 NOTE — CONSULTS
Consult- Priyanka Parker 1935, 81 y o  female MRN: [id number]    Unit/Bed#: [address] [place] [address] Encounter: [id number]    Primary Care Provider: Marky Adhikari DO   Date and time admitted to hospital: 3/7/2018  8:03 AM      Inpatient consult to Vascular Surgery  Consult performed by: Yoselyn Rucker ordered by: Darren Drake          Carotid stenosis, left   Assessment & Plan    High-grade L ICA stenosis  --patient previously evaluated and refused elective L CEA in October 2016  --obtain carotid duplex  --await neurology consult/opinion regarding etiology of symptoms  --continue statin therapy  --continue Eliquis secondary to PAF (ASA/plavix discontinued during last hospitalization and changed to Eliquis)  --patient now agreeable to L CEA  --will d/w Dr Iris Flynn  --will follow w/formal recommendations to follow after review of carotid duplex and neurology consult  --discussed at length with Internal Medicine  Thank you for allowing us to participate in the care of this patient          * Right pontine stroke ([address] )   Assessment & Plan    Faint increased signal right gena common possible ischemia  --await neurology consult  --continue medical management        History of Merkel cell carcinoma   Assessment & Plan    History of Merkel cell carcinoma, s/p L femoral lymph node dissection  --stable with residual chronic left lower extremity weakness        Fibromyalgia   Assessment & Plan    --continue current management per medical team        Paroxysmal atrial fibrillation [facility])   Assessment & Plan    --continue Eliquis per primary service  --TTE 1/6/18 with no evidence of atrial enlargement or thrombus        Hyperlipemia   Assessment & Plan    Stable  --continue statin therapy        Syncope   Assessment & Plan    Recurrence syncope without valvular disease  --await neurology consult  --TTE with preserved LV function, no regional wall motion abnormality and without evidence of aortic stenosis or significant valvular disease  Meningioma (HCC)   Assessment & Plan    2 8 x 1 8 cm 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 area of sphenoid wing with mild mass effect  --management per Neurology  --patient refuses workup        Hypertension   Assessment & Plan    --continue management per primary service                Consulting Service: SLIM    Chief Complaint:  Recurrent syncope      HPI: Aimee Fierro is a 81 y o  female known to the vascular surgery service secondary to asymptomatic high-grade left carotid artery stenosis and history HTN, fibromyalgia, PAF on Eliquis (started 2 months ago), Merkel cell carcinoma s/p left femoral lymph node dissection, left medial cranial fossa mass likely meningioma and known asymptomatic high-grade left carotid artery stenosis who presents with recurrent syncope  Patient was last evaluated by vascular surgery in September 2016 when she was admitted for syncopal episode  She was found to have an asymptomatic high-grade left carotid artery stenosis (velocity 471/26 with ratio 7 72) for which L CEA was recommended but the patient refused  Patient had no prior history CVA or TIA  Patient is now admitted with a syncopal episode yesterday  She remembers going into her bathroom at 9:00 p m  and next memory is waking up on the floor of the bathroom at 3:00 a m  Дмитрий Puri She was weak and crawled to her phone to call her neighbor and 911  She denies facial droop, amaurosis fugax, ataxia or right-sided weakness  She complains of left shoulder weakness since her syncopal episode     She reports episodes of somewhat garbled speech on the previous weeks as well as left-sided facial paresthesias  Brain MRI 3/7/2018 demonstrates a faint increased signal of the right gena possibly representing ischemia but no evidence of left hemispheric CVA  Again noted is a 2 8 x 1 8 cm left intracranial dural mass arising from the sphenoid wing with mild mass effect    CTA of the head/neck on 3/7/2018 again demonstrates a high-grade left ICA stenosis of 85% and no significant right ICA stenosis  This remains unchanged from last CTA of 9/23/2016  TTE on 1/6/2018 demonstrates preserved left ventricular function with the EF 75%, no regional wall motion abnormality, no AS/AI, mild MR, mild TR and no atrial enlargement or thrombus  Presently, the patient continues to complain of left shoulder weakness only  Vascular surgery is consulted for evaluation of left carotid artery stenosis  Patient previously was on aspirin and Plavix which was discontinued and changed Eliquis the time of her last hospitalization January 2018  She has had multiple recent hospitalizations secondary to a myriad of symptoms to include dyspnea and syncope  Review of Systems:  General: negative  Cardiovascular: no chest pain or dyspnea on exertion  Respiratory: no cough, shortness of breath, or wheezing  Gastrointestinal: no abdominal pain, change in bowel habits, or black or bloody stools  Genitourinary ROS: no dysuria, trouble voiding, or hematuria  Musculoskeletal ROS:   As pe the above HPI   Neurological ROS:  As per the above HPI  Hematological and Lymphatic ROS: negative  Dermatological ROS: negative  Psychological ROS: negative  Ophthalmic ROS: negative  ENT ROS: negative    Past Medical History:  Past Medical History:   Diagnosis Date    Asthma     Cancer ([facility] [address] )     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    Cardiac disease     a fib    Fibromyalgia     Fibromyalgia, primary     GERD (gastroesophageal reflux disease)     Hypertension     Hypokalemia     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, left groin, lung and bladder    Patient treated with chemotherapy and radiation       Past Surgical History:  Past Surgical History:   Procedure Laterality Date    APPENDECTOMY      CATARACT EXTRACTION      CHEST WALL BIOPSY N/A 10/27/2017    Procedure: SINUS EXCISION AND REMOVAL OF FOREIGN BODY, ABDOMEN (WOUND EXPLORATION); Surgeon: Salina Powers MD;  Location: [address] [place] [address];  Service: General    EYE SURGERY Bilateral     cataracts     HIP FRACTURE SURGERY Left     HYSTERECTOMY      JOINT REPLACEMENT Left     hip    LYMPHADENECTOMY      PORTACATH PLACEMENT Right        Social History:  History   Alcohol Use No     History   Drug Use No     History   Smoking Status    Never Smoker   Smokeless Tobacco    Never Used     Comment: never smoke       Family History:  Family History   Problem Relation Age of Onset    Pneumonia Mother     Heart disease Father        Allergies: Allergies   Allergen Reactions    Fentanyl And Related Other (See Comments)     Passed out    Latex     Other     Penicillins        Medications:  Current Facility-Administered Medications   Medication Dose Route Frequency    acetaminophen (TYLENOL) tablet 650 mg  650 mg Oral Q6H PRN    albuterol inhalation solution 0 63 mg  0 63 mg Nebulization Q6H PRN    ALPRAZolam (XANAX) tablet 0 5 mg  0 5 mg Oral HS PRN    apixaban (ELIQUIS) tablet 5 mg  5 mg Oral BID    atorvastatin (LIPITOR) tablet 40 mg  40 mg Oral HS    fluticasone-salmeterol (ADVAIR) 250-50 mcg/dose inhaler 1 puff  1 puff Inhalation Q12H [address]    metoprolol tartrate (LOPRESSOR) tablet 50 mg  50 mg Oral Q12H CORBIN    oxybutynin (DITROPAN-XL) 24 hr tablet 5 mg  5 mg Oral Daily    pantoprazole (PROTONIX) EC tablet 40 mg  40 mg Oral Early Morning    pregabalin (LYRICA) capsule 300 mg  300 mg Oral BID       Vitals:  /62 (BP Location: Left arm)   Pulse 66   Temp 97 8 °F (36 6 °C) (Tympanic)   Resp 18   Wt 91 5 kg (201 lb 11 5 oz)   SpO2 95%   BMI 36 90 kg/m²     I/Os:  I/O last 24 hours:   In: 240 [P O :240]  Out: -     Lab Results and Cultures:   Lab Results   Component Value Date    WBC 8 70 03/07/2018    HGB 12 7 03/07/2018    HCT 40 6 03/07/2018    MCV 80 (L) 03/07/2018     03/07/2018     Lab Results   Component Value Date    GLUCOSE 107 03/07/2018    CALCIUM 9 2 03/07/2018     03/07/2018    K 3 4 (L) 03/07/2018    CO2 41 (H) 03/07/2018    CL 99 (L) 03/07/2018    BUN 17 03/07/2018    CREATININE 1 07 03/07/2018     Lab Results   Component Value Date    INR 1 03 03/07/2018    INR 1 01 02/26/2018    INR 1 02 01/05/2018    PROTIME 10 8 03/07/2018    PROTIME 10 6 02/26/2018    PROTIME 10 7 01/05/2018       Lipid Panel:   Lab Results   Component Value Date    CHOL 167 01/06/2018   ,     Blood Culture:   Lab Results   Component Value Date    BLOODCX No Growth After 5 Days  01/23/2018   ,   Urinalysis:   Lab Results   Component Value Date    COLORU Light Yellow 01/08/2018    CLARITYU Slightly Cloudy 01/08/2018    SPECGRAV <=1 005 01/08/2018    PHUR 5 5 01/08/2018    LEUKOCYTESUR Negative 01/08/2018    NITRITE Negative 01/08/2018    PROTEINUA Negative 01/08/2018    GLUCOSEU Negative 01/08/2018    KETONESU Negative 01/08/2018    BILIRUBINUR Negative 01/08/2018    BLOODU Negative 01/08/2018   ,   Urine Culture:   Lab Results   Component Value Date    URINECX >100,000 cfu/ml Mixed Contaminants X5 09/21/2016   ,   Wound Culure: No results found for: WOUNDCULT    Imaging:  CTA head/neck 3/7/2018:  Imaging study reviewed and as described above  Please see full report below:  "FINDINGS:  CERVICAL VASCULATURE     AORTIC ARCH AND GREAT VESSELS:  Mild calcification of the aortic arch  No stenosis of the great vessels  Normal subclavian vasculature      RIGHT VERTEBRAL ARTERY CERVICAL SEGMENT:  Normal origin  The vessel is normal in caliber throughout the neck      LEFT VERTEBRAL ARTERY CERVICAL SEGMENT:  Normal origin  The vessel is normal in caliber throughout the neck      RIGHT EXTRACRANIAL CAROTID SEGMENT:  Mild tortuosity of the right common carotid artery  Normal bifurcation  No stenosis    Cervical internal carotid artery demonstrates tortuosity as well without stenosis      LEFT EXTRACRANIAL CAROTID SEGMENT:  Mild tortuosity of the common carotid artery  Mild atherosclerotic disease of the distal common carotid artery with calcified and noncalcified plaque formation extending into the bifurcation  There is severe stenosis   of the left internal carotid artery origin best seen on series 2 image 167  Point of maximal stenosis measures approximately 1 mm  The left internal carotid artery is otherwise decreased in caliber throughout the neck  This is unchanged compared to   9/23/2016 CT angiography  Given the diffuse decreased caliber of the left internal carotid artery, comparison to the mid right cervical internal carotid artery which measures 6 mm, corresponds to a stenosis of at least 85%      NASCET criteria was used to determine the degree of internal carotid artery diameter stenosis      INTRACRANIAL VASCULATURE      INTERNAL CAROTID ARTERIES:  Mild calcification of the cavernous internal carotid arteries bilaterally  Again the left intracranial internal carotid artery is diffusely decreased in caliber compared to the opposite right      ANTERIOR CIRCULATION:  Symmetric A1 segments and anterior cerebral arteries with normal enhancement  Normal anterior communicating artery      MIDDLE CEREBRAL ARTERY CIRCULATION:  M1 segments are patent bilaterally  Mild intracranial atherosclerotic disease involving the left middle cerebral artery branches with no occlusive disease      DISTAL VERTEBRAL ARTERIES:  Distal left vertebral artery is dominant compared to the opposite right side which is mildly hypoplastic above the foramen magnum  No focal stenosis      BASILAR ARTERY:  Basilar artery is normal in caliber  Normal superior cerebellar arteries      POSTERIOR CEREBRAL ARTERIES: Normal posterior communicating arteries      DURAL VENOUS SINUSES:  Normal         NON VASCULAR ANATOMY     BONY STRUCTURES:  No acute osseous abnormality    Extensive diffuse paranasal sinus mucosal thickening with evidence of prior funduscopic sinus surgery  No air-fluid levels to suggest acute sinusitis  Mild osteomalacia of the maxilla  Mild diffuse   cervical spondylitic degenerative change      SOFT TISSUES OF THE NECK:  No discrete soft tissue mass or adenopathy  Mild heterogeneity of the thyroid gland with small subcentimeter nodules noted  There is a Port-A-Cath within the right chest extending to the SVC, below the scope of this exam      THORACIC INLET:  Unremarkable       IMPRESSION:  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 artery origin unchanged from prior CT angiogram dated 9/23/2016  This measures at least 85% compared to the mid to distal cervical internal carotid artery  Vascular surgery consultation recommended      Suggestion of mild intracranial atherosclerotic disease involving the left anterior MCA territory  No acute occlusive disease of the cervical or intracranial vasculature "    Brain MRI 3/7/2018:  Imaging study reviewed and as described above  Faint increased signal the right gena suspicious for ischemia  2 8 x 1 8 cm left intracranial mass at the sphenoid wing with mild mass effect    TTE 1/6/2018:  Echo reviewed and as described above  No evidence valvular disease, atrial enlargement or atrial thrombus  Preserved left ventricular function    Carotid duplex 9/22/2016:  Imaging study reviewed previously and again today which demonstrates a 70-99% stenosis of the proximal left ICA, velocity 471/186 with ratio 7 72  Vertebrals are antegrade and no evidence of subclavian stenosis  <50% R ICA  Physical Exam:    General appearance: alert and oriented, in no acute distress  Skin: Skin color, texture, turgor normal  No rashes or lesions  Neurologic: Grossly normal   Tongue midline  Smile symmetrical   Right upper extremity and lower extremity muscle strength 5/5  Left lower extremity muscle strength 4+/5  Left upper extremity / biceps/triceps muscle strength 5/5    Sensation intact light touch bilaterally upper lower extremities equal bilaterally  Head: Normocephalic, without obvious abnormality, atraumatic  Eyes: PERRL  EOMI, sclerae nonicteric  Throat: lips, mucosa, and tongue normal; teeth and gums normal  Neck: no adenopathy, no carotid bruit, no JVD, supple, symmetrical, trachea midline and thyroid not enlarged, symmetric, no tenderness/mass/nodules  Back: symmetric, no curvature  ROM normal  No CVA tenderness  Lungs: clear to auscultation bilaterally  Chest wall: no tenderness  Heart: regular rate and rhythm, S1, S2 normal, no murmur, click, rub or gallop  Abdomen: soft, non-tender; bowel sounds normal; no masses,  no organomegaly and Nondistended  No abdominal bruits    Extremities: extremities normal, warm and well-perfused; no cyanosis, clubbing, or edema, no edema, redness or tenderness in the calves or thighs and no ulcers, gangrene or trophic changes      Pulse exam:  Radial: Right: 2+ Left[de-identified] 2+  Ulnar: Right: 1+ Left[de-identified] 1+      Lashonda Yang PA-C  3/8/2018  [facility], [phone]

## 2018-03-08 NOTE — ASSESSMENT & PLAN NOTE
--continue Eliquis per primary service  --TTE 1/6/18 with no evidence of atrial enlargement or thrombus

## 2018-03-08 NOTE — PHYSICIAN ADVISOR
Current patient class: Inpatient  The patient is currently on Hospital Day: 2      The patient was admitted to the hospital at 0930 on 3/7/18 for the following diagnosis:  Altered mental status [R41 82]  Weakness [R53 1]  CVA (cerebral vascular accident) ([address] ) [I63 9]  Carotid stenosis, left [I65 22]  Left carotid stenosis [I65 22]  Syncope, unspecified syncope type [R55]       There is documentation in the medical record of an expected length of stay of at least 2 midnights  The patient is therefore expected to satisfy the 2 midnight benchmark and given the 2 midnight presumption is appropriate for INPATIENT ADMISSION  Given this expectation of a satisfying stay, CMS instructs us that the patient is most often appropriate for inpatient admission under part A provided medical necessity is documented in the chart  After review of the relevant documentation, labs, vital signs and test results, the patient is appropriate for INPATIENT ADMISSION  Admission to the hospital as an inpatient is a complex decision making process which requires the practitioner to consider the patients presenting complaint, history and physical examination and all relevant testing  With this in mind, in this case, the patient was deemed appropriate for INPATIENT ADMISSION  After review of the documentation and testing available at the time of the admission I concur with this clinical determination of medical necessity  Rationale is as follows: The patient is a 81 yrs old Female who presented to the ED at 3/7/2018  8:03 AM with a chief complaint of Altered Mental Status (Pt st she went to the bathroom at 2110 last night and awoke this am on the bathroom floor  Sts she feels like she can't talk properly and the left side of her face feels different)     Patient admitted with a report of wakening on the floor and not being able to speak properly along with a subjective sense of her left side of her face feeling differently    A stoke alert was called and patient was seen by Neurology  A CT did not reveal acute changes  A vascular study did show high grade left ICA stenosis and a MRI revealed possible acute ischemia to the right paramedian gena  A two night admission status to the hospital would be considered appropriate for this patient  The patients vitals on arrival were ED Triage Vitals   Temperature Pulse Respirations Blood Pressure SpO2   03/07/18 0814 03/07/18 0814 03/07/18 0814 03/07/18 0814 03/07/18 0810   99 7 °F (37 6 °C) 73 18 132/74 (!) 86 %      Temp Source Heart Rate Source Patient Position - Orthostatic VS BP Location FiO2 (%)   03/07/18 1634 03/07/18 0934 03/07/18 1100 03/07/18 1100 --   Tympanic Monitor Sitting Right arm       Pain Score       03/07/18 0814       No Pain           Past Medical History:   Diagnosis Date    Asthma     Cancer ([facility] [address] )     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    Cardiac disease     a fib    Fibromyalgia     Fibromyalgia, primary     GERD (gastroesophageal reflux disease)     Hypertension     Hypokalemia     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, left groin, lung and bladder  Patient treated with chemotherapy and radiation     Past Surgical History:   Procedure Laterality Date    APPENDECTOMY      CATARACT EXTRACTION      CHEST WALL BIOPSY N/A 10/27/2017    Procedure: SINUS EXCISION AND REMOVAL OF FOREIGN BODY, ABDOMEN (WOUND EXPLORATION);   Surgeon: Andrés Alfaro MD;  Location: [address] [place] [address];  Service: General    EYE SURGERY Bilateral     cataracts     HIP FRACTURE SURGERY Left     HYSTERECTOMY      JOINT REPLACEMENT Left     hip    LYMPHADENECTOMY      PORTACATH PLACEMENT Right            Consults have been placed to:   IP CONSULT TO NEUROLOGY  IP CONSULT TO VASCULAR SURGERY  IP CONSULT TO CASE MANAGEMENT    Vitals:    03/08/18 0856 03/08/18 1100 03/08/18 1124 03/08/18 1125   BP: 128/59 130/62     BP Location: Left arm Left arm     Pulse: 67 65  66   Resp: 20 20  18   Temp:  97 8 °F (36 6 °C)     TempSrc: Tympanic Tympanic     SpO2: 95% 95% 95% 95%   Weight:           Most recent labs:    Recent Labs      03/07/18   0820   WBC  8 70   HGB  12 7   HCT  40 6   PLT  149   K  3 4*   NA  144   CALCIUM  9 2   BUN  17   CREATININE  1 07   INR  1 03   CKTOTAL  444*       Scheduled Meds:  Current Facility-Administered Medications:  acetaminophen 650 mg Oral Q6H PRN GILDA Melgar   albuterol 0 63 mg Nebulization Q6H PRN Narayan Daniels MD   ALPRAZolam 0 5 mg Oral HS PRN Narayan Daniels MD   apixaban 5 mg Oral BID Narayan Daniels MD   atorvastatin 40 mg Oral HS Narayan Daniels MD   fluticasone-salmeterol 1 puff Inhalation Q12H [facility] Narayan Daniels MD   metoprolol tartrate 50 mg Oral Q12H [facility] Narayan Daniels MD   oxybutynin 5 mg Oral Daily Narayan Daniels MD   pantoprazole 40 mg Oral Early Morning Narayan Daniels MD   pregabalin 300 mg Oral BID Narayan Daniels MD     Continuous Infusions:   PRN Meds:   acetaminophen    albuterol    ALPRAZolam    Surgical procedures (if appropriate):

## 2018-03-08 NOTE — CASE MANAGEMENT
Initial Clinical Review    Admission: Date/Time/Statement: 3/7/18 @ 0930   Orders Placed This Encounter   Procedures    Inpatient Admission (expected length of stay for this patient is greater than two midnights)     Standing Status:   Standing     Number of Occurrences:   1     Order Specific Question:   Admitting Physician     Answer:   Ariadna Sethi     Order Specific Question:   Level of Care     Answer:   Med Surg [16]     Order Specific Question:   Estimated length of stay     Answer:   More than 2 Midnights     Order Specific Question:   Certification     Answer:   I certify that inpatient services are medically necessary for this patient for a duration of greater than two midnights  See H&P and MD Progress Notes for additional information about the patient's course of treatment  ED: Date/Time/Mode of Arrival:   ED Arrival Information     Expected Arrival Acuity Means of Arrival Escorted By Service Admission Type    - 3/7/2018 08:03 Immediate Ambulance 22 [facility]    Arrival Complaint    altered mental status          Chief Complaint:   Chief Complaint   Patient presents with    Altered Mental Status     Pt st she went to the bathroom at 2110 last night and awoke this am on the bathroom floor  Sts she feels like she can't talk properly and the left side of her face feels different       History of Illness:   81 y o  female, lives alone but independenly, with a history of asthma, atrial fibrillation on Eliquis, fibromyalgia, hypokalemia, GERD, 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, left lower extremity lymphedema and weakness at baseline, presented to the ER after possible syncopal episode last night  Patient lives alone at home  She recalls that she went to the bathroom last night at about 9pm but woke up at about 6am in the morning on the floor  She did not recall the events in between   She tried hard to manage to get up from the floor and activated her Life Alert device  She was brought in by EMS  As she reports "sluured speech and tingling sensation on her left face, stroke alert was activated  STAT CTA head/cervical showing (similar compared to 9/23/2016) - 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 artery origin unchanged from prior CT angiogram dated 9/23/2016  MRI is pending  The patient will be admitted for further eval and mgmt  Otherwise, she denied headache, visual blurring, facial droop, new extremity weakness or sensation loss  Her mental status appears to be at her baseline      Of notes, the patient was admitted in 9/2016 for similar episode  Vascular surgery consulted  The patient was also followed as outpatient but she declined any vascular intervention  ED Vital Signs:   ED Triage Vitals   Temperature Pulse Respirations Blood Pressure SpO2   03/07/18 0814 03/07/18 0814 03/07/18 0814 03/07/18 0814 03/07/18 0810   99 7 °F (37 6 °C) 73 18 132/74 (!) 86 %      Temp Source Heart Rate Source Patient Position - Orthostatic VS BP Location FiO2 (%)   03/07/18 1634 03/07/18 0934 03/07/18 1100 03/07/18 1100 --   Tympanic Monitor Sitting Right arm       Pain Score       03/07/18 0814       No Pain        Wt Readings from Last 1 Encounters:   03/08/18 91 5 kg (201 lb 11 5 oz)       Abnormal Labs/Diagnostic Test Results:   WBC Thousand/uL 8 70   HEMOGLOBIN g/dL 12 7   HEMATOCRIT % 40 6   PLATELETS Thousands/uL 149     SODIUM mmol/L 144   POTASSIUM mmol/L 3 4*   CHLORIDE mmol/L 99*   CO2 mmol/L 41*   BUN mg/dL 17   CREATININE mg/dL 1 07   CALCIUM mg/dL 9 2   GLUCOSE RANDOM mg/dL 107     INR   1 03     Chest X:  Stable cardiomegaly and central fullness of the pulmonary vasculature  No acute pulmonary disease  CT brain:  No acute intracranial abnormality  Microangiopathic changes  CTA Neck & Brain:  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 artery origin unchanged from prior CT angiogram dated 9/23/2016    This measures at least 85% compared to the mid to distal cervical internal carotid artery  Vascular surgery consultation recommended  Suggestion of mild intracranial atherosclerotic disease involving the left anterior MCA territory  No acute occlusive disease of the cervical or intracranial vasculature     ED Treatment:   Medication Administration from 03/07/2018 0803 to 03/07/2018 1012       Date/Time Order Dose Route Action Action by Comments     03/07/2018 0826 sodium chloride 0 9 % infusion 125 mL/hr Intravenous 40 [facility], [address] [place] [address]      03/07/2018 1470 iohexol (OMNIPAQUE) 350 MG/ML injection (MULTI-DOSE) 85 mL 85 mL Intravenous Given Wong Starwellington      03/07/2018 0384 aspirin tablet 325 mg 325 mg Oral Given Issa Sellers RN       GCS 15    Past Medical/Surgical History:    Active Ambulatory Problems     Diagnosis Date Noted    Chest pain 04/26/2016    Hypertension 04/27/2016    Acute cholecystitis 09/21/2016    Abdominal pain 09/21/2016    Meningioma ([facility] ) 09/21/2016    Syncope 09/21/2016    Hypertension, accelerated 09/24/2016    Sepsis ([facility] ) 09/24/2016    Carotid stenosis, left 09/25/2016    Obesity (BMI 30-39 9) 01/10/2018    Acute bronchitis 03/13/2017    GERD (gastroesophageal reflux disease) 03/13/2017    Hyperlipemia 03/13/2017    Paroxysmal atrial fibrillation ([facility] ) 03/13/2017    Fibromyalgia 03/14/2017    Hypokalemia 03/14/2017    Moderate persistent asthma with acute exacerbation 03/16/2017    Open wound of abdominal wall without penetration into peritoneal cavity 10/27/2017    Asthma exacerbation 12/12/2017    Lymphedema of left leg 12/12/2017    Bacteremia 12/12/2017    History of Merkel cell carcinoma 12/13/2017    Port-a-cath in place 12/13/2017    Acute congestive heart failure ([facility] ) 01/05/2018    Moderate persistent asthma 01/05/2018    Iron deficiency anemia 01/07/2018    Shortness of breath 01/08/2018    Merkel cell carcinoma ([facility] ) 02/20/2018     Resolved Ambulatory Problems     Diagnosis Date Noted    Acute on chronic respiratory failure ([facility] ) 01/05/2018     Past Medical History:   Diagnosis Date    Asthma     Cancer [facility])     Cardiac disease     Fibromyalgia     Fibromyalgia, primary     GERD (gastroesophageal reflux disease)     Hypertension     Hypokalemia     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HCC)        Admitting Diagnosis: Altered mental status [R41 82]  Weakness [R53 1]  CVA (cerebral vascular accident) ([facility] ) [I63 9]  Carotid stenosis, left [I65 22]  Left carotid stenosis [I65 22]  Syncope, unspecified syncope type [R55]    Age/Sex: 81 y o  female    Assessment/Plan:   Syncope  Active Problems:    Fall    Carotid stenosis, left    Paroxysmal atrial fibrillation (HCC)    Hypertension    Obesity (BMI 30-39  9)    GERD (gastroesophageal reflux disease)    Hyperlipemia    Fibromyalgia    Hypokalemia    History of Merkel cell carcinoma     81 y o  female, lives alone but [place], with a history of asthma, atrial fibrillation on Eliquis, fibromyalgia, hypokalemia, GERD, 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, left lower extremity lymphedema and weakness at baseline, presented to the ER after possible syncopal episode last night  Patient lives alone at home  She recalls that she went to the bathroom last night at about 9pm but woke up at about 6am in the morning on the floor  She did not recall the events in between  She tried hard to manage to get up from the floor and activated her Life Alert device  She was brought in by EMS  As she reports "sluured speech and tingling sensation on her left face, stroke alert was activated  STAT CTA head/cervical showing (similar compared to 9/23/2016) - 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 artery origin unchanged from prior CT angiogram dated 9/23/2016  MRI is pending  The patient will be admitted for further eval and mgmt  Otherwise, she denied headache, visual blurring, facial droop, new extremity weakness or sensation loss   Her mental status appears to be at her baseline      · Syncope, Fall: Unclear etiology, concern is TIA/CVA in a patient with severe left carotid stenosis and PAFIB  Admit to medicine w/ tele  Neurology and Vascular surgery consult  Continue Eliquis  PT/OT/Swallow and speech eval   · Carotid stenosis, left: Of notes, the patient was admitted in 9/2016 for syncope  Vascular surgery consulted  The patient was also followed as outpatient but she declined left carotid endarterectomy in the past    · Paroxysmal atrial fibrillation: Continue bb, Eliquis  · Hypertension: Continue bb, hold amlodipine  · Hyperlipemia: Continue statin  · GERD: Continue PPI  · Fibromyalgia: Continue Lyrica  · Hypokalemia: KCl supplement  · Moderate persistent asthma, Chronic respiratory failure with hypoxia: Continue home inhalers  Titrate O2 to keep SpO2>90%  · Iron deficiency anemia: Continue iron supplement  · Left lower extremity chronic lymphedema: 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  Continue Venodyne compressive pumps as tolerated  Hold home Lasix for now  · 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  No evidence of recurrence  Continue outpatient follow-up      Component      Latest Ref Rng & Units 1/7/2018   TSH 3RD GENERATON      0 358 - 3 740 uIU/mL 0 386   Free T4      0 76 - 1 46 ng/dL 0 85      DVT Prophylaxis: on Eliquis  Code Status: Full Code  Disposition: anticipate d/c home once clinically improved     Anticipated Length of Stay:  Patient will be admitted on an Inpatient basis with an anticipated length of stay of  > 2 midnights           Admission Orders:  Scheduled Meds:   Current Facility-Administered Medications:  acetaminophen 650 mg Oral Q6H PRN GILDA Melgar   albuterol 0 63 mg Nebulization Q6H PRN Sana Barton MD   ALPRAZolam 0 5 mg Oral HS PRN Sana Barton MD   apixaban 5 mg Oral BID Sana Barton MD   atorvastatin 40 mg Oral HS Sana Barton MD   fluticasone-salmeterol 1 puff Inhalation Q12H [address] Sana Barton MD metoprolol tartrate 50 mg Oral Q12H [address] Dia Rubinstein, MD   oxybutynin 5 mg Oral Daily Dia Rubinstein, MD   pantoprazole 40 mg Oral Early Morning Dia Rubinstein, MD   pregabalin 300 mg Oral BID Dia Rubinstein, MD     Continuous Infusions:    PRN Meds:   acetaminophen    albuterol    ALPRAZolam    Up and OOB as tolerated  Neuro checks q4h  Consult PT/OT  Consult Neuro  Consult Vascular Surgery  O2 @ 2L via NC  TELM

## 2018-03-08 NOTE — PROGRESS NOTES
[facility] Progress Note  Patient: Inocencia Salinas 81 y o  female   MRN: [id number]  PCP: Reyna Edward DO  Unit/Bed#: [address] [place] [address] Encounter: [id number]  Date Of Visit: 18    Subjective:   Denied facial weakness or speech difficulty today  No chest pain sob lightheaded syncope    Objective:   Vitals:   Temp (24hrs), Av 8 °F (36 6 °C), Min:97 5 °F (36 4 °C), Max:98 3 °F (36 8 °C)    HR:  [54-67] 67  Resp:  [18-22] 18  BP: (109-153)/(53-74) 136/74  SpO2:  [92 %-95 %] 92 %  Body mass index is 36 9 kg/m²  No intake or output data in the 24 hours ending 18 1600    Physical Exam:   General: NAD  HEENT: EOMI, anicteric, oral moist, neck supple, no mass or JVD  Chest: CTAB, BS diminished, no wheeze/rales  Cardiac: RRR, S1/S2, No murmur  Abd: S/ND/NT/BS+  MSK: No LE pitting edema, pulses intact  Neuro: AAOx3, moving all extremities  Psychiatric: Mood with normal affect    Additional Data:     Labs:    Results from last 7 days  Lab Units 18  0820   WBC Thousand/uL 8 70   HEMOGLOBIN g/dL 12 7   HEMATOCRIT % 40 6   PLATELETS Thousands/uL 149       Results from last 7 days  Lab Units 18  0820   SODIUM mmol/L 144   POTASSIUM mmol/L 3 4*   CHLORIDE mmol/L 99*   CO2 mmol/L 41*   BUN mg/dL 17   CREATININE mg/dL 1 07   CALCIUM mg/dL 9 2   GLUCOSE RANDOM mg/dL 107       Results from last 7 days  Lab Units 18  0820   INR  1 03       No results found for this or any previous visit (from the past 24 hour(s))  Cultures:         Imaging:  X-ray Chest 1 View Portable    Result Date: 3/7/2018  Narrative: CHEST INDICATION:  Stroke  COMPARISON:  Chest x-ray from 2018 EXAM PERFORMED/VIEWS:  XR CHEST PORTABLE FINDINGS:  Right-sided Mediport line tip overlies the SVC  There is stable cardiomegaly  Stable central fullness of the pulmonary vasculature  Left hemidiaphragm is mildly elevated  The lungs are clear  No pneumothorax or pleural effusion   Osseous structures appear within normal limits for patient age  Impression: Stable cardiomegaly and central fullness of the pulmonary vasculature  No acute pulmonary disease  Workstation performed: [id number]     Xr Chest 1 View Portable    Result Date: 2/26/2018  Narrative: CHEST INDICATION: SOB COMPARISON:  1/23/2018  EXAM PERFORMED/VIEWS:  XR CHEST PORTABLE 1 image FINDINGS:  A right-sided Port-A-Cath terminates at the caval atrial junction  Heart shadow is enlarged but unchanged from prior exam  Right basilar subsegmental atelectasis noted  Upper lobe and left lung are clear  No pleural effusions or pneumothorax  Osseous structures appear within normal limits for patient age  Impression: Right basilar atelectasis  Workstation performed: [id number]     Mri Brain W Wo Contrast    Result Date: 3/7/2018  Narrative: MRI BRAIN WITH AND WITHOUT CONTRAST INDICATION:  Slurred speech  Left facial weakness  COMPARISON:  9/21/2016 TECHNIQUE: Sagittal T1, axial T2, axial FLAIR, axial T1, axial Killen, axial diffusion  Sagittal, axial T1 postcontrast   Axial bravo postcontrast with coronal reconstructions  IV Contrast:  9 mL of Gadobutrol injection (SINGLE-DOSE)  IMAGE QUALITY:   Diagnostic  FINDINGS: BRAIN PARENCHYMA:  There is a faint focus of increased signal on diffusion imaging within the right paramedian gena, series 4 image 9 which unfortunately is not able to be confirmed on the ADC map  This may represent a small focus of ischemia  Stable mild diffuse cerebral volume loss  Scattered periventricular white matter hyperintensities on T2 and FLAIR imaging consistent with chronic microangiopathy  There is a homogeneously enhancing dural based, extra-axial mass arising from the sphenoid wing on the left extending into the middle cranial fossa and causing mild mass effect upon the adjacent temporal lobe  This measures 2 8 x 1 8 cm  This is slightly increased in size compared to the prior examination where it measured 2 5 x 1 6  cm    There is increased T2 signal extending superiorly from the anterior temporal lobe into the inferior frontal lobe, slightly increased compared to the prior examination  No acute or chronic hemorrhage  VENTRICLES:  Stable  SELLA AND PITUITARY GLAND:  Normal  ORBITS:  Normal  PARANASAL SINUSES:  Diffuse paranasal sinus mucosal thickening  No air-fluid levels  Prior funduscopic sinus surgery  VASCULATURE:  Evaluation of the major intracranial vasculature demonstrates appropriate flow voids  CALVARIUM AND SKULL BASE:  Normal  EXTRACRANIAL SOFT TISSUES:  Normal      Impression: Faint focus of increased signal on diffusion imaging within the right paramedian gena not able to be confirmed on the ADC map may represent a small focus of acute to subacute ischemia  Mild interval enlargement of a left middle cranial fossa meningioma with mild mass effect upon the adjacent temporal lobe and inferior frontal lobe  Slight increase in mild adjacent vasogenic edema  Otherwise stable atrophy and chronic microangiopathy  The study was marked in [facility] for immediate notification  Workstation performed: [id number]     Ct Stroke Alert Brain    Result Date: 3/7/2018  Narrative: CT BRAIN - STROKE ALERT PROTOCOL INDICATION:  81-year-old female with  slurred speech and left sided facial weakness  COMPARISON: CT brain 9/23/2016; MRI brain 9/21/2016  TECHNIQUE:  CT examination of the brain was performed  In addition to axial images, coronal reformatted images were created and submitted for interpretation  Radiation dose length product (DLP) for this visit:  1160 25 mGy-cm   This examination, like all CT scans performed in the [facility], was performed utilizing techniques to minimize radiation dose exposure, including the use of iterative reconstruction and automated exposure control  IMAGE QUALITY:  Diagnostic   FINDINGS:  PARENCHYMA:  Decreased attenuation is noted in the supratentorial white matter demonstrating an appearance most consistent with moderate microangiopathic change  No intracranial mass, mass effect or midline shift  No acute intracranial hemorrhage  No CT signs of acute infarction  Chronic vasogenic edema in the left frontal lobe secondary to a 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 described on prior MRI  VENTRICLES AND EXTRA-AXIAL SPACES:  Normal for patient's age  VISUALIZED ORBITS AND PARANASAL SINUSES:  Orbits appear normal   Mild scattered sinus mucosal thickening is noted  No fluid levels are seen  CALVARIUM AND EXTRACRANIAL SOFT TISSUES:   Normal      Impression: No acute intracranial abnormality  Microangiopathic changes  Findings were directly discussed with Yeison Miranda on 3/7/2018 8:23 AM  Workstation performed: [id number]     Cta Stroke Alert (head/neck)    Result Date: 3/7/2018  Narrative: CTA NECK AND BRAIN WITH CONTRAST INDICATION: Acute onset of slurred speech and left facial paresthesias at approximately 9:00 PM last night  COMPARISON:   CT brain performed today  Previous CT, MRI and CT angiography performed September 2016  TECHNIQUE: Post contrast imaging was performed after administration of iodinated contrast through the neck and brain  Post contrast axial 0 625 mm images timed to opacify the arterial system  3D rendering was performed on an independent workstation  MIP reconstructions performed  Coronal reconstructions were performed of the noncontrast portion of the brain  Radiation dose length product (DLP) for this visit:  380 11 mGy-cm   This examination, like all CT scans performed in the [facility], was performed utilizing techniques to minimize radiation dose exposure, including the use of iterative  reconstruction and automated exposure control  IV Contrast:  85 mL of iohexol (OMNIPAQUE)  IMAGE QUALITY:   Diagnostic FINDINGS: CERVICAL VASCULATURE AORTIC ARCH AND GREAT VESSELS:  Mild calcification of the aortic arch    No stenosis of the great vessels  Normal subclavian vasculature  RIGHT VERTEBRAL ARTERY CERVICAL SEGMENT:  Normal origin  The vessel is normal in caliber throughout the neck  LEFT VERTEBRAL ARTERY CERVICAL SEGMENT:  Normal origin  The vessel is normal in caliber throughout the neck  RIGHT EXTRACRANIAL CAROTID SEGMENT:  Mild tortuosity of the right common carotid artery  Normal bifurcation  No stenosis  Cervical internal carotid artery demonstrates tortuosity as well without stenosis  LEFT EXTRACRANIAL CAROTID SEGMENT:  Mild tortuosity of the common carotid artery  Mild atherosclerotic disease of the distal common carotid artery with calcified and noncalcified plaque formation extending into the bifurcation  There is severe stenosis  of the left internal carotid artery origin best seen on series 2 image 167  Point of maximal stenosis measures approximately 1 mm  The left internal carotid artery is otherwise decreased in caliber throughout the neck  This is unchanged compared to 9/23/2016 CT angiography  Given the diffuse decreased caliber of the left internal carotid artery, comparison to the mid right cervical internal carotid artery which measures 6 mm, corresponds to a stenosis of at least 85%  NASCET criteria was used to determine the degree of internal carotid artery diameter stenosis  INTRACRANIAL VASCULATURE INTERNAL CAROTID ARTERIES:  Mild calcification of the cavernous internal carotid arteries bilaterally  Again the left intracranial internal carotid artery is diffusely decreased in caliber compared to the opposite right  ANTERIOR CIRCULATION:  Symmetric A1 segments and anterior cerebral arteries with normal enhancement  Normal anterior communicating artery  MIDDLE CEREBRAL ARTERY CIRCULATION:  M1 segments are patent bilaterally  Mild intracranial atherosclerotic disease involving the left middle cerebral artery branches with no occlusive disease   DISTAL VERTEBRAL ARTERIES:  Distal left vertebral artery is dominant compared to the opposite right side which is mildly hypoplastic above the foramen magnum  No focal stenosis  BASILAR ARTERY:  Basilar artery is normal in caliber  Normal superior cerebellar arteries  POSTERIOR CEREBRAL ARTERIES: Normal posterior communicating arteries  DURAL VENOUS SINUSES:  Normal  NON VASCULAR ANATOMY BONY STRUCTURES:  No acute osseous abnormality  Extensive diffuse paranasal sinus mucosal thickening with evidence of prior funduscopic sinus surgery  No air-fluid levels to suggest acute sinusitis  Mild osteomalacia of the maxilla  Mild diffuse cervical spondylitic degenerative change  SOFT TISSUES OF THE NECK:  No discrete soft tissue mass or adenopathy  Mild heterogeneity of the thyroid gland with small subcentimeter nodules noted  There is a Port-A-Cath within the right chest extending to the SVC, below the scope of this exam  THORACIC INLET:  Unremarkable  Impression: 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 artery origin unchanged from prior CT angiogram dated 9/23/2016  This measures at least 85% compared to the mid to distal cervical internal carotid artery  Vascular surgery consultation recommended  Suggestion of mild intracranial atherosclerotic disease involving the left anterior MCA territory  No acute occlusive disease of the cervical or intracranial vasculature   I personally discussed this study with Jesusita Vargas on 3/7/2018 at 8:25 AM  Workstation performed: [id number]     Imaging Reports Reviewed by myself    Last 24 Hours Medication List:     Current Facility-Administered Medications:     acetaminophen (TYLENOL) tablet 650 mg, 650 mg, Oral, Q6H PRN, GILDA Melgar, 650 mg at 03/07/18 2202    albuterol inhalation solution 0 63 mg, 0 63 mg, Nebulization, Q6H PRN, Edison Mcnamara MD    ALPRAZolam Dalbert Carton) tablet 0 5 mg, 0 5 mg, Oral, HS PRN, Edison Mcnamara MD, 0 5 mg at 03/07/18 2203    apixaban (ELIQUIS) tablet 5 mg, 5 mg, Oral, BID, Edison Mcnamara MD, 5 mg at 03/08/18 0856    atorvastatin (LIPITOR) tablet 40 mg, 40 mg, Oral, HS, Pineda La MD, 40 mg at 03/07/18 2203    fluticasone-salmeterol (ADVAIR) 250-50 mcg/dose inhaler 1 puff, 1 puff, Inhalation, Q12H [address], Pineda La MD, 1 puff at 03/08/18 0900    metoprolol tartrate (LOPRESSOR) tablet 50 mg, 50 mg, Oral, Q12H [address], Pineda La MD, 50 mg at 03/08/18 0856    oxybutynin (DITROPAN-XL) 24 hr tablet 5 mg, 5 mg, Oral, Daily, Pineda La MD, 5 mg at 03/08/18 0900    pantoprazole (PROTONIX) EC tablet 40 mg, 40 mg, Oral, Early Morning, Pineda La MD, 40 mg at 03/08/18 0630    pregabalin (LYRICA) capsule 300 mg, 300 mg, Oral, BID, Pineda La MD, 300 mg at 03/08/18 0856     Assessment and Plans:  Hospital Problem List:   Principal Problem:    Right pontine stroke [facility])  Active Problems:    Syncope    Fall    Carotid stenosis, left    Paroxysmal atrial fibrillation (HCC)    Hypertension    Meningioma (HCC)    Obesity (BMI 30-39  9)    GERD (gastroesophageal reflux disease)    Hyperlipemia    Fibromyalgia    Hypokalemia    History of Merkel cell carcinoma    Chronic diastolic CHF (congestive heart failure), NYHA class 1 ([facility] [address] )    81 y o  female, lives alone but [place], with a history of asthma, atrial fibrillation on Eliquis, fibromyalgia, hypokalemia, GERD, 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, left lower extremity lymphedema and weakness at baseline, presented to the ER after possible syncopal episode last night     1  Syncope, Small Right pontine stroke as per MRI: Neurology consulted  Continue Eliquis  Continue PT/OT/Swallow and speech eval   2  Carotid stenosis, left: Vascular consulted  Of notes, the patient was admitted in 9/2016 for syncope  The patient was also followed as outpatient but she declined left carotid endarterectomy in the past    3  Paroxysmal atrial fibrillation: Continue bb, Eliquis  4  Hypertension: Continue bb, hold amlodipine  5  Hyperlipemia: Continue statin   6   GERD: Continue PPI  7  Fibromyalgia: Continue Lyrica  8  Hypokalemia: KCl supplement  9  Moderate persistent asthma, Chronic respiratory failure with hypoxia: Continue home inhalers  Titrate O2 to keep SpO2>90%  10  Iron deficiency anemia: Continue iron supplement  11  Left lower extremity chronic lymphedema: 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

## 2018-03-08 NOTE — PHYSICAL THERAPY NOTE
PT EVALUATION     03/08/18 4995   Note Type   Note type Eval only   Pain Assessment   Pain Assessment 0-10   Pain Score 7   Pain Type Chronic pain  (fibromyalgia)   Pain Location Generalized   Home Living   Type of Home Apartment  ([address])   Home Layout One level;Elevator   Bathroom Equipment Grab bars in shower; Shower chair   Home Equipment Cane  (rollator)   Prior Function   Level of [place] Needs assistance with ADLs and functional mobility; Needs assistance with IADLs  (amb without AD inside;uses SPC or RW outside)   Lives With Alone   Receives Help From Home health  (2x/wk)   ADL Assistance Needs assistance   IADLs Needs assistance   Falls in the last 6 months 1 to 4   Restrictions/Precautions   Other Precautions Contact/isolation; Fall Risk;Bed Alarm; Chair Alarm;O2;Pain   General   Additional Pertinent History Pt adm with AMS;pt went to bathroom in evening and woke up in AM on floor of bathroom     Family/Caregiver Present No   Cognition   Overall Cognitive Status WFL   Arousal/Participation Cooperative   Attention Within functional limits   Orientation Level Oriented X4   Following Commands Follows all commands and directions without difficulty   RLE Assessment   RLE Assessment WFL  (3 to 3+/5)   LLE Assessment   LLE Assessment WFL  (3- to 3/5)   Bed Mobility   Supine to Sit 4  Minimal assistance   Additional items Verbal cues   Sit to Supine 4  Minimal assistance   Additional items Verbal cues   Transfers   Sit to Stand 4  Minimal assistance   Additional items Verbal cues   Stand to Sit 4  Minimal assistance   Additional items Verbal cues   Ambulation/Elevation   Gait pattern (pt fatigues easily;feels like knees will buckle)   Gait Assistance 4  Minimal assist   Additional items Verbal cues   Assistive Device Rolling walker   Distance 80 feet   Balance   Static Sitting Fair +   Static Standing Fair  (with RW)   Dynamic Standing Fair -  (with RW)   Ambulatory Fair -  (with RW)   Activity Tolerance   Activity Tolerance Patient limited by fatigue;Patient limited by pain   Assessment   Prognosis Good   Problem List Decreased strength;Decreased range of motion;Decreased endurance; Impaired balance;Decreased mobility; Decreased safety awareness;Pain;Obesity   Assessment Patient seen for Physical Therapy evaluation  Patient admitted with Right pontine stroke ([facility] [address] )  Comorbidities affecting patient's physical performance include: HTN, syncope, sepsis, HLD, Paroxysmal A-Fib, carotid stenosis, obesity, fibromyalgia, asthma, falls, CHF SOB  Personal factors affecting patient at time of initial evaluation include: lives in one story house, ambulating with assistive device, inability to navigate community distances, inability to navigate level surfaces without external assistance, inability to perform dynamic tasks in community, limited home support and positive fall history  Prior to admission, patient was independent with functional mobility with rollator, independent with functional mobility without assistive device, requiring assist for ADLS, requiring assist for IADLS, living alone in one story home with no steps to enter and ambulating household distance  Please find objective findings from Physical Therapy assessment regarding body systems outlined above with impairments and limitations including weakness, decreased ROM, impaired balance, decreased endurance, impaired coordination, gait deviations, pain, decreased activity tolerance, decreased functional mobility tolerance, decreased safety awareness and fall risk  The Barthel Index was used as a functional outcome tool presenting with a score of 45 today indicating marked limitations of functional mobility and ADLS    Patient's clinical presentation is currently unstable/unpredictable as seen in patient's presentation of vital sign response, changing level of pain, increased fall risk, new onset of impairment of functional mobility, decreased endurance and new onset of weakness  Pt would benefit from continued Physical Therapy treatment to address deficits as defined above and maximize level of functional mobility  As demonstrated by objective findings, the assigned level of complexity for this evaluation is high  Goals   Patient Goals Home   STG Expiration Date (1-7 days)   Short Term Goal #1 bed mob - S; trans - S   Short Term Goal #2 amb with RW x 100 feet - S; balance with RW - F/F+   LTG Expiration Date (8-14 days)   Long Term Goal #1 bed mob - I; trans - I   Long Term Goal #2 amb with RW x 200 feet - I; balance with RW - F+/G; strength LEs - increase by 1/2 MMT   Plan   Treatment/Interventions ADL retraining;Functional transfer training;LE strengthening/ROM; Therapeutic exercise; Endurance training;Patient/family training;Bed mobility;Gait training   PT Frequency (3-5x/wk)   Recommendation   Recommendation (Home with services vs STR pending progress)   Equipment Recommended (Pt has a rollator and SPC)   Barthel Index   Feeding 5   Bathing 0   Grooming Score 0   Dressing Score 5   Bladder Score 10   Bowels Score 10   Toilet Use Score 5   Transfers (Bed/Chair) Score 10   Mobility (Level Surface) Score 0   Stairs Score 0   Barthel Index Score 45

## 2018-03-08 NOTE — CONSULTS
[address]   Neurology Initial Consult    Tony Bansal is a 81 y o  female  [address] [facility] 419/4 [facility]-*          Information obtained from:   Chief Complaint   Patient presents with    Altered Mental Status     Pt st she went to the bathroom at 2110 last night and awoke this am on the bathroom floor  Sts she feels like she can't talk properly and the left side of her face feels different         Assessment/Plan:    1  Acute ischemic right pontine small infarction  2  H/o A fib  3  Left carotid severe stenosis, asymptomatic  4  HTN    Patient was out of tPA window  She currently has left sided weakness explained by right pontine stroke  Etiology can be HTN, age, A fib  CTA head and neck shows severe stenosis  Vascular surgery is offering elective CEA however patient is refusing it at this time  She will f/u with vascular as outpatient  Cont Eliquis 5mg bid  lipitor is increased to 40mg daily  Her LDL is wnl  HgbA1c in Jan was wnl  Speech and swallow evaluation per stroke guidelines  PT/OT-  Recommend rehab as she lives alone   Discussed plan with patient and primary team       TPA Decision: Patient not a TPA candidate  Unclear time of onset outside appropriate time window  Reason for Consult / Principal Problem: stroke   Hx and PE limited by: none  Patient last known well: 9 pm on 3/6  Stroke alert called: 8:08am  Neurology time of arrival: n/a  Discussed plan over phone at 8:10am          HPI:  Tony Bansal is an 80 yo F with PMH of FM, A fib on eliquis, HTN presents with cc of after a fall  She states that the night before around 9:10pm she was going to bathroom but somehow found herself on floor after 3 hours  She woke up in between during the night but couldn't get herself up  At [place], she got herself up to activate the life alert system  She has some slurring of speech and left face tingling sensation when arrived by EMS  Patient takes eliquis daily and has been compliant   She has known severe stenosis of the left internal carotid artery origin unchanged from prior CT angiogram dated 9/23/2016  She says she doesn't want to get surgery done  During encounter she was noted to have left sided weakness  Past Medical History:   Diagnosis Date    Asthma     Cancer ([facility] [address] )     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    Cardiac disease     a fib    Fibromyalgia     Fibromyalgia, primary     GERD (gastroesophageal reflux disease)     Hypertension     Hypokalemia     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, left groin, lung and bladder  Patient treated with chemotherapy and radiation       Past Surgical History:   Procedure Laterality Date    APPENDECTOMY      CATARACT EXTRACTION      CHEST WALL BIOPSY N/A 10/27/2017    Procedure: SINUS EXCISION AND REMOVAL OF FOREIGN BODY, ABDOMEN (WOUND EXPLORATION);   Surgeon: Aroldo Mead MD;  Location: [address] [place] [address];  Service: General    EYE SURGERY Bilateral     cataracts     HIP FRACTURE SURGERY Left     HYSTERECTOMY      JOINT REPLACEMENT Left     hip    LYMPHADENECTOMY      PORTACATH PLACEMENT Right        Allergies   Allergen Reactions    Fentanyl And Related Other (See Comments)     Passed out    Latex     Other     Penicillins          Current Facility-Administered Medications:     acetaminophen (TYLENOL) tablet 650 mg, 650 mg, Oral, Q6H PRN, GILDA Melgar, 650 mg at 03/07/18 2202    albuterol inhalation solution 0 63 mg, 0 63 mg, Nebulization, Q6H PRN, Rufina Barton MD    ALPRAZolam Bowman Niece) tablet 0 5 mg, 0 5 mg, Oral, HS PRN, Rufina Barton MD, 0 5 mg at 03/07/18 2203    apixaban (ELIQUIS) tablet 5 mg, 5 mg, Oral, BID, Rufina Barton MD, 5 mg at 03/08/18 0856    atorvastatin (LIPITOR) tablet 40 mg, 40 mg, Oral, HS, Rufina Barton MD, 40 mg at 03/07/18 2203    fluticasone-salmeterol (ADVAIR) 250-50 mcg/dose inhaler 1 puff, 1 puff, Inhalation, Q12H [facility], Rufina Barton MD, 1 puff at 03/08/18 0900    metoprolol tartrate (LOPRESSOR) tablet 50 mg, 50 mg, Oral, Q12H [address], Gisel Huang MD, 50 mg at 03/08/18 0856    oxybutynin (DITROPAN-XL) 24 hr tablet 5 mg, 5 mg, Oral, Daily, Gisel Huang MD, 5 mg at 03/08/18 0900    pantoprazole (PROTONIX) EC tablet 40 mg, 40 mg, Oral, Early Morning, Gisel Huang MD, 40 mg at 03/08/18 0630    pregabalin (LYRICA) capsule 300 mg, 300 mg, Oral, BID, Gisel Huang MD, 300 mg at 03/08/18 2977    Social History     Social History    Marital status:      Spouse name: N/A    Number of children: N/A    Years of education: N/A     Occupational History    Not on file  Social History Main Topics    Smoking status: Never Smoker    Smokeless tobacco: Never Used      Comment: never smoke    Alcohol use No    Drug use: No    Sexual activity: No     Other Topics Concern    Not on file     Social History Narrative    No narrative on file       Family History   Problem Relation Age of Onset    Pneumonia Mother     Heart disease Father          Review of systems:  Please see HPI for positive symptoms  No fever, no chills, no weight change  Ocular: No drainage, no blurred vision  HEENT:  No sore throat, earache, or congestion  No neck pain  COR:  No chest pain  No palpitations  Lungs:  no sob, wheezing,  GI:  no  nausea, no vomiting, no diarrhea, no constipation, no anorexia  :  No dysuria, frequency, or urgency  No hematuria  Musculoskeletal:  No joint pain or swelling or edema  Skin:  No rash or itching  Psychiatric:  no anxiety, no depression  Endocrine:  No polyuria or polydipsia  Physical examination:  Vitals:    03/08/18 1125   BP:    Pulse: 66   Resp: 18   Temp:    SpO2: 95%     NIHSS:    1a Level of Consciousness: 0 = Alert   1b  LOC Questions: 0 = Answers both correctly   1c  LOC Commands: 0 = Obeys both correctly   2  Best Gaze: 0 = Normal   3  Visual: 0 = No visual field loss   4  Facial Palsy: 0=Normal symmetric movement   5a   Motor Right Arm: 0=No drift, limb holds 90 (or 45) degrees for full 10 seconds   5b  Motor Left Arm: 0=No drift, limb holds 90 (or 45) degrees for full 10 seconds   6a  Motor Right Le=No drift, limb holds 90 (or 45) degrees for full 10 seconds   6b  Motor Left Le=Drift, limb holds 90 (or 45) degrees but drifts down before full 10 seconds: does not hit bed   7  Limb Ataxia:  0=Absent   8  Sensory: 1=Mild to moderate sensory loss; patient feels pinprick is less sharp or is dull on the affected side; there is a loss of superficial pain with pinprick but patient is aware She is being touched   9  Best Language:  0=No aphasia, normal   10  Dysarthria: 0=Normal articulation   11  Extinction and Inattention (formerly Neglect): 0=No abnormality   Total Score: 2         GENERAL APPEARANCE:  The patient is alert, oriented  He is in no acute distress  HEENT:  Head is normocephalic  The sinuses are otherwise nontender  Pupils are equal and reactive  NECK:  Supple without lymphadenopathy  HEART:  Regular rate and rhythm  LUNGS:  clear to auscultation  No crackles or wheezes are heard  ABDOMEN:  Soft, nontender, nondistended with good bowel sounds heard  EXTREMITIES:  Without cyanosis, clubbing or edema  Mental status: The patient is alert, attentive, and oriented  Speech is clear and fluent, good repetition, comprehension, and naming  she recalls 3/3 objects at 5 minutes  Cranial nerves:  CN II: Visual fields are full to confrontation  Fundoscopic exam is normal with sharp discs and no vascular changes    Pupils are 3 mm and reactive to light  CN III, IV, VI: At primary gaze, there is no eye deviation  CN V: Facial sensation is decreased on left  Corneal responses are intact  CN VII: Face is symmetric with normal eye closure and smile  CN VIII: Hearing is normal to rubbing fingers  CN IX, X: Palate elevates symmetrically   Phonation is normal   CN XI: Head turning and shoulder shrug are intact  CN XII: Tongue is midline with normal movements and no atrophy  Motor: There is no pronator drift of out-stretched arms  Muscle bulk and tone are normal      Muscle exam  Arm Right Left Leg Right Left   Deltoid 5/5 4/5 Iliopsoas 5/5 4-/5   Biceps 5/5 4/5 Quads 5/5 4-/5   Triceps 5/5 4/5 Hamstrings 5/5 4-/5   Wrist Extension 5/5 4/5 Ankle Dorsi Flexion 5/5 4-/5   Wrist Flexion 5/5 4/5 Ankle Plantar Flexion 5/5 4-/5   Interossei 5/5 4/5 Ankle Eversion 5/5 4-/5   APB 5/5 4/5 Ankle Inversion 5/5 4-/5       Reflexes   RJ BJ TJ KJ AJ Plantars Longo's   Right 2+ 2+ 2+ 2+ 1+ Downgoing Not present   Left 2+ 2+ 2+ 2+ 1+ Downgoing Not present     Sensory:  Decreased over left forearm  Coordination:  Rapid alternating movements and fine finger movements are intact  There is no dysmetria on finger-to-nose and heel-knee-shin  There are no abnormal or extraneous movements  Romberg ramona  Gait/Stance:  Shruthi [place]  Fall risk     Lab Results   Component Value Date    WBC 8 70 03/07/2018    HGB 12 7 03/07/2018    HCT 40 6 03/07/2018    MCV 80 (L) 03/07/2018     03/07/2018     Lab Results   Component Value Date    HGBA1C 6 2 01/06/2018     Lab Results   Component Value Date    ALT 13 02/26/2018    AST 28 02/26/2018    ALKPHOS 95 02/26/2018    BILITOT 0 30 02/26/2018     Lab Results   Component Value Date    GLUCOSE 107 03/07/2018    CALCIUM 9 2 03/07/2018     03/07/2018    K 3 4 (L) 03/07/2018    CO2 41 (H) 03/07/2018    CL 99 (L) 03/07/2018    BUN 17 03/07/2018    CREATININE 1 07 03/07/2018         Radiology          Review of reports and notes reveal:    Independent Interpretation of images or specimens:  X-ray Chest 1 View Portable    Result Date: 3/7/2018  Stable cardiomegaly and central fullness of the pulmonary vasculature  No acute pulmonary disease   Workstation performed: [id number]     Mri Brain W Wo Contrast    Result Date: 3/7/2018  Faint focus of increased signal on diffusion imaging within the right paramedian gena not able to be confirmed on the [facility] map may represent a small focus of acute to subacute ischemia  Mild interval enlargement of a left middle cranial fossa meningioma with mild mass effect upon the adjacent temporal lobe and inferior frontal lobe  Slight increase in mild adjacent vasogenic edema  Otherwise stable atrophy and chronic microangiopathy  The study was marked in [facility] for immediate notification  Workstation performed: [id number]     Ct Stroke Alert Brain    Result Date: 3/7/2018  No acute intracranial abnormality  Microangiopathic changes  Findings were directly discussed with Fran Ordoñez on 3/7/2018 8:23 AM  Workstation performed: [id number]     Cta Stroke Alert (head/neck)    Result Date: 3/7/2018  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 artery origin unchanged from prior CT angiogram dated 9/23/2016  This measures at least 85% compared to the mid to distal cervical internal carotid artery  Vascular surgery consultation recommended  Suggestion of mild intracranial atherosclerotic disease involving the left anterior MCA territory  No acute occlusive disease of the cervical or intracranial vasculature  I personally discussed this study with Fran Ordoñez on 3/7/2018 at 8:25 AM  Workstation performed: [id number]             Thank you for this consult  Total time of encounter: 65 min   More than 50% of time was spent in counseling and coordination of care of patient  CORA Stevens  [facility]  [facility] [address]  [address]

## 2018-03-08 NOTE — ASSESSMENT & PLAN NOTE
History of Merkel cell carcinoma, s/p L femoral lymph node dissection  --stable with residual chronic left lower extremity weakness

## 2018-03-08 NOTE — RESPIRATORY THERAPY NOTE
RT Protocol Note  Nikki Lancaster 81 y o  female MRN: [id number]  Unit/Bed#: [address] [place], [place] [id number] Encounter: [id number]    Assessment    Principal Problem:    Syncope  Active Problems:    Hypertension    Carotid stenosis, left    Obesity (BMI 30-39  9)    GERD (gastroesophageal reflux disease)    Hyperlipemia    Paroxysmal atrial fibrillation (HCC)    Fibromyalgia    Hypokalemia    History of Merkel cell carcinoma    Fall      Home Pulmonary Medications:  Albuterol neb prn/advair    Past Medical History:   Diagnosis Date    Asthma     Cancer ([facility] [address] )     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    Cardiac disease     a fib    Fibromyalgia     Fibromyalgia, primary     GERD (gastroesophageal reflux disease)     Hypertension     Hypokalemia     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, left groin, lung and bladder  Patient treated with chemotherapy and radiation     Social History     Social History    Marital status:      Spouse name: N/A    Number of children: N/A    Years of education: N/A     Social History Main Topics    Smoking status: Never Smoker    Smokeless tobacco: Never Used      Comment: never smoke    Alcohol use No    Drug use: No    Sexual activity: No     Other Topics Concern    None     Social History Narrative    None       Subjective         Objective    Physical Exam:        Vitals:  Blood pressure 137/63, pulse 67, temperature 97 5 °F (36 4 °C), temperature source Oral, resp  rate 22, weight 90 3 kg (199 lb), SpO2 93 %, not currently breastfeeding  Imaging and other studies: I have personally reviewed pertinent reports              Plan

## 2018-03-08 NOTE — ASSESSMENT & PLAN NOTE
Recurrence syncope without valvular disease  --await neurology consult  --TTE with preserved LV function, no regional wall motion abnormality and without evidence of aortic stenosis or significant valvular disease

## 2018-03-09 ENCOUNTER — APPOINTMENT (INPATIENT)
Dept: RADIOLOGY | Facility: HOSPITAL | Age: 83
DRG: 065 | End: 2018-03-09
Payer: MEDICARE

## 2018-03-09 VITALS
HEART RATE: 63 BPM | OXYGEN SATURATION: 93 % | TEMPERATURE: 97.6 F | HEIGHT: 63 IN | WEIGHT: 202.6 LBS | SYSTOLIC BLOOD PRESSURE: 140 MMHG | RESPIRATION RATE: 18 BRPM | DIASTOLIC BLOOD PRESSURE: 70 MMHG | BODY MASS INDEX: 35.9 KG/M2

## 2018-03-09 LAB
CHOLEST SERPL-MCNC: 133 MG/DL (ref 50–200)
HDLC SERPL-MCNC: 44 MG/DL (ref 40–60)
LDLC SERPL CALC-MCNC: 71 MG/DL (ref 0–100)
TRIGL SERPL-MCNC: 91 MG/DL

## 2018-03-09 PROCEDURE — 99232 SBSQ HOSP IP/OBS MODERATE 35: CPT | Performed by: PHYSICIAN ASSISTANT

## 2018-03-09 PROCEDURE — 97535 SELF CARE MNGMENT TRAINING: CPT

## 2018-03-09 PROCEDURE — 99239 HOSP IP/OBS DSCHRG MGMT >30: CPT | Performed by: INTERNAL MEDICINE

## 2018-03-09 PROCEDURE — 93880 EXTRACRANIAL BILAT STUDY: CPT | Performed by: SURGERY

## 2018-03-09 PROCEDURE — 94760 N-INVAS EAR/PLS OXIMETRY 1: CPT

## 2018-03-09 PROCEDURE — 80061 LIPID PANEL: CPT | Performed by: PSYCHIATRY & NEUROLOGY

## 2018-03-09 PROCEDURE — 93880 EXTRACRANIAL BILAT STUDY: CPT

## 2018-03-09 PROCEDURE — 99223 1ST HOSP IP/OBS HIGH 75: CPT | Performed by: INTERNAL MEDICINE

## 2018-03-09 RX ORDER — ATORVASTATIN CALCIUM 40 MG/1
40 TABLET, FILM COATED ORAL
Qty: 30 TABLET | Refills: 0 | Status: SHIPPED | OUTPATIENT
Start: 2018-03-09 | End: 2018-05-10 | Stop reason: ALTCHOICE

## 2018-03-09 RX ADMIN — ACETAMINOPHEN 650 MG: 325 TABLET, FILM COATED ORAL at 09:28

## 2018-03-09 RX ADMIN — PANTOPRAZOLE SODIUM 40 MG: 40 TABLET, DELAYED RELEASE ORAL at 07:41

## 2018-03-09 RX ADMIN — APIXABAN 5 MG: 5 TABLET, FILM COATED ORAL at 17:56

## 2018-03-09 RX ADMIN — APIXABAN 5 MG: 5 TABLET, FILM COATED ORAL at 09:32

## 2018-03-09 RX ADMIN — METOPROLOL TARTRATE 50 MG: 50 TABLET ORAL at 09:32

## 2018-03-09 RX ADMIN — PREGABALIN 300 MG: 100 CAPSULE ORAL at 09:28

## 2018-03-09 RX ADMIN — FLUTICASONE PROPIONATE AND SALMETEROL 1 PUFF: 50; 250 POWDER RESPIRATORY (INHALATION) at 09:32

## 2018-03-09 NOTE — DISCHARGE SUMMARY
Discharge Summary - [address] [facility]  Patient Information: Burke Nguyen 81 y o  female MRN: [id number]  Unit/Bed#: [address] [address] Encounter: [id number]    Admission Date: 3/7/2018  Discharge Date: 3/9/2018    Admitting Physician: Lori Jamison MD  Discharging Physician/Practitioner: David Maya MD  PCP: Dasha Larkin DO    Reason for Admission: Altered Mental Status (Pt st she went to the bathroom at 2110 last night and awoke this am on the bathroom floor  Sts she feels like she can't talk properly and the left side of her face feels different)      Admission Diagnoses: Altered mental status [R41 82]  Weakness [R53 1]  CVA (cerebral vascular accident) ([facility] ) [I63 9]  Carotid stenosis, left [I65 22]  Left carotid stenosis [I65 22]  Syncope, unspecified syncope type [R55]    Discharge Diagnoses:    Right pontine stroke [facility])    Syncope    Fall    Asymptomatic carotid artery stenosis without infarction, left    Paroxysmal atrial fibrillation (HCC)    Hypertension    Meningioma (HCC)    Obesity (BMI 30-39  9)    GERD (gastroesophageal reflux disease)    Hyperlipemia    Fibromyalgia    Hypokalemia    History of Merkel cell carcinoma    Chronic diastolic CHF (congestive heart failure), NYHA class 1 ([facility] )    Consultations During Hospital Stay:  IP CONSULT TO NEUROLOGY  IP CONSULT TO VASCULAR SURGERY  IP CONSULT TO CASE MANAGEMENT  IP CONSULT TO CARDIOLOGY    Procedures Performed:     Hospital Course:     81 y o  female, lives alone but independenly, with a history of asthma, atrial fibrillation on Eliquis, fibromyalgia, hypokalemia, GERD, 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, left lower extremity lymphedema and weakness at baseline, Obesity, presented to the ER after possible syncopal episode at night       · Syncope, Small Right pontine stroke as per MRI: Neurology consulted  Patient was out of tPA window  Lipid panel wnl  No hx DM - glucose wnl  PT/OT/Swallow and speech eval completed   Patient is against for STR  She ambulates with walker  She will be discharged to home and resume home VNA service including home PT/OT  Continue Eliquis 5mg po bid  Lipitor increased to 40mg po daily  · Carotid stenosis, left: Vascular consulted  CTA head and neck shows severe stenosis  Vascular surgery is offering elective CEA however patient is refusing it at this time  She will f/u with vascular as outpatient  Of notes, the patient was admitted in 9/2016 for syncope  The patient was also followed as outpatient but she declined left carotid endarterectomy in the past    · Paroxysmal atrial fibrillation: Continue metoprolol, Eliquis  · Hypertension: Continue metoprolol, amlodipine  · Hyperlipemia: Continue statin   · GERD: Continue PPI  · Fibromyalgia: Continue Lyrica  · Hypokalemia: KCl supplement  · Moderate persistent asthma, Chronic respiratory failure with hypoxia: Continue home inhalers  Titrate O2 to keep SpO2>90%  · Iron deficiency anemia: Continue iron supplement  · Left lower extremity chronic lymphedema: 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  Continue Venodyne compressive pumps as tolerated  Continue home Lasix  · Chronic diastolic CHF (congestive heart failure), NYHA class 1: Continue home Lasix  · 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  No evidence of recurrence  Continue outpatient follow-up      DVT Prophylaxis: on Eliquis  Code Status: Level 1 - Full Code    Imaging while in hospital:  X-ray Chest 1 View Portable    Result Date: 3/7/2018  Narrative: CHEST INDICATION:  Stroke  COMPARISON:  Chest x-ray from 2/26/2018 EXAM PERFORMED/VIEWS:  XR CHEST PORTABLE FINDINGS:  Right-sided Mediport line tip overlies the SVC  There is stable cardiomegaly  Stable central fullness of the pulmonary vasculature  Left hemidiaphragm is mildly elevated  The lungs are clear  No pneumothorax or pleural effusion  Osseous structures appear within normal limits for patient age       Impression: Stable cardiomegaly and central fullness of the pulmonary vasculature  No acute pulmonary disease  Workstation performed: [id number]     Xr Chest 1 View Portable    Result Date: 2/26/2018  Narrative: CHEST INDICATION: SOB COMPARISON:  1/23/2018  EXAM PERFORMED/VIEWS:  XR CHEST PORTABLE 1 image FINDINGS:  A right-sided Port-A-Cath terminates at the caval atrial junction  Heart shadow is enlarged but unchanged from prior exam  Right basilar subsegmental atelectasis noted  Upper lobe and left lung are clear  No pleural effusions or pneumothorax  Osseous structures appear within normal limits for patient age  Impression: Right basilar atelectasis  Workstation performed: [id number]     Mri Brain W Wo Contrast    Result Date: 3/7/2018  Narrative: MRI BRAIN WITH AND WITHOUT CONTRAST INDICATION:  Slurred speech  Left facial weakness  COMPARISON:  9/21/2016 TECHNIQUE: Sagittal T1, axial T2, axial FLAIR, axial T1, axial [place], axial diffusion  Sagittal, axial T1 postcontrast   Axial bravo postcontrast with coronal reconstructions  IV Contrast:  9 mL of Gadobutrol injection (SINGLE-DOSE)  IMAGE QUALITY:   Diagnostic  FINDINGS: BRAIN PARENCHYMA:  There is a faint focus of increased signal on diffusion imaging within the right paramedian gena, series 4 image 9 which unfortunately is not able to be confirmed on the ADC map  This may represent a small focus of ischemia  Stable mild diffuse cerebral volume loss  Scattered periventricular white matter hyperintensities on T2 and FLAIR imaging consistent with chronic microangiopathy  There is a homogeneously enhancing dural based, extra-axial mass arising from the sphenoid wing on the left extending into the middle cranial fossa and causing mild mass effect upon the adjacent temporal lobe  This measures 2 8 x 1 8 cm  This is slightly increased in size compared to the prior examination where it measured 2 5 x 1 6  cm    There is increased T2 signal extending superiorly from the anterior temporal lobe into the inferior frontal lobe, slightly increased compared to the prior examination  No acute or chronic hemorrhage  VENTRICLES:  Stable  SELLA AND PITUITARY GLAND:  Normal  ORBITS:  Normal  PARANASAL SINUSES:  Diffuse paranasal sinus mucosal thickening  No air-fluid levels  Prior funduscopic sinus surgery  VASCULATURE:  Evaluation of the major intracranial vasculature demonstrates appropriate flow voids  CALVARIUM AND SKULL BASE:  Normal  EXTRACRANIAL SOFT TISSUES:  Normal      Impression: Faint focus of increased signal on diffusion imaging within the right paramedian gena not able to be confirmed on the ADC map may represent a small focus of acute to subacute ischemia  Mild interval enlargement of a left middle cranial fossa meningioma with mild mass effect upon the adjacent temporal lobe and inferior frontal lobe  Slight increase in mild adjacent vasogenic edema  Otherwise stable atrophy and chronic microangiopathy  The study was marked in [facility] for immediate notification  Workstation performed: [id number]     Ct Stroke Alert Brain    Result Date: 3/7/2018  Narrative: CT BRAIN - STROKE ALERT PROTOCOL INDICATION:  52-year-old female with  slurred speech and left sided facial weakness  COMPARISON: CT brain 9/23/2016; MRI brain 9/21/2016  TECHNIQUE:  CT examination of the brain was performed  In addition to axial images, coronal reformatted images were created and submitted for interpretation  Radiation dose length product (DLP) for this visit:  1160 25 mGy-cm   This examination, like all CT scans performed in the [facility], was performed utilizing techniques to minimize radiation dose exposure, including the use of iterative reconstruction and automated exposure control  IMAGE QUALITY:  Diagnostic   FINDINGS:  PARENCHYMA:  Decreased attenuation is noted in the supratentorial white matter demonstrating an appearance most consistent with moderate microangiopathic change  No intracranial mass, mass effect or midline shift  No acute intracranial hemorrhage  No CT signs of acute infarction  Chronic vasogenic edema in the left frontal lobe secondary to a 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 described on prior MRI  VENTRICLES AND EXTRA-AXIAL SPACES:  Normal for patient's age  VISUALIZED ORBITS AND PARANASAL SINUSES:  Orbits appear normal   Mild scattered sinus mucosal thickening is noted  No fluid levels are seen  CALVARIUM AND EXTRACRANIAL SOFT TISSUES:   Normal      Impression: No acute intracranial abnormality  Microangiopathic changes  Findings were directly discussed with Yoruba Mode on 3/7/2018 8:23 AM  Workstation performed: [id number]     Cta Stroke Alert (head/neck)    Result Date: 3/7/2018  Narrative: CTA NECK AND BRAIN WITH CONTRAST INDICATION: Acute onset of slurred speech and left facial paresthesias at approximately 9:00 PM last night  COMPARISON:   CT brain performed today  Previous CT, MRI and CT angiography performed September 2016  TECHNIQUE: Post contrast imaging was performed after administration of iodinated contrast through the neck and brain  Post contrast axial 0 625 mm images timed to opacify the arterial system  3D rendering was performed on an independent workstation  MIP reconstructions performed  Coronal reconstructions were performed of the noncontrast portion of the brain  Radiation dose length product (DLP) for this visit:  380 11 mGy-cm   This examination, like all CT scans performed in the [facility], was performed utilizing techniques to minimize radiation dose exposure, including the use of iterative  reconstruction and automated exposure control  IV Contrast:  85 mL of iohexol (OMNIPAQUE)  IMAGE QUALITY:   Diagnostic FINDINGS: CERVICAL VASCULATURE AORTIC ARCH AND GREAT VESSELS:  Mild calcification of the aortic arch  No stenosis of the great vessels    Normal subclavian vasculature  RIGHT VERTEBRAL ARTERY CERVICAL SEGMENT:  Normal origin  The vessel is normal in caliber throughout the neck  LEFT VERTEBRAL ARTERY CERVICAL SEGMENT:  Normal origin  The vessel is normal in caliber throughout the neck  RIGHT EXTRACRANIAL CAROTID SEGMENT:  Mild tortuosity of the right common carotid artery  Normal bifurcation  No stenosis  Cervical internal carotid artery demonstrates tortuosity as well without stenosis  LEFT EXTRACRANIAL CAROTID SEGMENT:  Mild tortuosity of the common carotid artery  Mild atherosclerotic disease of the distal common carotid artery with calcified and noncalcified plaque formation extending into the bifurcation  There is severe stenosis  of the left internal carotid artery origin best seen on series 2 image 167  Point of maximal stenosis measures approximately 1 mm  The left internal carotid artery is otherwise decreased in caliber throughout the neck  This is unchanged compared to 9/23/2016 CT angiography  Given the diffuse decreased caliber of the left internal carotid artery, comparison to the mid right cervical internal carotid artery which measures 6 mm, corresponds to a stenosis of at least 85%  NASCET criteria was used to determine the degree of internal carotid artery diameter stenosis  INTRACRANIAL VASCULATURE INTERNAL CAROTID ARTERIES:  Mild calcification of the cavernous internal carotid arteries bilaterally  Again the left intracranial internal carotid artery is diffusely decreased in caliber compared to the opposite right  ANTERIOR CIRCULATION:  Symmetric A1 segments and anterior cerebral arteries with normal enhancement  Normal anterior communicating artery  MIDDLE CEREBRAL ARTERY CIRCULATION:  M1 segments are patent bilaterally  Mild intracranial atherosclerotic disease involving the left middle cerebral artery branches with no occlusive disease   DISTAL VERTEBRAL ARTERIES:  Distal left vertebral artery is dominant compared to the opposite right side which is mildly hypoplastic above the foramen magnum  No focal stenosis  BASILAR ARTERY:  Basilar artery is normal in caliber  Normal superior cerebellar arteries  POSTERIOR CEREBRAL ARTERIES: Normal posterior communicating arteries  DURAL VENOUS SINUSES:  Normal  NON VASCULAR ANATOMY BONY STRUCTURES:  No acute osseous abnormality  Extensive diffuse paranasal sinus mucosal thickening with evidence of prior funduscopic sinus surgery  No air-fluid levels to suggest acute sinusitis  Mild osteomalacia of the maxilla  Mild diffuse cervical spondylitic degenerative change  SOFT TISSUES OF THE NECK:  No discrete soft tissue mass or adenopathy  Mild heterogeneity of the thyroid gland with small subcentimeter nodules noted  There is a Port-A-Cath within the right chest extending to the SVC, below the scope of this exam  THORACIC INLET:  Unremarkable  Impression: 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 artery origin unchanged from prior CT angiogram dated 9/23/2016  This measures at least 85% compared to the mid to distal cervical internal carotid artery  Vascular surgery consultation recommended  Suggestion of mild intracranial atherosclerotic disease involving the left anterior MCA territory  No acute occlusive disease of the cervical or intracranial vasculature  I personally discussed this study with Marybeth Fuentes on 3/7/2018 at 8:25 AM  Workstation performed: [id number]       Allergies: Allergies   Allergen Reactions    Fentanyl And Related Other (See Comments)     Passed out    Latex     Other     Penicillins        Discharge Medications:  Current Discharge Medication List        Current Discharge Medication List      CONTINUE these medications which have CHANGED    Details   atorvastatin (LIPITOR) 40 mg tablet Take 1 tablet (40 mg total) by mouth daily at bedtime for 30 days  Qty: 30 tablet, Refills: 0    Associated Diagnoses: CVA (cerebral vascular accident) ([address] );  Left carotid stenosis; Hyperlipidemia, unspecified hyperlipidemia type           Current Discharge Medication List      CONTINUE these medications which have NOT CHANGED    Details   albuterol (ACCUNEB) 0 63 MG/3ML nebulizer solution Take 1 ampule by nebulization every 6 (six) hours as needed for wheezing      ALPRAZolam (XANAX) 0 5 mg tablet Take 0 5 mg by mouth daily at bedtime as needed for anxiety  amLODIPine (NORVASC) 5 mg tablet Take 5 mg by mouth daily      apixaban (ELIQUIS) 5 mg Take 5 mg by mouth 2 (two) times a day      esomeprazole (NexIUM) 40 MG capsule Take 40 mg by mouth daily  Fesoterodine Fumarate ER (TOVIAZ) 8 MG TB24 Take by mouth daily at bedtime        fluticasone-salmeterol (ADVAIR) 250-50 mcg/dose inhaler Inhale 1 puff every 12 (twelve) hours This is home medication  Refills: 0      potassium chloride (K-DUR) 10 mEq tablet Take 10 mEq by mouth daily  pregabalin (LYRICA) 300 MG capsule Take 300 mg by mouth 2 (two) times a day  furosemide (LASIX) 20 mg tablet Take 20 mg by mouth daily      metoprolol tartrate (LOPRESSOR) 50 mg tablet Take 50 mg by mouth every 12 (twelve) hours           Current Discharge Medication List          Medications were reconciliated and instructions were given to Ms Burnetteherman Faria at bedside prior to the discharge  Discharge Day Visit/Exam:   Subjective:  /70 (BP Location: Right arm)   Pulse 63   Temp 97 6 °F (36 4 °C) (Tympanic)   Resp 18   Ht 5' 3" (1 6 m)   Wt 91 9 kg (202 lb 9 6 oz)   SpO2 93%   BMI 35 89 kg/m²   General: NAD  HEENT: EOMI, anicteric, oral moist, neck supple, no mass or JVD  Chest: CTAB, BS diminished, no wheeze/rales  Cardiac: RRR, S1/S2, No murmur  Abd: S/ND/NT/BS+  MSK: No LE pitting edema, pulses intact  Neuro: AAOx3, moving all extremities  Psychiatric: Mood with normal affect    Patient Instructions:    Activity: activity as tolerated  Diet: cardiac diet and low fat, low cholesterol diet  Wound Care: none needed    Discharge instructions/Information to patient and family:(Discharge Medications and Follow up):  See after visit summary for information provided to patient and family  Provisions for Follow-Up Care:  See after visit summary for information related to follow-up care and any pertinent home health orders  Follow-up Informations: [facility]  [address]  [phone]    Go on 4/10/2018  Follow-up with Dr Coco Dalton at [facility], address as above, on 4/10/18 at 4:30 PM   Please call office after hospital discharge to confirm appointment  Marylene Pinion, DO  [address] [phone]    Follow up in 1 week(s)  Acute ischemic right pontine small infarction, syncope, severe left carotid stenosis    Raquel Bucio MD  [address]  [id number] [phone]    Follow up in 1 month(s)  Acute ischemic right pontine small infarction, AFIB, Syncope, Severe left carotid stenosis                  Disposition: Home, resume home VNA home PT/OT    Planned Readmission: No     Discharge Statement:  I spent 30 minutes discharging the patient  This time was spent on the day of discharge  I had direct contact with the patient on the day of discharge  Greater than 50% of the total time was spent examining patient, answering all patient questions, arranging and discussing plan of care with patient as well as directly providing post-discharge instructions  Additional time then spent on discharge activities  Discharge Medications:  See after visit summary for reconciled discharge medications provided to patient and family

## 2018-03-09 NOTE — PLAN OF CARE
Activity Intolerance/Impaired Mobility     Mobility/activity is maintained at optimum level for patient Progressing        Communication Impairment     Ability to express needs and understand communication [address] Discharge to home or other facility with appropriate resources Progressing        INFECTION - ADULT     Absence or prevention of progression during hospitalization Progressing     Absence of fever/infection during neutropenic period Progressing        Knowledge Deficit     Patient/family/caregiver demonstrates understanding of disease process, treatment plan, medications, and discharge instructions Progressing        Neurological Deficit     Neurological status is stable or improving Progressing        Nutrition     Nutrition/Hydration status is improving Progressing        PAIN - ADULT     Verbalizes/displays adequate comfort level or baseline comfort level Progressing        Potential for Aspiration     Non-ventilated patient's risk of aspiration is minimized Progressing     Ventilated patient's risk of aspiration is minimized Progressing        Potential for Falls     Patient will remain free of falls Progressing        RESPIRATORY - ADULT     Achieves optimal ventilation and oxygenation Progressing        SAFETY ADULT     Maintain or return to baseline ADL function Progressing     Maintain or return mobility status to optimal level Progressing

## 2018-03-09 NOTE — ASSESSMENT & PLAN NOTE
Compensated and stable  --continue medical regimen  --cardiology consult for preoperative risk stratification and clearance for future L CEA

## 2018-03-09 NOTE — PROGRESS NOTES
Progress Note - Jovanny Sigala 1935, 81 y o  female MRN: [id number]    Unit/Bed#: [address] [address] Encounter: [id number]    Primary Care Provider: Monica Chavez DO   Date and time admitted to hospital: 3/7/2018  8:03 AM        * Right pontine stroke [facility])   Assessment & Plan    --management per Neurology  --continue Eliquis and statin therapy  --?? need for addition of anti-platelet therapy, will leave to discretion of neurology  --PT/OT        Asymptomatic carotid artery stenosis without infarction, left   Assessment & Plan    Asymptomatic high-grade L ICA stenosis  --patient previously evaluated and refused elective L CEA in October 2016  --patient now agreeable to consider L CEA, although somewhat apprehensive this a m   --check carotid duplex for completeness  --neurology consult appreciated  --continue statin therapy  --continue Eliquis secondary to PAF (ASA/plavix discontinued during last hospitalization and changed to Eliquis)  --cardiology consult for preoperative risk stratification and clearance for potential future L CEA  --outpatient follow-up in 2 weeks to discuss elective L CEA to allow recovery from current right pontine CVA  --d/w Dr Vania Low  --will see on a p r n  basis during this hospitalization    Please call if we can be of any further assistance        Chronic diastolic CHF (congestive heart failure), NYHA class 1 (HCC)   Assessment & Plan    Compensated and stable  --continue medical regimen  --cardiology consult for preoperative risk stratification and clearance for future L CEA        History of Merkel cell carcinoma   Assessment & Plan    History of Merkel cell carcinoma, s/p L femoral lymph node dissection  --stable with residual chronic left lower extremity weakness        Fibromyalgia   Assessment & Plan    --continue current management per medical team        Paroxysmal atrial fibrillation [facility])   Assessment & Plan    --continue Eliquis per primary service  --TTE 1/6/18 with no evidence of atrial enlargement or thrombus        Hyperlipemia   Assessment & Plan    Stable  --continue statin therapy        Syncope   Assessment & Plan    Recurrence syncope without valvular disease or documented arrhythmia and+ right pontine CVA  --management per neurology consult  --TTE with preserved LV function, no regional wall motion abnormality and without evidence of aortic stenosis or significant valvular disease  Meningioma ([facility] [address] )   Assessment & Plan    2 8 x 1 8 cm 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 area of sphenoid wing with mild mass effect  --management per Neurology  --patient refuses workup        Hypertension   Assessment & Plan    --continue management per primary service  --permissive HTN secondary to small right pontine CVA              Subjective:  Patient awake, alert without new complaints  No new neurological deficits  No new syncopal episodes  Patient states she "just does not feel right"  Hemodynamics stable  Neurology consult appreciated  Carotid duplex and cardiology consult pending  Patient undecided this a m  regarding L CEA but does wish to continue to entertain the idea  VSS    Vitals:  /65 (BP Location: Right arm)   Pulse 81   Temp 98 7 °F (37 1 °C) (Tympanic)   Resp 18   Wt 91 9 kg (202 lb 9 6 oz)   SpO2 95%   BMI 37 06 kg/m²     I/Os:  No intake/output data recorded  No intake/output data recorded      Lab Results and Cultures:   Lab Results   Component Value Date    WBC 8 70 03/07/2018    HGB 12 7 03/07/2018    HCT 40 6 03/07/2018    MCV 80 (L) 03/07/2018     03/07/2018     Lab Results   Component Value Date    GLUCOSE 107 03/07/2018    CALCIUM 9 2 03/07/2018     03/07/2018    K 3 4 (L) 03/07/2018    CO2 41 (H) 03/07/2018    CL 99 (L) 03/07/2018    BUN 17 03/07/2018    CREATININE 1 07 03/07/2018     Lab Results   Component Value Date    INR 1 03 03/07/2018    INR 1 01 02/26/2018    INR 1 02 01/05/2018    PROTIME 10 8 03/07/2018    PROTIME 10 6 02/26/2018    PROTIME 10 7 01/05/2018        Blood Culture:   Lab Results   Component Value Date    BLOODCX No Growth After 5 Days  01/23/2018   ,   Urinalysis:   Lab Results   Component Value Date    COLORU Light Yellow 01/08/2018    CLARITYU Slightly Cloudy 01/08/2018    SPECGRAV <=1 005 01/08/2018    PHUR 5 5 01/08/2018    LEUKOCYTESUR Negative 01/08/2018    NITRITE Negative 01/08/2018    PROTEINUA Negative 01/08/2018    GLUCOSEU Negative 01/08/2018    KETONESU Negative 01/08/2018    BILIRUBINUR Negative 01/08/2018    BLOODU Negative 01/08/2018   ,   Urine Culture:   Lab Results   Component Value Date    URINECX >100,000 cfu/ml Mixed Contaminants X5 09/21/2016   ,   Wound Culure: No results found for: WOUNDCULT    Medications:  Current Facility-Administered Medications   Medication Dose Route Frequency    acetaminophen (TYLENOL) tablet 650 mg  650 mg Oral Q6H PRN    albuterol inhalation solution 0 63 mg  0 63 mg Nebulization Q6H PRN    ALPRAZolam (XANAX) tablet 0 5 mg  0 5 mg Oral HS PRN    apixaban (ELIQUIS) tablet 5 mg  5 mg Oral BID    atorvastatin (LIPITOR) tablet 40 mg  40 mg Oral HS    fluticasone-salmeterol (ADVAIR) 250-50 mcg/dose inhaler 1 puff  1 puff Inhalation Q12H [facility]    metoprolol tartrate (LOPRESSOR) tablet 50 mg  50 mg Oral Q12H CORBIN    oxybutynin (DITROPAN-XL) 24 hr tablet 5 mg  5 mg Oral Daily    pantoprazole (PROTONIX) EC tablet 40 mg  40 mg Oral Early Morning    pregabalin (LYRICA) capsule 300 mg  300 mg Oral BID       Imaging:  Carotid duplex 3/9/2018:  Pending    Physical Exam:    General appearance: alert and oriented, in no acute distress  Neurologic: Grossly normal   Tongue midline  Smile symmetrical   Right upper lower extremity motor strength 5/5  Sensation intact light touch bilaterally upper lower extremities equal bilaterally  Left  and biceps strength 5/5    Neck: no adenopathy, no carotid bruit, no JVD, supple, symmetrical, trachea midline and thyroid not enlarged, symmetric, no tenderness/mass/nodules  Lungs: clear to auscultation bilaterally  Heart: regular rate and rhythm, S1, S2 normal, no murmur, click, rub or gallop  Abdomen: soft, non-tender; bowel sounds normal; no masses,  no organomegaly and Nondistended  No abdominal bruits    Extremities: extremities normal, warm and well-perfused; no cyanosis, clubbing, or edema, no edema, redness or tenderness in the calves or thighs and no ulcers, gangrene or trophic changes      Pulse exam:  Radial: Right: 2+ Left[de-identified] 2+  Ulnar: Right: 1+ Left[de-identified] 1+      Kelby Meckel, PA-C  3/9/2018  [facility], [phone]

## 2018-03-09 NOTE — ASSESSMENT & PLAN NOTE
--management per Neurology  --continue Eliquis and statin therapy  --?? need for addition of anti-platelet therapy, will leave to discretion of neurology  --PT/OT

## 2018-03-09 NOTE — PLAN OF CARE
Problem: DISCHARGE PLANNING  Goal: Discharge to home or other facility with appropriate resources  INTERVENTIONS:  - Identify barriers to discharge w/patient and caregiver  - Arrange for needed discharge resources and transportation as appropriate  - Identify discharge learning needs (meds, wound care, etc )  - Arrange for interpretive services to assist at discharge as needed  - Refer to Case Management Department for coordinating discharge planning if the patient needs post-hospital services based on physician/advanced practitioner order or complex needs related to functional status, cognitive ability, or social support system   -Discharge home with services when medically stable  Outcome: Progressing  Pt is generally independent  She lives alone in a senior apartment building  Pt gets her prescriptions filled at [facility]  She has many DME including cane, walker, nebulizer, oxygen and life alert  Pt reports that she has home care services  Discharge home with resumption of [facility] services when medically stable

## 2018-03-09 NOTE — ASSESSMENT & PLAN NOTE
Asymptomatic high-grade L ICA stenosis  --patient previously evaluated and refused elective L CEA in October 2016  --patient now agreeable to consider L CEA, although somewhat apprehensive this a m   --check carotid duplex for completeness  --neurology consult appreciated  --continue statin therapy  --continue Eliquis secondary to PAF (ASA/plavix discontinued during last hospitalization and changed to Eliquis)  --cardiology consult for preoperative risk stratification and clearance for potential future L CEA  --outpatient follow-up in 2 weeks to discuss elective L CEA to allow recovery from current right pontine CVA  --d/w Dr Jose Albarran  --will see on a p r n  basis during this hospitalization    Please call if we can be of any further assistance

## 2018-03-09 NOTE — OCCUPATIONAL THERAPY NOTE
OT TREATMENT       03/09/18 0906   Restrictions/Precautions   Other Precautions Contact/isolation; Fall Risk;Bed Alarm; Chair Alarm;O2;Pain   Pain Assessment   Pain Assessment 0-10   Pain Score 7   Pain Type Acute pain   Pain Location Generalized   Hospital Pain Intervention(s) Repositioned; Ambulation/increased activity; Distraction; Emotional support  (nurse notified for pain meds)   Diversional Activities Television   Response to Interventions some relief   ADL   Where Assessed Sitting at sink   Eating Assistance 5  Supervision/Setup   Eating Comments opening some containers   Grooming Assistance 4  Minimal Assistance   UB Bathing Assistance 4  Minimal Assistance   LB Bathing Assistance 3  Moderate Assistance   UB Dressing Assistance 4  Minimal Assistance   LB Dressing Assistance 3  Moderate Assistance   Bed Mobility   Supine to Sit 5  Supervision   Transfers   Sit to Stand 5  Supervision   Stand to Sit 5  Supervision   Stand pivot 4  Minimal assistance   Additional items Assist x 1   Functional Mobility   Functional Mobility 4  Minimal assistance   Additional Comments to bathroom and back, 10 feet x 2   Additional items Rolling walker   Coordination   Gross Motor min impaired   Fine Motor min impaired   Cognition   Overall Cognitive Status WFL   Activity Tolerance   Activity Tolerance Patient limited by pain; Patient limited by fatigue   Medical Staff Made Aware Davis Hernandez RN   Assessment   Assessment Pt with increased 7/10 pain and fatigue today, requiring Shaan/ModA for self care tasks  Pt stated, "I feel punky today and so tired " Pt completed ADL at sink in bathroom x 20 minutes and reported some lightheadedness  Ambulated to recliner with Shaan and RW and verbal cues for safety  Positioned reclined with legs elevated and 2L O2  Nurse made aware  Pt reported feeling better after resting for a few minutes  Vitals stable  Cont OT per POC      Plan   Treatment Interventions ADL retraining;Functional transfer training;UE strengthening/ROM; Endurance training;Patient/family training;Equipment evaluation/education; Neuromuscular reeducation; Fine motor coordination activities; Compensatory technique education; Energy conservation   OT Frequency 3-5x/wk   Recommendation   OT Discharge Recommendation (home with services vs STR, pending progress)   Modified Eleazar Scale   Modified Door Scale 4

## 2018-03-09 NOTE — ASSESSMENT & PLAN NOTE
Recurrence syncope without valvular disease or documented arrhythmia and+ right pontine CVA  --management per neurology consult  --TTE with preserved LV function, no regional wall motion abnormality and without evidence of aortic stenosis or significant valvular disease

## 2018-03-09 NOTE — SOCIAL WORK
Pt is generally independent  She lives alone in [place]  Her DME includes walker, cane, nebulizer, oxygen and life alert  Dr Jamila Merrill is her PCP  Pt uses [employer] transportation  She does not have a POA, or living will  Pt fills her prescriptions at [facility]  She is currently on service with [facility] [address]   SW reviewed d/c planning process including the following: identifying help at home, pt preference for d/c planning needs, availability of treatment team to discuss questions or concerns pt and/or family may have regarding understanding medications and recognizing signs and symptoms once discharged  SW also encouraged pt to follow up with all recommended appointments after discharge  Pt advised of importance for pt and family to participate in managing pts medical well being  Plan is for pt to discharge home with [facility] services

## 2018-03-10 NOTE — CONSULTS
CARDIOLOGY CONSULTATION  Jovany Moon 81 y o  female MRN: [id number]  Unit/Bed#: [address] [place] [address] Encounter: [id number]      History of Present Illness   Physician Requesting Consult: No att  providers found  Reason for Consult / Principal Problem:weakness  Assessment/Plan   Acute ischemic right pontine small infarction-agree with Neurology to continue Eliquis therapy as outpatient  Discussed with patient  History of atrial fibrillation  Severe left carotid stent stenosis-high cardiac risk  Recommend Lexiscan nuclear stress test prior to surgery for improved cardiac risk delineation  Last stress test April 2016  Hypertension    HPI: Jovany Moon is a 81y o  year old female history of atrial fibrillation on Eliquis, hypertension who presents with a fall  The patient denied having chest discomfort prior to the fall  She woke up to use the restroom but some of found herself on the floor after 3 hours  At 16 she got herself up to activate the Life Alert system  She had some slurring of speech and left facial tingling when EMS arrived  Patient takes Eliquis daily and has been compliant  She has known severe stenosis of the left internal carotid artery origin  Historical Information   Past Medical History:   Diagnosis Date    Asthma     Cancer ([facility] [address] )     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    Cardiac disease     a fib    Fibromyalgia     Fibromyalgia, primary     GERD (gastroesophageal reflux disease)     Hypertension     Hypokalemia     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, left groin, lung and bladder  Patient treated with chemotherapy and radiation     Past Surgical History:   Procedure Laterality Date    APPENDECTOMY      CATARACT EXTRACTION      CHEST WALL BIOPSY N/A 10/27/2017    Procedure: SINUS EXCISION AND REMOVAL OF FOREIGN BODY, ABDOMEN (WOUND EXPLORATION);   Surgeon: Lisandro Orosco MD;  Location: [address] [place] [address];  Service: General    EYE SURGERY Bilateral     cataracts     HIP FRACTURE SURGERY Left     HYSTERECTOMY      JOINT REPLACEMENT Left     hip    LYMPHADENECTOMY      PORTACATH PLACEMENT Right      History   Alcohol Use No     History   Drug Use No     History   Smoking Status    Never Smoker   Smokeless Tobacco    Never Used     Comment: never smoke     Family History   Problem Relation Age of Onset    Pneumonia Mother     Heart disease Father        Meds/Allergies   Prior to Admission medications    Medication Sig Start Date End Date Taking? Authorizing Provider   albuterol (ACCUNEB) 0 63 MG/3ML nebulizer solution Take 1 ampule by nebulization every 6 (six) hours as needed for wheezing   Yes Historical Provider, MD   ALPRAZolam Cait Ready) 0 5 mg tablet Take 0 5 mg by mouth daily at bedtime as needed for anxiety  Yes Historical Provider, MD   amLODIPine (NORVASC) 5 mg tablet Take 5 mg by mouth daily   Yes Historical Provider, MD   apixaban (ELIQUIS) 5 mg Take 5 mg by mouth 2 (two) times a day   Yes Historical Provider, MD   esomeprazole (NexIUM) 40 MG capsule Take 40 mg by mouth daily  Yes Historical Provider, MD   Fesoterodine Fumarate ER (TOVIAZ) 8 MG TB24 Take by mouth daily at bedtime     Yes Historical Provider, MD   fluticasone-salmeterol (ADVAIR) 250-50 mcg/dose inhaler Inhale 1 puff every 12 (twelve) hours This is home medication 1/10/18  Yes Latonya Sierra MD   potassium chloride (K-DUR) 10 mEq tablet Take 10 mEq by mouth daily  Yes Historical Provider, MD   pregabalin (LYRICA) 300 MG capsule Take 300 mg by mouth 2 (two) times a day     Yes Historical Provider, MD   atorvastatin (LIPITOR) 10 mg tablet Take 10 mg by mouth daily  3/9/18 Yes Historical Provider, MD   atorvastatin (LIPITOR) 40 mg tablet Take 1 tablet (40 mg total) by mouth daily at bedtime for 30 days 3/9/18 4/8/18  Latonya Sierra MD   furosemide (LASIX) 20 mg tablet Take 20 mg by mouth daily    Historical Provider, MD   metoprolol tartrate (LOPRESSOR) 50 mg tablet Take 50 mg by mouth every 12 (twelve) hours    Historical Provider, MD     Current Facility-Administered Medications   Medication Dose Route Frequency Provider Last Rate Last Dose    acetaminophen (TYLENOL) tablet 650 mg  650 mg Oral Q6H PRN Navjot OlmosripardeepGILDA   650 mg at 03/09/18 0928    albuterol inhalation solution 0 63 mg  0 63 mg Nebulization Q6H PRN Narayan Daniels MD        ALPRAZolam Radha Betters) tablet 0 5 mg  0 5 mg Oral HS PRN Narayan Daniels MD   0 5 mg at 03/08/18 2153    apixaban (ELIQUIS) tablet 5 mg  5 mg Oral BID Narayan Daniels MD   5 mg at 03/09/18 1756    atorvastatin (LIPITOR) tablet 40 mg  40 mg Oral HS Narayan Daniels MD   40 mg at 03/08/18 2203    fluticasone-salmeterol (ADVAIR) 250-50 mcg/dose inhaler 1 puff  1 puff Inhalation Q12H [facility] Narayan Daniels MD   1 puff at 03/09/18 0932    metoprolol tartrate (LOPRESSOR) tablet 50 mg  50 mg Oral Q12H [facility] Narayan Daniels MD   50 mg at 03/09/18 0932    oxybutynin (DITROPAN-XL) 24 hr tablet 5 mg  5 mg Oral Daily Narayan Daniels MD   5 mg at 03/08/18 0900    pantoprazole (PROTONIX) EC tablet 40 mg  40 mg Oral Early Morning Narayan Daniels MD   40 mg at 03/09/18 0741    pregabalin (LYRICA) capsule 300 mg  300 mg Oral BID Narayan Daniels MD   300 mg at 03/09/18 6170     Current Outpatient Prescriptions   Medication Sig Dispense Refill    albuterol (ACCUNEB) 0 63 MG/3ML nebulizer solution Take 1 ampule by nebulization every 6 (six) hours as needed for wheezing      ALPRAZolam (XANAX) 0 5 mg tablet Take 0 5 mg by mouth daily at bedtime as needed for anxiety   amLODIPine (NORVASC) 5 mg tablet Take 5 mg by mouth daily      apixaban (ELIQUIS) 5 mg Take 5 mg by mouth 2 (two) times a day      esomeprazole (NexIUM) 40 MG capsule Take 40 mg by mouth daily        Fesoterodine Fumarate ER (TOVIAZ) 8 MG TB24 Take by mouth daily at bedtime        fluticasone-salmeterol (ADVAIR) 250-50 mcg/dose inhaler Inhale 1 puff every 12 (twelve) hours This is home medication 0    potassium chloride (K-DUR) 10 mEq tablet Take 10 mEq by mouth daily   pregabalin (LYRICA) 300 MG capsule Take 300 mg by mouth 2 (two) times a day   atorvastatin (LIPITOR) 40 mg tablet Take 1 tablet (40 mg total) by mouth daily at bedtime for 30 days 30 tablet 0    furosemide (LASIX) 20 mg tablet Take 20 mg by mouth daily      metoprolol tartrate (LOPRESSOR) 50 mg tablet Take 50 mg by mouth every 12 (twelve) hours       Allergies   Allergen Reactions    Fentanyl And Related Other (See Comments)     Passed out    Latex     Other     Penicillins        Review of systems  CONSTITUTIONAL:  No weight loss, fever, chills, weakness or fatigue  HEENT:  Eyes:  No visual loss, blurred vision, double vision or yellow sclerae  Ears, Nose, Throat:  No hearing loss, sneezing, congestion, runny nose or sore throat  SKIN:  No rash or itching  CARDIOVASCULAR:  As per HPI RESPIRATORY:  As per HPI  GASTROINTESTINAL:  No anorexia, nausea, vomiting or diarrhea  No abdominal pain or blood  GENITOURINARY:  Burning on urination  No flank pain  NEUROLOGICAL:  As per HPI  MUSCULOSKELETAL:  No muscle, back pain, joint pain or stiffness  HEMATOLOGIC:  No anemia, bleeding or bruising  LYMPHATICS:  No enlarged nodes  No history of splenectomy  PSYCHIATRIC:  No active suicidal or homicidal ideation  ENDOCRINOLOGIC:  No reports of sweating, cold or heat intolerance  No polyuria or polydipsia  ALLERGIES:  No history of asthma, hives, eczema or rhinitis  More than 10 systems reviewed and otherwise noncontributory  Objective   Vitals: Blood pressure 140/70, pulse 63, temperature 97 6 °F (36 4 °C), temperature source Tympanic, resp  rate 18, height 5' 3" (1 6 m), weight 91 9 kg (202 lb 9 6 oz), SpO2 93 %, not currently breastfeeding  Physical Exam   Constitutional: She is oriented to person, place, and time  No distress  HENT:   Head: Normocephalic and atraumatic     Right Ear: External ear normal    Left Ear: External ear normal    Nose: Nose normal    Mouth/Throat: No oropharyngeal exudate  Eyes: Conjunctivae are normal  Pupils are equal, round, and reactive to light  Right eye exhibits no discharge  Left eye exhibits no discharge  No scleral icterus  Neck: Normal range of motion  No JVD present  No tracheal deviation present  No thyromegaly present  Cardiovascular: Normal rate, regular rhythm and intact distal pulses  Exam reveals gallop  Exam reveals no friction rub  Murmur heard  Pulmonary/Chest: Effort normal and breath sounds normal  No stridor  No respiratory distress  She has no wheezes  She has no rales  She exhibits no tenderness  Abdominal: Soft  Bowel sounds are normal  She exhibits distension  She exhibits no mass  There is no tenderness  There is no rebound and no guarding  Genitourinary:   Genitourinary Comments: No CVA tenderness   Musculoskeletal: She exhibits no edema, tenderness or deformity  Neurological: She is alert and oriented to person, place, and time  She displays normal reflexes  She exhibits normal muscle tone  Coordination abnormal    Skin: Skin is warm and dry  No rash noted  She is not diaphoretic  No erythema  Psychiatric: She has a normal mood and affect  Her behavior is normal  Judgment and thought content normal    Nursing note and vitals reviewed              Recent Results (from the past 24 hour(s))   Lipid Panel with Direct LDL reflex    Collection Time: 03/09/18  7:53 AM   Result Value Ref Range    Cholesterol 133 50 - 200 mg/dL    Triglycerides 91 <=150 mg/dL    HDL, Direct 44 40 - 60 mg/dL    LDL Calculated 71 0 - 100 mg/dL     Imaging: I have personally reviewed pertinent films in PACS    Cardiac testing:   Results for orders placed during the hospital encounter of 01/05/18   Echo complete with contrast if indicated    Narrative [address]  [address] [address]  [phone]    Transthoracic Echocardiogram  2D, M-mode, Doppler, and Color Doppler    Study date:  2018    Patient: Leighton Bush  MR number: [id number]  Account number: [de-identified]  : 1935  Age: 81 years  Gender: Female  Status: Routine  Location: Bedside  Height: 62 in  Weight: 187 7 lb  BP: 134/ 59 mmHg    Indications: Shortness of Breath Evaluate for suspected cardiac source of embolism  Diagnoses: 786 05 - SHORTNESS OF BREATH    Sonographer:  Eugenie Nguyen  Interpreting Physician:  Cheikh Diamond DO  Primary Physician:  Zeke Gann DO  Group:  [facility]    SUMMARY    LEFT VENTRICLE:  The cavity was small  Systolic function was hyperdynamic by visual assessment  Ejection fraction was estimated in the range of 70 % to 75 %  There were no regional wall motion abnormalities  There was mild concentric hypertrophy  Doppler parameters were consistent with abnormal left ventricular relaxation (grade 1 diastolic dysfunction)  Doppler parameters were consistent with elevated mean left atrial filling pressure  MITRAL VALVE:  There was mild regurgitation  AORTIC VALVE:  There was no evidence for stenosis  There was no regurgitation  TRICUSPID VALVE:  There was mild regurgitation  Pulmonary artery systolic pressure was mildly increased  HISTORY: PRIOR HISTORY: Shortness of Breath    PROCEDURE: The procedure was performed at the bedside  This was a routine study  The transthoracic approach was used  The study included complete 2D imaging, M-mode, complete spectral Doppler, and color Doppler  The heart rate was 77 bpm,  at the start of the study  This was a technically difficult study  LEFT VENTRICLE: The cavity was small  Systolic function was hyperdynamic by visual assessment  Ejection fraction was estimated in the range of 70 % to 75 %  There were no regional wall motion abnormalities  There was mild concentric  hypertrophy   DOPPLER: Doppler parameters were consistent with abnormal left ventricular relaxation (grade 1 diastolic dysfunction)  Doppler parameters were consistent with elevated mean left atrial filling pressure  RIGHT VENTRICLE: The size was normal  Systolic function was normal  DOPPLER: Systolic pressure was within the normal range  LEFT ATRIUM: Size was normal  No thrombus was identified  RIGHT ATRIUM: Size was normal     MITRAL VALVE: Valve structure was normal  There was normal leaflet separation  No echocardiographic evidence for prolapse  DOPPLER: The transmitral velocity was within the normal range  There was no evidence for stenosis  There was mild  regurgitation  AORTIC VALVE: The valve was trileaflet  Leaflets exhibited normal cuspal separation and sclerosis  DOPPLER: Transaortic velocity was within the normal range  There was no evidence for stenosis  There was no regurgitation  TRICUSPID VALVE: The valve structure was normal  There was normal leaflet separation  DOPPLER: The transtricuspid velocity was within the normal range  There was mild regurgitation  Pulmonary artery systolic pressure was mildly increased  Estimated peak PA pressure was 42 mmHg  PULMONIC VALVE: Leaflets exhibited normal thickness, no calcification, and normal cuspal separation  DOPPLER: The transpulmonic velocity was within the normal range  There was mild regurgitation  PERICARDIUM: There was no thickening  There was no pericardial effusion  AORTA: The root exhibited normal size      PULMONARY ARTERY: The size was normal  The morphology appeared normal     SYSTEM MEASUREMENT TABLES    2D mode  AoR Diam 2D: 3 1 cm  LA Diam (2D): 4 2 cm  LA/Ao (2D): 1 35  FS (2D Teich): 53 9 %  IVSd (2D): 1 08 cm  LVDEV: 73 4 cm³  LVESV: 10 9 cm³  LVIDd(2D): 4 08 cm  LVISd (2D): 1 88 cm  LVOT Area 2D: 3 46 cm squared  LVPWd (2D): 1 05 cm  SV (Teich): 62 5 cm³    Apical four chamber  LVEF A4C: 84 %    Unspecified Scan Mode  ANNIE Cont Eq (Peak Pacheco): 1 94 cm squared  ANNIE Cont Eq (VTI): 1 81 cm squared  LVOT (VTI): 26 8 cm  LVOT Diam : 2 1 cm  LVOT Vmax: 1210 mm/s  LVOT Vmax; Mean: 996 mm/s  Peak Grad ; Mean: 4 mm[Hg]  SV (LVOT): 81 cm³  VTI;Mean: 4 mm[Hg]  MV Peak A Pacheco: 1240 mm/s  MV Peak E Pacheco  Mean: 731 mm/s  RVSP: 36 mm[Hg]  Max P mm[Hg]  V Max: 2790 mm/s  Vmax: 2800 mm/s  TAPSE: 2 2 cm    [facility] Accredited Echocardiography Laboratory    Prepared and electronically signed by    Adry Hunt DO  Signed 2018 11:30:10       No results found for this or any previous visit  EKG: reviewed    Counseling / Coordination of Care  Total time spent today 70 minutes  Greater than 50% of total time was spent with the patient and / or family counseling and / or coordination of care  Cva/afib management  Mili Carter MD [facility]      "This note has been constructed using a voice recognition system  Therefore there may be syntax, spelling, and/or grammatical errors   Please call if you have any questions  "

## 2018-03-13 ENCOUNTER — TELEPHONE (OUTPATIENT)
Dept: VASCULAR SURGERY | Facility: CLINIC | Age: 83
End: 2018-03-13

## 2018-03-13 NOTE — TELEPHONE ENCOUNTER
----- Message from Drea Izaguirre PA-C sent at 3/12/2018  4:38 PM EDT -----  Carotid duplex reviewed  Patient has outpatient appt scheduled with Dr Geoff Sánchez to discuss elective L CEA  Thanks    ----- Message -----  From: Denisse Sargent  Sent: 3/12/2018  10:02 AM  To: Joy Childress PA-C    Pls rev Cerebrovascular Study

## 2018-03-14 ENCOUNTER — HOSPITAL ENCOUNTER (EMERGENCY)
Facility: HOSPITAL | Age: 83
Discharge: HOME | End: 2018-03-14
Attending: EMERGENCY MEDICINE | Admitting: EMERGENCY MEDICINE
Payer: MEDICARE

## 2018-03-14 ENCOUNTER — APPOINTMENT (EMERGENCY)
Dept: RADIOLOGY | Facility: HOSPITAL | Age: 83
End: 2018-03-14
Payer: MEDICARE

## 2018-03-14 VITALS
HEART RATE: 78 BPM | DIASTOLIC BLOOD PRESSURE: 65 MMHG | TEMPERATURE: 99.2 F | RESPIRATION RATE: 22 BRPM | WEIGHT: 200 LBS | OXYGEN SATURATION: 95 % | SYSTOLIC BLOOD PRESSURE: 147 MMHG | BODY MASS INDEX: 35.43 KG/M2

## 2018-03-14 DIAGNOSIS — S82.839A CLOSED FRACTURE OF PROXIMAL FIBULA: Primary | ICD-10-CM

## 2018-03-14 PROCEDURE — G8987 SELF CARE CURRENT STATUS: HCPCS

## 2018-03-14 PROCEDURE — 73552 X-RAY EXAM OF FEMUR 2/>: CPT

## 2018-03-14 PROCEDURE — 73590 X-RAY EXAM OF LOWER LEG: CPT

## 2018-03-14 PROCEDURE — 97163 PT EVAL HIGH COMPLEX 45 MIN: CPT

## 2018-03-14 PROCEDURE — G8979 MOBILITY GOAL STATUS: HCPCS

## 2018-03-14 PROCEDURE — G8988 SELF CARE GOAL STATUS: HCPCS

## 2018-03-14 PROCEDURE — 73502 X-RAY EXAM HIP UNI 2-3 VIEWS: CPT

## 2018-03-14 PROCEDURE — G8978 MOBILITY CURRENT STATUS: HCPCS

## 2018-03-14 PROCEDURE — 99284 EMERGENCY DEPT VISIT MOD MDM: CPT

## 2018-03-14 PROCEDURE — 97167 OT EVAL HIGH COMPLEX 60 MIN: CPT

## 2018-03-14 RX ORDER — TRAMADOL HYDROCHLORIDE 50 MG/1
50 TABLET ORAL EVERY 8 HOURS PRN
Qty: 10 TABLET | Refills: 0 | Status: SHIPPED | OUTPATIENT
Start: 2018-03-14 | End: 2018-03-19 | Stop reason: HOSPADM

## 2018-03-14 RX ORDER — TRAMADOL HYDROCHLORIDE 50 MG/1
50 TABLET ORAL ONCE
Status: COMPLETED | OUTPATIENT
Start: 2018-03-14 | End: 2018-03-14

## 2018-03-14 RX ADMIN — TRAMADOL HYDROCHLORIDE 50 MG: 50 TABLET, FILM COATED ORAL at 05:13

## 2018-03-14 NOTE — ED NOTES
Report to [facility] here for transport     Marimar Pena, RN  03/14/18 [phone]

## 2018-03-14 NOTE — ED NOTES
Pt sleeping,regular,unlabored respirations noted  Continues on 4L o2 via nasal cannula     Bety Raymond, ROSANNE  03/14/18 0600

## 2018-03-14 NOTE — ED NOTES
Pt having difficulty ambulating  Pt states her right leg is her "good" leg due to surgery on her left leg  Due to fx on right leg now, pt having difficulty  Will have PT evaluate  Dr Ibis Fabian made aware       Alie Smalls RN  03/14/18 9323 KIM Guillermo RN  03/14/18 5523

## 2018-03-14 NOTE — SOCIAL WORK
CM CALLED TO ASSESS AND PLACE PT TO POSS STR  PT FELL AND HAS A WB FRACTURE, CAN NOT BE HOME ALONE  CM SPOKE WITH PT, SHE IS UNABLE TO GO HOME ALONE, IS WILLING TO GO TO STR AND POSS APPLY FOR MEDICAID IF NEEDED  CALL MADE TO [facility], THEY HAVE NO BED BUT WILLING TO ACCEPT THE REFERRAL  CALL MADE TO [facility], WILL REVIEW THE REQUEST AND ASSESS FOR BED AVAILABILITY  REFERRAL PLACED THROUGH [facility], AWAITING REPLY

## 2018-03-14 NOTE — OCCUPATIONAL THERAPY NOTE
OT EVALUATION     03/14/18 0840   Restrictions/Precautions   RLE Weight Bearing Per Order WBAT   Braces or Orthoses LE Immobilizer   Other Precautions Chair Alarm; Bed Alarm; Fall Risk;O2   Pain Assessment   Pain Assessment 0-10   Pain Score 9   Pain Type Acute pain   Pain Location Knee   Pain Orientation Right   Home Living   Type of [address] entrance  (senior apartment )   [address] [place] [address] chair   Home Equipment Walker;Cane  (O2 3L 24/7)   Additional Comments VNA, daughter shops, microwaves meals   Prior Function   Level of [place] Needs assistance with IADLs; Needs assistance with ADLs and functional mobility   Lives With Alone   Receives Help From Family;Home health   ADL Assistance Needs assistance   IADLs Needs assistance   Comments pt with recent hospitalization with small CVA, pt s/p fall with proximal fibular fracture now in ER, WBAT per ER MD   ADL   Eating Assistance 4  Minimal Assistance   Grooming Assistance 4  Minimal Assistance   UB Bathing Assistance 3  Moderate Assistance   LB Bathing Assistance 2  Maximal Assistance   UB Dressing Assistance 3  Moderate Assistance   LB Dressing Assistance 2  Maximal [address]  2  Maximal Assistance   Bed Mobility   Supine to Sit 2  Maximal assistance   Sit to Supine 2  Maximal assistance   Additional items Assist x 2   Transfers   Sit to Stand 3  Moderate assistance   Additional items Assist x 2   Stand to Sit 3  Moderate assistance   Additional items Assist x 2   Functional Mobility   Functional Mobility (unable to take steps )   Balance   Static Sitting Fair -   Dynamic Sitting Poor   Static Standing Poor   Dynamic Standing Poor   Activity Tolerance   Activity Tolerance Patient limited by pain; Patient limited by fatigue   Medical Staff Made Aware tremors BUE, weakness unable to ambulate    RUE Assessment   RUE Assessment WFL  (4-/5)   LUE Assessment   LUE Assessment WFL  (4-/5)   Hand Function   Fine Motor Coordination Impaired  (tremors BUE)   Cognition   Overall Cognitive Status WFL   Arousal/Participation Cooperative   Attention Within functional limits   Orientation Level Oriented X4   Following Commands Follows all commands and directions without difficulty  Patient with LLE lymphedema (history)   Assessment   Limitation Decreased ADL status; Decreased UE strength;Decreased Safe judgement during ADL;Decreased endurance;Decreased self-care trans;Decreased high-level ADLs  (decreased balance and mobility )   Prognosis Good   Assessment Patient evaluated by Occupational Therapy  Patient admitted with fall with proximal fibular fracture right, WBAT  The patients occupational profile, medical and therapy history includes a extensive additional review of physical, cognitive, or psychosocial history related to current functional performance  Comorbidities affecting functional mobility and ADLS include:merkel cell cancer, LLE lymphedema,fibromyalgia, asthma, cardiac disease and hypertension  Prior to admission, patient was independent with functional mobility with walker, living in a senior apartment with VNA and homemaker, requiring assist for ADLS and requiring assist for IADLS  The evaluation identifies the following performance deficits: weakness, impaired balance, decreased endurance, decreased coordination, increased fall risk, new onset of impairment of functional mobility, decreased ADLS, decreased IADLS, decreased activity tolerance, decreased safety awareness, impaired judgement, ortheopedic restrictions and decreased strength, that result in activity limitations and/or participation restrictions  This evaluation requires clinical decision making of high complexity, because the patient presents with comorbidites that affect occupational performance and required significant modification of tasks or assistance with consideration of multiple treatment options    The Barthel Index was used as a functional outcome tool presenting with a score of 35, indicating marked limitations of functional mobility and ADLS  Patient will benefit from skilled Occupational Therapy services to address above deficits and facilitate a safe return to prior level of function  Goals   Patient Goals no pain, go home    STG Time Frame (1-7 days)   Short Term Goal  Patient will increase standing tolerance to 3 minutes during functional activity; Patient will increase bed mobility to mod assist; Patient will increase functional mobility to and from bathroom with rolling walker with mod assist to increase performance with ADLS; Patient will tolerate 8 minutes of UE ROM/strengthening to increase general activity tolerance and performance in ADLS/IADLS; Patient will improve functional activity tolerance to 10 minutes of sustained functional tasks to increase participation in basic self-care and decrease assistance level  LTG Time Frame (8-14 days)   Long Term Goal Patient will increase standing tolerance to 6 minutes during functional activity; Patient will increase bed mobility to min assist; Patient will increase functional mobility to and from bathroom with rolling walker with mod assist to increase performance with ADLS; Patient will tolerate 12 minutes of UE ROM/strengthening to increase general activity tolerance and performance in ADLS/IADLS; Patient will improve functional activity tolerance to 20 minutes of sustained functional tasks to increase participation in basic self-care and decrease assistance level     Functional Transfer Goals   Pt Will Perform All Functional Transfers (STG min assist of 2 LTG Min assist )   ADL Goals   Pt Will Perform Eating (STG supervision LTG Independent )   Pt Will Perform Grooming (STG supervision LTG independent )   Pt Will Perform Bathing (STG mod assist LTG min assist )   Pt Will Perform UE Dressing (STG supervision LTG independent )   Pt Will Perform LE Dressing (STG mod assist LTG min assist ) Pt Will Perform Toileting (STG mod assist LTG min assist )   Plan   Treatment Interventions ADL retraining;Functional transfer training;UE strengthening/ROM; Endurance training;Patient/family training;Equipment evaluation/education; Activityengagement; Energy conservation; Fine motor coordination activities   OT Frequency 3-5x/wk   Recommendation   OT Discharge Recommendation Short Term Rehab   Barthel Index   Feeding 5   Bathing 0   Grooming Score 0   Dressing Score 0   Bladder Score 10   Bowels Score 10   Toilet Use Score 5   Transfers (Bed/Chair) Score 5   Mobility (Level Surface) Score 0   Stairs Score 0   Barthel Index Score 27 [place], [place] OTR/L [id number]

## 2018-03-14 NOTE — DISCHARGE INSTRUCTIONS
Leg Fracture   WHAT YOU NEED TO KNOW:   A leg fracture is a break in any of the 3 long bones of your leg  The femur is the largest bone and goes from your hip to your knee  The fibula and tibia are the 2 bones in your lower leg that go from your knee to your ankle  DISCHARGE INSTRUCTIONS:   Return to the emergency department if:   · Your leg feels warm, tender, and painful  It may look swollen and red  · The pain in your injured leg gets worse even after you rest and take medicine  · Your cast gets wet or damaged  · Your leg or toes are numb  · The skin or toes of your injured leg become swollen, cold, or blue  Contact your healthcare provider or bone specialist if:   · You have a fever  · Your cast or brace is too tight  · There are new blood stains or a bad smell coming from under the cast     · You have new or worsening trouble moving your leg  · You have questions or concerns about your condition or care  Medicines:   · Prescription pain medicine  may be given  Ask how to take this medicine safely  · NSAIDs , such as ibuprofen, help decrease swelling, pain, and fever  NSAIDs can cause stomach bleeding or kidney problems in certain people  If you take blood thinner medicine, always ask your healthcare provider if NSAIDs are safe for you  Always read the medicine label and follow directions  · Acetaminophen  decreases pain and fever  It is available without a doctor's order  Ask how much to take and how often to take it  Follow directions  Read the labels of all other medicines you are using to see if they also contain acetaminophen, or ask your doctor or pharmacist  Acetaminophen can cause liver damage if not taken correctly  Do not use more than 4 grams (4,000 milligrams) total of acetaminophen in one day  · Take your medicine as directed  Contact your healthcare provider if you think your medicine is not helping or if you have side effects   Tell him of her if you are allergic to any medicine  Keep a list of the medicines, vitamins, and herbs you take  Include the amounts, and when and why you take them  Bring the list or the pill bottles to follow-up visits  Carry your medicine list with you in case of an emergency  Cast or splint care:   · Check the skin around your cast and splint daily for any redness or open areas  · Do not use a sharp or pointed object to scratch your skin under the cast or splint  · Do not remove your splint unless your healthcare provider says it is okay  Bathing with a cast or splint:  Do not let your cast or splint get wet  Before bathing, cover the cast or splint with a plastic bag  Tape the bag to your skin above the cast or splint to seal out the water  Keep your leg out of the water in case the bag leaks  Ask when it is okay to take a bath or shower  Self-care:   · Rest  your leg as directed and avoid activities that cause leg pain  · Apply ice  on your leg for 15 to 20 minutes every hour or as directed  Use an ice pack, or put crushed ice in a plastic bag  Cover it with a towel  Ice helps prevent tissue damage and decreases swelling and pain  · Elevate  your leg above the level of your heart as often as you can  This will help decrease swelling and pain  Prop your leg on pillows or blankets to keep it elevated comfortably  · Use crutches or a walker  as directed  Crutches will help you walk and take some weight off your injured leg while it heals  · Physical therapy  may be recommended  A physical therapist teaches you exercises to help improve movement and strength, and to decrease pain  Follow up with your healthcare provider or bone specialist as directed: You may need to return to have your splint or cast removed  You may need an x-ray of your leg to check how well the bone has healed  Write down your questions so you remember to ask them during your visits    © 2017 [address] Information is for End User's use only and may not be sold, redistributed or otherwise used for commercial purposes  All illustrations and images included in CareNotes® are the copyrighted property of A D [employer] M [employer]  or [employer]  The above information is an  only  It is not intended as medical advice for individual conditions or treatments  Talk to your doctor, nurse or pharmacist before following any medical regimen to see if it is safe and effective for you

## 2018-03-14 NOTE — ED PROVIDER NOTES
History  Chief Complaint   Patient presents with   [place] Fall     tried to sit on toilet and fell    Hip Pain     right     Leg Pain     right     81year-old morbidly obese white female went to sit on the toilet and missed complaints of right leg pain  No obvious deformity, no rotational deformity or shortening  History provided by:  Patient  Fall   Mechanism of injury: fall    Injury location:  Leg  Leg injury location:  R lower leg  Incident location:  Bathroom  Time since incident:  1 hour  Arrived directly from scene: yes    Fall:     Fall occurred: In the bathroom    Impact surface:  Hard floor    Point of impact:  Unable to specify  Suspicion of alcohol use: no    Suspicion of drug use: no    Prior to arrival data:     Bystander interventions:  None    Loss of consciousness: no      Amnesic to event: no    Associated symptoms: no abdominal pain, no back pain, no chest pain, no difficulty breathing and no headaches    Hip Pain   Associated symptoms: no abdominal pain, no chest pain and no headaches    Leg Pain   Associated symptoms: no back pain        Prior to Admission Medications   Prescriptions Last Dose Informant Patient Reported? Taking? ALPRAZolam (XANAX) 0 5 mg tablet   Yes Yes   Sig: Take 0 5 mg by mouth daily at bedtime as needed for anxiety  Fesoterodine Fumarate ER (TOVIAZ) 8 MG TB24   Yes Yes   Sig: Take by mouth daily at bedtime     albuterol (ACCUNEB) 0 63 MG/3ML nebulizer solution  Self Yes Yes   Sig: Take 1 ampule by nebulization every 6 (six) hours as needed for wheezing   amLODIPine (NORVASC) 5 mg tablet   Yes Yes   Sig: Take 5 mg by mouth daily   apixaban (ELIQUIS) 5 mg   Yes Yes   Sig: Take 5 mg by mouth 2 (two) times a day   atorvastatin (LIPITOR) 40 mg tablet   No Yes   Sig: Take 1 tablet (40 mg total) by mouth daily at bedtime for 30 days   esomeprazole (NexIUM) 40 MG capsule   Yes Yes   Sig: Take 40 mg by mouth daily     fluticasone-salmeterol (ADVAIR) 250-50 mcg/dose inhaler   No Yes   Sig: Inhale 1 puff every 12 (twelve) hours This is home medication   furosemide (LASIX) 20 mg tablet   Yes Yes   Sig: Take 20 mg by mouth daily   metoprolol tartrate (LOPRESSOR) 50 mg tablet   Yes Yes   Sig: Take 50 mg by mouth every 12 (twelve) hours   potassium chloride (K-DUR) 10 mEq tablet   Yes Yes   Sig: Take 10 mEq by mouth daily  pregabalin (LYRICA) 300 MG capsule   Yes Yes   Sig: Take 300 mg by mouth 2 (two) times a day  Facility-Administered Medications: None       Past Medical History:   Diagnosis Date    Asthma     Cancer ([facility] [address] )     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    Cardiac disease     a fib    Fibromyalgia     Fibromyalgia, primary     GERD (gastroesophageal reflux disease)     Hypertension     Hypokalemia     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, left groin, lung and bladder  Patient treated with chemotherapy and radiation       Past Surgical History:   Procedure Laterality Date    APPENDECTOMY      CATARACT EXTRACTION      CHEST WALL BIOPSY N/A 10/27/2017    Procedure: SINUS EXCISION AND REMOVAL OF FOREIGN BODY, ABDOMEN (WOUND EXPLORATION); Surgeon: Kayla Garza MD;  Location: [address] [place] [address];  Service: General    EYE SURGERY Bilateral     cataracts     HIP FRACTURE SURGERY Left     HYSTERECTOMY      JOINT REPLACEMENT Left     hip    LYMPHADENECTOMY      PORTACATH PLACEMENT Right        Family History   Problem Relation Age of Onset    Pneumonia Mother     Heart disease Father      I have reviewed and agree with the history as documented  Social History   Substance Use Topics    Smoking status: Never Smoker    Smokeless tobacco: Never Used      Comment: never smoke    Alcohol use No        Review of Systems   Constitutional: Negative  HENT: Negative  Respiratory: Negative  Cardiovascular: Negative for chest pain  Gastrointestinal: Negative for abdominal pain  Genitourinary: Negative  Musculoskeletal: Negative for back pain  Skin: Negative  Neurological: Negative for headaches  Hematological: Negative  Psychiatric/Behavioral: Negative  All other systems reviewed and are negative  Physical Exam  ED Triage Vitals [03/14/18 0407]   Temperature Pulse Respirations Blood Pressure SpO2   98 5 °F (36 9 °C) 82 (!) 24 (!) 168/120 90 %      Temp Source Heart Rate Source Patient Position - Orthostatic VS BP Location FiO2 (%)   Oral Monitor Lying Left arm --      Pain Score       9           Orthostatic Vital Signs  Vitals:    03/14/18 0407   BP: (!) 168/120   Pulse: 82   Patient Position - Orthostatic VS: Lying       Physical Exam   Constitutional: She appears well-developed and well-nourished  HENT:   Head: Normocephalic and atraumatic  Right Ear: External ear normal    Left Ear: External ear normal    Nose: Nose normal    Mouth/Throat: Oropharynx is clear and moist    Eyes: Conjunctivae and EOM are normal    Neck: Normal range of motion  Neck supple  Cardiovascular: Normal rate, regular rhythm, normal heart sounds and intact distal pulses  Pulmonary/Chest: Effort normal and breath sounds normal    Abdominal: Soft  Bowel sounds are normal    Musculoskeletal:        Right knee: She exhibits decreased range of motion, swelling and bony tenderness  She exhibits no ecchymosis and no deformity  Tenderness found  Legs:  Neurological: She is alert  Skin: Skin is warm and dry  Capillary refill takes less than 2 seconds  Psychiatric: She has a normal mood and affect  Her behavior is normal  Judgment and thought content normal    Nursing note and vitals reviewed        ED Medications  Medications   traMADol (ULTRAM) tablet 50 mg (50 mg Oral Given 3/14/18 8729)       Diagnostic Studies  Results Reviewed     None                 XR hip/pelv 2-3 vws right if performed   ED Interpretation by Joseline Albrecht MD (03/14 4329)   No acute pathology      XR femur 2 views RIGHT   ED Interpretation by Lay Hermosillo MD (03/14 9002)   No fracture, no acute disease      XR tibia fibula 2 views RIGHT   ED Interpretation by Lay Hermosillo MD (03/14 2911)   Nondisplaced fracture right proximal fibula                 Procedures  Procedures       Phone Contacts  ED Phone Contact    ED Course  ED Course                                MDM  CritCare Time    Disposition  Final diagnoses:   Closed fracture of proximal fibula - Right     Time reflects when diagnosis was documented in both MDM as applicable and the Disposition within this note     Time User Action Codes Description Comment    3/14/2018  4:58 AM Dayna Dillon Add [X65 023L] Closed fracture of proximal fibula     3/14/2018  4:58 AM Dayna Jewell Modify [L56 421P] Closed fracture of proximal fibula Right      ED Disposition     ED Disposition Condition Comment    Discharge  Lulu Olivares discharge to home/self care  Condition at discharge: Stable        Follow-up Information     Follow up With Specialties Details [facility] [address] , DO Orthopedic Surgery Schedule an appointment as soon as possible for a visit in 2 days  [address] [place] [address] Rd  [phone]          Patient's Medications   Discharge Prescriptions    No medications on file     No discharge procedures on file      ED Provider  Electronically Signed by           Lay Hermosillo MD  03/14/18 0178

## 2018-03-14 NOTE — PHYSICAL THERAPY NOTE
PT EVALUATION       03/14/18 0835   Note Type   Note type Eval only   Pain Assessment   Pain Assessment 0-10   Pain Score 9   Pain Type Acute pain   Pain Location Knee   Pain Orientation Right   Home Living   Type of Home Apartment  ([facility])   Home Layout One level;Elevator   Bathroom Equipment Shower chair   Home Equipment Walker;Cane   Prior Function   Level of Chambers (ambulatory with rolling walker)   Lives With Alone   Receives Help From Family;Home health   Restrictions/Precautions   Wells Zohra Bearing Precautions Per Order Yes   RLE Weight Bearing Per Order WBAT   Braces or Orthoses LE Immobilizer   Other Precautions Pain; Fall Risk;O2   General   Additional Pertinent History Pt fell at home with subsequent R proximal fibular fx    Pt has a history of multiple recent admissions and chronic L LE weakness   Cognition   Overall Cognitive Status WFL   Arousal/Participation Alert   Orientation Level Oriented X4   RLE Assessment   RLE Assessment (knee immobilizer;  mmt hip 2/5, knee NA, ankle2-/5, toes 3+/)   LLE Assessment   LLE Assessment (ROM WFL, MMT 3-/5)   Bed Mobility   Supine to Sit 2  Maximal assistance   Sit to Supine 2  Maximal assistance   Additional items Assist x 2   Transfers   Sit to Stand 3  Moderate assistance   Additional items Assist x 2  (UE support on walker, knees buckling)   Stand to Sit 3  Moderate assistance   Additional items Assist x 2   Stand pivot (unable )   Ambulation/Elevation   Gait Assistance (unable due to weakness and pain)   Balance   Static Sitting Fair   Dynamic Sitting Poor   Static Standing Poor   Dynamic Standing Poor   Endurance Deficit   Endurance Deficit (Sp02 95% HR 87 bpm on 3L02)   Activity Tolerance   Activity Tolerance Patient limited by pain   Medical Staff Made Aware pt very shaky when sitting, generally weak and unable to walk due to weakness and pain   Assessment   Prognosis Good   Problem List Decreased strength;Decreased endurance;Decreased range of motion; Impaired balance;Decreased mobility;Pain;Orthopedic restrictions   Assessment Patient seen for Physical Therapy evaluation  Patient admitted with R fibular fx  Comorbidities affecting patient's physical performance include: fibromyalgia, htn, syncope, L LE lymphedema, R pontine stroke, CHF  Personal factors affecting patient at time of initial evaluation include: ambulating with assistive device, inability to ambulate household distances, limited home support, positive fall history and inability to perform physical activity  Prior to admission, patient was ambulatory with walker  Please find objective findings from Physical Therapy assessment regarding body systems outlined above with impairments and limitations including weakness, decreased ROM, impaired balance, gait deviations, pain, decreased activity tolerance, decreased functional mobility tolerance and fall risk  The Barthel Index was used as a functional outcome tool presenting with a score of 35 today indicating marked limitations of functional mobility and ADLS  Patient's clinical presentation is currently unstable/unpredictable as seen in patient's presentation of changing level of pain, increased fall risk, new onset of impairment of functional mobility, decreased endurance and new onset of weakness  Pt would benefit from continued Physical Therapy treatment to address deficits as defined above and maximize level of functional mobility  As demonstrated by objective findings, the assigned level of complexity for this evaluation is high  PT IS GENERALLY WEAK IN ADDITION TO R KNEE PAIN AND WEAKNESS FROM FX  PT IS UNABLE TO PERFORM BED MOBILITY OR TRANSFERS WITHOUT ASSIST AND IS UNABLE TO WALK  PT IS NOT ABLE TO GO HOME, RECOMMEND STR     Goals   Patient Goals "no pain, go home"   STG Expiration Date (1-7 days)   Short Term Goal #1 mod assist bed mobility, mod assist transfers, mod assist ambulation with walker 10 feet   LTG Expiration Date (1-2 weeks)   Long Term Goal #1 min assist bed mobility, min assist transfers, min assist ambulation with walker 50 feet   Plan   Treatment/Interventions ADL retraining;Functional transfer training;LE strengthening/ROM; Therapeutic exercise; Endurance training;Patient/family training;Equipment eval/education; Bed mobility;Gait training   PT Frequency 5x/wk   Recommendation   Recommendation (STR)   Barthel Index   Feeding 5   Bathing 0   Grooming Score 0   Dressing Score 0   Bladder Score 10   Bowels Score 10   Toilet Use Score 5   Transfers (Bed/Chair) Score 5   Mobility (Level Surface) Score 0   Stairs Score 0   Barthel Index Score [address] Neda PT  [phone]

## 2018-03-15 ENCOUNTER — APPOINTMENT (EMERGENCY)
Dept: RADIOLOGY | Facility: HOSPITAL | Age: 83
DRG: 091 | End: 2018-03-15
Payer: MEDICARE

## 2018-03-15 ENCOUNTER — HOSPITAL ENCOUNTER (INPATIENT)
Facility: HOSPITAL | Age: 83
LOS: 3 days | Discharge: NURSING, REHAB OR LONG-TERM CARE | DRG: 091 | End: 2018-03-19
Attending: EMERGENCY MEDICINE | Admitting: FAMILY MEDICINE
Payer: MEDICARE

## 2018-03-15 DIAGNOSIS — G92.8 TOXIC METABOLIC ENCEPHALOPATHY: ICD-10-CM

## 2018-03-15 DIAGNOSIS — S82.201D CLOSED FRACTURE OF RIGHT TIBIA AND FIBULA WITH ROUTINE HEALING: ICD-10-CM

## 2018-03-15 DIAGNOSIS — J96.21 ACUTE ON CHRONIC RESPIRATORY FAILURE WITH HYPOXIA (HCC): ICD-10-CM

## 2018-03-15 DIAGNOSIS — R41.0 DELIRIOUS: Primary | ICD-10-CM

## 2018-03-15 DIAGNOSIS — S82.401D CLOSED FRACTURE OF RIGHT TIBIA AND FIBULA WITH ROUTINE HEALING: ICD-10-CM

## 2018-03-15 DIAGNOSIS — M79.7 FIBROMYALGIA: ICD-10-CM

## 2018-03-15 LAB
AMORPH URATE CRY URNS QL MICRO: ABNORMAL /HPF
ANION GAP SERPL CALCULATED.3IONS-SCNC: 2 MMOL/L (ref 4–13)
ATRIAL RATE: 96 BPM
BACTERIA UR QL AUTO: ABNORMAL /HPF
BASOPHILS # BLD AUTO: 0.02 THOUSANDS/ΜL (ref 0–0.1)
BASOPHILS NFR BLD AUTO: 0 % (ref 0–1)
BILIRUB UR QL STRIP: NEGATIVE
BUN SERPL-MCNC: 15 MG/DL (ref 5–25)
CALCIUM SERPL-MCNC: 9.3 MG/DL (ref 8.3–10.1)
CHLORIDE SERPL-SCNC: 101 MMOL/L (ref 100–108)
CLARITY UR: ABNORMAL
CLARITY, POC: NORMAL
CO2 SERPL-SCNC: 39 MMOL/L (ref 21–32)
COLOR UR: YELLOW
COLOR, POC: YELLOW
CREAT SERPL-MCNC: 0.68 MG/DL (ref 0.6–1.3)
EOSINOPHIL # BLD AUTO: 0.21 THOUSAND/ΜL (ref 0–0.61)
EOSINOPHIL NFR BLD AUTO: 3 % (ref 0–6)
ERYTHROCYTE [DISTWIDTH] IN BLOOD BY AUTOMATED COUNT: 16.4 % (ref 11.6–15.1)
GFR SERPL CREATININE-BSD FRML MDRD: 82 ML/MIN/1.73SQ M
GLUCOSE SERPL-MCNC: 97 MG/DL (ref 65–140)
GLUCOSE UR STRIP-MCNC: NEGATIVE MG/DL
HCT VFR BLD AUTO: 40.4 % (ref 34.8–46.1)
HGB BLD-MCNC: 12.6 G/DL (ref 11.5–15.4)
HGB UR QL STRIP.AUTO: ABNORMAL
KETONES UR STRIP-MCNC: ABNORMAL MG/DL
LEUKOCYTE ESTERASE UR QL STRIP: NEGATIVE
LYMPHOCYTES # BLD AUTO: 0.55 THOUSANDS/ΜL (ref 0.6–4.47)
LYMPHOCYTES NFR BLD AUTO: 8 % (ref 14–44)
MCH RBC QN AUTO: 25.7 PG (ref 26.8–34.3)
MCHC RBC AUTO-ENTMCNC: 31.2 G/DL (ref 31.4–37.4)
MCV RBC AUTO: 82 FL (ref 82–98)
MONOCYTES # BLD AUTO: 0.42 THOUSAND/ΜL (ref 0.17–1.22)
MONOCYTES NFR BLD AUTO: 6 % (ref 4–12)
NEUTROPHILS # BLD AUTO: 5.79 THOUSANDS/ΜL (ref 1.85–7.62)
NEUTS SEG NFR BLD AUTO: 83 % (ref 43–75)
NITRITE UR QL STRIP: NEGATIVE
NON-SQ EPI CELLS URNS QL MICRO: ABNORMAL /HPF
NRBC BLD AUTO-RTO: 0 /100 WBCS
P AXIS: 95 DEGREES
PH UR STRIP.AUTO: 8.5 [PH] (ref 4.5–8)
PLATELET # BLD AUTO: 123 THOUSANDS/UL (ref 149–390)
PMV BLD AUTO: 11.4 FL (ref 8.9–12.7)
POTASSIUM SERPL-SCNC: 4.9 MMOL/L (ref 3.5–5.3)
PR INTERVAL: 176 MS
PROT UR STRIP-MCNC: ABNORMAL MG/DL
QRS AXIS: -15 DEGREES
QRSD INTERVAL: 84 MS
QT INTERVAL: 356 MS
QTC INTERVAL: 449 MS
RBC # BLD AUTO: 4.9 MILLION/UL (ref 3.81–5.12)
RBC #/AREA URNS AUTO: ABNORMAL /HPF
SODIUM SERPL-SCNC: 142 MMOL/L (ref 136–145)
SP GR UR STRIP.AUTO: 1.01 (ref 1–1.03)
T WAVE AXIS: 52 DEGREES
TROPONIN I SERPL-MCNC: <0.02 NG/ML
UROBILINOGEN UR QL STRIP.AUTO: 1 E.U./DL
VENTRICULAR RATE: 96 BPM
WBC # BLD AUTO: 7.02 THOUSAND/UL (ref 4.31–10.16)
WBC #/AREA URNS AUTO: ABNORMAL /HPF

## 2018-03-15 PROCEDURE — 36415 COLL VENOUS BLD VENIPUNCTURE: CPT | Performed by: EMERGENCY MEDICINE

## 2018-03-15 PROCEDURE — 71046 X-RAY EXAM CHEST 2 VIEWS: CPT

## 2018-03-15 PROCEDURE — 93010 ELECTROCARDIOGRAM REPORT: CPT | Performed by: INTERNAL MEDICINE

## 2018-03-15 PROCEDURE — 70450 CT HEAD/BRAIN W/O DYE: CPT

## 2018-03-15 PROCEDURE — 84484 ASSAY OF TROPONIN QUANT: CPT | Performed by: EMERGENCY MEDICINE

## 2018-03-15 PROCEDURE — 81001 URINALYSIS AUTO W/SCOPE: CPT

## 2018-03-15 PROCEDURE — 93005 ELECTROCARDIOGRAM TRACING: CPT

## 2018-03-15 PROCEDURE — 80048 BASIC METABOLIC PNL TOTAL CA: CPT | Performed by: EMERGENCY MEDICINE

## 2018-03-15 PROCEDURE — 73590 X-RAY EXAM OF LOWER LEG: CPT

## 2018-03-15 PROCEDURE — 96372 THER/PROPH/DIAG INJ SC/IM: CPT

## 2018-03-15 PROCEDURE — 81002 URINALYSIS NONAUTO W/O SCOPE: CPT | Performed by: EMERGENCY MEDICINE

## 2018-03-15 PROCEDURE — 85025 COMPLETE CBC W/AUTO DIFF WBC: CPT | Performed by: EMERGENCY MEDICINE

## 2018-03-15 PROCEDURE — 99285 EMERGENCY DEPT VISIT HI MDM: CPT

## 2018-03-15 PROCEDURE — 99220 PR INITIAL OBSERVATION CARE/DAY 70 MINUTES: CPT | Performed by: FAMILY MEDICINE

## 2018-03-15 RX ORDER — PREGABALIN 75 MG/1
300 CAPSULE ORAL 2 TIMES DAILY
Status: DISCONTINUED | OUTPATIENT
Start: 2018-03-15 | End: 2018-03-19 | Stop reason: HOSPADM

## 2018-03-15 RX ORDER — ACETAMINOPHEN 325 MG/1
650 TABLET ORAL EVERY 6 HOURS PRN
Status: DISCONTINUED | OUTPATIENT
Start: 2018-03-15 | End: 2018-03-19 | Stop reason: HOSPADM

## 2018-03-15 RX ORDER — TOLTERODINE TARTRATE 1 MG/1
1 TABLET, EXTENDED RELEASE ORAL 2 TIMES DAILY
Status: DISCONTINUED | OUTPATIENT
Start: 2018-03-15 | End: 2018-03-19 | Stop reason: HOSPADM

## 2018-03-15 RX ORDER — FUROSEMIDE 20 MG/1
20 TABLET ORAL DAILY
Status: DISCONTINUED | OUTPATIENT
Start: 2018-03-16 | End: 2018-03-16

## 2018-03-15 RX ORDER — HYDRALAZINE HYDROCHLORIDE 20 MG/ML
10 INJECTION INTRAMUSCULAR; INTRAVENOUS EVERY 6 HOURS PRN
Status: DISCONTINUED | OUTPATIENT
Start: 2018-03-15 | End: 2018-03-19 | Stop reason: HOSPADM

## 2018-03-15 RX ORDER — TRAMADOL HYDROCHLORIDE 50 MG/1
50 TABLET ORAL EVERY 8 HOURS PRN
Status: DISCONTINUED | OUTPATIENT
Start: 2018-03-15 | End: 2018-03-17

## 2018-03-15 RX ORDER — METOPROLOL TARTRATE 50 MG/1
50 TABLET, FILM COATED ORAL EVERY 12 HOURS SCHEDULED
Status: DISCONTINUED | OUTPATIENT
Start: 2018-03-15 | End: 2018-03-19 | Stop reason: HOSPADM

## 2018-03-15 RX ORDER — OLANZAPINE 10 MG/1
5 INJECTION, POWDER, LYOPHILIZED, FOR SOLUTION INTRAMUSCULAR ONCE
Status: COMPLETED | OUTPATIENT
Start: 2018-03-15 | End: 2018-03-15

## 2018-03-15 RX ORDER — OMEPRAZOLE 20 MG/1
20 CAPSULE, DELAYED RELEASE ORAL DAILY
COMMUNITY
End: 2018-05-10 | Stop reason: ALTCHOICE

## 2018-03-15 RX ORDER — ATORVASTATIN CALCIUM 40 MG/1
40 TABLET, FILM COATED ORAL
Status: DISCONTINUED | OUTPATIENT
Start: 2018-03-15 | End: 2018-03-19 | Stop reason: HOSPADM

## 2018-03-15 RX ORDER — QUETIAPINE FUMARATE 25 MG/1
12.5 TABLET, FILM COATED ORAL
Status: DISCONTINUED | OUTPATIENT
Start: 2018-03-15 | End: 2018-03-19 | Stop reason: HOSPADM

## 2018-03-15 RX ORDER — ALPRAZOLAM 0.5 MG/1
0.5 TABLET ORAL DAILY PRN
Status: DISCONTINUED | OUTPATIENT
Start: 2018-03-15 | End: 2018-03-18

## 2018-03-15 RX ORDER — ONDANSETRON 2 MG/ML
4 INJECTION INTRAMUSCULAR; INTRAVENOUS EVERY 6 HOURS PRN
Status: DISCONTINUED | OUTPATIENT
Start: 2018-03-15 | End: 2018-03-19 | Stop reason: HOSPADM

## 2018-03-15 RX ORDER — ALBUTEROL SULFATE 2.5 MG/3ML
2.5 SOLUTION RESPIRATORY (INHALATION) EVERY 4 HOURS PRN
Status: DISCONTINUED | OUTPATIENT
Start: 2018-03-15 | End: 2018-03-19 | Stop reason: HOSPADM

## 2018-03-15 RX ORDER — ALBUTEROL SULFATE 2.5 MG/3ML
2.5 SOLUTION RESPIRATORY (INHALATION) EVERY 6 HOURS PRN
Status: DISCONTINUED | OUTPATIENT
Start: 2018-03-15 | End: 2018-03-15

## 2018-03-15 RX ORDER — POTASSIUM CHLORIDE 750 MG/1
10 TABLET, EXTENDED RELEASE ORAL DAILY
Status: DISCONTINUED | OUTPATIENT
Start: 2018-03-16 | End: 2018-03-16

## 2018-03-15 RX ORDER — AMLODIPINE BESYLATE 5 MG/1
5 TABLET ORAL DAILY
Status: DISCONTINUED | OUTPATIENT
Start: 2018-03-16 | End: 2018-03-19 | Stop reason: HOSPADM

## 2018-03-15 RX ORDER — PANTOPRAZOLE SODIUM 20 MG/1
20 TABLET, DELAYED RELEASE ORAL
Status: DISCONTINUED | OUTPATIENT
Start: 2018-03-16 | End: 2018-03-19 | Stop reason: HOSPADM

## 2018-03-15 RX ORDER — ALBUTEROL SULFATE 2.5 MG/3ML
2.5 SOLUTION RESPIRATORY (INHALATION) EVERY 6 HOURS PRN
COMMUNITY

## 2018-03-15 RX ADMIN — ATORVASTATIN CALCIUM 40 MG: 40 TABLET, FILM COATED ORAL at 21:48

## 2018-03-15 RX ADMIN — OLANZAPINE 5 MG: 10 INJECTION, POWDER, FOR SOLUTION INTRAMUSCULAR at 13:38

## 2018-03-15 RX ADMIN — QUETIAPINE 12.5 MG: 25 TABLET ORAL at 21:48

## 2018-03-15 RX ADMIN — APIXABAN 5 MG: 5 TABLET, FILM COATED ORAL at 19:11

## 2018-03-15 RX ADMIN — METOPROLOL TARTRATE 50 MG: 50 TABLET ORAL at 21:48

## 2018-03-15 RX ADMIN — WATER 2.1 ML: 1 INJECTION INTRAMUSCULAR; INTRAVENOUS; SUBCUTANEOUS at 13:38

## 2018-03-15 RX ADMIN — FLUTICASONE PROPIONATE AND SALMETEROL 1 PUFF: 50; 250 POWDER RESPIRATORY (INHALATION) at 21:48

## 2018-03-15 RX ADMIN — HYDRALAZINE HYDROCHLORIDE 10 MG: 20 INJECTION INTRAMUSCULAR; INTRAVENOUS at 19:11

## 2018-03-15 RX ADMIN — TOLTERODINE TARTRATE 1 MG: 1 TABLET, FILM COATED ORAL at 19:15

## 2018-03-15 RX ADMIN — PREGABALIN 300 MG: 100 CAPSULE ORAL at 19:11

## 2018-03-15 NOTE — ASSESSMENT & PLAN NOTE
· 10 years ago pt found to have a lesion 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; pathology results were unclear and patient was treated for high-grade neuroendocrine tumor/Merkel cell  Received resection and lymph node dissection of the left groin which were both positive  Patient received radiation to the groin and tumor bed and received IV chemotherapy  · Follows with Hem/Onc Dr Marty Head at [facility], seen 2/20/18:  No sign of recurrence, recent scan did not demonstrate evidence of recurrent Merkel cell, surveillance on going, patient will follow up in 6 months    · Left lower extremity lymphedema- chronic

## 2018-03-15 NOTE — ASSESSMENT & PLAN NOTE
· CTA neck/brain 3/2018: 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 artery 85%  · Carotid duplex: L ICA stenosis 70-99%  Plaque is heterogenous and irregular  Vertebral artery flow is antegrade  · Patient seen by vascular during hospitalization in March and refused CEA; also noted to have refused CEA as an outpatient

## 2018-03-15 NOTE — ASSESSMENT & PLAN NOTE
· Multifactorial, hypoxia, recent change in environment, prior to [facility] pt was home with self-care  Yesterday she presented to ED s/p  Fall, sustained a R fibular fracture, was d/c to [facility] rehab, tramadol Rx given; unsure whether patient took tramadol, questionable underlying dementia   · 3/7/18 hospitalized at [facility] for R pontine stroke  · Head CT:  No acute intracranial abnormality, chronic small vessel ischemia unchanged; L middle cranial fossa meningioma unchanged    · Supportive care  · Fall precautions

## 2018-03-15 NOTE — ASSESSMENT & PLAN NOTE
· Right foot swelling, tenderness to palpation, foot pink and warm, no signs of compartment syndrome  · Continue Right knee immobilizer  · Pain mgmt: P r n   Tylenol and ibuprofen, avoid narcotics in patient with acute delirium  · PT consult  · Seen by OT in the ER yesterday, recommendation for STR

## 2018-03-15 NOTE — ED NOTES
Pt refusing RN assessment and refusing testing   Pt states "You guys are really going to get it "       Jerad Monsivais, RN  03/15/18 9621

## 2018-03-15 NOTE — ASSESSMENT & PLAN NOTE
· O2 sat 88% on admission, improved with 2lnc  · Hospitalized 1/23-1/26/18 at [facility] for CHF exacerbation, O2 sat on RA 87%, was discharged with 2 LN/C  · Patient on [facility] at home; as per daughter was on O2 at [facility]; continue  · Incentive spirometer

## 2018-03-15 NOTE — ASSESSMENT & PLAN NOTE
· Euvolemic, no edema, lungs CTA  · CXR:  Negative for acute infiltrate or pleural effusion; mild pulmonary vascular congestion    · ECHO Jan 2018: LVEF 70-75%,   · Continue furosemide and metoprolol

## 2018-03-15 NOTE — ED ATTENDING ATTESTATION
Angel Mendoza MD, saw and evaluated the patient  I have discussed the patient with the resident/non-physician practitioner and agree with the resident's/non-physician practitioner's findings, Plan of Care, and MDM as documented in the resident's/non-physician practitioner's note, except where noted  All available labs and Radiology studies were reviewed  At this point I agree with the current assessment done in the Emergency Department  I have conducted an independent evaluation of this patient including a focused history and a physical exam     69-year-old female, fall at home, was seen at [facility] and transferred from there to a nursing facility for a fibular fractures presenting to the emergency department from the nursing facility for alteration in mental status  Per the nursing facility the patient is increasingly confused and not what they would consider baseline  On arrival to the emergency department, the patient is unaware as to where she is, and is unaware of the recent events that have transpired  The patient does not believe that she needs to be seen or evaluated in the emergency department and is combative with staff  She denies any complaints  Elderly female lying recumbent in the stretcher no acute respiratory distress  Head is normocephalic and atraumatic  The patient has some juvenal ocular erythema bilateral eyes  Mucous membranes are moist   Neck is supple without meningismus  Lungs are clear to auscultation bilaterally with no wheezes rales or rhonchi  Heart is regular rate and rhythm with no murmurs rubs or gallops  Abdomen is soft, nontender to palpation  Extremities the right lower extremity is in a splint, and the left lower extremity has edema without any evidence of cellulitis  Patient moves bilateral upper lower extremities without difficulty  69-year-old female, altered mental status, will be evaluated with lab work and a CT head      Labs Reviewed   CBC AND DIFFERENTIAL - Abnormal        Result Value Ref Range Status    WBC 7 02  4 31 - 10 16 Thousand/uL Final    RBC 4 90  3 81 - 5 12 Million/uL Final    Hemoglobin 12 6  11 5 - 15 4 g/dL Final    Hematocrit 40 4  34 8 - 46 1 % Final    MCV 82  82 - 98 fL Final    MCH 25 7 (*) 26 8 - 34 3 pg Final    MCHC 31 2 (*) 31 4 - 37 4 g/dL Final    RDW 16 4 (*) 11 6 - 15 1 % Final    MPV 11 4  8 9 - 12 7 fL Final    Platelets 482 (*) 967 - 390 Thousands/uL Final    nRBC 0  /100 WBCs Final    Neutrophils Relative 83 (*) 43 - 75 % Final    Lymphocytes Relative 8 (*) 14 - 44 % Final    Monocytes Relative 6  4 - 12 % Final    Eosinophils Relative 3  0 - 6 % Final    Basophils Relative 0  0 - 1 % Final    Neutrophils Absolute 5 79  1 85 - 7 62 Thousands/µL Final    Lymphocytes Absolute 0 55 (*) 0 60 - 4 47 Thousands/µL Final    Monocytes Absolute 0 42  0 17 - 1 22 Thousand/µL Final    Eosinophils Absolute 0 21  0 00 - 0 61 Thousand/µL Final    Basophils Absolute 0 02  0 00 - 0 10 Thousands/µL Final   BASIC METABOLIC PANEL - Abnormal     Sodium 142  136 - 145 mmol/L Final    Potassium 4 9  3 5 - 5 3 mmol/L Final    Comment: Slightly Hemolyzed; Results May be Affected    Chloride 101  100 - 108 mmol/L Final    CO2 39 (*) 21 - 32 mmol/L Final    Anion Gap 2 (*) 4 - 13 mmol/L Final    BUN 15  5 - 25 mg/dL Final    Creatinine 0 68  0 60 - 1 30 mg/dL Final    Comment: Standardized to IDMS reference method    Glucose 97  65 - 140 mg/dL Final    Comment:   If the patient is fasting, the ADA then defines impaired fasting glucose as > 100 mg/dL and diabetes as > or equal to 123 mg/dL  Specimen collection should occur prior to Sulfasalazine administration due to the potential for falsely depressed results  Specimen collection should occur prior to Sulfapyridine administration due to the potential for falsely elevated results      Calcium 9 3  8 3 - 10 1 mg/dL Final    eGFR 82  ml/min/1 73sq m Final    Narrative:     National Kidney Disease Education Program recommendations are as follows:  GFR calculation is accurate only with a steady state creatinine  Chronic Kidney disease less than 60 ml/min/1 73 sq  meters  Kidney failure less than 15 ml/min/1 73 sq  meters  URINE MICROSCOPIC - Abnormal     RBC, UA None Seen  None Seen, 0-5 /hpf Final    WBC, UA 2-4 (*) None Seen, 0-5, 5-55, 5-65 /hpf Final    Epithelial Cells Occasional  None Seen, Occasional /hpf Final    Bacteria, UA Occasional  None Seen, Occasional /hpf Final    AMORPH URATES Innumerable  /hpf Final   ED URINE MACROSCOPIC - Abnormal     Color, UA Yellow   Final    Clarity, UA Cloudy   Final    pH, UA 8 5 (*) 4 5 - 8 0 Final    Leukocytes, UA Negative  Negative Final    Nitrite, UA Negative  Negative Final    Protein,  (2+) (*) Negative mg/dl Final    Glucose, UA Negative  Negative mg/dl Final    Ketones, UA 15 (1+) (*) Negative mg/dl Final    Urobilinogen, UA 1 0  0 2, 1 0 E U /dl E U /dl Final    Bilirubin, UA Negative  Negative Final    Blood, UA Trace (*) Negative Final    Specific [place], UA 1 015  1 003 - 1 030 Final    Narrative:     CLINITEK RESULT   TROPONIN I - Normal    Troponin I <0 02  <=0 04 ng/mL Final    Narrative:     [employer] Chemistry analyzer 99% cutoff is > 0 04 ng/mL in network labs    o cTnI 99% cutoff is useful only when applied to patients in the clinical setting of myocardial ischemia  o cTnI 99% cutoff should be interpreted in the context of clinical history, ECG findings and possibly cardiac imaging to establish correct diagnosis  o cTnI 99% cutoff may be suggestive but clearly not indicative of a coronary event without the clinical setting of myocardial ischemia  POCT URINALYSIS DIPSTICK - Normal    Color, UA yellow   Final    Clarity, UA cloudy   Final     XR ankle 3+ vw right   Final Result      Soft tissue swelling over the lateral malleolus without acute osseous injury           Workstation performed: [id number]         XR tibia fibula 2 views LEFT ED Interpretation   No acute findings      Final Result      No acute osseous abnormality  Workstation performed: [id number]         XR chest 2 views   Final Result      Mild pulmonary vascular congestion  No acute infiltrate or pleural effusion  Workstation performed: [id number]         CT head without contrast   Final Result      No acute intracranial abnormality  Chronic small vessel ischemic disease is unchanged  Unchanged appearance of a left middle cranial fossa meningioma                    Workstation performed: [id number]

## 2018-03-15 NOTE — H&P
H&P- Tiffany Travis 1935, 81 y o  female MRN: [id number]    Unit/Bed#: CW2 215-01 Encounter: [id number]    Primary Care Provider: Talon Sanchez DO   Date and time admitted to hospital: 3/15/2018 12:48 PM      * Delirium   Assessment & Plan    · Multifactorial, hypoxia, recent change in environment, prior to [facility] pt was home with self-care  Yesterday she presented to ED s/p  Fall, sustained a R fibular fracture, [place] [facility] [place], tramadol Rx given; unsure whether patient took tramadol, questionable underlying dementia   · 3/7/18 hospitalized at [facility] for R pontine stroke  · Head CT:  No acute intracranial abnormality, chronic small vessel ischemia unchanged; L middle cranial fossa meningioma unchanged  · Supportive care  · Fall precautions        Acute on chronic respiratory failure with hypoxia (HCC)   Assessment & Plan    · O2 sat 88% on admission, improved with 2lnc  · Hospitalized 1/23-1/26/18 at [facility] for CHF exacerbation, O2 sat on RA 87%, was discharged with 2 LN/C  · Patient on [facility] at home; as per daughter was on O2 at [facility] care; continue  · Incentive spirometer         Hypertension, accelerated   Assessment & Plan    · /92, 173/109  · Prn labetolol SBP >180  · Continuing home meds amlodipine, metoprolol, Lasix        Closed fracture of right tibia and fibula with routine healing   Assessment & Plan    · Right foot swelling, tenderness to palpation, foot pink and warm, no signs of compartment syndrome  · Continue Right knee immobilizer  · Pain mgmt: P r n  Tylenol and ibuprofen, avoid narcotics in patient with acute delirium  · PT consult  · Seen by OT in the ER yesterday, recommendation for STR        Chronic diastolic CHF (congestive heart failure), NYHA class 1 (HCC)   Assessment & Plan    · Euvolemic, no edema, lungs CTA  · CXR:  Negative for acute infiltrate or pleural effusion; mild pulmonary vascular congestion    · ECHO Jan 2018: LVEF 70-75%,   · Continue furosemide and metoprolol        Stenosis of left internal carotid artery   Assessment & Plan    · CTA neck/brain 3/2018: 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 artery 85%  · Carotid duplex: L ICA stenosis 70-99%  Plaque is heterogenous and irregular  Vertebral artery flow is antegrade  · Patient seen by vascular during hospitalization in March and refused CEA; also noted to have refused CEA as an outpatient  Moderate persistent asthma   Assessment & Plan    · No acute exacerbation  · Continue Advair and p r n  nebs        Paroxysmal atrial fibrillation (HCC)   Assessment & Plan    · EKG: NSR with PVC/PAC, rate 96  · On Eliquis, continue  · On Metoprolol for rate control         Fibromyalgia   Assessment & Plan    · Continue Lyrica        Hyperlipemia   Assessment & Plan    · Continue statin        GERD (gastroesophageal reflux disease)   Assessment & Plan    · Continue PPI        History of Merkel cell carcinoma   Assessment & Plan    · 10 years ago pt found to have a lesion 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; pathology results were unclear and patient was treated for high-grade neuroendocrine tumor/Merkel cell  Received resection and lymph node dissection of the left groin which were both positive  Patient received radiation to the groin and tumor bed and received IV chemotherapy  · Follows with Hem/Onc Dr Luis Miguel Francisco at [facility], seen 2/20/18:  No sign of recurrence, recent scan did not demonstrate evidence of recurrent Merkel cell, surveillance on going, patient will follow up in 6 months  · Left lower extremity lymphedema- chronic          VTE Prophylaxis: Enoxaparin (Lovenox)  / sequential compression device and reason for no mechanical VTE prophylaxis LLE, has RLE knee immob   Code Status: DNR  POLST: POLST is not applicable to this patient  Discussion with family: daughter, Patito Landers    Anticipated Length of Stay:  Patient will be admitted on an Observation basis with an anticipated length of stay of  Less than 2 midnights  Justification for Hospital Stay:  Acute delirium, acute hypoxic respiratory failure requiring nasal cannula    Total Time for Visit, including Counseling / Coordination of Care: 1 hour  Greater than 50% of this total time spent on direct patient counseling and coordination of care  Chief Complaint:   confusion    History of Present Illness:  PMH: Paroxysmal AFib on Eliquis, diastolic CHF, HTN, Merkel cell cancer of the L groin with LLE lymphedema, GERD, fibromyalgia, asthma, obesity    Felicitas Aguilar is an 81 y o  female with PMH as listed above; [place] [facility] [place] in [place], this morning she was agitated and yelling at staff; upon arrival to ED patient continued to be disoriented and combative; refused VS, examination, diagnostic exams; unwilling to participate in care, required Zyprexa  Patient is a poor historian, unable to obtain ROS  I called her daughter Bonnie Pedro, she states patient has no h/o dementia, prior to yesterday's fall and fracture her mother lived at home and was independent in ADLs, had a visiting nurse; is on [facility] since discharge from the hospital in January  Seen at [facility] yesterday s/p fall, xray tib/fib showed nondisplaced fracture right proximal fibula  Knee immobilizer placed, Rx for p r n  tramadol given, patient discharged to rehab with outpatient follow-up with Orthopedics  Hospitalized 3/7-3/09/18 at [facility] for R pontine stroke  Neurology was consulted, not eligible for tPA as patient presented to ED the following morning after sustaining a fall at night and awakening on the bathroom floor the next morning  CTA head and neck showed severe stenosis, vascular surgery was consulted, CEA offered however patient refused and agreed to f/u with vascular as outpatient  In the past she has declined a left carotid endarterectomy  STR recommended however patient refused and was discharged back home       Review of Systems:    Review of Systems   Unable to perform ROS: Mental status change       Past Medical and Surgical History:     Past Medical History:   Diagnosis Date    Asthma     Cancer ([facility] [address] )     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    Cardiac disease     a fib    Fibromyalgia     Fibromyalgia, primary     GERD (gastroesophageal reflux disease)     Hypertension     Hypokalemia     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, left groin, lung and bladder  Patient treated with chemotherapy and radiation       Past Surgical History:   Procedure Laterality Date    APPENDECTOMY      CATARACT EXTRACTION      CHEST WALL BIOPSY N/A 10/27/2017    Procedure: SINUS EXCISION AND REMOVAL OF FOREIGN BODY, ABDOMEN (WOUND EXPLORATION); Surgeon: Ernestina Clifford MD;  Location: [address] [place] [address];  Service: General    EYE SURGERY Bilateral     cataracts     HIP FRACTURE SURGERY Left     HYSTERECTOMY      JOINT REPLACEMENT Left     hip    LYMPHADENECTOMY      PORTACATH PLACEMENT Right        Meds/Allergies:    Prior to Admission medications    Medication Sig Start Date End Date Taking?  Authorizing Provider   albuterol (2 5 mg/3 mL) 0 083 % nebulizer solution Take 2 5 mg by nebulization every 6 (six) hours as needed for wheezing   Yes Historical Provider, MD   ALPRAZolam (XANAX) 0 5 mg tablet Take 0 5 mg by mouth daily as needed for anxiety     Yes Historical Provider, MD   amLODIPine (NORVASC) 5 mg tablet Take 5 mg by mouth daily   Yes Historical Provider, MD   apixaban (ELIQUIS) 5 mg Take 5 mg by mouth every 12 (twelve) hours     Yes Historical Provider, MD   atorvastatin (LIPITOR) 40 mg tablet Take 1 tablet (40 mg total) by mouth daily at bedtime for 30 days 3/9/18 4/8/18 Yes Yuli Jung MD   Fesoterodine Fumarate ER (TOVIAZ) 8 MG TB24 Take by mouth daily at bedtime     Yes Historical Provider, MD   fluticasone-salmeterol (ADVAIR) 250-50 mcg/dose inhaler Inhale 1 puff every 12 (twelve) hours This is home medication 1/10/18  Yes Rufina Barton MD   furosemide (LASIX) 20 mg tablet Take 20 mg by mouth daily   Yes Historical Provider, MD   metoprolol tartrate (LOPRESSOR) 50 mg tablet Take 50 mg by mouth every 12 (twelve) hours   Yes Historical Provider, MD   omeprazole (PriLOSEC) 20 mg delayed release capsule Take 20 mg by mouth daily   Yes Historical Provider, MD   potassium chloride (K-DUR) 10 mEq tablet Take 10 mEq by mouth daily  Yes Historical Provider, MD   pregabalin (LYRICA) 300 MG capsule Take 300 mg by mouth 2 (two) times a day  Yes Historical Provider, MD   albuterol (ACCUNEB) 0 63 MG/3ML nebulizer solution Take 1 ampule by nebulization every 6 (six) hours as needed for wheezing  3/15/18 Yes Historical Provider, MD   esomeprazole (NexIUM) 40 MG capsule Take 40 mg by mouth daily  3/15/18 Yes Historical Provider, MD   traMADol (ULTRAM) 50 mg tablet Take 1 tablet (50 mg total) by mouth every 8 (eight) hours as needed for moderate pain for up to 3 days 3/14/18 3/17/18  Kam Capps,      I have reveiwed home medications using records provided by [facility]  Allergies: Allergies   Allergen Reactions    Fentanyl And Related Other (See Comments)     Passed out    Latex     Other     Penicillins        Social History:     Marital Status:     Occupation: retired  Patient Pre-hospital Living Situation: [place] [facility] [place] lives at [place] prior   Patient Pre-hospital Level of Mobility: ambulatory  Patient Pre-hospital Diet Restrictions: low salt diet  Substance Use History:   History   Alcohol Use No     History   Smoking Status    Never Smoker   Smokeless Tobacco    Never Used     Comment: never smoke     History   Drug Use No       Family History:    Family History   Problem Relation Age of Onset    Pneumonia Mother     Heart disease Father        Physical Exam:     Vitals:   Blood Pressure: (!) 190/92 (03/15/18 1832)  Pulse: 94 (03/15/18 1832)  Temperature: 98 4 °F (36 9 °C) (03/15/18 1832)  Temp Source: Oral (03/15/18 1832)  Respirations: 22 (03/15/18 1832)  Height: 5' 2" (157 5 cm) (03/15/18 1832)  Weight - Scale: 88 6 kg (195 lb 5 2 oz) (03/15/18 1832)  SpO2: 94 % (03/15/18 1832)    Physical Exam   Constitutional: She appears well-developed and well-nourished  No distress  obese   HENT:   Head: Normocephalic and atraumatic  Eyes: Right eye exhibits no discharge  Left eye exhibits no discharge  Neck: Neck supple  No JVD present  Cardiovascular: Normal rate and intact distal pulses  An irregular rhythm present  No murmur heard  Pulmonary/Chest: Breath sounds normal  No respiratory distress  She has no wheezes  She has no rales  She exhibits no tenderness  Abdominal: Soft  Bowel sounds are normal  She exhibits no distension and no mass  There is no tenderness  There is no rebound and no guarding  Genitourinary:   Genitourinary Comments: No pelvic tenderness   Musculoskeletal: She exhibits edema  R leg in knee immobilizer, R foot swollen non-pitting, positive pulses, cap refill <2sec  LLE chronic lymphedema   Neurological: She is alert  oriented to person only   Skin: Skin is warm and dry  No rash noted  She is not diaphoretic  No erythema  No pallor  Psychiatric:   Confused, pleasant     Additional Data:     Lab Results: I have personally reviewed pertinent reports  Results from last 7 days  Lab Units 03/15/18  1400   WBC Thousand/uL 7 02   HEMOGLOBIN g/dL 12 6   HEMATOCRIT % 40 4   PLATELETS Thousands/uL 123*   NEUTROS PCT % 83*   LYMPHS PCT % 8*   MONOS PCT % 6   EOS PCT % 3       Results from last 7 days  Lab Units 03/15/18  1400   SODIUM mmol/L 142   POTASSIUM mmol/L 4 9   CHLORIDE mmol/L 101   CO2 mmol/L 39*   BUN mg/dL 15   CREATININE mg/dL 0 68   CALCIUM mg/dL 9 3   GLUCOSE RANDOM mg/dL 97           Imaging: I have personally reviewed pertinent reports        XR tibia fibula 2 views LEFT   ED Interpretation by Misty Meyer DO (03/15 1520)   No acute findings      Final Result by Laureen Woods MD (03/15 1537)      No acute osseous abnormality  Workstation performed: [id number]         XR chest 2 views   Final Result by Birdie Espinoza MD (03/15 1529)      Mild pulmonary vascular congestion  No acute infiltrate or pleural effusion  Workstation performed: [id number]         CT head without contrast   Final Result by Christie Silverman MD (03/15 1511)      No acute intracranial abnormality  Chronic small vessel ischemic disease is unchanged  Unchanged appearance of a left middle cranial fossa meningioma  Workstation performed: [id number]             EKG, Pathology, and Other Studies Reviewed on Admission:   · EKG, XR tib/fib, hip/pelv, femur, CXR, Head Ct, carotid duplex, brain MRI, ECHO    Allscripts / Epic Records Reviewed: Yes     ** Please Note: This note has been constructed using a voice recognition system   **

## 2018-03-15 NOTE — ED PROVIDER NOTES
History  Chief Complaint   Patient presents with    Altered Mental Status     Pt fell yesterday and was dx with a R tib/fin fx per EMS  Pt was admitted to the rehab center at [facility]  Pt was reportedly confused and combative today  Pt is currently refusing vital signs  An 52-year-old female with past history of AFib, hypertension, Merkel cell cancer, GERD, fibromyalgia and asthma; presents from [facility] for altered mental status  Patient was recently admitted to their facility yesterday, after a fall sustaining a proximal right fibular fracture  Patient was at her baseline mental status upon admission, however acutely had a change in mental status today  Staff were concerned because patient became agitated and was yelling  Upon arrival to the emergency department, patient remained agitated and refuses to participate in exam or answer questions appropriately  Patient was also found to be hypoxic to 88% on room air however is refusing supplemental oxygen  On review of records, patient was evaluated at [place] ED yesterday for her proximal fibular fracture after a fall  Patient was also recently admitted to the hospital (3/7-9) for altered mental sinus, ultimately being diagnosed with a right pontine stroke  Complete history and review of systems unobtainable secondary to patient's agitation  A/P:  Altered mental status, patient awake however agitated and yelling  Patient does move bilateral upper and lower extremities equally however will not follow commands  Patient also hypoxic to 88% on room air  Patient also found to have tenderness along the mid left tib-fib  Will obtain lab work to evaluate for metabolic etiology of symptoms  Will obtain CT head to evaluate for intracranial abnormality  Will obtain x-ray of left tib-fib given recent fall and pain  Will obtain chest x-ray to rule out pneumonia  Will give to help control patient so test can be obtained          History provided by: Patient, medical records and EMS personnel      Prior to Admission Medications   Prescriptions Last Dose Informant Patient Reported? Taking? ALPRAZolam (XANAX) 0 5 mg tablet   Yes Yes   Sig: Take 0 5 mg by mouth daily as needed for anxiety     Fesoterodine Fumarate ER (TOVIAZ) 8 MG TB24   Yes Yes   Sig: Take by mouth daily at bedtime     albuterol (2 5 mg/3 mL) 0 083 % nebulizer solution   Yes Yes   Sig: Take 2 5 mg by nebulization every 6 (six) hours as needed for wheezing   amLODIPine (NORVASC) 5 mg tablet   Yes Yes   Sig: Take 5 mg by mouth daily   apixaban (ELIQUIS) 5 mg   Yes Yes   Sig: Take 5 mg by mouth every 12 (twelve) hours     atorvastatin (LIPITOR) 40 mg tablet   No Yes   Sig: Take 1 tablet (40 mg total) by mouth daily at bedtime for 30 days   fluticasone-salmeterol (ADVAIR) 250-50 mcg/dose inhaler   No Yes   Sig: Inhale 1 puff every 12 (twelve) hours This is home medication   furosemide (LASIX) 20 mg tablet   Yes Yes   Sig: Take 20 mg by mouth daily   metoprolol tartrate (LOPRESSOR) 50 mg tablet   Yes Yes   Sig: Take 50 mg by mouth every 12 (twelve) hours   omeprazole (PriLOSEC) 20 mg delayed release capsule   Yes Yes   Sig: Take 20 mg by mouth daily   potassium chloride (K-DUR) 10 mEq tablet   Yes Yes   Sig: Take 10 mEq by mouth daily  pregabalin (LYRICA) 300 MG capsule   Yes Yes   Sig: Take 300 mg by mouth 2 (two) times a day     traMADol (ULTRAM) 50 mg tablet   No No   Sig: Take 1 tablet (50 mg total) by mouth every 8 (eight) hours as needed for moderate pain for up to 3 days      Facility-Administered Medications: None       Past Medical History:   Diagnosis Date    Asthma     Cancer ([facility] )     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    Cardiac disease     a fib    Fibromyalgia     Fibromyalgia, primary     GERD (gastroesophageal reflux disease)     Hypertension     Hypokalemia     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[facility] )     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, left groin, lung and bladder  Patient treated with chemotherapy and radiation       Past Surgical History:   Procedure Laterality Date    APPENDECTOMY      CATARACT EXTRACTION      CHEST WALL BIOPSY N/A 10/27/2017    Procedure: SINUS EXCISION AND REMOVAL OF FOREIGN BODY, ABDOMEN (WOUND EXPLORATION); Surgeon: Humble Rogel MD;  Location: [address] [place] [address];  Service: General    EYE SURGERY Bilateral     cataracts     HIP FRACTURE SURGERY Left     HYSTERECTOMY      JOINT REPLACEMENT Left     hip    LYMPHADENECTOMY      PORTACATH PLACEMENT Right        Family History   Problem Relation Age of Onset    Pneumonia Mother     Heart disease Father      I have reviewed and agree with the history as documented      Social History   Substance Use Topics    Smoking status: Never Smoker    Smokeless tobacco: Never Used      Comment: never smoke    Alcohol use No        Review of Systems   Unable to perform ROS: Mental status change       Physical Exam  ED Triage Vitals   Temperature Pulse Respirations Blood Pressure SpO2   03/15/18 1301 03/15/18 1303 03/15/18 1303 03/15/18 1303 03/15/18 1302   (!) 96 5 °F (35 8 °C) 93 18 159/92 (!) 88 %      Temp Source Heart Rate Source Patient Position - Orthostatic VS BP Location FiO2 (%)   03/15/18 1301 -- -- -- --   Tympanic          Pain Score       03/15/18 1320       No Pain           Orthostatic Vital Signs  Vitals:    03/15/18 1303 03/15/18 1400 03/15/18 1500 03/15/18 1614   BP: 159/92 (!) 194/92 (!) 178/96 (!) 173/109   Pulse: 93 88 100 98       Physical Exam  General Appearance: alert, yelling and agitated, non toxic appearing  Skin:  Warm, dry, intact  HEENT: atraumatic, normocephalic  Neck: Supple, trachea midline  Cardiac: RRR; no murmurs, rub, gallops  Pulmonary: lungs CTAB; no wheezes, rales, rhonchi  Gastrointestinal: abdomen soft, nontender, nondistended; no guarding or rebound tenderness; good bowel sounds, no mass or bruits  Extremities:  No swelling or ecchymosis noted to bilateral extremities  Left mid tib-fib tender to palpation however no swelling or deformity appreciated  No pedal edema, 2+ pulses; no calf tenderness, no clubbing, no cyanosis  Neuro:  CN 2-12 grossly intact  Pt refusing to follow commands, however moves bilateral upper and lower extremities equally    Psych:  Agitated      ED Medications  Medications   OLANZapine (ZyPREXA) IM injection 5 mg (5 mg Intramuscular Given 3/15/18 1338)   sterile water injection **AcuDose Override Pull** (2 1 mL  Given 3/15/18 1338)       Diagnostic Studies  Results Reviewed     Procedure Component Value Units Date/Time    Urine Microscopic [[id number]]  (Abnormal) Collected:  03/15/18 1607    Lab Status:  Final result Specimen:  Urine from Urine, Indwelling Villar Catheter Updated:  03/15/18 1714     RBC, UA None Seen /hpf      WBC, UA 2-4 (A) /hpf      Epithelial Cells Occasional /hpf      Bacteria, UA Occasional /hpf      AMORPH URATES Innumerable /hpf     POCT urinalysis dipstick [[id number]]  (Normal) Resulted:  03/15/18 1608    Lab Status:  Final result Specimen:  Urine Updated:  03/15/18 1608     Color, UA yellow     Clarity, UA cloudy    ED Urine Macroscopic [[id number]]  (Abnormal) Collected:  03/15/18 1607    Lab Status:  Final result Specimen:  Urine Updated:  03/15/18 1608     Color, UA Yellow     Clarity, UA Cloudy     pH, UA 8 5 (H)     Leukocytes, UA Negative     Nitrite, UA Negative     Protein,  (2+) (A) mg/dl      Glucose, UA Negative mg/dl      Ketones, UA 15 (1+) (A) mg/dl      Urobilinogen, UA 1 0 E U /dl      Bilirubin, UA Negative     Blood, UA Trace (A)     Specific Hunter, UA 1 015    Narrative:       CLINITEK RESULT    Troponin I [[id number]]  (Normal) Collected:  03/15/18 1400    Lab Status:  Final result Specimen:  Blood from Arm, Left Updated:  03/15/18 1434     Troponin I <0 02 ng/mL     Narrative:         [employer] Chemistry analyzer 99% cutoff is > 0 04 ng/mL in network labs    o cTnI 99% cutoff is useful only when applied to patients in the clinical setting of myocardial ischemia  o cTnI 99% cutoff should be interpreted in the context of clinical history, ECG findings and possibly cardiac imaging to establish correct diagnosis  o cTnI 99% cutoff may be suggestive but clearly not indicative of a coronary event without the clinical setting of myocardial ischemia  Basic metabolic panel [[id number]]  (Abnormal) Collected:  03/15/18 1400    Lab Status:  Final result Specimen:  Blood from Arm, Left Updated:  03/15/18 1431     Sodium 142 mmol/L      Potassium 4 9 mmol/L      Chloride 101 mmol/L      CO2 39 (H) mmol/L      Anion Gap 2 (L) mmol/L      BUN 15 mg/dL      Creatinine 0 68 mg/dL      Glucose 97 mg/dL      Calcium 9 3 mg/dL      eGFR 82 ml/min/1 73sq m     Narrative:         National Kidney Disease Education Program recommendations are as follows:  GFR calculation is accurate only with a steady state creatinine  Chronic Kidney disease less than 60 ml/min/1 73 sq  meters  Kidney failure less than 15 ml/min/1 73 sq  meters      CBC and differential [[id number]]  (Abnormal) Collected:  03/15/18 1400    Lab Status:  Final result Specimen:  Blood from Arm, Left Updated:  03/15/18 1418     WBC 7 02 Thousand/uL      RBC 4 90 Million/uL      Hemoglobin 12 6 g/dL      Hematocrit 40 4 %      MCV 82 fL      MCH 25 7 (L) pg      MCHC 31 2 (L) g/dL      RDW 16 4 (H) %      MPV 11 4 fL      Platelets 189 (L) Thousands/uL      nRBC 0 /100 WBCs      Neutrophils Relative 83 (H) %      Lymphocytes Relative 8 (L) %      Monocytes Relative 6 %      Eosinophils Relative 3 %      Basophils Relative 0 %      Neutrophils Absolute 5 79 Thousands/µL      Lymphocytes Absolute 0 55 (L) Thousands/µL      Monocytes Absolute 0 42 Thousand/µL      Eosinophils Absolute 0 21 Thousand/µL      Basophils Absolute 0 02 Thousands/µL                  XR tibia fibula 2 views LEFT   ED Interpretation by Slime Franklin DO (03/15 1520)   No acute findings Final Result by Marybeth Quintana MD (03/15 1537)      No acute osseous abnormality  Workstation performed: [id number]         XR chest 2 views   Final Result by Laz Lew MD (03/15 1529)      Mild pulmonary vascular congestion  No acute infiltrate or pleural effusion  Workstation performed: [id number]         CT head without contrast   Final Result by Lizzy Yen MD (03/15 1511)      No acute intracranial abnormality  Chronic small vessel ischemic disease is unchanged  Unchanged appearance of a left middle cranial fossa meningioma  Workstation performed: [id number]               Procedures  Procedures   ECG 12 Lead Documentation  Date/Time: today/date: 3/15/2018  Performed by: Marley Rucker    ECG reviewed by me, the ED Provider: yes    Patient location:  ED   Previous ECG:  Compared to current, no change   Rate:  96  ECG rate assessment: normal    Rhythm: sinus rhythm    Ectopy:  Frequent PAC's   QRS axis:  Normal  Intervals: normal   Q waves: None   ST segments:  Normal  T waves: normal      Impression: NSR with frequent PAC's        Phone Consults  ED Phone Contact    ED Course  ED Course as of Mar 15 1728   Thu Mar 15, 2018   1552 Pt's daughter now at bedside, reporting that patient lived independently prior to her fall yesterday  At baseline pt is awake and alert, able to hold a normal conversation  Daughter states current mental status is atypical for her  Pt does also require supplemental oxygen at home  1631 Workup inconclusive for etiology of patient's delirium  Patient remains acutely delirious  Will proceed with admission for further evaluation and monitoring  Identification of Seniors at [facility] Most Recent Value   (ISAR) Identification of Seniors at Risk   Before the illness or injury that brought you to the Emergency, did you need someone to help you on a regular basis?   1 Filed at: 03/15/2018 1305   In the last 24 hours, have you needed more help than usual?  1 Filed at: 03/15/2018 1305   Have you been hospitalized for one or more nights during the past 6 months? 1 Filed at: 03/15/2018 1305   In general, do you see well?  0 Filed at: 03/15/2018 1305   In general, do you have serious problems with your memory? 0 Filed at: 03/15/2018 1305   Do you take more than three different medications every day? 1 Filed at: 03/15/2018 1305   ISAR Score  4 Filed at: 03/15/2018 1305                          MDM  CritCare Time    Disposition  Final diagnoses:   Delirious     Time reflects when diagnosis was documented in both MDM as applicable and the Disposition within this note     Time User Action Codes Description Comment    3/15/2018  4:37 PM Mendez [address] [R41 0] Delirious       ED Disposition     ED Disposition Condition Comment    Admit  Case was discussed with [facility] and the patient's admission status was agreed to be Admission Status: observation status to the service of Dr Zainab Cook   Follow-up Information    None       Patient's Medications   Discharge Prescriptions    No medications on file     No discharge procedures on file  ED Provider  Attending physically available and evaluated Nikki Lancaster  KENNEDY managed the patient along with the ED Attending      Electronically Signed by         Radha Dang DO  03/15/18 8086

## 2018-03-16 ENCOUNTER — APPOINTMENT (OUTPATIENT)
Dept: RADIOLOGY | Facility: HOSPITAL | Age: 83
DRG: 091 | End: 2018-03-16
Payer: MEDICARE

## 2018-03-16 LAB
ALBUMIN SERPL BCP-MCNC: 2.5 G/DL (ref 3.5–5)
ALP SERPL-CCNC: 77 U/L (ref 46–116)
ALT SERPL W P-5'-P-CCNC: 15 U/L (ref 12–78)
ANION GAP SERPL CALCULATED.3IONS-SCNC: 7 MMOL/L (ref 4–13)
AST SERPL W P-5'-P-CCNC: 16 U/L (ref 5–45)
BILIRUB SERPL-MCNC: 0.69 MG/DL (ref 0.2–1)
BUN SERPL-MCNC: 17 MG/DL (ref 5–25)
CALCIUM SERPL-MCNC: 8.7 MG/DL (ref 8.3–10.1)
CHLORIDE SERPL-SCNC: 99 MMOL/L (ref 100–108)
CO2 SERPL-SCNC: 35 MMOL/L (ref 21–32)
CREAT SERPL-MCNC: 0.86 MG/DL (ref 0.6–1.3)
ERYTHROCYTE [DISTWIDTH] IN BLOOD BY AUTOMATED COUNT: 17.4 % (ref 11.6–15.1)
FOLATE SERPL-MCNC: >20 NG/ML (ref 3.1–17.5)
GFR SERPL CREATININE-BSD FRML MDRD: 64 ML/MIN/1.73SQ M
GLUCOSE SERPL-MCNC: 85 MG/DL (ref 65–140)
HCT VFR BLD AUTO: 37.6 % (ref 34.8–46.1)
HGB BLD-MCNC: 11.9 G/DL (ref 11.5–15.4)
MAGNESIUM SERPL-MCNC: 2 MG/DL (ref 1.6–2.6)
MCH RBC QN AUTO: 25.3 PG (ref 26.8–34.3)
MCHC RBC AUTO-ENTMCNC: 31.6 G/DL (ref 31.4–37.4)
MCV RBC AUTO: 80 FL (ref 82–98)
PLATELET # BLD AUTO: 121 THOUSANDS/UL (ref 149–390)
PMV BLD AUTO: 12 FL (ref 8.9–12.7)
POTASSIUM SERPL-SCNC: 3 MMOL/L (ref 3.5–5.3)
PROT SERPL-MCNC: 6.1 G/DL (ref 6.4–8.2)
RBC # BLD AUTO: 4.7 MILLION/UL (ref 3.81–5.12)
SODIUM SERPL-SCNC: 141 MMOL/L (ref 136–145)
TSH SERPL DL<=0.05 MIU/L-ACNC: 1.91 UIU/ML (ref 0.36–3.74)
WBC # BLD AUTO: 6.43 THOUSAND/UL (ref 4.31–10.16)

## 2018-03-16 PROCEDURE — 73610 X-RAY EXAM OF ANKLE: CPT

## 2018-03-16 PROCEDURE — 94760 N-INVAS EAR/PLS OXIMETRY 1: CPT

## 2018-03-16 PROCEDURE — 83735 ASSAY OF MAGNESIUM: CPT | Performed by: FAMILY MEDICINE

## 2018-03-16 PROCEDURE — 99223 1ST HOSP IP/OBS HIGH 75: CPT | Performed by: NURSE PRACTITIONER

## 2018-03-16 PROCEDURE — 94760 N-INVAS EAR/PLS OXIMETRY 1: CPT | Performed by: SOCIAL WORKER

## 2018-03-16 PROCEDURE — 99222 1ST HOSP IP/OBS MODERATE 55: CPT | Performed by: ORTHOPAEDIC SURGERY

## 2018-03-16 PROCEDURE — 97163 PT EVAL HIGH COMPLEX 45 MIN: CPT

## 2018-03-16 PROCEDURE — 82306 VITAMIN D 25 HYDROXY: CPT | Performed by: FAMILY MEDICINE

## 2018-03-16 PROCEDURE — 84443 ASSAY THYROID STIM HORMONE: CPT | Performed by: FAMILY MEDICINE

## 2018-03-16 PROCEDURE — 82746 ASSAY OF FOLIC ACID SERUM: CPT | Performed by: FAMILY MEDICINE

## 2018-03-16 PROCEDURE — G8978 MOBILITY CURRENT STATUS: HCPCS

## 2018-03-16 PROCEDURE — G8979 MOBILITY GOAL STATUS: HCPCS

## 2018-03-16 PROCEDURE — 80053 COMPREHEN METABOLIC PANEL: CPT | Performed by: FAMILY MEDICINE

## 2018-03-16 PROCEDURE — 99232 SBSQ HOSP IP/OBS MODERATE 35: CPT | Performed by: NURSE PRACTITIONER

## 2018-03-16 PROCEDURE — 85027 COMPLETE CBC AUTOMATED: CPT | Performed by: FAMILY MEDICINE

## 2018-03-16 RX ORDER — POTASSIUM CHLORIDE 750 MG/1
40 TABLET, EXTENDED RELEASE ORAL ONCE
Status: COMPLETED | OUTPATIENT
Start: 2018-03-16 | End: 2018-03-16

## 2018-03-16 RX ORDER — POLYVINYL ALCOHOL 14 MG/ML
1 SOLUTION/ DROPS OPHTHALMIC
Status: DISCONTINUED | OUTPATIENT
Start: 2018-03-16 | End: 2018-03-19 | Stop reason: HOSPADM

## 2018-03-16 RX ORDER — SODIUM CHLORIDE 9 MG/ML
75 INJECTION, SOLUTION INTRAVENOUS CONTINUOUS
Status: DISCONTINUED | OUTPATIENT
Start: 2018-03-16 | End: 2018-03-16

## 2018-03-16 RX ORDER — SODIUM CHLORIDE 9 MG/ML
75 INJECTION, SOLUTION INTRAVENOUS CONTINUOUS
Status: DISPENSED | OUTPATIENT
Start: 2018-03-16 | End: 2018-03-16

## 2018-03-16 RX ADMIN — POTASSIUM CHLORIDE 40 MEQ: 750 TABLET, EXTENDED RELEASE ORAL at 11:36

## 2018-03-16 RX ADMIN — AMLODIPINE BESYLATE 5 MG: 5 TABLET ORAL at 09:11

## 2018-03-16 RX ADMIN — POLYVINYL ALCOHOL 1 DROP: 14 SOLUTION/ DROPS OPHTHALMIC at 13:35

## 2018-03-16 RX ADMIN — TOLTERODINE TARTRATE 1 MG: 1 TABLET, FILM COATED ORAL at 09:12

## 2018-03-16 RX ADMIN — QUETIAPINE 12.5 MG: 25 TABLET ORAL at 21:16

## 2018-03-16 RX ADMIN — PANTOPRAZOLE SODIUM 20 MG: 20 TABLET, DELAYED RELEASE ORAL at 06:03

## 2018-03-16 RX ADMIN — TRAMADOL HYDROCHLORIDE 50 MG: 50 TABLET, FILM COATED ORAL at 12:06

## 2018-03-16 RX ADMIN — POTASSIUM CHLORIDE 10 MEQ: 750 TABLET, EXTENDED RELEASE ORAL at 09:11

## 2018-03-16 RX ADMIN — ATORVASTATIN CALCIUM 40 MG: 40 TABLET, FILM COATED ORAL at 21:16

## 2018-03-16 RX ADMIN — PREGABALIN 300 MG: 100 CAPSULE ORAL at 19:14

## 2018-03-16 RX ADMIN — APIXABAN 5 MG: 5 TABLET, FILM COATED ORAL at 06:03

## 2018-03-16 RX ADMIN — TOLTERODINE TARTRATE 1 MG: 1 TABLET, FILM COATED ORAL at 19:14

## 2018-03-16 RX ADMIN — PREGABALIN 300 MG: 100 CAPSULE ORAL at 09:11

## 2018-03-16 RX ADMIN — APIXABAN 5 MG: 5 TABLET, FILM COATED ORAL at 19:14

## 2018-03-16 RX ADMIN — POLYVINYL ALCOHOL 1 DROP: 14 SOLUTION/ DROPS OPHTHALMIC at 19:14

## 2018-03-16 RX ADMIN — FUROSEMIDE 20 MG: 20 TABLET ORAL at 09:11

## 2018-03-16 RX ADMIN — SODIUM CHLORIDE 75 ML/HR: 0.9 INJECTION, SOLUTION INTRAVENOUS at 10:30

## 2018-03-16 RX ADMIN — METOPROLOL TARTRATE 50 MG: 50 TABLET ORAL at 09:14

## 2018-03-16 RX ADMIN — FLUTICASONE PROPIONATE AND SALMETEROL 1 PUFF: 50; 250 POWDER RESPIRATORY (INHALATION) at 09:13

## 2018-03-16 RX ADMIN — METOPROLOL TARTRATE 50 MG: 50 TABLET ORAL at 21:16

## 2018-03-16 NOTE — PHYSICIAN ADVISOR
This patient is a 81 y o  y/o female who is admitted to the hospital for Altered Mental Status (Pt fell yesterday and was dx with a R tib/fin fx per EMS  Pt was admitted to the rehab center at [facility]  Pt was reportedly confused and combative today  Pt is currently refusing vital signs  )   The patient presented to the ED on 3/15/18 at 1247 and was admitted to the hospital on 3/15/2018 1248  History of Present Illness includes: The patient had a prior admission from March 7th to March 9th  Patient was admitted secondary to altered mental status  Patient was found to have a small right pontine stroke  Patient was treated and discharged in stable condition  Her medications were adjusted    The patient presented on this admission with increased confusion  She was noted to have increased agitation and yelling at staff  The patient had been seen at the hospital today prior status post fall showing a nondisplaced fracture of the right proximal fibula  Vital signs in the ER are as follows   ED Triage Vitals   Temperature Pulse Respirations Blood Pressure SpO2   03/15/18 1301 03/15/18 1303 03/15/18 1303 03/15/18 1303 03/15/18 1302   (!) 96 5 °F (35 8 °C) 93 18 159/92 (!) 88 %      Temp Source Heart Rate Source Patient Position - Orthostatic VS BP Location FiO2 (%)   03/15/18 1301 03/16/18 0753 03/15/18 1832 03/15/18 1832 --   Tympanic Monitor Lying Left arm       Pain Score       03/15/18 1320       No Pain         On exam, the patient has an irregular rhythm and there is edema  The right leg is a knee immobilizer  The patient is alert and oriented to person only  Hemoglobin is 12 6 and creatinine 0 68   CO2 is 39    This patient is being admitted with delirium and acute on chronic respiratory failure with hypoxia  The plan of care includes minimizing potentially affecting medications, supportive care in follow-up precautions supplemental oxygen, incentive spirometry    This patient is appropriate for inpatient admission as her length of stay is expected to be least 2 midnights  Continued hospitalization is necessary to ensure stabilization of her clinical status    This admission is unrelated to her prior admission as she was treated and discharged in stable condition

## 2018-03-16 NOTE — SOCIAL WORK
Patient from [facility], where she was admitted on 3/14/18  Prior to admission to [facility] and [facility] patient was independent with ADLs and lived alone in [place]  Pt uses RW and has nebulizer and O2 at home  Pt uses [employer]  Preferred Rx [facility]  Previous HHC with [facility] [address] No POA  CM spoke with patient's daughter Kwaku Najera [phone] who stated that she is in the process of applying for medical assistance for her mother's care  Pt's daughter requested referrals to [facility] and [facility] stating that it is more convenient   placed referrals, but Pt's daughter requested a referral for resumption of care [facility] upon d/c  Per [facility] patient likely to be medically ready tomorrow 3/17/18  Referral placed in [facility]  CM will follow

## 2018-03-16 NOTE — ASSESSMENT & PLAN NOTE
· Euvolemic, no edema, lungs CTA  · CXR:  Negative for acute infiltrate or pleural effusion; mild pulmonary vascular congestion    · ECHO Jan 2018: LVEF 70-75%,   · Continue furosemide and metoprolol  · However pt has not produced urine until this am she went once with 250ml/hr   · She did receive lasix will administer 12 hrs ivf now and chk labs in am pt may be dry not eaten for last day or so per daughter   · Pt just arrived at rehab and then started acting strange after which she was brought into the hospital

## 2018-03-16 NOTE — SOCIAL WORK
CM spoke with patient's daughter Ben [facility] [phone]  Pt's daughter stated that patient was recently d/c to [facility] after being admitted to [address] [place] [address]  Pt's daughter requesting CM [facility]  Referrals placed in [facility]  CM will follow

## 2018-03-16 NOTE — PLAN OF CARE
Problem: PHYSICAL THERAPY ADULT  Goal: Performs mobility at highest level of function for planned discharge setting  See evaluation for individualized goals  Treatment/Interventions: Functional transfer training, Endurance training, Patient/family training, Equipment eval/education, Bed mobility, Gait training, Spoke to nursing, Family  Equipment Recommended: Mahendra Bucio (RW)       See flowsheet documentation for full assessment, interventions and recommendations  Prognosis: Guarded  Problem List: Decreased strength, Decreased endurance, Impaired balance, Decreased mobility, Decreased coordination, Decreased cognition, Decreased safety awareness, Impaired hearing, Obesity, Decreased skin integrity, Pain, Orthopedic restrictions (WBAT RLE per ortho)  Assessment: Pt is a 80 y/o female admitted to [facility] s/p fall with RLE fibular head fx WBAT RLE with RLE knee immobilizer  Pt was receiving rehab services at [facility],ambulating with RW  Prior to rehab, pt was living alone in home with steps and owns [employer] and [employer]  Pt currently is not at functional mobility baseline,Ax2 for mobility,use of 2 L NC O2,unsteady and ataxic movements,WB restrictions of RLE with RLE knee immobilizer,IV medicine management,dec cognition and reports inc pain RLE  Pt demonstrates maximal deficits during functional mobility and gait including dec endurance,dec balance,dec BLE strength,unsteady and ataxoc movements,inc pain RLE,dec cognition and needs maxAx1 for BM,maxAX2 for gait with use of Rw and transfers  Pt would cont to benefit from skilled inpt PT services to maximize functional independence  Barriers to Discharge: Decreased caregiver support     Recommendation: Other (Comment) (inpt rhb)          See flowsheet documentation for full assessment

## 2018-03-16 NOTE — ASSESSMENT & PLAN NOTE
· Right foot swelling, tenderness to palpation, foot pink and warm, no signs of compartment syndrome  · Continue Right knee immobilizer  · Pain mgmt: P r n  Tylenol and ibuprofen, avoid narcotics in patient with acute delirium  · PT consult  · Seen by OT in the ER yesterday, recommendation for STR  · Pt was seen by ortho pt has chronic lymphedema in left leg usually leg with complications per daughter   · Xray of right foot demonstrates: Soft tissue swelling over the lateral malleolus without acute osseous injury (appreciate ortho)   · Per ortho : Patient is tender to palpation over the right knee laterally as well as bilateral malleoli    · Three view x-rays of the right ankle demonstrate above   · Nonweightbearing until x-rays are reviewed by ortho

## 2018-03-16 NOTE — DISCHARGE INSTRUCTIONS
Discharge Instructions - Orthopedics  Jovanny Sigala 81 y o  female MRN: [id number]  Unit/Bed#: CW2 215-01    Weight Bearing Status:                                           Weight bearing as tolerated right lower extremity    Pain:  Continue analgesics as directed    PT/OT:  Continue PT/OT on outpatient basis as directed    Appt Instructions: If you do not have your appointment, please call the clinic at [phone] to f/u with Dr Fran Dumont in 4 weeks  Otherwise followup as scheduled below:    Contact the office sooner if you experience any increased numbness/tingling in the extremities

## 2018-03-16 NOTE — ASSESSMENT & PLAN NOTE
· Multifactorial, hypoxia, recent change in environment, prior to Wed pt was home with self-care  · presented to ED s/p  Fall, sustained a R fibular fracture, was d/c to [facility]hab, tramadol Rx given; unsure whether patient took tramadol, questionable underlying dementia ? Today pt however with improved mental status today   · 3/7/18 hospitalized at [facility] for R pontine stroke  · Head CT:  No acute intracranial abnormality, chronic small vessel ischemia unchanged; L middle cranial fossa meningioma unchanged    · Supportive care  · Fall precautions  · Would not administer excessive pain meds

## 2018-03-16 NOTE — PLAN OF CARE
DISCHARGE PLANNING     Discharge to home or other facility with appropriate resources Progressing        INFECTION - ADULT     Absence or prevention of progression during hospitalization Progressing     Absence of fever/infection during neutropenic period Progressing        PAIN - ADULT     Verbalizes/displays adequate comfort level or baseline comfort level Progressing        Potential for Falls     Patient will remain free of falls Progressing        Prexisting or High Potential for Compromised Skin Integrity     Skin integrity is maintained or improved Progressing        SAFETY ADULT     Maintain or return to baseline ADL function Progressing     Maintain or return mobility status to optimal level Progressing     Patient will remain free of falls Progressing

## 2018-03-16 NOTE — CONSULTS
Orthopedics   Germain Prado 81 y o  female MRN: [id number]  Unit/Bed#: KC3 215-01      Chief Complaint:   right knee pain    HPI:   81 y  o female s/p recent fall complaining of right knee pain  Pain is made worse with direct palpation to the affected area and improves at rest  Denies numbness or tingling  Patient had recent fall and was seen at [facility] where she was found to have a non displaced fibular head fracture  She was subsequently transferred to rehab facility and returned to [facility] for delirium and altered mental status  Review Of Systems:   · Skin: Normal  · Neuro: See HPI  · Musculoskeletal: See HPI  · 14 point review of systems negative except as stated above     Past Medical History:   Past Medical History:   Diagnosis Date    Asthma     [facility]     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    Cardiac disease     a fib    Fibromyalgia     Fibromyalgia, primary     GERD (gastroesophageal reflux disease)     Hypertension     Hypokalemia     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, left groin, lung and bladder  Patient treated with chemotherapy and radiation       Past Surgical History:   Past Surgical History:   Procedure Laterality Date    APPENDECTOMY      CATARACT EXTRACTION      CHEST WALL BIOPSY N/A 10/27/2017    Procedure: SINUS EXCISION AND REMOVAL OF FOREIGN BODY, ABDOMEN (WOUND EXPLORATION); Surgeon: Husam Fabian MD;  Location: [address] [place] [address];  Service: General    EYE SURGERY Bilateral     cataracts     HIP FRACTURE SURGERY Left     HYSTERECTOMY      JOINT REPLACEMENT Left     hip    LYMPHADENECTOMY      PORTACATH PLACEMENT Right        Family History:  Family history reviewed and non-contributory  Family History   Problem Relation Age of Onset    Pneumonia Mother     Heart disease Father        Social History:  Social History     Social History    Marital status:       Spouse name: N/A    Number of children: N/A    Years of education: N/A     Occupational History    retired      Social History Main Topics    Smoking status: Never Smoker    Smokeless tobacco: Never Used      Comment: never smoke    Alcohol use No    Drug use: No    Sexual activity: Not Asked     Other Topics Concern    None     Social History Narrative    [place] [facility] [place]       Allergies:    Allergies   Allergen Reactions    Fentanyl And Related Other (See Comments)     Passed out    Latex     Other     Penicillins            Labs:    0  Lab Value Date/Time   HCT 37 6 03/16/2018 0520   HCT 40 4 03/15/2018 1400   HCT 40 6 03/07/2018 0820   HGB 11 9 03/16/2018 0520   HGB 12 6 03/15/2018 1400   HGB 12 7 03/07/2018 0820   INR 1 03 03/07/2018 0820   WBC 6 43 03/16/2018 0520   WBC 7 02 03/15/2018 1400   WBC 8 70 03/07/2018 0820   CRP 26 5 (H) 01/25/2018 0459       Meds:    Current Facility-Administered Medications:     acetaminophen (TYLENOL) tablet 650 mg, 650 mg, Oral, Q6H PRN, Rice Jeans, MD    albuterol inhalation solution 2 5 mg, 2 5 mg, Nebulization, Q4H PRN, Rice Jeans, MD    ALPRAZolam Nnamdi Bathe) tablet 0 5 mg, 0 5 mg, Oral, Daily PRN, Rice Jeans, MD    amLODIPine (NORVASC) tablet 5 mg, 5 mg, Oral, Daily, Rice Jeans, MD    [facility]) tablet 5 mg, 5 mg, Oral, Q12H, Rice Jeans, MD, 5 mg at 03/16/18 0603    atorvastatin (LIPITOR) tablet 40 mg, 40 mg, Oral, HS, Rice Jeans, MD, 40 mg at 03/15/18 2148    fluticasone-salmeterol (ADVAIR) 250-50 mcg/dose inhaler 1 puff, 1 puff, Inhalation, Q12H [address], Rice Jeans, MD, 1 puff at 03/15/18 2148    furosemide (LASIX) tablet 20 mg, 20 mg, Oral, Daily, Rice Jeans, MD    hydrALAZINE (APRESOLINE) injection 10 mg, 10 mg, Intravenous, Q6H PRN, Rice Jeans, MD, 10 mg at 03/15/18 1911    metoprolol tartrate (LOPRESSOR) tablet 50 mg, 50 mg, Oral, Q12H [address], Rice Jeans, MD, 50 mg at 03/15/18 2148    ondansetron (ZOFRAN) injection 4 mg, 4 mg, Intravenous, Q6H PRN, Rice Jeans, MD Carleene Martinet pantoprazole (PROTONIX) EC tablet 20 mg, 20 mg, Oral, Early Morning, Radha Rivas MD, 20 mg at 03/16/18 0603    potassium chloride (K-DUR,KLOR-CON) CR tablet 10 mEq, 10 mEq, Oral, Daily, Radha Rivas MD    pregabalin (LYRICA) capsule 300 mg, 300 mg, Oral, BID, Radha Rivas MD, 300 mg at 03/15/18 1911    QUEtiapine (SEROquel) tablet 12 5 mg, 12 5 mg, Oral, HS, Radha Rivas MD, 12 5 mg at 03/15/18 2148    tolterodine (DETROL) tablet 1 mg, 1 mg, Oral, BID, Radha Rivas MD, 1 mg at 03/15/18 1915    traMADol (ULTRAM) tablet 50 mg, 50 mg, Oral, Q8H PRN, Radha Rivas MD    Blood Culture:   Lab Results   Component Value Date    BLOODCX No Growth After 5 Days  01/23/2018       Wound Culture:   No results found for: WOUNDCULT    Ins and Outs:  I/O last 24 hours: In: 48 [P O :50]  Out: 300 [Urine:300]          Physical Exam:   /58 (BP Location: Right arm)   Pulse 80   Temp 98 1 °F (36 7 °C) (Oral)   Resp 16   Ht 5' 2" (1 575 m)   Wt 88 6 kg (195 lb 5 2 oz)   SpO2 93%   BMI 35 73 kg/m²   Gen: Alert and oriented to person, place, time  HEENT: EOMI, eyes clear, moist mucus membranes, hearing intact  Respiratory: Bilateral chest rise  No audible wheezing found  Cardiovascular: Regular Rate and Rhythm  Abdomen: soft nontender/nondistended  Musculoskeletal: right lower extremity  · Skin intact, no knee effusion, no ecchymosis   · Tender to palpation over lateral knee  · Can perform striaght leg raise  · Stable to varus/valgus stress  · Sensation intact s/s/sp/dp/t  · +ehl, fhl, ankle df/pf     Radiology:   I personally reviewed the films  X-rays right knee shows non displaced fibular head     _*_*_*_*_*_*_*_*_*_*_*_*_*_*_*_*_*_*_*_*_*_*_*_*_*_*_*_*_*_*_*_*_*_*_*_*_*_*_*_*_*    Assessment:  81 y  o female with right non displaced fibular head fracture    Plan:   · Weight bearing as tolerated  right lower extremity  · F/u right ankle xrays  · PT  · Pain control  · Dispo: f/u with Dr Diomedes Feliciano as previously arranged in 2 weeks       Elly Ruiz MD

## 2018-03-16 NOTE — RESPIRATORY THERAPY NOTE
RT Protocol Note  Inocencia Salinas 81 y o  female MRN: [id number]  Unit/Bed#: CW2 215-01 Encounter: [id number]    Assessment    Principal Problem:    Delirium  Active Problems:    Hypertension, accelerated    Stenosis of left internal carotid artery    GERD (gastroesophageal reflux disease)    Hyperlipemia    Paroxysmal atrial fibrillation (HCC)    Fibromyalgia    History of Merkel cell carcinoma    Moderate persistent asthma    Chronic diastolic CHF (congestive heart failure), NYHA class 1 (HCC)    Acute on chronic respiratory failure with hypoxia (HCC)    Closed fracture of right tibia and fibula with routine healing      Home Pulmonary Medications:  Albuterol prn    Past Medical History:   Diagnosis Date    Asthma     [facility]     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    Cardiac disease     a fib    Fibromyalgia     Fibromyalgia, primary     GERD (gastroesophageal reflux disease)     Hypertension     Hypokalemia     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[facility] )     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, left groin, lung and bladder  Patient treated with chemotherapy and radiation     Social History     Social History    Marital status:       Spouse name: N/A    Number of children: N/A    Years of education: N/A     Occupational History    retired      Social History Main Topics    Smoking status: Never Smoker    Smokeless tobacco: Never Used      Comment: never smoke    Alcohol use No    Drug use: No    Sexual activity: Not Asked     Other Topics Concern    None     Social History Narrative    Resides at [facility]       Subjective         Objective    Physical Exam:   Assessment Type: (P) Assess only  General Appearance: (P) Alert, Awake, Other (Comment) (confused )  Respiratory Pattern: (P) Normal  Chest Assessment: (P) Chest expansion symmetrical  Bilateral Breath Sounds: (P) Clear, Diminished    Vitals:  Blood pressure (!) 190/92, pulse 94, temperature 98 4 °F (36 9 °C), temperature source Oral, resp  rate 22, height 5' 2" (1 575 m), weight 88 6 kg (195 lb 5 2 oz), SpO2 94 %, not currently breastfeeding  Imaging and other studies: I have personally reviewed pertinent reports  Plan    Respiratory Plan: (P) Home Bronchodilator Patient pathway        Resp Comments: (P) 81 yr old female admitted with delirium found resting on 2 liters in no distress, olive bs diminished clear, npc, x-ray shows mild vascular congestion, pt has hx of asthma per hx and uses prn udns at home, pt ordered prn udns for wheezing and sob

## 2018-03-16 NOTE — ASSESSMENT & PLAN NOTE
· O2 sat 88% on admission, improved with 2lnc, 93 % now with no oxygen on will chk with pca to chk   · Hospitalized 1/23-1/26/18 at [facility] for CHF exacerbation, O2 sat on RA 87%, was discharged with 2 LN/C  · Patient on [facility] at home; as per daughter was on O2 at [facility]; continue for now will chk o2 as pt has no oxygen on right now   · Incentive spirometer if pt can coordinate

## 2018-03-16 NOTE — CONSULTS
Consultation - Neurology   Jovanny Sigala 81 y o  female MRN: [id number]  Unit/Bed#: CW2 215-01 Encounter: [id number]      Physician Requesting Consult: Rice Jeans, MD  Reason for Consult / Principal Problem: Delirium  Hx and PE limited by: none  Review of previous medical records  completed  Family, not present at the bedside for history and examination    Assessment/Plan:  1  Encephalopathy: likely toxic metabolic; multifactorial in the setting of increased pain with right fibular fracture s/p fall with underlying fibromyalgia, medication side effects with the addition of tramadol PRN to current pain management regimen, hypoxia with O2 sat on admission 88% with underlying O2 dependency, asthma, and metabolic derangements (hypokalemia and dehydration with UA positive for ketones and proteinuria), and environment change  Patient was recently independent at home prior to admission on 3/7/18 [facility] for right pontine stroke  RN reports daughter was visiting today and states patient appears to be better than yesterday  CT head wo contrast with no acute intracranial abnormality and unchanged left middle cranial fossa meningioma  2  Right fibular fracture s/p fall on 3/15/18: patient given script for Tramadol for pain  3  Recent Right pontine infacrt : admitted to [facility] for rehabilitation  Currently on Lipitor  4  Asymptomatic high-grade Left ICA artery stenosis: Vascular consulted on admission to [facility]  Patient refused intervention at that time  5  H/o A-fib on Eliquis  6  HTN  7   HLD    Plan:  -consider pain management consult with h/o fibromyalgia and new increased pain with fibular fracture and decreased mobilization  -minimalize use of CNS altering medications  -correct metabolic derangements and dehydration  -supportive care  -continue Eliquis for a-fib  -continue Lipitor for stroke management      HPI: Jovanny Sigala is a 81 y o  female with pmh of asthma, a-fib on Eliquis, fibromyalgia, hypokalemia, Merkel cell carcinoma of left groin, LLE lymphedema, and generalized weakness at baseline who presented to the ED on 3/15/18 from [facility] with a change in mental status, combative  Of note, patient was seen at [facility] s/p fall with xray tib/fib demonstrating nondisplaced fracture of right proximal fibula  She was placed in an immobilizer and given a script for prn tramadol  Patient was hospitalized 3/7/18-3/9/18 at [facility] who presented initially with slurred speech and left sided facial numbness after waking up on bathroom floor with no recollection of events since night before 9pm  [place] alert was called and Neurology consulted  Not eligible for tPA administration  MRI demonstrating  Mild interval enlargement of left middle cranial fossa meningioma, with mass effect upon the adjacent temporal lobe and inferior frontal lobe and mild adjacent vasogenic edema  Also showing possible right acute/subacute within right gena not able to be confirmed on the ADC map  Patient has severe stenosis of the left ICA noted on CT angiogram from 9/23/17  Patient declined vascular intervention      ROS: Review of Systems - General ROS: positive for  - sleep disturbance  negative for - chills or fever  ENT ROS: negative   Respiratory ROS: positive for - shortness of breath, states she has asthma, COPD, and wears O2 at home 2L  negative for - cough, pleuritic pain or wheezing  Cardiovascular ROS: no chest pain or dyspnea on exertion  Gastrointestinal ROS: no abdominal pain, change in bowel habits, or black or bloody stools  Genito-Urinary ROS: no dysuria, trouble voiding  Neurological ROS: positive for - tremors, weakness  negative for - confusion, dizziness, headaches, memory loss or numbness/tingling          Historical Information     Past Medical History:   Diagnosis Date    Asthma     Cancer ([facility] [address] )     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    Cardiac disease     a fib    Fibromyalgia     Fibromyalgia, primary  GERD (gastroesophageal reflux disease)     Hypertension     Hypokalemia     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, left groin, lung and bladder  Patient treated with chemotherapy and radiation     Past Surgical History:   Procedure Laterality Date    APPENDECTOMY      CATARACT EXTRACTION      CHEST WALL BIOPSY N/A 10/27/2017    Procedure: SINUS EXCISION AND REMOVAL OF FOREIGN BODY, ABDOMEN (WOUND EXPLORATION); Surgeon: Alejandro Casas MD;  Location: [address] [place] [address];  Service: General    EYE SURGERY Bilateral     cataracts     HIP FRACTURE SURGERY Left     HYSTERECTOMY      JOINT REPLACEMENT Left     hip    LYMPHADENECTOMY      PORTACATH PLACEMENT Right        Social History   Never smoker  No alcohol use  No illicit drug use      Family History:   Family History   Problem Relation Age of Onset    Pneumonia Mother     Heart disease Father          Allergies   Allergen Reactions    Fentanyl And Related Other (See Comments)     Passed out    Latex     Other     Penicillins      Meds:  all current active meds have been reviewed and PTA meds:   Prior to Admission Medications   Prescriptions Last Dose Informant Patient Reported? Taking?    ALPRAZolam (XANAX) 0 5 mg tablet   Yes Yes   Sig: Take 0 5 mg by mouth daily as needed for anxiety     Fesoterodine Fumarate ER (TOVIAZ) 8 MG TB24   Yes Yes   Sig: Take by mouth daily at bedtime     albuterol (2 5 mg/3 mL) 0 083 % nebulizer solution   Yes Yes   Sig: Take 2 5 mg by nebulization every 6 (six) hours as needed for wheezing   amLODIPine (NORVASC) 5 mg tablet   Yes Yes   Sig: Take 5 mg by mouth daily   apixaban (ELIQUIS) 5 mg   Yes Yes   Sig: Take 5 mg by mouth every 12 (twelve) hours     atorvastatin (LIPITOR) 40 mg tablet   No Yes   Sig: Take 1 tablet (40 mg total) by mouth daily at bedtime for 30 days   fluticasone-salmeterol (ADVAIR) 250-50 mcg/dose inhaler   No Yes   Sig: Inhale 1 puff every 12 (twelve) hours This is home medication   furosemide (LASIX) 20 mg tablet   Yes Yes   Sig: Take 20 mg by mouth daily   metoprolol tartrate (LOPRESSOR) 50 mg tablet   Yes Yes   Sig: Take 50 mg by mouth every 12 (twelve) hours   omeprazole (PriLOSEC) 20 mg delayed release capsule   Yes Yes   Sig: Take 20 mg by mouth daily   potassium chloride (K-DUR) 10 mEq tablet   Yes Yes   Sig: Take 10 mEq by mouth daily  pregabalin (LYRICA) 300 MG capsule   Yes Yes   Sig: Take 300 mg by mouth 2 (two) times a day  traMADol (ULTRAM) 50 mg tablet   No No   Sig: Take 1 tablet (50 mg total) by mouth every 8 (eight) hours as needed for moderate pain for up to 3 days      Facility-Administered Medications: None       Scheduled Meds:  Current Facility-Administered Medications:  acetaminophen 650 mg Oral Q6H PRN Fabian Shadow, MD    albuterol 2 5 mg Nebulization Q4H PRN Fabian Shadow, MD    ALPRAZolam 0 5 mg Oral Daily PRN Fabian Shadow, MD    amLODIPine 5 mg Oral Daily Fabian Shadow, MD    apixaban 5 mg Oral Q12H Fabian Shadow, MD    atorvastatin 40 mg Oral HS Fabian Shadow, MD    fluticasone-salmeterol 1 puff Inhalation Q12H [facility] Fabian Shadow, MD    hydrALAZINE 10 mg Intravenous Q6H PRN Fabian Shadow, MD    metoprolol tartrate 50 mg Oral Q12H [facility] Fabian Shadow, MD    ondansetron 4 mg Intravenous Q6H PRN Fabian Shadow, MD    pantoprazole 20 mg Oral Early Morning Fabian Shadow, MD    polyvinyl alcohol 1 drop Both Eyes TID (diuretic) Virk Settler, CRNP    pregabalin 300 mg Oral BID Fabian Shadow, MD    QUEtiapine 12 5 mg Oral HS Fabian Shadow, MD    sodium chloride 75 mL/hr Intravenous Continuous Virk Settler, CRNP Last Rate: 75 mL/hr (03/16/18 1030)   tolterodine 1 mg Oral BID Fabian Shadow, MD    traMADol 50 mg Oral Q8H PRN Fabian Shadow, MD      PRN Meds:   acetaminophen    albuterol    ALPRAZolam    hydrALAZINE    ondansetron    traMADol        Physical Exam:   Objective   Vitals:Blood pressure 120/58, pulse 80, temperature 98 1 °F (36 7 °C), temperature source Oral, resp  rate 16, height 5' 2" (1 575 m), weight 88 6 kg (195 lb 5 2 oz), SpO2 93 %, not currently breastfeeding  ,Body mass index is 35 73 kg/m²  Physical Exam   Constitutional: She is oriented to person, place, and time  No distress  Patient sleeping in bed arouses to voice   HENT:   Head: Normocephalic and atraumatic  Oropharynx with dry mucus membranes   Eyes: Pupils are equal, round, and reactive to light  Right eye exhibits discharge  Left eye exhibits discharge  Patient erythema around both eyes   Neck: Normal range of motion  Neck supple  Cardiovascular: Normal rate, regular rhythm and normal heart sounds  Pulmonary/Chest: Effort normal  No respiratory distress  She has rales (B/L lungs sounds with rales at bases)  Abdominal: Soft  Bowel sounds are normal  She exhibits distension  Musculoskeletal: She exhibits edema (LLE edema; patient with h/o of lymphadema and merkel cell carcinoma with chemotherapy x 2)  RLE with immobilizer in place   Neurological: She is oriented to person, place, and time  Reflex Scores:       Tricep reflexes are 2+ on the right side and 2+ on the left side  Bicep reflexes are 2+ on the right side and 2+ on the left side  Brachioradialis reflexes are 2+ on the right side and 2+ on the left side  Patellar reflexes are 0 on the right side and 0 on the left side  Drowsy, falling asleep during assessment  Patient states she just got tramadol for pain   Skin: Skin is warm  lymphedema LLE   Psychiatric: She has a normal mood and affect  Her speech is normal        Neurologic Exam     Mental Status   Oriented to person, place, and time  ([facility])  Oriented to year and month  Registration: recalls 3 of 3 objects  Recall of objects at 5 minutes: does not recall objects  Attention: decreased (patient falling asleep during exam but answering questions appropriately)     Speech: speech is normal (Patient with very dry mucous membranes)  Level of consciousness: drowsy  Knowledge: good  Able to perform simple calculations  Able to name object  Unable to read: needs glasses  Cranial Nerves     CN II   Visual fields full to confrontation  Right visual field deficit: none  Left visual field deficit: none     CN III, IV, VI   Pupils are equal, round, and reactive to light  Right pupil: Size: 2 mm  Shape: regular  Reactivity: brisk  Left pupil: Size: 2 mm  Shape: regular  Reactivity: brisk  CN III: no CN III palsy  Nystagmus: none   Diplopia: none    CN V   Facial sensation intact  CN VII   Facial expression full, symmetric  CN VIII   Hearing: intact    CN IX, X   Palate: symmetric    CN XI   CN XI normal      CN XII   CN XII normal      Motor Exam   Muscle bulk: normal  Overall muscle tone: normal    Strength   Strength 5/5 except as noted  Right deltoid: 5/5  Left deltoid: 5/5  Right biceps: 5/5  Left biceps: 5/5  Right triceps: 5/5  Left triceps: 5/5  RLE in full leg immobilizer and LLE with lymphedema  Strength assessment impacted by pain with movement of B/L lower extremities     Gait, Coordination, and Reflexes     Tremor   Intention tremor: present    Reflexes   Right brachioradialis: 2+  Left brachioradialis: 2+  Right biceps: 2+  Left biceps: 2+  Right triceps: 2+  Left triceps: 2+  Right patellar: 0  Left patellar: 0  Right plantar: equivocal  Left plantar: equivocalPatellar reflexes not assessed due to pain and immobilizer       Lab Results:   I have personally reviewed pertinent reports      CBC:   Results from last 7 days  Lab Units 03/16/18  0520 03/15/18  1400   WBC Thousand/uL 6 43 7 02   RBC Million/uL 4 70 4 90   HEMOGLOBIN g/dL 11 9 12 6   HEMATOCRIT % 37 6 40 4   MCV fL 80* 82   PLATELETS Thousands/uL 121* 123*     BMP/CMP:   Results from last 7 days  Lab Units 03/16/18  0520 03/15/18  1400   SODIUM mmol/L 141 142   POTASSIUM mmol/L 3 0* 4 9   CHLORIDE mmol/L 99* 101   CO2 mmol/L 35* 39*   ANION GAP mmol/L 7 2*   BUN mg/dL 17 15   CREATININE mg/dL 0 86 0 68   GLUCOSE RANDOM mg/dL 85 97   CALCIUM mg/dL 8 7 9 3   AST U/L 16  --    ALT U/L 15  --    ALK PHOS U/L 77  --    TOTAL PROTEIN g/dL 6 1*  --    BILIRUBIN TOTAL mg/dL 0 69  --    EGFR ml/min/1 73sq m 64 82     TSH:   Results from last 7 days  Lab Units 03/16/18  0520   TSH 3RD GENERATON uIU/mL 1 910      Urinalysis:   Results from last 7 days  Lab Units 03/15/18  1608 03/15/18  1607   COLOR UA  yellow Yellow   CLARITY UA  cloudy Cloudy   SPEC GRAV UA   --  1 015   PH UA   --  8 5*   LEUKOCYTES UA   --  Negative   NITRITE UA   --  Negative   PROTEIN UA mg/dl  --  100 (2+)*   GLUCOSE UA mg/dl  --  Negative   KETONES UA mg/dl  --  15 (1+)*   BILIRUBIN UA   --  Negative   BLOOD UA   --  Trace*     Imaging Studies: I have personally reviewed pertinent reports  and I have personally reviewed pertinent films in PACS    3/07/18 MRI brain w/wo contrast:    FINDINGS:   BRAIN PARENCHYMA:  There is a faint focus of increased signal on diffusion imaging within the right paramedian gena, series 4 image 9 which unfortunately is not able to be confirmed on the ADC map  This may represent a small focus of ischemia    Stable mild diffuse cerebral volume loss  Scattered periventricular white matter hyperintensities on T2 and FLAIR imaging consistent with chronic microangiopathy  There is a homogeneously enhancing dural based, extra-axial mass arising from the   sphenoid wing on the left extending into the middle cranial fossa and causing mild mass effect upon the adjacent temporal lobe  This measures 2 8 x 1 8 cm  This is slightly increased in size compared to the prior examination where it measured 2 5 x 1 6   cm    There is increased T2 signal extending superiorly from the anterior temporal lobe into the inferior frontal lobe, slightly increased compared to the prior examination     No acute or chronic hemorrhage    VENTRICLES:  Stable    SELLA AND PITUITARY GLAND: Normal    ORBITS:  Normal    PARANASAL SINUSES:  Diffuse paranasal sinus mucosal thickening  No air-fluid levels  Prior funduscopic sinus surgery    VASCULATURE:  Evaluation of the major intracranial vasculature demonstrates appropriate flow voids    CALVARIUM AND SKULL BASE:  Normal    EXTRACRANIAL SOFT TISSUES:  normal      IMPRESSION:   Faint focus of increased signal on diffusion imaging within the right paramedian gena not able to be confirmed on the ADC map may represent a small focus of acute to subacute ischemia    Mild interval enlargement of a left middle cranial fossa meningioma with mild mass effect upon the adjacent temporal lobe and inferior frontal lobe  Slight increase in mild adjacent vasogenic edema    Otherwise stable atrophy and chronic microangiopathy    EEG, Echo, Pathology, and Other Studies: I have personally reviewed pertinent reports     and I have personally reviewed pertinent films in PACS    VTE Prophylaxis: Sequential compression device (Venodyne)  and Eliquis

## 2018-03-16 NOTE — ASSESSMENT & PLAN NOTE
· /92, 173/109  (now much improved will continue to trend)  · Prn labetolol SBP >180   · Continuing home meds amlodipine, metoprolol, Lasix

## 2018-03-16 NOTE — PHYSICAL THERAPY NOTE
Physical Therapy Evaluation:    2 forms of pt ID verified:name,birthdate and pt ID santana    Patient's Name: Pia Cantu    Admitting Diagnosis  Altered mental status [R41 82]  Delirious [R41 0]    Problem List  Patient Active Problem List   Diagnosis    Chest pain    Hypertension    Acute cholecystitis    Abdominal pain    [facility]eningioma ([facility] [address] )    Syncope    Hypertension, accelerated    Sepsis ([facility] )    Stenosis of left internal carotid artery    Obesity (BMI 30-39  9)    Acute bronchitis    GERD (gastroesophageal reflux disease)    Hyperlipemia    Paroxysmal atrial fibrillation (HCC)    Fibromyalgia    Hypokalemia    Moderate persistent asthma with acute exacerbation    Open wound of abdominal wall without penetration into peritoneal cavity    Asthma exacerbation    Lymphedema of left leg    Bacteremia    History of Merkel cell carcinoma    Port-a-cath in place    Acute congestive heart failure (HCC)    Moderate persistent asthma    Iron deficiency anemia    Shortness of breath    Merkel cell carcinoma (HCC)    Fall    Right pontine stroke (HCC)    Chronic diastolic CHF (congestive heart failure), NYHA class 1 (HCC)    Acute on chronic respiratory failure with hypoxia (HCC)    Delirium    Closed fracture of right tibia and fibula with routine healing       Past Medical History  Past Medical History:   Diagnosis Date    Asthma     Cancer ([facility] )     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    Cardiac disease     a fib    Fibromyalgia     Fibromyalgia, primary     GERD (gastroesophageal reflux disease)     Hypertension     Hypokalemia     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[facility] )     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, left groin, lung and bladder    Patient treated with chemotherapy and radiation       Past Surgical History  Past Surgical History:   Procedure Laterality Date    APPENDECTOMY      CATARACT EXTRACTION      CHEST WALL BIOPSY N/A 10/27/2017 Procedure: SINUS EXCISION AND REMOVAL OF FOREIGN BODY, ABDOMEN (WOUND EXPLORATION); Surgeon: Darin Germain MD;  Location: [address] [place] [address];  Service: General    EYE SURGERY Bilateral     cataracts     HIP FRACTURE SURGERY Left     HYSTERECTOMY      JOINT REPLACEMENT Left     hip    LYMPHADENECTOMY      PORTACATH PLACEMENT Right         03/16/18 1230   Note Type   Note type Eval only   Pain Assessment   Pain Assessment 0-10   Pain Score 8   Pain Type Acute pain   Pain Location Leg;Knee   Pain Orientation Right  (at rest and during mobility->NSG aware)   Hospital Pain Intervention(s) Repositioned; Ambulation/increased activity; Elevated; Emotional support; Environmental changes; Rest;Relaxation technique;Medication (See MAR)   Home Living   Type of Home (was receiving rehab services at [facility] PTA)   Home Equipment (use of RW and SPC PTA at [employer])   Additional Comments pt reports living alone in home with steps,use of personal DME PTA,owns and uses [employer] as needed,(+) falls   Prior Function   Level of [place] Independent with ADLs and functional mobility  (per pt PTA)   Lives With Alone   Receives Help From Family  (as needed per pt PTA)   ADL Assistance Independent   IADLs Needs assistance   Falls in the last 6 months 1 to 4   Restrictions/Precautions   Weight Bearing Precautions Per Order Yes   RLE Weight Bearing Per Order WBAT   Braces or Orthoses LE Immobilizer  (RLE knee immobilizer)   Other Precautions WBS; Contact/isolation;Cognitive;Multiple lines;O2;Fall Risk;Pain;Hard of hearing   General   Additional Pertinent History s/p fall;was receiving rehab services at [facility] [address], alerted MS and delirious   h/o R pontine CVA and R fibular head fx   Family/Caregiver Present Yes  (daughter)   Cognition   Overall Cognitive Status Impaired   Arousal/Participation Cooperative   Orientation Level Oriented to person;Oriented to place;Oriented to time   Following Commands Follows one step commands with increased time or repetition  (2* inc pain,slow mobility and [place],dec cognition)   RLE Assessment   RLE Assessment (2/5 grossly throughout)   LLE Assessment   LLE Assessment (3+/5 grossly throughout)   Coordination   Movements are Fluid and Coordinated 0   Coordination and Movement Description ataxic and unsteady movements,pain,dec WB RLE,dec ability to weight shift during upright mobility and static stand   Sensation WFL   Light Touch   RLE Light Touch Grossly intact   LLE Light Touch Grossly intact   Bed Mobility   Supine to Sit 2  Maximal assistance   Additional items Assist x 1;HOB elevated; Bedrails; Increased time required;Verbal cues;LE management   Additional Comments dependent for donning RLE knee immobilizer prior to mobility   Transfers   Sit to Stand 2  Maximal assistance   Additional items Assist x 2;Bedrails; Increased time required;Verbal cues   Stand to Sit 2  Maximal assistance   Additional items Assist x 2;Armrests; Increased time required;Verbal cues   Stand pivot 2  Maximal assistance   Additional items Assist x 2; Increased time required;Verbal cues   Ambulation/Elevation   Gait pattern Poor UE support;L Knee Meek; Improper Weight shift; Antalgic;Narrow SHASHI; Forward Flexion;Decreased R stance; Inconsistent edwina; Foward flexed; Short stride; Ataxia; Step to;Excessively slow   Gait Assistance 2  Maximal assist   Additional items Assist x 2;Verbal cues; Tactile cues   Assistive Device Rolling walker   Distance 2-4 steps with use of RW and RLE knee immobilizer on tile surface bed->chair;limited mobility and gait distance 2* inc pain,weakness,dec ability to weight shift BLE during upright mobility   Balance   Static Sitting Good  (chair alarm intact prior to leaving pt;s room;BLE elevated)   Dynamic Sitting (zero)   Static Standing Zero   Dynamic Standing (zero)   Ambulatory Zero   Endurance Deficit   Endurance Deficit Yes   Endurance Deficit Description weakness,fatigue,pain,deconditioning   Activity Tolerance   Activity Tolerance Patient limited by fatigue;Patient limited by pain  (poor)   Nurse Made Aware yes [facility])   Assessment   Prognosis Guarded   Problem List Decreased strength;Decreased endurance; Impaired balance;Decreased mobility; Decreased coordination;Decreased cognition;Decreased safety awareness; Impaired hearing;Obesity; Decreased skin integrity;Pain;Orthopedic restrictions  (WBAT RLE per ortho)   Assessment Pt is a 82 y/o female admitted to B 2* s/p fall with RLE fibular head fx WBAT RLE with RLE knee immobilizer  Pt was receiving rehab services at [facility] PTA,ambulating with RW  Prior to rehab, pt was living alone in home with steps and owns RW and [employer]  Pt currently is not at functional mobility baseline,Ax2 for mobility,use of 2 L NC O2,unsteady and ataxic movements,WB restrictions of RLE with RLE knee immobilizer,IV medicine management,dec cognition and reports inc pain RLE  Pt demonstrates maximal deficits during functional mobility and gait including dec endurance,dec balance,dec BLE strength,unsteady and ataxoc movements,inc pain RLE,dec cognition and needs maxAx1 for BM,maxAX2 for gait with use of Rw and transfers  Pt would cont to benefit from skilled inpt PT services to maximize functional independence   Barriers to Discharge Decreased caregiver support   Goals   Patient Goals to dec pain   STG Expiration Date 03/26/18   Short Term Goal #1 in 7-10 days:pt will be able to ambulate >20 feet with use of RW and RLE knee immobilizer WBAT RLE on various surfaces and chair follow modAX1,BM and transfers modAx1,activity tolerance:45mins/45mins,inc balance 1 grade,minAx1 for BLE ther ex HEP   Treatment Day 0   Plan   Treatment/Interventions Functional transfer training; Endurance training;Patient/family training;Equipment eval/education; Bed mobility;Gait training;Spoke to nursing;Family   PT Frequency 5x/wk   Recommendation   Recommendation Other (Comment)  (inpt rhb)   Equipment Recommended Walker  (RW)   Barthel Index   Feeding 10   Bathing 0   Grooming Score 0   Dressing Score 0   Bladder Score 5   Bowels Score 5   Toilet Use Score 5   Transfers (Bed/Chair) Score 5   Mobility (Level Surface) Score 0   Stairs Score 0   Barthel Index Score 30   Skilled PT recommended while in hospital and upon DC to progress pt toward treatment goals       Emi Buckley, PT, DPT@

## 2018-03-16 NOTE — ASSESSMENT & PLAN NOTE
· 10 years ago pt found to have a lesion 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; pathology results were unclear and patient was treated for high-grade neuroendocrine tumor/Merkel cell  Received resection and lymph node dissection of the left groin which were both positive  Patient received radiation to the groin and tumor bed and received IV chemotherapy  · Follows with Hem/Onc Dr Melina Bowden at [facility], seen 2/20/18:  No sign of recurrence, recent scan did not demonstrate evidence of recurrent Merkel cell, surveillance on going, patient will follow up in 6 months    · Left lower extremity lymphedema- chronic  · Pt does ambulate and do well   · Leg is at her baseline on the left, is larger than right no warmth and pulse pedal present

## 2018-03-16 NOTE — CASE MANAGEMENT
Initial Clinical Review    Admission: Date/Time/Statement: Observation 3/15/18 @ 1638 changed to INPATIENT 3/16/18 @ 1503    Orders Placed This Encounter   Procedures     03/16/18 1503  Inpatient Admission Once     Transfer Service: General Medicine       Question Answer Comment   Admitting Physician Merline Reich    Level of Care Med Surg    Estimated length of stay More than 2 Midnights    Certification I certify that inpatient services are medically necessary for this patient for a duration of greater than two midnights  See H&P and MD Progress Notes for additional information about the patient's course of treatment  03/16/18 1503     ED: Date/Time/Mode of Arrival:   ED Arrival Information     Expected Arrival Acuity Means of Arrival Escorted By Service Admission Type    3/15/2018 12:28 3/15/2018 12:47 Emergent Ambulance [address] Emergency    Arrival Complaint    CHANGE IN MENTAL STATUS        Chief Complaint:   Chief Complaint   Patient presents with    Altered Mental Status     Pt fell yesterday and was dx with a R tib/fin fx per EMS  Pt was admitted to the rehab center at [facility]  Pt was reportedly confused and combative today  Pt is currently refusing vital signs  History of Illness: Sean Levy is an 81 y o  female with PMH as listed above; she presents from [facility] for change in mental status, this morning she was agitated and yelling at staff; upon arrival to ED patient continued to be disoriented and combative; refused VS, examination, diagnostic exams; unwilling to participate in care, required Zyprexa  Patient is a poor historian, unable to obtain ROS  I called her daughter Marshal Robles, she states patient has no h/o dementia, prior to yesterday's fall and fracture her mother lived at home and was independent in ADLs, had a visiting nurse; is on [facility] since discharge from the hospital in January    Seen at [facility] yesterday s/p fall, xray tib/fib showed nondisplaced fracture right proximal fibula  Knee immobilizer placed, Rx for p r n  tramadol given, patient discharged to rehab with outpatient follow-up with Orthopedics  Hospitalized 3/7-3/09/18 at [facility] for R pontine stroke  Neurology was consulted, not eligible for tPA as patient presented to ED the following morning after sustaining a fall at night and awakening on the bathroom floor the next morning  CTA head and neck showed severe stenosis, vascular surgery was consulted, CEA offered however patient refused and agreed to f/u with vascular as outpatient  In the past she has declined a left carotid endarterectomy  STR recommended however patient refused and was discharged back home  ED Vital Signs:   ED Triage Vitals   Temperature Pulse Respirations Blood Pressure SpO2   03/15/18 1301 03/15/18 1303 03/15/18 1303 03/15/18 1303 03/15/18 1302   (!) 96 5 °F (35 8 °C) 93 18 159/92 (!) 88 %      Temp Source Heart Rate Source Patient Position - Orthostatic VS BP Location FiO2 (%)   03/15/18 1301 03/16/18 0753 03/15/18 1832 03/15/18 1832 --   Tympanic Monitor Lying Left arm       Pain Score       03/15/18 1320       No Pain        Wt Readings from Last 1 Encounters:   03/15/18 88 6 kg (195 lb 5 2 oz)     Vital Signs (abnormal):   03/15/18 1832  98 4 °F (36 9 °C)  94  22   190/92  94 %  Nasal cannula  Lying   03/15/18 1730  --  98   39   180/140  95 %  Nasal cannula  --   BP: Admitting at bedside   at 03/15/18 1730   03/15/18 1614  --  98   26   173/109  97 %  Nasal cannula  --   03/15/18 1500  --  100   30   178/96  96 %  None (Room air)  --   03/15/18 1400  --  88  22   194/92  96 %  Nasal cannula  --   03/15/18 1303  --  93  18  159/92   88 %  None (Room air)  --   03/15/18 1302  --  --  --  --   88 %  --  --   03/15/18 1301   96 5 °F (35 8 °C)  --  --  --  --  --  --        Abnormal Labs:   3/15 3/16   Potassium 4 9 3 0   Chloride 101 99   CO2 39 35   Anion Gap 2 7   Total Protein  6 1   Albumin  2 5     Folate >20  Platelets 243   Ref Range & Units 03/15/18 1607   Color, UA  Yellow    Clarity, UA  Cloudy    pH, UA 4 5 - 8 0 8 5     Leukocytes, UA Negative  Negative    Nitrite, UA Negative  Negative    Protein, UA Negative mg/dl  100 (2+)     Glucose, UA Negative mg/dl  Negative    Ketones, UA Negative mg/dl 15 (1+)     Urobilinogen, UA 0 2, 1 0 E U /dl E U /dl 1 0    Bilirubin, UA Negative  Negative    Blood, UA Negative Trace     Specific [place], UA 1 003 - 1 030 1 015      Diagnostic Test Results:     3/15 EKG - Sinus rhythm with occasional Premature ventricular complexes and Premature   atrial complexes  Otherwise normal ECG  When compared with ECG of 07-MAR-2018 08:27,  Premature ventricular complexes are now Present  Criteria for Inferior infarct are no longer Present    3/15 CXR - Mild pulmonary vascular congestion  3/16 R ankle xray -  Soft tissue swelling over the lateral malleolus without acute osseous injury  3/16 Xray L tib-fib No acute osseous abnormality      ED Treatment:   Medication Administration from 03/15/2018 1228 to 03/15/2018 1744    Date/Time Order Dose Route Action   03/15/2018 1338 OLANZapine (ZyPREXA) IM injection 5 mg 5 mg Intramuscular Given   03/15/2018 1338 sterile water injection **AcuDose Override Pull** 2 1 mL  Given        Past Medical/Surgical History:    Active Ambulatory Problems     Diagnosis Date Noted    Chest pain 04/26/2016    Hypertension 04/27/2016    Acute cholecystitis 09/21/2016    Abdominal pain 09/21/2016    Meningioma ([facility] ) 09/21/2016    Syncope 09/21/2016    Hypertension, accelerated 09/24/2016    Sepsis ([facility] ) 09/24/2016    Stenosis of left internal carotid artery 09/25/2016    Obesity (BMI 30-39 9) 01/10/2018    Acute bronchitis 03/13/2017    GERD (gastroesophageal reflux disease) 03/13/2017    Hyperlipemia 03/13/2017    Paroxysmal atrial fibrillation (HCC) 03/13/2017    Fibromyalgia 03/14/2017    Hypokalemia 03/14/2017    Moderate persistent asthma with acute exacerbation 03/16/2017    Open wound of abdominal wall without penetration into peritoneal cavity 10/27/2017    Asthma exacerbation 12/12/2017    Lymphedema of left leg 12/12/2017    Bacteremia 12/12/2017    History of Merkel cell carcinoma 12/13/2017    Port-a-cath in place 12/13/2017    Acute congestive heart failure ([facility] ) 01/05/2018    Moderate persistent asthma 01/05/2018    Iron deficiency anemia 01/07/2018    Shortness of breath 01/08/2018    Merkel cell carcinoma ([facility] ) 02/20/2018    Fall 03/07/2018    Right pontine stroke ([facility] ) 03/08/2018    Chronic diastolic CHF (congestive heart failure), NYHA class 1 ([facility] ) 03/08/2018     Resolved Ambulatory Problems     Diagnosis Date Noted    Acute on chronic respiratory failure ([facility] ) 01/05/2018     Past Medical History:   Diagnosis Date    Asthma     Cancer ([facility] )     Cardiac disease     Fibromyalgia     Fibromyalgia, primary     GERD (gastroesophageal reflux disease)     Hypertension     Hypokalemia     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HCC)      Admitting Diagnosis: Altered mental status [R41 82]  Delirious [R41 0]    Age/Sex: 81 y o  female    Assessment/Plan:   * Delirium   Assessment & Plan     · Multifactorial, hypoxia, recent change in environment, prior to [facility] pt was home with self-care  Yesterday she presented to ED s/p  Fall, sustained a R fibular fracture, was d/c to [facility] rehab, tramadol Rx given; unsure whether patient took tramadol, questionable underlying dementia   · 3/7/18 hospitalized at [facility] for R pontine stroke  · Head CT:  No acute intracranial abnormality, chronic small vessel ischemia unchanged; L middle cranial fossa meningioma unchanged    · Supportive care  · Fall precautions        Acute on chronic respiratory failure with hypoxia (HCC)   Assessment & Plan     · O2 sat 88% on admission, improved with 2lnc  · Hospitalized 1/23-1/26/18 at [facility] for CHF exacerbation, O2 sat on RA 87%, was discharged with 2 LN/C  · Patient on 2LNC at home; as per daughter was on O2 at [facility]; continue  · Incentive spirometer         Hypertension, accelerated   Assessment & Plan     · /92, 173/109  · Prn labetolol SBP >180  · Continuing home meds amlodipine, metoprolol, Lasix        Closed fracture of right tibia and fibula with routine healing   Assessment & Plan     · Right foot swelling, tenderness to palpation, foot pink and warm, no signs of compartment syndrome  · Continue Right knee immobilizer  · Pain mgmt: P r n  Tylenol and ibuprofen, avoid narcotics in patient with acute delirium  · PT consult  · Seen by OT in the ER yesterday, recommendation for STR        Chronic diastolic CHF (congestive heart failure), NYHA class 1 ([facility])   Assessment & Plan     · Euvolemic, no edema, lungs CTA  · CXR:  Negative for acute infiltrate or pleural effusion; mild pulmonary vascular congestion  · ECHO Jan 2018: LVEF 70-75%,   · Continue furosemide and metoprolol        Stenosis of left internal carotid artery   Assessment & Plan     · CTA neck/brain 3/2018: 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 artery 85%  · Carotid duplex: L ICA stenosis 70-99%  Plaque is heterogenous and irregular  Vertebral artery flow is antegrade      · Patient seen by vascular during hospitalization in March and refused CEA; also noted to have refused CEA as an outpatient         Moderate persistent asthma   Assessment & Plan     · No acute exacerbation  · Continue Advair and p r n  nebs        Paroxysmal atrial fibrillation (HCC)   Assessment & Plan     · EKG: NSR with PVC/PAC, rate 96  · On Eliquis, continue  · On Metoprolol for rate control         Fibromyalgia   Assessment & Plan     · Continue Lyrica        Hyperlipemia   Assessment & Plan     · Continue statin        GERD (gastroesophageal reflux disease)   Assessment & Plan     · Continue PPI        History of Merkel cell carcinoma   Assessment & Plan     · 10 years ago pt found to have a lesion 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; pathology results were unclear and patient was treated for high-grade neuroendocrine tumor/Merkel cell  Received resection and lymph node dissection of the left groin which were both positive  Patient received radiation to the groin and tumor bed and received IV chemotherapy  · Follows with Hem/Onc Dr Maya Olivares at [facility], seen 2/20/18:  No sign of recurrence, recent scan did not demonstrate evidence of recurrent Merkel cell, surveillance on going, patient will follow up in 6 months  · Left lower extremity lymphedema- chronic     Admission Orders:  Scheduled Meds:   Current Facility-Administered Medications:  acetaminophen 650 mg Oral Q6H PRN Magdalena Marsh MD   albuterol 2 5 mg Nebulization Q4H PRN Magdalena Marsh MD   ALPRAZolam 0 5 mg Oral Daily PRN Magdalena Marsh MD   amLODIPine 5 mg Oral Daily Magdalena Marsh MD   apixaban 5 mg Oral Q12H Magdalena Marsh MD   atorvastatin 40 mg Oral HS Magdalena Marsh MD   fluticasone-salmeterol 1 puff Inhalation Q12H [address] Magdalena Marsh MD   furosemide 20 mg Oral Daily Magdalena Marsh MD   hydrALAZINE 10 mg Intravenous Q6H PRN Magdalena Marsh MD   metoprolol tartrate 50 mg Oral Q12H [address] Magdalena Marsh MD   ondansetron 4 mg Intravenous Q6H PRN Magdalena Marsh MD   pantoprazole 20 mg Oral Early Morning Magdalena Marsh MD   potassium chloride 10 mEq Oral Daily Magdalena Marsh MD   pregabalin 300 mg Oral BID Magdalena Marsh MD   QUEtiapine 12 5 mg Oral HS Magdalena Marsh MD   tolterodine 1 mg Oral BID Magdalena Marsh MD   traMADol 50 mg Oral Q8H PRN Magdalena Marsh MD     Continuous Infusions:    PRN Meds:   acetaminophen    albuterol    ALPRAZolam    hydrALAZINE x1    ondansetron    traMADol    SCDs  Daily wt  Vitamin D panel   Diet cardiac 2 3 Gm Na  Cons Neuro   Cons Ortho   Respiratory protocol  PT eval/tx   __________________________  3/16 Ortho Consult   81 y  o female with right non displaced fibular head fracture     Plan:   · Weight bearing as tolerated  right lower extremity  · F/u right ankle xrays  · PT  · Pain control  · Dispo: f/u with Dr Sudheer Wilde as previously arranged in 2 weeks   _________________________________  3/16 Medicine Progress Note  Closed fracture of right tibia and fibula with routine healing   Assessment & Plan     · Right foot swelling, tenderness to palpation, foot pink and warm, no signs of compartment syndrome  · Continue Right knee immobilizer  · Pain mgmt: P r n  Tylenol and ibuprofen, avoid narcotics in patient with acute delirium  · PT consult  · Seen by OT in the ER yesterday, recommendation for STR  · Pt was seen by ortho pt has chronic lymphedema in left leg usually leg with complications per daughter   · Xray of right foot demonstrates: Soft tissue swelling over the lateral malleolus without acute osseous injury (appreciate ortho)   · Per ortho : Patient is tender to palpation over the right knee laterally as well as bilateral malleoli  · Three view x-rays of the right ankle demonstrate above   · Nonweightbearing until x-rays are reviewed by ortho           Acute on chronic respiratory failure with hypoxia (HCC)   Assessment & Plan     · O2 sat 88% on admission, improved with 2lnc, 93 % now with no oxygen on will chk with pca to chk   · Hospitalized 1/23-1/26/18 at [facility] for CHF exacerbation, O2 sat on RA 87%, was discharged with 2 LN/C  · Patient on [place] at home; as per daughter was on O2 at [facility]; continue for now will chk o2 as pt has no oxygen on right now   · Incentive spirometer if pt can coordinate           Chronic diastolic CHF (congestive heart failure), NYHA class 1 (HCC)   Assessment & Plan     · Euvolemic, no edema, lungs CTA  · CXR:  Negative for acute infiltrate or pleural effusion; mild pulmonary vascular congestion    · ECHO Jan 2018: LVEF 70-75%,   · Continue furosemide and metoprolol  · However pt has not produced urine until this am she went once with 250ml/hr   · She did receive lasix will administer 12 hrs ivf now and chk labs in am pt may be dry not eaten for last day or so per daughter   · Pt just arrived at rehab and then started acting strange after which she was brought into the hospital           Moderate persistent asthma   Assessment & Plan     · No acute exacerbation  · Continue Advair and dru agustin          History of Merkel cell carcinoma   Assessment & Plan     · 10 years ago pt found to have a lesion 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; pathology results were unclear and patient was treated for high-grade neuroendocrine tumor/Merkel cell  Received resection and lymph node dissection of the left groin which were both positive  Patient received radiation to the groin and tumor bed and received IV chemotherapy  · Follows with Hem/Onc Dr Juanita Eckert at [facility], seen 2/20/18:  No sign of recurrence, recent scan did not demonstrate evidence of recurrent Merkel cell, surveillance on going, patient will follow up in 6 months  · Left lower extremity lymphedema- chronic  · Pt does ambulate and do well   · Leg is at her baseline on the left, is larger than right no warmth and pulse pedal present           Fibromyalgia   Assessment & Plan     · Continue Lyrica          Paroxysmal atrial fibrillation (HCC)   Assessment & Plan     · EKG: NSR with PVC/PAC, rate 96  · On Eliquis, continue  · On Metoprolol for rate control           Hyperlipemia   Assessment & Plan     · Continue statin          GERD (gastroesophageal reflux disease)   Assessment & Plan     · Continue PPI          Stenosis of left internal carotid artery   Assessment & Plan     · CTA neck/brain 3/2018: 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 artery 85%  · Carotid duplex: L ICA stenosis 70-99%  Plaque is heterogenous and irregular  Vertebral artery flow is antegrade      · Patient seen by vascular during hospitalization in March and refused CEA; also noted to have refused CEA as an outpatient           Hypertension, accelerated   Assessment & Plan     · /92, 173/109  (now much improved will continue to trend)  · Prn labetolol SBP >180   · Continuing home meds amlodipine, metoprolol, Lasix          * Delirium   Assessment & Plan     · Multifactorial, hypoxia, recent change in environment, prior to Wed pt was home with self-care  · presented to ED s/p  Fall, sustained a R fibular fracture, [place] [facility] [place] tramadol Rx given; unsure whether patient took tramadol, questionable underlying dementia ? Today pt however with improved mental status today   · 3/7/18 hospitalized at [facility] for R pontine stroke  · Head CT:  No acute intracranial abnormality, chronic small vessel ischemia unchanged; L middle cranial fossa meningioma unchanged    · Supportive care  · Fall precautions  · Would not administer excessive pain meds           VTE Pharmacologic Prophylaxis:   Pharmacologic: Apixaban (Eliquis)  Mechanical VTE Prophylaxis in Place: Yes  Certification Statement: The patient, admitted on an observation basis, will now require > 2 midnight hospital stay due to Ongoing observation, secondary to low urine output and pain in right lower extremity

## 2018-03-16 NOTE — PROGRESS NOTES
Progress Note - Santino Moctezuma 1935, 81 y o  female MRN: [id number]    Unit/Bed#: CW2 215-01 Encounter: [id number]    Primary Care Provider: Nuha William DO   Date and time admitted to hospital: 3/15/2018 12:48 PM        Closed fracture of right tibia and fibula with routine healing   Assessment & Plan    · Right foot swelling, tenderness to palpation, foot pink and warm, no signs of compartment syndrome  · Continue Right knee immobilizer  · Pain mgmt: P r n  Tylenol and ibuprofen, avoid narcotics in patient with acute delirium  · PT consult  · Seen by OT in the ER yesterday, recommendation for STR  · Pt was seen by ortho pt has chronic lymphedema in left leg usually leg with complications per daughter   · Xray of right foot demonstrates: Soft tissue swelling over the lateral malleolus without acute osseous injury (appreciate ortho)   · Per ortho : Patient is tender to palpation over the right knee laterally as well as bilateral malleoli  · Three view x-rays of the right ankle demonstrate above   · Nonweightbearing until x-rays are reviewed by ortho         Acute on chronic respiratory failure with hypoxia (HCC)   Assessment & Plan    · O2 sat 88% on admission, improved with 2lnc, 93 % now with no oxygen on will chk with pca to chk   · Hospitalized 1/23-1/26/18 at [facility] for CHF exacerbation, O2 sat on RA 87%, was discharged with 2 LN/C  · Patient on [place] at home; as per daughter was on O2 at [facility]; continue for now will chk o2 as pt has no oxygen on right now   · Incentive spirometer if pt can coordinate         Chronic diastolic CHF (congestive heart failure), NYHA class 1 (HCC)   Assessment & Plan    · Euvolemic, no edema, lungs CTA  · CXR:  Negative for acute infiltrate or pleural effusion; mild pulmonary vascular congestion    · ECHO Jan 2018: LVEF 70-75%,   · Continue furosemide and metoprolol  · However pt has not produced urine until this am she went once with 250ml/hr   · She did receive lasix will administer 12 hrs ivf now and chk labs in am pt may be dry not eaten for last day or so per daughter   · Pt just arrived at rehab and then started acting strange after which she was brought into the hospital         Moderate persistent asthma   Assessment & Plan    · No acute exacerbation  · Continue Advair and p r armond agustin        History of Merkel cell carcinoma   Assessment & Plan    · 10 years ago pt found to have a lesion 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; pathology results were unclear and patient was treated for high-grade neuroendocrine tumor/Merkel cell  Received resection and lymph node dissection of the left groin which were both positive  Patient received radiation to the groin and tumor bed and received IV chemotherapy  · Follows with Hem/Onc Dr Angelo Arroyo at [facility], seen 2/20/18:  No sign of recurrence, recent scan did not demonstrate evidence of recurrent Merkel cell, surveillance on going, patient will follow up in 6 months  · Left lower extremity lymphedema- chronic  · Pt does ambulate and do well   · Leg is at her baseline on the left, is larger than right no warmth and pulse pedal present         Fibromyalgia   Assessment & Plan    · Continue Lyrica        Paroxysmal atrial fibrillation (HCC)   Assessment & Plan    · EKG: NSR with PVC/PAC, rate 96  · On Eliquis, continue  · On Metoprolol for rate control         Hyperlipemia   Assessment & Plan    · Continue statin        GERD (gastroesophageal reflux disease)   Assessment & Plan    · Continue PPI        Stenosis of left internal carotid artery   Assessment & Plan    · CTA neck/brain 3/2018: 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 artery 85%  · Carotid duplex: L ICA stenosis 70-99%  Plaque is heterogenous and irregular  Vertebral artery flow is antegrade  · Patient seen by vascular during hospitalization in March and refused CEA; also noted to have refused CEA as an outpatient          Hypertension, accelerated   Assessment & Plan    · /92, 173/109 (now much improved will continue to trend)  · Prn labetolol SBP >180   · Continuing home meds amlodipine, metoprolol, Lasix        * Delirium   Assessment & Plan    · Multifactorial, hypoxia, recent change in environment, prior to Wed pt was home with self-care  · presented to ED s/p  Fall, sustained a R fibular fracture, was [place] [facility] [place], tramadol Rx given; unsure whether patient took tramadol, questionable underlying dementia ? Today pt however with improved mental status today   · 3/7/18 hospitalized at [facility] for R pontine stroke  · Head CT:  No acute intracranial abnormality, chronic small vessel ischemia unchanged; L middle cranial fossa meningioma unchanged  · Supportive care  · Fall precautions  · Would not administer excessive pain meds             VTE Pharmacologic Prophylaxis:   Pharmacologic: Apixaban (Eliquis)  Mechanical VTE Prophylaxis in Place: Yes    Patient Centered Rounds: I have performed bedside rounds with nursing staff today  Discussions with Specialists or Other Care Team Provider:  Nursing    Education and Discussions with Family / Patient:  Patient and patient's daughter at bedside    Time Spent for Care: 30 minutes  More than 50% of total time spent on counseling and coordination of care as described above  Current Length of Stay: 0 day(s)    Current Patient Status: Observation   Certification Statement: The patient, admitted on an observation basis, will now require > 2 midnight hospital stay due to Ongoing observation, secondary to low urine output and pain in right lower extremity    Discharge Plan:  Family not wanting patient to return to [facility]  Discussed with case management working  On prior Auth  Code Status: Level 3 - DNAR and DNI      Subjective:   Patient is alert and oriented x3  She realizes she is at [facility]   She does not remember events as of yesterday  She states that her family told her she was acting crazy    Patient current knee with some discomfort in her right lower extremity  No other complaints currently off oxygen requested nursing check oxygen however done when patient was back on oxygen 3 L at 92%  Question possible mild hypoxia at facility if oxygen off  Also question any pain medication administration  Patient has not produced much urine since admission  Negative two hundred sixty per nursing this morning patient voided finally prior to going down for x-ray, 250 mL since admission  Objective:     Vitals:   Temp (24hrs), Av 3 °F (36 8 °C), Min:98 1 °F (36 7 °C), Max:98 4 °F (36 9 °C)    HR:  [] 80  Resp:  [16-39] 16  BP: (120-194)/() 120/58  SpO2:  [92 %-97 %] 92 %  Body mass index is 35 73 kg/m²  Input and Output Summary (last 24 hours): Intake/Output Summary (Last 24 hours) at 18 1327  Last data filed at 18 0910   Gross per 24 hour   Intake              290 ml   Output              550 ml   Net             -260 ml       Physical Exam:     Physical Exam   Constitutional: She is oriented to person, place, and time  No distress  Obese    HENT:   Head: Normocephalic and atraumatic  Mouth/Throat: No oropharyngeal exudate  Eyes: Conjunctivae are normal  Pupils are equal, round, and reactive to light  Right eye exhibits no discharge  Left eye exhibits no discharge  No scleral icterus  Neck: No JVD present  No tracheal deviation present  No thyromegaly present  Cardiovascular: Normal heart sounds  Exam reveals no gallop and no friction rub  No murmur heard  Pulmonary/Chest: Effort normal and breath sounds normal  No stridor  No respiratory distress  She has no wheezes  She has no rales  She exhibits no tenderness  Abdominal: Soft  She exhibits no distension and no mass  There is no tenderness  There is no rebound and no guarding  Musculoskeletal: She exhibits no edema, tenderness or deformity     Left leg with chronic lymphedema intact and stable    Lymphadenopathy:     She has no cervical adenopathy  Neurological: She is oriented to person, place, and time  Little forgetful    Skin: No rash noted  She is not diaphoretic  No erythema  No pallor  Psychiatric: She has a normal mood and affect  Additional Data:     Labs:      Results from last 7 days  Lab Units 03/16/18  0520 03/15/18  1400   WBC Thousand/uL 6 43 7 02   HEMOGLOBIN g/dL 11 9 12 6   HEMATOCRIT % 37 6 40 4   PLATELETS Thousands/uL 121* 123*   NEUTROS PCT %  --  83*   LYMPHS PCT %  --  8*   MONOS PCT %  --  6   EOS PCT %  --  3       Results from last 7 days  Lab Units 03/16/18  0520   SODIUM mmol/L 141   POTASSIUM mmol/L 3 0*   CHLORIDE mmol/L 99*   CO2 mmol/L 35*   BUN mg/dL 17   CREATININE mg/dL 0 86   CALCIUM mg/dL 8 7   TOTAL PROTEIN g/dL 6 1*   BILIRUBIN TOTAL mg/dL 0 69   ALK PHOS U/L 77   ALT U/L 15   AST U/L 16   GLUCOSE RANDOM mg/dL 85           * I Have Reviewed All Lab Data Listed Above  * Additional Pertinent Lab Tests Reviewed:  All Labs Within Last 24 Hours Reviewed    Imaging:    Imaging Reports Reviewed Today Include reviewed       Recent Cultures (last 7 days):           Last 24 Hours Medication List:     Current Facility-Administered Medications:  acetaminophen 650 mg Oral Q6H PRN Edgar Plasencia MD    albuterol 2 5 mg Nebulization Q4H PRN Edgar Plasencia MD    ALPRAZolam 0 5 mg Oral Daily PRN Edgar Plasencia MD    amLODIPine 5 mg Oral Daily Edgar Plasencia MD    apixaban 5 mg Oral Q12H Edgar Plasencia MD    atorvastatin 40 mg Oral HS Edgar Plasencia MD    fluticasone-salmeterol 1 puff Inhalation Q12H [address] Edgar Plasencia MD    hydrALAZINE 10 mg Intravenous Q6H PRN Edgar Plasencia MD    metoprolol tartrate 50 mg Oral Q12H [address] Edgar Plasencia MD    ondansetron 4 mg Intravenous Q6H PRN Edgar Plasencia MD    pantoprazole 20 mg Oral Early Morning Edgar Plasencia MD    polyvinyl alcohol 1 drop Both Eyes TID (diuretic) GILDA Muñoz    pregabalin 300 mg Oral BID Edgar Plasencia MD    QUEtiapine 12 5 mg Oral HS Edgar Plasencia MD sodium chloride 75 mL/hr Intravenous Continuous GILDA Ramachandran Last Rate: 75 mL/hr (03/16/18 1030)   tolterodine 1 mg Oral BID Ana Hardy MD    traMADol 50 mg Oral Q8H PRN Ana Hardy MD         Today, Patient Was Seen By: GILDA Ramachandran    ** Please Note: Dictation voice to text software may have been used in the creation of this document   **

## 2018-03-17 ENCOUNTER — APPOINTMENT (INPATIENT)
Dept: RADIOLOGY | Facility: HOSPITAL | Age: 83
DRG: 091 | End: 2018-03-17
Payer: MEDICARE

## 2018-03-17 LAB
ANION GAP SERPL CALCULATED.3IONS-SCNC: 5 MMOL/L (ref 4–13)
BASOPHILS # BLD AUTO: 0.03 THOUSANDS/ΜL (ref 0–0.1)
BASOPHILS NFR BLD AUTO: 0 % (ref 0–1)
BUN SERPL-MCNC: 17 MG/DL (ref 5–25)
CALCIUM SERPL-MCNC: 8.1 MG/DL (ref 8.3–10.1)
CHLORIDE SERPL-SCNC: 101 MMOL/L (ref 100–108)
CO2 SERPL-SCNC: 32 MMOL/L (ref 21–32)
CREAT SERPL-MCNC: 0.74 MG/DL (ref 0.6–1.3)
EOSINOPHIL # BLD AUTO: 0.5 THOUSAND/ΜL (ref 0–0.61)
EOSINOPHIL NFR BLD AUTO: 7 % (ref 0–6)
ERYTHROCYTE [DISTWIDTH] IN BLOOD BY AUTOMATED COUNT: 17.6 % (ref 11.6–15.1)
GFR SERPL CREATININE-BSD FRML MDRD: 76 ML/MIN/1.73SQ M
GLUCOSE SERPL-MCNC: 147 MG/DL (ref 65–140)
GLUCOSE SERPL-MCNC: 92 MG/DL (ref 65–140)
HCT VFR BLD AUTO: 36.9 % (ref 34.8–46.1)
HGB BLD-MCNC: 11.4 G/DL (ref 11.5–15.4)
LYMPHOCYTES # BLD AUTO: 1.03 THOUSANDS/ΜL (ref 0.6–4.47)
LYMPHOCYTES NFR BLD AUTO: 14 % (ref 14–44)
MCH RBC QN AUTO: 25.3 PG (ref 26.8–34.3)
MCHC RBC AUTO-ENTMCNC: 30.9 G/DL (ref 31.4–37.4)
MCV RBC AUTO: 82 FL (ref 82–98)
MONOCYTES # BLD AUTO: 0.44 THOUSAND/ΜL (ref 0.17–1.22)
MONOCYTES NFR BLD AUTO: 6 % (ref 4–12)
NEUTROPHILS # BLD AUTO: 5.21 THOUSANDS/ΜL (ref 1.85–7.62)
NEUTS SEG NFR BLD AUTO: 73 % (ref 43–75)
NRBC BLD AUTO-RTO: 0 /100 WBCS
PLATELET # BLD AUTO: 115 THOUSANDS/UL (ref 149–390)
PMV BLD AUTO: 12.1 FL (ref 8.9–12.7)
POTASSIUM SERPL-SCNC: 3.5 MMOL/L (ref 3.5–5.3)
RBC # BLD AUTO: 4.5 MILLION/UL (ref 3.81–5.12)
SODIUM SERPL-SCNC: 138 MMOL/L (ref 136–145)
WBC # BLD AUTO: 7.22 THOUSAND/UL (ref 4.31–10.16)

## 2018-03-17 PROCEDURE — 71046 X-RAY EXAM CHEST 2 VIEWS: CPT

## 2018-03-17 PROCEDURE — 82948 REAGENT STRIP/BLOOD GLUCOSE: CPT

## 2018-03-17 PROCEDURE — 85025 COMPLETE CBC W/AUTO DIFF WBC: CPT | Performed by: NURSE PRACTITIONER

## 2018-03-17 PROCEDURE — 99232 SBSQ HOSP IP/OBS MODERATE 35: CPT | Performed by: NURSE PRACTITIONER

## 2018-03-17 PROCEDURE — 94760 N-INVAS EAR/PLS OXIMETRY 1: CPT

## 2018-03-17 PROCEDURE — 94640 AIRWAY INHALATION TREATMENT: CPT

## 2018-03-17 PROCEDURE — 80048 BASIC METABOLIC PNL TOTAL CA: CPT | Performed by: NURSE PRACTITIONER

## 2018-03-17 RX ORDER — POTASSIUM CHLORIDE 20 MEQ/1
40 TABLET, EXTENDED RELEASE ORAL ONCE
Status: COMPLETED | OUTPATIENT
Start: 2018-03-17 | End: 2018-03-17

## 2018-03-17 RX ORDER — FUROSEMIDE 10 MG/ML
20 INJECTION INTRAMUSCULAR; INTRAVENOUS ONCE
Status: COMPLETED | OUTPATIENT
Start: 2018-03-17 | End: 2018-03-17

## 2018-03-17 RX ADMIN — POLYVINYL ALCOHOL 1 DROP: 14 SOLUTION/ DROPS OPHTHALMIC at 17:41

## 2018-03-17 RX ADMIN — METOPROLOL TARTRATE 50 MG: 50 TABLET ORAL at 21:45

## 2018-03-17 RX ADMIN — PREGABALIN 300 MG: 100 CAPSULE ORAL at 08:30

## 2018-03-17 RX ADMIN — ALPRAZOLAM 0.5 MG: 0.5 TABLET ORAL at 21:46

## 2018-03-17 RX ADMIN — PREGABALIN 300 MG: 100 CAPSULE ORAL at 21:45

## 2018-03-17 RX ADMIN — ATORVASTATIN CALCIUM 40 MG: 40 TABLET, FILM COATED ORAL at 21:54

## 2018-03-17 RX ADMIN — QUETIAPINE 12.5 MG: 25 TABLET ORAL at 21:45

## 2018-03-17 RX ADMIN — FLUTICASONE PROPIONATE AND SALMETEROL 1 PUFF: 50; 250 POWDER RESPIRATORY (INHALATION) at 21:48

## 2018-03-17 RX ADMIN — FLUTICASONE PROPIONATE AND SALMETEROL 1 PUFF: 50; 250 POWDER RESPIRATORY (INHALATION) at 14:33

## 2018-03-17 RX ADMIN — ALBUTEROL SULFATE 2.5 MG: 2.5 SOLUTION RESPIRATORY (INHALATION) at 11:13

## 2018-03-17 RX ADMIN — FUROSEMIDE 20 MG: 10 INJECTION, SOLUTION INTRAMUSCULAR; INTRAVENOUS at 10:17

## 2018-03-17 RX ADMIN — METOPROLOL TARTRATE 50 MG: 50 TABLET ORAL at 08:30

## 2018-03-17 RX ADMIN — AMLODIPINE BESYLATE 5 MG: 5 TABLET ORAL at 08:30

## 2018-03-17 RX ADMIN — APIXABAN 5 MG: 5 TABLET, FILM COATED ORAL at 21:45

## 2018-03-17 RX ADMIN — APIXABAN 5 MG: 5 TABLET, FILM COATED ORAL at 05:06

## 2018-03-17 RX ADMIN — POTASSIUM CHLORIDE 40 MEQ: 1500 TABLET, EXTENDED RELEASE ORAL at 11:24

## 2018-03-17 RX ADMIN — TOLTERODINE TARTRATE 1 MG: 1 TABLET, FILM COATED ORAL at 14:32

## 2018-03-17 RX ADMIN — PANTOPRAZOLE SODIUM 20 MG: 20 TABLET, DELAYED RELEASE ORAL at 05:06

## 2018-03-17 RX ADMIN — TOLTERODINE TARTRATE 1 MG: 1 TABLET, FILM COATED ORAL at 21:49

## 2018-03-17 NOTE — PROGRESS NOTES
Progress Note - Disha Peralta 1935, 81 y o  female MRN: [id number]    [facility] Encounter: [id number]    Primary Care Provider: Qamar Vieyra, DO   Date and time admitted to hospital: 3/15/2018 12:48 PM    Spoke with ortho now on phone will have ortho resident put final impression note in for pt most likely ok to weightbear    Closed fracture of right tibia and fibula with routine healing   Assessment & Plan    · Right foot swelling, tenderness to palpation, foot pink and warm, no signs of compartment syndrome  · Continue Right knee immobilizer  · Pain mgmt: P r n  Tylenol and ibuprofen, avoid narcotics in patient with acute delirium  · PT consult recommend inpt rehab pt will transition to another facility at the families request   · Seen by OT in the ER yesterday, recommendation for STR  · Pt was seen by ortho pt has chronic lymphedema in left leg usually leg with complications per daughter   · Xray of right foot demonstrates: Soft tissue swelling over the lateral malleolus without acute osseous injury (appreciate ortho)   · Per ortho : Patient is tender to palpation over the right knee laterally as well as bilateral malleoli  · Three view x-rays of the right ankle demonstrate above   · Nonweightbearing until x-rays are reviewed by ortho ( have called ortho for formal recommendations as no follow up noted )         Acute on chronic respiratory failure with hypoxia (HCC)   Assessment & Plan    · O2 sat 88% on admission, improved with 2lnc,   · Pt this am per nursing was awake and upon my examination was fatigues and tired blood sugars and vitals chkd(stable)   · Pt in on 2 liters and will send pt for chest xray pt did receive 12 hrs low dose ivf and nursing felt pt had a moist cough     · Pt was administered low dose iv lasix this am for relief (my examination I did hear some subtle wheezes but poor effort) breathing treatment was ordered 96% today on 2 liters   · Hospitalized 1/23-1/26/18 at [facility] for CHF exacerbation, O2 sat on RA 87%, was discharged with 2 LN/C  · Patient on [place] at home; as per daughter was on O2 at [facility];   · Incentive spirometer if pt can coordinate         Chronic diastolic CHF (congestive heart failure), NYHA class 1 (HCC)   Assessment & Plan    · Euvolemic, no edema, lungs CTA  · CXR:  Negative for acute infiltrate or pleural effusion; mild pulmonary vascular congestion  · ECHO Jan 2018: LVEF 70-75%,   · Continue furosemide and metoprolol  · However pt has not produced urine until this am she went once with 250ml/hr   · She did receive her am ivf and one liters ivf overnight pt does not seem to be overloaded  · However will administered 20 mg iv lasix now   · Pt just arrived at rehab and then started acting strange after which she was brought into the hospital         Moderate persistent asthma   Assessment & Plan    · No acute exacerbation, however this am with some wheezing   · Ordered xray due to pt lethargy and wheezing   · Continue Advair and p r n  nebs        History of Merkel cell carcinoma   Assessment & Plan    · 10 years ago pt found to have a lesion 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; pathology results were unclear and patient was treated for high-grade neuroendocrine tumor/Merkel cell  Received resection and lymph node dissection of the left groin which were both positive  Patient received radiation to the groin and tumor bed and received IV chemotherapy  · Follows with Hem/Onc Dr Tim Gomez at [facility], seen 2/20/18:  No sign of recurrence, recent scan did not demonstrate evidence of recurrent Merkel cell, surveillance on going, patient will follow up in 6 months    · Left lower extremity lymphedema- chronic  · Pt does ambulate and do well   · Leg is at her baseline on the left, is larger than right no warmth and pulse pedal present         Fibromyalgia   Assessment & Plan    · Continue Lyrica        Paroxysmal atrial fibrillation (HCC)   Assessment & Plan    · EKG: NSR with PVC/PAC, rate 96  · On Eliquis, continue  · On Metoprolol for rate control         Hyperlipemia   Assessment & Plan    · Continue statin        GERD (gastroesophageal reflux disease)   Assessment & Plan    · Continue PPI        Stenosis of left internal carotid artery   Assessment & Plan    · CTA neck/brain 3/2018: 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 artery 85%  · Carotid duplex: L ICA stenosis 70-99%  Plaque is heterogenous and irregular  Vertebral artery flow is antegrade  · Patient seen by vascular during hospitalization in March and refused CEA; also noted to have refused CEA as an outpatient  Hypertension, accelerated   Assessment & Plan    · /92, 173/109  (now much improved will continue to trend)  · Prn labetolol SBP >180   · Continuing home meds amlodipine, metoprolol, Lasix        * Delirium   Assessment & Plan    · Multifactorial, hypoxia, recent change in environment, prior to Wed pt was home with self-care  · presented to ED s/p  Fall, sustained a R fibular fracture, was d/c to [facility] rehab, tramadol Rx given; unsure whether patient took tramadol, questionable underlying dementia ? Today pt however with improved mental status today   · 3/7/18 hospitalized at [facility] for R pontine stroke  · Head CT:  No acute intracranial abnormality, chronic small vessel ischemia unchanged; L middle cranial fossa meningioma unchanged    · Supportive care  · Fall precautions  · Would not administer excessive pain meds   · Pt was a little lethargic this am although nursing tell me she was more awake this am  · Pt is wheezing but not as moist as nursing intially reported   · resp protocol ordered and chest xray ordered for follow up along with small iv dose of lasix ordered   · Will wait for xray to resume pts regularly scheduled dose of diuretic             VTE Pharmacologic Prophylaxis:   Pharmacologic: Apixaban (Eliquis)  Mechanical VTE Prophylaxis in Place: Yes    Patient Centered Rounds: I have performed bedside rounds with nursing staff today  Discussions with Specialists or Other Care Team Provider: nursing     Education and Discussions with Family / Patient: patient     Time Spent for Care: 30 minutes  More than 50% of total time spent on counseling and coordination of care as described above  Current Length of Stay: 1 day(s)    Current Patient Status: Inpatient   Certification Statement: The patient will continue to require additional inpatient hospital stay due to ongonig medical treatment     Discharge Plan: discharge to rehab when medically stable     Code Status: Level 3 - DNAR and DNI      Subjective:   Patient is lethargic  Opens eyes to verbal stimuli  Nursing time the patient was awake and doing well shortly prior  Objective:     Vitals:   Temp (24hrs), Av 3 °F (36 8 °C), Min:98 1 °F (36 7 °C), Max:98 4 °F (36 9 °C)    HR:  [64-91] 91  Resp:  [18] 18  BP: (116-150)/(47-74) 127/48  SpO2:  [92 %-96 %] 96 %  Body mass index is 38 13 kg/m²  Input and Output Summary (last 24 hours): Intake/Output Summary (Last 24 hours) at 18 1521  Last data filed at 18 1200   Gross per 24 hour   Intake              920 ml   Output              555 ml   Net              365 ml       Physical Exam:     Physical Exam   Constitutional: She appears well-developed  No distress  HENT:   Head: Normocephalic  Eyes: Conjunctivae are normal  Right eye exhibits no discharge  Left eye exhibits no discharge  No scleral icterus  Neck: No JVD present  No tracheal deviation present  No thyromegaly present  Cardiovascular: Normal rate  Exam reveals no gallop and no friction rub  No murmur heard  Pulmonary/Chest: No stridor  No respiratory distress  She has wheezes (wheezing bl bases exp )  She has no rales  She exhibits no tenderness  Abdominal: Soft  She exhibits no distension and no mass  There is no tenderness  There is no rebound and no guarding     Musculoskeletal: She exhibits no edema, tenderness or deformity  Lymphadenopathy:     She has no cervical adenopathy  Neurological:   Lethargic but arousable    Skin: No rash noted  She is not diaphoretic  No erythema  No pallor  Psychiatric:   Lethargic opens eyes to loud verbal stimuli         Additional Data:     Labs:      Results from last 7 days  Lab Units 03/17/18  0543   WBC Thousand/uL 7 22   HEMOGLOBIN g/dL 11 4*   HEMATOCRIT % 36 9   PLATELETS Thousands/uL 115*   NEUTROS PCT % 73   LYMPHS PCT % 14   MONOS PCT % 6   EOS PCT % 7*       Results from last 7 days  Lab Units 03/17/18  0543 03/16/18  0520   SODIUM mmol/L 138 141   POTASSIUM mmol/L 3 5 3 0*   CHLORIDE mmol/L 101 99*   CO2 mmol/L 32 35*   BUN mg/dL 17 17   CREATININE mg/dL 0 74 0 86   CALCIUM mg/dL 8 1* 8 7   TOTAL PROTEIN g/dL  --  6 1*   BILIRUBIN TOTAL mg/dL  --  0 69   ALK PHOS U/L  --  77   ALT U/L  --  15   AST U/L  --  16   GLUCOSE RANDOM mg/dL 92 85           * I Have Reviewed All Lab Data Listed Above  * Additional Pertinent Lab Tests Reviewed:  All Labs Within Last 24 Hours Reviewed    Imaging:    Imaging Reports Reviewed Today Include: reviewed       Recent Cultures (last 7 days):           Last 24 Hours Medication List:     Current Facility-Administered Medications:  acetaminophen 650 mg Oral Q6H PRN Reynold Peters MD   albuterol 2 5 mg Nebulization Q4H PRN Reynold Peters MD   ALPRAZolam 0 5 mg Oral Daily PRN Reynold Peters MD   amLODIPine 5 mg Oral Daily Reynold Peters MD   apixaban 5 mg Oral Q12H Reynold Peters MD   atorvastatin 40 mg Oral HS Reynold Peters MD   fluticasone-salmeterol 1 puff Inhalation Q12H [facility] Reynold Peters MD   hydrALAZINE 10 mg Intravenous Q6H PRN Reynold Peters MD   metoprolol tartrate 50 mg Oral Q12H [facility] Reynold Peters MD   ondansetron 4 mg Intravenous Q6H PRN Reynold Peters MD   pantoprazole 20 mg Oral Early Morning Reynold Peters MD   polyvinyl alcohol 1 drop Both Eyes TID (diuretic) GILDA Carvalho   pregabalin 300 mg Oral BID Reynold Peters MD QUEtiapine 12 5 mg Oral HS Maggie Montalvo MD   tolterodine 1 mg Oral BID Maggie Motnalvo MD   traMADol 50 mg Oral Q8H PRN Maggie Montalvo MD        Today, Patient Was Seen By: GILDA Tony    ** Please Note: Dictation voice to text software may have been used in the creation of this document   **

## 2018-03-17 NOTE — ASSESSMENT & PLAN NOTE
· No acute exacerbation, however this am with some wheezing   · Ordered xray due to pt lethargy and wheezing   · Continue Advair and p r n  nebs

## 2018-03-17 NOTE — ASSESSMENT & PLAN NOTE
· 10 years ago pt found to have a lesion 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; pathology results were unclear and patient was treated for high-grade neuroendocrine tumor/Merkel cell  Received resection and lymph node dissection of the left groin which were both positive  Patient received radiation to the groin and tumor bed and received IV chemotherapy  · Follows with Hem/Onc Dr Verena Al at [facility], seen 2/20/18:  No sign of recurrence, recent scan did not demonstrate evidence of recurrent Merkel cell, surveillance on going, patient will follow up in 6 months    · Left lower extremity lymphedema- chronic  · Pt does ambulate and do well   · Leg is at her baseline on the left, is larger than right no warmth and pulse pedal present

## 2018-03-17 NOTE — ASSESSMENT & PLAN NOTE
· Right foot swelling, tenderness to palpation, foot pink and warm, no signs of compartment syndrome  · Continue Right knee immobilizer  · Pain mgmt: P r n  Tylenol and ibuprofen, avoid narcotics in patient with acute delirium  · PT consult recommend inpt rehab pt will transition to another facility at the families request   · Seen by OT in the ER yesterday, recommendation for STR  · Pt was seen by ortho pt has chronic lymphedema in left leg usually leg with complications per daughter   · Xray of right foot demonstrates: Soft tissue swelling over the lateral malleolus without acute osseous injury (appreciate ortho)   · Per ortho : Patient is tender to palpation over the right knee laterally as well as bilateral malleoli    · Three view x-rays of the right ankle demonstrate above   · Nonweightbearing until x-rays are reviewed by ortho ( have called ortho for formal recommendations as no follow up noted )

## 2018-03-17 NOTE — ASSESSMENT & PLAN NOTE
· Euvolemic, no edema, lungs CTA  · CXR:  Negative for acute infiltrate or pleural effusion; mild pulmonary vascular congestion    · ECHO Jan 2018: LVEF 70-75%,   · Continue furosemide and metoprolol  · However pt has not produced urine until this am she went once with 250ml/hr   · She did receive her am ivf and one liters ivf overnight pt does not seem to be overloaded  · However will administered 20 mg iv lasix now   · Pt just arrived at rehab and then started acting strange after which she was brought into the hospital

## 2018-03-17 NOTE — PLAN OF CARE
DISCHARGE PLANNING     Discharge to home or other facility with appropriate resources Progressing        DISCHARGE PLANNING - CARE MANAGEMENT     Discharge to post-acute care or home with appropriate resources Progressing        INFECTION - ADULT     Absence or prevention of progression during hospitalization Progressing        PAIN - ADULT     Verbalizes/displays adequate comfort level or baseline comfort level Progressing        Potential for Falls     Patient will remain free of falls Progressing        Prexisting or High Potential for Compromised Skin Integrity     Skin integrity is maintained or improved Progressing        SAFETY ADULT     Maintain or return to baseline ADL function Progressing     Maintain or return mobility status to optimal level Progressing     Patient will remain free of falls Progressing

## 2018-03-17 NOTE — ASSESSMENT & PLAN NOTE
· O2 sat 88% on admission, improved with 2lnc,   · Pt this am per nursing was awake and upon my examination was fatigues and tired blood sugars and vitals chkd(stable)   · Pt in on 2 liters and will send pt for chest xray pt did receive 12 hrs low dose ivf and nursing felt pt had a moist cough     · Pt was administered low dose iv lasix this am for relief (my examination I did hear some subtle wheezes but poor effort) breathing treatment was ordered 96% today on 2 liters   · Hospitalized 1/23-1/26/18 at [facility] for CHF exacerbation, O2 sat on RA 87%, was discharged with 2 LN/C  · Patient on [facility] at home; as per daughter was on O2 at [facility];   · Incentive spirometer if pt can coordinate

## 2018-03-17 NOTE — ASSESSMENT & PLAN NOTE
· Multifactorial, hypoxia, recent change in environment, prior to Wed pt was home with self-care  · presented to ED s/p  Fall, sustained a R fibular fracture, was d/c to [facility] rehab, tramadol Rx given; unsure whether patient took tramadol, questionable underlying dementia ? Today pt however with improved mental status today   · 3/7/18 hospitalized at [facility] for R pontine stroke  · Head CT:  No acute intracranial abnormality, chronic small vessel ischemia unchanged; L middle cranial fossa meningioma unchanged    · Supportive care  · Fall precautions  · Would not administer excessive pain meds   · Pt was a little lethargic this am although nursing tell me she was more awake this am  · Pt is wheezing but not as moist as nursing intially reported   · resp protocol ordered and chest xray ordered for follow up along with small iv dose of lasix ordered   · Will wait for xray to resume pts regularly scheduled dose of diuretic

## 2018-03-18 LAB
ANION GAP SERPL CALCULATED.3IONS-SCNC: 4 MMOL/L (ref 4–13)
BUN SERPL-MCNC: 18 MG/DL (ref 5–25)
CALCIUM SERPL-MCNC: 8.5 MG/DL (ref 8.3–10.1)
CHLORIDE SERPL-SCNC: 101 MMOL/L (ref 100–108)
CO2 SERPL-SCNC: 36 MMOL/L (ref 21–32)
CREAT SERPL-MCNC: 0.91 MG/DL (ref 0.6–1.3)
GFR SERPL CREATININE-BSD FRML MDRD: 59 ML/MIN/1.73SQ M
GLUCOSE SERPL-MCNC: 123 MG/DL (ref 65–140)
POTASSIUM SERPL-SCNC: 4 MMOL/L (ref 3.5–5.3)
SODIUM SERPL-SCNC: 141 MMOL/L (ref 136–145)

## 2018-03-18 PROCEDURE — 99232 SBSQ HOSP IP/OBS MODERATE 35: CPT | Performed by: NURSE PRACTITIONER

## 2018-03-18 PROCEDURE — 80048 BASIC METABOLIC PNL TOTAL CA: CPT | Performed by: NURSE PRACTITIONER

## 2018-03-18 RX ORDER — ALPRAZOLAM 0.25 MG/1
0.25 TABLET ORAL DAILY PRN
Status: DISCONTINUED | OUTPATIENT
Start: 2018-03-18 | End: 2018-03-19 | Stop reason: HOSPADM

## 2018-03-18 RX ORDER — FUROSEMIDE 20 MG/1
20 TABLET ORAL DAILY
Status: DISCONTINUED | OUTPATIENT
Start: 2018-03-18 | End: 2018-03-19 | Stop reason: HOSPADM

## 2018-03-18 RX ADMIN — ALPRAZOLAM 0.25 MG: 0.25 TABLET ORAL at 21:24

## 2018-03-18 RX ADMIN — QUETIAPINE 12.5 MG: 25 TABLET ORAL at 21:24

## 2018-03-18 RX ADMIN — PREGABALIN 300 MG: 100 CAPSULE ORAL at 17:14

## 2018-03-18 RX ADMIN — FLUTICASONE PROPIONATE AND SALMETEROL 1 PUFF: 50; 250 POWDER RESPIRATORY (INHALATION) at 09:20

## 2018-03-18 RX ADMIN — PREGABALIN 300 MG: 100 CAPSULE ORAL at 09:36

## 2018-03-18 RX ADMIN — METOPROLOL TARTRATE 50 MG: 50 TABLET ORAL at 21:26

## 2018-03-18 RX ADMIN — AMLODIPINE BESYLATE 5 MG: 5 TABLET ORAL at 09:20

## 2018-03-18 RX ADMIN — PANTOPRAZOLE SODIUM 20 MG: 20 TABLET, DELAYED RELEASE ORAL at 05:30

## 2018-03-18 RX ADMIN — POLYVINYL ALCOHOL 1 DROP: 14 SOLUTION/ DROPS OPHTHALMIC at 17:13

## 2018-03-18 RX ADMIN — TOLTERODINE TARTRATE 1 MG: 1 TABLET, FILM COATED ORAL at 09:19

## 2018-03-18 RX ADMIN — METOPROLOL TARTRATE 50 MG: 50 TABLET ORAL at 09:19

## 2018-03-18 RX ADMIN — APIXABAN 5 MG: 5 TABLET, FILM COATED ORAL at 05:30

## 2018-03-18 RX ADMIN — ATORVASTATIN CALCIUM 40 MG: 40 TABLET, FILM COATED ORAL at 21:24

## 2018-03-18 RX ADMIN — APIXABAN 5 MG: 5 TABLET, FILM COATED ORAL at 17:13

## 2018-03-18 RX ADMIN — FUROSEMIDE 20 MG: 20 TABLET ORAL at 09:20

## 2018-03-18 RX ADMIN — ACETAMINOPHEN 650 MG: 325 TABLET, FILM COATED ORAL at 05:33

## 2018-03-18 RX ADMIN — TOLTERODINE TARTRATE 1 MG: 1 TABLET, FILM COATED ORAL at 17:13

## 2018-03-18 RX ADMIN — ACETAMINOPHEN 650 MG: 325 TABLET, FILM COATED ORAL at 19:32

## 2018-03-18 RX ADMIN — POLYVINYL ALCOHOL 1 DROP: 14 SOLUTION/ DROPS OPHTHALMIC at 05:31

## 2018-03-18 RX ADMIN — POLYVINYL ALCOHOL 1 DROP: 14 SOLUTION/ DROPS OPHTHALMIC at 12:29

## 2018-03-18 NOTE — ASSESSMENT & PLAN NOTE
· No acute exacerbation, wheezing improved   · resp protocol   · Ordered xray due to pt lethargy and wheezing   · Continue Advair and p r n  nebs

## 2018-03-18 NOTE — ASSESSMENT & PLAN NOTE
· Multifactorial, hypoxia, recent change in environment, prior to Wed pt was home with self-care  · presented to ED s/p  Fall, sustained a R fibular fracture, was d/c to [facility] rehab, tramadol Rx given; unsure whether patient took tramadol, questionable underlying dementia ? Today pt however with improved mental status today   · 3/7/18 hospitalized at [facility] for R pontine stroke  · Head CT:  No acute intracranial abnormality, chronic small vessel ischemia unchanged; L middle cranial fossa meningioma unchanged    · Supportive care  · Fall precautions  · Would not administer excessive pain meds   · Reduced xanax dose to 0 25  · resp protocol ordered and chest xray ordered for follow up along with small iv dose of lasix ordered   · Xray negative

## 2018-03-18 NOTE — ASSESSMENT & PLAN NOTE
· Right foot swelling, tenderness to palpation, foot pink and warm, no signs of compartment syndrome  · Continue Right knee immobilizer  · Pain mgmt: P r n  Tylenol and ibuprofen, avoid narcotics in patient with acute delirium  · PT consult recommend inpt rehab pt will transition to another facility at the families request   · Seen by OT in the ER yesterday, recommendation for STR  · Pt was seen by ortho pt has chronic lymphedema in left leg usually leg with complications per daughter   · Xray of right foot demonstrates: Soft tissue swelling over the lateral malleolus without acute osseous injury (appreciate ortho)   · Per ortho : Patient is tender to palpation over the right knee laterally as well as bilateral malleoli    · Three view x-rays of the right ankle demonstrate above   · Weight bearing per ortho appreciated pt has immobilizer to right knee

## 2018-03-18 NOTE — PROGRESS NOTES
Right ankle images reviewed by orthopedic team   There is soft tissue swelling over lateral malleolus  No fracture or dislocation  The patient can be weight-bearing as tolerated RLE  Ortho signing off  Call with questions

## 2018-03-18 NOTE — PROGRESS NOTES
Progress Note - Nilam Dempsey 1935, 81 y o  female MRN: [id number]    Unit/Bed#: [facility] 701-01 Encounter: [id number]    Primary Care Provider: Monica Rodriguez DO   Date and time admitted to hospital: 3/15/2018 12:48 PM        Closed fracture of right tibia and fibula with routine healing   Assessment & Plan    · Right foot swelling, tenderness to palpation, foot pink and warm, no signs of compartment syndrome  · Continue Right knee immobilizer  · Pain mgmt: P r n  Tylenol and ibuprofen, avoid narcotics in patient with acute delirium  · PT consult recommend inpt rehab pt will transition to another facility at the families request   · Seen by OT in the ER yesterday, recommendation for STR  · Pt was seen by ortho pt has chronic lymphedema in left leg usually leg with complications per daughter   · Xray of right foot demonstrates: Soft tissue swelling over the lateral malleolus without acute osseous injury (appreciate ortho)   · Per ortho : Patient is tender to palpation over the right knee laterally as well as bilateral malleoli  · Three view x-rays of the right ankle demonstrate above   · Weight bearing per ortho appreciated pt has immobilizer to right knee         Acute on chronic respiratory failure with hypoxia (HCC)   Assessment & Plan    · O2 sat 88% on admission, improved with 2lnc, 92 % today no wheezing and chest xray yesterday stable   · Pt in on 2 liters received  1 time dose of IV Lasix yesterday, resume p o  20 mg today  · Hospitalized 1/23-1/26/18 at [facility] for CHF exacerbation, O2 sat on RA 87%, was discharged with 2 LN/C  · Patient on [place] at home; as per daughter was on O2 at [facility];   · Incentive spirometer if pt can coordinate         Chronic diastolic CHF (congestive heart failure), NYHA class 1 (HCC)   Assessment & Plan    · Euvolemic, no edema, lungs CTA  · CXR:  Negative for acute infiltrate or pleural effusion; mild pulmonary vascular congestion    · ECHO Jan 2018: LVEF 70-75%,   · Continue furosemide po resumed today  and metoprolol  · Sp iv lasix yest after one liter of ivf , resume po lasix today         Moderate persistent asthma   Assessment & Plan    · No acute exacerbation, wheezing improved   · resp protocol   · Ordered xray due to pt lethargy and wheezing   · Continue Advair and p r n  nebs        History of Merkel cell carcinoma   Assessment & Plan    · 10 years ago pt found to have a lesion 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; pathology results were unclear and patient was treated for high-grade neuroendocrine tumor/Merkel cell  Received resection and lymph node dissection of the left groin which were both positive  Patient received radiation to the groin and tumor bed and received IV chemotherapy  · Follows with Hem/Onc Dr Rebeca Wilcox at [facility], seen 2/20/18:  No sign of recurrence, recent scan did not demonstrate evidence of recurrent Merkel cell, surveillance on going, patient will follow up in 6 months  · Left lower extremity lymphedema- chronic  · Pt does ambulate and do well   · Leg is at her baseline on the left, is larger than right no warmth and pulse pedal present         Fibromyalgia   Assessment & Plan    · Continue Lyrica        Paroxysmal atrial fibrillation (HCC)   Assessment & Plan    · EKG: NSR with PVC/PAC, rate 96  · On Eliquis, continue  · On Metoprolol for rate control         Hyperlipemia   Assessment & Plan    · Continue statin        GERD (gastroesophageal reflux disease)   Assessment & Plan    · Continue PPI        Stenosis of left internal carotid artery   Assessment & Plan    · CTA neck/brain 3/2018: 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 artery 85%  · Carotid duplex: L ICA stenosis 70-99%  Plaque is heterogenous and irregular  Vertebral artery flow is antegrade  · Patient seen by vascular during hospitalization in March and refused CEA; also noted to have refused CEA as an outpatient          Hypertension, accelerated   Assessment & Plan    · /92, 173/109  (now much improved will continue to trend)  · Prn labetolol SBP >180   · Continuing home meds amlodipine, metoprolol, Lasix        * Delirium   Assessment & Plan    · Multifactorial, hypoxia, recent change in environment, prior to Wed pt was home with self-care  · presented to ED s/p  Fall, sustained a R fibular fracture, was d/c to [facility] rehab, tramadol Rx given; unsure whether patient took tramadol, questionable underlying dementia ? Today pt however with improved mental status today   · 3/7/18 hospitalized at [address] [place] [address] for R pontine stroke  · Head CT:  No acute intracranial abnormality, chronic small vessel ischemia unchanged; L middle cranial fossa meningioma unchanged  · Supportive care  · Fall precautions  · Would not administer excessive pain meds   · Reduced xanax dose to 0 25  · resp protocol ordered and chest xray ordered for follow up along with small iv dose of lasix ordered   · Xray negative             VTE Pharmacologic Prophylaxis:   Pharmacologic: Apixaban (Eliquis)  Mechanical VTE Prophylaxis in Place: Yes    Patient Centered Rounds: I have performed bedside rounds with nursing staff today  Discussions with Specialists or Other Care Team Provider: reviewed     Education and Discussions with Family / Patient: patient     Time Spent for Care: 30 minutes  More than 50% of total time spent on counseling and coordination of care as described above  Current Length of Stay: 2 day(s)    Current Patient Status: Inpatient   Certification Statement: The patient will continue to require additional inpatient hospital stay due to placement at this time     Discharge Plan: to rehab     Code Status: Level 3 - DNAR and DNI      Subjective:   Pt is doing better  This am she is reading the newspaper  I got her some glasses so she could read better   She ate breakfast she is humorous, and alert knows what she wants   Objective:     Vitals:   Temp (24hrs), Av 7 °F (37 1 °C), Min:98 2 °F (36 8 °C), Max:98 9 °F (37 2 °C)    HR:  [74-85] 85  Resp:  [18] 18  BP: (120-132)/(54-65) 125/60  SpO2:  [90 %-92 %] 92 %  Body mass index is 38 01 kg/m²  Input and Output Summary (last 24 hours): Intake/Output Summary (Last 24 hours) at 03/18/18 1353  Last data filed at 03/18/18 1300   Gross per 24 hour   Intake              540 ml   Output             1148 ml   Net             -608 ml       Physical Exam:     Physical Exam   Constitutional: She is oriented to person, place, and time  She appears well-developed  No distress  HENT:   Head: Normocephalic  Eyes: Conjunctivae are normal  Right eye exhibits no discharge  Left eye exhibits no discharge  No scleral icterus  Neck: No JVD present  No tracheal deviation present  No thyromegaly present  Cardiovascular: Normal rate  Exam reveals no gallop and no friction rub  No murmur heard  Pulmonary/Chest: No stridor  No respiratory distress  She has no wheezes  She has no rales  She exhibits no tenderness  Abdominal: She exhibits no distension and no mass  There is no tenderness  There is no rebound and no guarding  Musculoskeletal: She exhibits edema (left lower ext lymphedema )  She exhibits no tenderness or deformity  Lymphadenopathy:     She has no cervical adenopathy  Neurological: She is oriented to person, place, and time  Skin: No rash noted  She is not diaphoretic  No erythema  No pallor  Psychiatric: She has a normal mood and affect           Additional Data:     Labs:      Results from last 7 days  Lab Units 03/17/18  0543   WBC Thousand/uL 7 22   HEMOGLOBIN g/dL 11 4*   HEMATOCRIT % 36 9   PLATELETS Thousands/uL 115*   NEUTROS PCT % 73   LYMPHS PCT % 14   MONOS PCT % 6   EOS PCT % 7*       Results from last 7 days  Lab Units 03/18/18  0545  03/16/18  0520   SODIUM mmol/L 141  < > 141   POTASSIUM mmol/L 4 0  < > 3 0*   CHLORIDE mmol/L 101  < > 99*   CO2 mmol/L 36*  < > 35*   BUN mg/dL 18  < > 17   CREATININE mg/dL 0 91  < > 0 86   CALCIUM mg/dL 8 5  < > 8  7   TOTAL PROTEIN g/dL  --   --  6 1*   BILIRUBIN TOTAL mg/dL  --   --  0 69   ALK PHOS U/L  --   --  77   ALT U/L  --   --  15   AST U/L  --   --  16   GLUCOSE RANDOM mg/dL 123  < > 85   < > = values in this interval not displayed  * I Have Reviewed All Lab Data Listed Above  * Additional Pertinent Lab Tests Reviewed: All Labs Within Last 24 Hours Reviewed    Imaging:    Imaging Reports Reviewed Today Include: reviewed       Recent Cultures (last 7 days):           Last 24 Hours Medication List:     Current Facility-Administered Medications:  acetaminophen 650 mg Oral Q6H PRN Abi Ramirez MD   albuterol 2 5 mg Nebulization Q4H PRN Abi Ramirez MD   ALPRAZolam 0 25 mg Oral Daily PRN GILDA Turpin   amLODIPine 5 mg Oral Daily Abi Ramirez MD   apixaban 5 mg Oral Q12H Abi Ramirez MD   atorvastatin 40 mg Oral HS Abi Ramirez MD   fluticasone-salmeterol 1 puff Inhalation Q12H [facility] Abi Ramirez MD   furosemide 20 mg Oral Daily GILDA Turpin   hydrALAZINE 10 mg Intravenous Q6H PRN Abi Ramirez MD   metoprolol tartrate 50 mg Oral Q12H [facility] Abi Ramirez MD   ondansetron 4 mg Intravenous Q6H PRN Abi Ramirez MD   pantoprazole 20 mg Oral Early Morning Abi Ramirez MD   polyvinyl alcohol 1 drop Both Eyes TID (diuretic) GILDA Turpin   pregabalin 300 mg Oral BID Abi Ramirez MD   QUEtiapine 12 5 mg Oral HS Abi Ramirez MD   tolterodine 1 mg Oral BID Abi Ramirez MD        Today, Patient Was Seen By: GILDA Turpin    ** Please Note: Dictation voice to text software may have been used in the creation of this document   **

## 2018-03-18 NOTE — ASSESSMENT & PLAN NOTE
· O2 sat 88% on admission, improved with 2lnc, 92 % today no wheezing and chest xray yesterday stable   · Pt in on 2 liters received  1 time dose of IV Lasix yesterday, resume p o  20 mg today  · Hospitalized 1/23-1/26/18 at [facility] for CHF exacerbation, O2 sat on RA 87%, was discharged with 2 LN/C  · Patient on [facility] at home; as per daughter was on O2 at [facility];   · Incentive spirometer if pt can coordinate

## 2018-03-18 NOTE — ASSESSMENT & PLAN NOTE
· 10 years ago pt found to have a lesion 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; pathology results were unclear and patient was treated for high-grade neuroendocrine tumor/Merkel cell  Received resection and lymph node dissection of the left groin which were both positive  Patient received radiation to the groin and tumor bed and received IV chemotherapy  · Follows with Hem/Onc Dr Roz Carrasco at [facility], seen 2/20/18:  No sign of recurrence, recent scan did not demonstrate evidence of recurrent Merkel cell, surveillance on going, patient will follow up in 6 months    · Left lower extremity lymphedema- chronic  · Pt does ambulate and do well   · Leg is at her baseline on the left, is larger than right no warmth and pulse pedal present

## 2018-03-18 NOTE — ASSESSMENT & PLAN NOTE
· Euvolemic, no edema, lungs CTA  · CXR:  Negative for acute infiltrate or pleural effusion; mild pulmonary vascular congestion    · ECHO Jan 2018: LVEF 70-75%,   · Continue furosemide po resumed today  and metoprolol  · Sp iv lasix yest after one liter of ivf , resume po lasix today

## 2018-03-19 VITALS
DIASTOLIC BLOOD PRESSURE: 68 MMHG | OXYGEN SATURATION: 96 % | RESPIRATION RATE: 19 BRPM | WEIGHT: 211.2 LBS | BODY MASS INDEX: 37.42 KG/M2 | HEART RATE: 76 BPM | HEIGHT: 63 IN | SYSTOLIC BLOOD PRESSURE: 131 MMHG | TEMPERATURE: 98.3 F

## 2018-03-19 PROBLEM — G92.8 TOXIC METABOLIC ENCEPHALOPATHY: Status: ACTIVE | Noted: 2018-03-19

## 2018-03-19 PROBLEM — J96.21 ACUTE ON CHRONIC RESPIRATORY FAILURE WITH HYPOXIA (HCC): Status: RESOLVED | Noted: 2018-03-15 | Resolved: 2018-03-19

## 2018-03-19 PROCEDURE — 99239 HOSP IP/OBS DSCHRG MGMT >30: CPT | Performed by: NURSE PRACTITIONER

## 2018-03-19 PROCEDURE — 97116 GAIT TRAINING THERAPY: CPT

## 2018-03-19 PROCEDURE — 97110 THERAPEUTIC EXERCISES: CPT

## 2018-03-19 RX ORDER — ACETAMINOPHEN 325 MG/1
650 TABLET ORAL EVERY 6 HOURS PRN
Qty: 30 TABLET | Refills: 0
Start: 2018-03-19 | End: 2018-05-10 | Stop reason: ALTCHOICE

## 2018-03-19 RX ORDER — ALPRAZOLAM 0.25 MG/1
0.25 TABLET ORAL DAILY PRN
Qty: 5 TABLET | Refills: 0 | Status: SHIPPED | OUTPATIENT
Start: 2018-03-19 | End: 2018-05-10 | Stop reason: ALTCHOICE

## 2018-03-19 RX ORDER — QUETIAPINE FUMARATE 25 MG/1
12.5 TABLET, FILM COATED ORAL
Qty: 10 TABLET | Refills: 0 | Status: SHIPPED | OUTPATIENT
Start: 2018-03-19 | End: 2018-05-10 | Stop reason: ALTCHOICE

## 2018-03-19 RX ORDER — POLYVINYL ALCOHOL 14 MG/ML
1 SOLUTION/ DROPS OPHTHALMIC
Qty: 15 ML | Refills: 0 | Status: SHIPPED | OUTPATIENT
Start: 2018-03-19 | End: 2018-09-29 | Stop reason: ALTCHOICE

## 2018-03-19 RX ORDER — ALPRAZOLAM 0.25 MG/1
0.25 TABLET ORAL DAILY PRN
Qty: 30 TABLET | Refills: 0
Start: 2018-03-19 | End: 2018-03-19

## 2018-03-19 RX ADMIN — PANTOPRAZOLE SODIUM 20 MG: 20 TABLET, DELAYED RELEASE ORAL at 05:59

## 2018-03-19 RX ADMIN — PREGABALIN 300 MG: 100 CAPSULE ORAL at 08:18

## 2018-03-19 RX ADMIN — FLUTICASONE PROPIONATE AND SALMETEROL 1 PUFF: 50; 250 POWDER RESPIRATORY (INHALATION) at 08:20

## 2018-03-19 RX ADMIN — TOLTERODINE TARTRATE 1 MG: 1 TABLET, FILM COATED ORAL at 08:18

## 2018-03-19 RX ADMIN — POLYVINYL ALCOHOL 1 DROP: 14 SOLUTION/ DROPS OPHTHALMIC at 18:37

## 2018-03-19 RX ADMIN — POLYVINYL ALCOHOL 1 DROP: 14 SOLUTION/ DROPS OPHTHALMIC at 06:00

## 2018-03-19 RX ADMIN — POLYVINYL ALCOHOL 1 DROP: 14 SOLUTION/ DROPS OPHTHALMIC at 11:18

## 2018-03-19 RX ADMIN — APIXABAN 5 MG: 5 TABLET, FILM COATED ORAL at 05:59

## 2018-03-19 RX ADMIN — PREGABALIN 300 MG: 100 CAPSULE ORAL at 18:37

## 2018-03-19 RX ADMIN — TOLTERODINE TARTRATE 1 MG: 1 TABLET, FILM COATED ORAL at 18:36

## 2018-03-19 RX ADMIN — ACETAMINOPHEN 650 MG: 325 TABLET, FILM COATED ORAL at 08:19

## 2018-03-19 RX ADMIN — APIXABAN 5 MG: 5 TABLET, FILM COATED ORAL at 18:37

## 2018-03-19 NOTE — PLAN OF CARE
Problem: PHYSICAL THERAPY ADULT  Goal: Performs mobility at highest level of function for planned discharge setting  See evaluation for individualized goals  Treatment/Interventions: Functional transfer training, Endurance training, Patient/family training, Equipment eval/education, Bed mobility, Gait training, Spoke to nursing, Family  Equipment Recommended: Corona Huber (RW)       See flowsheet documentation for full assessment, interventions and recommendations  Outcome: Progressing  Prognosis: Fair  Problem List: Decreased strength, Decreased range of motion, Decreased endurance, Impaired balance, Decreased mobility, Decreased coordination, Decreased cognition, Decreased safety awareness, Impaired hearing, Obesity, Decreased skin integrity, Orthopedic restrictions, Pain  Assessment: Pt able to perform sit to stand transfers maxAx2 to and from low surface  Ambulates 5 feet with use of RLE knee immobilizer and RW on tile surface with chair follow  Pt is LEONEL,dec cognition,R knee buckling during static stand and mobility and needs max constant verbal and physical instruction throughout mobility  Pt able to perform and complete BLE ther ex sitting in chair pre mobilirt AAROM->AROM  Pt would cont to benefit from skilled inpt PT services to maximize functional independence  Barriers to Discharge: Decreased caregiver support     Recommendation: Other (Comment) (inpt rhb)          See flowsheet documentation for full assessment

## 2018-03-19 NOTE — RESTORATIVE TECHNICIAN NOTE
Restorative Specialist Mobility Note       Activity: Ambulate in room, Chair, Dangle, Stand at bedside (Educated/encouraged pt to ambulate with assistance 3-4 x's/day  Chair alarm on   Pt callbell, phone/tray within reach )     Assistive Device: Other (Comment) (HHA x2 with R Knee immobilizer on)       Shen PLUMMER, Restorative Technician, [employer]

## 2018-03-19 NOTE — RESTORATIVE TECHNICIAN NOTE
Restorative Specialist Mobility Note                      Repositioned: Other (Comment) (Rep /sat pt upright in bed  Bed alarm on   Pt callbell, phone/tray within reach )          Osiel PLUMMER, Restorative Technician, [employer]

## 2018-03-19 NOTE — ASSESSMENT & PLAN NOTE
· Euvolemic, no edema, lungs CTA  · CXR:  Negative for acute infiltrate or pleural effusion; mild pulmonary vascular congestion    · ECHO Jan 2018: LVEF 70-75%,   · Continue furosemide po resumed sunday and metoprolol  · Sp iv lasix saturday after one liter of ivf , resume po lasix today

## 2018-03-19 NOTE — SOCIAL WORK
CM was informed that pt is medically stable for dc today  CM called and spoke to pts daughter Sharee Carroll who states she prefers pt dc to a [facility] facility in [address] [place] [address] Way  CM sent updated clinical information to [facility] per Lavell's request     Received a call from Everette Durham at [facility] who states pt is accepted and a bed is available today  [facility] arranged with [address] [place] [address] for a 7pm dc to [facility]  CM notified pts bedside RN Gail, pts daughter Sharee Carroll, and Everette Durham at [facility] of dc time  Facility transfer form and CMN completed  Chart copy requested

## 2018-03-19 NOTE — ASSESSMENT & PLAN NOTE
· Multifactorial, hypoxia, recent change in environment, prior to Wed pt was home with self-care  · presented to ED s/p  Fall, sustained a R fibular fracture, was d/c to [facility] rehab, tramadol Rx given; unsure whether patient took tramadol, questionable underlying dementia ? Pt has been labile, today pt is with improved mental status   · 3/7/18 hospitalized at [facility] for R pontine stroke  · Head CT:  No acute intracranial abnormality, chronic small vessel ischemia unchanged; L middle cranial fossa meningioma unchanged    · Supportive care  · Fall precautions  · Would limit excessive pain meds   · Reduced xanax dose to 0 25 as pt was becoming too lethargic if taking 0 5mg   · resp protocol ordered and chest xray ordered for follow up along with small iv dose of lasix ordered

## 2018-03-19 NOTE — ASSESSMENT & PLAN NOTE
· CTA neck/brain 3/2018: 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 artery 85%  · Carotid duplex: L ICA stenosis 70-99%  Plaque is heterogenous and irregular  Vertebral artery flow is antegrade  · Patient seen by vascular and neuro during hospitalization in March and refused CEA; also noted to have refused CEA as an outpatient

## 2018-03-19 NOTE — ASSESSMENT & PLAN NOTE
In setting of increased pain and probable medication side affects with new addition of tramadol now removed, hypoxia and some subtle metabolic derrangements  - appreciate neuro  - pt is labile but has done well on on limited medications and regular routine and faces

## 2018-03-19 NOTE — ASSESSMENT & PLAN NOTE
· O2 sat 88% on admission, improved with 2lnc, 95 % today no wheezing and chest xray  Performed Saturday with no acute findings   · Pt in on 2 liters received  1 time dose of IV Lasix Saturday, to resume p o  20 mg Sunday   · Hospitalized 1/23-1/26/18 at [facility] for CHF exacerbation, O2 sat on RA 87%, was discharged with 2 LN/C  · Patient on [facility] at home; to [facility] on oxygen   · Incentive spirometer if pt can coordinate

## 2018-03-19 NOTE — ASSESSMENT & PLAN NOTE
· Right foot swelling, tenderness to palpation, foot pink and warm, no signs of compartment syndrome  · Continue Right knee immobilizer  · Pain mgmt: P r n   Tylenol and ibuprofen, avoid narcotics in patient with acute delirium  · PT consult recommend inpt rehab pt will transition to another facility at the families request   · Seen by OT in the ER yesterday, recommendation for STR  · Pt was seen by ortho pt has chronic lymphedema in left leg usually leg with complications per daughter , Pt states she always has pain in that leg   · Xray of right foot demonstrates: Soft tissue swelling over the lateral malleolus without acute osseous injury (appreciate ortho) , ok for weight bearing leg brace for comfort   · Per ortho : Patient is tender to palpation over the right knee laterally as well as bilateral malleoli, will follow up as an outpt   · Three view x-rays of the right ankle demonstrate above   · Weight bearing per ortho appreciated pt has immobilizer to right knee

## 2018-03-19 NOTE — DISCHARGE SUMMARY
Discharge- Harry Tate 1935, 81 y o  female MRN: [id number]    Unit/Bed#: [facility] 701-01 Encounter: [id number]    Primary Care Provider: Urmila Graves DO   Date and time admitted to hospital: 3/15/2018 12:48 PM        Toxic metabolic encephalopathy   Assessment & Plan    In setting of increased pain and probable medication side affects with new addition of tramadol now removed, hypoxia and some subtle metabolic derrangements  - appreciate neuro  - pt is labile but has done well on on limited medications and regular routine and faces         Closed fracture of right tibia and fibula with routine healing   Assessment & Plan    · Right foot swelling, tenderness to palpation, foot pink and warm, no signs of compartment syndrome  · Continue Right knee immobilizer  · Pain mgmt: P r n   Tylenol and ibuprofen, avoid narcotics in patient with acute delirium  · PT consult recommend inpt rehab pt will transition to another facility at the families request   · Seen by OT in the ER yesterday, recommendation for STR  · Pt was seen by ortho pt has chronic lymphedema in left leg usually leg with complications per daughter , Pt states she always has pain in that leg   · Xray of right foot demonstrates: Soft tissue swelling over the lateral malleolus without acute osseous injury (appreciate ortho) , ok for weight bearing leg brace for comfort   · Per ortho : Patient is tender to palpation over the right knee laterally as well as bilateral malleoli, will follow up as an outpt   · Three view x-rays of the right ankle demonstrate above   · Weight bearing per ortho appreciated pt has immobilizer to right knee         Acute on chronic respiratory failure with hypoxia (HCC)   Assessment & Plan    · O2 sat 88% on admission, improved with 2lnc, 95 % today no wheezing and chest xray  Performed Saturday with no acute findings   · Pt in on 2 liters received  1 time dose of IV Lasix Saturday, to resume p o  20 mg Sunday   · Hospitalized 1/23-1/26/18 at [facility] for CHF exacerbation, O2 sat on RA 87%, was discharged with 2 LN/C  · Patient on [facility] at home; to [facility] on oxygen   · Incentive spirometer if pt can coordinate         Chronic diastolic CHF (congestive heart failure), NYHA class 1 (HCC)   Assessment & Plan    · Euvolemic, no edema, lungs CTA  · CXR:  Negative for acute infiltrate or pleural effusion; mild pulmonary vascular congestion  · ECHO Jan 2018: LVEF 70-75%,   · Continue furosemide po resumed sunday and metoprolol  · Sp iv lasix saturday after one liter of ivf , resume po lasix today         Moderate persistent asthma   Assessment & Plan    · No acute exacerbation, wheezing improved   · resp protocol   · Ordered xray due to pt lethargy and wheezing   · Continue Advair and p r n  nebs        History of Merkel cell carcinoma   Assessment & Plan    · 10 years ago pt found to have a lesion 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; pathology results were unclear and patient was treated for high-grade neuroendocrine tumor/Merkel cell  Received resection and lymph node dissection of the left groin which were both positive  Patient received radiation to the groin and tumor bed and received IV chemotherapy  · Follows with Hem/Onc Dr Juan Alberto Martinez at [facility], seen 2/20/18:  No sign of recurrence, recent scan did not demonstrate evidence of recurrent Merkel cell, surveillance on going, patient will follow up in 6 months    · Left lower extremity lymphedema- chronic  · Pt does ambulate and do well   · Leg is at her baseline on the left, is larger than right no warmth and pulse pedal present         Fibromyalgia   Assessment & Plan    · Continue Lyrica        Paroxysmal atrial fibrillation (HCC)   Assessment & Plan    · EKG: NSR with PVC/PAC, rate 76  · On Eliquis, continue (will need to monitor for falls)   · On Metoprolol for rate control         Hyperlipemia   Assessment & Plan    · Continue statin        GERD (gastroesophageal reflux disease)   Assessment & Plan · Continue PPI        Stenosis of left internal carotid artery   Assessment & Plan    · CTA neck/brain 3/2018: 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 artery 85%  · Carotid duplex: L ICA stenosis 70-99%  Plaque is heterogenous and irregular  Vertebral artery flow is antegrade  · Patient seen by vascular and neuro during hospitalization in March and refused CEA; also noted to have refused CEA as an outpatient  Hypertension, accelerated   Assessment & Plan    ·  today stable   · Prn labetolol SBP >180   · Continuing home meds amlodipine, metoprolol, Lasix        * Delirium   Assessment & Plan    · Multifactorial, hypoxia, recent change in environment, prior to Wed pt was home with self-care  · presented to ED s/p  Fall, sustained a R fibular fracture, [place] [facility] [place], tramadol Rx given; unsure whether patient took tramadol, questionable underlying dementia ? Pt has been labile, today pt is with improved mental status   · 3/7/18 hospitalized at [address] [place] [address] for R pontine stroke  · Head CT:  No acute intracranial abnormality, chronic small vessel ischemia unchanged; L middle cranial fossa meningioma unchanged    · Supportive care  · Fall precautions  · Would limit excessive pain meds   · Reduced xanax dose to 0 25 as pt was becoming too lethargic if taking 0 5mg   · resp protocol ordered and chest xray ordered for follow up along with small iv dose of lasix ordered                 [facility]    Patient Information: Nikki Lancaster 81 y o  female MRN: [id number]  Unit/Bed#: [facility] 701-01 Encounter: [id number]    Discharging Physician / Practitioner: GILDA Ace  PCP: Marguerite Blake DO  Admission Date: 3/15/2018  Discharge Date: 03/19/18    Disposition:      Other: rehab     For Discharges to [facility] SNF:   · Not Applicable to this Patient - Not Applicable to this Patient    Reason for Admission: aggressive behavior Discharge Diagnoses:     Principal Problem:    Delirium  Active Problems:    Hypertension, accelerated    Stenosis of left internal carotid artery    GERD (gastroesophageal reflux disease)    Hyperlipemia    Paroxysmal atrial fibrillation (HCC)    Fibromyalgia    History of Merkel cell carcinoma    Moderate persistent asthma    Chronic diastolic CHF (congestive heart failure), NYHA class 1 (HCC)    Acute on chronic respiratory failure with hypoxia (HCC)    Closed fracture of right tibia and fibula with routine healing    Toxic metabolic encephalopathy  Resolved Problems:    * No resolved hospital problems  *      Consultations During Hospital Stay:  · Dr Darling Jacome ortho   · Dr Mandy Garcia neurology   · Dr Laverne Wilkerson orthopedics     Procedures Performed:     · Right hip x-ray on 18 demonstrates nondisplaced fracture in the proximal fibula  · Repeat right hip x-ray with no acute osseous abnormality  · Repeat with no acute osseus abnormality  · CT head without contrast demonstrates no acute intracranial abnormality  Chronic small vessel ischemic disease unchanged  Unchanged appearance of left middle cranial fossa meningioma    Medication Adjustments and Discharge Medications:  · Summary of Medication Adjustments made as a result of this hospitalization:  The addition of Tylenol 650 every 6 hours as needed  · The reduction of Xanax from 0 5 mg to 0 25 mg daily as needed  ·  Addition of artificial tears  · Addition of 12 5 mg Seroquel at HS  ·   · Medication Dosing Tapers - Please refer to Discharge Medication List for details on any medication dosing tapers (if applicable to patient)  None noneMedications being temporarily held (include recommended restart time):   · Discharge Medication List: See after visit summary for reconciled discharge medications  See attached     Wound Care Recommendations:  When applicable, please see wound care section of After Visit Summary      Diet Recommendations at Discharge:  Diet -        Diet Orders            Start     Ordered    03/15/18 1900  Diet Cardiac; Cardiac TLC 2 3 GM NA  Diet effective now     Question Answer Comment   Diet Type Cardiac    Cardiac Cardiac TLC 2 3 GM NA    RD to adjust diet per protocol? Yes        03/15/18 1900        Fluid Restriction - No Fluid Restriction at Discharge  Instructions for any Catheters / Lines Present at Discharge (including removal date, if applicable): none     Significant Findings / Test Results:     · See above    Incidental Findings:   · None     Test Results Pending at Discharge (will require follow up): · None      Outpatient Tests Requested:  · Follow up with pcp in one week      Complications:  None     Hospital Course:     Inocencia Salinas is a 81 y o  female patient who originally presented to the hospital on 3/15/2018 due to agitation and yelling at the staff  Patient has a known history of per sees mal atrial fibrillation on Eliquis, diastolic heart failure, hypertension, Merkel cell cancer of the left groin with left lower extremity lymphedema, GERD, fibromyalgia, asthma and obesity  [place] [facility] [place]  She was refusing vital signs, examination diagnostic exams and unwilling to participate requiring Zyprexa  Patient is a poor historian unable to obtain review of systems on admission  Attending physician called the daughter Jaiden blake who stated the patient had no history of dementia prior to fall and fracture of the hip  She stated her lived at home and was independent with ADLs but was on 2 L of nasal cannula status post being discharged from the hospital in January  Patient was seen at [facility] day prior to admission status post a fall, with an x-ray of the tib-fib showing nondisplaced fracture of the right proximal fibular  A knee immobilizer was placed and as needed tramadol was given patient was discharged to rehab with outpatient follow-up with Orthopedics    After which patient then came back to [address] [place] [address] with agitation and aggression  Patient was recently hospitalized between 03/07/2018 and 03/09/2018 at [place] [address] for right pontine stroke  Urology at the time were consulted and patient was not eligible for tPA as she had presented the day after her presenting symptoms  CTA at that time of head and neck showed severe stenosis, vascular surgery were consulted for recommendations are CEA was offered however the patient refused and agreed to follow up with vascular as an outpatient  Patient also declined left carotid endarterectomy in the past   Patient was seen by orthopedic screw for right knee pain  Pain was worse with direct palpitation of the affected area but improved at rest   Patient denied any numbness or tingling  She had recently fallen and was seen previously at [place] [address] which was found to have a nondisplaced fibular head fracture  She was subsequently transferred her to her rehab and then returned to [address] [place] [address] after an episode of altered mental status  Patient was ordered three view x-rays of the right ankle which demonstrated soft tissue swelling over the lateral malleolus without acute osseous injury  Orthopedics,  recommended the patient can be weight-bearing as tolerated to right lower extremity  There were no fractures or dislocations noted  Patient has been labile with her mentation occasionally receiving Xanax 0 50 which was readjusted to 0 25 mg at HS as needed  Patient was also placed on Seroquel low dose 12 5 mg for which she is doing much better  Patient's mental status has been much more stable  She is currently alert and awake knows that she is in the hospital and understands that she is going to rehab  Patient does have chronic pain in her left lower extremity and now occasional pain in right lower extremity being medicated with Tylenol at this time    Patient does wear oxygen chronically, set the been 95% on 2 L nasal cannula  At this point patient is medically stable for discharge to rehab      Condition at Discharge: fair     Discharge Day Visit / Exam:     Subjective: The patient states she feels good  Denies any shortness of breath  Denies any chest pain or palpitations  Does complain of some pain in her right lower extremity  States her left lower extremity has chronic pain something she deals with  Vitals: Blood Pressure: 105/59 (03/19/18 0649)  Pulse: 78 (03/19/18 0649)  Temperature: 98 2 °F (36 8 °C) (03/19/18 0649)  Temp Source: Oral (03/19/18 0649)  Respirations: 18 (03/19/18 0649)  Height: 5' 2 5" (158 8 cm) (03/16/18 2115)  Weight - Scale: 95 8 kg (211 lb 3 2 oz) (03/19/18 0600)  SpO2: 95 % (03/19/18 0649)  Exam:   Physical Exam   Constitutional: She is oriented to person, place, and time  She appears well-developed  Obese    HENT:   Head: Normocephalic and atraumatic  Eyes: Conjunctivae are normal  Right eye exhibits no discharge  Left eye exhibits no discharge  No scleral icterus  Neck: No JVD present  No tracheal deviation present  No thyromegaly present  Cardiovascular: Normal rate  Exam reveals friction rub  Exam reveals no gallop  No murmur heard  Pulmonary/Chest: No stridor  No respiratory distress  She has no wheezes  She has no rales  She exhibits no tenderness  Poor effort with 2 liters    Abdominal: She exhibits no distension and no mass  There is no tenderness  There is no rebound and no guarding  Musculoskeletal: She exhibits edema (bl chronic lymphedema on left )  She exhibits no tenderness or deformity  Lymphadenopathy:     She has no cervical adenopathy  Neurological: She is oriented to person, place, and time  Can be labile and forgetful    Skin: No rash noted  There is erythema (subltle on left lower extremity wtih chronic lymphedema )  No pallor  Psychiatric: She has a normal mood and affect         Discussion with Family: daughter over phone Goals of Care Discussions:  · Code Status at Discharge: Level 3 - DNAR and DNI  · Were there any Goals of Care Discussions during Hospitalization?: Yes  · Results of any General Goals of Care Discussions: family ok with dnr dni at this time would still want to treat  · POLST Completed: No   · If POLST Completed, Summary of POLST Agreement Provided Here: no  · OK to Rehospitalize if Needed? Yes    Discharge instructions/Information to patient and family:   See after visit summary section titled Discharge Instructions for information provided to patient and family  Planned Readmission: high risk      Discharge Statement:  I spent 50 minutes discharging the patient  This time was spent on the day of discharge  I had direct contact with the patient on the day of discharge  Greater than 50% of the total time was spent examining patient, answering all patient questions, arranging and discussing plan of care with patient as well as directly providing post-discharge instructions  Additional time then spent on discharge activities      ** Please Note: This note has been constructed using a voice recognition system **

## 2018-03-19 NOTE — SOCIAL WORK
MCG Guide Used for Initial Round: Delirium RRG  Optimal GLOS: 2  Hospital Day: 3 days  DC Readiness:   Discharge Readiness  Return to top of Delirium RRG - ISC  · Discharge readiness is indicated by patient meeting Recovery Milestones, including ALL of the following:  ? No dangerous behavior[P] for at least 24 hours  ? Delirium resolved or manageable[Q]  ? Thoughts of suicide or Harm absent or manageable at lower level of care  ? Medical comorbidities, adverse medication events, and substance use absent or treatable at lower level of care  ? Functional status impairment absent or adequate self-care support planned at lower level of care  ? Patient can participate (eg, verify absence of plan for harm) in needed monitoring  ? Provider and supports sufficiently available at lower level of care  ?  Supports understand follow-up treatment and crisis plan    Identified Barriers: Plan for discharge to inpt rehab today  Discussion Date (Time): 03/19/18 with Dr Giovanni Castillo

## 2018-03-19 NOTE — ASSESSMENT & PLAN NOTE
· EKG: NSR with PVC/PAC, rate 76  · On Eliquis, continue (will need to monitor for falls)   · On Metoprolol for rate control

## 2018-03-19 NOTE — ASSESSMENT & PLAN NOTE
· 10 years ago pt found to have a lesion 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; pathology results were unclear and patient was treated for high-grade neuroendocrine tumor/Merkel cell  Received resection and lymph node dissection of the left groin which were both positive  Patient received radiation to the groin and tumor bed and received IV chemotherapy  · Follows with Hem/Onc Dr Ambar Ruano at [address] [place] [address], seen 2/20/18:  No sign of recurrence, recent scan did not demonstrate evidence of recurrent Merkel cell, surveillance on going, patient will follow up in 6 months    · Left lower extremity lymphedema- chronic  · Pt does ambulate and do well   · Leg is at her baseline on the left, is larger than right no warmth and pulse pedal present

## 2018-03-19 NOTE — PHYSICAL THERAPY NOTE
Physical Therapy Tx Session:       03/19/18 1110   Pain Assessment   Pain Assessment 0-10   Pain Score 4   Pain Type Acute pain   Pain Location Leg;Foot; Ankle   Pain Orientation Right   Hospital Pain Intervention(s) Repositioned; Ambulation/increased activity; Elevated; Emotional support; Rest   Restrictions/Precautions   Weight Bearing Precautions Per Order Yes   RLE Weight Bearing Per Order WBAT   Braces or Orthoses LE Immobilizer  (RLE)   Other Precautions Cognitive; Chair Alarm;WBS;Multiple lines;O2;Fall Risk;Pain;Hard of hearing  (2 L NC O2,RLE knee immobilizer intact upon arrival)   General   Chart Reviewed Yes   Family/Caregiver Present No   Cognition   Overall Cognitive Status Impaired   Arousal/Participation Cooperative; Alert   Attention Attends with cues to redirect  (needs constant max verbal and physical instruction)   Orientation Level Oriented to person;Oriented to place;Oriented to time   Following Commands Follows one step commands with increased time or repetition  (2* pain,dec cognition,slow mobility)   Subjective   Subjective pt sitting upright in chair finishing lunch;pt willing and agreeable to work with PT and to participate in therapy intervention;"I can try"   Bed Mobility   Supine to Sit Unable to assess  (pt sitting in chair pre and post mobility)   Transfers   Sit to Stand 2  Maximal assistance   Additional items Assist x 2;Armrests; Increased time required;Verbal cues  (from low surface)   Stand to Sit 2  Maximal assistance   Additional items Assist x 2;Armrests; Increased time required;Verbal cues  (to low chair;for education,safety and control descent)   Ambulation/Elevation   Gait pattern Poor UE support;R Knee Meek; Improper Weight shift; Antalgic;Narrow SHASHI; Forward Flexion; Shuffling;Decreased R stance;Decreased L stance; Inconsistent edwina; Foward flexed; Short stride; Ataxia; Step to  (tremors of RLE and buckling during static stand)   Gait Assistance 3  Moderate assist   Additional items Assist x 2;Verbal cues; Tactile cues  (with chair follow)   Assistive Device Rolling walker  (RLE Knee immobilizer)   Distance 5 feet with use of R LE knee immobilizer and RW, chair follow;limited mobility and gait distance 2* R knee buckling,pain and deconditioning,weakness   Balance   Static Sitting (with chair alarm intact prior to leaving pt;s room)   Dynamic Sitting (zero)   Static Standing Zero   Dynamic Standing (zero)   Ambulatory Zero   Endurance Deficit   Endurance Deficit Yes   Endurance Deficit Description deconditioning,weakness,pain,fatigue   Activity Tolerance   Activity Tolerance Patient limited by fatigue;Patient limited by pain  (dec cognition;poor)   Nurse Made Aware yes   Exercises   Hip Flexion Sitting;15 reps;AAROM; Bilateral   Knee AROM Long Arc Quad Sitting;15 reps;AAROM; Left   Ankle Pumps Sitting;20 reps;AROM; Bilateral   Assessment   Prognosis Fair   Problem List Decreased strength;Decreased range of motion;Decreased endurance; Impaired balance;Decreased mobility; Decreased coordination;Decreased cognition;Decreased safety awareness; Impaired hearing;Obesity; Decreased skin integrity;Orthopedic restrictions;Pain   Assessment Pt able to perform sit to stand transfers maxAx2 to and from low surface  Ambulates 5 feet with use of RLE knee immobilizer and RW on tile surface with chair follow  Pt is Unga,dec cognition,R knee buckling during static stand and mobility and needs max constant verbal and physical instruction throughout mobility  Pt able to perform and complete BLE ther ex sitting in chair pre mobilirt AAROM->AROM   Pt would cont to benefit from skilled inpt PT services to maximize functional independence   Barriers to Discharge Decreased caregiver support   Goals   Patient Goals to get moving and stay here for rehab   STG Expiration Date 03/26/18   Short Term Goal #1 pt will be able to perform w/c mobility >100 feet on various surfaces minAx1,w/c management modAx1   Treatment Day 1   Plan Treatment/Interventions Functional transfer training;LE strengthening/ROM; Therapeutic exercise; Endurance training;Patient/family training;Equipment eval/education;Gait training;Spoke to nursing   Progress Progressing toward goals   PT Frequency 5x/wk   Recommendation   Recommendation Other (Comment)  (inpt rhb)   Equipment Recommended Walker  (RW, w/c for mobility and management for longer distances)   Skilled PT recommended while in hospital and upon DC to progress pt toward treatment goals

## 2018-03-20 LAB
25(OH)D2 SERPL-MCNC: <1 NG/ML
25(OH)D3 SERPL-MCNC: 17 NG/ML
25(OH)D3+25(OH)D2 SERPL-MCNC: 18 NG/ML

## 2018-04-04 ENCOUNTER — HOSPITAL ENCOUNTER (OUTPATIENT)
Dept: INFUSION CENTER | Facility: HOSPITAL | Age: 83
Discharge: HOME | End: 2018-04-04

## 2018-04-17 ENCOUNTER — APPOINTMENT (OUTPATIENT)
Dept: RADIOLOGY | Facility: CLINIC | Age: 83
End: 2018-04-17
Payer: COMMERCIAL

## 2018-04-17 ENCOUNTER — OFFICE VISIT (OUTPATIENT)
Dept: OBGYN CLINIC | Facility: CLINIC | Age: 83
End: 2018-04-17
Payer: MEDICARE

## 2018-04-17 VITALS
HEIGHT: 63 IN | HEART RATE: 78 BPM | SYSTOLIC BLOOD PRESSURE: 93 MMHG | BODY MASS INDEX: 34.91 KG/M2 | WEIGHT: 197 LBS | DIASTOLIC BLOOD PRESSURE: 57 MMHG

## 2018-04-17 DIAGNOSIS — R26.81 GAIT INSTABILITY: ICD-10-CM

## 2018-04-17 DIAGNOSIS — S82.831D CLOSED FRACTURE OF HEAD OF RIGHT FIBULA WITH ROUTINE HEALING, SUBSEQUENT ENCOUNTER: ICD-10-CM

## 2018-04-17 DIAGNOSIS — S82.831S CLOSED FRACTURE OF PROXIMAL END OF RIGHT FIBULA, UNSPECIFIED FRACTURE MORPHOLOGY, SEQUELA: ICD-10-CM

## 2018-04-17 DIAGNOSIS — S82.831S CLOSED FRACTURE OF PROXIMAL END OF RIGHT FIBULA, UNSPECIFIED FRACTURE MORPHOLOGY, SEQUELA: Primary | ICD-10-CM

## 2018-04-17 PROCEDURE — 99214 OFFICE O/P EST MOD 30 MIN: CPT | Performed by: ORTHOPAEDIC SURGERY

## 2018-04-17 PROCEDURE — 73590 X-RAY EXAM OF LOWER LEG: CPT

## 2018-04-17 RX ORDER — LORATADINE 10 MG/1
10 TABLET ORAL DAILY
COMMUNITY

## 2018-04-17 NOTE — PROGRESS NOTES
Assessment/Plan:  1  Closed fracture of proximal end of right fibula, unspecified fracture morphology, sequela  XR tibia fibula 2 vw right   2  Closed fracture of head of right fibula with routine healing, subsequent encounter     3  Gait instability       Catherine Multani is a pleasant 81year old female with an asymptomatic, healing non-displaced right fibular head fracture  She is asymptomatic of any knee or ankle instability as well  There has been significant callus development at the fracture site over the past month  Therefore, she can continue to bear weight as tolerated on the right lower extremity  She may discontinue the knee immobilizer, but should continue to use the walker due to her chronic gait instability  She may continue with daily PT/OT  She can return to our office on an as needed basis  All questions answered  Subjective: Right fibula fracture follow up    Patient ID: Farhad Butler is a 81 y o  female  Catherine Multani is a very pleasant 81-year-old female presenting today for follow-up approximately one month after sustaining a non-displaced right fibular head fracture  She has been at a rehab facility, working with PT daily  She is not having significant pain in the right leg  She uses a walker due to chronic gait instability from her left sided surgeries, radiation, and cancer treatment, for which is chronic and treated by [facility]  She has been ambulating with the knee immobilizer in place on the right lower extremity  She does not have any ankle pain or feelings of instability  She denies parasthesias  Review of Systems   Constitutional: Negative  HENT: Negative  Eyes: Negative  Respiratory: Positive for shortness of breath  Cardiovascular: Negative  Gastrointestinal: Negative  Endocrine: Positive for polyuria  Genitourinary: Negative  Skin: Negative  Allergic/Immunologic: Negative  Neurological: Negative  Hematological: Negative  Psychiatric/Behavioral: Negative  Past Medical History:   Diagnosis Date    Asthma     Cancer ([facility] [address] )     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    Cardiac disease     a fib    Fibromyalgia     Fibromyalgia, primary     GERD (gastroesophageal reflux disease)     Hypertension     Hypokalemia     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, left groin, lung and bladder  Patient treated with chemotherapy and radiation       Past Surgical History:   Procedure Laterality Date    APPENDECTOMY      CATARACT EXTRACTION      CHEST WALL BIOPSY N/A 10/27/2017    Procedure: SINUS EXCISION AND REMOVAL OF FOREIGN BODY, ABDOMEN (WOUND EXPLORATION);   Surgeon: Christal Aquino MD;  Location: [address] [place] [address];  Service: General    EYE SURGERY Bilateral     cataracts     HIP FRACTURE SURGERY Left     HYSTERECTOMY      JOINT REPLACEMENT Left     hip    LYMPHADENECTOMY      PORTACATH PLACEMENT Right        Family History   Problem Relation Age of Onset    Pneumonia Mother     Heart disease Father        Social History     Occupational History    retired      Social History Main Topics    Smoking status: Never Smoker    Smokeless tobacco: Never Used      Comment: never smoke    Alcohol use No    Drug use: No    Sexual activity: Not on file         Current Outpatient Prescriptions:     acetaminophen (TYLENOL) 325 mg tablet, Take 2 tablets (650 mg total) by mouth every 6 (six) hours as needed for fever, Disp: 30 tablet, Rfl: 0    albuterol (2 5 mg/3 mL) 0 083 % nebulizer solution, Take 2 5 mg by nebulization every 6 (six) hours as needed for wheezing, Disp: , Rfl:     amLODIPine (NORVASC) 5 mg tablet, Take 5 mg by mouth daily, Disp: , Rfl:     apixaban (ELIQUIS) 5 mg, Take 5 mg by mouth every 12 (twelve) hours  , Disp: , Rfl:     Fesoterodine Fumarate ER (TOVIAZ) 8 MG TB24, Take by mouth daily at bedtime  , Disp: , Rfl:     fluticasone-salmeterol (ADVAIR) 250-50 mcg/dose inhaler, Inhale 1 puff every 12 (twelve) hours This is home medication, Disp: , Rfl: 0    furosemide (LASIX) 20 mg tablet, Take 20 mg by mouth daily, Disp: , Rfl:     loratadine (CLARITIN) 10 mg tablet, Take 10 mg by mouth daily, Disp: , Rfl:     metoprolol tartrate (LOPRESSOR) 50 mg tablet, Take 50 mg by mouth every 12 (twelve) hours, Disp: , Rfl:     omeprazole (PriLOSEC) 20 mg delayed release capsule, Take 20 mg by mouth daily, Disp: , Rfl:     polyvinyl alcohol (LIQUIFILM TEARS) 1 4 % ophthalmic solution, Administer 1 drop to both eyes 3 (three) times a day, Disp: 15 mL, Rfl: 0    potassium chloride (K-DUR) 10 mEq tablet, Take 10 mEq by mouth daily  , Disp: , Rfl:     pregabalin (LYRICA) 300 MG capsule, Take 300 mg by mouth 2 (two) times a day , Disp: , Rfl:     ALPRAZolam (XANAX) 0 25 mg tablet, Take 1 tablet (0 25 mg total) by mouth daily as needed for anxiety for up to 5 days, Disp: 5 tablet, Rfl: 0    atorvastatin (LIPITOR) 40 mg tablet, Take 1 tablet (40 mg total) by mouth daily at bedtime for 30 days, Disp: 30 tablet, Rfl: 0    QUEtiapine (SEROquel) 25 mg tablet, Take 0 5 tablets (12 5 mg total) by mouth daily at bedtime, Disp: 10 tablet, Rfl: 0    Allergies   Allergen Reactions    Fentanyl And Related Other (See Comments)     Passed out    Latex     Other     Penicillins        Objective:  Vitals:    04/17/18 1136   BP: 93/57   Pulse: 78       Body mass index is 35 43 kg/m²  Right Knee Exam     Tenderness   The patient is experiencing tenderness in the medial joint line  Range of Motion   The patient has normal right knee ROM    Extension: 0   Flexion: 120     Tests   Michael:  Medial - negative Lateral - negative  Lachman:  Anterior - negative      Drawer:       Anterior - negative    Posterior - negative  Varus: negative  Valgus: negative    Other   Erythema: absent  Scars: absent  Sensation: normal  Pulse: present  Swelling: none  Other tests: no effusion present    Comments:  Examination of the right lower extremity reveal no skin changes  There is no instability of the collateral ligaments with varus and valgus stressing  There is no tenderness over the fibular head  She has full painless right knee range of motion  Her calf is soft and non-tender  There is no tenderness about the right ankle  She ambulates slowly with an antalgic gait on the left and the use of a walker  She is grossly distally neurovascularly intact  Observations     Right Knee   Negative for effusion  Physical Exam   Constitutional: She is oriented to person, place, and time  She appears well-developed and well-nourished  Body mass index is 35 43 kg/m²  HENT:   Head: Normocephalic and atraumatic  Eyes: EOM are normal    Neck: Normal range of motion  Cardiovascular: Intact distal pulses  Pulmonary/Chest: Effort normal    Musculoskeletal:        Right knee: She exhibits no effusion  See ortho exam   Neurological: She is alert and oriented to person, place, and time  Skin: Skin is warm and dry  Psychiatric: She has a normal mood and affect  Her behavior is normal  Judgment and thought content normal      I have personally reviewed pertinent films in PACS of her right tibia/fibula which show callus development at the non-displaced fracture of the right fibular head, which has not changed in alignment over the last month  There have been no other interval changes  There are minor chronic degenerative changes in the right knee

## 2018-04-24 ENCOUNTER — OFFICE VISIT (OUTPATIENT)
Dept: VASCULAR SURGERY | Facility: CLINIC | Age: 83
End: 2018-04-24
Payer: MEDICARE

## 2018-04-24 VITALS
DIASTOLIC BLOOD PRESSURE: 78 MMHG | HEART RATE: 78 BPM | TEMPERATURE: 98.3 F | WEIGHT: 201 LBS | HEIGHT: 62 IN | BODY MASS INDEX: 36.99 KG/M2 | SYSTOLIC BLOOD PRESSURE: 122 MMHG | RESPIRATION RATE: 18 BRPM

## 2018-04-24 DIAGNOSIS — I65.22 STENOSIS OF LEFT INTERNAL CAROTID ARTERY: Primary | ICD-10-CM

## 2018-04-24 PROCEDURE — 99214 OFFICE O/P EST MOD 30 MIN: CPT | Performed by: SURGERY

## 2018-04-24 NOTE — LETTER
April 24, 2018     Hal Sparrow DO  [address] [facility]    Patient: Lisa Amaro   YOB: 1935   Date of Visit: 4/24/2018       Dear Dr Megan Wall:    Thank you for referring Jovi Gilbert to me for evaluation  Below are the relevant portions of my assessment and plan of care  Stenosis of left internal carotid artery  Asymptomatic high-grade left internal carotid artery stenosis  Patient has recently had 2 syncopal episodes and finding of a right pontine stroke  We discussed at length pathophysiology of carotid occlusive disease and the associated symptoms  We discussed the fact that her recent episodes would not likely be associated with this left carotid artery stenosis  We also discussed the indications for treatment and the associated risks and benefits of treatment of an asymptomatic greater than 80% stenosis in a octogenarian  She understands those risks and benefits and would like to avoid surgery  She has asked that we continue conservative follow-up and thus she will be scheduled for a carotid duplex in 6 months  If you have questions, please do not hesitate to call me  I look forward to following Marilee Jiménez along with you           Sincerely,        Gaby Mccray MD        CC: No Recipients

## 2018-04-24 NOTE — ASSESSMENT & PLAN NOTE
Asymptomatic high-grade left internal carotid artery stenosis  Patient has recently had 2 syncopal episodes and finding of a right pontine stroke  We discussed at length pathophysiology of carotid occlusive disease and the associated symptoms  We discussed the fact that her recent episodes would not likely be associated with this left carotid artery stenosis  We also discussed the indications for treatment and the associated risks and benefits of treatment of an asymptomatic greater than 80% stenosis in a octogenarian  She understands those risks and benefits and would like to avoid surgery  She has asked that we continue conservative follow-up and thus she will be scheduled for a carotid duplex in 6 months

## 2018-04-24 NOTE — PROGRESS NOTES
Assessment/Plan:    Stenosis of left internal carotid artery  Asymptomatic high-grade left internal carotid artery stenosis  Patient has recently had 2 syncopal episodes and finding of a right pontine stroke  We discussed at length pathophysiology of carotid occlusive disease and the associated symptoms  We discussed the fact that her recent episodes would not likely be associated with this left carotid artery stenosis  We also discussed the indications for treatment and the associated risks and benefits of treatment of an asymptomatic greater than 80% stenosis in a octogenarian  She understands those risks and benefits and would like to avoid surgery  She has asked that we continue conservative follow-up and thus she will be scheduled for a carotid duplex in 6 months  Diagnoses and all orders for this visit:    Stenosis of left internal carotid artery  -     VAS carotid complete study; Future           Patient ID: Lisa Amaro is a 81 y o  female  Chief Complaint: Pt is here to discuss left CEA  Pt states she has had 2 strokes within last 6 weeks  Pt denies TIA/ Stroke symptoms  Pt denies one sided weakness but states she feels weak all over and " cant find the strength"  No facial droopiness  Pt is taking Eliquis  80-year-old with 2 recent hospitalizations secondary to syncopal events  She has no recollection of the events to include 1 episode in which she suffered a tibia/fibula fracture  She denied any focal neurologic deficits such as arm or leg weakness, facial drooping, visual loss or slurred speech  She denies any limitation secondary to chest pain or shortness of breath  She denies claudication or rest pain but does complain of some generalized fatigue which is improving since her most recent hospitalization  She is currently utilizing a cane to walk          The following portions of the patient's history were reviewed and updated as appropriate: allergies, current medications, past family history, past medical history, past social history, past surgical history and problem list     Review of Systems   Constitutional: Positive for activity change  HENT: Positive for postnasal drip and sneezing  Eyes: Positive for itching  Cardiovascular: Negative  Gastrointestinal: Negative  Endocrine: Negative  Genitourinary: Negative  Musculoskeletal: Positive for myalgias  Skin: Negative  Allergic/Immunologic: Positive for environmental allergies  Neurological: Negative  Hematological: Negative  Psychiatric/Behavioral: Negative  Objective:      /78 (BP Location: Right arm, Patient Position: Sitting, Cuff Size: Adult)   Pulse 78   Temp 98 3 °F (36 8 °C) (Tympanic)   Resp 18   Ht 5' 2" (1 575 m)   Wt 91 2 kg (201 lb)   BMI 36 76 kg/m²          Physical Exam   Constitutional: She is oriented to person, place, and time  She appears well-developed and well-nourished  Frail in appearance   HENT:   Head: Normocephalic and atraumatic  Eyes: Conjunctivae and EOM are normal    Neck: Normal range of motion  Neck supple  No JVD present  Carotid bruit is not present  Cardiovascular: Normal rate, regular rhythm, S1 normal, S2 normal and normal heart sounds  No murmur heard  Pulses:       Carotid pulses are 2+ on the right side, and 2+ on the left side  Radial pulses are 2+ on the right side, and 2+ on the left side  Pulmonary/Chest: Effort normal and breath sounds normal    Musculoskeletal: Normal range of motion  She exhibits no edema, tenderness or deformity  Utilizing a cane to walk   Neurological: She is alert and oriented to person, place, and time  No cranial nerve deficit  Skin: Skin is warm, dry and intact  Psychiatric: She has a normal mood and affect

## 2018-04-24 NOTE — PATIENT INSTRUCTIONS
Stenosis of left internal carotid artery  Asymptomatic high-grade left internal carotid artery stenosis  Patient has recently had 2 syncopal episodes and finding of a right pontine stroke  We discussed at length pathophysiology of carotid occlusive disease and the associated symptoms  We discussed the fact that her recent episodes would not likely be associated with this left carotid artery stenosis  We also discussed the indications for treatment and the associated risks and benefits of treatment of an asymptomatic greater than 80% stenosis in a octogenarian  She understands those risks and benefits and would like to avoid surgery  She has asked that we continue conservative follow-up and thus she will be scheduled for a carotid duplex in 6 months

## 2018-04-30 ENCOUNTER — APPOINTMENT (OUTPATIENT)
Dept: LAB | Facility: CLINIC | Age: 83
End: 2018-04-30
Payer: MEDICARE

## 2018-04-30 DIAGNOSIS — I50.33 ACUTE ON CHRONIC DIASTOLIC HEART FAILURE (HCC): ICD-10-CM

## 2018-04-30 DIAGNOSIS — I10 ESSENTIAL HYPERTENSION, MALIGNANT: Primary | ICD-10-CM

## 2018-04-30 DIAGNOSIS — I48.0 PAROXYSMAL ATRIAL FIBRILLATION (HCC): ICD-10-CM

## 2018-04-30 LAB
ANION GAP SERPL CALCULATED.3IONS-SCNC: 6 MMOL/L (ref 4–13)
BUN SERPL-MCNC: 14 MG/DL (ref 5–25)
CALCIUM SERPL-MCNC: 8.6 MG/DL (ref 8.3–10.1)
CHLORIDE SERPL-SCNC: 102 MMOL/L (ref 100–108)
CO2 SERPL-SCNC: 33 MMOL/L (ref 21–32)
CREAT SERPL-MCNC: 0.89 MG/DL (ref 0.6–1.3)
GFR SERPL CREATININE-BSD FRML MDRD: 61 ML/MIN/1.73SQ M
GLUCOSE SERPL-MCNC: 93 MG/DL (ref 65–140)
POTASSIUM SERPL-SCNC: 4.2 MMOL/L (ref 3.5–5.3)
SODIUM SERPL-SCNC: 141 MMOL/L (ref 136–145)

## 2018-04-30 PROCEDURE — 36415 COLL VENOUS BLD VENIPUNCTURE: CPT

## 2018-04-30 PROCEDURE — 80048 BASIC METABOLIC PNL TOTAL CA: CPT

## 2018-05-10 ENCOUNTER — HOSPITAL ENCOUNTER (INPATIENT)
Facility: HOSPITAL | Age: 83
LOS: 8 days | Discharge: NURSING, REHAB OR LONG-TERM CARE | DRG: 563 | End: 2018-05-18
Attending: EMERGENCY MEDICINE | Admitting: FAMILY MEDICINE
Payer: MEDICARE

## 2018-05-10 ENCOUNTER — APPOINTMENT (EMERGENCY)
Dept: RADIOLOGY | Facility: HOSPITAL | Age: 83
DRG: 563 | End: 2018-05-10
Payer: MEDICARE

## 2018-05-10 ENCOUNTER — APPOINTMENT (OUTPATIENT)
Dept: RADIOLOGY | Facility: HOSPITAL | Age: 83
DRG: 563 | End: 2018-05-10
Payer: MEDICARE

## 2018-05-10 ENCOUNTER — APPOINTMENT (INPATIENT)
Dept: RADIOLOGY | Facility: HOSPITAL | Age: 83
DRG: 563 | End: 2018-05-10
Payer: MEDICARE

## 2018-05-10 DIAGNOSIS — S82.52XA DISPLACED FRACTURE OF MEDIAL MALLEOLUS OF LEFT TIBIA, INITIAL ENCOUNTER FOR CLOSED FRACTURE: ICD-10-CM

## 2018-05-10 DIAGNOSIS — S82.409A: ICD-10-CM

## 2018-05-10 DIAGNOSIS — J45.40 MODERATE PERSISTENT ASTHMA WITHOUT COMPLICATION: ICD-10-CM

## 2018-05-10 DIAGNOSIS — J06.9 UPPER RESPIRATORY TRACT INFECTION, UNSPECIFIED TYPE: ICD-10-CM

## 2018-05-10 DIAGNOSIS — D32.9 MENINGIOMA (HCC): ICD-10-CM

## 2018-05-10 DIAGNOSIS — R47.81 SLURRED SPEECH: Primary | ICD-10-CM

## 2018-05-10 DIAGNOSIS — S82.401D CLOSED FRACTURE OF RIGHT TIBIA AND FIBULA WITH ROUTINE HEALING: ICD-10-CM

## 2018-05-10 DIAGNOSIS — S82.201D CLOSED FRACTURE OF RIGHT TIBIA AND FIBULA WITH ROUTINE HEALING: ICD-10-CM

## 2018-05-10 PROBLEM — S82.892A FX ANKLE, LEFT, CLOSED, INITIAL ENCOUNTER: Status: ACTIVE | Noted: 2018-05-10

## 2018-05-10 PROBLEM — G45.9 TIA (TRANSIENT ISCHEMIC ATTACK): Status: ACTIVE | Noted: 2018-05-10

## 2018-05-10 LAB
ABO GROUP BLD: NORMAL
ALBUMIN SERPL BCP-MCNC: 2.9 G/DL (ref 3.5–5)
ALP SERPL-CCNC: 127 U/L (ref 46–116)
ALT SERPL W P-5'-P-CCNC: 15 U/L (ref 12–78)
ANION GAP SERPL CALCULATED.3IONS-SCNC: 0 MMOL/L (ref 4–13)
APTT PPP: 28 SECONDS (ref 23–35)
AST SERPL W P-5'-P-CCNC: 20 U/L (ref 5–45)
BILIRUB DIRECT SERPL-MCNC: 0.1 MG/DL (ref 0–0.2)
BILIRUB SERPL-MCNC: 0.3 MG/DL (ref 0.2–1)
BLD GP AB SCN SERPL QL: NEGATIVE
BUN SERPL-MCNC: 10 MG/DL (ref 5–25)
CALCIUM SERPL-MCNC: 8.7 MG/DL (ref 8.3–10.1)
CHLORIDE SERPL-SCNC: 102 MMOL/L (ref 100–108)
CO2 SERPL-SCNC: 38 MMOL/L (ref 21–32)
CREAT SERPL-MCNC: 0.93 MG/DL (ref 0.6–1.3)
ERYTHROCYTE [DISTWIDTH] IN BLOOD BY AUTOMATED COUNT: 16.7 % (ref 11.6–15.1)
EST. AVERAGE GLUCOSE BLD GHB EST-MCNC: 120 MG/DL
GFR SERPL CREATININE-BSD FRML MDRD: 58 ML/MIN/1.73SQ M
GLUCOSE SERPL-MCNC: 108 MG/DL (ref 65–140)
GLUCOSE SERPL-MCNC: 118 MG/DL (ref 65–140)
HBA1C MFR BLD: 5.8 % (ref 4.2–6.3)
HCT VFR BLD AUTO: 36.6 % (ref 37–47)
HGB BLD-MCNC: 11.4 G/DL (ref 12–16)
INR PPP: 1.06 (ref 0.86–1.16)
MCH RBC QN AUTO: 25 PG (ref 27–31)
MCHC RBC AUTO-ENTMCNC: 31.3 G/DL (ref 31.4–37.4)
MCV RBC AUTO: 80 FL (ref 82–98)
PLATELET # BLD AUTO: 122 THOUSANDS/UL (ref 130–400)
PMV BLD AUTO: 10.4 FL (ref 8.9–12.7)
POTASSIUM SERPL-SCNC: 3.6 MMOL/L (ref 3.5–5.3)
PROT SERPL-MCNC: 6.4 G/DL (ref 6.4–8.2)
PROTHROMBIN TIME: 11.1 SECONDS (ref 9.4–11.7)
RBC # BLD AUTO: 4.59 MILLION/UL (ref 4.2–5.4)
RH BLD: NEGATIVE
SODIUM SERPL-SCNC: 140 MMOL/L (ref 136–145)
SPECIMEN EXPIRATION DATE: NORMAL
TROPONIN I SERPL-MCNC: <0.02 NG/ML
WBC # BLD AUTO: 5.7 THOUSAND/UL (ref 4.8–10.8)

## 2018-05-10 PROCEDURE — 85730 THROMBOPLASTIN TIME PARTIAL: CPT | Performed by: EMERGENCY MEDICINE

## 2018-05-10 PROCEDURE — 85610 PROTHROMBIN TIME: CPT | Performed by: EMERGENCY MEDICINE

## 2018-05-10 PROCEDURE — 94640 AIRWAY INHALATION TREATMENT: CPT

## 2018-05-10 PROCEDURE — 80048 BASIC METABOLIC PNL TOTAL CA: CPT | Performed by: EMERGENCY MEDICINE

## 2018-05-10 PROCEDURE — 27808 TREATMENT OF ANKLE FRACTURE: CPT | Performed by: ORTHOPAEDIC SURGERY

## 2018-05-10 PROCEDURE — 70496 CT ANGIOGRAPHY HEAD: CPT

## 2018-05-10 PROCEDURE — 99223 1ST HOSP IP/OBS HIGH 75: CPT | Performed by: PSYCHIATRY & NEUROLOGY

## 2018-05-10 PROCEDURE — 80076 HEPATIC FUNCTION PANEL: CPT | Performed by: EMERGENCY MEDICINE

## 2018-05-10 PROCEDURE — 86900 BLOOD TYPING SEROLOGIC ABO: CPT | Performed by: EMERGENCY MEDICINE

## 2018-05-10 PROCEDURE — 0QSHXZZ REPOSITION LEFT TIBIA, EXTERNAL APPROACH: ICD-10-PCS | Performed by: EMERGENCY MEDICINE

## 2018-05-10 PROCEDURE — 71045 X-RAY EXAM CHEST 1 VIEW: CPT

## 2018-05-10 PROCEDURE — 84484 ASSAY OF TROPONIN QUANT: CPT | Performed by: EMERGENCY MEDICINE

## 2018-05-10 PROCEDURE — 99223 1ST HOSP IP/OBS HIGH 75: CPT | Performed by: FAMILY MEDICINE

## 2018-05-10 PROCEDURE — 73600 X-RAY EXAM OF ANKLE: CPT

## 2018-05-10 PROCEDURE — 99222 1ST HOSP IP/OBS MODERATE 55: CPT | Performed by: ORTHOPAEDIC SURGERY

## 2018-05-10 PROCEDURE — 70450 CT HEAD/BRAIN W/O DYE: CPT

## 2018-05-10 PROCEDURE — 73620 X-RAY EXAM OF FOOT: CPT

## 2018-05-10 PROCEDURE — 83036 HEMOGLOBIN GLYCOSYLATED A1C: CPT | Performed by: NURSE PRACTITIONER

## 2018-05-10 PROCEDURE — 93005 ELECTROCARDIOGRAM TRACING: CPT

## 2018-05-10 PROCEDURE — 73590 X-RAY EXAM OF LOWER LEG: CPT

## 2018-05-10 PROCEDURE — 27780 TREATMENT OF FIBULA FRACTURE: CPT | Performed by: ORTHOPAEDIC SURGERY

## 2018-05-10 PROCEDURE — 36415 COLL VENOUS BLD VENIPUNCTURE: CPT | Performed by: EMERGENCY MEDICINE

## 2018-05-10 PROCEDURE — 73502 X-RAY EXAM HIP UNI 2-3 VIEWS: CPT

## 2018-05-10 PROCEDURE — 86901 BLOOD TYPING SEROLOGIC RH(D): CPT | Performed by: EMERGENCY MEDICINE

## 2018-05-10 PROCEDURE — 86850 RBC ANTIBODY SCREEN: CPT | Performed by: EMERGENCY MEDICINE

## 2018-05-10 PROCEDURE — 73560 X-RAY EXAM OF KNEE 1 OR 2: CPT

## 2018-05-10 PROCEDURE — 73552 X-RAY EXAM OF FEMUR 2/>: CPT

## 2018-05-10 PROCEDURE — 84484 ASSAY OF TROPONIN QUANT: CPT | Performed by: NURSE PRACTITIONER

## 2018-05-10 PROCEDURE — 85027 COMPLETE CBC AUTOMATED: CPT | Performed by: EMERGENCY MEDICINE

## 2018-05-10 PROCEDURE — 99285 EMERGENCY DEPT VISIT HI MDM: CPT

## 2018-05-10 PROCEDURE — 70498 CT ANGIOGRAPHY NECK: CPT

## 2018-05-10 PROCEDURE — 82948 REAGENT STRIP/BLOOD GLUCOSE: CPT

## 2018-05-10 RX ORDER — ALPRAZOLAM 0.5 MG/1
0.5 TABLET ORAL
COMMUNITY
End: 2019-04-10 | Stop reason: SDUPTHER

## 2018-05-10 RX ORDER — PANTOPRAZOLE SODIUM 40 MG/1
40 TABLET, DELAYED RELEASE ORAL
Status: DISCONTINUED | OUTPATIENT
Start: 2018-05-11 | End: 2018-05-18 | Stop reason: HOSPADM

## 2018-05-10 RX ORDER — AMLODIPINE BESYLATE 5 MG/1
5 TABLET ORAL DAILY
Status: DISCONTINUED | OUTPATIENT
Start: 2018-05-11 | End: 2018-05-10

## 2018-05-10 RX ORDER — MORPHINE SULFATE 4 MG/ML
4 INJECTION, SOLUTION INTRAMUSCULAR; INTRAVENOUS EVERY 4 HOURS PRN
Status: DISCONTINUED | OUTPATIENT
Start: 2018-05-10 | End: 2018-05-11

## 2018-05-10 RX ORDER — ATORVASTATIN CALCIUM 40 MG/1
40 TABLET, FILM COATED ORAL DAILY
COMMUNITY
End: 2019-03-15 | Stop reason: ALTCHOICE

## 2018-05-10 RX ORDER — PREGABALIN 100 MG/1
300 CAPSULE ORAL 2 TIMES DAILY
Status: DISCONTINUED | OUTPATIENT
Start: 2018-05-10 | End: 2018-05-18 | Stop reason: HOSPADM

## 2018-05-10 RX ORDER — ASPIRIN 325 MG
325 TABLET, DELAYED RELEASE (ENTERIC COATED) ORAL ONCE
Status: COMPLETED | OUTPATIENT
Start: 2018-05-10 | End: 2018-05-10

## 2018-05-10 RX ORDER — ACETAMINOPHEN 325 MG/1
650 TABLET ORAL EVERY 6 HOURS PRN
Status: DISCONTINUED | OUTPATIENT
Start: 2018-05-10 | End: 2018-05-18 | Stop reason: HOSPADM

## 2018-05-10 RX ORDER — LORATADINE 10 MG/1
10 TABLET ORAL DAILY
Status: DISCONTINUED | OUTPATIENT
Start: 2018-05-10 | End: 2018-05-18 | Stop reason: HOSPADM

## 2018-05-10 RX ORDER — HEPARIN SODIUM 1000 [USP'U]/ML
4000 INJECTION, SOLUTION INTRAVENOUS; SUBCUTANEOUS AS NEEDED
Status: DISCONTINUED | OUTPATIENT
Start: 2018-05-10 | End: 2018-05-11

## 2018-05-10 RX ORDER — AMLODIPINE BESYLATE 5 MG/1
5 TABLET ORAL DAILY
Status: DISCONTINUED | OUTPATIENT
Start: 2018-05-10 | End: 2018-05-10

## 2018-05-10 RX ORDER — IPRATROPIUM BROMIDE AND ALBUTEROL SULFATE 2.5; .5 MG/3ML; MG/3ML
3 SOLUTION RESPIRATORY (INHALATION)
Status: DISPENSED | OUTPATIENT
Start: 2018-05-10 | End: 2018-05-10

## 2018-05-10 RX ORDER — ATORVASTATIN CALCIUM 80 MG/1
80 TABLET, FILM COATED ORAL
Status: DISCONTINUED | OUTPATIENT
Start: 2018-05-10 | End: 2018-05-18 | Stop reason: HOSPADM

## 2018-05-10 RX ORDER — METOPROLOL TARTRATE 50 MG/1
50 TABLET, FILM COATED ORAL EVERY 12 HOURS SCHEDULED
Status: DISCONTINUED | OUTPATIENT
Start: 2018-05-10 | End: 2018-05-10

## 2018-05-10 RX ORDER — FUROSEMIDE 20 MG/1
20 TABLET ORAL DAILY
Status: DISCONTINUED | OUTPATIENT
Start: 2018-05-10 | End: 2018-05-10

## 2018-05-10 RX ORDER — POLYVINYL ALCOHOL 14 MG/ML
1 SOLUTION/ DROPS OPHTHALMIC 3 TIMES DAILY
Status: DISCONTINUED | OUTPATIENT
Start: 2018-05-10 | End: 2018-05-18 | Stop reason: HOSPADM

## 2018-05-10 RX ORDER — ESOMEPRAZOLE MAGNESIUM 40 MG/1
40 CAPSULE, DELAYED RELEASE ORAL DAILY
COMMUNITY
End: 2018-09-29 | Stop reason: ALTCHOICE

## 2018-05-10 RX ORDER — FUROSEMIDE 20 MG/1
20 TABLET ORAL ONCE
Status: DISCONTINUED | OUTPATIENT
Start: 2018-05-11 | End: 2018-05-10

## 2018-05-10 RX ORDER — PROPOFOL 10 MG/ML
0.5 INJECTION, EMULSION INTRAVENOUS ONCE
Status: COMPLETED | OUTPATIENT
Start: 2018-05-10 | End: 2018-05-10

## 2018-05-10 RX ORDER — MORPHINE SULFATE 2 MG/ML
2 INJECTION, SOLUTION INTRAMUSCULAR; INTRAVENOUS ONCE
Status: COMPLETED | OUTPATIENT
Start: 2018-05-10 | End: 2018-05-10

## 2018-05-10 RX ORDER — TRAMADOL HYDROCHLORIDE 50 MG/1
50 TABLET ORAL ONCE
Status: COMPLETED | OUTPATIENT
Start: 2018-05-10 | End: 2018-05-10

## 2018-05-10 RX ORDER — ALPRAZOLAM 0.5 MG/1
0.5 TABLET ORAL
Status: DISCONTINUED | OUTPATIENT
Start: 2018-05-10 | End: 2018-05-18 | Stop reason: HOSPADM

## 2018-05-10 RX ORDER — MORPHINE SULFATE 2 MG/ML
2 INJECTION, SOLUTION INTRAMUSCULAR; INTRAVENOUS EVERY 4 HOURS PRN
Status: DISCONTINUED | OUTPATIENT
Start: 2018-05-10 | End: 2018-05-10

## 2018-05-10 RX ORDER — ALBUTEROL SULFATE 2.5 MG/3ML
2.5 SOLUTION RESPIRATORY (INHALATION) EVERY 6 HOURS PRN
Status: DISCONTINUED | OUTPATIENT
Start: 2018-05-10 | End: 2018-05-18 | Stop reason: HOSPADM

## 2018-05-10 RX ORDER — DOCUSATE SODIUM 100 MG/1
100 CAPSULE, LIQUID FILLED ORAL 2 TIMES DAILY
Status: DISCONTINUED | OUTPATIENT
Start: 2018-05-10 | End: 2018-05-18 | Stop reason: HOSPADM

## 2018-05-10 RX ORDER — HEPARIN SODIUM 10000 [USP'U]/100ML
3-20 INJECTION, SOLUTION INTRAVENOUS
Status: DISCONTINUED | OUTPATIENT
Start: 2018-05-10 | End: 2018-05-11

## 2018-05-10 RX ORDER — HEPARIN SODIUM 1000 [USP'U]/ML
2000 INJECTION, SOLUTION INTRAVENOUS; SUBCUTANEOUS AS NEEDED
Status: DISCONTINUED | OUTPATIENT
Start: 2018-05-10 | End: 2018-05-11

## 2018-05-10 RX ORDER — TOLTERODINE 2 MG/1
4 CAPSULE, EXTENDED RELEASE ORAL DAILY
Status: DISCONTINUED | OUTPATIENT
Start: 2018-05-11 | End: 2018-05-18 | Stop reason: HOSPADM

## 2018-05-10 RX ADMIN — ASPIRIN 325 MG: 325 TABLET, COATED ORAL at 09:19

## 2018-05-10 RX ADMIN — HEPARIN SODIUM AND DEXTROSE 11.8 UNITS/KG/HR: 10000; 5 INJECTION INTRAVENOUS at 17:54

## 2018-05-10 RX ADMIN — POLYVINYL ALCOHOL 1 DROP: 14 SOLUTION/ DROPS OPHTHALMIC at 17:43

## 2018-05-10 RX ADMIN — IOHEXOL 85 ML: 350 INJECTION, SOLUTION INTRAVENOUS at 08:53

## 2018-05-10 RX ADMIN — POLYVINYL ALCOHOL 1 DROP: 14 SOLUTION/ DROPS OPHTHALMIC at 21:17

## 2018-05-10 RX ADMIN — LORATADINE 10 MG: 10 TABLET ORAL at 17:43

## 2018-05-10 RX ADMIN — MORPHINE SULFATE 2 MG: 2 INJECTION, SOLUTION INTRAMUSCULAR; INTRAVENOUS at 16:29

## 2018-05-10 RX ADMIN — ATORVASTATIN CALCIUM 80 MG: 80 TABLET, FILM COATED ORAL at 11:22

## 2018-05-10 RX ADMIN — MORPHINE SULFATE 2 MG: 2 INJECTION, SOLUTION INTRAMUSCULAR; INTRAVENOUS at 18:08

## 2018-05-10 RX ADMIN — FLUTICASONE PROPIONATE AND SALMETEROL 1 PUFF: 50; 250 POWDER RESPIRATORY (INHALATION) at 21:17

## 2018-05-10 RX ADMIN — ALPRAZOLAM 0.5 MG: 0.5 TABLET ORAL at 21:17

## 2018-05-10 RX ADMIN — PROPOFOL 44 MG: 10 INJECTION, EMULSION INTRAVENOUS at 12:57

## 2018-05-10 RX ADMIN — PREGABALIN 300 MG: 100 CAPSULE ORAL at 17:43

## 2018-05-10 RX ADMIN — DOCUSATE SODIUM 100 MG: 100 CAPSULE, LIQUID FILLED ORAL at 17:43

## 2018-05-10 RX ADMIN — TRAMADOL HYDROCHLORIDE 50 MG: 50 TABLET, FILM COATED ORAL at 10:45

## 2018-05-10 RX ADMIN — FLUTICASONE PROPIONATE AND SALMETEROL 1 PUFF: 50; 250 POWDER RESPIRATORY (INHALATION) at 17:43

## 2018-05-10 RX ADMIN — IPRATROPIUM BROMIDE AND ALBUTEROL SULFATE 3 ML: .5; 3 SOLUTION RESPIRATORY (INHALATION) at 09:20

## 2018-05-10 NOTE — ED NOTES
POC glucose = [id number] [facility], [address] [place] [address]  05/10/18 8676

## 2018-05-10 NOTE — ASSESSMENT & PLAN NOTE
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, consistent with prior CTA    · Spoke with neurology, she does not require repeat ECHO  · 1/6/2018 ECHO, EF 70-75%  No regional wall motion abnormalities  Mild concentric hypertrophy  Grade 1 diastolic dysfunction  · 3/9/2018 lipid profile, cholesterol 133, triglycerides 91, HDL 44, LDL 71  Continue Lipitor 80 mg as an inpatient  At discharge Lipitor 40 mg p o  daily  · 3/16/2018 TSH 1 910  · Hemoglobin A1c, 5 8   · MRI of brain with contrast, pending  · Normotensive is fine now  · Hold Eliquis for now due to drop in hemoglobin    · Neurology following appreciate recommendations

## 2018-05-10 NOTE — H&P
H&P- Jeny Sirpura 1935, 81 y o  female MRN: [id number]    Unit/Bed#: [address] [place] [address] Encounter: [id number]    Primary Care Provider: Wenceslao Doll DO   Date and time admitted to hospital: 5/10/2018  8:15 AM        * TIA (transient ischemic attack)   Assessment & Plan    · Questionable TIA  She complained of feeling like she had marbles in her mouth when she went to speak only upon arrival at the ED  · CT stroke alert, No acute intracranial hemorrhage or mass effect  Multifocal chronic ischemic changes including left lateral frontal MCA territory infarction  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  · CTA head/neck, no acute intracranial hemorrhage or mass effect  Chronic ischemic changes involving supratentorial white matter and anterolateral  left frontal lobe, stable  Near occlusive arthrosclerotic disease at the origin of the left internal carotid artery, consistent with prior CTA    · Spoke with neurology, she does not require repeat ECHO  · 1/6/2018 ECHO, EF 70-75%  No regional wall motion abnormalities  Mild concentric hypertrophy  Grade 1 diastolic dysfunction  · 3/9/2018 lipid profile, cholesterol 133, triglycerides 91, HDL 44, LDL 71   · 3/16/2018 TSH 1 910  · Ordered hemoglobin A1c  · Ordered MRI of brain with contrast  · Allow for permissive hypertension   · Neurology following appreciate recommendations        Displaced  comminuted fracture of fibula   Assessment & Plan    · Left tibia/fibula x-ray, Mildly displaced, comminuted fracture of the mid fibula  · Orthopedics consulted  · Morphine, prn  · Neurovascular checks q 4 hours        Displaced fracture of medial malleolus of left tibia, initial encounter for closed fracture   Assessment & Plan    · Felt this a m  secondary to left leg giving out  · Left ankle xray, Fracture dislocation of the medial malleolus and tibiotalar joint  · Left tibia/fibula x-ray, Medial tibial plateau fracture   Fracture of the posterior malleolus of the tibia with posterior dislocation of the ankle  · She receiving conscious sedation in ED in the attempts to correct dislocation of left ankle  · Orthopedics consulted  · Morphine, prn  Neurovascular checks q 4 hours        Fall   Assessment & Plan    · She states she got up approximately 7:30 a m  this morning to go to the bathroom and lost her balance because her left leg gave out and she fell injuring her left leg  · Bed rest for now 2/2 fractures and dislocations        Chronic diastolic CHF (congestive heart failure), NYHA class 1 (HCC)   Assessment & Plan    · Euvolemic  · PCXR, no acute cardiopulmonary disease  · Hold Lasix Lasix and resume metoprolol at a decreased dose  · January, 2018 echo showed EF of 70-75%        Moderate persistent asthma   Assessment & Plan    · No acute exacerbation noted  · Continue with albuterol, p r n , Advair, and Claritin  · PCXR, no acute cardiopulmonary disease        History of Merkel cell carcinoma   Assessment & Plan    · Ten years ago for found to have a lesion 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, pathology results were unclear patient was treated for high-grade neuroendocrine tumor/Merkel cell  She received resection and lymph node dissection of the left groin which were both positive  She received radiation to the groin and tumor bed and received IV chemotherapy  · Follows with outpatient oncologist, Dr Je Chavez  Last seen in February 2018, no signs of recurrence, recent scan did not demonstrate evidence of recurrence Merkel cell, surveillance ongoing and patient is to follow up in 6 months  · Left lower extremity with chronic edema        Fibromyalgia   Assessment & Plan    · Continue Lyrica        Paroxysmal atrial fibrillation (HCC)   Assessment & Plan    · EKG, NSR  · Stop Eliquis and initiate low-dose heparin drip 2/2 she may require orthopedic surgical intervention  · On metoprolol for rate control  Decrease metoprolol to 25 mg p o  b i d  (home dose of metoprolol is 50 mg p o  b i d )        Hyperlipemia   Assessment & Plan    · 3/9/2018 lipid profile, cholesterol 133, triglyceride 91, HDL 44 and LDL 71  · Initiated Lipitor 80 mg p o  daily        GERD (gastroesophageal reflux disease)   Assessment & Plan    · Continue PPI        Obesity (BMI 30-39  9)   Assessment & Plan    · Encourage therapeutic lifestyle with weight loss and healthy eating  · Follow-up with PCP on an outpatient basis        Stenosis of left internal carotid artery   Assessment & Plan    · Follows with outpatient vascular surgeon, Dr Melissa Perry  · CTA head/neck, near occlusive arthrosclerotic disease in Left ICA  · She chooses not to pursue a surgical root  Hypertension   Assessment & Plan    · Allow for permissive hypertension for 48 hr   · Hold Norvasc and Lasix for now  Metoprolol decreased to 25mg PO bid (home dose is metoprolol 50 mg p o  b i d )          VTE Prophylaxis: Heparin  / sequential compression device   Code Status: full  POLST: POLST form is not discussed and not completed at this time  Anticipated Length of Stay:  Patient will be admitted on an Inpatient basis with an anticipated length of stay of  > 2 midnights  Justification for Hospital Stay: TIA    Total Time for Visit, including Counseling / Coordination of Care: 30 minutes  Greater than 50% of this total time spent on direct patient counseling and coordination of care  Chief Complaint:   TIA    History of Present Illness:    Lu Hazel is a 81 y o  female with a PMH of diastolic CHF, paroxysmal AFib on Eliquis, asymptomatic high-grade left ICA stenosis, fibromyalgia, GERD, 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, hypertension, and asthma who presents status post fall  She states that approximately 7:30 a m  today she got up to go to the bathroom she lost her bowels because her left lip gave out and she fell injuring her left leg    She states that she pulled the help button and a  arrived into her apartment in which he called EMS  Upon arrival to the ED patient complained of feeling like she had marbles in her mouth  She denied dizziness or lightheadedness  In the ED CT of brain showed no acute intracranial hemorrhage or mass effect  But she was noted to have a fracture/dislocation of the medial malleolus and tibial tele joint, left medial tibial plateau fracture, and mildly displaced comminuted fracture of the left fibula  Neurology and orthopedics were consulted in ED  Review of Systems:    Review of Systems   Constitutional: Positive for activity change  Negative for appetite change, chills, diaphoresis, fatigue and fever  HENT: Positive for postnasal drip  Negative for congestion, ear discharge, ear pain, hearing loss, nosebleeds, rhinorrhea, sinus pain, trouble swallowing and voice change  Eyes: Negative for pain and redness  Respiratory: Negative for apnea, cough, choking, chest tightness, shortness of breath and wheezing  Cardiovascular: Positive for leg swelling  Negative for chest pain and palpitations  Gastrointestinal: Negative for abdominal distention, abdominal pain, blood in stool, constipation, diarrhea, nausea and vomiting  Genitourinary: Negative for decreased urine volume, difficulty urinating, dysuria, flank pain, frequency and urgency  Musculoskeletal: Positive for arthralgias and gait problem  Negative for neck pain and neck stiffness  Skin: Negative for color change, pallor, rash and wound  Neurological: Positive for speech difficulty  Negative for dizziness, tremors, facial asymmetry, weakness, light-headedness, numbness and headaches  Psychiatric/Behavioral: Negative for agitation, behavioral problems, confusion, sleep disturbance and suicidal ideas         Past Medical and Surgical History:     Past Medical History:   Diagnosis Date    Asthma     Cancer ([facility] [address] )     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    Cardiac disease     a fib    Fibromyalgia     Fibromyalgia, primary     GERD (gastroesophageal reflux disease)     Hypertension     Hypokalemia     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, left groin, lung and bladder  Patient treated with chemotherapy and radiation       Past Surgical History:   Procedure Laterality Date    APPENDECTOMY      CATARACT EXTRACTION      CHEST WALL BIOPSY N/A 10/27/2017    Procedure: SINUS EXCISION AND REMOVAL OF FOREIGN BODY, ABDOMEN (WOUND EXPLORATION); Surgeon: Sherry Shaffer MD;  Location: [address] [place] [address];  Service: General    EYE SURGERY Bilateral     cataracts     HIP FRACTURE SURGERY Left     HYSTERECTOMY      JOINT REPLACEMENT Left     hip    LYMPHADENECTOMY      PORTACATH PLACEMENT Right        Meds/Allergies:    Prior to Admission medications    Medication Sig Start Date End Date Taking?  Authorizing Provider   albuterol (2 5 mg/3 mL) 0 083 % nebulizer solution Take 2 5 mg by nebulization every 6 (six) hours as needed for wheezing   Yes Historical Provider, MD   ALPRAZolam (XANAX) 0 5 mg tablet Take 0 5 mg by mouth daily at bedtime   Yes Historical Provider, MD   amLODIPine (NORVASC) 5 mg tablet Take 5 mg by mouth daily   Yes Historical Provider, MD   apixaban (ELIQUIS) 5 mg Take 5 mg by mouth every 12 (twelve) hours     Yes Historical Provider, MD   atorvastatin (LIPITOR) 40 mg tablet Take 40 mg by mouth daily   Yes Historical Provider, MD   esomeprazole (NexIUM) 40 MG capsule Take 40 mg by mouth daily   Yes Historical Provider, MD   Fesoterodine Fumarate ER (TOVIAZ) 8 MG TB24 Take by mouth daily at bedtime     Yes Historical Provider, MD   fluticasone-salmeterol (ADVAIR) 250-50 mcg/dose inhaler Inhale 1 puff every 12 (twelve) hours This is home medication 1/10/18  Yes Narayan Daniels MD   furosemide (LASIX) 20 mg tablet Take 20 mg by mouth daily   Yes Historical Provider, MD   loratadine (CLARITIN) 10 mg tablet Take 10 mg by mouth daily   Yes Historical Provider, MD   metoprolol tartrate (LOPRESSOR) 50 mg tablet Take 50 mg by mouth every 12 (twelve) hours   Yes Historical Provider, MD   potassium chloride (K-DUR) 10 mEq tablet Take 10 mEq by mouth daily  Yes Historical Provider, MD   pregabalin (LYRICA) 300 MG capsule Take 300 mg by mouth 2 (two) times a day  Yes Historical Provider, MD   polyvinyl alcohol (LIQUIFILM TEARS) 1 4 % ophthalmic solution Administer 1 drop to both eyes 3 (three) times a day 3/19/18   GILDA Bray   acetaminophen (TYLENOL) 325 mg tablet Take 2 tablets (650 mg total) by mouth every 6 (six) hours as needed for fever 3/19/18 5/10/18  GILDA Bray   ALPRAZolam Forestine Guitar) 0 25 mg tablet Take 1 tablet (0 25 mg total) by mouth daily as needed for anxiety for up to 5 days 3/19/18 5/10/18  GILDA Bray   atorvastatin (LIPITOR) 40 mg tablet Take 1 tablet (40 mg total) by mouth daily at bedtime for 30 days 3/9/18 5/10/18  Donald Lane MD   omeprazole (PriLOSEC) 20 mg delayed release capsule Take 20 mg by mouth daily  5/10/18  Historical Provider, MD   QUEtiapine (SEROquel) 25 mg tablet Take 0 5 tablets (12 5 mg total) by mouth daily at bedtime 3/19/18 5/10/18  GILDA Bray     I have reviewed home medications with patient personally  Allergies: Allergies   Allergen Reactions    Fentanyl And Related Other (See Comments)     Passed out    Latex Rash    Penicillins Rash       Social History:     Marital Status:     Occupation: retired  Patient Pre-hospital Living Situation:  Lives at [facility]  Patient Pre-hospital Level of Mobility:  Walks with a walker  Patient Pre-hospital Diet Restrictions:  None  Substance Use History:   History   Alcohol Use No     History   Smoking Status    Never Smoker   Smokeless Tobacco    Never Used     Comment: never smoke     History   Drug Use No       Family History:    Family History   Problem Relation Age of Onset    Pneumonia Mother     Heart disease Father        Physical Exam:     Vitals:   Blood Pressure: 139/72 (05/10/18 1436)  Pulse: 83 (05/10/18 1436)  Temperature: 98 9 °F (37 2 °C) (05/10/18 1436)  Temp Source: Tympanic (05/10/18 1436)  Respirations: 20 (05/10/18 1436)  Height: 5' 2" (157 5 cm) (05/10/18 1436)  Weight - Scale: 92 kg (202 lb 13 2 oz) (05/10/18 1436)  SpO2: 95 % (05/10/18 1436)    Physical Exam   Constitutional: She is oriented to person, place, and time  She appears well-developed and well-nourished  Obese   HENT:   Head: Normocephalic and atraumatic  Neck: Normal range of motion  Neck supple  Cardiovascular: Normal rate, regular rhythm and normal heart sounds  Exam reveals no gallop and no friction rub  No murmur heard  Pulmonary/Chest: Effort normal and breath sounds normal  No respiratory distress  She has no wheezes  She has no rales  She exhibits no tenderness  Abdominal: Soft  Bowel sounds are normal  She exhibits no distension and no mass  There is no tenderness  There is no rebound and no guarding  Musculoskeletal: She exhibits edema, tenderness and deformity  LLE with edema and erythema  Also noted to be shortened and externally rotated  Pedal pulses +2   Neurological: She is alert and oriented to person, place, and time  Unable to move left lower extremity secondary to dislocation and fractures  No facial droop  Equal hand grasps  Speech slurred    Skin: Skin is warm and dry  Psychiatric: She has a normal mood and affect  Her behavior is normal  Judgment and thought content normal            Additional Data:     Lab Results: I have personally reviewed pertinent reports          Results from last 7 days  Lab Units 05/10/18  0826   WBC Thousand/uL 5 70   HEMOGLOBIN g/dL 11 4*   HEMATOCRIT % 36 6*   PLATELETS Thousands/uL 122*       Results from last 7 days  Lab Units 05/10/18  0826   SODIUM mmol/L 140   POTASSIUM mmol/L 3 6   CHLORIDE mmol/L 102   CO2 mmol/L 38*   BUN mg/dL 10 CREATININE mg/dL 0 93   CALCIUM mg/dL 8 7   TOTAL PROTEIN g/dL 6 4   BILIRUBIN TOTAL mg/dL 0 30   ALK PHOS U/L 127*   ALT U/L 15   AST U/L 20   GLUCOSE RANDOM mg/dL 108       Results from last 7 days  Lab Units 05/10/18  0826   INR  1 06       Results from last 7 days  Lab Units 05/10/18  0823   POC GLUCOSE mg/dl 118           Imaging: I have personally reviewed pertinent reports  XR ankle 2 vw left   Final Result by Michael Lomxa MD (05/10 1412)      Improved alignment at the tibiotalar joint which is widened anteriorly and medially  Fracture through the medial malleolus extending posteriorly  Distal fibular fracture  Workstation performed: [id number]         XR hip/pelv 2-3 vws left if performed   Final Result by Interface, Radiology Results In (05/10 1340)   Addendum 1 of 1 by Bertha Mendoza DO (05/10 2108)   ADDENDUM:      ADDENDUM      There is a voice recognition software related error in the header of the    report  A phrase which reads " right hip "       should read, " LEFT HIP "  Final   Stable postoperative appearance of the left hip  No acute osseous abnormality  Workstation performed: [id number]         XR femur 2 vw left   Final Result by Michael Lomax MD (05/10 4441)      No acute osseous abnormality  Moderate size joint effusion  Left hip prostheses  Workstation performed: [id number]         XR tibia fibula 2 views LEFT   Final Result by Bertha Mendoza DO (05/10 9205)   1  Medial tibial plateau fracture  2   Fracture of the posterior malleolus of the tibia with posterior dislocation of the ankle  3   Mildly displaced, comminuted fracture of the mid fibula  The study was marked in [facility] for immediate notification  Workstation performed: [id number]         XR knee 1 or 2 views left   Final Result by Bertha Mendoza DO (05/10 1300)   Moderate size suprapatellar joint effusion        Workstation performed: [id number]         XR ankle 2 views LEFT   Final Result by Radha Schmidt MD (05/10 1134)      Fracture dislocation of the medial malleolus and tibiotalar joint  Workstation performed: [id number]         XR foot 2 views LEFT   Final Result by Radha Schmidt MD (05/10 1131)         1  Fracture dislocation of the ankle  No acute fracture of the foot  2   Calcaneal spur with diffuse osteopenia  Workstation performed: [id number]         CTA head and neck with and without contrast   Final Result by Moises Chávez MD (05/10 1003)   No acute intracranial hemorrhage or mass effect      Chronic ischemic changes involving supratentorial white matter and anterolateral left frontal lobe, stable      Near occlusive atherosclerotic disease at the origin of the left internal carotid artery, consistent with prior CTA      Findings were directly discussed by Dr Cheko Aquino with Dr Lissette Urena  at 8:57 AM, 5/10/2018                  Workstation performed: [id number]         CT stroke alert brain   Final Result by Moises Chávez MD (05/10 1002)   No acute intracranial hemorrhage or mass effect      Multifocal chronic ischemic changes including left lateral frontal MCA territory infarction      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         Findings were directly discussed by Dr Cheko Aquino with Dr Lissette Urena  at 8:57 AM, 5/10/2018            Workstation performed: [id number]         X-ray chest 1 view portable   Final Result by Gillian Zapata MD (05/10 0605)      No acute cardiopulmonary disease  Workstation performed: [id number]         MRI inpatient order    (Results Pending)       EKG, Pathology, and Other Studies Reviewed on Admission:   · EKG: NSR    Allscripts / Epic Records Reviewed: Yes     ** Please Note: This note has been constructed using a voice recognition system   **

## 2018-05-10 NOTE — ASSESSMENT & PLAN NOTE
· Felt this a m  secondary to left leg giving out  · Left ankle xray, Fracture dislocation of the medial malleolus and tibiotalar joint  · Left tibia/fibula x-ray, Medial tibial plateau fracture  Fracture of the posterior malleolus of the tibia with posterior dislocation of the ankle  · She receiving conscious sedation in ED in the attempts to correct dislocation of left ankle  · Orthopedics consulted  · Morphine, prn    Neurovascular checks q 4 hours

## 2018-05-10 NOTE — SEDATION DOCUMENTATION
Pt resting quietly in bed, A&O X3,  pt able to sit upright, maintain airway and have a conversation with nurse

## 2018-05-10 NOTE — ASSESSMENT & PLAN NOTE
· She states she got up approximately 7:30 a m  this morning to go to the bathroom and lost her balance because her left leg gave out and she fell injuring her left leg    · PT/OT evaluate and treat

## 2018-05-10 NOTE — ASSESSMENT & PLAN NOTE
· Left tibia/fibula x-ray, Mildly displaced, comminuted fracture of the mid fibula     · Orthopedics following and appreciate recommendations  · Morphine, prn and Vicodin, prn  · Neurovascular checks q 4 hours

## 2018-05-10 NOTE — ASSESSMENT & PLAN NOTE
26-Jul-2017 13:31 · Encourage therapeutic lifestyle with weight loss and healthy eating  · Follow-up with PCP on an outpatient basis 22-Jul-2017 08:00 22-Jul-2017 11:24

## 2018-05-10 NOTE — ASSESSMENT & PLAN NOTE
· João Machado this a m  secondary to left leg giving out  · Left ankle xray, Fracture dislocation of the medial malleolus and tibiotalar joint  · Left tibia/fibula x-ray, Medial tibial plateau fracture  Fracture of the posterior malleolus of the tibia with posterior dislocation of the ankle  · She receiving conscious sedation in ED in the attempts to correct dislocation of left ankle- left posterior splint applied  · Orthopedics following, appreciate recommendations  She is a poor surgical candidate and she may require a cast or a boot in 1 week  · Nonweightbearing with left lower extremity  · Morphine, prn  And Vicodin, prn   Neurovascular checks q 4 hours

## 2018-05-10 NOTE — ASSESSMENT & PLAN NOTE
· Follows with outpatient vascular surgeon, Dr Iris Flynn  · CTA head/neck, near occlusive arthrosclerotic disease in Left ICA  · She chooses not to pursue a surgical root

## 2018-05-10 NOTE — ED NOTES
Pt mentioned her speech is "off, tongue feels thick, states it started when the squad got to her house   Dr Maritza Edmond notified in room to assess patient     Nadeem Summers RN  05/10/18 7667

## 2018-05-10 NOTE — PLAN OF CARE
Activity Intolerance/Impaired Mobility     Mobility/activity is maintained at optimum level for patient Progressing        Communication Impairment     Ability to express needs and understand communication Progressing        Neurological Deficit     Neurological status is stable or improving Progressing        Nutrition     Nutrition/Hydration status is improving Progressing        Potential for Aspiration     Non-ventilated patient's risk of aspiration is minimized Progressing        Potential for Falls     Patient will remain free of falls Progressing        Prexisting or High Potential for Compromised Skin Integrity     Skin integrity is maintained or improved Progressing        RESPIRATORY - ADULT     Achieves optimal ventilation and oxygenation Progressing

## 2018-05-10 NOTE — ASSESSMENT & PLAN NOTE
· EKG, NSR  · Stop Eliquis and initiate low-dose heparin drip 2/2 she may require orthopedic surgical intervention  · On metoprolol for rate control

## 2018-05-10 NOTE — ASSESSMENT & PLAN NOTE
· Ten years ago for found to have a lesion 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, pathology results were unclear patient was treated for high-grade neuroendocrine tumor/Merkel cell  She received resection and lymph node dissection of the left groin which were both positive  She received radiation to the groin and tumor bed and received IV chemotherapy  · Follows with outpatient oncologist, Dr Prince Betancourt  Last seen in February 2018, no signs of recurrence, recent scan did not demonstrate evidence of recurrence Merkel cell, surveillance ongoing and patient is to follow up in 6 months    · Left lower extremity with chronic edema

## 2018-05-10 NOTE — ASSESSMENT & PLAN NOTE
· Left tibia/fibula x-ray, Mildly displaced, comminuted fracture of the mid fibula     · Orthopedics consulted  · Morphine, prn  · Neurovascular checks q 4 hours

## 2018-05-10 NOTE — ASSESSMENT & PLAN NOTE
· No acute exacerbation noted  · Continue with albuterol, p r n , Advair, and Claritin  · PCXR, no acute cardiopulmonary disease

## 2018-05-10 NOTE — ASSESSMENT & PLAN NOTE
· EKG, NSR  · Heparin drip discontinue  Hold Eliquis for now 2/2 drop in hemoglobin    Repeat H&H   · Hold metoprolol

## 2018-05-10 NOTE — PLAN OF CARE
Problem: RESPIRATORY - ADULT  Goal: Achieves optimal ventilation and oxygenation  INTERVENTIONS:  - Assess for changes in respiratory status  - Assess for changes in mentation and behavior  - Position to facilitate oxygenation and minimize respiratory effort  - Oxygen administration by appropriate delivery method based on oxygen saturation (per order) or ABGs  - Initiate smoking cessation education as indicated  - Encourage broncho-pulmonary hygiene including cough, deep breathe, Incentive Spirometry  - Assess and instruct to report SOB or any respiratory difficulty  - Respiratory Therapy support as indicated   Outcome: Progressing      Comments: Patient admitted and ordered for as needed nebulizer treatments  Will update POC x 3 days 5/13/18  Sejal Francois, RRT

## 2018-05-10 NOTE — ASSESSMENT & PLAN NOTE
· Euvolemic  · PCXR, no acute cardiopulmonary disease  · Continue Lasix and metoprolol  · January, 2018 echo showed EF of 70-75%

## 2018-05-10 NOTE — ASSESSMENT & PLAN NOTE
· Follows with outpatient vascular surgeon, Dr Kayley Jaimes  · CTA head/neck, near occlusive arthrosclerotic disease in Left ICA  · She chooses not to pursue a surgical root

## 2018-05-10 NOTE — ASSESSMENT & PLAN NOTE
· Euvolemic  · PCXR, no acute cardiopulmonary disease  · Hold Lasix and metoprolol    · January, 2018 echo showed EF of 70-75%

## 2018-05-10 NOTE — CONSULTS
[address]   Neurology Initial Consult    Jeny Hagan is a 81 y o  female  ED 07/ED 07          Information obtained from:   Chief Complaint   Patient presents with   Harris Kerens Fall     pt states was walking to the bathroom and left leg gave out, c/o pain left lower leg         Assessment/Plan:    1  TIA vs lacunar infarction   2  h/o chronic A fib on anticoagulation   3  Left carotid severe stenosis, asymptomatic  4  HTN  5  Recent right pontine infarction     Etiology of her sxs is unclear  Dysarthria is not impressive  The left sided weakness is on same side as her fall so it's unclear whether she is actually weak or ROM is limited due to fall  Due to being on eliquis and recent stroke, she was not a candidate for IV tPA  Permissive hypertension   Recommend MRI brain to r/o acute stroke  Resume eliquis 5mg q12h  Started patient on atorvastatin 80mg qd  Last lipid panel 3/9/18: Tchol 133, Trig 91, HDL 44,LDL 71 - wnl  HgbA1c 6 2 in Jan 2018 - wnl, repeat pending  Echo will not    If there is presence of new stroke on MRI, will reevaluate her carotid stenosis and see whether it's symptomatic   Speech and swallow eval  PT/OT          HPI:  This is an 80 yo female h/o afib on elquis, GERD, 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, LLE lymphedema with weakness at baseline, and 2 strokes in the last 6 months, with Left internal carotid stenosis who presents to the ER today s/p a fall  She notes she was walking to the bathroom this morning with her walker and her legs gave out and she fell  Upon arrival to the ED, she noted her tongue and lips felt heavy  She didn't have slurred speech but tongue continued to feel heavy  She states the heaviness has improved slightly  Pt does note she has LLE weakness and pain after her fall  She denies vision changes, headache, chest pain, palpitations, weakness, lightheadedness, double vision      Past Medical History:   Diagnosis Date    Asthma     Cancer ([facility] [address] )     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    Cardiac disease     a fib    Fibromyalgia     Fibromyalgia, primary     GERD (gastroesophageal reflux disease)     Hypertension     Hypokalemia     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, left groin, lung and bladder  Patient treated with chemotherapy and radiation       Past Surgical History:   Procedure Laterality Date    APPENDECTOMY      CATARACT EXTRACTION      CHEST WALL BIOPSY N/A 10/27/2017    Procedure: SINUS EXCISION AND REMOVAL OF FOREIGN BODY, ABDOMEN (WOUND EXPLORATION);   Surgeon: Mili Mesa MD;  Location: [address] [place] [address];  Service: General    EYE SURGERY Bilateral     cataracts     HIP FRACTURE SURGERY Left     HYSTERECTOMY      JOINT REPLACEMENT Left     hip    LYMPHADENECTOMY      PORTACATH PLACEMENT Right        Allergies   Allergen Reactions    Fentanyl And Related Other (See Comments)     Passed out    Latex Rash    Penicillins Rash         Current Facility-Administered Medications:     atorvastatin (LIPITOR) tablet 80 mg, 80 mg, Oral, Daily With Carlos Phan MD, 80 mg at 05/10/18 1122    Current Outpatient Prescriptions:     acetaminophen (TYLENOL) 325 mg tablet, Take 2 tablets (650 mg total) by mouth every 6 (six) hours as needed for fever, Disp: 30 tablet, Rfl: 0    albuterol (2 5 mg/3 mL) 0 083 % nebulizer solution, Take 2 5 mg by nebulization every 6 (six) hours as needed for wheezing, Disp: , Rfl:     ALPRAZolam (XANAX) 0 25 mg tablet, Take 1 tablet (0 25 mg total) by mouth daily as needed for anxiety for up to 5 days, Disp: 5 tablet, Rfl: 0    amLODIPine (NORVASC) 5 mg tablet, Take 5 mg by mouth daily, Disp: , Rfl:     apixaban (ELIQUIS) 5 mg, Take 5 mg by mouth every 12 (twelve) hours  , Disp: , Rfl:     atorvastatin (LIPITOR) 40 mg tablet, Take 1 tablet (40 mg total) by mouth daily at bedtime for 30 days, Disp: 30 tablet, Rfl: 0   Fesoterodine Fumarate ER (TOVIAZ) 8 MG TB24, Take by mouth daily at bedtime  , Disp: , Rfl:     fluticasone-salmeterol (ADVAIR) 250-50 mcg/dose inhaler, Inhale 1 puff every 12 (twelve) hours This is home medication, Disp: , Rfl: 0    furosemide (LASIX) 20 mg tablet, Take 20 mg by mouth daily, Disp: , Rfl:     loratadine (CLARITIN) 10 mg tablet, Take 10 mg by mouth daily, Disp: , Rfl:     metoprolol tartrate (LOPRESSOR) 50 mg tablet, Take 50 mg by mouth every 12 (twelve) hours, Disp: , Rfl:     omeprazole (PriLOSEC) 20 mg delayed release capsule, Take 20 mg by mouth daily, Disp: , Rfl:     polyvinyl alcohol (LIQUIFILM TEARS) 1 4 % ophthalmic solution, Administer 1 drop to both eyes 3 (three) times a day, Disp: 15 mL, Rfl: 0    potassium chloride (K-DUR) 10 mEq tablet, Take 10 mEq by mouth daily  , Disp: , Rfl:     pregabalin (LYRICA) 300 MG capsule, Take 300 mg by mouth 2 (two) times a day , Disp: , Rfl:     QUEtiapine (SEROquel) 25 mg tablet, Take 0 5 tablets (12 5 mg total) by mouth daily at bedtime, Disp: 10 tablet, Rfl: 0    Social History     Social History    Marital status:      Spouse name: N/A    Number of children: N/A    Years of education: N/A     Occupational History    retired      Social History Main Topics    Smoking status: Never Smoker    Smokeless tobacco: Never Used      Comment: never smoke    Alcohol use No    Drug use: No    Sexual activity: Not on file     Other Topics Concern    Not on file     Social History Narrative    [facility]ides at [facility]       Family History   Problem Relation Age of Onset    Pneumonia Mother     Heart disease Father          Review of systems:  Please see HPI for positive symptoms  No fever, no chills, no weight change  Ocular: No drainage, no blurred vision  HEENT:  No sore throat, earache, or congestion  No neck pain  COR:  No chest pain  No palpitations   Lungs:  no sob, wheezing,  GI:  no  nausea, no vomiting, no diarrhea, no constipation, no anorexia  :  No dysuria, frequency, or urgency  No hematuria  Musculoskeletal:  No joint pain or swelling or edema  Skin:  No rash or itching  Psychiatric:  no anxiety, no depression  Endocrine:  No polyuria or polydipsia  Physical examination:  Vitals:    05/10/18 1100   BP:    Pulse: 76   Resp: 19   Temp:    SpO2: 91%       GENERAL APPEARANCE:  The patient is alert, oriented  He is in no acute distress  HEENT:  Head is normocephalic  The sinuses are otherwise nontender  Pupils are equal and reactive  NECK:  Supple without lymphadenopathy  HEART:  Regular rate and rhythm  LUNGS:  clear to auscultation  No crackles or wheezes are heard  ABDOMEN:  Soft, nontender, nondistended with good bowel sounds heard  EXTREMITIES:  LLE +4edema with erythema, pain    Mental status: The patient is alert, attentive, and oriented  Speech is clear with mild lack of fluency  intact repetition, comprehension, and naming  she recalls 1/3 objects at 5 minutes  Cranial nerves:  CN II: Visual fields are full to confrontation  Fundoscopic exam is normal with sharp discs and no vascular changes  Pupils are 3 mm and reactive to light  CN III, IV, VI: At primary gaze, there is no eye deviation  CN V: Facial sensation is intact to pinprick in all 3 divisions bilaterally  Corneal responses are intact  CN VII: Face is symmetric with normal eye closure and smile  CN VIII: Hearing is normal to rubbing fingers  CN IX, X: Palate elevates symmetrically  Phonation is normal   CN XI: Head turning and shoulder shrug are intact  CN XII: Tongue is midline with normal movements and no atrophy  Motor: There is no pronator drift of out-stretched arms    Muscle bulk and tone are normal      Muscle exam  Arm Right Left Leg Right Left   Deltoid 5/5 4/5 Iliopsoas 5/5 2/5   Biceps 5/5 4/5 Quads 5/5 2/5   Triceps 5/5 4/5 Hamstrings 5/5 2/5   Wrist Extension 5/5 4/5 Ankle Dorsi Flexion 5/5 2/5   Wrist Flexion 5/5 4/5 Ankle Plantar Flexion 5/5 2/5   Interossei 5/5 4/5 Ankle Eversion 5/5 2/5   APB 5/5 4/5 Ankle Inversion 5/5 2/5       Reflexes   RJ BJ TJ KJ AJ Plantars Longo's   Right 2+ 2+ 2+ 2+ 0 Downgoing Not present   Left 2+ 2+ 2+ 2+ 0 Downgoing Not present     Sensory:  Pt has numbness in LLE, with decreased sensation  Coordination:  Rapid alternating movements and fine finger movements are intact  There is no dysmetria on finger-to-nose and heel-knee-shin  There are no abnormal or extraneous movements  Romberg negative  Gait/Stance:  Unable to assess 2/2 LLE fracture  Lab Results   Component Value Date    WBC 5 70 05/10/2018    HGB 11 4 (L) 05/10/2018    HCT 36 6 (L) 05/10/2018    MCV 80 (L) 05/10/2018     (L) 05/10/2018     Lab Results   Component Value Date    HGBA1C 6 2 01/06/2018     Lab Results   Component Value Date    ALT 15 05/10/2018    AST 20 05/10/2018    ALKPHOS 127 (H) 05/10/2018    BILITOT 0 30 05/10/2018     Lab Results   Component Value Date    GLUCOSE 108 05/10/2018    CALCIUM 8 7 05/10/2018     05/10/2018    K 3 6 05/10/2018    CO2 38 (H) 05/10/2018     05/10/2018    BUN 10 05/10/2018    CREATININE 0 93 05/10/2018         Radiology          Review of reports and notes reveal:  X-ray Chest 1 View Portable    Result Date: 5/10/2018  No acute cardiopulmonary disease  Workstation performed: [id number]     Xr Hip/pelv 2-3 Vws Left If Performed    Addendum Date: 5/10/2018    ADDENDUM: ADDENDUM There is a voice recognition software related error in the header of the report  A phrase which reads " right hip " should read, " LEFT HIP "  Result Date: 5/10/2018  Stable postoperative appearance of the left hip  No acute osseous abnormality  Workstation performed: [id number]     Xr Femur 2 Vw Left    Result Date: 5/10/2018  No acute osseous abnormality  Moderate size joint effusion  Left hip prostheses   Workstation performed: [id number]     Xr Knee 1 Or 2 Views Left    Result Date: 5/10/2018  Moderate size suprapatellar joint effusion  Workstation performed: [id number]     Xr Tibia Fibula 2 Views Left    Result Date: 5/10/2018  1  Medial tibial plateau fracture  2   Fracture of the posterior malleolus of the tibia with posterior dislocation of the ankle  3   Mildly displaced, comminuted fracture of the mid fibula  The study was marked in [facility] for immediate notification  Workstation performed: [id number]     Xr Ankle 2 Vw Left    Result Date: 5/10/2018  Improved alignment at the tibiotalar joint which is widened anteriorly and medially  Fracture through the medial malleolus extending posteriorly  Distal fibular fracture  Workstation performed: [id number]     Xr Ankle 2 Views Left    Result Date: 5/10/2018  Fracture dislocation of the medial malleolus and tibiotalar joint  Workstation performed: [id number]     Xr Foot 2 Views Left    Result Date: 5/10/2018  1  Fracture dislocation of the ankle  No acute fracture of the foot  2   Calcaneal spur with diffuse osteopenia   Workstation performed: [id number]     Independent Interpretation of images or specimens:  Cta Head And Neck With And Without Contrast    Result Date: 5/10/2018  No acute intracranial hemorrhage or mass effect Chronic ischemic changes involving supratentorial white matter and anterolateral left frontal lobe, stable Near occlusive atherosclerotic disease at the origin of the left internal carotid artery, consistent with prior CTA Findings were directly discussed by Dr Juanita Becker with Dr Lars Kaiser  at 8:57 AM, 5/10/2018 Workstation performed: [id number]       Ct Stroke Alert Brain    Result Date: 5/10/2018  No acute intracranial hemorrhage or mass effect Multifocal chronic ischemic changes including left lateral frontal MCA territory infarction 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 Findings were directly discussed by Dr Juanita Becker with Dr Lars Kaiser  at 8:57 AM, 5/10/2018 Workstation performed: [id number]             Thank you for this consult  Total time of encounter: 70 min   More than 50% of time was spent in counseling and coordination of care of patient  [facility]  [facility] [id number]  [address]

## 2018-05-10 NOTE — ASSESSMENT & PLAN NOTE
· Ten years ago for found to have a lesion 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, pathology results were unclear patient was treated for high-grade neuroendocrine tumor/Merkel cell  She received resection and lymph node dissection of the left groin which were both positive  She received radiation to the groin and tumor bed and received IV chemotherapy  · Follows with outpatient oncologist, Dr Trey Farrell  Last seen in February 2018, no signs of recurrence, recent scan did not demonstrate evidence of recurrence Merkel cell, surveillance ongoing and patient is to follow up in 6 months    · Left lower extremity with chronic edema

## 2018-05-10 NOTE — ASSESSMENT & PLAN NOTE
· Encourage therapeutic lifestyle with weight loss and healthy eating  · Follow-up with PCP on an outpatient basis

## 2018-05-10 NOTE — TELEMEDICINE
TeleConsultation - Stroke   Nilam Dempsey 81 y o  female MRN: [id number]  Unit/Bed#: ED 07 Encounter: [id number]        Assessment/Plan   Assessment:  Alex Bowles is an 81-year-old woman who presents after a fall when her left lower extremity gave out    EN route to the hospital she began to experience dysarthric speech  This is apparently fluctuating to a degree and yield an NIH stroke scale of 1 although it is not completely clear whether she has increased left lower extremity weakness compared to usual   She would be considered to be a high risk for stroke considering her multitude of risk factors and recent cerebral infarction  TPA Decision:  She is not considered a candidate for IV tPA, in spite of presenting in the window, because of her low NIH stroke scale, her recent ischemic stroke, and she is prescribed Eliquis  Plan: -suggest that she shoul be admitted along the stroke pathway  -please evaluate for the presence of any trauma or significant lower extremity pathology that may have contributed to her fall  -please perform a thorough metabolic evaluation including a urinalysis to check for any signs of infection  -please provide aspirin, full dose, x1 (either rectal or if she is able to pass a dysphagia screen, oral) otherwise please continue Eliquis at her standard dose  Continue her statin medication   -I would suggest that a period of permissive hypertension lasting 24-48 hours at maximum would be reasonable  -MRI brain  -considered 2D echocardiogram if it was not completed recently, similarly a fasting lipid panel and hemoglobin A1c may have been completed recently which would preclude the need for repetition  -PT/OT/SP    I did review the case with Dr Nataly Barrios who will be seeing the patient formally, I also reviewed my initial recommendations with the emergency room practitioner      History of Present Illness     Reason for Consult / Principal Problem:  Stroke alert  Hx and PE limited by: None  Patient last known well:  05/10/2018 approximately 815 hr  Stroke alert called:  05/10/2018 approximately 822 hr  Neurology time of arrival:  05/10/2018 approximately 826 hr  HPI: Aquilla Meckel is a 81 y o   female with multiple vascular risk factors including a recent pontine stroke and severe asymptomatic left internal carotid artery stenosis who presents with concerns for stroke-like symptoms  She was last seen by Neurology on March 16, 2018 at which point in time she was experiencing toxic encephalopathy  Prior to that she had a recent right pontine stroke with incidental discovery of severe left ICA stenosis  She was offered elective carotid intervention however declined, opting for best medical management for the time being  Reportedly this morning she fell when her left leg gave out from under her  She has donut left lower extremity weakness secondary to lymphedema and fibromyalgia/pain  According to report from the ER physician her left lower extremity weakness does not seem to be particularly worse than usual however in the ambulance EN route to the hospital she began to experience the new onset of dysarthric speech  The ED attending reports that her current NIH stroke scale is 1 for the dysarthric speech  I personally reviewed her noncontrast head CT and there is no evidence of acute hemorrhage  I did not see any large vessel occlusion, and the radiologist has indicated that the CTA does not include any target for potential thrombectomy although the severe left ICA stenosis remains      Consults    Review of Systems    Historical Information   Past Medical History:   Diagnosis Date    Asthma     [email or web address]     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    Cardiac disease     a fib    Fibromyalgia     Fibromyalgia, primary     GERD (gastroesophageal reflux disease)     Hypertension     Hypokalemia     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[email or web address]     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, left groin, lung and bladder  Patient treated with chemotherapy and radiation     Past Surgical History:   Procedure Laterality Date    APPENDECTOMY      CATARACT EXTRACTION      CHEST WALL BIOPSY N/A 10/27/2017    Procedure: SINUS EXCISION AND REMOVAL OF FOREIGN BODY, ABDOMEN (WOUND EXPLORATION); Surgeon: Mili Mesa MD;  Location: [address] [place] [address];  Service: General    EYE SURGERY Bilateral     cataracts     HIP FRACTURE SURGERY Left     HYSTERECTOMY      JOINT REPLACEMENT Left     hip    LYMPHADENECTOMY      PORTACATH PLACEMENT Right      Social History   History   Alcohol Use No     History   Drug Use No     History   Smoking Status    Never Smoker   Smokeless Tobacco    Never Used     Comment: never smoke       Review of previous medical records was  Completed as described above    Meds/Allergies   PTA meds:   Prior to Admission Medications   Prescriptions Last Dose Informant Patient Reported? Taking?    ALPRAZolam (XANAX) 0 5 mg tablet 5/9/2018  Yes Yes   Sig: Take 0 5 mg by mouth daily at bedtime   Fesoterodine Fumarate ER (TOVIAZ) 8 MG TB24 5/9/2018  Yes Yes   Sig: Take by mouth daily at bedtime     albuterol (2 5 mg/3 mL) 0 083 % nebulizer solution 5/9/2018  Yes Yes   Sig: Take 2 5 mg by nebulization every 6 (six) hours as needed for wheezing   amLODIPine (NORVASC) 5 mg tablet 5/9/2018  Yes Yes   Sig: Take 5 mg by mouth daily   apixaban (ELIQUIS) 5 mg 5/9/2018  Yes Yes   Sig: Take 5 mg by mouth every 12 (twelve) hours     atorvastatin (LIPITOR) 40 mg tablet 5/9/2018  Yes Yes   Sig: Take 40 mg by mouth daily   esomeprazole (NexIUM) 40 MG capsule 5/10/2018 at 0700  Yes Yes   Sig: Take 40 mg by mouth daily   fluticasone-salmeterol (ADVAIR) 250-50 mcg/dose inhaler 5/9/2018  No Yes   Sig: Inhale 1 puff every 12 (twelve) hours This is home medication   furosemide (LASIX) 20 mg tablet 5/9/2018  Yes Yes   Sig: Take 20 mg by mouth daily   loratadine (CLARITIN) 10 mg tablet 5/9/2018 Yes Yes   Sig: Take 10 mg by mouth daily   metoprolol tartrate (LOPRESSOR) 50 mg tablet 5/9/2018  Yes Yes   Sig: Take 50 mg by mouth every 12 (twelve) hours   polyvinyl alcohol (LIQUIFILM TEARS) 1 4 % ophthalmic solution 5/9/2018  No No   Sig: Administer 1 drop to both eyes 3 (three) times a day   potassium chloride (K-DUR) 10 mEq tablet 5/9/2018  Yes Yes   Sig: Take 10 mEq by mouth daily  pregabalin (LYRICA) 300 MG capsule 5/9/2018  Yes Yes   Sig: Take 300 mg by mouth 2 (two) times a day  Facility-Administered Medications: None       Allergies   Allergen Reactions    Fentanyl And Related Other (See Comments)     Passed out    Latex Rash    Penicillins Rash       Objective   Vitals:Blood pressure 142/68, pulse 76, temperature 98 6 °F (37 °C), temperature source Tympanic, resp  rate 19, weight 88 9 kg (196 lb), SpO2 91 %, not currently breastfeeding  ,Body mass index is 35 85 kg/m²  No intake or output data in the 24 hours ending 05/10/18 1248    Invasive Devices:    Invasive Devices     Peripheral Intravenous Line            Peripheral IV 05/10/18 Right Antecubital less than 1 day                Physical Exam  Neurologic Exam

## 2018-05-10 NOTE — ASSESSMENT & PLAN NOTE
· 3/9/2018 lipid profile, cholesterol 133, triglyceride 91, HDL 44 and LDL 71  · Initiated Lipitor 80 mg p o  daily

## 2018-05-10 NOTE — ASSESSMENT & PLAN NOTE
· She states she got up approximately 7:30 a m  this morning to go to the bathroom and lost her balance because her left leg gave out and she fell injuring her left leg    · Bed rest for now 2/2 fractures and dislocations

## 2018-05-11 ENCOUNTER — APPOINTMENT (INPATIENT)
Dept: RADIOLOGY | Facility: HOSPITAL | Age: 83
DRG: 563 | End: 2018-05-11
Payer: MEDICARE

## 2018-05-11 PROBLEM — R71.0 DROP IN HEMOGLOBIN: Status: ACTIVE | Noted: 2018-05-11

## 2018-05-11 PROBLEM — E83.42 HYPOMAGNESEMIA: Status: ACTIVE | Noted: 2018-05-11

## 2018-05-11 LAB
ANION GAP SERPL CALCULATED.3IONS-SCNC: 0 MMOL/L (ref 4–13)
ANION GAP SERPL CALCULATED.3IONS-SCNC: 1 MMOL/L (ref 4–13)
APTT PPP: 48 SECONDS (ref 23–35)
APTT PPP: 75 SECONDS (ref 24–33)
BUN SERPL-MCNC: 10 MG/DL (ref 5–25)
BUN SERPL-MCNC: 11 MG/DL (ref 5–25)
CALCIUM SERPL-MCNC: 7.7 MG/DL (ref 8.3–10.1)
CALCIUM SERPL-MCNC: 7.8 MG/DL (ref 8.3–10.1)
CHLORIDE SERPL-SCNC: 102 MMOL/L (ref 100–108)
CHLORIDE SERPL-SCNC: 99 MMOL/L (ref 100–108)
CHOLEST SERPL-MCNC: 75 MG/DL (ref 50–200)
CO2 SERPL-SCNC: 39 MMOL/L (ref 21–32)
CO2 SERPL-SCNC: 40 MMOL/L (ref 21–32)
CREAT SERPL-MCNC: 0.67 MG/DL (ref 0.6–1.3)
CREAT SERPL-MCNC: 0.68 MG/DL (ref 0.6–1.3)
ERYTHROCYTE [DISTWIDTH] IN BLOOD BY AUTOMATED COUNT: 16.4 % (ref 11.6–15.1)
GFR SERPL CREATININE-BSD FRML MDRD: 82 ML/MIN/1.73SQ M
GFR SERPL CREATININE-BSD FRML MDRD: 83 ML/MIN/1.73SQ M
GLUCOSE SERPL-MCNC: 113 MG/DL (ref 65–140)
GLUCOSE SERPL-MCNC: 144 MG/DL (ref 65–140)
HCT VFR BLD AUTO: 28.5 % (ref 37–47)
HCT VFR BLD AUTO: 28.8 % (ref 37–47)
HDLC SERPL-MCNC: 36 MG/DL (ref 40–60)
HGB BLD-MCNC: 9.1 G/DL (ref 12–16)
HGB BLD-MCNC: 9.1 G/DL (ref 12–16)
LDLC SERPL CALC-MCNC: 28 MG/DL (ref 0–100)
MAGNESIUM SERPL-MCNC: 1.6 MG/DL (ref 1.6–2.6)
MCH RBC QN AUTO: 25 PG (ref 27–31)
MCHC RBC AUTO-ENTMCNC: 31.9 G/DL (ref 31.4–37.4)
MCV RBC AUTO: 79 FL (ref 82–98)
PHOSPHATE SERPL-MCNC: 2.7 MG/DL (ref 2.3–4.1)
PLATELET # BLD AUTO: 95 THOUSANDS/UL (ref 130–400)
PLATELET BLD QL SMEAR: ABNORMAL
PMV BLD AUTO: 10.3 FL (ref 8.9–12.7)
POTASSIUM SERPL-SCNC: 2.8 MMOL/L (ref 3.5–5.3)
POTASSIUM SERPL-SCNC: 3.7 MMOL/L (ref 3.5–5.3)
RBC # BLD AUTO: 3.63 MILLION/UL (ref 4.2–5.4)
SODIUM SERPL-SCNC: 140 MMOL/L (ref 136–145)
SODIUM SERPL-SCNC: 141 MMOL/L (ref 136–145)
TRIGL SERPL-MCNC: 57 MG/DL
TROPONIN I SERPL-MCNC: 0.02 NG/ML
WBC # BLD AUTO: 4.9 THOUSAND/UL (ref 4.8–10.8)

## 2018-05-11 PROCEDURE — 85730 THROMBOPLASTIN TIME PARTIAL: CPT | Performed by: NURSE PRACTITIONER

## 2018-05-11 PROCEDURE — 85027 COMPLETE CBC AUTOMATED: CPT | Performed by: NURSE PRACTITIONER

## 2018-05-11 PROCEDURE — 99232 SBSQ HOSP IP/OBS MODERATE 35: CPT | Performed by: PSYCHIATRY & NEUROLOGY

## 2018-05-11 PROCEDURE — A9585 GADOBUTROL INJECTION: HCPCS | Performed by: NURSE PRACTITIONER

## 2018-05-11 PROCEDURE — 80048 BASIC METABOLIC PNL TOTAL CA: CPT | Performed by: NURSE PRACTITIONER

## 2018-05-11 PROCEDURE — G8998 SWALLOW D/C STATUS: HCPCS

## 2018-05-11 PROCEDURE — 80061 LIPID PANEL: CPT | Performed by: PSYCHIATRY & NEUROLOGY

## 2018-05-11 PROCEDURE — 84484 ASSAY OF TROPONIN QUANT: CPT | Performed by: NURSE PRACTITIONER

## 2018-05-11 PROCEDURE — G8997 SWALLOW GOAL STATUS: HCPCS

## 2018-05-11 PROCEDURE — 99232 SBSQ HOSP IP/OBS MODERATE 35: CPT | Performed by: NURSE PRACTITIONER

## 2018-05-11 PROCEDURE — 70553 MRI BRAIN STEM W/O & W/DYE: CPT

## 2018-05-11 PROCEDURE — G8996 SWALLOW CURRENT STATUS: HCPCS

## 2018-05-11 PROCEDURE — 85014 HEMATOCRIT: CPT | Performed by: NURSE PRACTITIONER

## 2018-05-11 PROCEDURE — 85018 HEMOGLOBIN: CPT | Performed by: NURSE PRACTITIONER

## 2018-05-11 PROCEDURE — 84100 ASSAY OF PHOSPHORUS: CPT | Performed by: NURSE PRACTITIONER

## 2018-05-11 PROCEDURE — 92610 EVALUATE SWALLOWING FUNCTION: CPT

## 2018-05-11 PROCEDURE — 83735 ASSAY OF MAGNESIUM: CPT | Performed by: NURSE PRACTITIONER

## 2018-05-11 RX ORDER — MAGNESIUM SULFATE HEPTAHYDRATE 40 MG/ML
2 INJECTION, SOLUTION INTRAVENOUS ONCE
Status: COMPLETED | OUTPATIENT
Start: 2018-05-11 | End: 2018-05-11

## 2018-05-11 RX ORDER — HYDROCODONE BITARTRATE AND ACETAMINOPHEN 5; 325 MG/1; MG/1
1 TABLET ORAL EVERY 4 HOURS PRN
Status: DISCONTINUED | OUTPATIENT
Start: 2018-05-11 | End: 2018-05-18 | Stop reason: HOSPADM

## 2018-05-11 RX ORDER — MORPHINE SULFATE 4 MG/ML
4 INJECTION, SOLUTION INTRAMUSCULAR; INTRAVENOUS EVERY 6 HOURS PRN
Status: DISCONTINUED | OUTPATIENT
Start: 2018-05-11 | End: 2018-05-11

## 2018-05-11 RX ORDER — POTASSIUM CHLORIDE 14.9 MG/ML
20 INJECTION INTRAVENOUS ONCE
Status: COMPLETED | OUTPATIENT
Start: 2018-05-11 | End: 2018-05-11

## 2018-05-11 RX ORDER — POTASSIUM CHLORIDE 20 MEQ/1
40 TABLET, EXTENDED RELEASE ORAL ONCE
Status: COMPLETED | OUTPATIENT
Start: 2018-05-11 | End: 2018-05-11

## 2018-05-11 RX ORDER — MORPHINE SULFATE 2 MG/ML
2 INJECTION, SOLUTION INTRAMUSCULAR; INTRAVENOUS EVERY 6 HOURS PRN
Status: DISCONTINUED | OUTPATIENT
Start: 2018-05-11 | End: 2018-05-18 | Stop reason: HOSPADM

## 2018-05-11 RX ADMIN — DOCUSATE SODIUM 100 MG: 100 CAPSULE, LIQUID FILLED ORAL at 17:43

## 2018-05-11 RX ADMIN — PREGABALIN 300 MG: 100 CAPSULE ORAL at 08:31

## 2018-05-11 RX ADMIN — PANTOPRAZOLE SODIUM 40 MG: 40 TABLET, DELAYED RELEASE ORAL at 06:30

## 2018-05-11 RX ADMIN — LORATADINE 10 MG: 10 TABLET ORAL at 08:36

## 2018-05-11 RX ADMIN — POTASSIUM CHLORIDE 40 MEQ: 1500 TABLET, EXTENDED RELEASE ORAL at 11:46

## 2018-05-11 RX ADMIN — POTASSIUM CHLORIDE 20 MEQ: 200 INJECTION, SOLUTION INTRAVENOUS at 13:44

## 2018-05-11 RX ADMIN — HEPARIN SODIUM 2000 UNITS: 1000 INJECTION, SOLUTION INTRAVENOUS; SUBCUTANEOUS at 01:46

## 2018-05-11 RX ADMIN — GADOBUTROL 9 ML: 604.72 INJECTION INTRAVENOUS at 09:25

## 2018-05-11 RX ADMIN — ATORVASTATIN CALCIUM 80 MG: 80 TABLET, FILM COATED ORAL at 17:42

## 2018-05-11 RX ADMIN — APIXABAN 5 MG: 5 TABLET, FILM COATED ORAL at 08:40

## 2018-05-11 RX ADMIN — PREGABALIN 300 MG: 100 CAPSULE ORAL at 17:42

## 2018-05-11 RX ADMIN — POLYVINYL ALCOHOL 1 DROP: 14 SOLUTION/ DROPS OPHTHALMIC at 17:43

## 2018-05-11 RX ADMIN — MORPHINE SULFATE 4 MG: 4 INJECTION INTRAVENOUS at 11:56

## 2018-05-11 RX ADMIN — POLYVINYL ALCOHOL 1 DROP: 14 SOLUTION/ DROPS OPHTHALMIC at 08:41

## 2018-05-11 RX ADMIN — MORPHINE SULFATE 2 MG: 2 INJECTION, SOLUTION INTRAMUSCULAR; INTRAVENOUS at 21:35

## 2018-05-11 RX ADMIN — FLUTICASONE PROPIONATE AND SALMETEROL 1 PUFF: 50; 250 POWDER RESPIRATORY (INHALATION) at 08:38

## 2018-05-11 RX ADMIN — DOCUSATE SODIUM 100 MG: 100 CAPSULE, LIQUID FILLED ORAL at 08:36

## 2018-05-11 RX ADMIN — ALPRAZOLAM 0.5 MG: 0.5 TABLET ORAL at 21:34

## 2018-05-11 RX ADMIN — HEPARIN SODIUM AND DEXTROSE 13.8 UNITS/KG/HR: 10000; 5 INJECTION INTRAVENOUS at 01:51

## 2018-05-11 RX ADMIN — FLUTICASONE PROPIONATE AND SALMETEROL 1 PUFF: 50; 250 POWDER RESPIRATORY (INHALATION) at 21:35

## 2018-05-11 RX ADMIN — TOLTERODINE TARTRATE 4 MG: 2 CAPSULE, EXTENDED RELEASE ORAL at 11:46

## 2018-05-11 RX ADMIN — MAGNESIUM SULFATE HEPTAHYDRATE 2 G: 40 INJECTION, SOLUTION INTRAVENOUS at 11:46

## 2018-05-11 NOTE — PLAN OF CARE
Activity Intolerance/Impaired Mobility     Mobility/activity is maintained at optimum level for patient Progressing        Communication Impairment     Ability to express needs and understand communication [address] Discharge to post-acute care or home with appropriate resources Progressing        Neurological Deficit     Neurological status is stable or improving Progressing        Nutrition     Nutrition/Hydration status is improving Progressing        Nutrition/Hydration-ADULT     Nutrient/Hydration intake appropriate for improving, restoring or maintaining nutritional needs Progressing        Potential for Aspiration     Non-ventilated patient's risk of aspiration is minimized Progressing        Potential for Falls     Patient will remain free of falls Progressing        Prexisting or High Potential for Compromised Skin Integrity     Skin integrity is maintained or improved Progressing        RESPIRATORY - ADULT     Achieves optimal ventilation and oxygenation Progressing

## 2018-05-11 NOTE — CASE MANAGEMENT
Initial Clinical Review    Admission: Date/Time/Statement: 5/10/18 @ 1258     Inpatient Admission (Order [id number])   Admission   Date: 5/10/2018 Department: [address] [place] [address] Released By/Authorizing: GILDA Purvis (auto-released)   Order Information     Order Name   INPATIENT ADMISSION [263]     Inpatient   Date/Time Action Taken User Additional Information   05/10/18 Yessica (auto-released) From Order: [id number]   05/10/18 1258 Complete GILDA Purvis    Order Details     Frequency Duration Priority Order Class   Once 1  occurrence Routine Hospital Performed   Inpatient Admission   Order: [id number]   Status:  Completed   Visible to patient:  No (Not Released) Next appt:  08/27/2018 at 01:00 PM in Hematology and Oncology Phillip King MD)                            Order Questions     Question Answer Comment   Admitting Physician [facility]    Level of Care Med Surg    Estimated length of stay More than 2 Midnights    Certification I certify that inpatient services are medically necessary for this patient for a duration of greater than two midnights  See H&P and MD Progress Notes for additional information about the patient's course of treatment              ED: Date/Time/Mode of Arrival:   ED Arrival Information     Expected Arrival Acuity Means of Arrival Escorted By Service Admission Type    5/10/2018 08:15 5/10/2018 08:10 Emergent Ambulance [facility] Emergency    Arrival Complaint    ankle pain          Chief Complaint:   Chief Complaint   Patient presents with    Fall     pt states was walking to the bathroom and left leg gave out, c/o pain left lower leg       History of Illness: Nick Weaver is a 81 y o  female with a PMH of diastolic CHF, paroxysmal AFib on Eliquis, asymptomatic high-grade left ICA stenosis, fibromyalgia, GERD, 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, hypertension, and asthma who presents status post fall   She states that approximately 7:30 a m  today she got up to go to the bathroom she lost her bowels because her left lip gave out and she fell injuring her left leg  She states that she pulled the help button and a  arrived into her apartment in which he called EMS  Upon arrival to the ED patient complained of feeling like she had marbles in her mouth  She denied dizziness or lightheadedness  In the ED CT of brain showed no acute intracranial hemorrhage or mass effect  But she was noted to have a fracture/dislocation of the medial malleolus and tibial tele joint, left medial tibial plateau fracture, and mildly displaced comminuted fracture of the left fibula  Neurology and orthopedics were consulted in ED  ED Vital Signs:   ED Triage Vitals   Temperature Pulse Respirations Blood Pressure SpO2   05/10/18 0815 05/10/18 0815 05/10/18 0815 05/10/18 0815 05/10/18 0815   98 6 °F (37 °C) 75 20 142/68 90 %      Temp Source Heart Rate Source Patient Position - Orthostatic VS BP Location FiO2 (%)   05/10/18 0815 05/10/18 1256 05/10/18 0815 05/10/18 0815 --   Tympanic Monitor Lying Right arm       Pain Score       05/10/18 0815       9        Wt Readings from Last 1 Encounters:   05/10/18 92 kg (202 lb 13 2 oz)       Vital Signs (abnormal):      Abnormal Labs/Diagnostic Test Results:     ED Treatment:   Medication Administration from 05/10/2018 0810 to 05/10/2018 1407       Date/Time Order Dose Route Action Action by Comments     05/10/2018 0853 iohexol (OMNIPAQUE) 350 MG/ML injection (MULTI-DOSE) 85 mL 85 mL Intravenous Given Ayde Weaver V      05/10/2018 0919 aspirin (ECOTRIN) EC tablet 325 mg 325 mg Oral Given Jillian Davila RN      05/10/2018 1153 ipratropium-albuterol (DUO-NEB) 0 5-2 5 mg/3 mL inhalation solution 3 mL 3 mL Nebulization Not Given Marleny Hogan RN      05/10/2018 0920 ipratropium-albuterol (DUO-NEB) 0 5-2 5 mg/3 mL inhalation solution 3 mL 3 mL Nebulization Given Antoine Nunez SHOLA Jaffe, RN      05/10/2018 1045 traMADol (ULTRAM) tablet 50 mg 50 mg Oral Given Ean Samson RN      05/10/2018 1122 atorvastatin (LIPITOR) tablet 80 mg 80 mg Oral Given Sharron Brumfield RN      05/10/2018 1257 propofol (DIPRIVAN) 200 MG/20ML bolus injection 44 mg 44 mg Intravenous Given Sharron Brumfield, RN dispensed by dr ferreira          Past Medical/Surgical History:    Active Ambulatory Problems     Diagnosis Date Noted    Chest pain 2016    Hypertension 2016    Acute cholecystitis 2016    Abdominal pain 2016    Meningioma ([facility] [address] ) 2016    Syncope 2016    Hypertension, accelerated 2016    [facility] 2016    Stenosis of left internal carotid artery 2016    Obesity (BMI 30-39 9) 01/10/2018    Acute bronchitis 2017    GERD (gastroesophageal reflux disease) 2017    Hyperlipemia 2017    [facility]oxysmal [facility] fibrillation [facility] 2017    Fibromyalgia 2017    Hypokalemia 2017    Moderate persistent asthma with acute exacerbation 2017    Open wound of abdominal wall without penetration into peritoneal cavity 10/27/2017    Asthma exacerbation 2017    Lymphedema of left leg 2017    Bacteremia 2017    History of Merkel cell carcinoma 2017    Port-a-cath in place 2017    Acute congestive heart failure ([facility] ) 2018    Moderate persistent asthma 2018    Iron deficiency anemia 2018    Shortness of breath 2018    Fall 2018    Right pontine stroke ([facility] ) 2018    Chronic diastolic CHF (congestive heart failure), NYHA class 1 ([facility] ) 2018    Delirium 03/15/2018    Closed fracture of right tibia and fibula with routine healing 03/15/2018    Toxic metabolic encephalopathy     Delirious      Resolved Ambulatory Problems     Diagnosis Date Noted    Acute on chronic respiratory failure ([facility] [address] [facility] ) 2018    Acute on chronic respiratory failure with hypoxia ([facility] [address] ) 03/15/2018     Past Medical History:   Diagnosis Date    Asthma     Cancer [facility])     Cardiac disease     Fibromyalgia     Fibromyalgia, primary     GERD (gastroesophageal reflux disease)     Hypertension     Hypokalemia     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HCC)        Admitting Diagnosis: Slurred speech [R47 81]  Ankle pain [M25 579]  Displaced fracture of fibula [S82 409A]  Displaced fracture of medial malleolus of left tibia, initial encounter for closed fracture [S82 52XA]    Age/Sex: 81 y o  female    Assessment/Plan:   * TIA (transient ischemic attack)   Assessment & Plan     · Questionable TIA  She complained of feeling like she had marbles in her mouth when she went to speak only upon arrival at the ED  · CT stroke alert, No acute intracranial hemorrhage or mass effect  Multifocal chronic ischemic changes including left lateral frontal MCA territory infarction  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  · CTA head/neck, no acute intracranial hemorrhage or mass effect  Chronic ischemic changes involving supratentorial white matter and anterolateral  left frontal lobe, stable  Near occlusive arthrosclerotic disease at the origin of the left internal carotid artery, consistent with prior CTA    · Spoke with neurology, she does not require repeat ECHO  · 1/6/2018 ECHO, EF 70-75%  No regional wall motion abnormalities  Mild concentric hypertrophy  Grade 1 diastolic dysfunction  · 3/9/2018 lipid profile, cholesterol 133, triglycerides 91, HDL 44, LDL 71   · 3/16/2018 TSH 1 910  · Ordered hemoglobin A1c  · Ordered MRI of brain with contrast  · Allow for permissive hypertension   · Neurology following appreciate recommendations          Displaced  comminuted fracture of fibula   Assessment & Plan     · Left tibia/fibula x-ray, Mildly displaced, comminuted fracture of the mid fibula     · Orthopedics consulted  · Morphine, prn  · Neurovascular checks q 4 hours          Displaced fracture of medial malleolus of left tibia, initial encounter for closed fracture   Assessment & Plan     · Felt this a m  secondary to left leg giving out  · Left ankle xray, Fracture dislocation of the medial malleolus and tibiotalar joint  · Left tibia/fibula x-ray, Medial tibial plateau fracture  Fracture of the posterior malleolus of the tibia with posterior dislocation of the ankle  · She receiving conscious sedation in ED in the attempts to correct dislocation of left ankle  · Orthopedics consulted  · Morphine, prn  Neurovascular checks q 4 hours          Fall   Assessment & Plan     · She states she got up approximately 7:30 a m  this morning to go to the bathroom and lost her balance because her left leg gave out and she fell injuring her left leg  · Bed rest for now 2/2 fractures and dislocations          Chronic diastolic CHF (congestive heart failure), NYHA class 1 (HCC)   Assessment & Plan     · Euvolemic  · PCXR, no acute cardiopulmonary disease  · Hold Lasix Lasix and resume metoprolol at a decreased dose  · January, 2018 echo showed EF of 70-75%          Moderate persistent asthma   Assessment & Plan     · No acute exacerbation noted  · Continue with albuterol, p r n , Advair, and Claritin  · PCXR, no acute cardiopulmonary disease          History of Merkel cell carcinoma   Assessment & Plan     · Ten years ago for found to have a lesion 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, pathology results were unclear patient was treated for high-grade neuroendocrine tumor/Merkel cell  She received resection and lymph node dissection of the left groin which were both positive  She received radiation to the groin and tumor bed and received IV chemotherapy  · Follows with outpatient oncologist, Dr Teresa Ortiz  Last seen in February 2018, no signs of recurrence, recent scan did not demonstrate evidence of recurrence Merkel cell, surveillance ongoing and patient is to follow up in 6 months    · Left lower extremity with chronic edema          Fibromyalgia   Assessment & Plan     · Continue Lyrica          Paroxysmal atrial fibrillation (HCC)   Assessment & Plan     · EKG, NSR  · Stop Eliquis and initiate low-dose heparin drip 2/2 she may require orthopedic surgical intervention  · On metoprolol for rate control  Decrease metoprolol to 25 mg p o  b i d  (home dose of metoprolol is 50 mg p o  b i d )          Hyperlipemia   Assessment & Plan     · 3/9/2018 lipid profile, cholesterol 133, triglyceride 91, HDL 44 and LDL 71  · Initiated Lipitor 80 mg p o  daily          GERD (gastroesophageal reflux disease)   Assessment & Plan     · Continue PPI          Obesity (BMI 30-39  9)   Assessment & Plan     · Encourage therapeutic lifestyle with weight loss and healthy eating  · Follow-up with PCP on an outpatient basis          Stenosis of left internal carotid artery   Assessment & Plan     · Follows with outpatient vascular surgeon, Dr Cullen Opitz  · CTA head/neck, near occlusive arthrosclerotic disease in Left ICA  · She chooses not to pursue a surgical root           Hypertension   Assessment & Plan     · Allow for permissive hypertension for 48 hr   · Hold Norvasc and Lasix for now  Metoprolol decreased to 25mg PO bid (home dose is metoprolol 50 mg p o  b i d )        VTE Prophylaxis: Heparin  / sequential compression device   Code Status: full  POLST: POLST form is not discussed and not completed at this time  Anticipated Length of Stay:  Patient will be admitted on an Inpatient basis with an anticipated length of stay of  > 2 midnights     Justification for Hospital Stay: TIA    TELE NEURO  Plan: -suggest that she shoul be admitted along the stroke pathway  -please evaluate for the presence of any trauma or significant lower extremity pathology that may have contributed to her fall  -please perform a thorough metabolic evaluation including a urinalysis to check for any signs of infection  -please provide aspirin, full dose, x1 (either rectal or if she is able to pass a dysphagia screen, oral) otherwise please continue Eliquis at her standard dose  Continue her statin medication   -I would suggest that a period of permissive hypertension lasting 24-48 hours at maximum would be reasonable  -MRI brain  -considered 2D echocardiogram if it was not completed recently, similarly a fasting lipid panel and hemoglobin A1c may have been completed recently which would preclude the need for repetition  -PT/OT/SP    NEUROLOGY CONSULT  Assessment/Plan:     1  TIA vs lacunar infarction   2  h/o chronic A fib on anticoagulation   3  Left carotid severe stenosis, asymptomatic  4  HTN  5  Recent right pontine infarction   Etiology of her sxs is unclear  Dysarthria is not impressive  The left sided weakness is on same side as her fall so it's unclear whether she is actually weak or ROM is limited due to fall  Due to being on eliquis and recent stroke, she was not a candidate for IV tPA  Permissive hypertension   Recommend MRI brain to r/o acute stroke     Resume eliquis 5mg q12h  Started patient on atorvastatin 80mg qd  Last lipid panel 3/9/18: Tchol 133, Trig 91, HDL 44,LDL 71 - wnl  HgbA1c 6 2 in Jan 2018 - wnl, repeat pending  Echo will not    If there is presence of new stroke on MRI, will reevaluate her carotid stenosis and see whether it's symptomatic   Speech and swallow eval  PT/OT    Admission Orders:  TELE MON  OXYGEN NC  CONSULT NEUROLOGY  NEURO CHECKS  DYSPHAGIA ASSESSMENT  PTOT SP  H/H BMP APTT Q6H  STOOL OCCULT BLOOD   MRI BRAIN  Scheduled Meds:   Current Facility-Administered Medications:  acetaminophen 650 mg Oral Q6H PRN GILDA Lewis   albuterol 2 5 mg Nebulization Q6H PRN GILDA Lewis   ALPRAZolam 0 5 mg Oral HS GILDA Juárez   atorvastatin 80 mg Oral Daily With Merna Aguilar MD   docusate sodium 100 mg Oral BID GILDA Lewis   fluticasone-salmeterol 1 puff Inhalation Q12H [address] Nydia Lopez, GILDA   loratadine 10 mg Oral Daily GILDA Tirado   morphine injection 4 mg Intravenous Q4H PRN Lyndsey Barragan DO   pantoprazole 40 mg Oral Early Morning GILDA Tirado   polyvinyl alcohol 1 drop Both Eyes TID Ledy M Sung, GILDA   potassium chloride 20 mEq Intravenous Once Ledy M Sung, GILDA   pregabalin 300 mg Oral BID GILDA Tirado   tolterodine 4 mg Oral Daily GILDA Tirado     Continuous Infusions:    PRN Meds:   acetaminophen    albuterol    morphine injection

## 2018-05-11 NOTE — SOCIAL WORK
Pt is generally independent  She lives alone in [place] the elderly  Her DME includes walker, cane, nebulizer, oxygen and life alert  Dr Pastor Recinos is her PCP  Pt uses [employer] transportation  She does not have a POA, or living will  Pt [facility] her prescriptions at [facility]  She is currently on service with [facility] [address]   SW reviewed d/c planning process including the following: identifying help at home, pt preference for d/c planning needs, availability of treatment team to discuss questions or concerns pt and/or family may have regarding understanding medications and recognizing signs and symptoms once discharged  SW also encouraged pt to follow up with all recommended appointments after discharge  Pt advised of importance for pt and family to participate in managing pts medical well being  Plan is for pt to discharge home with [facility] services vs STR pending progress

## 2018-05-11 NOTE — PLAN OF CARE
Problem: DISCHARGE PLANNING - CARE MANAGEMENT  Goal: Discharge to post-acute care or home with appropriate resources  INTERVENTIONS:  - Conduct assessment to determine patient/family and health care team treatment goals, and need for post-acute services based on payer coverage, community resources, and patient preferences, and barriers to discharge  - Address psychosocial, clinical, and financial barriers to discharge as identified in assessment in conjunction with the patient/family and health care team  - Arrange appropriate level of post-acute services according to patient's   needs and preference and payer coverage in collaboration with the physician and health care team  - Communicate with and update the patient/family, physician, and health care team regarding progress on the discharge plan  - Arrange appropriate transportation to post-acute venues  DCP IS TO HOME VS STR PENDING PROGRESS  Outcome: Progressing

## 2018-05-11 NOTE — PROGRESS NOTES
Progress Note - Ambar Paez 1935, 81 y o  female MRN: [id number]    Unit/Bed#: [address] [place] [address] Encounter: [id number]    Primary Care Provider: Kei Pickett DO   Date and time admitted to hospital: 5/10/2018  8:15 AM        * TIA (transient ischemic attack)   Assessment & Plan    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, consistent with prior CTA    · Spoke with neurology, she does not require repeat ECHO  · 1/6/2018 ECHO, EF 70-75%  No regional wall motion abnormalities  Mild concentric hypertrophy  Grade 1 diastolic dysfunction  · 3/9/2018 lipid profile, cholesterol 133, triglycerides 91, HDL 44, LDL 71  Continue Lipitor 80 mg as an inpatient  At discharge Lipitor 40 mg p o  daily  · 3/16/2018 TSH 1 910  · Hemoglobin A1c, 5 8   · MRI of brain with contrast, pending  · Normotensive is fine now  · Hold Eliquis for now due to drop in hemoglobin  · Neurology following appreciate recommendations        Displaced  comminuted fracture of fibula   Assessment & Plan    · Left tibia/fibula x-ray, Mildly displaced, comminuted fracture of the mid fibula     · Orthopedics following and appreciate recommendations  · Morphine, prn and Vicodin, prn  · Neurovascular checks q 4 hours        Displaced fracture of medial malleolus of left tibia, initial encounter for closed fracture   Assessment & Plan    · Fell this a m  secondary to left leg giving out  · Left ankle xray, Fracture dislocation of the medial malleolus and tibiotalar joint  · Left tibia/fibula x-ray, Medial tibial plateau fracture  Fracture of the posterior malleolus of the tibia with posterior dislocation of the ankle  · She receiving conscious sedation in ED in the attempts to correct dislocation of left ankle- left posterior splint applied  · Orthopedics following, appreciate recommendations  She is a poor surgical candidate and she may require a cast or a boot in 1 week  · Nonweightbearing with left lower extremity  · Morphine, prn  And Vicodin, prn  Neurovascular checks q 4 hours        Fall   Assessment & Plan    · She states she got up approximately 7:30 a m  this morning to go to the bathroom and lost her balance because her left leg gave out and she fell injuring her left leg  · PT/OT evaluate and treat         Hypomagnesemia   Assessment & Plan    · Current magnesium 1 6  · Ordered magnesium 2 g IV x1 dose        Drop in hemoglobin   Assessment & Plan    · Hemoglobin dropped from 11 4 to 9 1 g/dL  Base limb hemoglobin 11-12 8 g/dL She did not receive any IVF  · She is s/p heparin drip  Hold eliquis for now  · Stool for occult blood x3  · Repeat H&H at 1700        Chronic diastolic CHF (congestive heart failure), NYHA class 1 (HCC)   Assessment & Plan    · Euvolemic  · PCXR, no acute cardiopulmonary disease  · Hold Lasix and metoprolol  · January, 2018 echo showed EF of 70-75%        Moderate persistent asthma   Assessment & Plan    · No acute exacerbation noted  · Continue with albuterol, p r n , Advair, and Claritin  · PCXR, no acute cardiopulmonary disease        History of Merkel cell carcinoma   Assessment & Plan    · Ten years ago for found to have a lesion 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, pathology results were unclear patient was treated for high-grade neuroendocrine tumor/Merkel cell    She received resection and lymph node dissection of the left groin which were both positive  She received radiation to the groin and tumor bed and received IV chemotherapy  · Follows with outpatient oncologist, Dr Barney Saucedo  Last seen in February 2018, no signs of recurrence, recent scan did not demonstrate evidence of recurrence Merkel cell, surveillance ongoing and patient is to follow up in 6 months  · Left lower extremity with chronic edema        Hypokalemia   Assessment & Plan    · Potassium 2 8  · Ordered KCl 40 mEq p o  x1 dose and KCl 20 mg IV x1 dose  · Repeat potassium at 1700        Fibromyalgia   Assessment & Plan    · Continue Lyrica        Paroxysmal atrial fibrillation (HCC)   Assessment & Plan    · EKG, NSR  · Heparin drip discontinue  Hold Eliquis for now 2/2 drop in hemoglobin  Repeat H&H   · Hold metoprolol           Hyperlipemia   Assessment & Plan    · 3/9/2018 lipid profile, cholesterol 133, triglyceride 91, HDL 44 and LDL 71  · Initiated Lipitor 80 mg p o  daily        GERD (gastroesophageal reflux disease)   Assessment & Plan    · Continue PPI        Obesity (BMI 30-39  9)   Assessment & Plan    · Encourage therapeutic lifestyle with weight loss and healthy eating  · Follow-up with PCP on an outpatient basis        Stenosis of left internal carotid artery   Assessment & Plan    · Follows with outpatient vascular surgeon, Dr Miguel Ángel Posada  · CTA head/neck, near occlusive arthrosclerotic disease in Left ICA  · She chooses not to pursue a surgical root  Hypertension   Assessment & Plan    · Zuleyka tensive blood pressures are fine now  · Hold Norvasc, Lasix and Metoprolol for now  VTE Pharmacologic Prophylaxis:   Pharmacologic: Apixaban (Eliquis)  Mechanical VTE Prophylaxis in Place: Yes    Patient Centered Rounds: I have performed bedside rounds with nursing staff today      Discussions with Specialists or Other Care Team Provider:  Reviewed orthopedic and neurology progress notes    Education and Discussions with Family / Patient:  Spoke with patient concerning plan of care    Time Spent for Care: 30 minutes  More than 50% of total time spent on counseling and coordination of care as described above  Current Length of Stay: 1 day(s)    Current Patient Status: Inpatient   Certification Statement: The patient will continue to require additional inpatient hospital stay due to TIA awaiting MRI result and low hemoglobin  Discharge Plan:  Not anticipated    Code Status: Level 1 - Full Code      Subjective:   She is observed lying in bed eating and tolerating her meals well  She states she continues with a dull ache in her left lower extremity  She denies any active bleeding  She denies shortness of breath  Denies chest pain, abdominal pain, or headache  Denies nausea, vomiting, or diarrhea  Objective:     Vitals:   Temp (24hrs), Av 4 °F (36 9 °C), Min:97 7 °F (36 5 °C), Max:99 6 °F (37 6 °C)    HR:  [] 84  Resp:  [18-20] 20  BP: (122-190)/(60-89) 125/60  SpO2:  [91 %-96 %] 91 %  Body mass index is 37 1 kg/m²  Input and Output Summary (last 24 hours): Intake/Output Summary (Last 24 hours) at 18 1437  Last data filed at 18 1328   Gross per 24 hour   Intake                0 ml   Output              600 ml   Net             -600 ml       Physical Exam:     Physical Exam   Constitutional: She is oriented to person, place, and time  She appears well-developed and well-nourished  No distress  Obese   HENT:   Head: Normocephalic and atraumatic  Neck: Normal range of motion  Neck supple  Cardiovascular: Normal rate, regular rhythm and normal heart sounds  Exam reveals no gallop and no friction rub  No murmur heard  Pulmonary/Chest: Effort normal  No respiratory distress  She has no wheezes  She has no rales  She exhibits no tenderness  Diminished at the bases   Abdominal: Soft  Bowel sounds are normal  She exhibits no distension and no mass  There is no tenderness  There is no rebound and no guarding     Musculoskeletal: She exhibits edema and tenderness  LLE noted with posterior splint  Pedal edema noted  +2 pedal pulses with positive movement and sensation  Neurological: She is alert and oriented to person, place, and time  Skin: Skin is warm and dry  No rash noted  She is not diaphoretic  No erythema  No pallor  Psychiatric: She has a normal mood and affect  Her behavior is normal  Judgment and thought content normal        Additional Data:     Labs:      Results from last 7 days  Lab Units 05/11/18  0638   WBC Thousand/uL 4 90   HEMOGLOBIN g/dL 9 1*   HEMATOCRIT % 28 5*   PLATELETS Thousands/uL 95*       Results from last 7 days  Lab Units 05/11/18  0638 05/10/18  0826   SODIUM mmol/L 141 140   POTASSIUM mmol/L 2 8* 3 6   CHLORIDE mmol/L 102 102   CO2 mmol/L 39* 38*   BUN mg/dL 11 10   CREATININE mg/dL 0 67 0 93   CALCIUM mg/dL 7 8* 8 7   TOTAL PROTEIN g/dL  --  6 4   BILIRUBIN TOTAL mg/dL  --  0 30   ALK PHOS U/L  --  127*   ALT U/L  --  15   AST U/L  --  20   GLUCOSE RANDOM mg/dL 113 108       Results from last 7 days  Lab Units 05/10/18  0826   INR  1 06       * I Have Reviewed All Lab Data Listed Above  * Additional Pertinent Lab Tests Reviewed:  All Labs Within Last 24 Hours Reviewed    Imaging:    Imaging Reports Reviewed Today Include: MRI pending  Imaging Personally Reviewed by Myself Includes:  none    Recent Cultures (last 7 days):           Last 24 Hours Medication List:     Current Facility-Administered Medications:  acetaminophen 650 mg Oral Q6H PRN Adam Brood, CRNP    albuterol 2 5 mg Nebulization Q6H PRN Adam Brood, CRNP    ALPRAZolam 0 5 mg Oral HS Adam Brood, CRNP    atorvastatin 80 mg Oral Daily With Tanika Mcclendon MD    docusate sodium 100 mg Oral BID Adam Brood, CRNP    fluticasone-salmeterol 1 puff Inhalation Q12H [address] Adam Brood, CRNP    HYDROcodone-acetaminophen 1 tablet Oral Q4H PRN Adam Brood, CRNP    loratadine 10 mg Oral Daily Jaja Connolly GILDA Almaraz    morphine injection 4 mg Intravenous Q6H PRN GILDA Nelson    pantoprazole 40 mg Oral Early Morning GILDA Nelson    polyvinyl alcohol 1 drop Both Eyes TID GILDA Nelson    potassium chloride 20 mEq Intravenous Once GILDA Nelson Last Rate: 20 mEq (05/11/18 1344)   pregabalin 300 mg Oral BID GILDA Nelson    tolterodine 4 mg Oral Daily GILDA Nelson         Today, Patient Was Seen By: GILDA Nelson    ** Please Note: Dictation voice to text software may have been used in the creation of this document   **

## 2018-05-11 NOTE — CONSULTS
Consultation - Ozella Severin 81 y o  female MRN: [id number]  Unit/Bed#: [address] [place] [address] Encounter: [id number]      Assessment/Plan     Assessment:  Left fibular shaft fracture and left medial malleolus and posterior malleolus fractures of the distal tibia  Plan:  She did have a nice reduction performed by the emergency room physician  I did speak with the physician after the reduction and did relate to them that I thought that the reduction was quite good  She had been placed into a posterior splint  In seeing her this evening I felt that the splint was fitting well and did slightly loosen the Ace bandage however  Due to the fact that she has such massive lymphedema in the left lower extremity, she has a very poor surgical candidate  We will hopefully be able to perform non operative measures for her and likely place her into a cast or boot in 1 week  She is a very high surgical risk with the amount of lymphedema that she has into the left lower extremity  She is a high risk to not heal the wound or have infection with any surgery  We will try to heal this non operatively  She understands this and also wishes to try a non operative route for her  I should see her back in the office in 1 week for repeat clinical evaluation and repeat x-ray  She will be nonweightbearing  I believe the fibular shaft fracture will heal well on its own   The mortise appeared to be intact  The knee does not have a fracture  The report from radiology that stated there was a medial tibial plateau fracture was incorrect  History of Present Illness   Physician Requesting Consult: Nicole Sullivan MD  Reason for Consult / Principal Problem:  Left ankle pain  HPI: Gladis Lawrence is a 81y o  year old female who presents with left ankle pain after she suffered a fall at home  She is not exactly sure what happened but her leg started to shake and she simply fell down    She is being evaluated for possible cerebral vascular accident  She did have an x-ray in the emergency department at demonstrated a fibular shaft fracture and distal tibia fracture with subluxation of the joint  This was reduced by the emergency room physician  He states that now her pain is mild to moderate ache that is dull and constant  It is better with elevation and pain medication but is worse with any attempts of movement of the ankle  Inpatient consult to Orthopedic Surgery  Consult performed by: Vicki Mackey ordered by: Maranda Dance          Review of Systems   Constitutional: Negative  Negative for chills, fever and unexpected weight change  HENT: Negative  Negative for ear pain, hearing loss, nosebleeds and sore throat  Eyes: Negative  Negative for pain, redness and visual disturbance  Respiratory: Negative  Negative for cough, shortness of breath and wheezing  Cardiovascular: Positive for leg swelling  Negative for chest pain and palpitations  Gastrointestinal: Negative  Negative for abdominal pain, blood in stool, nausea and vomiting  Endocrine: Negative  Negative for polydipsia and polyuria  Genitourinary: Negative  Negative for difficulty urinating, dysuria and hematuria  Musculoskeletal:        As noted in HPI   Skin: Negative  Negative for pallor, rash and wound  Neurological: Positive for dizziness  Negative for numbness and headaches  Hematological: Negative  Negative for adenopathy  Does not bruise/bleed easily  Psychiatric/Behavioral: Negative  Negative for confusion, decreased concentration and suicidal ideas  The patient is not nervous/anxious          Historical Information   Past Medical History:   Diagnosis Date    Asthma     Cancer ([facility] [address] )     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    Cardiac disease     a fib    Fibromyalgia     Fibromyalgia, primary     GERD (gastroesophageal reflux disease)     Hypertension     Hypokalemia     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HCC) 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, left groin, lung and bladder  Patient treated with chemotherapy and radiation     Past Surgical History:   Procedure Laterality Date    APPENDECTOMY      CATARACT EXTRACTION      CHEST WALL BIOPSY N/A 10/27/2017    Procedure: SINUS EXCISION AND REMOVAL OF FOREIGN BODY, ABDOMEN (WOUND EXPLORATION);   Surgeon: Miil Mesa MD;  Location: [address] [place] [address];  Service: General    EYE SURGERY Bilateral     cataracts     HIP FRACTURE SURGERY Left     HYSTERECTOMY      JOINT REPLACEMENT Left     hip    LYMPHADENECTOMY      PORTACATH PLACEMENT Right      Social History   History   Alcohol Use No     History   Drug Use No     History   Smoking Status    Never Smoker   Smokeless Tobacco    Never Used     Comment: never smoke     Family History:   Family History   Problem Relation Age of Onset    Pneumonia Mother     Heart disease Father        Meds/Allergies   current meds:   Current Facility-Administered Medications   Medication Dose Route Frequency    acetaminophen (TYLENOL) tablet 650 mg  650 mg Oral Q6H PRN    albuterol inhalation solution 2 5 mg  2 5 mg Nebulization Q6H PRN    ALPRAZolam (XANAX) tablet 0 5 mg  0 5 mg Oral HS    atorvastatin (LIPITOR) tablet 80 mg  80 mg Oral Daily With Dinner    docusate sodium (COLACE) capsule 100 mg  100 mg Oral BID    fluticasone-salmeterol (ADVAIR) 250-50 mcg/dose inhaler 1 puff  1 puff Inhalation Q12H [address]    heparin (porcine) 25,000 units in 250 mL infusion (premix)  3-20 Units/kg/hr (Order-Specific) Intravenous Titrated    heparin (porcine) injection 2,000 Units  2,000 Units Intravenous PRN    heparin (porcine) injection 4,000 Units  4,000 Units Intravenous PRN    loratadine (CLARITIN) tablet 10 mg  10 mg Oral Daily    morphine (PF) 4 mg/mL injection 4 mg  4 mg Intravenous Q4H PRN    [START ON 5/11/2018] pantoprazole (PROTONIX) EC tablet 40 mg  40 mg Oral Early Morning    polyvinyl alcohol (LIQUIFILM TEARS) 1 4 % ophthalmic solution 1 drop  1 drop Both Eyes TID    pregabalin (LYRICA) capsule 300 mg  300 mg Oral BID    [START ON 5/11/2018] tolterodine (DETROL LA) 24 hr capsule 4 mg  4 mg Oral Daily     Allergies   Allergen Reactions    Fentanyl And Related Other (See Comments)     Passed out    Latex Rash    Penicillins Rash       Objective   Vitals: Blood pressure (!) 190/89, pulse 73, temperature 99 6 °F (37 6 °C), temperature source Tympanic, resp  rate 18, height 5' 2" (1 575 m), weight 92 kg (202 lb 13 2 oz), SpO2 94 %, not currently breastfeeding  ,Body mass index is 37 1 kg/m²  Intake/Output Summary (Last 24 hours) at 05/10/18 2306  Last data filed at 05/10/18 1701   Gross per 24 hour   Intake                0 ml   Output              300 ml   Net             -300 ml     I/O last 24 hours: In: -   Out: 300 [Urine:300]    Invasive Devices     Central Venous Catheter Line            Port A Cath 05/10/18 Right Chest less than 1 day          Peripheral Intravenous Line            Peripheral IV 05/10/18 Right Antecubital less than 1 day                Physical Exam   Constitutional: She is oriented to person, place, and time  She appears well-developed and well-nourished  HENT:   Head: Normocephalic and atraumatic  Eyes: Conjunctivae and EOM are normal    Neck: Normal range of motion  Cardiovascular: Intact distal pulses  Pulmonary/Chest: Effort normal  No respiratory distress  Neurological: She is alert and oriented to person, place, and time  Skin: Skin is warm and dry  Psychiatric: She has a normal mood and affect  Her behavior is normal      Left Ankle Exam   Swelling: moderate    Range of Motion   Dorsiflexion: abnormal   Plantar flexion: abnormal     Muscle Strength   Left ankle normal muscle strength: She is able to actively flex and extend all toes with no significant discomfort  Ankle strength testing was deferred at this point      Other   Sensation: normal  Pulse: present            Lab Results:   CBC:   Lab Results   Component Value Date    WBC 5 70 05/10/2018    HGB 11 4 (L) 05/10/2018    HCT 36 6 (L) 05/10/2018    MCV 80 (L) 05/10/2018     (L) 05/10/2018    MCH 25 0 (L) 05/10/2018    MCHC 31 3 (L) 05/10/2018    RDW 16 7 (H) 05/10/2018    MPV 10 4 05/10/2018     Imaging Studies: I have personally reviewed pertinent films in PACS x-rays prior to reduction demonstrate a subluxed left ankle with significant lymphedema present  There is a posterior malleolus fracture present as well as what appears to be a medial malleolus fracture  Postreduction x-rays demonstrate excellent alignment of the posterior malleolus fragment and the medial malleolus fragment with a joint that is congruent and located well  There is also a displaced fibular shaft fracture    EKG, Pathology, and Other Studies: I have personally reviewed pertinent films in PACS  VTE Prophylaxis: Sequential compression device Breanne Lamb     Code Status: Level 1 - Full Code  Advance Directive and Living Will:      Power of :    POLST:

## 2018-05-11 NOTE — SPEECH THERAPY NOTE
Speech Language/Pathology  Bedside Swallowing Evaluation    Patient Name: Santino Moctezuma  [id number] Date: 5/11/2018     Problem List  Patient Active Problem List   Diagnosis    Chest pain    Hypertension    Acute cholecystitis    Abdominal pain    Meningioma ([facility] )    Syncope    Hypertension, accelerated    Sepsis ([facility] )    Stenosis of left internal carotid artery    Obesity (BMI 30-39  9)    Acute bronchitis    GERD (gastroesophageal reflux disease)    Hyperlipemia    Paroxysmal atrial fibrillation (HCC)    Fibromyalgia    Hypokalemia    Moderate persistent asthma with acute exacerbation    Open wound of abdominal wall without penetration into peritoneal cavity    Asthma exacerbation    Lymphedema of left leg    Bacteremia    History of Merkel cell carcinoma    Port-a-cath in place    Acute congestive heart failure (HCC)    Moderate persistent asthma    Iron deficiency anemia    Shortness of breath    Fall    Right pontine stroke (HCC)    Chronic diastolic CHF (congestive heart failure), NYHA class 1 (HCC)    Delirium    Closed fracture of right tibia and fibula with routine healing    Toxic metabolic encephalopathy    Delirious    TIA (transient ischemic attack)    Displaced fracture of medial malleolus of left tibia, initial encounter for closed fracture    Displaced  comminuted fracture of fibula    Drop in hemoglobin    Hypomagnesemia     Past Medical History  Past Medical History:   Diagnosis Date    Asthma     Cancer ([facility] )     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    Cardiac disease     a fib    Fibromyalgia     Fibromyalgia, primary     GERD (gastroesophageal reflux disease)     Hypertension     Hypokalemia     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[facility] )     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, left groin, lung and bladder    Patient treated with chemotherapy and radiation     Past Surgical History  Past Surgical History:   Procedure Laterality Date    APPENDECTOMY      CATARACT EXTRACTION      CHEST WALL BIOPSY N/A 10/27/2017    Procedure: SINUS EXCISION AND REMOVAL OF FOREIGN BODY, ABDOMEN (WOUND EXPLORATION); Surgeon: May Chong MD;  Location: [address] [place] [address];  Service: General    EYE SURGERY Bilateral     cataracts     HIP FRACTURE SURGERY Left     HYSTERECTOMY      JOINT REPLACEMENT Left     hip    LYMPHADENECTOMY      PORTACATH PLACEMENT Right         05/11/18 1500   Swallow Information   Current Risks for Dysphagia & Aspiration Respiratory compromise;Dysphonia; New Neuro event   Current Symptoms/Concerns (Determine safety of present diet )   Current Diet Regular; Thin liquid   Baseline Diet Regular; Thin liquids   Baseline Assessment   Behavior/Cognition Alert; Cooperative; Interactive   Speech/Language Status Expressive and receptive language skills are WNL  Harsh and hoarse vocal quality patient reports is due to asthma  Patient Positioning Upright in bed   Swallow Mechanism Exam   Labial Symmetry WFL   Labial Strength WFL   Labial ROM WFL   Labial Sensation WFL   Facial Symmetry WFL   Facial Strength WFL   Facial ROM WFL   Facial Sensation WFL   Lingual Symmetry WFL   Lingual Strength WFL   Lingual ROM WFL   Lingual Sensation WFL   Velum WFL   Gag (did not assess )   Mandible WFL   Dentition Partial dentition   Volitional Cough Strong   Tracheostomy No   Consistencies Assessed and Performance   Materials Admnistered Regular/Solid; Thin liquid   Materials Adminstered Comment see above  Oral Stage WFL   Oral Stage Comment Adequate timing of oral preparation and transport for solids and liquids  Phargngeal Stage WFL   Pharyngeal Stage Comment No s/s aspiration, no delay in initiation of the swallow, adequate laryngeal elevation, clear voice quality following all swallows  Swallow Mechanics WFL;Swallow initation; Appears prompt;Good Larygneal rise   Esophageal Concerns No s/s reported   Strategies and Efficacy None required     Summary   Swallow Summary Swallow skills are safe/WFL for regular consistency diet and thin liquids  Recommendations   Risk for Aspiration Mild   Recommendations Consider oral diet   Diet Solid Recommendation Regular consistency   Diet Liquid Recommendation Thin liquid   Recommended Form of Meds With thin liquid   General Precautions Aspiration precautions;Upright as possible for all oral intake;Remain upright for 45 mins after meals   Compensatory Swallowing Strategies (None required )   Further Evaluations (Continue follow up with neurology )   Results Reviewed with PT/Family/Caregiver   Treatment Recommendations   Duration of treatment Treatment is not required     Follow up treatments (Not required )   Dysphagia Goals Patient will tolerate recommended diet without observed clinical signs of oral/pharngeal dysphagia   Speech Therapy Prognosis   Prognosis Good   Prognosis Considerations Patient Participation Level;Previous Level of Function     CORA Manzo S , [address] [facility]  Speech-Language Pathologist  [id number]

## 2018-05-11 NOTE — ASSESSMENT & PLAN NOTE
· Potassium 2 8  · Ordered KCl 40 mEq p o  x1 dose and KCl 20 mg IV x1 dose  · Repeat potassium at 1700

## 2018-05-11 NOTE — CONSULTS
Neurology Consult Follow Up      Stephen Frausto is a 81 y o  female  [address] [facility]    [id number]        Assessment/Recommendations:      1  Probable TIA  2  h/o chronic A fib on anticoagulation   3  Left carotid severe stenosis, asymptomatic  4  HTN  5  Recent right pontine infarction   6  Left frontal lobe meningioma      Patient's MRI brain does not reveal acute stroke  TIA can't be excluded  Her weakness and ROM could be limited from multiple left lower extremity fractures she suffered post fall  Normotension is fine now  Resume eliquis 5mg q12h  Cont atorvastatin 80mg as inpatient  Upon discharge, resume 40mg daily for atherosclerotic disease  Last lipid panel 3/9/18: Tchol 133, Trig 91, HDL 44,LDL 71 - wnl  HgbA1c 5 8, wnl    Echo will not    If there is presence of new stroke on MRI, will reevaluate her carotid stenosis and see whether it's symptomatic stenosis  Patient's left frontotemporal meningioma appears stable though has some mass effect  Will place an outpatient referral with Dr Kay Fierro to make sure no intervention is needed  Speech and swallow evaluation  PT/OT- rehab           Chief Complaint:  Weakness   Subjective:   Patient has remained stable  Denies any new or worsening symptom  She says she still can't bear weight with left hip  She has suffered left fibular shaft,  medial malleolus and posterior malleolus fractures of the distal tibia since her fall yesterday  Past Medical History:   Diagnosis Date    Asthma     Cancer ([facility] )     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    Cardiac disease     a fib    Fibromyalgia     Fibromyalgia, primary     GERD (gastroesophageal reflux disease)     Hypertension     Hypokalemia     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, left groin, lung and bladder    Patient treated with chemotherapy and radiation     Social History     Social History    Marital status:      Spouse name: N/A    Number of children: N/A    Years of education: N/A     Occupational History    retired      Social History Main Topics    Smoking status: Never Smoker    Smokeless tobacco: Never Used      Comment: never smoke    Alcohol use No    Drug use: No    Sexual activity: Not on file     Other Topics Concern    Not on file     Social History Narrative    [facility]ides at [facility]     Family History   Problem Relation Age of Onset    Pneumonia Mother     Heart disease Father        ROS:  Please see HPI for positive symptoms  No fever, no chills, no weight change  Ocular: No drainage, no blurred vision  HEENT:  No sore throat, earache, or congestion  No neck pain  COR:  No chest pain  No palpitations  Lungs:  no sob, wheezing,  GI:  no  nausea, no vomiting, no diarrhea, no constipation, no anorexia  :  No dysuria, frequency, or urgency  No hematuria  No vaginal discharge or vaginal bleeding  Musculoskeletal:  No joint pain or swelling or edema  Skin:  No rash or itching  Psychiatric:  no anxiety, no depression  Endocrine:  No polyuria or polydipsia  Objective:  /68   Pulse 92   Temp 98 2 °F (36 8 °C) (Oral)   Resp 20   Ht 5' 2" (1 575 m)   Wt 92 kg (202 lb 13 2 oz)   SpO2 96%   BMI 37 10 kg/m²     General: alert   Mental status: oriented x3  Attention: normal  Knowledge: fair  Language and Speech: normal  Cranial nerves: II-XII intact  Muscle tone: normal  Motor strength:  5/5 in RUE, RLE, LUE: 4/5, LLE: 2/5  Sensory: decreased to light touch, pin prick in LLE     Gait: ramona, fall risk   Coordination: finger to nose and heel to toe normal  Reflexes: 2+ throughout  except as noted      Labs:      Lab Results   Component Value Date    WBC 4 90 05/11/2018    HGB 9 1 (L) 05/11/2018    HCT 28 5 (L) 05/11/2018    MCV 79 (L) 05/11/2018    PLT 95 (L) 05/11/2018     Lab Results   Component Value Date    HGBA1C 5 8 05/10/2018     Lab Results   Component Value Date    ALT 15 05/10/2018    AST 20 05/10/2018    ALKPHOS 127 (H) 05/10/2018    BILITOT 0 30 05/10/2018     Lab Results   Component Value Date    GLUCOSE 113 05/11/2018    CALCIUM 7 8 (L) 05/11/2018     05/11/2018    K 2 8 (L) 05/11/2018    CO2 39 (H) 05/11/2018     05/11/2018    BUN 11 05/11/2018    CREATININE 0 67 05/11/2018         Review of reports and notes reveal:       Cta Head And Neck With And Without Contrast    Result Date: 5/10/2018  No acute intracranial hemorrhage or mass effect Chronic ischemic changes involving supratentorial white matter and anterolateral left frontal lobe, stable Near occlusive atherosclerotic disease at the origin of the left internal carotid artery, consistent with prior CTA Findings were directly discussed by Dr Cecilia Shah with Dr Leslie Vazquez  at 8:57 AM, 5/10/2018 Workstation performed: [id number]     X-ray Chest 1 View Portable    Result Date: 5/10/2018  No acute cardiopulmonary disease  Workstation performed: [id number]     Xr Hip/pelv 2-3 Vws Left If Performed    Addendum Date: 5/10/2018    ADDENDUM: ADDENDUM There is a voice recognition software related error in the header of the report  A phrase which reads " right hip " should read, " LEFT HIP "  Result Date: 5/10/2018  Stable postoperative appearance of the left hip  No acute osseous abnormality  Workstation performed: [id number]     Xr Femur 2 Vw Left    Result Date: 5/10/2018  No acute osseous abnormality  Moderate size joint effusion  Left hip prostheses  Workstation performed: [id number]     Xr Knee 1 Or 2 Views Left    Result Date: 5/10/2018  Moderate size suprapatellar joint effusion  Workstation performed: [id number]     Xr Tibia Fibula 2 Views Left    Result Date: 5/10/2018  1  Medial tibial plateau fracture  2   Fracture of the posterior malleolus of the tibia with posterior dislocation of the ankle  3   Mildly displaced, comminuted fracture of the mid fibula   The study was marked in [facility] for immediate notification  Workstation performed: [id number]     Xr Ankle 2 Vw Left    Result Date: 5/10/2018  Improved alignment at the tibiotalar joint which is widened anteriorly and medially  Fracture through the medial malleolus extending posteriorly  Distal fibular fracture  Workstation performed: [id number]     Xr Ankle 2 Views Left    Result Date: 5/10/2018  Fracture dislocation of the medial malleolus and tibiotalar joint  Workstation performed: [id number]     Xr Foot 2 Views Left    Result Date: 5/10/2018  1  Fracture dislocation of the ankle  No acute fracture of the foot  2   Calcaneal spur with diffuse osteopenia  Workstation performed: [id number]     Ct Stroke Alert Brain    Result Date: 5/10/2018  No acute intracranial hemorrhage or mass effect Multifocal chronic ischemic changes including left lateral frontal MCA territory infarction 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 Findings were directly discussed by Dr Db Vasquez with Dr Mandy Guevara  at 8:57 AM, 5/10/2018 Workstation performed: [id number]     *MRI brain:  Upon personal review, no acute stroke noted  Will await official report, and status on her meningioma  Thank you for this consult      Total time of encounter:  35 min  More than 50% of the time was used in counseling and/or coordination of care  Extent of couseling and/or coordination of care        Manisha Bolanos MD  [address] [place] [address] [facility]rology associates  [address] [place] [address],[address]  [place] [address]  [phone]

## 2018-05-12 LAB
ANION GAP SERPL CALCULATED.3IONS-SCNC: 0 MMOL/L (ref 4–13)
ATRIAL RATE: 78 BPM
BUN SERPL-MCNC: 10 MG/DL (ref 5–25)
CALCIUM SERPL-MCNC: 7.9 MG/DL (ref 8.3–10.1)
CHLORIDE SERPL-SCNC: 101 MMOL/L (ref 100–108)
CO2 SERPL-SCNC: 38 MMOL/L (ref 21–32)
CREAT SERPL-MCNC: 0.65 MG/DL (ref 0.6–1.3)
ERYTHROCYTE [DISTWIDTH] IN BLOOD BY AUTOMATED COUNT: 17.8 % (ref 11.6–15.1)
GFR SERPL CREATININE-BSD FRML MDRD: 84 ML/MIN/1.73SQ M
GLUCOSE SERPL-MCNC: 142 MG/DL (ref 65–140)
HCT VFR BLD AUTO: 28.3 % (ref 37–47)
HGB BLD-MCNC: 8.9 G/DL (ref 12–16)
MAGNESIUM SERPL-MCNC: 2 MG/DL (ref 1.6–2.6)
MCH RBC QN AUTO: 25 PG (ref 27–31)
MCHC RBC AUTO-ENTMCNC: 31.3 G/DL (ref 31.4–37.4)
MCV RBC AUTO: 80 FL (ref 82–98)
P AXIS: 53 DEGREES
PLATELET # BLD AUTO: 103 THOUSANDS/UL (ref 130–400)
PMV BLD AUTO: 10.2 FL (ref 8.9–12.7)
POTASSIUM SERPL-SCNC: 3.8 MMOL/L (ref 3.5–5.3)
PR INTERVAL: 174 MS
QRS AXIS: -22 DEGREES
QRSD INTERVAL: 88 MS
QT INTERVAL: 374 MS
QTC INTERVAL: 426 MS
RBC # BLD AUTO: 3.55 MILLION/UL (ref 4.2–5.4)
SODIUM SERPL-SCNC: 139 MMOL/L (ref 136–145)
T WAVE AXIS: 39 DEGREES
VENTRICULAR RATE: 78 BPM
WBC # BLD AUTO: 4.9 THOUSAND/UL (ref 4.8–10.8)

## 2018-05-12 PROCEDURE — 80048 BASIC METABOLIC PNL TOTAL CA: CPT | Performed by: NURSE PRACTITIONER

## 2018-05-12 PROCEDURE — G8987 SELF CARE CURRENT STATUS: HCPCS

## 2018-05-12 PROCEDURE — 93010 ELECTROCARDIOGRAM REPORT: CPT | Performed by: INTERNAL MEDICINE

## 2018-05-12 PROCEDURE — G8979 MOBILITY GOAL STATUS: HCPCS | Performed by: PHYSICAL THERAPIST

## 2018-05-12 PROCEDURE — 97163 PT EVAL HIGH COMPLEX 45 MIN: CPT | Performed by: PHYSICAL THERAPIST

## 2018-05-12 PROCEDURE — 83735 ASSAY OF MAGNESIUM: CPT | Performed by: NURSE PRACTITIONER

## 2018-05-12 PROCEDURE — 94640 AIRWAY INHALATION TREATMENT: CPT

## 2018-05-12 PROCEDURE — 94760 N-INVAS EAR/PLS OXIMETRY 1: CPT

## 2018-05-12 PROCEDURE — 99232 SBSQ HOSP IP/OBS MODERATE 35: CPT | Performed by: NURSE PRACTITIONER

## 2018-05-12 PROCEDURE — G8988 SELF CARE GOAL STATUS: HCPCS

## 2018-05-12 PROCEDURE — G8978 MOBILITY CURRENT STATUS: HCPCS | Performed by: PHYSICAL THERAPIST

## 2018-05-12 PROCEDURE — 97167 OT EVAL HIGH COMPLEX 60 MIN: CPT

## 2018-05-12 PROCEDURE — 94664 DEMO&/EVAL PT USE INHALER: CPT

## 2018-05-12 PROCEDURE — 85027 COMPLETE CBC AUTOMATED: CPT | Performed by: NURSE PRACTITIONER

## 2018-05-12 RX ORDER — IPRATROPIUM BROMIDE AND ALBUTEROL SULFATE 2.5; .5 MG/3ML; MG/3ML
3 SOLUTION RESPIRATORY (INHALATION)
Status: DISCONTINUED | OUTPATIENT
Start: 2018-05-12 | End: 2018-05-13

## 2018-05-12 RX ORDER — POTASSIUM CHLORIDE 750 MG/1
10 TABLET, EXTENDED RELEASE ORAL DAILY
Status: DISCONTINUED | OUTPATIENT
Start: 2018-05-12 | End: 2018-05-18 | Stop reason: HOSPADM

## 2018-05-12 RX ORDER — METOPROLOL TARTRATE 50 MG/1
50 TABLET, FILM COATED ORAL EVERY 12 HOURS SCHEDULED
Status: DISCONTINUED | OUTPATIENT
Start: 2018-05-12 | End: 2018-05-18 | Stop reason: HOSPADM

## 2018-05-12 RX ORDER — FUROSEMIDE 10 MG/ML
20 SOLUTION ORAL DAILY
Status: DISCONTINUED | OUTPATIENT
Start: 2018-05-12 | End: 2018-05-12

## 2018-05-12 RX ORDER — FUROSEMIDE 20 MG/1
20 TABLET ORAL DAILY
Status: DISCONTINUED | OUTPATIENT
Start: 2018-05-12 | End: 2018-05-18 | Stop reason: HOSPADM

## 2018-05-12 RX ADMIN — PREGABALIN 300 MG: 100 CAPSULE ORAL at 17:45

## 2018-05-12 RX ADMIN — FUROSEMIDE 20 MG: 20 TABLET ORAL at 11:00

## 2018-05-12 RX ADMIN — DOCUSATE SODIUM 100 MG: 100 CAPSULE, LIQUID FILLED ORAL at 17:43

## 2018-05-12 RX ADMIN — LORATADINE 10 MG: 10 TABLET ORAL at 09:19

## 2018-05-12 RX ADMIN — PREGABALIN 300 MG: 100 CAPSULE ORAL at 09:21

## 2018-05-12 RX ADMIN — IPRATROPIUM BROMIDE AND ALBUTEROL SULFATE 3 ML: .5; 3 SOLUTION RESPIRATORY (INHALATION) at 20:48

## 2018-05-12 RX ADMIN — METOPROLOL TARTRATE 50 MG: 50 TABLET ORAL at 22:34

## 2018-05-12 RX ADMIN — POLYVINYL ALCOHOL 1 DROP: 14 SOLUTION/ DROPS OPHTHALMIC at 17:43

## 2018-05-12 RX ADMIN — DOCUSATE SODIUM 100 MG: 100 CAPSULE, LIQUID FILLED ORAL at 09:18

## 2018-05-12 RX ADMIN — FLUTICASONE PROPIONATE AND SALMETEROL 1 PUFF: 50; 250 POWDER RESPIRATORY (INHALATION) at 22:35

## 2018-05-12 RX ADMIN — METOPROLOL TARTRATE 25 MG: 25 TABLET ORAL at 09:18

## 2018-05-12 RX ADMIN — POTASSIUM CHLORIDE 10 MEQ: 750 TABLET, EXTENDED RELEASE ORAL at 11:00

## 2018-05-12 RX ADMIN — ALPRAZOLAM 0.5 MG: 0.5 TABLET ORAL at 22:36

## 2018-05-12 RX ADMIN — FLUTICASONE PROPIONATE AND SALMETEROL 1 PUFF: 50; 250 POWDER RESPIRATORY (INHALATION) at 09:19

## 2018-05-12 RX ADMIN — POLYVINYL ALCOHOL 1 DROP: 14 SOLUTION/ DROPS OPHTHALMIC at 09:19

## 2018-05-12 RX ADMIN — ATORVASTATIN CALCIUM 80 MG: 80 TABLET, FILM COATED ORAL at 17:42

## 2018-05-12 RX ADMIN — TOLTERODINE TARTRATE 4 MG: 2 CAPSULE, EXTENDED RELEASE ORAL at 09:18

## 2018-05-12 RX ADMIN — PANTOPRAZOLE SODIUM 40 MG: 40 TABLET, DELAYED RELEASE ORAL at 06:13

## 2018-05-12 NOTE — ASSESSMENT & PLAN NOTE
· Acute exacerbation  Scattered expiratory wheezes noted throughout  · Increased DuoNeb to every 4 hours  Added Solu-Medrol 40 mg q 12 hours  · Continue albuterol nebulizer treatment as needed  · Continue home inhaler, Advair   · Repeat chest x-ray, Left hemidiaphragm is obscured and there is blunting of the left costophrenic angle  Increased retrocardiac opacity, suggesting on the left lower lobe atelectasis or infiltrate  Probable small left effusion  · Will hold off on antibiotics as of now because she has been afebrile without leukocytosis  Will monitor closely

## 2018-05-12 NOTE — ASSESSMENT & PLAN NOTE
· Repeat chest x-ray,  No pleural effusion  ·  1/ 2018 ECHO showed EF of 70-75%    · Resume Lasix 20 mg daily with potassium supplementation  · Monitor I+O, daily weights

## 2018-05-12 NOTE — PHYSICAL THERAPY NOTE
PT EVALUATION:   05/12/18 1140   Pain Assessment   Pain Assessment 0-10   Pain Score 7   Pain Type Acute pain   Pain Location Ankle   Pain Orientation Left   Home Living   Type of Home House   Home Layout Multi-level   Bathroom Shower/Tub Tub/shower unit   Home Equipment Walker   Additional Comments pt has aides that assist on daily basis   Prior Function   Level of [place] Needs assistance with IADLs; Needs assistance with ADLs and functional mobility   Lives With Alone   Receives Help From Home health   ADL Assistance Needs assistance   IADLs Needs assistance   Falls in the last 6 months 1 to 4   Restrictions/Precautions   Weight Bearing Precautions Per Order Yes   LLE Weight Bearing Per Order NWB   Other Precautions (pt has splint on L LE anticipate cast L Le by ortho in week)   General   Additional Pertinent History Pt had fall in home with (+) fracture posterior malleolus tibial talur fracture with splint on L LE   Pt NWBing  Pt to be casted next week with ortho   Pt has lymphedema L LE   Family/Caregiver Present No   Cognition   Overall Cognitive Status Impaired   Orientation Level Oriented X4   Following Commands Follows one step commands with increased time or repetition   RUE Assessment   RUE Assessment WFL   LUE Assessment   LUE Assessment WFL   RLE Assessment   RLE Assessment WFL   LLE Assessment   LLE Assessment (pain with any movement L LE)   Bed Mobility   Rolling R 2  Maximal assistance   Rolling L 2  Maximal assistance   Supine to Sit 2  Maximal assistance   Additional items Assist x 2   Sit to Supine 2  Maximal assistance   Additional items Assist x 2   Transfers   Sit to Stand 2  Maximal assistance   Additional items Assist x 2   Stand to Sit 2  Maximal assistance   Additional items Assist x 2   Additional Comments unable to maintain NWBing L LE status   Balance   Static Sitting Fair -   Dynamic Sitting Poor +   Static Standing Poor   Dynamic Standing Poor -   Endurance Deficit   Endurance Deficit Yes   Endurance Deficit Description pt fatigues easily and has severe c/o pain   Activity Tolerance   Activity Tolerance Patient limited by fatigue;Patient limited by pain   Nurse Made Aware yes   Assessment   Prognosis Good   Problem List Decreased strength;Decreased range of motion;Decreased endurance; Impaired balance;Decreased mobility;Pain   Assessment Patient seen for Physical Therapy evaluation  Patient admitted with TIA (transient ischemic attack)  Comorbidities affecting patient's physical performance include: afib, hypertension and cancer, merkel cell ca, chemo, fibromyalgia, chronic Afib, recent R pontine infarction  Personal factors affecting patient at time of initial evaluation include: lives in multi level story house, ambulating with assistive device, inability to ambulate household distances, inability to navigate community distances, inability to perform dynamic tasks in community, inability to perform ADLS, inability to perform IADLS  and inability to live alone  Prior to admission, patient was requiring assist for functional mobility with RW, requiring assist for ADLS and requiring assist for IADLS  Please find objective findings from Physical Therapy assessment regarding body systems outlined above with impairments and limitations including weakness, decreased ROM, impaired balance, decreased endurance, gait deviations, pain, decreased activity tolerance, decreased functional mobility tolerance and fall risk  The Barthel Index was used as a functional outcome tool presenting with a score of 25 today indicating marked limitations of functional mobility and ADLS  Patient's clinical presentation is currently unstable/unpredictable as seen in patient's presentation of changing level of pain, varying levels of cognitive performance, increased fall risk, new onset of impairment of functional mobility, decreased endurance and new onset of weakness   Pt would benefit from continued Physical Therapy treatment to address deficits as defined above and maximize level of functional mobility  As demonstrated by objective findings, the assigned level of complexity for this evaluation is high  Goals   Patient Goals "I want to sleep"   STG Expiration Date (1-7 days)   Short Term Goal #1 1) able to transfer, mod A   Short Term Goal #2 2) able to stand x 2 min without LOB NWBing L Le  3) assess gait NWBing L Le    LTG Expiration Date (8-14 days)   Long Term Goal #1 1) transfers, min A   Long Term Goal #2 2) gait x 5 feet with SW, [place] L LE , mod A   Plan   Treatment/Interventions Functional transfer training;LE strengthening/ROM; Therapeutic exercise; Endurance training;Patient/family training;Bed mobility;Gait training;Spoke to nursing;OT   PT Frequency 5x/wk   Recommendation   Recommendation Short-term skilled PT   Modified [place] Scale   Modified [place] Scale 0   Barthel Index   Feeding 5   Bathing 0   Grooming Score 0   Dressing Score 0   Bladder Score 5   Bowels Score 10   Toilet Use Score 0   Transfers (Bed/Chair) Score 5   Mobility (Level Surface) Score 0   Stairs Score 0   Barthel Index Score 22   Licensure   [place] License Number  Naina Tan [place] DPT [id number]

## 2018-05-12 NOTE — ASSESSMENT & PLAN NOTE
· Ten years ago for found to have a lesion 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, pathology results were unclear patient was treated for high-grade neuroendocrine tumor/Merkel cell  She received resection and lymph node dissection of the left groin which were both positive  She received radiation to the groin and tumor bed and received IV chemotherapy  · Follows with outpatient oncologist, Dr Marty Head  Last seen in February 2018, no signs of recurrence, recent scan did not demonstrate evidence of recurrence Merkel cell, surveillance ongoing and patient is to follow up in 6 months    · Left lower extremity with chronic edema on Lasix  · Resume Lasix 20 mg daily

## 2018-05-12 NOTE — ED PROVIDER NOTES
History  Chief Complaint   Patient presents with    Fall     pt states was walking to the bathroom and left leg gave out, c/o pain left lower leg       History provided by:  Patient   used: No    Fall   Mechanism of injury: fall    Mechanism of injury comment:  Pt states her leg "gave from under her, felt weak, but I always have problems with my leg"  Injury location:  Leg  Leg injury location:  L lower leg, L upper leg, L hip and L foot  Incident location:  Home  Arrived directly from scene: yes    Fall:     Fall occurred:  Walking    Height of fall:  Standing    Impact surface:  Hard floor    Point of impact: left side of her body      Entrapped after fall: no    Protective equipment: none    Suspicion of alcohol use: no    Suspicion of drug use: no (no illicit drugs, but possibly Rx meds)    Prior to arrival data:     Bystander interventions:  None    Patient ambulatory at scene: no      Blood loss:  None    Responsiveness at scene:  Alert    Orientation at scene:  Person, place, situation and time    Loss of consciousness: no      Amnesic to event: no      Airway interventions:  None    Breathing interventions:  None    IV access status:  None    IO access:  None    Fluids administered:  None    Cardiac interventions:  None    Medications administered:  None    Immobilization:  None  Associated symptoms: no abdominal pain, no back pain, no blindness, no chest pain, no difficulty breathing, no headaches, no hearing loss, no loss of consciousness, no nausea, no neck pain, no seizures and no vomiting    Associated symptoms comment:  Slurred speech started while en route in ambulance per pt and EMS  Risk factors: anticoagulation therapy    Risk factors: no AICD, no asthma, no beta blocker therapy, no CABG, no CAD, no CHF, no COPD, no diabetes, no dialysis, no hemophilia, no kidney disease, no pacemaker, no past MI, not pregnant and no steroid use    Risk factors comment:  Morbid obesity, chronic LLE lymphadema, s/p lymphnode removal s/t CA, Merkel cell CA, firbromyalgia, afib on anticoagulation, asthma, HTN  Neurologic Problem   Location:  Motor  Quality:  Slurred speech, ? LLE weakness  Severity:  Mild  Onset quality:  Sudden  Duration: <30  Timing:  Constant  Progression:  Unchanged  Chronicity:  New  Context:  While being transported via EMS to ED after fall, pt developed slurred speech and felt her tongue was heavier  Relieved by:  Nothing triedd  Worsened by:  Nothing  Ineffective treatments:  Nothing tried  Associated symptoms: fatigue    Associated symptoms: no abdominal pain, no chest pain, no congestion, no cough, no diarrhea, no fever, no headaches, no loss of consciousness, no myalgias, no nausea, no rash, no rhinorrhea, no shortness of breath, no sore throat, no vomiting and no wheezing    Risk factors:  See PMH, additionally polypharmacy      Prior to Admission Medications   Prescriptions Last Dose Informant Patient Reported? Taking?    ALPRAZolam (XANAX) 0 5 mg tablet 5/9/2018  Yes Yes   Sig: Take 0 5 mg by mouth daily at bedtime   Fesoterodine Fumarate ER (TOVIAZ) 8 MG TB24 5/9/2018  Yes Yes   Sig: Take by mouth daily at bedtime     albuterol (2 5 mg/3 mL) 0 083 % nebulizer solution 5/9/2018  Yes Yes   Sig: Take 2 5 mg by nebulization every 6 (six) hours as needed for wheezing   amLODIPine (NORVASC) 5 mg tablet 5/9/2018  Yes Yes   Sig: Take 5 mg by mouth daily   apixaban (ELIQUIS) 5 mg 5/9/2018  Yes Yes   Sig: Take 5 mg by mouth every 12 (twelve) hours     atorvastatin (LIPITOR) 40 mg tablet 5/9/2018  Yes Yes   Sig: Take 40 mg by mouth daily   esomeprazole (NexIUM) 40 MG capsule 5/10/2018 at 0700  Yes Yes   Sig: Take 40 mg by mouth daily   fluticasone-salmeterol (ADVAIR) 250-50 mcg/dose inhaler 5/9/2018  No Yes   Sig: Inhale 1 puff every 12 (twelve) hours This is home medication   furosemide (LASIX) 20 mg tablet 5/9/2018  Yes Yes   Sig: Take 20 mg by mouth daily   loratadine (CLARITIN) 10 mg tablet 5/9/2018  Yes Yes   Sig: Take 10 mg by mouth daily   metoprolol tartrate (LOPRESSOR) 50 mg tablet 5/9/2018  Yes Yes   Sig: Take 50 mg by mouth every 12 (twelve) hours   polyvinyl alcohol (LIQUIFILM TEARS) 1 4 % ophthalmic solution 5/9/2018  No No   Sig: Administer 1 drop to both eyes 3 (three) times a day   potassium chloride (K-DUR) 10 mEq tablet 5/9/2018  Yes Yes   Sig: Take 10 mEq by mouth daily  pregabalin (LYRICA) 300 MG capsule 5/9/2018  Yes Yes   Sig: Take 300 mg by mouth 2 (two) times a day  Facility-Administered Medications: None       Past Medical History:   Diagnosis Date    Asthma     Cancer ([facility] [address] )     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    Cardiac disease     a fib    Fibromyalgia     Fibromyalgia, primary     GERD (gastroesophageal reflux disease)     Hypertension     Hypokalemia     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, left groin, lung and bladder  Patient treated with chemotherapy and radiation       Past Surgical History:   Procedure Laterality Date    APPENDECTOMY      CATARACT EXTRACTION      CHEST WALL BIOPSY N/A 10/27/2017    Procedure: SINUS EXCISION AND REMOVAL OF FOREIGN BODY, ABDOMEN (WOUND EXPLORATION); Surgeon: Alia Reddy MD;  Location: [address] [place] [address];  Service: General    EYE SURGERY Bilateral     cataracts     HIP FRACTURE SURGERY Left     HYSTERECTOMY      JOINT REPLACEMENT Left     hip    LYMPHADENECTOMY      PORTACATH PLACEMENT Right        Family History   Problem Relation Age of Onset    Pneumonia Mother     Heart disease Father      I have reviewed and agree with the history as documented  Social History   Substance Use Topics    Smoking status: Never Smoker    Smokeless tobacco: Never Used      Comment: never smoke    Alcohol use No        Review of Systems   Constitutional: Positive for activity change and fatigue  Negative for chills, diaphoresis and fever  HENT: Negative for congestion, hearing loss, rhinorrhea, sore throat, trouble swallowing and voice change  Denies head/neck injury   Eyes: Negative for blindness, pain and visual disturbance  Respiratory: Negative for cough, chest tightness, shortness of breath and wheezing  Cardiovascular: Negative for chest pain  Gastrointestinal: Negative for abdominal distention, abdominal pain, diarrhea, nausea and vomiting  Genitourinary: Negative for difficulty urinating, dysuria and flank pain  Musculoskeletal: Negative for back pain, myalgias and neck pain  Skin: Negative for pallor, rash and wound  Allergic/Immunologic: Negative for immunocompromised state  Neurological: Positive for speech difficulty  Negative for dizziness, tremors, seizures, loss of consciousness, syncope, facial asymmetry, weakness, light-headedness, numbness and headaches  Psychiatric/Behavioral: The patient is nervous/anxious  Physical Exam  ED Triage Vitals   Temperature Pulse Respirations Blood Pressure SpO2   05/10/18 0815 05/10/18 0815 05/10/18 0815 05/10/18 0815 05/10/18 0815   98 6 °F (37 °C) 75 20 142/68 90 %      Temp Source Heart Rate Source Patient Position - Orthostatic VS BP Location FiO2 (%)   05/10/18 0815 05/10/18 1256 05/10/18 0815 05/10/18 0815 --   Tympanic Monitor Lying Right arm       Pain Score       05/10/18 0815       9           Orthostatic Vital Signs  Vitals:    05/11/18 1328 05/11/18 2136 05/12/18 0419 05/12/18 0916   BP: 125/60 152/72 145/68 132/60   Pulse: 84 98 88 93   Patient Position - Orthostatic VS: Lying Lying Lying Lying       Physical Exam   Constitutional: She is oriented to person, place, and time  She appears well-developed and well-nourished  No distress  Obese  F   HENT:   Head: Normocephalic and atraumatic  Mouth/Throat: Oropharynx is clear and moist    Eyes: EOM are normal  Pupils are equal, round, and reactive to light     3 mm pupils b/l   Neck: Normal range of motion  Neck supple  No JVD present  No tracheal deviation present  Cardiovascular: Normal rate and intact distal pulses  Pulmonary/Chest: Effort normal and breath sounds normal  No stridor  Abdominal: Soft  She exhibits no distension  There is no tenderness  Obese abd   Musculoskeletal: Normal range of motion  She exhibits edema and tenderness  She exhibits no deformity  Neurological: She is alert and oriented to person, place, and time  Slurred speech, mild  Clears slightly with intention  No other gross motor/sensory deficits, but RLE eval, limited by chronic lymphedema and new acute diffuse pain s/o fx   Skin: Skin is warm and dry  She is not diaphoretic  LLE chronic venous stasis/lymphadema   Psychiatric: Her behavior is normal  Thought content normal    Mod anxious   Nursing note and vitals reviewed        ED Medications  Medications   ipratropium-albuterol (DUO-NEB) 0 5-2 5 mg/3 mL inhalation solution 3 mL (3 mL Nebulization Not Given 5/10/18 1153)   atorvastatin (LIPITOR) tablet 80 mg (80 mg Oral Given 5/11/18 1742)   albuterol inhalation solution 2 5 mg (not administered)   ALPRAZolam (XANAX) tablet 0 5 mg (0 5 mg Oral Given 5/11/18 2134)   pantoprazole (PROTONIX) EC tablet 40 mg (40 mg Oral Given 5/12/18 0613)   tolterodine (DETROL LA) 24 hr capsule 4 mg (4 mg Oral Given 5/12/18 0918)   fluticasone-salmeterol (ADVAIR) 250-50 mcg/dose inhaler 1 puff (1 puff Inhalation Given 5/12/18 0919)   loratadine (CLARITIN) tablet 10 mg (10 mg Oral Given 5/12/18 0919)   polyvinyl alcohol (LIQUIFILM TEARS) 1 4 % ophthalmic solution 1 drop (1 drop Both Eyes Given 5/12/18 0919)   pregabalin (LYRICA) capsule 300 mg (300 mg Oral Given 5/12/18 0921)   docusate sodium (COLACE) capsule 100 mg (100 mg Oral Given 5/12/18 0918)   acetaminophen (TYLENOL) tablet 650 mg (not administered)   HYDROcodone-acetaminophen (NORCO) 5-325 mg per tablet 1 tablet (not administered)   morphine injection 2 mg (2 mg Intravenous Given 5/11/18 2135)   ipratropium-albuterol (DUO-NEB) 0 5-2 5 mg/3 mL inhalation solution 3 mL (not administered)   metoprolol tartrate (LOPRESSOR) tablet 50 mg (not administered)   furosemide (LASIX) tablet 20 mg (not administered)   potassium chloride (K-DUR,KLOR-CON) CR tablet 10 mEq (not administered)   apixaban (ELIQUIS) tablet 5 mg (not administered)   iohexol (OMNIPAQUE) 350 MG/ML injection (MULTI-DOSE) 85 mL (85 mL Intravenous Given 5/10/18 0853)   aspirin (ECOTRIN) EC tablet 325 mg (325 mg Oral Given 5/10/18 0919)   traMADol (ULTRAM) tablet 50 mg (50 mg Oral Given 5/10/18 1045)   propofol (DIPRIVAN) 200 MG/20ML bolus injection 44 mg (44 mg Intravenous Given 5/10/18 1257)   morphine injection 2 mg (2 mg Intravenous Given 5/10/18 1808)   magnesium sulfate 2 g/50 mL IVPB (premix) 2 g (2 g Intravenous New Bag 5/11/18 1146)   potassium chloride (K-DUR,KLOR-CON) CR tablet 40 mEq (40 mEq Oral Given 5/11/18 1146)   potassium chloride 20 mEq IVPB (premix) (20 mEq Intravenous New Bag 5/11/18 1344)   Gadobutrol injection (SINGLE-DOSE) SOLN 9 mL (9 mL Intravenous Given 5/11/18 0925)       Diagnostic Studies  Results Reviewed     Procedure Component Value Units Date/Time    Hepatic function panel [[id number]]  (Abnormal) Collected:  05/10/18 0826    Lab Status:  Final result Specimen:  Blood from Arm, Right Updated:  05/10/18 1037     Total Bilirubin 0 30 mg/dL      Bilirubin, Direct 0 10 mg/dL      Alkaline Phosphatase 127 (H) U/L      AST 20 U/L      ALT 15 U/L      Total Protein 6 4 g/dL      Albumin 2 9 (L) g/dL     Basic metabolic panel [[id number]]  (Abnormal) Collected:  05/10/18 0826    Lab Status:  Final result Specimen:  Blood from Arm, Right Updated:  05/10/18 0871     Sodium 140 mmol/L      Potassium 3 6 mmol/L      Chloride 102 mmol/L      CO2 38 (H) mmol/L      Anion Gap 0 (L) mmol/L      BUN 10 mg/dL      Creatinine 0 93 mg/dL      Glucose 108 mg/dL      Calcium 8 7 mg/dL      eGFR 58 ml/min/1 73sq m     Narrative: National Kidney Disease Education Program recommendations are as follows:  GFR calculation is accurate only with a steady state creatinine  Chronic Kidney disease less than 60 ml/min/1 73 sq  meters  Kidney failure less than 15 ml/min/1 73 sq  meters  Troponin I [[id number]]  (Normal) Collected:  05/10/18 0826    Lab Status:  Final result Specimen:  Blood from Arm, Right Updated:  05/10/18 0859     Troponin I <0 02 ng/mL     Narrative:         [employer] Chemistry analyzer 99% cutoff is > 0 04 ng/mL in network labs    o cTnI 99% cutoff is useful only when applied to patients in the clinical setting of myocardial ischemia  o cTnI 99% cutoff should be interpreted in the context of clinical history, ECG findings and possibly cardiac imaging to establish correct diagnosis  o cTnI 99% cutoff may be suggestive but clearly not indicative of a coronary event without the clinical setting of myocardial ischemia  APTT [[id number]]  (Normal) Collected:  05/10/18 0826    Lab Status:  Final result Specimen:  Blood from Arm, Right Updated:  05/10/18 0849     PTT 28 seconds     Protime-INR [[id number]]  (Normal) Collected:  05/10/18 0826    Lab Status:  Final result Specimen:  Blood from Arm, Right Updated:  05/10/18 0849     Protime 11 1 seconds      INR 1 06    CBC [[id number]]  (Abnormal) Collected:  05/10/18 0826    Lab Status:  Final result Specimen:  Blood from Arm, Right Updated:  05/10/18 0838     WBC 5 70 Thousand/uL      RBC 4 59 Million/uL      Hemoglobin 11 4 (L) g/dL      Hematocrit 36 6 (L) %      MCV 80 (L) fL      MCH 25 0 (L) pg      MCHC 31 3 (L) g/dL      RDW 16 7 (H) %      Platelets 720 (L) Thousands/uL      MPV 10 4 fL     Fingerstick Glucose (POCT) [[id number]]  (Normal) Collected:  05/10/18 0823    Lab Status:  Final result Updated:  05/10/18 0825     POC Glucose 118 mg/dl                  MRI brain w wo contrast   Final Result by Roxanne Mckenzie DO (05/11 1440)      No acute intracranial abnormality  No evidence of recent infarct  Briskly enhancing extra-axial mass as described at the frontal temporal junction stable in size from prior study with adjacent FLAIR abnormality compatible with edema  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  Workstation performed: [id number]         XR ankle 2 vw left   Final Result by Alvarado Garcia MD (05/10 1276)      Improved alignment at the tibiotalar joint which is widened anteriorly and medially  Fracture through the medial malleolus extending posteriorly  Distal fibular fracture  Workstation performed: [id number]         XR hip/pelv 2-3 vws left if performed   Final Result by Interface, Radiology Results In (05/10 1340)   Addendum 1 of 1 by Anabell Nieves DO (05/10 1351)   ADDENDUM:      ADDENDUM      There is a voice recognition software related error in the header of the    report  A phrase which reads " right hip "       should read, " LEFT HIP "  Final   Stable postoperative appearance of the left hip  No acute osseous abnormality  Workstation performed: [id number]         XR femur 2 vw left   Final Result by Alvarado Garcia MD (05/10 0085)      No acute osseous abnormality  Moderate size joint effusion  Left hip prostheses  Workstation performed: [id number]         XR tibia fibula 2 views LEFT   Final Result by Anabell Nieves DO (05/10 1301)   1  Medial tibial plateau fracture  2   Fracture of the posterior malleolus of the tibia with posterior dislocation of the ankle  3   Mildly displaced, comminuted fracture of the mid fibula  The study was marked in [facility] for immediate notification  Workstation performed: [id number]         XR knee 1 or 2 views left   Final Result by Anabell Nieves DO (05/10 1305)   Moderate size suprapatellar joint effusion        Workstation performed: [id number]         XR ankle 2 views LEFT   Final Result by Antolin Carrasquillo MD (05/10 1134)      Fracture dislocation of the medial malleolus and tibiotalar joint  Workstation performed: [id number]         XR foot 2 views LEFT   Final Result by Antolin Carrasquillo MD (05/10 1131)         1  Fracture dislocation of the ankle  No acute fracture of the foot  2   Calcaneal spur with diffuse osteopenia  Workstation performed: [id number]         CTA head and neck with and without contrast   Final Result by Andi Romero MD (05/10 1003)   No acute intracranial hemorrhage or mass effect      Chronic ischemic changes involving supratentorial white matter and anterolateral left frontal lobe, stable      Near occlusive atherosclerotic disease at the origin of the left internal carotid artery, consistent with prior CTA      Findings were directly discussed by Dr Lisha Banks with Dr Harish Cisnreos  at 8:57 AM, 5/10/2018                  Workstation performed: [id number]         CT stroke alert brain   Final Result by Andi Romero MD (05/10 1002)   No acute intracranial hemorrhage or mass effect      Multifocal chronic ischemic changes including left lateral frontal MCA territory infarction      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         Findings were directly discussed by Dr Lisha Banks with Dr Harish Cisneros  at 8:57 AM, 5/10/2018            Workstation performed: [id number]         X-ray chest 1 view portable   Final Result by Scar Campos MD (05/10 9413)      No acute cardiopulmonary disease  Workstation performed: [id number]                    Procedures  Procedural Sedation  Date/Time: 5/10/2018 1:00 PM  Performed by: Sangeetha Cook  Authorized by: Sangeetha Cook     Consent:     Consent obtained:  Verbal    Consent given by:  Patient    Risks discussed:   Allergic reaction, dysrhythmia, inadequate sedation, nausea, prolonged hypoxia resulting in organ damage, respiratory compromise necessitating ventilatory assistance and intubation, vomiting and prolonged sedation necessitating reversal    Alternatives discussed:  Analgesia without sedation  Universal protocol:     Procedure explained and questions answered to patient or proxy's satisfaction: yes      Relevant documents present and verified: yes      Test results available and properly labeled: yes      Imaging studies available: yes      Required blood products, implants, devices, and special equipment available: no      Immediately prior to procedure a time out was called: yes      Patient identity confirmation method:  Verbally with patient and arm band  Indications:     Sedation purpose:  Fracture reduction    Procedure necessitating sedation performed by:  Different physician    Intended level of sedation:  Moderate (conscious sedation)  Pre-sedation assessment:     Time since last food or drink:  >12 hours    ASA classification: class 3 - patient with severe systemic disease      Neck mobility: reduced      Mouth openin finger widths    Thyromental distance:  2 finger widths    Mallampati score:  II - soft palate, uvula, fauces visible    Pre-sedation assessments completed and reviewed: airway patency, cardiovascular function, hydration status, mental status, nausea/vomiting, pain level, respiratory function and temperature      History of difficult intubation: no    Immediate pre-procedure details:     Reassessment: Patient reassessed immediately prior to procedure      Reviewed: vital signs, relevant labs/tests and NPO status      Verified: bag valve mask available, emergency equipment available, intubation equipment available, IV patency confirmed, oxygen available and suction available    Procedure details (see MAR for exact dosages):     Preoxygenation:  Nasal cannula    Sedation:  Propofol    Intra-procedure monitoring:  Blood pressure monitoring, cardiac monitor, continuous pulse oximetry, frequent LOC assessments and frequent vital sign checks    Intra-procedure events: none      Intra-procedure management:  Supplemental oxygen  Post-procedure details:     Attendance: Constant attendance by certified staff until patient recovered      Recovery: Patient returned to pre-procedure baseline      Post-sedation assessments completed and reviewed: airway patency, cardiovascular function, hydration status, mental status, nausea/vomiting, pain level, respiratory function and temperature      Patient is stable for discharge or admission: yes      Patient tolerance: Tolerated well, no immediate complications  Orthopedic Injury  Date/Time: 5/12/2018 11:30 AM  Performed by: Zoey Ding  Authorized by: Zoey Ding   Consent: Verbal consent obtained  Consent given by: patient  Patient understanding: patient states understanding of the procedure being performed  Patient consent: the patient's understanding of the procedure matches consent given  Imaging studies: imaging studies available  Patient identity confirmed: verbally with patient and arm band  Time out: Immediately prior to procedure a "time out" was called to verify the correct patient, procedure, equipment, support staff and site/side marked as required    Injury location: lower leg  Location details: left lower leg  Injury type: fracture-dislocation  Pre-procedure neurovascular assessment: neurovascularly intact  Pre-procedure distal perfusion: normal  Pre-procedure neurological function: normal  Pre-procedure range of motion: reduced  Pre-procedure range of motion comment: s/t pain    Anesthesia:  Local anesthesia used: no  General Anesthesia used: no  Sedation:  Sedation Type: moderate (conscious) sedation (See separate Procedural Sedation form)Manipulation performed: yes  Skin traction used: no  Skeletal traction used: yes  Reduction successful: yes  X-ray confirmed reduction: yes  Immobilization: splint  Splint type: short leg (posterior splint, LLE)  Supplies used: fiberglass  Post-procedure neurovascular assessment: post-procedure neurovascularly intact  Post-procedure distal perfusion: normal  Post-procedure neurological function: normal  Post-procedure range of motion: unchanged  Patient tolerance: Patient tolerated the procedure well with no immediate complications             Phone Contacts  ED Phone Contact    ED Course  ED Course as of May 12 1055   Thu May 10, 2018   1257 XR tibia fibula 2 views LEFT                               MDM  Number of Diagnoses or Management Options  Slurred speech: new and requires workup  Diagnosis management comments: Lt ankle - closed fx/dislocation  Lt fibula - closed comminuted fx, displaced  Chronic LLE lymphedema  r/o CVA, TBI, acs, infection metabolic/electrolyte abnormality, intox, med effect, other acute trauma  - POC glucose  - EKG  - Labs  - CTH, CTA  - Xrays, LLE  - Neuro eval  - Ortho consult  - ASA  - PRN analgesia  - Joint reduction/splint  - Adm           Amount and/or Complexity of Data Reviewed  Clinical lab tests: ordered and reviewed  Tests in the radiology section of CPT®: ordered and reviewed  Tests in the medicine section of CPT®: reviewed and ordered  Decide to obtain previous medical records or to obtain history from someone other than the patient: yes  Obtain history from someone other than the patient: yes (EMS)  Review and summarize past medical records: yes  Discuss the patient with other providers: yes (Neuro - low suspicion for acute CVA, pt not candidate for TPA    Dr Jeanie Singh - agree with reduction, in good place, will see pt in-house    Dr Markos Carballo - case discussed, care transferred)  Independent visualization of images, tracings, or specimens: yes    Risk of Complications, Morbidity, and/or Mortality  Presenting problems: high  Diagnostic procedures: moderate  Management options: high    Patient Progress  Patient progress: improved (Stable at adm)    CritCare Time    Disposition  Final diagnoses:   Slurred speech     Time reflects when diagnosis was documented in both MDM as applicable and the Disposition within this note     Time User Action Codes Description Comment    5/10/2018  8:23 AM Allyson Grumbling Add [R47 81] Slurred speech     5/10/2018  1:59 PM Beverlee Lusty Add [S82 201D,  S82 401D] Closed fracture of right tibia and fibula with routine healing     5/10/2018  1:59 PM Beverlee Lusty Modify [M95 792T,  S82 401D] Closed fracture of right tibia and fibula with routine healing     5/10/2018  1:59 PM Beverlee Lusty Add [S82 [id number]] Displaced fracture of medial malleolus of left tibia, initial encounter for closed fracture     5/10/2018  1:59 PM Beverlee Lusty Modify [T42 [id number]] Displaced fracture of medial malleolus of left tibia, initial encounter for closed fracture     5/10/2018  1:59 PM Beverlee Lusty Add Jefm Dyke Displaced  comminuted fracture of fibula     5/10/2018  1:59 PM Beverlee Lusty Modify [C72 390V] Displaced  comminuted fracture of fibula     5/10/2018  1:59 PM Beverlee Lusty Remove [[id number] [id number],  S82 [id number]] Closed fracture of right tibia and fibula with routine healing     5/11/2018  4:30 PM Esvin Chahal Add [D32 [id number]] [facility])       ED Disposition     None      Follow-up Information     Follow up With Specialties Details Why Contact Info    Radha Bedoya MD Neurosurgery   [address] [place] [address] [phone]          Current Discharge Medication List      CONTINUE these medications which have NOT CHANGED    Details   albuterol (2 5 mg/3 mL) 0 083 % nebulizer solution Take 2 5 mg by nebulization every 6 (six) hours as needed for wheezing      ALPRAZolam (XANAX) 0 5 mg tablet Take 0 5 mg by mouth daily at bedtime      amLODIPine (NORVASC) 5 mg tablet Take 5 mg by mouth daily      apixaban (ELIQUIS) 5 mg Take 5 mg by mouth every 12 (twelve) hours        atorvastatin (LIPITOR) 40 mg tablet Take 40 mg by mouth daily      esomeprazole (NexIUM) 40 MG capsule Take 40 mg by mouth daily Fesoterodine Fumarate ER (TOVIAZ) 8 MG TB24 Take by mouth daily at bedtime        fluticasone-salmeterol (ADVAIR) 250-50 mcg/dose inhaler Inhale 1 puff every 12 (twelve) hours This is home medication  Refills: 0      furosemide (LASIX) 20 mg tablet Take 20 mg by mouth daily      loratadine (CLARITIN) 10 mg tablet Take 10 mg by mouth daily      metoprolol tartrate (LOPRESSOR) 50 mg tablet Take 50 mg by mouth every 12 (twelve) hours      potassium chloride (K-DUR) 10 mEq tablet Take 10 mEq by mouth daily  pregabalin (LYRICA) 300 MG capsule Take 300 mg by mouth 2 (two) times a day  polyvinyl alcohol (LIQUIFILM TEARS) 1 4 % ophthalmic solution Administer 1 drop to both eyes 3 (three) times a day  Qty: 15 mL, Refills: 0    Associated Diagnoses: Toxic metabolic encephalopathy             Outpatient Discharge Orders  Ambulatory referral to Neurosurgery   Standing Status: Future  Standing Exp   Date: 11/11/18         ED Provider  Electronically Signed by           Beni Jacinto MD  05/12/18 111 Cecil Mckinney MD  05/12/18 [id number]

## 2018-05-12 NOTE — ASSESSMENT & PLAN NOTE
· Potassium 2 8 -> 3 7 -> 3 8-->3 6, likely due to poor oral intake and diuretics  · Monitor K in a m , repeat BMP

## 2018-05-12 NOTE — ASSESSMENT & PLAN NOTE
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, consistent with prior CTA    · Spoke with neurology, she does not require repeat ECHO  · 1/6/2018 ECHO, EF 70-75%  No regional wall motion abnormalities  Mild concentric hypertrophy  Grade 1 diastolic dysfunction  · 3/9/2018 lipid profile, cholesterol 133, triglycerides 91, HDL 44, LDL 71  Continue Lipitor 80 mg as an inpatient  At discharge Lipitor 40 mg p o  daily  · 3/16/2018 TSH 1 910  · Hemoglobin A1c, 5 8   · MRI of brain with contrast, no acute infarct  · Hold Eliquis for now due to drop in hemoglobin with acute LLE fracture, consider resuming tomorrow am if HGB stable   Repeat hgb 9 0 g/dL  · Neurology following appreciate recommendations

## 2018-05-12 NOTE — ASSESSMENT & PLAN NOTE
· Fall at home resulting in left lower extremity injury  · Left ankle xray, fracture dislocation of the medial malleolus and tibiotalar joint  · Left tibia/fibula x-ray, medial tibial plateau fracture  Fracture of the posterior malleolus of the tibia with posterior dislocation of the ankle  · S/p nicely reduced dislocation of left ankle  Left posterior splint applied  · Orthopedics following, appreciate recommendations  · She is a poor surgical candidate due to chronic LLE edema and reduced healing  · Ortho and patient prefer to manage conservatively and hold off on surgery  · Patient will follow-up with orthopedist, Dr Edy Valadez, in 1 week as outpatient  · Repeat Xray during Dr Dooley Payment follow-up appt  · May need a cast or a boot in 1 week    · Nonweightbearing with left lower extremity  · Neurovascular checks q 4 hours  · PT/OT with plan to discharge to STR   · Morphine, prn  Vicodin, prn

## 2018-05-12 NOTE — ASSESSMENT & PLAN NOTE
· Follows with outpatient vascular surgeon, Dr Iris Flynn  · CTA head/neck, near occlusive arthrosclerotic disease in Left ICA  · She chooses not to pursue a surgical root  · Continue statin and hold Eliquis secondary to drop in hemoglobin

## 2018-05-12 NOTE — ASSESSMENT & PLAN NOTE
· In a patient with acute LLE fracture  Hemoglobin dropped from 11 4 -> 9 1-> 8 9-->8 5-->9 0 g/dL  Baseline HGB 11-12 8 g/dL  · She did not receive any IVF    · She is s/p  mg po x1, heparin drip, and Eliquis po x2  · Discontinued Heparin drip and holding Eliquis   · Consider resuming Eliquis tomorrow am if HGB stable   · Stool for occult blood x3

## 2018-05-12 NOTE — OCCUPATIONAL THERAPY NOTE
OT EVALUATION     05/12/18 1200   Restrictions/Precautions   LLE Weight Bearing Per Order NWB   Braces or Orthoses Splint  (LLE)   Pain Assessment   Pain Assessment 0-10   Pain Score 7   Pain Type Acute pain   Pain Location Leg; Ankle   Pain Orientation Left   Home Living   Type of Home House   Home Layout Multi-level   Bathroom Shower/Tub Walk-in shower   Home Equipment Walker   Additional Comments HHA daily for a "couple hours"  Patient reports recent right leg fracture (3 months ago)   Prior Function   Level of [place] Needs assistance with ADLs and functional mobility; Needs assistance with IADLs   Lives With Alone   Receives Help From Home health   ADL Assistance Needs assistance   IADLs Needs assistance   ADL   Eating Assistance 4  Minimal Assistance   Grooming Assistance 3  Moderate Assistance   UB Bathing Assistance 3  Moderate Assistance   LB Bathing Assistance 2  Maximal Assistance   UB Dressing Assistance 2  Maximal Assistance   LB Dressing Assistance 2  Maximal [address]  2  Maximal Assistance   Bed Mobility   Supine to Sit 2  Maximal assistance   Additional items Assist x 2   Sit to Supine 2  Maximal assistance   Additional items Assist x 2   Transfers   Sit to Stand 2  Maximal assistance   Additional items Assist x 2  (unable to maintain NWB LLE)   Stand to Sit 2  Maximal assistance   Additional items Assist x 2   Functional Mobility   Functional Mobility (unable)   Balance   Static Sitting Poor +   Dynamic Sitting Poor   Static Standing Poor -   Dynamic Standing Poor -   Activity Tolerance   Activity Tolerance Patient limited by pain; Patient limited by fatigue   RUE Assessment   RUE Assessment WFL  (4-/5)   LUE Assessment   LUE Assessment WFL  (4-/5)   Sensation   Additional Comments + LLE edema   Cognition   Overall Cognitive Status WFL   Arousal/Participation Lethargic   Attention Attends with cues to redirect   Orientation Level Oriented X4   Following Commands Follows one step commands with increased time or repetition   Assessment   Limitation Decreased ADL status; Decreased UE strength;Decreased Safe judgement during ADL;Decreased endurance;Decreased self-care trans;Decreased high-level ADLs  (decreased balance and mobility )   Prognosis Good   Assessment Patient evaluated by Occupational Therapy  Patient admitted with TIA (transient ischemic attack), s/p tib/fib fracture NWB LLE  The patients occupational profile, medical and therapy history includes a extensive additional review of physical, cognitive, or psychosocial history related to current functional performance  Comorbidities affecting functional mobility and ADLS include: fibromyalgia, asthma, hypertension and cancer  Prior to admission, patient was requiring assist for functional mobility with walker, requiring assist for ADLS and requiring assist for IADLS  The evaluation identifies the following performance deficits: weakness, decreased ROM, impaired balance, decreased endurance, increased fall risk, new onset of impairment of functional mobility, decreased ADLS, decreased IADLS, pain, decreased activity tolerance, decreased safety awareness, impaired judgement, ortheopedic restrictions and decreased strength, that result in activity limitations and/or participation restrictions  This evaluation requires clinical decision making of high complexity, because the patient presents with comorbidites that affect occupational performance and required significant modification of tasks or assistance with consideration of multiple treatment options  The Barthel Index was used as a functional outcome tool presenting with a score of 25, indicating marked limitations of functional mobility and ADLS  Patient will benefit from skilled Occupational Therapy services to address above deficits and facilitate a safe return to prior level of function     Goals   Patient Goals no pain, get better    STG Time Frame (1-7 days)   Short Term Goal  Patient will increase standing tolerance to 1 minutes during functional activity; Patient will increase bed mobility to mod assist of 2;  Patient will tolerate 8 minutes of UE ROM/strengthening to increase general activity tolerance and performance in ADLS/IADLS; Patient will improve functional activity tolerance to 10 minutes of sustained functional tasks to increase participation in basic self-care and decrease assistance level  LTG Time Frame (8-14 days)   Long Term Goal Patient will increase standing tolerance to 2 minutes during functional activity; Patient will increase bed mobility to mod assist;  Patient will tolerate 12 minutes of UE ROM/strengthening to increase general activity tolerance and performance in ADLS/IADLS; Patient will improve functional activity tolerance to 20 minutes of sustained functional tasks to increase participation in basic self-care and decrease assistance level  Functional Transfer Goals   Pt Will Perform All Functional Transfers (STG mod assist of 2 LTG mod assist )   ADL Goals   Pt Will Perform Eating (STG supervision LTG independent )   Pt Will Perform Grooming (STG supervision LTG independent )   Pt Will Perform Bathing (STG mod assist LTG min assist )   Pt Will Perform UE Dressing (STG min assist LTG supervision )   Pt Will Perform LE Dressing (STG mod assist LTG min assist )   Pt Will Perform Toileting (STG mod assist LTG min assist )   Plan   Treatment Interventions ADL retraining;Functional transfer training;UE strengthening/ROM; Endurance training;Patient/family training;Equipment evaluation/education; Activityengagement; Energy conservation   OT Frequency 3-5x/wk   Recommendation   OT Discharge Recommendation Short Term Rehab   Barthel Index   Feeding 5   Bathing 0   Grooming Score 0   Dressing Score 0   Bladder Score 5   Bowels Score 5   Toilet Use Score 5   Transfers (Bed/Chair) Score 5   Mobility (Level Surface) Score 0   Stairs Score 0   Barthel Index Score 25   Modified [place] Scale   Modified [place] Scale 0   Licensure   [place] License Number  Janelle Chuck Rod August [birth year] OTR/L [id number]

## 2018-05-12 NOTE — ASSESSMENT & PLAN NOTE
· Fall at home resulting in left lower extremity injury  · Left ankle xray, fracture dislocation of the medial malleolus and tibiotalar joint  · Left tibia/fibula x-ray, medial tibial plateau fracture  Fracture of the posterior malleolus of the tibia with posterior dislocation of the ankle  · S/p nicely reduced dislocation of left ankle   · Left posterior splint applied  · Orthopedics following, appreciate recommendations  · She is a poor surgical candidate due to chronic LLE edema and reduced healing  · Ortho and patient prefer to manage conservatively and hold off on surgery  · Patient will follow-up with orthopedist, Dr Felice Wallace, in 1 week as outpatient  · Repeat Xray during Dr Liyah Garcia follow-up appt  · May need a cast or a boot in 1 week    · Nonweightbearing with left lower extremity  · Neurovascular checks q 4 hours  · PT/OT with plan to discharge to STR   · Morphine, prn  Vicodin, prn

## 2018-05-12 NOTE — ASSESSMENT & PLAN NOTE
· Blood pressure normotensive  · Continue metoprolol 50 mg BID and Lasix 20 mg daily  · Holding Norvasc

## 2018-05-12 NOTE — ASSESSMENT & PLAN NOTE
· EKG, NSR  · Continuous telemetry NSR with PACs and PVCs  One episode of Afib HR 120s during the night    Optimize electrolytes  · Continue metoprolol 50 mg BID  · Due to recent left lower extremity fracture with decreasing hemoglobin, continue holding Eliquis and consider resuming tomorrow morning if hemoglobin stable  · Of note, patient received aspirin 325 mg x1 in the ED followed by a Heparin drip which was later discontinued and patient received 2 dose of Eliquis

## 2018-05-12 NOTE — ASSESSMENT & PLAN NOTE
· Patient reports a recent right lower extremity fracture with residual weakness    States she fell due to her right leg weakness after she lost her balance and her legs gave out, newly injuring her left lower extremity   · Left lower extremity noted to have a displaced acute fracture  · Nonweightbearing to left lower extremity  · PT/OT   · Plan to discharge to short-term rehab

## 2018-05-12 NOTE — PROGRESS NOTES
Progress Note - Santino Moctezuma 1935, 81 y o  female MRN: [id number]    Unit/Bed#: [address] [place] [address] Encounter: [id number]    Primary Care Provider: Nuha William DO   Date and time admitted to hospital: 5/10/2018  8:15 AM        Displaced fracture of medial malleolus of left tibia, initial encounter for closed fracture   Assessment & Plan    · Fall at home resulting in left lower extremity injury  · Left ankle xray, fracture dislocation of the medial malleolus and tibiotalar joint  · Left tibia/fibula x-ray, medial tibial plateau fracture  Fracture of the posterior malleolus of the tibia with posterior dislocation of the ankle  · S/p nicely reduced dislocation of left ankle   · Left posterior splint applied  · Orthopedics following, appreciate recommendations  · She is a poor surgical candidate due to chronic LLE edema and reduced healing  · Ortho and patient prefer to manage conservatively and hold off on surgery  · Patient will follow-up with orthopedist, Dr Huy Olivares, in 1 week as outpatient  · Repeat Xray during Dr Lucho Kelly follow-up appt  · May need a cast or a boot in 1 week  · Nonweightbearing with left lower extremity  · Neurovascular checks q 4 hours  · PT/OT with plan to discharge to STR   · Morphine, prn  Vicodin, prn  Displaced  comminuted fracture of fibula   Assessment & Plan    · Fall at home resulting in left lower extremity injury  · Left ankle xray, fracture dislocation of the medial malleolus and tibiotalar joint  · Left tibia/fibula x-ray, medial tibial plateau fracture  Fracture of the posterior malleolus of the tibia with posterior dislocation of the ankle  · S/p nicely reduced dislocation of left ankle   · Left posterior splint applied  · Orthopedics following, appreciate recommendations      · She is a poor surgical candidate due to chronic LLE edema and reduced healing  · Ortho and patient prefer to manage conservatively and hold off on surgery  · Patient will follow-up with orthopedist, Dr Ana Maria Jolley, in 1 week as outpatient  · Repeat Xray during Dr Alistair Castro follow-up appt  · May need a cast or a boot in 1 week  · Nonweightbearing with left lower extremity  · Neurovascular checks q 4 hours  · PT/OT with plan to discharge to STR   · Morphine, prn  Vicodin, prn  Drop in hemoglobin   Assessment & Plan    In a patient with acute LLE fracture  Hemoglobin dropped from 11 4 -> 9 1-> 8 9 g/dL  Baseline HGB 11-12 8 g/dL  · She did not receive any IVF  · She is s/p  mg po x1, heparin drip, and Eliquis po x1  · Discontinued Heparin drip and holding Eliquis   · Consider resuming Eliquis tomorrow am if HGB stable   · Stool for occult blood x3        Fall   Assessment & Plan    · Patient reports a recent right lower extremity fracture with residual weakness  States she fell due to her right leg weakness after she lost her balance and her legs gave out, newly injuring her left lower extremity   · Left lower extremity noted to have a displaced acute fracture  · Nonweightbearing to left lower extremity  · PT/OT   · Plan to discharge to short-term rehab        History of Merkel cell carcinoma   Assessment & Plan    · Ten years ago for found to have a lesion 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, pathology results were unclear patient was treated for high-grade neuroendocrine tumor/Merkel cell  She received resection and lymph node dissection of the left groin which were both positive  She received radiation to the groin and tumor bed and received IV chemotherapy  · Follows with outpatient oncologist, Dr Breanna Hooker  Last seen in February 2018, no signs of recurrence, recent scan did not demonstrate evidence of recurrence Merkel cell, surveillance ongoing and patient is to follow up in 6 months  · Left lower extremity with chronic edema on Lasix  · Resume Lasix 20 mg daily        * TIA (transient ischemic attack)   Assessment & Plan    · Probable TIA    She complained of feeling like she had marbles in her mouth when she went to speak only upon arrival at the ED  · CT stroke alert, No acute intracranial hemorrhage or mass effect  Multifocal chronic ischemic changes including left lateral frontal MCA territory infarction  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  · CTA head/neck, no acute intracranial hemorrhage or mass effect  Chronic ischemic changes involving supratentorial white matter and anterolateral  left frontal lobe, stable  Near occlusive arthrosclerotic disease at the origin of the left internal carotid artery, consistent with prior CTA    · Spoke with neurology, she does not require repeat ECHO  · 1/6/2018 ECHO, EF 70-75%  No regional wall motion abnormalities  Mild concentric hypertrophy  Grade 1 diastolic dysfunction  · 3/9/2018 lipid profile, cholesterol 133, triglycerides 91, HDL 44, LDL 71  Continue Lipitor 80 mg as an inpatient  At discharge Lipitor 40 mg p o  daily  · 3/16/2018 TSH 1 910  · Hemoglobin A1c, 5 8   · MRI of brain with contrast, no acute infarct  · Hold Eliquis for now due to drop in hemoglobin with acute LLE fracture, consider resuming tomorrow am if HGB stable   · Neurology following appreciate recommendations        Stenosis of left internal carotid artery   Assessment & Plan    · Follows with outpatient vascular surgeon, Dr Iris Flynn  · CTA head/neck, near occlusive arthrosclerotic disease in Left ICA  · She chooses not to pursue a surgical root  · Continue outpatient medications, statin and Eliquis         Chronic diastolic CHF (congestive heart failure), NYHA class 1 ([facility])   Assessment & Plan    pCXR, no acute cardiopulmonary disease  1/ 2018 ECHO showed EF of 70-75%    · Resume Lasix 20 mg daily with potassium supplementation  · Monitor I+O, daily weights         Moderate persistent asthma   Assessment & Plan    Patient with bilateral expiratory wheezing on exam  PCXR, no acute cardiopulmonary disease  · Start DuoNeb every 12 hours as patient takes nebulizer treatments twice daily at home  · Continue albuterol nebulizer treatment as needed  · Continue home inhaler, Advair        Paroxysmal atrial fibrillation (HCC)   Assessment & Plan    · EKG, NSR  · Continuous telemetry sinus tachycardia with PVCs  · Resume home medication, metoprolol 50 mg BID  · Due to recent left lower extremity fracture with decreasing hemoglobin, continue holding Eliquis and consider resuming tomorrow morning if hemoglobin stable  · Of note, patient received aspirin 325 mg x1 in the ED followed by a heparin Heparin drip which was later discontinued and patient received 1 dose of Eliquis          Hypomagnesemia   Assessment & Plan    Magnesium 1 6 -> 2 0  · Monitor level in am         Hypokalemia   Assessment & Plan    · Potassium 2 8 -> 3 7 -> 3 8, likely due to poor oral intake and diuretics  · Monitor K in a m , repeat BMP        Fibromyalgia   Assessment & Plan    · Continue Lyrica        Hyperlipemia   Assessment & Plan    · 3/9/2018 lipid profile, cholesterol 133, triglyceride 91, HDL 44 and LDL 71  · Initiated Lipitor 80 mg p o  daily        GERD (gastroesophageal reflux disease)   Assessment & Plan    · Continue PPI        Obesity (BMI 30-39  9)   Assessment & Plan    · Encourage therapeutic lifestyle with weight loss and healthy eating  · Follow-up with PCP on an outpatient basis        Hypertension   Assessment & Plan    Blood pressure normotensive  Will resume home medications metoprolol 50 mg BID and Lasix 20 mg daily  · Holding Norvasc            VTE Pharmacologic Prophylaxis:   Pharmacologic: Apixaban (Eliquis) - will resume tomorrow a m  if hemoglobin stable  Mechanical VTE Prophylaxis in Place: Yes    Patient Centered Rounds: I have performed bedside rounds with nursing staff today      Discussions with Specialists or Other Care Team Provider:  Nursing , case management, appreciate Orthopedic and Neurology notes    Education and Discussions with Family / Patient:  Spoke with patient concerning plan of care    Time Spent for Care: 20 minutes  More than 50% of total time spent on counseling and coordination of care as described above  Current Length of Stay: 2 day(s)    Current Patient Status: Inpatient   Certification Statement: The patient will continue to require additional inpatient hospital stay due to Status post left lower extremity displaced fracture  Nonweightbearing  Needs short-term rehab  Discharge Plan:  Patient is not medically stable for discharge today, likely to short-term rehab in 24-48 hours  Code Status: Level 1 - Full Code      Subjective:   Observed patient resting in bed  Patient states she is on 3 L of supplemental oxygen at home  States she takes a breathing treatment twice a day  States she is on Lasix at home  Reports a good appetite  Complains of 8/10 left lower extremity pain  Denies headache, chest pain, shortness of breath, abdominal pain, nausea, vomiting, diarrhea  Objective:     Vitals:   Temp (24hrs), Av 6 °F (37 °C), Min:97 9 °F (36 6 °C), Max:99 3 °F (37 4 °C)    HR:  [] 93  Resp:  [18-20] 18  BP: (122-152)/(60-76) 132/60  SpO2:  [91 %-97 %] 97 %  Body mass index is 37 1 kg/m²  Input and Output Summary (last 24 hours): Intake/Output Summary (Last 24 hours) at 18 1022  Last data filed at 18 1328   Gross per 24 hour   Intake                0 ml   Output              550 ml   Net             -550 ml       Physical Exam:     Physical Exam   Constitutional: She is oriented to person, place, and time  She appears well-developed  No distress  Morbidly obese female resting in bed on O2 at 3 LNC, no distress    HENT:   Head: Normocephalic  Neck: Normal range of motion  Cardiovascular: Normal rate, regular rhythm and intact distal pulses  Pulmonary/Chest: Effort normal  No respiratory distress   She has decreased breath sounds in the right lower field and the left lower field  She has wheezes in the right lower field and the left lower field  She has no rhonchi  She has no rales  Abdominal: Soft  Bowel sounds are normal  She exhibits no distension  There is no tenderness  Obese     Musculoskeletal: She exhibits edema and tenderness  LLE with splint and ACE bandage from reduction in OR  No drainage  LLE warm with good cap refill  +2 LLE (chronic)    Neurological: She is alert and oriented to person, place, and time  Skin: Skin is warm and dry  She is not diaphoretic  Psychiatric: She has a normal mood and affect  Her behavior is normal  Judgment normal    Nursing note and vitals reviewed  Additional Data:     Labs:      Results from last 7 days  Lab Units 05/12/18  0610   WBC Thousand/uL 4 90   HEMOGLOBIN g/dL 8 9*   HEMATOCRIT % 28 3*   PLATELETS Thousands/uL 103*       Results from last 7 days  Lab Units 05/12/18  0610  05/10/18  0826   SODIUM mmol/L 139  < > 140   POTASSIUM mmol/L 3 8  < > 3 6   CHLORIDE mmol/L 101  < > 102   CO2 mmol/L 38*  < > 38*   BUN mg/dL 10  < > 10   CREATININE mg/dL 0 65  < > 0 93   CALCIUM mg/dL 7 9*  < > 8 7   TOTAL PROTEIN g/dL  --   --  6 4   BILIRUBIN TOTAL mg/dL  --   --  0 30   ALK PHOS U/L  --   --  127*   ALT U/L  --   --  15   AST U/L  --   --  20   GLUCOSE RANDOM mg/dL 142*  < > 108   < > = values in this interval not displayed  Results from last 7 days  Lab Units 05/10/18  0826   INR  1 06       * I Have Reviewed All Lab Data Listed Above  * Additional Pertinent Lab Tests Reviewed:  All Labs Within Last 24 Hours Reviewed    Imaging:    Imaging Reports Reviewed Today Include: MRI brain   Imaging Personally Reviewed by Myself Includes:  none    Recent Cultures (last 7 days):           Last 24 Hours Medication List:     Current Facility-Administered Medications:  acetaminophen 650 mg Oral Q6H PRN Hugo Linen, CRNP   albuterol 2 5 mg Nebulization Q6H PRN Hugo Linen, CRNP   ALPRAZolam 0 5 mg Oral [facility] Sung, GILDA   atorvastatin 80 mg Oral Daily With Marian Ordaz MD   docusate sodium 100 mg Oral BID Elizabeth Chalet, CRNP   fluticasone-salmeterol 1 puff Inhalation Q12H [address] Elizabeth Chalet, CRNP   furosemide 20 mg Oral Daily Rhina Coburn, GILDA   HYDROcodone-acetaminophen 1 tablet Oral Q4H PRN Elizabeth Chalet, CRNP   ipratropium-albuterol 3 mL Nebulization Q12H Rhina Coburn, GILDA   loratadine 10 mg Oral Daily Elizabeth Chalet, CRNP   metoprolol tartrate 50 mg Oral Q12H [address] Rhina Coburn, GILDA   morphine injection 2 mg Intravenous Q6H PRN Elizabeth Chalet, CRNP   pantoprazole 40 mg Oral Early Morning Elizabteh Chalet, CRNP   polyvinyl alcohol 1 drop Both Eyes TID Elizabeth Chalet, CRNP   potassium chloride 10 mEq Oral Daily Rhina Coburn, GILDA   pregabalin 300 mg Oral BID Elizabeth Chalet, CRNP   tolterodine 4 mg Oral Daily Elizabeth Chalet, CRNP        Today, Patient Was Seen By: GILDA Ely    ** Please Note: Dictation voice to text software may have been used in the creation of this document   **

## 2018-05-13 ENCOUNTER — APPOINTMENT (INPATIENT)
Dept: RADIOLOGY | Facility: HOSPITAL | Age: 83
DRG: 563 | End: 2018-05-13
Payer: MEDICARE

## 2018-05-13 LAB
ALBUMIN SERPL BCP-MCNC: 2 G/DL (ref 3.5–5)
ALP SERPL-CCNC: 100 U/L (ref 46–116)
ALT SERPL W P-5'-P-CCNC: 10 U/L (ref 12–78)
ANION GAP SERPL CALCULATED.3IONS-SCNC: 0 MMOL/L (ref 4–13)
AST SERPL W P-5'-P-CCNC: 14 U/L (ref 5–45)
BILIRUB SERPL-MCNC: 0.4 MG/DL (ref 0.2–1)
BUN SERPL-MCNC: 9 MG/DL (ref 5–25)
CALCIUM SERPL-MCNC: 7.9 MG/DL (ref 8.3–10.1)
CHLORIDE SERPL-SCNC: 103 MMOL/L (ref 100–108)
CO2 SERPL-SCNC: 40 MMOL/L (ref 21–32)
CREAT SERPL-MCNC: 0.7 MG/DL (ref 0.6–1.3)
ERYTHROCYTE [DISTWIDTH] IN BLOOD BY AUTOMATED COUNT: 17.4 % (ref 11.6–15.1)
GFR SERPL CREATININE-BSD FRML MDRD: 82 ML/MIN/1.73SQ M
GLUCOSE SERPL-MCNC: 99 MG/DL (ref 65–140)
HCT VFR BLD AUTO: 26.9 % (ref 37–47)
HCT VFR BLD AUTO: 28.8 % (ref 37–47)
HGB BLD-MCNC: 8.5 G/DL (ref 12–16)
HGB BLD-MCNC: 9 G/DL (ref 12–16)
MAGNESIUM SERPL-MCNC: 1.7 MG/DL (ref 1.6–2.6)
MCH RBC QN AUTO: 25.3 PG (ref 27–31)
MCHC RBC AUTO-ENTMCNC: 31.7 G/DL (ref 31.4–37.4)
MCV RBC AUTO: 80 FL (ref 82–98)
PLATELET # BLD AUTO: 112 THOUSANDS/UL (ref 130–400)
PMV BLD AUTO: 10.6 FL (ref 8.9–12.7)
POTASSIUM SERPL-SCNC: 3.6 MMOL/L (ref 3.5–5.3)
PROT SERPL-MCNC: 5.2 G/DL (ref 6.4–8.2)
RBC # BLD AUTO: 3.37 MILLION/UL (ref 4.2–5.4)
SODIUM SERPL-SCNC: 143 MMOL/L (ref 136–145)
WBC # BLD AUTO: 5.6 THOUSAND/UL (ref 4.8–10.8)

## 2018-05-13 PROCEDURE — 85014 HEMATOCRIT: CPT | Performed by: NURSE PRACTITIONER

## 2018-05-13 PROCEDURE — 85027 COMPLETE CBC AUTOMATED: CPT | Performed by: NURSE PRACTITIONER

## 2018-05-13 PROCEDURE — 83735 ASSAY OF MAGNESIUM: CPT | Performed by: NURSE PRACTITIONER

## 2018-05-13 PROCEDURE — 94640 AIRWAY INHALATION TREATMENT: CPT

## 2018-05-13 PROCEDURE — 94760 N-INVAS EAR/PLS OXIMETRY 1: CPT

## 2018-05-13 PROCEDURE — 85018 HEMOGLOBIN: CPT | Performed by: NURSE PRACTITIONER

## 2018-05-13 PROCEDURE — 99232 SBSQ HOSP IP/OBS MODERATE 35: CPT | Performed by: NURSE PRACTITIONER

## 2018-05-13 PROCEDURE — 80053 COMPREHEN METABOLIC PANEL: CPT | Performed by: NURSE PRACTITIONER

## 2018-05-13 PROCEDURE — 71045 X-RAY EXAM CHEST 1 VIEW: CPT

## 2018-05-13 RX ORDER — POTASSIUM CHLORIDE 20 MEQ/1
20 TABLET, EXTENDED RELEASE ORAL ONCE
Status: COMPLETED | OUTPATIENT
Start: 2018-05-13 | End: 2018-05-13

## 2018-05-13 RX ORDER — MAGNESIUM SULFATE HEPTAHYDRATE 40 MG/ML
2 INJECTION, SOLUTION INTRAVENOUS ONCE
Status: COMPLETED | OUTPATIENT
Start: 2018-05-13 | End: 2018-05-13

## 2018-05-13 RX ORDER — IPRATROPIUM BROMIDE AND ALBUTEROL SULFATE 2.5; .5 MG/3ML; MG/3ML
3 SOLUTION RESPIRATORY (INHALATION)
Status: DISCONTINUED | OUTPATIENT
Start: 2018-05-13 | End: 2018-05-18

## 2018-05-13 RX ORDER — METHYLPREDNISOLONE SODIUM SUCCINATE 40 MG/ML
40 INJECTION, POWDER, LYOPHILIZED, FOR SOLUTION INTRAMUSCULAR; INTRAVENOUS EVERY 12 HOURS SCHEDULED
Status: DISCONTINUED | OUTPATIENT
Start: 2018-05-13 | End: 2018-05-18

## 2018-05-13 RX ADMIN — METHYLPREDNISOLONE SODIUM SUCCINATE 40 MG: 40 INJECTION, POWDER, FOR SOLUTION INTRAMUSCULAR; INTRAVENOUS at 11:22

## 2018-05-13 RX ADMIN — METOPROLOL TARTRATE 50 MG: 50 TABLET ORAL at 08:02

## 2018-05-13 RX ADMIN — PREGABALIN 300 MG: 100 CAPSULE ORAL at 17:11

## 2018-05-13 RX ADMIN — APIXABAN 5 MG: 5 TABLET, FILM COATED ORAL at 08:02

## 2018-05-13 RX ADMIN — IPRATROPIUM BROMIDE AND ALBUTEROL SULFATE 3 ML: .5; 3 SOLUTION RESPIRATORY (INHALATION) at 14:58

## 2018-05-13 RX ADMIN — HYDROCODONE BITARTRATE AND ACETAMINOPHEN 1 TABLET: 5; 325 TABLET ORAL at 06:43

## 2018-05-13 RX ADMIN — PREGABALIN 300 MG: 100 CAPSULE ORAL at 08:02

## 2018-05-13 RX ADMIN — IPRATROPIUM BROMIDE AND ALBUTEROL SULFATE 3 ML: .5; 3 SOLUTION RESPIRATORY (INHALATION) at 12:05

## 2018-05-13 RX ADMIN — FUROSEMIDE 20 MG: 20 TABLET ORAL at 08:02

## 2018-05-13 RX ADMIN — METOPROLOL TARTRATE 50 MG: 50 TABLET ORAL at 21:42

## 2018-05-13 RX ADMIN — METHYLPREDNISOLONE SODIUM SUCCINATE 40 MG: 40 INJECTION, POWDER, FOR SOLUTION INTRAMUSCULAR; INTRAVENOUS at 21:43

## 2018-05-13 RX ADMIN — POTASSIUM CHLORIDE 10 MEQ: 750 TABLET, EXTENDED RELEASE ORAL at 08:03

## 2018-05-13 RX ADMIN — ATORVASTATIN CALCIUM 80 MG: 80 TABLET, FILM COATED ORAL at 15:44

## 2018-05-13 RX ADMIN — ALPRAZOLAM 0.5 MG: 0.5 TABLET ORAL at 21:42

## 2018-05-13 RX ADMIN — POTASSIUM CHLORIDE 20 MEQ: 1500 TABLET, EXTENDED RELEASE ORAL at 11:21

## 2018-05-13 RX ADMIN — FLUTICASONE PROPIONATE AND SALMETEROL 1 PUFF: 50; 250 POWDER RESPIRATORY (INHALATION) at 21:43

## 2018-05-13 RX ADMIN — IPRATROPIUM BROMIDE AND ALBUTEROL SULFATE 3 ML: .5; 3 SOLUTION RESPIRATORY (INHALATION) at 08:33

## 2018-05-13 RX ADMIN — DOCUSATE SODIUM 100 MG: 100 CAPSULE, LIQUID FILLED ORAL at 08:02

## 2018-05-13 RX ADMIN — LORATADINE 10 MG: 10 TABLET ORAL at 08:03

## 2018-05-13 RX ADMIN — MAGNESIUM SULFATE HEPTAHYDRATE 2 G: 40 INJECTION, SOLUTION INTRAVENOUS at 11:22

## 2018-05-13 RX ADMIN — FLUTICASONE PROPIONATE AND SALMETEROL 1 PUFF: 50; 250 POWDER RESPIRATORY (INHALATION) at 08:02

## 2018-05-13 RX ADMIN — TOLTERODINE TARTRATE 4 MG: 2 CAPSULE, EXTENDED RELEASE ORAL at 08:02

## 2018-05-13 RX ADMIN — PANTOPRAZOLE SODIUM 40 MG: 40 TABLET, DELAYED RELEASE ORAL at 06:43

## 2018-05-13 RX ADMIN — POLYVINYL ALCOHOL 1 DROP: 14 SOLUTION/ DROPS OPHTHALMIC at 08:02

## 2018-05-13 RX ADMIN — IPRATROPIUM BROMIDE AND ALBUTEROL SULFATE 3 ML: .5; 3 SOLUTION RESPIRATORY (INHALATION) at 20:13

## 2018-05-13 RX ADMIN — POLYVINYL ALCOHOL 1 DROP: 14 SOLUTION/ DROPS OPHTHALMIC at 15:45

## 2018-05-13 RX ADMIN — DOCUSATE SODIUM 100 MG: 100 CAPSULE, LIQUID FILLED ORAL at 17:11

## 2018-05-13 NOTE — PROGRESS NOTES
Progress Note - Nilam Dempsey 1935, 81 y o  female MRN: [id number]    Unit/Bed#: [address] [place] [address] Encounter: [id number]    Primary Care Provider: Monica Rodriguez DO   Date and time admitted to hospital: 5/10/2018  8:15 AM        * TIA (transient ischemic attack)   Assessment & Plan    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, consistent with prior CTA    · Spoke with neurology, she does not require repeat ECHO  · 1/6/2018 ECHO, EF 70-75%  No regional wall motion abnormalities  Mild concentric hypertrophy  Grade 1 diastolic dysfunction  · 3/9/2018 lipid profile, cholesterol 133, triglycerides 91, HDL 44, LDL 71  Continue Lipitor 80 mg as an inpatient  At discharge Lipitor 40 mg p o  daily  · 3/16/2018 TSH 1 910  · Hemoglobin A1c, 5 8   · MRI of brain with contrast, no acute infarct  · Hold Eliquis for now due to drop in hemoglobin with acute LLE fracture, consider resuming tomorrow am if HGB stable  Repeat hgb 9 0 g/dL  · Neurology following appreciate recommendations        Displaced  comminuted fracture of fibula   Assessment & Plan    · Fall at home resulting in left lower extremity injury  · Left ankle xray, fracture dislocation of the medial malleolus and tibiotalar joint  · Left tibia/fibula x-ray, medial tibial plateau fracture   Fracture of the posterior malleolus of the tibia with posterior dislocation of the ankle   · S/p nicely reduced dislocation of left ankle   · Left posterior splint applied  · Orthopedics following, appreciate recommendations  · She is a poor surgical candidate due to chronic LLE edema and reduced healing  · Ortho and patient prefer to manage conservatively and hold off on surgery  · Patient will follow-up with orthopedist, Dr Lazarus Riis, in 1 week as outpatient  · Repeat Xray during Dr Sydnie Garcia follow-up appt  · May need a cast or a boot in 1 week  · Nonweightbearing with left lower extremity  · Neurovascular checks q 4 hours  · PT/OT with plan to discharge to [facility]   · Morphine, prn  Vicodin, prn  Displaced fracture of medial malleolus of left tibia, initial encounter for closed fracture   Assessment & Plan    · Fall at home resulting in left lower extremity injury  · Left ankle xray, fracture dislocation of the medial malleolus and tibiotalar joint  · Left tibia/fibula x-ray, medial tibial plateau fracture  Fracture of the posterior malleolus of the tibia with posterior dislocation of the ankle  · S/p nicely reduced dislocation of left ankle  Left posterior splint applied  · Orthopedics following, appreciate recommendations  · She is a poor surgical candidate due to chronic LLE edema and reduced healing  · Ortho and patient prefer to manage conservatively and hold off on surgery  · Patient will follow-up with orthopedist, Dr Lazarus Riis, in 1 week as outpatient  · Repeat Xray during Dr Sydnie Garcia follow-up appt  · May need a cast or a boot in 1 week  · Nonweightbearing with left lower extremity  · Neurovascular checks q 4 hours  · PT/OT with plan to discharge to [facility]   · Morphine, prn  Vicodin, prn  Fall   Assessment & Plan    · Patient reports a recent right lower extremity fracture with residual weakness    States she fell due to her right leg weakness after she lost her balance and her legs gave out, newly injuring her left lower extremity   · Left lower extremity noted to have a displaced acute fracture  · Nonweightbearing to left lower extremity  · PT/OT   · Plan to discharge to short-term rehab        Hypomagnesemia   Assessment & Plan    · Magnesium 1 6 -> 2 0->1 7  · Ordered magnesium 2 g IV x1 dose  · Monitor level in am         Drop in hemoglobin   Assessment & Plan    · In a patient with acute LLE fracture  Hemoglobin dropped from 11 4 -> 9 1-> 8 9-->8 5-->9 0 g/dL  Baseline HGB 11-12 8 g/dL  · She did not receive any IVF  · She is s/p  mg po x1, heparin drip, and Eliquis po x2  · Discontinued Heparin drip and holding Eliquis   · Consider resuming Eliquis tomorrow am if HGB stable   · Stool for occult blood x3        Chronic diastolic CHF (congestive heart failure), NYHA class 1 ([facility])   Assessment & Plan    · Repeat chest x-ray,  No pleural effusion  ·  1/ 2018 ECHO showed EF of 70-75%  · Resume Lasix 20 mg daily with potassium supplementation  · Monitor I+O, daily weights         Moderate persistent asthma   Assessment & Plan    · Acute exacerbation  Scattered expiratory wheezes noted throughout  · Increased DuoNeb to every 4 hours  Added Solu-Medrol 40 mg q 12 hours  · Continue albuterol nebulizer treatment as needed  · Continue home inhaler, Advair   · Repeat chest x-ray, Left hemidiaphragm is obscured and there is blunting of the left costophrenic angle  Increased retrocardiac opacity, suggesting on the left lower lobe atelectasis or infiltrate  Probable small left effusion  · Will hold off on antibiotics as of now because she has been afebrile without leukocytosis  Will monitor closely  History of Merkel cell carcinoma   Assessment & Plan    · Ten years ago for found to have a lesion 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, pathology results were unclear patient was treated for high-grade neuroendocrine tumor/Merkel cell  She received resection and lymph node dissection of the left groin which were both positive    She received radiation to the groin and tumor bed and received IV chemotherapy  · Follows with outpatient oncologist, Dr Goins   Last seen in February 2018, no signs of recurrence, recent scan did not demonstrate evidence of recurrence Merkel cell, surveillance ongoing and patient is to follow up in 6 months  · Left lower extremity with chronic edema on Lasix  · Resume Lasix 20 mg daily        Hypokalemia   Assessment & Plan    · Potassium 2 8 -> 3 7 -> 3 8-->3 6, likely due to poor oral intake and diuretics  · Monitor K in a m , repeat BMP        Fibromyalgia   Assessment & Plan    · Continue Lyrica        Paroxysmal atrial fibrillation (HCC)   Assessment & Plan    · EKG, NSR  · Continuous telemetry NSR with PACs and PVCs  One episode of Afib HR 120s during the night  Optimize electrolytes  · Continue metoprolol 50 mg BID  · Due to recent left lower extremity fracture with decreasing hemoglobin, continue holding Eliquis and consider resuming tomorrow morning if hemoglobin stable  · Of note, patient received aspirin 325 mg x1 in the ED followed by a Heparin drip which was later discontinued and patient received 2 dose of Eliquis          Hyperlipemia   Assessment & Plan    · 3/9/2018 lipid profile, cholesterol 133, triglyceride 91, HDL 44 and LDL 71  · Initiated Lipitor 80 mg p o  daily        GERD (gastroesophageal reflux disease)   Assessment & Plan    · Continue PPI        Obesity (BMI 30-39  9)   Assessment & Plan    · Encourage therapeutic lifestyle with weight loss and healthy eating  · Follow-up with PCP on an outpatient basis        Stenosis of left internal carotid artery   Assessment & Plan    · Follows with outpatient vascular surgeon, Dr Huey Bowie  · CTA head/neck, near occlusive arthrosclerotic disease in Left ICA  · She chooses not to pursue a surgical root  · Continue statin and hold Eliquis secondary to drop in hemoglobin        Hypertension   Assessment & Plan    · Blood pressure normotensive  · Continue metoprolol 50 mg BID and Lasix 20 mg daily  · Holding Norvasc            VTE Pharmacologic Prophylaxis:   Pharmacologic: Pharmacologic VTE Prophylaxis contraindicated due to Acute drop in hemoglobin  Mechanical VTE Prophylaxis in Place: Yes    Patient Centered Rounds: I have performed bedside rounds with nursing staff today  Discussions with Specialists or Other Care Team Provider:  Spoke with PT/OT    Education and Discussions with Family / Patient:  Spoke with patient concerning plan of care    Time Spent for Care: 30 minutes  More than 50% of total time spent on counseling and coordination of care as described above  Current Length of Stay: 3 day(s)    Current Patient Status: Inpatient   Certification Statement: The patient will continue to require additional inpatient hospital stay due to Monitoring hemoglobin    Discharge Plan:  Not anticipated today    Code Status: Level 1 - Full Code      Subjective:   She states she was unable to sleep last night because she is afraid of dying and today she is very sleepy  Denies chest pain, abdominal pain, or headache  Denies any active bleeding  Denies shortness of breath or dizziness  Denies nausea, vomiting, or diarrhea    Objective:     Vitals:   Temp (24hrs), Av 9 °F (37 2 °C), Min:97 9 °F (36 6 °C), Max:100 5 °F (38 1 °C)    HR:  [] 79  Resp:  [18-22] 19  BP: (109-133)/(53-61) 130/59  SpO2:  [91 %-93 %] 92 %  Body mass index is 38 21 kg/m²  Input and Output Summary (last 24 hours): Intake/Output Summary (Last 24 hours) at 18 1553  Last data filed at 18 1501   Gross per 24 hour   Intake                0 ml   Output             1900 ml   Net            -1900 ml       Physical Exam:     Physical Exam   Constitutional: She is oriented to person, place, and time  She appears well-developed and well-nourished  No distress  Obese   HENT:   Head: Normocephalic and atraumatic  Neck: Normal range of motion  Neck supple     Cardiovascular: Normal rate, regular rhythm and normal heart sounds  Exam reveals no gallop and no friction rub  No murmur heard  Pulmonary/Chest: Effort normal  No respiratory distress  She has wheezes  She has no rales  She exhibits no tenderness  Scattered expiratory wheezes noted throughout   Abdominal: Soft  Bowel sounds are normal  She exhibits no distension  There is no tenderness  There is no rebound and no guarding  Musculoskeletal: She exhibits edema and tenderness  She exhibits no deformity  LLE noted in posterior splint  LLE pink warm and dry with +2 pedal pulses with positive sensation and movement  +3 LLE edema  +1-2 RLE edema   Neurological: She is alert and oriented to person, place, and time  Skin: Skin is warm and dry  No rash noted  She is not diaphoretic  No erythema  No pallor  Psychiatric: She has a normal mood and affect  Her behavior is normal  Judgment and thought content normal        Additional Data:     Labs:      Results from last 7 days  Lab Units 05/13/18  1336 05/13/18  0644   WBC Thousand/uL  --  5 60   HEMOGLOBIN g/dL 9 0* 8 5*   HEMATOCRIT % 28 8* 26 9*   PLATELETS Thousands/uL  --  112*       Results from last 7 days  Lab Units 05/13/18  0644   SODIUM mmol/L 143   POTASSIUM mmol/L 3 6   CHLORIDE mmol/L 103   CO2 mmol/L 40*   BUN mg/dL 9   CREATININE mg/dL 0 70   CALCIUM mg/dL 7 9*   TOTAL PROTEIN g/dL 5 2*   BILIRUBIN TOTAL mg/dL 0 40   ALK PHOS U/L 100   ALT U/L 10*   AST U/L 14   GLUCOSE RANDOM mg/dL 99       Results from last 7 days  Lab Units 05/10/18  0826   INR  1 06       * I Have Reviewed All Lab Data Listed Above  * Additional Pertinent Lab Tests Reviewed:  All Labs Within Last 24 Hours Reviewed    Imaging:    Imaging Reports Reviewed Today Include: PCXR  Imaging Personally Reviewed by Myself Includes:  none    Recent Cultures (last 7 days):           Last 24 Hours Medication List:     Current Facility-Administered Medications:  acetaminophen 650 mg Oral Q6H PRN GILDA Ramirez   albuterol 2 5 mg Nebulization Q6H PRN Ariana Shea, GILDA   ALPRAZolam 0 5 mg Oral HS Ledy Almaraz, CRNP   atorvastatin 80 mg Oral Daily With Rosalind Heath MD   docusate sodium 100 mg Oral BID Ariana Shea, GILDA   fluticasone-salmeterol 1 puff Inhalation Q12H [facility] Ariana Shea, CRNP   furosemide 20 mg Oral Daily Rosibel Merrill, YAHIRNP   HYDROcodone-acetaminophen 1 tablet Oral Q4H PRN Ariana Shea, CRNP   ipratropium-albuterol 3 mL Nebulization Q4H Ariana Shea, YAHIRNP   loratadine 10 mg Oral Daily Ariana Shea, CRNP   methylPREDNISolone sodium succinate 40 mg Intravenous Q12H [facility] Ariana Shea, YAHIRNP   metoprolol tartrate 50 mg Oral Q12H [facility] Rosibel Merrill, YAHIRNP   morphine injection 2 mg Intravenous Q6H PRN Ariana Shea, YAHIRNP   pantoprazole 40 mg Oral Early Morning Ariana Shea, GILDA   polyvinyl alcohol 1 drop Both Eyes TID Ariana Shea, GILDA   potassium chloride 10 mEq Oral Daily Rosibel Merrill, GILDA   pregabalin 300 mg Oral BID Ariana Shea, CRNP   tolterodine 4 mg Oral Daily Ariana Shea, GILDA        Today, Patient Was Seen By: GILDA Briceño    ** Please Note: Dictation voice to text software may have been used in the creation of this document   **

## 2018-05-13 NOTE — SOCIAL WORK
Spoke with the pt at the bedside  Pt is [facility] and letter was provided  Pt stated that she is planning on DC to [facility] when she is done here  Will place referral to [facility]  Pt also OK with [facility] if no bed available at [facility]  Pt had decline to provide 2 other choices at this time, was provided a list of other facility and encouraged her to make 2 other choices just in case, advised her of the preferred provider list for the [facility], pt noted that [facility] is on that list   Will place this referral for review, SW to follow

## 2018-05-13 NOTE — PLAN OF CARE
Activity Intolerance/Impaired Mobility     Mobility/activity is maintained at optimum level for patient Progressing        Communication Impairment     Ability to express needs and understand communication [address] Discharge to post-acute care or home with appropriate resources Progressing        MUSCULOSKELETAL - ADULT     Maintain or return mobility to safest level of function Progressing     Maintain proper alignment of affected body part Progressing        Neurological Deficit     Neurological status is stable or improving Progressing        Nutrition     Nutrition/Hydration status is improving Progressing        Nutrition/Hydration-ADULT     Nutrient/Hydration intake appropriate for improving, restoring or maintaining nutritional needs Progressing        Potential for Aspiration     Non-ventilated patient's risk of aspiration is minimized Progressing        Potential for Falls     Patient will remain free of falls Progressing        Prexisting or High Potential for Compromised Skin Integrity     Skin integrity is maintained or improved Progressing        RESPIRATORY - ADULT     Achieves optimal ventilation and oxygenation Progressing

## 2018-05-13 NOTE — PLAN OF CARE
Problem: RESPIRATORY - ADULT  Goal: Achieves optimal ventilation and oxygenation  INTERVENTIONS:  - Assess for changes in respiratory status  - Assess for changes in mentation and behavior  - Position to facilitate oxygenation and minimize respiratory effort  - Oxygen administration by appropriate delivery method based on oxygen saturation (per order) or ABGs  - Initiate smoking cessation education as indicated  - Encourage broncho-pulmonary hygiene including cough, deep breathe, Incentive Spirometry  - Assess and instruct to report SOB or any respiratory difficulty  - Respiratory Therapy support as indicated   Outcome: Progressing

## 2018-05-14 PROBLEM — E83.42 HYPOMAGNESEMIA: Status: RESOLVED | Noted: 2018-05-11 | Resolved: 2018-05-14

## 2018-05-14 PROBLEM — E87.6 HYPOKALEMIA: Status: RESOLVED | Noted: 2017-03-14 | Resolved: 2018-05-14

## 2018-05-14 LAB
ANION GAP SERPL CALCULATED.3IONS-SCNC: 1 MMOL/L (ref 4–13)
BUN SERPL-MCNC: 14 MG/DL (ref 5–25)
CALCIUM SERPL-MCNC: 8.3 MG/DL (ref 8.3–10.1)
CHLORIDE SERPL-SCNC: 100 MMOL/L (ref 100–108)
CO2 SERPL-SCNC: 39 MMOL/L (ref 21–32)
CREAT SERPL-MCNC: 0.82 MG/DL (ref 0.6–1.3)
ERYTHROCYTE [DISTWIDTH] IN BLOOD BY AUTOMATED COUNT: 17.9 % (ref 11.6–15.1)
GFR SERPL CREATININE-BSD FRML MDRD: 67 ML/MIN/1.73SQ M
GLUCOSE SERPL-MCNC: 160 MG/DL (ref 65–140)
GLUCOSE SERPL-MCNC: 194 MG/DL (ref 65–140)
GLUCOSE SERPL-MCNC: 238 MG/DL (ref 65–140)
GLUCOSE SERPL-MCNC: 255 MG/DL (ref 65–140)
HCT VFR BLD AUTO: 30.5 % (ref 37–47)
HGB BLD-MCNC: 9.5 G/DL (ref 12–16)
MAGNESIUM SERPL-MCNC: 2.3 MG/DL (ref 1.6–2.6)
MCH RBC QN AUTO: 24.9 PG (ref 27–31)
MCHC RBC AUTO-ENTMCNC: 31.3 G/DL (ref 31.4–37.4)
MCV RBC AUTO: 80 FL (ref 82–98)
PLATELET # BLD AUTO: 144 THOUSANDS/UL (ref 130–400)
PMV BLD AUTO: 10.5 FL (ref 8.9–12.7)
POTASSIUM SERPL-SCNC: 3.9 MMOL/L (ref 3.5–5.3)
PROCALCITONIN SERPL-MCNC: <0.05 NG/ML
RBC # BLD AUTO: 3.83 MILLION/UL (ref 4.2–5.4)
SODIUM SERPL-SCNC: 140 MMOL/L (ref 136–145)
WBC # BLD AUTO: 6.1 THOUSAND/UL (ref 4.8–10.8)

## 2018-05-14 PROCEDURE — 82272 OCCULT BLD FECES 1-3 TESTS: CPT | Performed by: NURSE PRACTITIONER

## 2018-05-14 PROCEDURE — 80048 BASIC METABOLIC PNL TOTAL CA: CPT | Performed by: NURSE PRACTITIONER

## 2018-05-14 PROCEDURE — 84145 PROCALCITONIN (PCT): CPT | Performed by: NURSE PRACTITIONER

## 2018-05-14 PROCEDURE — 85027 COMPLETE CBC AUTOMATED: CPT | Performed by: NURSE PRACTITIONER

## 2018-05-14 PROCEDURE — 99024 POSTOP FOLLOW-UP VISIT: CPT | Performed by: PHYSICIAN ASSISTANT

## 2018-05-14 PROCEDURE — 97535 SELF CARE MNGMENT TRAINING: CPT

## 2018-05-14 PROCEDURE — 99232 SBSQ HOSP IP/OBS MODERATE 35: CPT | Performed by: NURSE PRACTITIONER

## 2018-05-14 PROCEDURE — 83735 ASSAY OF MAGNESIUM: CPT | Performed by: NURSE PRACTITIONER

## 2018-05-14 PROCEDURE — 94760 N-INVAS EAR/PLS OXIMETRY 1: CPT

## 2018-05-14 PROCEDURE — 97110 THERAPEUTIC EXERCISES: CPT

## 2018-05-14 PROCEDURE — 82948 REAGENT STRIP/BLOOD GLUCOSE: CPT

## 2018-05-14 PROCEDURE — 94640 AIRWAY INHALATION TREATMENT: CPT

## 2018-05-14 RX ORDER — GUAIFENESIN 600 MG
1200 TABLET, EXTENDED RELEASE 12 HR ORAL EVERY 12 HOURS SCHEDULED
Status: DISCONTINUED | OUTPATIENT
Start: 2018-05-14 | End: 2018-05-18 | Stop reason: HOSPADM

## 2018-05-14 RX ORDER — AMLODIPINE BESYLATE 5 MG/1
5 TABLET ORAL DAILY
Status: DISCONTINUED | OUTPATIENT
Start: 2018-05-14 | End: 2018-05-18 | Stop reason: HOSPADM

## 2018-05-14 RX ORDER — DIPHENHYDRAMINE HCL 25 MG
25 TABLET ORAL ONCE
Status: COMPLETED | OUTPATIENT
Start: 2018-05-15 | End: 2018-05-15

## 2018-05-14 RX ADMIN — PREGABALIN 300 MG: 100 CAPSULE ORAL at 17:19

## 2018-05-14 RX ADMIN — DOCUSATE SODIUM 100 MG: 100 CAPSULE, LIQUID FILLED ORAL at 09:09

## 2018-05-14 RX ADMIN — METHYLPREDNISOLONE SODIUM SUCCINATE 40 MG: 40 INJECTION, POWDER, FOR SOLUTION INTRAMUSCULAR; INTRAVENOUS at 21:55

## 2018-05-14 RX ADMIN — GUAIFENESIN 1200 MG: 600 TABLET, EXTENDED RELEASE ORAL at 21:55

## 2018-05-14 RX ADMIN — METHYLPREDNISOLONE SODIUM SUCCINATE 40 MG: 40 INJECTION, POWDER, FOR SOLUTION INTRAMUSCULAR; INTRAVENOUS at 09:10

## 2018-05-14 RX ADMIN — DOCUSATE SODIUM 100 MG: 100 CAPSULE, LIQUID FILLED ORAL at 17:19

## 2018-05-14 RX ADMIN — METOPROLOL TARTRATE 50 MG: 50 TABLET ORAL at 09:10

## 2018-05-14 RX ADMIN — INSULIN LISPRO 1 UNITS: 100 INJECTION, SOLUTION INTRAVENOUS; SUBCUTANEOUS at 21:57

## 2018-05-14 RX ADMIN — APIXABAN 5 MG: 5 TABLET, FILM COATED ORAL at 09:09

## 2018-05-14 RX ADMIN — ALPRAZOLAM 0.5 MG: 0.5 TABLET ORAL at 21:56

## 2018-05-14 RX ADMIN — ATORVASTATIN CALCIUM 80 MG: 80 TABLET, FILM COATED ORAL at 16:46

## 2018-05-14 RX ADMIN — FLUTICASONE PROPIONATE AND SALMETEROL 1 PUFF: 50; 250 POWDER RESPIRATORY (INHALATION) at 22:02

## 2018-05-14 RX ADMIN — PREGABALIN 300 MG: 100 CAPSULE ORAL at 09:09

## 2018-05-14 RX ADMIN — POLYVINYL ALCOHOL 1 DROP: 14 SOLUTION/ DROPS OPHTHALMIC at 09:10

## 2018-05-14 RX ADMIN — INSULIN LISPRO 2 UNITS: 100 INJECTION, SOLUTION INTRAVENOUS; SUBCUTANEOUS at 11:41

## 2018-05-14 RX ADMIN — FUROSEMIDE 20 MG: 20 TABLET ORAL at 09:10

## 2018-05-14 RX ADMIN — IPRATROPIUM BROMIDE AND ALBUTEROL SULFATE 3 ML: .5; 3 SOLUTION RESPIRATORY (INHALATION) at 07:12

## 2018-05-14 RX ADMIN — FLUTICASONE PROPIONATE AND SALMETEROL 1 PUFF: 50; 250 POWDER RESPIRATORY (INHALATION) at 09:10

## 2018-05-14 RX ADMIN — PANTOPRAZOLE SODIUM 40 MG: 40 TABLET, DELAYED RELEASE ORAL at 05:44

## 2018-05-14 RX ADMIN — HYDROCODONE BITARTRATE AND ACETAMINOPHEN 1 TABLET: 5; 325 TABLET ORAL at 14:46

## 2018-05-14 RX ADMIN — LORATADINE 10 MG: 10 TABLET ORAL at 09:10

## 2018-05-14 RX ADMIN — IPRATROPIUM BROMIDE AND ALBUTEROL SULFATE 3 ML: .5; 3 SOLUTION RESPIRATORY (INHALATION) at 15:28

## 2018-05-14 RX ADMIN — POLYVINYL ALCOHOL 1 DROP: 14 SOLUTION/ DROPS OPHTHALMIC at 15:00

## 2018-05-14 RX ADMIN — POTASSIUM CHLORIDE 10 MEQ: 750 TABLET, EXTENDED RELEASE ORAL at 09:09

## 2018-05-14 RX ADMIN — TOLTERODINE TARTRATE 4 MG: 2 CAPSULE, EXTENDED RELEASE ORAL at 09:09

## 2018-05-14 RX ADMIN — AMLODIPINE BESYLATE 5 MG: 5 TABLET ORAL at 11:42

## 2018-05-14 RX ADMIN — IPRATROPIUM BROMIDE AND ALBUTEROL SULFATE 3 ML: .5; 3 SOLUTION RESPIRATORY (INHALATION) at 19:42

## 2018-05-14 RX ADMIN — ALBUTEROL SULFATE 2.5 MG: 2.5 SOLUTION RESPIRATORY (INHALATION) at 11:22

## 2018-05-14 RX ADMIN — METOPROLOL TARTRATE 50 MG: 50 TABLET ORAL at 21:56

## 2018-05-14 RX ADMIN — INSULIN LISPRO 2 UNITS: 100 INJECTION, SOLUTION INTRAVENOUS; SUBCUTANEOUS at 16:48

## 2018-05-14 RX ADMIN — GUAIFENESIN 1200 MG: 600 TABLET, EXTENDED RELEASE ORAL at 11:41

## 2018-05-14 RX ADMIN — APIXABAN 5 MG: 5 TABLET, FILM COATED ORAL at 21:55

## 2018-05-14 NOTE — PROGRESS NOTES
Progress Note - Inocencia Salinas 1935, 81 y o  female MRN: [id number]    Unit/Bed#: [address] [place] [address] Encounter: [id number]    Primary Care Provider: Reyna Edward DO   Date and time admitted to hospital: 5/10/2018  8:15 AM        * TIA (transient ischemic attack)   Assessment & Plan    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, consistent with prior CTA    · Spoke with neurology, she does not require repeat ECHO  · 1/6/2018 ECHO, EF 70-75%  No regional wall motion abnormalities  Mild concentric hypertrophy  Grade 1 diastolic dysfunction  · 3/9/2018 lipid profile, cholesterol 133, triglycerides 91, HDL 44, LDL 71  Continue Lipitor 80 mg as an inpatient  At discharge Lipitor 40 mg p o  daily  · 3/16/2018 TSH 1 910  · Hemoglobin A1c, 5 8   · MRI of brain with contrast, no acute infarct  · Continue Eliquis    · Neurology following appreciate recommendations        Displaced  comminuted fracture of fibula   Assessment & Plan    · Fall at home resulting in left lower extremity injury  · Left ankle xray, fracture dislocation of the medial malleolus and tibiotalar joint  · Left tibia/fibula x-ray, medial tibial plateau fracture  Fracture of the posterior malleolus of the tibia with posterior dislocation of the ankle   Dr Edy Valadez feels that the report from radiology that stated there was a medial tibial plateau fracture was incorrect  · S/p nicely reduced dislocation of left ankle   · Left posterior splint applied  · Orthopedics following, appreciate recommendations  · She is a poor surgical candidate due to chronic LLE edema and reduced healing  · Ortho and patient prefer to manage conservatively and hold off on surgery  · Patient will follow-up with orthopedist, Dr Angle Adhikari, in 1 week as outpatient  · Repeat Xray during Dr Manuelito Guevara follow-up appt  · May need a cast or a boot in 1 week  · Nonweightbearing with left lower extremity  · Neurovascular checks q 4 hours  · PT/OT with plan to discharge to STR   · Morphine, prn  Vicodin, prn  Displaced fracture of medial malleolus of left tibia, initial encounter for closed fracture   Assessment & Plan    · Fall at home resulting in left lower extremity injury  · Left ankle xray, fracture dislocation of the medial malleolus and tibiotalar joint  · Left tibia/fibula x-ray, medial tibial plateau fracture  Fracture of the posterior malleolus of the tibia with posterior dislocation of the ankle  · S/p nicely reduced dislocation of left ankle  Left posterior splint applied  · Orthopedics following, appreciate recommendations  · She is a poor surgical candidate due to chronic LLE edema and reduced healing  · Ortho and patient prefer to manage conservatively and hold off on surgery  · Patient will follow-up with orthopedist, Dr Angle Adhikari, in 1 week as outpatient  · Repeat Xray during Dr Manuelito Guevara follow-up appt  · May need a cast or a boot in 1 week  · Nonweightbearing with left lower extremity  · Neurovascular checks q 4 hours  · PT/OT with plan to discharge to STR   · Morphine, prn  Vicodin, prn  Fall   Assessment & Plan    · Patient reports a recent right lower extremity fracture with residual weakness    States she fell due to her right leg weakness after she lost her balance and her legs gave out, newly injuring her left lower extremity   · Left lower extremity noted to have a displaced acute fracture  · Nonweightbearing to left lower extremity  · PT/OT   · Plan to discharge to short-term rehab        Drop in hemoglobin   Assessment & Plan    · Stable  · In a patient with acute LLE fracture  Hemoglobin dropped from 11 4 -> 9 1-> 8 9-->8 5-->9 0-->9 5 g/dL  Baseline HGB 11-12 8 g/dL  · She did not receive any IVF  · She is s/p  mg po x1, heparin drip, and Eliquis po x2  · Restart  Eliquis today  · Stool for occult blood, negative x1  Chronic diastolic CHF (congestive heart failure), NYHA class 1 ([facility])   Assessment & Plan    · Repeat chest x-ray,  No pleural effusion  ·  1/ 2018 ECHO showed EF of 70-75%  · continue Lasix 20 mg daily with potassium supplementation  · Monitor I+O, daily weights         Moderate persistent asthma   Assessment & Plan    · Acute exacerbation  Scattered expiratory wheezes noted throughout  · Continue  DuoNeb to every 4 hours and Solu-Medrol 40 mg q 12 hours  Continue IS and flutter valve  Added Mucinex  · Continue albuterol nebulizer treatment as needed  · Continue home inhaler, Advair   · Repeat chest x-ray, Left hemidiaphragm is obscured and there is blunting of the left costophrenic angle  Increased retrocardiac opacity, suggesting on the left lower lobe atelectasis or infiltrate  Probable small left effusion  · Will hold off on antibiotics as of now because she has been afebrile without leukocytosis  Will monitor closely  History of Merkel cell carcinoma   Assessment & Plan    · Ten years ago found to have a lesion 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, pathology results were unclear patient was treated for high-grade neuroendocrine tumor/Merkel cell  She received resection and lymph node dissection of the left groin which were both positive  She received radiation to the groin and tumor bed and received IV chemotherapy  · Follows with outpatient oncologist, Dr Marty Head    Last seen in February 2018, no signs of recurrence, recent scan did not demonstrate evidence of recurrence Merkel cell, surveillance ongoing and patient is to follow up in 6 months  · Left lower extremity with chronic edema on Lasix  · continue Lasix 20 mg daily        Fibromyalgia   Assessment & Plan    · Continue Lyrica        Paroxysmal atrial fibrillation (HCC)   Assessment & Plan    · EKG, NSR  · Continuous telemetry NSR with PACs and PVCs  One episode of Afib HR 120s during the night  Optimize electrolytes  Discontinue telemetry  · Continue metoprolol 50 mg BID  · Restarted Eliquis today  · Of note, patient received aspirin 325 mg x1 in the ED followed by a Heparin drip which was later discontinued and patient received 2 dose of Eliquis          Hyperlipemia   Assessment & Plan    · 3/9/2018 lipid profile, cholesterol 133, triglyceride 91, HDL 44 and LDL 71  · Continue Lipitor 80 mg p o  daily        GERD (gastroesophageal reflux disease)   Assessment & Plan    · Continue PPI        Obesity (BMI 30-39  9)   Assessment & Plan    · Encourage therapeutic lifestyle with weight loss and healthy eating  · Follow-up with PCP on an outpatient basis        Stenosis of left internal carotid artery   Assessment & Plan    · Follows with outpatient vascular surgeon, Dr Patria Cox  · CTA head/neck, near occlusive arthrosclerotic disease in Left ICA  · She chooses not to pursue a surgical root  · Continue statin and restart Eliquis today        Hypertension   Assessment & Plan    · Continue metoprolol 50 mg BID and Lasix 20 mg daily  · Restarted Norvasc today  Hypomagnesemiaresolved as of 5/14/2018   Assessment & Plan    · Magnesium 1 6 -> 2 0->1 7  · Ordered magnesium 2 g IV x1 dose     · Monitor level in am         Hypokalemiaresolved as of 5/14/2018   Assessment & Plan    · Potassium 2 8 -> 3 7 -> 3 8-->3 6, likely due to poor oral intake and diuretics  · Monitor K in a m , repeat BMP          VTE Pharmacologic Prophylaxis:   Pharmacologic: Apixaban (Eliquis)  Mechanical VTE Prophylaxis in Place: Yes    Patient Centered Rounds: I have performed bedside rounds with nursing staff today  Discussions with Specialists or Other Care Team Provider: spoke with Orthopedics concerning plan of care  Education and Discussions with Family / Patient:  Spoke with patient concerning plan of care    Time Spent for Care: 30 minutes  More than 50% of total time spent on counseling and coordination of care as described above  Current Length of Stay: 4 day(s)    Current Patient Status: Inpatient   Certification Statement: The patient will continue to require additional inpatient hospital stay due to Exacerbation of asthma requiring IV steroids  Discharge Plan:  Not anticipated    Code Status: Level 1 - Full Code      Subjective:   She is observed lying in bed offering no complaints of pain or discomfort at present  She states she is eating and tolerating her meals well and that she had a BM this morning  Denies chest pain, abdominal pain, or headache  Denies nausea, vomiting, or diarrhea  Objective:     Vitals:   Temp (24hrs), Av 2 °F (36 8 °C), Min:97 4 °F (36 3 °C), Max:98 6 °F (37 °C)    HR:  [69-81] 77  Resp:  [18-19] 18  BP: (130-147)/(59-71) 147/71  SpO2:  [90 %-94 %] 91 %  Body mass index is 38 23 kg/m²  Input and Output Summary (last 24 hours): Intake/Output Summary (Last 24 hours) at 18 1134  Last data filed at 18 1206   Gross per 24 hour   Intake                0 ml   Output             1100 ml   Net            -1100 ml       Physical Exam:     Physical Exam   Constitutional: She is oriented to person, place, and time  She appears well-developed and well-nourished  No distress  obese   HENT:   Head: Normocephalic and atraumatic  Neck: Normal range of motion  Neck supple  Cardiovascular: Normal rate, regular rhythm and normal heart sounds  Exam reveals no gallop and no friction rub  No murmur heard    Pulmonary/Chest: Effort normal  No respiratory distress  She has wheezes  She has no rales  She exhibits no tenderness  Scattered expiratory wheezes   Abdominal: Soft  Bowel sounds are normal  She exhibits no distension and no mass  There is no tenderness  There is no rebound and no guarding  Musculoskeletal: She exhibits edema  LLE posterior splint noted  LLE warm, pink, and dry   +2 pedal pulses with positive movement and sensation  +2 edema noted  Neurological: She is alert and oriented to person, place, and time  Skin: Skin is warm and dry  No rash noted  She is not diaphoretic  No erythema  No pallor  Psychiatric: She has a normal mood and affect  Her behavior is normal  Judgment and thought content normal        Additional Data:     Labs:      Results from last 7 days  Lab Units 05/14/18  0543   WBC Thousand/uL 6 10   HEMOGLOBIN g/dL 9 5*   HEMATOCRIT % 30 5*   PLATELETS Thousands/uL 144       Results from last 7 days  Lab Units 05/14/18  0543 05/13/18  0644   SODIUM mmol/L 140 143   POTASSIUM mmol/L 3 9 3 6   CHLORIDE mmol/L 100 103   CO2 mmol/L 39* 40*   BUN mg/dL 14 9   CREATININE mg/dL 0 82 0 70   CALCIUM mg/dL 8 3 7 9*   TOTAL PROTEIN g/dL  --  5 2*   BILIRUBIN TOTAL mg/dL  --  0 40   ALK PHOS U/L  --  100   ALT U/L  --  10*   AST U/L  --  14   GLUCOSE RANDOM mg/dL 160* 99       Results from last 7 days  Lab Units 05/10/18  0826   INR  1 06       * I Have Reviewed All Lab Data Listed Above  * Additional Pertinent Lab Tests Reviewed:  All Labs Within Last 24 Hours Reviewed    Imaging:    Imaging Reports Reviewed Today Include: PCXR  Imaging Personally Reviewed by Myself Includes:  PCXR    Recent Cultures (last 7 days):           Last 24 Hours Medication List:     Current Facility-Administered Medications:  acetaminophen 650 mg Oral Q6H PRN Lennox Form, CRNP   albuterol 2 5 mg Nebulization Q6H PRN Lennox Form, CRNP   ALPRAZolam 0 5 mg Oral HS Lennox Form, CRNP   amLODIPine 5 mg Oral Daily Kyrie De León, GILDA   apixaban 5 mg Oral Q12H GILDA Juárez   atorvastatin 80 mg Oral Daily With Gustavo Field MD   docusate sodium 100 mg Oral BID Arkellee De León, GILDA   fluticasone-salmeterol 1 puff Inhalation Q12H [address] Arkellee De León, GILDA   furosemide 20 mg Oral Daily Perez Leopard, CRNP   guaiFENesin 1,200 mg Oral Q12H [address] Ardellasha De León, GILDA   HYDROcodone-acetaminophen 1 tablet Oral Q4H PRN Demetriusl Sarthak, YAHIRNP   insulin lispro 1-5 Units Subcutaneous TID AC Kyrie De León, GILDA   insulin lispro 1-5 Units Subcutaneous HS GILDA Singleton   ipratropium-albuterol 3 mL Nebulization Q4H GILDA Singleton   loratadine 10 mg Oral Daily GILDA Singleton   methylPREDNISolone sodium succinate 40 mg Intravenous Q12H [address] ArdelGILDA Childers   metoprolol tartrate 50 mg Oral Q12H [address] Perez Leopard, CRNP   morphine injection 2 mg Intravenous Q6H PRN Kyrie De León, GILDA   pantoprazole 40 mg Oral Early Morning GILDA Singleton   polyvinyl alcohol 1 drop Both Eyes TID GILDA Singleton   potassium chloride 10 mEq Oral Daily Perez Leopard, CRNP   pregabalin 300 mg Oral BID Kyrie De León, GILDA   tolterodine 4 mg Oral Daily GILDA Singleton        Today, Patient Was Seen By: GILDA Singleton    ** Please Note: Dictation voice to text software may have been used in the creation of this document   **

## 2018-05-14 NOTE — ASSESSMENT & PLAN NOTE
· Acute exacerbation  Scattered expiratory wheezes noted throughout  · Continue  DuoNeb to every 4 hours and Solu-Medrol 40 mg q 12 hours  Continue IS and flutter valve  Added Mucinex  · Continue albuterol nebulizer treatment as needed  · Continue home inhaler, Advair   · Repeat chest x-ray, Left hemidiaphragm is obscured and there is blunting of the left costophrenic angle  Increased retrocardiac opacity, suggesting on the left lower lobe atelectasis or infiltrate  Probable small left effusion  · Will hold off on antibiotics as of now because she has been afebrile without leukocytosis  Will monitor closely

## 2018-05-14 NOTE — PLAN OF CARE
Activity Intolerance/Impaired Mobility     Mobility/activity is maintained at optimum level for patient Progressing        CARDIOVASCULAR - ADULT     Maintains optimal cardiac output and hemodynamic stability Progressing     Absence of cardiac dysrhythmias or at baseline rhythm Progressing        Communication Impairment     Ability to express needs and understand communication [id number] Discharge to post-acute care or home with appropriate resources Progressing        MUSCULOSKELETAL - ADULT     Maintain or return mobility to safest level of function Progressing     Maintain proper alignment of affected body part Progressing        Neurological Deficit     Neurological status is stable or improving Progressing        Nutrition     Nutrition/Hydration status is improving Progressing        Nutrition/Hydration-ADULT     Nutrient/Hydration intake appropriate for improving, restoring or maintaining nutritional needs Progressing        Potential for Aspiration     Non-ventilated patient's risk of aspiration is minimized Progressing        Potential for Falls     Patient will remain free of falls Progressing        Prexisting or High Potential for Compromised Skin Integrity     Skin integrity is maintained or improved Progressing        RESPIRATORY - ADULT     Achieves optimal ventilation and oxygenation Progressing

## 2018-05-14 NOTE — ASSESSMENT & PLAN NOTE
· Follows with outpatient vascular surgeon, Dr Ronni Parker  · CTA head/neck, near occlusive arthrosclerotic disease in Left ICA  · She chooses not to pursue a surgical root  · Continue statin and restart Eliquis today

## 2018-05-14 NOTE — ASSESSMENT & PLAN NOTE
· Stable  · In a patient with acute LLE fracture  Hemoglobin dropped from 11 4 -> 9 1-> 8 9-->8 5-->9 0-->9 5 g/dL  Baseline HGB 11-12 8 g/dL  · She did not receive any IVF  · She is s/p  mg po x1, heparin drip, and Eliquis po x2  · Restart  Eliquis today  · Stool for occult blood, negative x1

## 2018-05-14 NOTE — CASE MANAGEMENT
Continued Stay Review    Date: 5/14/18    Vital Signs: /60 (BP Location: Right arm)   Pulse 86   Temp 98 °F (36 7 °C) (Oral)   Resp 20   Ht 5' 2" (1 575 m)   Wt 94 8 kg (208 lb 15 9 oz)   SpO2 90%   BMI 38 23 kg/m²     Medications:   Scheduled Meds:   Current Facility-Administered Medications:  acetaminophen 650 mg Oral Q6H PRN Amanda Caprice, CRNP   albuterol 2 5 mg Nebulization Q6H PRN Amanda Caprice, CRNP   ALPRAZolam 0 5 mg Oral HS Amanda Caprice, CRNP   amLODIPine 5 mg Oral Daily [facility], CRNP   apixaban 5 mg Oral Q12H [facility], CRNP   atorvastatin 80 mg Oral Daily With Suella Seeds, MD   docusate sodium 100 mg Oral BID Amanda Caprice, CRNP   fluticasone-salmeterol 1 puff Inhalation Q12H [facility] Amanda Caprice, CRNP   furosemide 20 mg Oral Daily Mc Torres, GILDA   guaiFENesin 1,200 mg Oral Q12H [facility] Amanda Caprice, CRNP   HYDROcodone-acetaminophen 1 tablet Oral Q4H PRN Amanda Caprice, CRNP   insulin lispro 1-5 Units Subcutaneous TID AC Amanda Caprice, CRNP   insulin lispro 1-5 Units Subcutaneous HS Amanda Caprice, CRNP   ipratropium-albuterol 3 mL Nebulization Q4H Amanda Caprice, CRNP   loratadine 10 mg Oral Daily Amanda Caprice, CRNP   methylPREDNISolone sodium succinate 40 mg Intravenous Q12H [facility] Amanda Caprice, CRNP   metoprolol tartrate 50 mg Oral Q12H [facility] GILDA Kim   morphine injection 2 mg Intravenous Q6H PRN Amanda Caprice, CRNP   pantoprazole 40 mg Oral Early Morning Amanda Caprice, CRNP   polyvinyl alcohol 1 drop Both Eyes TID Amanda Caprice, CRNP   potassium chloride 10 mEq Oral Daily GILDA Kim   pregabalin 300 mg Oral BID Amanda Caprice, CRNP   tolterodine 4 mg Oral Daily Amanda Caprice, CRNP     Continuous Infusions:    PRN Meds:   acetaminophen    albuterol    HYDROcodone-acetaminophen    morphine injection    Abnormal Labs/Diagnostic Results: Age/Sex: 81 y o  female     Attending  TIA (transient ischemic attack)  · Spoke with neurology, she does not require repeat ECHO  · 1/6/2018 ECHO, EF 70-75%  No regional wall motion abnormalities  Mild concentric hypertrophy  Grade 1 diastolic dysfunction  · 3/9/2018 lipid profile, cholesterol 133, triglycerides 91, HDL 44, LDL 71  Continue Lipitor 80 mg as an inpatient  At discharge Lipitor 40 mg p o  daily  Displaced  comminuted fracture of fibula  · feels that the report from radiology that stated there was a medial tibial plateau fracture was incorrect  · S/p nicely reduced dislocation of left ankle   ? Left posterior splint applied  Displaced fracture of medial malleolus of left tibia, initial encounter for closed fracture  · Orthopedics following, appreciate recommendations  · She is a poor surgical candidate due to chronic LLE edema and reduced healing  · Ortho and patient prefer to manage conservatively and hold off on surgery  ? Patient will follow-up with orthopedist, Dr Rosaline Benites, in 1 week as outpatient  ? Repeat Xray during Dr Greg Caputo follow-up appt  ? May need a cast or a boot in 1 week  · Nonweightbearing with left lower extremity  History of Merkel cell carcinoma  · Follows with outpatient oncologist, Dr Teresa Ortiz  Last seen in February 2018, no signs of recurrence, recent scan did not demonstrate evidence of recurrence Merkel cell, surveillance ongoing and patient is to follow up in 6 months  · Left lower extremity with chronic edema on Lasix  · continue Lasix 20 mg daily  Paroxysmal atrial fibrillation (HCC)  · Restarted Eliquis today  · Of note, patient received aspirin 325 mg x1 in the ED followed by a Heparin drip which was later discontinued and patient received 2 dose of Eliquis  Moderate persistent asthma   Assessment & Plan     · Acute exacerbation  Scattered expiratory wheezes noted throughout  · Continue  DuoNeb to every 4 hours and Solu-Medrol 40 mg q 12 hours  Continue IS and flutter valve  Added Mucinex  · Continue albuterol nebulizer treatment as needed  · Continue home inhaler, Advair   · Repeat chest x-ray, Left hemidiaphragm is obscured and there is blunting of the left costophrenic angle   Increased retrocardiac opacity, suggesting on the left lower lobe atelectasis or infiltrate   Probable small left effusion  · Will hold off on antibiotics as of now because she has been afebrile without leukocytosis  Will monitor closely       ·

## 2018-05-14 NOTE — ASSESSMENT & PLAN NOTE
· EKG, NSR  · Continuous telemetry NSR with PACs and PVCs  One episode of Afib HR 120s during the night  Optimize electrolytes  Discontinue telemetry    · Continue metoprolol 50 mg BID  · Restarted Eliquis today  · Of note, patient received aspirin 325 mg x1 in the ED followed by a Heparin drip which was later discontinued and patient received 2 dose of Eliquis

## 2018-05-14 NOTE — ASSESSMENT & PLAN NOTE
· Fall at home resulting in left lower extremity injury  · Left ankle xray, fracture dislocation of the medial malleolus and tibiotalar joint  · Left tibia/fibula x-ray, medial tibial plateau fracture  Fracture of the posterior malleolus of the tibia with posterior dislocation of the ankle  Dr Sonia Aquino feels that the report from radiology that stated there was a medial tibial plateau fracture was incorrect  · S/p nicely reduced dislocation of left ankle   · Left posterior splint applied  · Orthopedics following, appreciate recommendations  · She is a poor surgical candidate due to chronic LLE edema and reduced healing  · Ortho and patient prefer to manage conservatively and hold off on surgery  · Patient will follow-up with orthopedist, Dr Sonia Aquino, in 1 week as outpatient  · Repeat Xray during Dr Shasta Puente follow-up appt  · May need a cast or a boot in 1 week    · Nonweightbearing with left lower extremity  · Neurovascular checks q 4 hours  · PT/OT with plan to discharge to STR   · Morphine, prn  Vicodin, prn

## 2018-05-14 NOTE — ASSESSMENT & PLAN NOTE
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, consistent with prior CTA    · Spoke with neurology, she does not require repeat ECHO  · 1/6/2018 ECHO, EF 70-75%  No regional wall motion abnormalities  Mild concentric hypertrophy  Grade 1 diastolic dysfunction  · 3/9/2018 lipid profile, cholesterol 133, triglycerides 91, HDL 44, LDL 71  Continue Lipitor 80 mg as an inpatient    At discharge Lipitor 40 mg p o  daily  · 3/16/2018 TSH 1 910  · Hemoglobin A1c, 5 8   · MRI of brain with contrast, no acute infarct  · Continue Eliquis    · Neurology following appreciate recommendations

## 2018-05-14 NOTE — ASSESSMENT & PLAN NOTE
· Fall at home resulting in left lower extremity injury  · Left ankle xray, fracture dislocation of the medial malleolus and tibiotalar joint  · Left tibia/fibula x-ray, medial tibial plateau fracture  Fracture of the posterior malleolus of the tibia with posterior dislocation of the ankle  · S/p nicely reduced dislocation of left ankle  Left posterior splint applied  · Orthopedics following, appreciate recommendations  · She is a poor surgical candidate due to chronic LLE edema and reduced healing  · Ortho and patient prefer to manage conservatively and hold off on surgery  · Patient will follow-up with orthopedist, Dr Angle Adhikari, in 1 week as outpatient  · Repeat Xray during Dr Manuelito Guevara follow-up appt  · May need a cast or a boot in 1 week    · Nonweightbearing with left lower extremity  · Neurovascular checks q 4 hours  · PT/OT with plan to discharge to STR   · Morphine, prn  Vicodin, prn

## 2018-05-14 NOTE — ASSESSMENT & PLAN NOTE
· 3/9/2018 lipid profile, cholesterol 133, triglyceride 91, HDL 44 and LDL 71  · Continue Lipitor 80 mg p o  daily

## 2018-05-14 NOTE — ASSESSMENT & PLAN NOTE
· Repeat chest x-ray,  No pleural effusion  ·  1/ 2018 ECHO showed EF of 70-75%    · continue Lasix 20 mg daily with potassium supplementation  · Monitor I+O, daily weights

## 2018-05-14 NOTE — PROGRESS NOTES
Progress Note - Orthopedics   Santino Moctezuma 81 y o  female MRN: [id number]  Unit/Bed#: [address] [place] [address] Encounter: [id number]    Assessment:  1) left fibular shaft fracture and left medial malleolus and posterior malleolus fractures of the distal tibia - pain well controlled, neurovascularly intact, patient currently would like would ever is best for her if that include surgery, there was some concern as to whether not surgery was still needed from a Internal Medicine perspective for this patient if she was willing to have surgery, this is not indicated for this patient not only because she is a poor surgical candidate but also because conservative measures should be taken 1st, possible need for surgical or of of medial mouth depending on if the patient does not continue to improve, follow-up in 2 weeks will be needed in order to monitor fractures, currently in splint    Plan:  1) left fibular shaft fracture and left medial malleolus and posterior malleolus fractures of the distal tibia -   - Antibiotics: none needed  - Anticoagulation:  Eliquis, SCDs, ambulation as possible on lower right extremity  - Activity:  Nonweightbearing on lower left extremity, PT/OT    - AM lab draw:  No a m  labs needed from an orthopedic perspective  - Analgesia: Continue p r n  medication  - Dressing: keep clean, dry, and in tact, continue splint until follow-up in the outpatient office  - Disposition:  Follow-up in 2 weeks with the orthopedic office after discharge, if patient stays for any more time follow-up can probably be in 1 week as compared to 2 weeks  From an orthopedic standpoint patient is ready for discharge  - orthopedic team will sign off    Weight bearing:  Nonweightbearing on lower left extremity     VTE Pharmacologic Prophylaxis:  Eliquis  VTE Mechanical Prophylaxis: sequential compression device    Subjective:  Patient was seen examined at bedside  Patient denies any acute events overnight    Patient reports that she is doing relatively well and her pain is decent controlled at rest   Patient can still feel and wiggle her toes  Patient denies any numbness, tingling, lack of motor movement, lack of sensation in her distal lower left extremity  Patient reports that she would rather not have surgery if it is not needed  Patient also has a history she reports of having lower left extremity edema due to having lymph nodes taken out  Patient just wanted to be reassured that surgery was not the best option for her at this point  Patient was educated on the fact that she is a neurovascularly intact and she does not have any open fracture that currently we should proceed with conservative measures 1st     Vitals: Blood pressure 134/60, pulse 86, temperature 98 °F (36 7 °C), temperature source Oral, resp  rate 20, height 5' 2" (1 575 m), weight 94 8 kg (208 lb 15 9 oz), SpO2 92 %, not currently breastfeeding  ,Body mass index is 38 23 kg/m²  Intake/Output Summary (Last 24 hours) at 05/14/18 1616  Last data filed at 05/14/18 1201   Gross per 24 hour   Intake                0 ml   Output             1100 ml   Net            -1100 ml       Invasive Devices     Central Venous Catheter Line            Port A Cath 05/10/18 Right Chest 4 days          Peripheral Intravenous Line            Peripheral IV 05/10/18 Right Antecubital 4 days                Physical Exam: /60 (BP Location: Right arm)   Pulse 86   Temp 98 °F (36 7 °C) (Oral)   Resp 20   Ht 5' 2" (1 575 m)   Wt 94 8 kg (208 lb 15 9 oz)   SpO2 92%   BMI 38 23 kg/m²   General appearance: alert and oriented, in no acute distress  Head: Normocephalic, without obvious abnormality, atraumatic  Skin: Skin color, texture, turgor normal  No rashes or lesions  Ortho Exam:   Left Lower Extremity: Thigh and calf compartments are soft and nontender to palpation  + L3-S1 SILT  + DF/PF minimal due to splint + EHL  No pain with passive stretch/extension of toes   DP non palpable due to 1/2+ pedal edema per her baseline, capillary refill less than 2 seconds, and foot is warm B/L  currently in splint      Lab, Imaging and other studies:   I have personally reviewed pertinent lab results    CBC:   Lab Results   Component Value Date    WBC 6 10 05/14/2018    HGB 9 5 (L) 05/14/2018    HCT 30 5 (L) 05/14/2018    MCV 80 (L) 05/14/2018     05/14/2018    MCH 24 9 (L) 05/14/2018    MCHC 31 3 (L) 05/14/2018    RDW 17 9 (H) 05/14/2018    MPV 10 5 05/14/2018     CMP:   Lab Results   Component Value Date     05/14/2018     05/14/2018    CO2 39 (H) 05/14/2018    ANIONGAP 1 (L) 05/14/2018    BUN 14 05/14/2018    CREATININE 0 82 05/14/2018    GLUCOSE 160 (H) 05/14/2018    CALCIUM 8 3 05/14/2018    EGFR 67 05/14/2018

## 2018-05-14 NOTE — OCCUPATIONAL THERAPY NOTE
OT TREATMENT       05/14/18 1030   Restrictions/Precautions   Weight Bearing Precautions Per Order Yes   LLE Weight Bearing Per Order NWB   Braces or Orthoses Splint  (LLE)   Other Precautions Chair Alarm; Bed Alarm;O2;Fall Risk;Pain   Pain Assessment   Pain Assessment Bowser-Baker FACES   Bowser-Baker FACES Pain Rating 8   Pain Location Ankle; Foot   Pain Orientation Right   Bed Mobility   Supine to Sit 3  Moderate assistance   Additional items Assist x 1   Sit to Supine 3  Moderate assistance   Additional items Assist x 1   Transfers   Sit to Stand 3  Moderate assistance   Additional items Assist x 2   Stand to Sit 3  Moderate assistance   Additional items Assist x 2   Stand pivot 2  Maximal assistance   Additional items Assist x 2  (attempted SPT to bedside chair with pt unable due to pain RL)   Cognition   Overall Cognitive Status [facility]   Arousal/Participation Alert; Responsive; Cooperative   Attention Within functional limits   Orientation Level Oriented X4   Memory Within functional limits   Following Commands Follows all commands and directions without difficulty   Activity Tolerance   Activity Tolerance Patient limited by pain   Medical Staff Made Aware (met with nursing after session)   Assessment   Assessment Pt received in bed, agreeable to treatment  Sits EOB with mod A x1, agreeable to attmept to stand but she anticipates it to be difficult due to hx fx and CA RLE  Able to sit to stand mod x2, attempted to transfer SPT with standard walker with assist x2 howwever pt was unable due to pain in RLE  based on pt status with standing, balance, strength pt anticipated to be set up UB care while in bed, max/dep LB care in bed  Dep toielting  Plan   Treatment Interventions ADL retraining;Functional transfer training;UE strengthening/ROM; Endurance training;Patient/family training;Equipment evaluation/education; Neuromuscular reeducation; Fine motor coordination activities; Compensatory technique education; Energy conservation   OT Frequency 3-5x/wk   Recommendation   OT Discharge Recommendation [id number] License Number  Kaylee Settler OTR/L [id number]   Pt in bed in chair position, call bell within reach, all needs met

## 2018-05-14 NOTE — PHYSICAL THERAPY NOTE
PT TREATMENT     05/14/18 1020   Pain Assessment   Pain Assessment Bowser-Baker FACES   Bowser-Baker FACES Pain Rating 0  (R ankle area with standing/weight bearing)   Restrictions/Precautions   LLE Weight Bearing Per Order NWB   Other Precautions Chair Alarm; Bed Alarm; Fall Risk;Pain   General   Chart Reviewed Yes   Family/Caregiver Present No   Cognition   Arousal/Participation Cooperative   Subjective   Subjective patient cooperative but with increased pain in R ankle with standing and weight bearing   Transfers   Sit to Stand 3  Moderate assistance   Additional items Assist x 2;Verbal cues   Stand to Sit 3  Moderate assistance   Additional items Assist x 2;Verbal cues   Stand pivot 2  Maximal assistance   Additional items Assist x 2;Verbal cues   Additional Comments static standing required max assist of 1 to maintain NWB LLE  On attempts to stand pivot patient required max assist of 2 but unable to complete bed to chair due to increased R ankle pain with attempts  standing and gait attempts required skilled assist of 2 therapists due to NWB LLE   Ambulation/Elevation   Gait Assistance 2  Maximal assist   Additional items Assist x 2;Verbal cues; Tactile cues   Assistive Device Standard walker   Distance unable to take steps at this time even with max assist of 2 and max assist to maintain NWB LLE   Exercises   Heelslides Supine;5 reps;Bilateral   Knee AROM Long Arc Quad Sitting;5 reps;Right  (LLE AAROM required)   Ankle Pumps Sitting;10 reps;Right   Assessment   Assessment Patient cooperative and motivated but unable to maintain NWB LLE even with static standing and unable to take steps or complete stand pivot transfers due to R ankle pain  Patient will benefit from continued PT daily with progression as tolerated   Plan   Treatment/Interventions ADL retraining;Functional transfer training;LE strengthening/ROM; Therapeutic exercise; Endurance training;Patient/family training;Equipment eval/education; Bed mobility;Gait training; Compensatory technique education   PT Frequency Once a day   Recommendation   Recommendation (STR)   Licensure   [place] License Number  Claire Ace PT [id number]

## 2018-05-14 NOTE — ASSESSMENT & PLAN NOTE
· Ten years ago found to have a lesion 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, pathology results were unclear patient was treated for high-grade neuroendocrine tumor/Merkel cell  She received resection and lymph node dissection of the left groin which were both positive  She received radiation to the groin and tumor bed and received IV chemotherapy  · Follows with outpatient oncologist, Dr Meghan Nolasco  Last seen in February 2018, no signs of recurrence, recent scan did not demonstrate evidence of recurrence Merkel cell, surveillance ongoing and patient is to follow up in 6 months    · Left lower extremity with chronic edema on Lasix  · continue Lasix 20 mg daily

## 2018-05-15 LAB
ERYTHROCYTE [DISTWIDTH] IN BLOOD BY AUTOMATED COUNT: 17.5 % (ref 11.6–15.1)
GLUCOSE SERPL-MCNC: 158 MG/DL (ref 65–140)
GLUCOSE SERPL-MCNC: 169 MG/DL (ref 65–140)
GLUCOSE SERPL-MCNC: 178 MG/DL (ref 65–140)
GLUCOSE SERPL-MCNC: 195 MG/DL (ref 65–140)
HCT VFR BLD AUTO: 29.8 % (ref 37–47)
HGB BLD-MCNC: 9.3 G/DL (ref 12–16)
MCH RBC QN AUTO: 24.7 PG (ref 27–31)
MCHC RBC AUTO-ENTMCNC: 31.1 G/DL (ref 31.4–37.4)
MCV RBC AUTO: 80 FL (ref 82–98)
PLATELET # BLD AUTO: 186 THOUSANDS/UL (ref 130–400)
PMV BLD AUTO: 10.1 FL (ref 8.9–12.7)
RBC # BLD AUTO: 3.75 MILLION/UL (ref 4.2–5.4)
WBC # BLD AUTO: 9.5 THOUSAND/UL (ref 4.8–10.8)

## 2018-05-15 PROCEDURE — 94760 N-INVAS EAR/PLS OXIMETRY 1: CPT

## 2018-05-15 PROCEDURE — 85027 COMPLETE CBC AUTOMATED: CPT | Performed by: NURSE PRACTITIONER

## 2018-05-15 PROCEDURE — 97110 THERAPEUTIC EXERCISES: CPT

## 2018-05-15 PROCEDURE — 94668 MNPJ CHEST WALL SBSQ: CPT

## 2018-05-15 PROCEDURE — 94640 AIRWAY INHALATION TREATMENT: CPT

## 2018-05-15 PROCEDURE — 82948 REAGENT STRIP/BLOOD GLUCOSE: CPT

## 2018-05-15 PROCEDURE — 94664 DEMO&/EVAL PT USE INHALER: CPT

## 2018-05-15 PROCEDURE — 99233 SBSQ HOSP IP/OBS HIGH 50: CPT | Performed by: INTERNAL MEDICINE

## 2018-05-15 RX ADMIN — TOLTERODINE TARTRATE 4 MG: 2 CAPSULE, EXTENDED RELEASE ORAL at 09:40

## 2018-05-15 RX ADMIN — ALPRAZOLAM 0.5 MG: 0.5 TABLET ORAL at 20:41

## 2018-05-15 RX ADMIN — POLYVINYL ALCOHOL 1 DROP: 14 SOLUTION/ DROPS OPHTHALMIC at 16:34

## 2018-05-15 RX ADMIN — METOPROLOL TARTRATE 50 MG: 50 TABLET ORAL at 09:43

## 2018-05-15 RX ADMIN — IPRATROPIUM BROMIDE AND ALBUTEROL SULFATE 3 ML: .5; 3 SOLUTION RESPIRATORY (INHALATION) at 07:32

## 2018-05-15 RX ADMIN — INSULIN LISPRO 1 UNITS: 100 INJECTION, SOLUTION INTRAVENOUS; SUBCUTANEOUS at 09:44

## 2018-05-15 RX ADMIN — HYDROCODONE BITARTRATE AND ACETAMINOPHEN 1 TABLET: 5; 325 TABLET ORAL at 10:55

## 2018-05-15 RX ADMIN — PANTOPRAZOLE SODIUM 40 MG: 40 TABLET, DELAYED RELEASE ORAL at 06:40

## 2018-05-15 RX ADMIN — APIXABAN 5 MG: 5 TABLET, FILM COATED ORAL at 20:36

## 2018-05-15 RX ADMIN — FLUTICASONE PROPIONATE AND SALMETEROL 1 PUFF: 50; 250 POWDER RESPIRATORY (INHALATION) at 21:57

## 2018-05-15 RX ADMIN — GUAIFENESIN 1200 MG: 600 TABLET, EXTENDED RELEASE ORAL at 21:50

## 2018-05-15 RX ADMIN — PREGABALIN 300 MG: 100 CAPSULE ORAL at 09:42

## 2018-05-15 RX ADMIN — POTASSIUM CHLORIDE 10 MEQ: 750 TABLET, EXTENDED RELEASE ORAL at 09:43

## 2018-05-15 RX ADMIN — IPRATROPIUM BROMIDE AND ALBUTEROL SULFATE 3 ML: .5; 3 SOLUTION RESPIRATORY (INHALATION) at 15:44

## 2018-05-15 RX ADMIN — DOCUSATE SODIUM 100 MG: 100 CAPSULE, LIQUID FILLED ORAL at 17:23

## 2018-05-15 RX ADMIN — METHYLPREDNISOLONE SODIUM SUCCINATE 40 MG: 40 INJECTION, POWDER, FOR SOLUTION INTRAMUSCULAR; INTRAVENOUS at 21:52

## 2018-05-15 RX ADMIN — IPRATROPIUM BROMIDE AND ALBUTEROL SULFATE 3 ML: .5; 3 SOLUTION RESPIRATORY (INHALATION) at 11:58

## 2018-05-15 RX ADMIN — DOCUSATE SODIUM 100 MG: 100 CAPSULE, LIQUID FILLED ORAL at 09:43

## 2018-05-15 RX ADMIN — POLYVINYL ALCOHOL 1 DROP: 14 SOLUTION/ DROPS OPHTHALMIC at 09:44

## 2018-05-15 RX ADMIN — LORATADINE 10 MG: 10 TABLET ORAL at 09:43

## 2018-05-15 RX ADMIN — INSULIN LISPRO 1 UNITS: 100 INJECTION, SOLUTION INTRAVENOUS; SUBCUTANEOUS at 12:12

## 2018-05-15 RX ADMIN — METHYLPREDNISOLONE SODIUM SUCCINATE 40 MG: 40 INJECTION, POWDER, FOR SOLUTION INTRAMUSCULAR; INTRAVENOUS at 09:40

## 2018-05-15 RX ADMIN — APIXABAN 5 MG: 5 TABLET, FILM COATED ORAL at 09:42

## 2018-05-15 RX ADMIN — PREGABALIN 300 MG: 100 CAPSULE ORAL at 17:22

## 2018-05-15 RX ADMIN — FUROSEMIDE 20 MG: 20 TABLET ORAL at 09:42

## 2018-05-15 RX ADMIN — GUAIFENESIN 1200 MG: 600 TABLET, EXTENDED RELEASE ORAL at 09:42

## 2018-05-15 RX ADMIN — ATORVASTATIN CALCIUM 80 MG: 80 TABLET, FILM COATED ORAL at 17:23

## 2018-05-15 RX ADMIN — FLUTICASONE PROPIONATE AND SALMETEROL 1 PUFF: 50; 250 POWDER RESPIRATORY (INHALATION) at 09:44

## 2018-05-15 RX ADMIN — METOPROLOL TARTRATE 50 MG: 50 TABLET ORAL at 21:51

## 2018-05-15 RX ADMIN — DIPHENHYDRAMINE HCL 25 MG: 25 TABLET ORAL at 00:15

## 2018-05-15 RX ADMIN — AMLODIPINE BESYLATE 5 MG: 5 TABLET ORAL at 09:42

## 2018-05-15 RX ADMIN — INSULIN LISPRO 1 UNITS: 100 INJECTION, SOLUTION INTRAVENOUS; SUBCUTANEOUS at 21:55

## 2018-05-15 RX ADMIN — INSULIN LISPRO 1 UNITS: 100 INJECTION, SOLUTION INTRAVENOUS; SUBCUTANEOUS at 17:22

## 2018-05-15 NOTE — PLAN OF CARE
Problem: PHYSICAL THERAPY ADULT  Goal: Performs mobility at highest level of function for planned discharge setting  See evaluation for individualized goals  Outcome: Progressing  Prognosis: Good  Problem List: Decreased strength, Decreased range of motion, Decreased endurance, Impaired balance, Decreased mobility, Pain  Assessment: Improved trans, amb, and ability to assist in maintaining NWB LLE  Pt will cont to benefit from skilled PT services  Recommendation:  (STR)          See flowsheet documentation for full assessment

## 2018-05-15 NOTE — PHYSICAL THERAPY NOTE
PT TREATMENT     05/15/18 1027   Pain Assessment   Pain Assessment 0-10   Pain Score 8   Pain Type Acute pain   Pain Location Ankle   Pain Orientation Left   Restrictions/Precautions   LLE Weight Bearing Per Order WBAT   Other Precautions Fall Risk;Pain;Bed Alarm; Chair Alarm;O2   General   Chart Reviewed Yes   Family/Caregiver Present No   Subjective   Subjective "It reall hurts when the leg hangs down"   Bed Mobility   Supine to Sit 5  Supervision   Transfers   Sit to Stand 3  Moderate assistance   Additional items Assist x 2;Verbal cues   Stand to Sit 3  Moderate assistance   Additional items Assist x 2;Verbal cues   Ambulation/Elevation   Gait Assistance 3  Moderate assist   Additional items Assist x 2   Assistive Device Standard walker   Distance 3 steps bed to chair, NWB LLE with assist to maintain NWB; Pt sat OOB in chair with all needs in reach, (+) chair alarm and LLE propped up with stool and pillows  RN aware  Balance   Static Sitting Fair +   Static Standing Poor  (with std walker)   Dynamic Standing Poor  (with std walker)   Ambulatory Poor  (with std walker)   Activity Tolerance   Activity Tolerance Patient limited by fatigue;Patient limited by pain   Assessment   Assessment Improved trans, amb, and ability to assist in maintaining NWB LLE  Pt will cont to benefit from skilled PT services  Plan   Treatment/Interventions ADL retraining;Functional transfer training;LE strengthening/ROM; Therapeutic exercise; Endurance training;Patient/family training;Bed mobility;Gait training   PT Frequency Once a day   Recommendation   Recommendation (STR)   Equipment Recommended (cont amb with std walker)   Licensure   [place] License Number  [place], [place] [id number]

## 2018-05-15 NOTE — PROGRESS NOTES
[facility] Progress Note  Patient: Disah Peralta 81 y o  female   MRN: [id number]  PCP: Qamar Vieyra DO  Unit/Bed#: [address] [place] [address] Encounter: [id number]  Date Of Visit: 05/15/18    Subjective:   Left ankle fracture pain fairly control  No loss of sensation of distal limb  No cp sob lightheaded syncope    Objective:   Vitals:   Temp (24hrs), Av 1 °F (36 7 °C), Min:98 °F (36 7 °C), Max:98 4 °F (36 9 °C)    HR:  [66-86] 79  Resp:  [18-20] 18  BP: (134-139)/(58-61) 139/61  SpO2:  [90 %-93 %] 93 %  Body mass index is 38 15 kg/m²         Intake/Output Summary (Last 24 hours) at 05/15/18 1039  Last data filed at 05/15/18 0601   Gross per 24 hour   Intake                0 ml   Output             1050 ml   Net            -1050 ml       Physical Exam:   General: NAD  HEENT: EOMI, anicteric, oral moist, neck supple, no mass or JVD  Chest: CTAB, BS diminished, no wheeze/rales  Cardiac: RRR, S1/S2, No murmur  Abd: S/ND/NT/BS+  MSK: LLE dressing applied, move all toes, sensation intact  Neuro: AAOx3, moving all extremities  Psychiatric: Mood with normal affect    Additional Data:     Labs:    Results from last 7 days  Lab Units 05/15/18  0637   WBC Thousand/uL 9 50   HEMOGLOBIN g/dL 9 3*   HEMATOCRIT % 29 8*   PLATELETS Thousands/uL 186       Results from last 7 days  Lab Units 18  0543 18  0644   SODIUM mmol/L 140 143   POTASSIUM mmol/L 3 9 3 6   CHLORIDE mmol/L 100 103   CO2 mmol/L 39* 40*   BUN mg/dL 14 9   CREATININE mg/dL 0 82 0 70   CALCIUM mg/dL 8 3 7 9*   TOTAL PROTEIN g/dL  --  5 2*   BILIRUBIN TOTAL mg/dL  --  0 40   ALK PHOS U/L  --  100   ALT U/L  --  10*   AST U/L  --  14   GLUCOSE RANDOM mg/dL 160* 99       Results from last 7 days  Lab Units 05/10/18  0826   INR  1 06       Recent Results (from the past 24 hour(s))   Fingerstick Glucose (POCT)    Collection Time: 18 11:40 AM   Result Value Ref Range    POC Glucose 238 (H) 65 - 140 mg/dl   Fingerstick Glucose (POCT)    Collection Time: 05/14/18  3:59 PM   Result Value Ref Range    POC Glucose 255 (H) 65 - 140 mg/dl   Fingerstick Glucose (POCT)    Collection Time: 05/14/18  8:19 PM   Result Value Ref Range    POC Glucose 194 (H) 65 - 140 mg/dl   CBC    Collection Time: 05/15/18  6:37 AM   Result Value Ref Range    WBC 9 50 4 80 - 10 80 Thousand/uL    RBC 3 75 (L) 4 20 - 5 40 Million/uL    Hemoglobin 9 3 (L) 12 0 - 16 0 g/dL    Hematocrit 29 8 (L) 37 0 - 47 0 %    MCV 80 (L) 82 - 98 fL    MCH 24 7 (L) 27 0 - 31 0 pg    MCHC 31 1 (L) 31 4 - 37 4 g/dL    RDW 17 5 (H) 11 6 - 15 1 %    Platelets 528 019 - 430 Thousands/uL    MPV 10 1 8 9 - 12 7 fL   Fingerstick Glucose (POCT)    Collection Time: 05/15/18  7:33 AM   Result Value Ref Range    POC Glucose 169 (H) 65 - 140 mg/dl       Cultures:         Imaging:  Cta Head And Neck With And Without Contrast    Result Date: 5/10/2018  Narrative: CTA NECK AND BRAIN WITH AND WITHOUT CONTRAST INDICATION: slurred speech COMPARISON:   3/15/2018 and 5/10/2018 CT; 3/7/2018 CTA TECHNIQUE:  Routine CT imaging of the Brain without contrast   Post contrast imaging was performed after administration of iodinated contrast through the neck and brain  Post contrast axial 0 625 mm images timed to opacify the arterial system  3D rendering was performed on an independent workstation  MIP reconstructions performed  Coronal reconstructions were performed of the noncontrast portion of the brain  Radiation dose length product (DLP) for this visit:  379 75 mGy-cm   This examination, like all CT scans performed in the [facility], was performed utilizing techniques to minimize radiation dose exposure, including the use of iterative  reconstruction and automated exposure control  IV Contrast:  85 mL of iohexol (OMNIPAQUE)  IMAGE QUALITY:   Diagnostic FINDINGS: NONCONTRAST BRAIN PARENCHYMA: Chronic ischemic changes involve supratentorial white matter  Chronic anterolateral left frontal infarction  Proximal 1 8 cm anterior left middle cranial fossa meningioma  These findings are stable  VENTRICLES AND EXTRA-AXIAL SPACES:  Normal for patient's age  VISUALIZED ORBITS AND PARANASAL SINUSES:  Pansinus disease CALVARIUM AND EXTRACRANIAL SOFT TISSUES:   Normal  CERVICAL VASCULATURE AORTIC ARCH AND GREAT VESSELS: Mild atherosclerotic calcification normal caliber aortic arch  Intact great vessels  RIGHT VERTEBRAL ARTERY CERVICAL SEGMENT:  Normal origin  The vessel is normal in caliber throughout the neck  LEFT VERTEBRAL ARTERY CERVICAL SEGMENT:  Normal origin  The vessel is normal in caliber throughout the neck  RIGHT EXTRACRANIAL CAROTID SEGMENT:  Normal caliber common carotid artery  Mild nonstenotic atherosclerotic disease involves the origin of the right internal carotid artery  LEFT EXTRACRANIAL CAROTID SEGMENT:  Normal caliber common carotid artery  Near occlusive atherosclerotic disease is present at the origin of the left internal carotid artery  The more cephalad portion of the LICA is patent through the skull base  These findings are stable  NASCET criteria was used to determine the degree of internal carotid artery diameter stenosis  INTRACRANIAL VASCULATURE INTERNAL CAROTID ARTERIES: Atherosclerotic calcifications without stenosis involve the cavernous ICAs bilaterally  Normal ophthalmic artery origins  Normal ICA terminus  ANTERIOR CIRCULATION:  Symmetric A1 segments and anterior cerebral arteries with normal enhancement  Normal anterior communicating artery  MIDDLE CEREBRAL ARTERY CIRCULATION:  M1 segment and middle cerebral artery branches demonstrate normal enhancement bilaterally  DISTAL VERTEBRAL ARTERIES:  Normal distal vertebral arteries  Posterior inferior cerebellar artery origins are normal  Normal vertebral basilar junction  BASILAR ARTERY:  Basilar artery is normal in caliber  Normal superior cerebellar arteries   POSTERIOR CEREBRAL ARTERIES: Both posterior cerebral arteries arises from the basilar tip  Both arteries demonstrate normal enhancement  Normal posterior communicating arteries  DURAL VENOUS SINUSES:  Normal  NON VASCULAR ANATOMY BONY STRUCTURES:  No acute osseous abnormality  Multilevel degenerative cervical spondylosis again noted SOFT TISSUES OF THE NECK:  Unremarkable  THORACIC INLET:  Unremarkable  Increased groundglass opacity at posterior right upper lobe possibly infectious since it was not evident on prior chest CT from 1/5/2018  Impression: No acute intracranial hemorrhage or mass effect Chronic ischemic changes involving supratentorial white matter and anterolateral left frontal lobe, stable Near occlusive atherosclerotic disease at the origin of the left internal carotid artery, consistent with prior CTA Findings were directly discussed by Dr Rekha Yanez with Dr Alvin Ann  at 8:57 AM, 5/10/2018 Workstation performed: [id number]     Xr Chest Portable    Result Date: 5/13/2018  Narrative: CHEST INDICATION:   wheezing and SOB  COMPARISON:  None EXAM PERFORMED/VIEWS:  XR CHEST PORTABLE FINDINGS:  Port-A-Cath present, tip at the level of the SVC  Multiple overlying EKG leads  Moderate cardiomegaly, tortuous aorta, stable  Mild central pulmonary venous distention suggesting component of venous hypertension  Left hemidiaphragm is obscured and there is blunting of the left costophrenic angle  Increased retrocardiac opacity, suggesting on the left lower lobe atelectasis or infiltrate  Probable small left effusion  No pneumothorax  No pleural effusion  Osseous structures appear within normal limits for patient age  Impression: Left lower lobe process as above  Cardiomegaly  Workstation performed: [id number]     X-ray Chest 1 View Portable    Result Date: 5/10/2018  Narrative: CHEST INDICATION:   stroke  COMPARISON:  3/17/2018, 3/13/2017  EXAM PERFORMED/VIEWS:  XR CHEST PORTABLE FINDINGS:  Stable right internal jugular chest port   Cardiomediastinal silhouette appears unremarkable  The lungs are clear  No pneumothorax or pleural effusion  Stable elevated left hemidiaphragm  Osseous structures appear within normal limits for patient age  Impression: No acute cardiopulmonary disease  Workstation performed: [id number]     Xr Hip/pelv 2-3 Vws Left If Performed    Addendum Date: 5/10/2018 Addendum:   ADDENDUM: ADDENDUM There is a voice recognition software related error in the header of the report  A phrase which reads " right hip " should read, " LEFT HIP "  Result Date: 5/10/2018  Narrative: RIGHT HIP INDICATION:   injury  Fall out of bed  COMPARISON:  Plain radiographs March 14, 2018 VIEWS:  XR HIP/PELV 2-3 VWS RIGHT W PELVIS IF PERFORMED Images: 3 FINDINGS: There is no acute fracture or dislocation  Osseous structures demineralized  Stable appearance of left hip arthroplasty  Mild narrowing of the right hip joint  No lytic or blastic osseous lesions  Soft tissues are unremarkable  The visualized lumbar spine is unremarkable  Impression: Stable postoperative appearance of the left hip  No acute osseous abnormality  Workstation performed: [id number]     Xr Femur 2 Vw Left    Result Date: 5/10/2018  Narrative: LEFT FEMUR INDICATION:   injury  COMPARISON:  None VIEWS:  XR FEMUR 2 VW LEFT Images: 4 FINDINGS: There is a moderate-sized joint effusion  There is a bipolar left hip prostheses present  No obvious femoral fracture is seen  No degenerative changes  No lytic or blastic lesions are seen  Surgical clips in the left inguinal region  Contrast material in the urinary bladder from a CT scan scan  Impression: No acute osseous abnormality  Moderate size joint effusion  Left hip prostheses  Workstation performed: [id number]     Xr Knee 1 Or 2 Views Left    Result Date: 5/10/2018  Narrative: LEFT KNEE INDICATION:   fall, ankle fx  Fall out of bed  Leg pain   COMPARISON:  None VIEWS:  XR KNEE 1 OR 2 VW LEFT Images: 2 FINDINGS: There is no acute fracture or dislocation  Moderate-sized suprapatellar joint effusion  Osseous structures demineralized  Moderate narrowing of the medial joint compartment  Sharpening of the tibial spines  Narrowing of the patellofemoral space  No lytic or blastic lesions are seen  Soft tissues are unremarkable  Impression: Moderate size suprapatellar joint effusion  Workstation performed: [id number]     Xr Tibia Fibula 2 Views Left    Result Date: 5/10/2018  Narrative: LEFT TIBIA AND FIBULA INDICATION:   pain, ankle fx  Fall out of bed  COMPARISON:  March 15, 2018 VIEWS:  XR TIBIA FIBULA 2 VW LEFT FINDINGS: There is posterior dislocation of the talus with respect to the distal tibia  There is an avulsion fracture of the posterior malleolus of the tibia  There is posterior displacement and proximal retraction of the distal fracture fragment  There is a comminuted, oblique fracture of the mid diaphysis of the fibula  There is proximal retraction and posterior angulation of the distal fracture fragment  There is cortical step off involving the medial tibial plateau, better seen on the tibia examination  There is depression of the medial tibial plateau  Degenerative changes in the knee  No lytic or blastic lesions are seen  Moderate size suprapatellar joint effusion  Impression: 1  Medial tibial plateau fracture  2   Fracture of the posterior malleolus of the tibia with posterior dislocation of the ankle  3   Mildly displaced, comminuted fracture of the mid fibula  The study was marked in [facility] for immediate notification  Workstation performed: [id number]     Xr Tibia Fibula 2 Vw Right    Result Date: 4/17/2018  Narrative: RIGHT TIBIA AND FIBULA INDICATION:   [id number]: Other fracture of upper and lower end of right fibula, sequela  COMPARISON:  03/14/2018 VIEWS:  XR TIBIA FIBULA 2 VW RIGHT Images: 2 FINDINGS: Healing fracture of the proximal fibular neck  No degenerative changes  No lytic or blastic lesions are seen   Soft tissues are unremarkable  Impression: Healing fracture of the proximal fibular neck  Workstation performed: [id number]     Xr Ankle 2 Vw Left    Result Date: 5/10/2018  Narrative: LEFT ANKLE INDICATION:   post reduction  COMPARISON:  Earlier today VIEWS:  XR ANKLE 2 VW LEFT FINDINGS: There is improved alignment at the ankle joint compared to the prior study  There is still some widening of the tibiotalar joint anteriorly and medially  Fracture through the medial malleolus extending posteriorly is seen  There is a fracture through the distal fibular shaft  No significant degenerative changes  No lytic or blastic lesions seen  A cast obscures fine detail  There is soft tissue swelling present  Impression: Improved alignment at the tibiotalar joint which is widened anteriorly and medially  Fracture through the medial malleolus extending posteriorly  Distal fibular fracture  Workstation performed: [id number]     Xr Ankle 2 Views Left    Result Date: 5/10/2018  Narrative: LEFT ANKLE INDICATION:   pain  COMPARISON:  None VIEWS:  XR ANKLE 2 VW LEFT Images: 2 FINDINGS: A fracture dislocation of the tibiotalar joint noted including a posterior displaced medial malleolar fracture with posterior dislocation of the tibiotalar joint  The lateral malleolus is intact  Calcaneal spur(s) noted  No lytic or blastic lesions seen  Diffuse osteopenia  Medial dorsal soft tissue swelling identified  Impression: Fracture dislocation of the medial malleolus and tibiotalar joint  Workstation performed: [id number]     Xr Foot 2 Views Left    Result Date: 5/10/2018  Narrative: LEFT FOOT INDICATION:   pain  COMPARISON:  Left ankle 5/10/2018 VIEWS:  XR FOOT 2 VW LEFT Images: 2 FINDINGS: A fracture dislocation at the ankle noted involving the distal tibia  There is no acute fracture throughout the foot  Calcaneal spur(s) noted  No lytic or blastic lesions seen  Diffuse osteopenia  Soft tissues are unremarkable  Impression: 1  Fracture dislocation of the ankle  No acute fracture of the foot  2   Calcaneal spur with diffuse osteopenia  Workstation performed: [id number]     Mri Brain W Wo Contrast    Result Date: 5/11/2018  Narrative: MRI BRAIN WITH AND WITHOUT CONTRAST INDICATION: Status post fall  Evaluate for CVA  COMPARISON:  Prior MRI March 7, 2018 TECHNIQUE: Sagittal T1, axial T2, axial FLAIR, axial T1, axial [place], axial diffusion  Sagittal, axial T1 postcontrast     IV Contrast:  9 mL of Gadobutrol injection (SINGLE-DOSE)  IMAGE QUALITY:   Diagnostic  FINDINGS: BRAIN PARENCHYMA:  Extra-axial briskly enhancing mass left frontal temporal junction on the posterior aspect of the left orbit compatible with meningioma measuring 2 4 x 2 0 x 2 2 cm  Surrounding FLAIR abnormality compatible with vasogenic edema similar  to the prior study  Findings compatible with atypical meningioma  No evidence of recent infarct  There are foci of T2 and FLAIR signal abnormality in the periventricular and subcortical white matter which are typical for microvascular disease in patients of this age group  VENTRICLES:  Normal  SELLA AND PITUITARY GLAND:  Normal  ORBITS:  Bilateral lens replacements PARANASAL SINUSES:  Extensive paranasal sinus mucosal thickening and bilateral mastoid opacification  VASCULATURE:  Evaluation of the major intracranial vasculature demonstrates appropriate flow voids  CALVARIUM AND SKULL BASE:  Normal  EXTRACRANIAL SOFT TISSUES:  Normal      Impression: No acute intracranial abnormality  No evidence of recent infarct  Briskly enhancing extra-axial mass as described at the frontal temporal junction stable in size from prior study with adjacent FLAIR abnormality compatible with edema  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   Workstation performed: [id number]     Ct Stroke Alert Brain    Result Date: 5/10/2018  Narrative: CT BRAIN - STROKE ALERT PROTOCOL INDICATION: 81-year-old female, slurred speech COMPARISON:  3/15/2018 CT TECHNIQUE:  CT examination of the brain was performed  In addition to axial images, coronal reformatted images were created and submitted for interpretation  Radiation dose length product (DLP) for this visit:  1244 57 mGy-cm   This examination, like all CT scans performed in the [facility], was performed utilizing techniques to minimize radiation dose exposure, including the use of iterative reconstruction and automated exposure control  IMAGE QUALITY:  Diagnostic  FINDINGS:  PARENCHYMA:  Chronic ischemic changes involve the lateral left frontal lobe in the MCA distribution, unchanged  Chronic microangiopathic ischemic changes involve supratentorial white matter  These findings are stable  Anterior left middle cranial fossa meningioma measuring approximately 1 8 cm is stable  VENTRICLES AND EXTRA-AXIAL SPACES:  Normal for patient's age   VISUALIZED ORBITS AND PARANASAL SINUSES:  Pansinus disease again evident CALVARIUM AND EXTRACRANIAL SOFT TISSUES:   Normal      Impression: No acute intracranial hemorrhage or mass effect Multifocal chronic ischemic changes including left lateral frontal MCA territory infarction 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 Findings were directly discussed by Dr Fatoumata Goldsmith with Dr Fernie Banuelos  at 8:57 AM, 5/10/2018 Workstation performed: [id number]     Imaging Reports Reviewed by myself    Last 24 Hours Medication List:     Current Facility-Administered Medications:     acetaminophen (TYLENOL) tablet 650 mg, 650 mg, Oral, Q6H PRN, Kavita Angle, CRNP    albuterol inhalation solution 2 5 mg, 2 5 mg, Nebulization, Q6H PRN, Kavita Angle, CRNP, 2 5 mg at 05/14/18 1122    ALPRAZolam (XANAX) tablet 0 5 mg, 0 5 mg, Oral, HS, Ledy CORA Almaraz, CRNP, 0 5 mg at 05/14/18 2156    amLODIPine (NORVASC) tablet 5 mg, 5 mg, Oral, Daily, Ledy M Almaraz, CRNP, 5 mg at 05/15/18 4980    apixaban (ELIQUIS) tablet 5 mg, 5 mg, Oral, Q12H, GILDA Juárez, 5 mg at 05/15/18 9571    atorvastatin (LIPITOR) tablet 80 mg, 80 mg, Oral, Daily With Kellen Andersen MD, 80 mg at 05/14/18 1646    docusate sodium (COLACE) capsule 100 mg, 100 mg, Oral, BID, GILDA Juárez, 100 mg at 05/15/18 0943    fluticasone-salmeterol (ADVAIR) 250-50 mcg/dose inhaler 1 puff, 1 puff, Inhalation, Q12H [address], GILDA Conn, 1 puff at 05/15/18 0944    furosemide (LASIX) tablet 20 mg, 20 mg, Oral, Daily, GILDA Bravo, 20 mg at 05/15/18 2091    guaiFENesin (MUCINEX) 12 hr tablet 1,200 mg, 1,200 mg, Oral, Q12H [address], GILDA Conn, 1,200 mg at 05/15/18 0942    HYDROcodone-acetaminophen (NORCO) 5-325 mg per tablet 1 tablet, 1 tablet, Oral, Q4H PRN, GILDA Conn, 1 tablet at 05/14/18 1446    insulin lispro (HumaLOG) 100 units/mL subcutaneous injection 1-5 Units, 1-5 Units, Subcutaneous, TID AC, 1 Units at 05/15/18 0944 **AND** Fingerstick Glucose (POCT), , , TID AC, GILDA Juárez    insulin lispro (HumaLOG) 100 units/mL subcutaneous injection 1-5 Units, 1-5 Units, Subcutaneous, HS, GILDA Conn, 1 Units at 05/14/18 2157    ipratropium-albuterol (DUO-NEB) 0 5-2 5 mg/3 mL inhalation solution 3 mL, 3 mL, Nebulization, Q4H, GILDA Juárez, 3 mL at 05/15/18 0732    loratadine (CLARITIN) tablet 10 mg, 10 mg, Oral, Daily, GILDA Juárez, 10 mg at 05/15/18 4085    methylPREDNISolone sodium succinate (Solu-MEDROL) injection 40 mg, 40 mg, Intravenous, Q12H [address], GILDA Conn, 40 mg at 05/15/18 0940    metoprolol tartrate (LOPRESSOR) tablet 50 mg, 50 mg, Oral, Q12H [address], GILDA Bravo, 50 mg at 05/15/18 1497    morphine injection 2 mg, 2 mg, Intravenous, Q6H PRN, GILDA Conn, 2 mg at 05/11/18 2135    pantoprazole (PROTONIX) EC tablet 40 mg, 40 mg, Oral, Early Morning, GILDA Juárez, 40 mg at 05/15/18 0640    polyvinyl alcohol (LIQUIFILM TEARS) 1 4 % ophthalmic solution 1 drop, 1 drop, Both Eyes, TID, Ledy Almaraz, YAHIRNP, 1 drop at 05/15/18 0944    potassium chloride (K-DUR,KLOR-CON) CR tablet 10 mEq, 10 mEq, Oral, Daily, Metta Grist, CRNP, 10 mEq at 05/15/18 1742    pregabalin (LYRICA) capsule 300 mg, 300 mg, Oral, BID, Ledyvimal Almaraz, YAHIRNP, 300 mg at 05/15/18 0653    tolterodine (DETROL LA) 24 hr capsule 4 mg, 4 mg, Oral, Daily, Ledyvimal Almaraz, YAHIRNP, 4 mg at 05/15/18 0940     Assessment and Plans:  Hospital Problem List:   Principal Problem:    TIA (transient ischemic attack)  Active Problems: Moderate persistent asthma    Fall    Stenosis of left internal carotid artery    Paroxysmal atrial fibrillation (HCC)    Hypertension    Obesity (BMI 30-39  9)    GERD (gastroesophageal reflux disease)    Hyperlipemia    Fibromyalgia    History of Merkel cell carcinoma    Chronic diastolic CHF (congestive heart failure), NYHA class 1 (HCC)    Displaced fracture of medial malleolus of left tibia, initial encounter for closed fracture    Displaced  comminuted fracture of fibula    Drop in hemoglobin    81 y o  female, lives alone but independenly, with a history of CVA, PAFIB on Eliquis, Severe left carotid stenosis refusing vacular intervention, fibromyalgia, hypokalemia, GERD, Asthma, 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, left lower extremity lymphedema and weakness at baseline, Obesity, presented to the ER after she fell at home again and sustained left displaced comminuted fracture of tibia and fibula  · Displaced  comminuted fracture of fibula, Displaced fracture of medial malleolus of left tibia: 2/2 mechanical fall  Orthopedics consulted - no surgical indication  Continue supportive conservative mgmt: NWB, optimal pain control, PT/OT, rehab placement  · TIA: MRI brain no new infarc  Neurology consult - more likely TIA  Eliquis resumed  Continue statins   Monitor H/H   · Drop in hemoglobin: Baseline Hg 11-12  Now 9-10  Concern is hematoma at sites of fractures  No sign of GIB or GUB  Closely monitor H/H, distal extremity new neuro deficit  · Carotid stenosis, left: Vascular consulted  CTA head and neck shows severe stenosis  Vascular surgery was offering elective CEA however patient is refusing it in the past and now  She will f/u with vascular as outpatient  · Paroxysmal atrial fibrillation: Continue metoprolol, Eliquis  · Hypertension: Continue metoprolol, amlodipine  · Hyperlipemia: Continue statin   · GERD: Continue PPI  · Fibromyalgia: Continue Lyrica  · Hypokalemia: KCl supplement  · Moderate persistent asthma, Chronic respiratory failure with hypoxia: Continue home inhalers  Titrate O2 to keep SpO2>90%  · Iron deficiency anemia: Continue iron supplement  · Left lower extremity chronic lymphedema: 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

## 2018-05-16 LAB
BASOPHILS # BLD AUTO: 0.01 THOUSANDS/ΜL (ref 0–0.1)
BASOPHILS NFR BLD AUTO: 0 % (ref 0–1)
EOSINOPHIL # BLD AUTO: 0 THOUSAND/ΜL (ref 0–0.61)
EOSINOPHIL NFR BLD AUTO: 0 % (ref 0–6)
ERYTHROCYTE [DISTWIDTH] IN BLOOD BY AUTOMATED COUNT: 17.1 % (ref 11.6–15.1)
GLUCOSE SERPL-MCNC: 127 MG/DL (ref 65–140)
GLUCOSE SERPL-MCNC: 158 MG/DL (ref 65–140)
GLUCOSE SERPL-MCNC: 183 MG/DL (ref 65–140)
GLUCOSE SERPL-MCNC: 185 MG/DL (ref 65–140)
HCT VFR BLD AUTO: 28.6 % (ref 34.8–46.1)
HGB BLD-MCNC: 8.6 G/DL (ref 11.5–15.4)
IMM GRANULOCYTES # BLD AUTO: 0.11 THOUSAND/UL (ref 0–0.2)
IMM GRANULOCYTES NFR BLD AUTO: 1 % (ref 0–2)
LYMPHOCYTES # BLD AUTO: 0.59 THOUSANDS/ΜL (ref 0.6–4.47)
LYMPHOCYTES NFR BLD AUTO: 8 % (ref 14–44)
MCH RBC QN AUTO: 24.9 PG (ref 26.8–34.3)
MCHC RBC AUTO-ENTMCNC: 30.1 G/DL (ref 31.4–37.4)
MCV RBC AUTO: 83 FL (ref 82–98)
MONOCYTES # BLD AUTO: 0.38 THOUSAND/ΜL (ref 0.17–1.22)
MONOCYTES NFR BLD AUTO: 5 % (ref 4–12)
NEUTROPHILS # BLD AUTO: 6.57 THOUSANDS/ΜL (ref 1.85–7.62)
NEUTS SEG NFR BLD AUTO: 86 % (ref 43–75)
NRBC BLD AUTO-RTO: 0 /100 WBCS
PLATELET # BLD AUTO: 192 THOUSANDS/UL (ref 149–390)
PMV BLD AUTO: 11.4 FL (ref 8.9–12.7)
RBC # BLD AUTO: 3.45 MILLION/UL (ref 3.81–5.12)
WBC # BLD AUTO: 7.66 THOUSAND/UL (ref 4.31–10.16)

## 2018-05-16 PROCEDURE — 94760 N-INVAS EAR/PLS OXIMETRY 1: CPT

## 2018-05-16 PROCEDURE — 82948 REAGENT STRIP/BLOOD GLUCOSE: CPT

## 2018-05-16 PROCEDURE — 94640 AIRWAY INHALATION TREATMENT: CPT

## 2018-05-16 PROCEDURE — 85025 COMPLETE CBC W/AUTO DIFF WBC: CPT | Performed by: INTERNAL MEDICINE

## 2018-05-16 PROCEDURE — 99232 SBSQ HOSP IP/OBS MODERATE 35: CPT | Performed by: INTERNAL MEDICINE

## 2018-05-16 RX ORDER — BENZONATATE 100 MG/1
100 CAPSULE ORAL 3 TIMES DAILY
Status: DISCONTINUED | OUTPATIENT
Start: 2018-05-16 | End: 2018-05-18 | Stop reason: HOSPADM

## 2018-05-16 RX ADMIN — HYDROCODONE BITARTRATE AND ACETAMINOPHEN 1 TABLET: 5; 325 TABLET ORAL at 15:58

## 2018-05-16 RX ADMIN — ALPRAZOLAM 0.5 MG: 0.5 TABLET ORAL at 21:36

## 2018-05-16 RX ADMIN — PREGABALIN 300 MG: 100 CAPSULE ORAL at 09:15

## 2018-05-16 RX ADMIN — METOPROLOL TARTRATE 50 MG: 50 TABLET ORAL at 09:15

## 2018-05-16 RX ADMIN — FLUTICASONE PROPIONATE AND SALMETEROL 1 PUFF: 50; 250 POWDER RESPIRATORY (INHALATION) at 21:37

## 2018-05-16 RX ADMIN — GUAIFENESIN 1200 MG: 600 TABLET, EXTENDED RELEASE ORAL at 21:36

## 2018-05-16 RX ADMIN — PREGABALIN 300 MG: 100 CAPSULE ORAL at 17:34

## 2018-05-16 RX ADMIN — INSULIN LISPRO 1 UNITS: 100 INJECTION, SOLUTION INTRAVENOUS; SUBCUTANEOUS at 12:59

## 2018-05-16 RX ADMIN — APIXABAN 5 MG: 5 TABLET, FILM COATED ORAL at 21:36

## 2018-05-16 RX ADMIN — IPRATROPIUM BROMIDE AND ALBUTEROL SULFATE 3 ML: .5; 3 SOLUTION RESPIRATORY (INHALATION) at 02:42

## 2018-05-16 RX ADMIN — LORATADINE 10 MG: 10 TABLET ORAL at 09:15

## 2018-05-16 RX ADMIN — INSULIN LISPRO 1 UNITS: 100 INJECTION, SOLUTION INTRAVENOUS; SUBCUTANEOUS at 22:35

## 2018-05-16 RX ADMIN — FUROSEMIDE 20 MG: 20 TABLET ORAL at 09:15

## 2018-05-16 RX ADMIN — METHYLPREDNISOLONE SODIUM SUCCINATE 40 MG: 40 INJECTION, POWDER, FOR SOLUTION INTRAMUSCULAR; INTRAVENOUS at 21:36

## 2018-05-16 RX ADMIN — PANTOPRAZOLE SODIUM 40 MG: 40 TABLET, DELAYED RELEASE ORAL at 06:20

## 2018-05-16 RX ADMIN — APIXABAN 5 MG: 5 TABLET, FILM COATED ORAL at 09:15

## 2018-05-16 RX ADMIN — METOPROLOL TARTRATE 50 MG: 50 TABLET ORAL at 21:36

## 2018-05-16 RX ADMIN — POTASSIUM CHLORIDE 10 MEQ: 750 TABLET, EXTENDED RELEASE ORAL at 09:15

## 2018-05-16 RX ADMIN — AMLODIPINE BESYLATE 5 MG: 5 TABLET ORAL at 09:15

## 2018-05-16 RX ADMIN — ATORVASTATIN CALCIUM 80 MG: 80 TABLET, FILM COATED ORAL at 15:58

## 2018-05-16 RX ADMIN — IPRATROPIUM BROMIDE AND ALBUTEROL SULFATE 3 ML: .5; 3 SOLUTION RESPIRATORY (INHALATION) at 08:12

## 2018-05-16 RX ADMIN — BENZONATATE 100 MG: 100 CAPSULE ORAL at 09:15

## 2018-05-16 RX ADMIN — BENZONATATE 100 MG: 100 CAPSULE ORAL at 15:58

## 2018-05-16 RX ADMIN — FLUTICASONE PROPIONATE AND SALMETEROL 1 PUFF: 50; 250 POWDER RESPIRATORY (INHALATION) at 09:15

## 2018-05-16 RX ADMIN — BENZONATATE 100 MG: 100 CAPSULE ORAL at 21:36

## 2018-05-16 RX ADMIN — IPRATROPIUM BROMIDE AND ALBUTEROL SULFATE 3 ML: .5; 3 SOLUTION RESPIRATORY (INHALATION) at 15:53

## 2018-05-16 RX ADMIN — METHYLPREDNISOLONE SODIUM SUCCINATE 40 MG: 40 INJECTION, POWDER, FOR SOLUTION INTRAMUSCULAR; INTRAVENOUS at 09:15

## 2018-05-16 RX ADMIN — INSULIN LISPRO 1 UNITS: 100 INJECTION, SOLUTION INTRAVENOUS; SUBCUTANEOUS at 17:35

## 2018-05-16 RX ADMIN — TOLTERODINE TARTRATE 4 MG: 2 CAPSULE, EXTENDED RELEASE ORAL at 09:15

## 2018-05-16 RX ADMIN — IPRATROPIUM BROMIDE AND ALBUTEROL SULFATE 3 ML: .5; 3 SOLUTION RESPIRATORY (INHALATION) at 20:34

## 2018-05-16 RX ADMIN — GUAIFENESIN 1200 MG: 600 TABLET, EXTENDED RELEASE ORAL at 09:15

## 2018-05-16 RX ADMIN — IPRATROPIUM BROMIDE AND ALBUTEROL SULFATE 3 ML: .5; 3 SOLUTION RESPIRATORY (INHALATION) at 11:50

## 2018-05-16 NOTE — PHYSICAL THERAPY NOTE
Attempted to see pt for PT,however pt refused  Could not encourage pt to participate and get OOB   " I'm very blue today", "I did my leg exercises this morning", "I hurt from my old injury"  Will follow

## 2018-05-16 NOTE — OCCUPATIONAL THERAPY NOTE
Occupational Therapy  Attempted to see pt for OT  Pt sleeping, unable to rouse with VC/TC  OT will follow as appropriate     Beba Farias OTR/JOSE MARTIN [id number]

## 2018-05-16 NOTE — PROGRESS NOTES
[facility] Progress Note  Patient: Harry Tate 81 y o  female   MRN: [id number]  PCP: Urmila Graves,   Unit/Bed#: 18 [facility] 207 Encounter: [id number]  Date Of Visit: 18    Subjective:   Similar left ankle frx pain  No loss of sensation of distal toes  No cp sob dizziness syncope    Objective:   Vitals:   Temp (24hrs), Av 1 °F (36 7 °C), Min:98 1 °F (36 7 °C), Max:98 2 °F (36 8 °C)    HR:  [70-81] 77  Resp:  [18-20] 20  BP: (128-160)/(58-73) 150/73  SpO2:  [94 %-97 %] 97 %  Body mass index is 38 31 kg/m²         Intake/Output Summary (Last 24 hours) at 18 1703  Last data filed at 18 1247   Gross per 24 hour   Intake                0 ml   Output              900 ml   Net             -900 ml       Physical Exam:   General: NAD  HEENT: EOMI, anicteric, oral moist, neck supple, no mass or JVD  Chest: CTAB, no wheeze/rales  Cardiac: RRR, S1/S2, No murmur  Abd: S/ND/NT/BS+  MSK: LLE cast/dressing applied, move all toes, sensation intact  Neuro: AAOx3, moving all extremities  Psychiatric: Mood with normal affect    Additional Data:     Labs:    Results from last 7 days  Lab Units 18  0818   WBC Thousand/uL 7 66   HEMOGLOBIN g/dL 8 6*   HEMATOCRIT % 28 6*   PLATELETS Thousands/uL 192   NEUTROS PCT % 86*   LYMPHS PCT % 8*   MONOS PCT % 5   EOS PCT % 0       Results from last 7 days  Lab Units 18  0543 18  0644   SODIUM mmol/L 140 143   POTASSIUM mmol/L 3 9 3 6   CHLORIDE mmol/L 100 103   CO2 mmol/L 39* 40*   BUN mg/dL 14 9   CREATININE mg/dL 0 82 0 70   CALCIUM mg/dL 8 3 7 9*   TOTAL PROTEIN g/dL  --  5 2*   BILIRUBIN TOTAL mg/dL  --  0 40   ALK PHOS U/L  --  100   ALT U/L  --  10*   AST U/L  --  14   GLUCOSE RANDOM mg/dL 160* 99       Results from last 7 days  Lab Units 05/10/18  0826   INR  1 06       Recent Results (from the past 24 hour(s))   Fingerstick Glucose (POCT)    Collection Time: 05/15/18  9:18 PM   Result Value Ref Range    POC Glucose 195 (H) 65 - 140 mg/dl   Fingerstick Glucose (POCT)    Collection Time: 05/16/18  8:00 AM   Result Value Ref Range    POC Glucose 127 65 - 140 mg/dl   CBC and differential    Collection Time: 05/16/18  8:18 AM   Result Value Ref Range    WBC 7 66 4 31 - 10 16 Thousand/uL    RBC 3 45 (L) 3 81 - 5 12 Million/uL    Hemoglobin 8 6 (L) 11 5 - 15 4 g/dL    Hematocrit 28 6 (L) 34 8 - 46 1 %    MCV 83 82 - 98 fL    MCH 24 9 (L) 26 8 - 34 3 pg    MCHC 30 1 (L) 31 4 - 37 4 g/dL    RDW 17 1 (H) 11 6 - 15 1 %    MPV 11 4 8 9 - 12 7 fL    Platelets 584 416 - 230 Thousands/uL    nRBC 0 /100 WBCs    Neutrophils Relative 86 (H) 43 - 75 %    Immat GRANS % 1 0 - 2 %    Lymphocytes Relative 8 (L) 14 - 44 %    Monocytes Relative 5 4 - 12 %    Eosinophils Relative 0 0 - 6 %    Basophils Relative 0 0 - 1 %    Neutrophils Absolute 6 57 1 85 - 7 62 Thousands/µL    Immature Grans Absolute 0 11 0 00 - 0 20 Thousand/uL    Lymphocytes Absolute 0 59 (L) 0 60 - 4 47 Thousands/µL    Monocytes Absolute 0 38 0 17 - 1 22 Thousand/µL    Eosinophils Absolute 0 00 0 00 - 0 61 Thousand/µL    Basophils Absolute 0 01 0 00 - 0 10 Thousands/µL   Fingerstick Glucose (POCT)    Collection Time: 05/16/18 11:39 AM   Result Value Ref Range    POC Glucose 183 (H) 65 - 140 mg/dl   Fingerstick Glucose (POCT)    Collection Time: 05/16/18  4:35 PM   Result Value Ref Range    POC Glucose 185 (H) 65 - 140 mg/dl       Cultures:         Imaging:  Cta Head And Neck With And Without Contrast    Result Date: 5/10/2018  Narrative: CTA NECK AND BRAIN WITH AND WITHOUT CONTRAST INDICATION: slurred speech COMPARISON:   3/15/2018 and 5/10/2018 CT; 3/7/2018 CTA TECHNIQUE:  Routine CT imaging of the Brain without contrast   Post contrast imaging was performed after administration of iodinated contrast through the neck and brain  Post contrast axial 0 625 mm images timed to opacify the arterial system  3D rendering was performed on an independent workstation  MIP reconstructions performed   Coronal reconstructions were performed of the noncontrast portion of the brain  Radiation dose length product (DLP) for this visit:  379 75 mGy-cm   This examination, like all CT scans performed in the [facility], was performed utilizing techniques to minimize radiation dose exposure, including the use of iterative  reconstruction and automated exposure control  IV Contrast:  85 mL of iohexol (OMNIPAQUE)  IMAGE QUALITY:   Diagnostic FINDINGS: NONCONTRAST BRAIN PARENCHYMA: Chronic ischemic changes involve supratentorial white matter  Chronic anterolateral left frontal infarction  Proximal 1 8 cm anterior left middle cranial fossa meningioma  These findings are stable  VENTRICLES AND EXTRA-AXIAL SPACES:  Normal for patient's age  VISUALIZED ORBITS AND PARANASAL SINUSES:  Pansinus disease CALVARIUM AND EXTRACRANIAL SOFT TISSUES:   Normal  CERVICAL VASCULATURE AORTIC ARCH AND GREAT VESSELS: Mild atherosclerotic calcification normal caliber aortic arch  Intact great vessels  RIGHT VERTEBRAL ARTERY CERVICAL SEGMENT:  Normal origin  The vessel is normal in caliber throughout the neck  LEFT VERTEBRAL ARTERY CERVICAL SEGMENT:  Normal origin  The vessel is normal in caliber throughout the neck  RIGHT EXTRACRANIAL CAROTID SEGMENT:  Normal caliber common carotid artery  Mild nonstenotic atherosclerotic disease involves the origin of the right internal carotid artery  LEFT EXTRACRANIAL CAROTID SEGMENT:  Normal caliber common carotid artery  Near occlusive atherosclerotic disease is present at the origin of the left internal carotid artery  The more cephalad portion of the LICA is patent through the skull base  These findings are stable  NASCET criteria was used to determine the degree of internal carotid artery diameter stenosis  INTRACRANIAL VASCULATURE INTERNAL CAROTID ARTERIES: Atherosclerotic calcifications without stenosis involve the cavernous ICAs bilaterally  Normal ophthalmic artery origins  Normal ICA terminus  ANTERIOR CIRCULATION:  Symmetric A1 segments and anterior cerebral arteries with normal enhancement  Normal anterior communicating artery  MIDDLE CEREBRAL ARTERY CIRCULATION:  M1 segment and middle cerebral artery branches demonstrate normal enhancement bilaterally  DISTAL VERTEBRAL ARTERIES:  Normal distal vertebral arteries  Posterior inferior cerebellar artery origins are normal  Normal vertebral basilar junction  BASILAR ARTERY:  Basilar artery is normal in caliber  Normal superior cerebellar arteries  POSTERIOR CEREBRAL ARTERIES: Both posterior cerebral arteries arises from the basilar tip  Both arteries demonstrate normal enhancement  Normal posterior communicating arteries  DURAL VENOUS SINUSES:  Normal  NON VASCULAR ANATOMY BONY STRUCTURES:  No acute osseous abnormality  Multilevel degenerative cervical spondylosis again noted SOFT TISSUES OF THE NECK:  Unremarkable  THORACIC INLET:  Unremarkable  Increased groundglass opacity at posterior right upper lobe possibly infectious since it was not evident on prior chest CT from 1/5/2018  Impression: No acute intracranial hemorrhage or mass effect Chronic ischemic changes involving supratentorial white matter and anterolateral left frontal lobe, stable Near occlusive atherosclerotic disease at the origin of the left internal carotid artery, consistent with prior CTA Findings were directly discussed by Dr Yaz Sparrow with Dr Srinivasan Marshall  at 8:57 AM, 5/10/2018 Workstation performed: [id number]     Xr Chest Portable    Result Date: 5/13/2018  Narrative: CHEST INDICATION:   wheezing and SOB  COMPARISON:  None EXAM PERFORMED/VIEWS:  XR CHEST PORTABLE FINDINGS:  Port-A-Cath present, tip at the level of the SVC  Multiple overlying EKG leads  Moderate cardiomegaly, tortuous aorta, stable  Mild central pulmonary venous distention suggesting component of venous hypertension   Left hemidiaphragm is obscured and there is blunting of the left costophrenic angle  Increased retrocardiac opacity, suggesting on the left lower lobe atelectasis or infiltrate  Probable small left effusion  No pneumothorax  No pleural effusion  Osseous structures appear within normal limits for patient age  Impression: Left lower lobe process as above  Cardiomegaly  Workstation performed: [id number]     X-ray Chest 1 View Portable    Result Date: 5/10/2018  Narrative: CHEST INDICATION:   stroke  COMPARISON:  3/17/2018, 3/13/2017  EXAM PERFORMED/VIEWS:  XR CHEST PORTABLE FINDINGS:  Stable right internal jugular chest port  Cardiomediastinal silhouette appears unremarkable  The lungs are clear  No pneumothorax or pleural effusion  Stable elevated left hemidiaphragm  Osseous structures appear within normal limits for patient age  Impression: No acute cardiopulmonary disease  Workstation performed: [id number]     Xr Hip/pelv 2-3 Vws Left If Performed    Addendum Date: 5/10/2018 Addendum:   ADDENDUM: ADDENDUM There is a voice recognition software related error in the header of the report  A phrase which reads " right hip " should read, " LEFT HIP "  Result Date: 5/10/2018  Narrative: RIGHT HIP INDICATION:   injury  Fall out of bed  COMPARISON:  Plain radiographs March 14, 2018 VIEWS:  XR HIP/PELV 2-3 VWS RIGHT W PELVIS IF PERFORMED Images: 3 FINDINGS: There is no acute fracture or dislocation  Osseous structures demineralized  Stable appearance of left hip arthroplasty  Mild narrowing of the right hip joint  No lytic or blastic osseous lesions  Soft tissues are unremarkable  The visualized lumbar spine is unremarkable  Impression: Stable postoperative appearance of the left hip  No acute osseous abnormality  Workstation performed: [id number]     Xr Femur 2 Vw Left    Result Date: 5/10/2018  Narrative: LEFT FEMUR INDICATION:   injury  COMPARISON:  None VIEWS:  XR FEMUR 2 VW LEFT Images: 4 FINDINGS: There is a moderate-sized joint effusion    There is a bipolar left hip prostheses present  No obvious femoral fracture is seen  No degenerative changes  No lytic or blastic lesions are seen  Surgical clips in the left inguinal region  Contrast material in the urinary bladder from a CT scan scan  Impression: No acute osseous abnormality  Moderate size joint effusion  Left hip prostheses  Workstation performed: [id number]     Xr Knee 1 Or 2 Views Left    Result Date: 5/10/2018  Narrative: LEFT KNEE INDICATION:   fall, ankle fx  Fall out of bed  Leg pain  COMPARISON:  None VIEWS:  XR KNEE 1 OR 2 VW LEFT Images: 2 FINDINGS: There is no acute fracture or dislocation  Moderate-sized suprapatellar joint effusion  Osseous structures demineralized  Moderate narrowing of the medial joint compartment  Sharpening of the tibial spines  Narrowing of the patellofemoral space  No lytic or blastic lesions are seen  Soft tissues are unremarkable  Impression: Moderate size suprapatellar joint effusion  Workstation performed: [id number]     Xr Tibia Fibula 2 Views Left    Result Date: 5/10/2018  Narrative: LEFT TIBIA AND FIBULA INDICATION:   pain, ankle fx  Fall out of bed  COMPARISON:  March 15, 2018 VIEWS:  XR TIBIA FIBULA 2 VW LEFT FINDINGS: There is posterior dislocation of the talus with respect to the distal tibia  There is an avulsion fracture of the posterior malleolus of the tibia  There is posterior displacement and proximal retraction of the distal fracture fragment  There is a comminuted, oblique fracture of the mid diaphysis of the fibula  There is proximal retraction and posterior angulation of the distal fracture fragment  There is cortical step off involving the medial tibial plateau, better seen on the tibia examination  There is depression of the medial tibial plateau  Degenerative changes in the knee  No lytic or blastic lesions are seen  Moderate size suprapatellar joint effusion  Impression: 1  Medial tibial plateau fracture   2   Fracture of the posterior malleolus of the tibia with posterior dislocation of the ankle  3   Mildly displaced, comminuted fracture of the mid fibula  The study was marked in [facility] for immediate notification  Workstation performed: [id number]     Xr Tibia Fibula 2 Vw Right    Result Date: 4/17/2018  Narrative: RIGHT TIBIA AND FIBULA INDICATION:   L43 [id number]: Other fracture of upper and lower end of right fibula, sequela  COMPARISON:  03/14/2018 VIEWS:  XR TIBIA FIBULA 2 VW RIGHT Images: 2 FINDINGS: Healing fracture of the proximal fibular neck  No degenerative changes  No lytic or blastic lesions are seen  Soft tissues are unremarkable  Impression: Healing fracture of the proximal fibular neck  Workstation performed: [id number]     Xr Ankle 2 Vw Left    Result Date: 5/10/2018  Narrative: LEFT ANKLE INDICATION:   post reduction  COMPARISON:  Earlier today VIEWS:  XR ANKLE 2 VW LEFT FINDINGS: There is improved alignment at the ankle joint compared to the prior study  There is still some widening of the tibiotalar joint anteriorly and medially  Fracture through the medial malleolus extending posteriorly is seen  There is a fracture through the distal fibular shaft  No significant degenerative changes  No lytic or blastic lesions seen  A cast obscures fine detail  There is soft tissue swelling present  Impression: Improved alignment at the tibiotalar joint which is widened anteriorly and medially  Fracture through the medial malleolus extending posteriorly  Distal fibular fracture  Workstation performed: [id number]     Xr Ankle 2 Views Left    Result Date: 5/10/2018  Narrative: LEFT ANKLE INDICATION:   pain  COMPARISON:  None VIEWS:  XR ANKLE 2 VW LEFT Images: 2 FINDINGS: A fracture dislocation of the tibiotalar joint noted including a posterior displaced medial malleolar fracture with posterior dislocation of the tibiotalar joint  The lateral malleolus is intact  Calcaneal spur(s) noted  No lytic or blastic lesions seen  Diffuse osteopenia  Medial dorsal soft tissue swelling identified  Impression: Fracture dislocation of the medial malleolus and tibiotalar joint  Workstation performed: [id number]     Xr Foot 2 Views Left    Result Date: 5/10/2018  Narrative: LEFT FOOT INDICATION:   pain  COMPARISON:  Left ankle 5/10/2018 VIEWS:  XR FOOT 2 VW LEFT Images: 2 FINDINGS: A fracture dislocation at the ankle noted involving the distal tibia  There is no acute fracture throughout the foot  Calcaneal spur(s) noted  No lytic or blastic lesions seen  Diffuse osteopenia  Soft tissues are unremarkable  Impression: 1  Fracture dislocation of the ankle  No acute fracture of the foot  2   Calcaneal spur with diffuse osteopenia  Workstation performed: [id number]     Mri Brain W Wo Contrast    Result Date: 5/11/2018  Narrative: MRI BRAIN WITH AND WITHOUT CONTRAST INDICATION: Status post fall  Evaluate for CVA  COMPARISON:  Prior MRI March 7, 2018 TECHNIQUE: Sagittal T1, axial T2, axial FLAIR, axial T1, axial [place], axial diffusion  Sagittal, axial T1 postcontrast     IV Contrast:  9 mL of Gadobutrol injection (SINGLE-DOSE)  IMAGE QUALITY:   Diagnostic  FINDINGS: BRAIN PARENCHYMA:  Extra-axial briskly enhancing mass left frontal temporal junction on the posterior aspect of the left orbit compatible with meningioma measuring 2 4 x 2 0 x 2 2 cm  Surrounding FLAIR abnormality compatible with vasogenic edema similar  to the prior study  Findings compatible with atypical meningioma  No evidence of recent infarct  There are foci of T2 and FLAIR signal abnormality in the periventricular and subcortical white matter which are typical for microvascular disease in patients of this age group  VENTRICLES:  Normal  SELLA AND PITUITARY GLAND:  Normal  ORBITS:  Bilateral lens replacements PARANASAL SINUSES:  Extensive paranasal sinus mucosal thickening and bilateral mastoid opacification   VASCULATURE:  Evaluation of the major intracranial vasculature demonstrates appropriate flow voids  CALVARIUM AND SKULL BASE:  Normal  EXTRACRANIAL SOFT TISSUES:  Normal      Impression: No acute intracranial abnormality  No evidence of recent infarct  Briskly enhancing extra-axial mass as described at the frontal temporal junction stable in size from prior study with adjacent FLAIR abnormality compatible with edema  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  Workstation performed: [id number]     Ct Stroke Alert Brain    Result Date: 5/10/2018  Narrative: CT BRAIN - STROKE ALERT PROTOCOL INDICATION: 56-year-old female, slurred speech COMPARISON:  3/15/2018 CT TECHNIQUE:  CT examination of the brain was performed  In addition to axial images, coronal reformatted images were created and submitted for interpretation  Radiation dose length product (DLP) for this visit:  1244 57 mGy-cm   This examination, like all CT scans performed in the [facility], was performed utilizing techniques to minimize radiation dose exposure, including the use of iterative reconstruction and automated exposure control  IMAGE QUALITY:  Diagnostic  FINDINGS:  PARENCHYMA:  Chronic ischemic changes involve the lateral left frontal lobe in the MCA distribution, unchanged  Chronic microangiopathic ischemic changes involve supratentorial white matter  These findings are stable  Anterior left middle cranial fossa meningioma measuring approximately 1 8 cm is stable  VENTRICLES AND EXTRA-AXIAL SPACES:  Normal for patient's age   VISUALIZED ORBITS AND PARANASAL SINUSES:  Pansinus disease again evident CALVARIUM AND EXTRACRANIAL SOFT TISSUES:   Normal      Impression: No acute intracranial hemorrhage or mass effect Multifocal chronic ischemic changes including left lateral frontal MCA territory infarction 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 Findings were directly discussed by Dr Juanita Becker with Dr Lars Kaiser at 8:57 AM, 5/10/2018 Workstation performed: [id number]     Imaging Reports Reviewed by myself    Last 24 Hours Medication List:     Current Facility-Administered Medications:     acetaminophen (TYLENOL) tablet 650 mg, 650 mg, Oral, Q6H PRN, Jj Spindle, CRNP    albuterol inhalation solution 2 5 mg, 2 5 mg, Nebulization, Q6H PRN, Jj Spindle, CRNP, 2 5 mg at 05/14/18 1122    ALPRAZolam (XANAX) tablet 0 5 mg, 0 5 mg, Oral, HS, GILDA Juárez, 0 5 mg at 05/15/18 2041    amLODIPine (NORVASC) tablet 5 mg, 5 mg, Oral, Daily, GILDA Juárez, 5 mg at 05/16/18 0915    apixaban (ELIQUIS) tablet 5 mg, 5 mg, Oral, Q12H, GILDA Juárez, 5 mg at 05/16/18 0915    atorvastatin (LIPITOR) tablet 80 mg, 80 mg, Oral, Daily With Dinner, Gonzalo Zamora MD, 80 mg at 05/16/18 1558    benzonatate (TESSALON PERLES) capsule 100 mg, 100 mg, Oral, TID, Christopher Ace MD, 100 mg at 05/16/18 1558    docusate sodium (COLACE) capsule 100 mg, 100 mg, Oral, BID, GILDA Juárez, 100 mg at 05/15/18 1723    fluticasone-salmeterol (ADVAIR) 250-50 mcg/dose inhaler 1 puff, 1 puff, Inhalation, Q12H [address], Jj Mercado, CRNP, 1 puff at 05/16/18 0915    furosemide (LASIX) tablet 20 mg, 20 mg, Oral, Daily, GILDA Phillip, 20 mg at 05/16/18 0915    guaiFENesin (MUCINEX) 12 hr tablet 1,200 mg, 1,200 mg, Oral, Q12H [address], Greeley Spindle, CRNP, 1,200 mg at 05/16/18 0915    HYDROcodone-acetaminophen (NORCO) 5-325 mg per tablet 1 tablet, 1 tablet, Oral, Q4H PRN, GILDA Perez, 1 tablet at 05/16/18 4018    insulin lispro (HumaLOG) 100 units/mL subcutaneous injection 1-5 Units, 1-5 Units, Subcutaneous, TID AC, 1 Units at 05/16/18 1259 **AND** Fingerstick Glucose (POCT), , , TID AC, GILDA Juárez    insulin lispro (HumaLOG) 100 units/mL subcutaneous injection 1-5 Units, 1-5 Units, Subcutaneous, HS, GILDA Perez, 1 Units at 05/15/18 5441   ipratropium-albuterol (DUO-NEB) 0 5-2 5 mg/3 mL inhalation solution 3 mL, 3 mL, Nebulization, Q4H, Ledyvimal Almaraz, CRNP, 3 mL at 05/16/18 1553    loratadine (CLARITIN) tablet 10 mg, 10 mg, Oral, Daily, Ledy CORA Almaraz, CRNP, 10 mg at 05/16/18 0915    methylPREDNISolone sodium succinate (Solu-MEDROL) injection 40 mg, 40 mg, Intravenous, Q12H [address], Lennox Form, CRNP, 40 mg at 05/16/18 0915    metoprolol tartrate (LOPRESSOR) tablet 50 mg, 50 mg, Oral, Q12H [address], Maricarmen Parsons, CRNP, 50 mg at 05/16/18 0915    morphine injection 2 mg, 2 mg, Intravenous, Q6H PRN, Lennox Form, CRNP, 2 mg at 05/11/18 2135    pantoprazole (PROTONIX) EC tablet 40 mg, 40 mg, Oral, Early Morning, Lennox Form, CRNP, 40 mg at 05/16/18 0620    polyvinyl alcohol (LIQUIFILM TEARS) 1 4 % ophthalmic solution 1 drop, 1 drop, Both Eyes, TID, Ledy Almaraz, GILDA, 1 drop at 05/15/18 1634    potassium chloride (K-DUR,KLOR-CON) CR tablet 10 mEq, 10 mEq, Oral, Daily, Brionna Bell, CRNP, 10 mEq at 05/16/18 0915    pregabalin (LYRICA) capsule 300 mg, 300 mg, Oral, BID, Ledy Almaraz, CRNP, 300 mg at 05/16/18 0915    tolterodine (DETROL LA) 24 hr capsule 4 mg, 4 mg, Oral, Daily, Ledyvimal Almaraz, CRNP, 4 mg at 05/16/18 0915     Assessment and Plans:  Hospital Problem List:   Principal Problem:    TIA (transient ischemic attack)  Active Problems: Moderate persistent asthma    Fall    Stenosis of left internal carotid artery    Paroxysmal atrial fibrillation (HCC)    Hypertension    Obesity (BMI 30-39  9)    GERD (gastroesophageal reflux disease)    Hyperlipemia    Fibromyalgia    History of Merkel cell carcinoma    Chronic diastolic CHF (congestive heart failure), NYHA class 1 (HCC)    Displaced fracture of medial malleolus of left tibia, initial encounter for closed fracture    Displaced  comminuted fracture of fibula    Drop in hemoglobin    81 y o  female, lives alone but [facility] with  history of CVA, PAFIB on Eliquis, Severe left carotid stenosis refusing vacular intervention, fibromyalgia, hypokalemia, GERD, Asthma, 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, left lower extremity lymphedema and weakness at baseline, Obesity, presented to the ER after she fell at home again and sustained left displaced comminuted fracture of tibia and fibula  · Displaced  comminuted fracture of fibula, Displaced fracture of medial malleolus of left tibia: 2/2 mechanical fall  Orthopedics consulted - no surgical indication  Continue supportive conservative mgmt: NWB, optimal pain control, PT/OT, rehab placement  · TIA: MRI brain no new infarc  Neurology consult - more likely TIA  Eliquis resumed  Continue statins  Monitor H/H   · Drop in hemoglobin: Baseline Hg 11~12  Now 8~9  Concern is hematoma at sites of fractures  No sign of GIB or GUB  Guaiac -ve  Closely monitor H/H  Closely monitor distal extremity new neuro deficit  · Carotid stenosis, left: Vascular consulted  CTA head and neck shows severe stenosis  Vascular surgery was offering elective CEA however patient is refusing it in the past and now  She will f/u with vascular as outpatient  · Paroxysmal atrial fibrillation: Continue metoprolol, Eliquis  · Hypertension: Continue metoprolol, amlodipine  · Hyperlipemia: Continue statin   · GERD: Continue PPI  · Fibromyalgia: Continue Lyrica  · Hypokalemia: KCl supplement  · Moderate persistent asthma, Chronic respiratory failure with hypoxia: Continue home inhalers  Titrate O2 to keep SpO2>90%  · Iron deficiency anemia: Continue iron supplement  · Left lower extremity chronic lymphedema: 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

## 2018-05-17 ENCOUNTER — APPOINTMENT (INPATIENT)
Dept: RADIOLOGY | Facility: HOSPITAL | Age: 83
DRG: 563 | End: 2018-05-17
Payer: MEDICARE

## 2018-05-17 LAB
ANION GAP SERPL CALCULATED.3IONS-SCNC: 2 MMOL/L (ref 4–13)
BASOPHILS # BLD AUTO: 0.01 THOUSANDS/ΜL (ref 0–0.1)
BASOPHILS NFR BLD AUTO: 0 % (ref 0–1)
BUN SERPL-MCNC: 26 MG/DL (ref 5–25)
CALCIUM SERPL-MCNC: 8 MG/DL (ref 8.3–10.1)
CHLORIDE SERPL-SCNC: 99 MMOL/L (ref 100–108)
CO2 SERPL-SCNC: 37 MMOL/L (ref 21–32)
CREAT SERPL-MCNC: 0.7 MG/DL (ref 0.6–1.3)
EOSINOPHIL # BLD AUTO: 0 THOUSAND/ΜL (ref 0–0.61)
EOSINOPHIL NFR BLD AUTO: 0 % (ref 0–6)
ERYTHROCYTE [DISTWIDTH] IN BLOOD BY AUTOMATED COUNT: 16.8 % (ref 11.6–15.1)
GFR SERPL CREATININE-BSD FRML MDRD: 82 ML/MIN/1.73SQ M
GLUCOSE SERPL-MCNC: 122 MG/DL (ref 65–140)
GLUCOSE SERPL-MCNC: 147 MG/DL (ref 65–140)
GLUCOSE SERPL-MCNC: 152 MG/DL (ref 65–140)
GLUCOSE SERPL-MCNC: 170 MG/DL (ref 65–140)
GLUCOSE SERPL-MCNC: 217 MG/DL (ref 65–140)
HCT VFR BLD AUTO: 29.9 % (ref 34.8–46.1)
HEMOCCULT STL QL: NEGATIVE
HGB BLD-MCNC: 8.9 G/DL (ref 11.5–15.4)
IMM GRANULOCYTES # BLD AUTO: 0.14 THOUSAND/UL (ref 0–0.2)
IMM GRANULOCYTES NFR BLD AUTO: 2 % (ref 0–2)
LYMPHOCYTES # BLD AUTO: 0.51 THOUSANDS/ΜL (ref 0.6–4.47)
LYMPHOCYTES NFR BLD AUTO: 8 % (ref 14–44)
MCH RBC QN AUTO: 24.9 PG (ref 26.8–34.3)
MCHC RBC AUTO-ENTMCNC: 29.8 G/DL (ref 31.4–37.4)
MCV RBC AUTO: 84 FL (ref 82–98)
MONOCYTES # BLD AUTO: 0.3 THOUSAND/ΜL (ref 0.17–1.22)
MONOCYTES NFR BLD AUTO: 5 % (ref 4–12)
NEUTROPHILS # BLD AUTO: 5.68 THOUSANDS/ΜL (ref 1.85–7.62)
NEUTS SEG NFR BLD AUTO: 85 % (ref 43–75)
NRBC BLD AUTO-RTO: 0 /100 WBCS
PLATELET # BLD AUTO: 221 THOUSANDS/UL (ref 149–390)
PMV BLD AUTO: 12.1 FL (ref 8.9–12.7)
POTASSIUM SERPL-SCNC: 4.2 MMOL/L (ref 3.5–5.3)
RBC # BLD AUTO: 3.57 MILLION/UL (ref 3.81–5.12)
SODIUM SERPL-SCNC: 138 MMOL/L (ref 136–145)
WBC # BLD AUTO: 6.64 THOUSAND/UL (ref 4.31–10.16)

## 2018-05-17 PROCEDURE — 94760 N-INVAS EAR/PLS OXIMETRY 1: CPT

## 2018-05-17 PROCEDURE — 80048 BASIC METABOLIC PNL TOTAL CA: CPT | Performed by: INTERNAL MEDICINE

## 2018-05-17 PROCEDURE — 73600 X-RAY EXAM OF ANKLE: CPT

## 2018-05-17 PROCEDURE — 82948 REAGENT STRIP/BLOOD GLUCOSE: CPT

## 2018-05-17 PROCEDURE — 85025 COMPLETE CBC W/AUTO DIFF WBC: CPT | Performed by: INTERNAL MEDICINE

## 2018-05-17 PROCEDURE — 97110 THERAPEUTIC EXERCISES: CPT

## 2018-05-17 PROCEDURE — 94640 AIRWAY INHALATION TREATMENT: CPT

## 2018-05-17 PROCEDURE — 97535 SELF CARE MNGMENT TRAINING: CPT

## 2018-05-17 PROCEDURE — 99232 SBSQ HOSP IP/OBS MODERATE 35: CPT | Performed by: INTERNAL MEDICINE

## 2018-05-17 RX ORDER — FLUTICASONE PROPIONATE 50 MCG
2 SPRAY, SUSPENSION (ML) NASAL DAILY
Status: DISCONTINUED | OUTPATIENT
Start: 2018-05-17 | End: 2018-05-18 | Stop reason: HOSPADM

## 2018-05-17 RX ADMIN — INSULIN LISPRO 1 UNITS: 100 INJECTION, SOLUTION INTRAVENOUS; SUBCUTANEOUS at 23:00

## 2018-05-17 RX ADMIN — FUROSEMIDE 20 MG: 20 TABLET ORAL at 09:50

## 2018-05-17 RX ADMIN — FLUTICASONE PROPIONATE 2 SPRAY: 50 SPRAY, METERED NASAL at 14:41

## 2018-05-17 RX ADMIN — POTASSIUM CHLORIDE 10 MEQ: 750 TABLET, EXTENDED RELEASE ORAL at 09:50

## 2018-05-17 RX ADMIN — METHYLPREDNISOLONE SODIUM SUCCINATE 40 MG: 40 INJECTION, POWDER, FOR SOLUTION INTRAMUSCULAR; INTRAVENOUS at 09:49

## 2018-05-17 RX ADMIN — AMLODIPINE BESYLATE 5 MG: 5 TABLET ORAL at 09:50

## 2018-05-17 RX ADMIN — PANTOPRAZOLE SODIUM 40 MG: 40 TABLET, DELAYED RELEASE ORAL at 06:25

## 2018-05-17 RX ADMIN — POLYVINYL ALCOHOL 1 DROP: 14 SOLUTION/ DROPS OPHTHALMIC at 17:02

## 2018-05-17 RX ADMIN — HYDROCODONE BITARTRATE AND ACETAMINOPHEN 1 TABLET: 5; 325 TABLET ORAL at 14:41

## 2018-05-17 RX ADMIN — HYDROCODONE BITARTRATE AND ACETAMINOPHEN 1 TABLET: 5; 325 TABLET ORAL at 21:02

## 2018-05-17 RX ADMIN — METHYLPREDNISOLONE SODIUM SUCCINATE 40 MG: 40 INJECTION, POWDER, FOR SOLUTION INTRAMUSCULAR; INTRAVENOUS at 21:03

## 2018-05-17 RX ADMIN — IPRATROPIUM BROMIDE AND ALBUTEROL SULFATE 3 ML: .5; 3 SOLUTION RESPIRATORY (INHALATION) at 15:19

## 2018-05-17 RX ADMIN — BENZONATATE 100 MG: 100 CAPSULE ORAL at 09:50

## 2018-05-17 RX ADMIN — METOPROLOL TARTRATE 50 MG: 50 TABLET ORAL at 21:02

## 2018-05-17 RX ADMIN — TOLTERODINE TARTRATE 4 MG: 2 CAPSULE, EXTENDED RELEASE ORAL at 09:50

## 2018-05-17 RX ADMIN — Medication 300 UNITS: at 17:49

## 2018-05-17 RX ADMIN — DOCUSATE SODIUM 100 MG: 100 CAPSULE, LIQUID FILLED ORAL at 09:49

## 2018-05-17 RX ADMIN — BENZONATATE 100 MG: 100 CAPSULE ORAL at 21:02

## 2018-05-17 RX ADMIN — LORATADINE 10 MG: 10 TABLET ORAL at 09:49

## 2018-05-17 RX ADMIN — APIXABAN 5 MG: 5 TABLET, FILM COATED ORAL at 09:51

## 2018-05-17 RX ADMIN — ATORVASTATIN CALCIUM 80 MG: 80 TABLET, FILM COATED ORAL at 17:04

## 2018-05-17 RX ADMIN — GUAIFENESIN 1200 MG: 600 TABLET, EXTENDED RELEASE ORAL at 09:50

## 2018-05-17 RX ADMIN — BENZONATATE 100 MG: 100 CAPSULE ORAL at 17:04

## 2018-05-17 RX ADMIN — FLUTICASONE PROPIONATE AND SALMETEROL 1 PUFF: 50; 250 POWDER RESPIRATORY (INHALATION) at 09:49

## 2018-05-17 RX ADMIN — FLUTICASONE PROPIONATE AND SALMETEROL 1 PUFF: 50; 250 POWDER RESPIRATORY (INHALATION) at 21:03

## 2018-05-17 RX ADMIN — GUAIFENESIN 1200 MG: 600 TABLET, EXTENDED RELEASE ORAL at 21:02

## 2018-05-17 RX ADMIN — POLYVINYL ALCOHOL 1 DROP: 14 SOLUTION/ DROPS OPHTHALMIC at 09:49

## 2018-05-17 RX ADMIN — PREGABALIN 300 MG: 100 CAPSULE ORAL at 09:50

## 2018-05-17 RX ADMIN — METOPROLOL TARTRATE 50 MG: 50 TABLET ORAL at 09:50

## 2018-05-17 RX ADMIN — APIXABAN 5 MG: 5 TABLET, FILM COATED ORAL at 21:02

## 2018-05-17 RX ADMIN — IPRATROPIUM BROMIDE AND ALBUTEROL SULFATE 3 ML: .5; 3 SOLUTION RESPIRATORY (INHALATION) at 07:05

## 2018-05-17 RX ADMIN — ALPRAZOLAM 0.5 MG: 0.5 TABLET ORAL at 21:02

## 2018-05-17 RX ADMIN — INSULIN LISPRO 1 UNITS: 100 INJECTION, SOLUTION INTRAVENOUS; SUBCUTANEOUS at 17:03

## 2018-05-17 RX ADMIN — IPRATROPIUM BROMIDE AND ALBUTEROL SULFATE 3 ML: .5; 3 SOLUTION RESPIRATORY (INHALATION) at 11:30

## 2018-05-17 RX ADMIN — PREGABALIN 300 MG: 100 CAPSULE ORAL at 17:05

## 2018-05-17 NOTE — OCCUPATIONAL THERAPY NOTE
OT TREATMENT       05/17/18 4267   Restrictions/Precautions   LLE Weight Bearing Per Order NWB   Braces or Orthoses Splint  (LLE)   Other Precautions Contact/isolation; Chair Alarm; Bed Alarm; Fall Risk;Pain;O2   Pain Assessment   Pain Assessment Bowser-Baker FACES   Bowser-Baker FACES Pain Rating 8   Pain Type Acute pain   Pain Location Leg   Pain Orientation Left   Hospital Pain Intervention(s) Medication (See MAR); Repositioned; Ambulation/increased activity; Distraction;Elevated   ADL   Toileting Assistance  4  Minimal Assistance   Toileting Deficit Bedside commode   Functional Standing Tolerance   Time 10-20 seconds x2   Activity static standing   Comments MaxA of 2 with RW   Bed Mobility   Supine to Sit 4  Minimal assistance   Additional items Assist x 1   Transfers   Sit to Stand 2  Maximal assistance   Additional items Assist x 2;Verbal cues; Impulsive   Stand to Sit 2  Maximal assistance   Additional items Assist x 2;Verbal cues; Impulsive   Stand pivot 2  Maximal assistance   Additional items Assist x 2;Verbal cues; Impulsive   Toilet Transfers   Toilet Transfer From [employer] Transfer Type To   Toilet Transfer to Standard bedside commode   Toilet Transfer Technique Stand pivot   Toilet Transfers Maximal assistance  (of 2 with RW and cues; MaxA to maintain NWB LLE)   Cognition   Arousal/Participation Alert; Cooperative   Attention Attends with cues to redirect   Orientation Level Oriented X4   Memory Decreased short term memory   Following Commands Follows multistep commands with increased time or repetition   Additional Activities   Additional Activities Comments Education and training regarding chair pushups and BUE should/extension exercises to facilitate improved sit to stand transitions     Activity Tolerance   Activity Tolerance Patient limited by fatigue;Patient limited by pain   Medical Staff [address], RN   Assessment   Assessment Pt is anxious and very fearful of falling during ADL and mobility tasks requiring MaxA of 2 people for safety and to maintain NWB LLE  Pt perseverating about breaking RLE recently and she does not trust herself or therapists to hold her up, therefore she is often impulsive and requires repeated cues to improve safety awareness to prevent further injury to herself or caregivers  Pt  left sitting in chair with all needs within reach and tab alarm in place, LLE elevated on footstool and pillows  Cont OT per POC  Plan   Treatment Interventions ADL retraining;Functional transfer training;UE strengthening/ROM; Endurance training;Patient/family training;Equipment evaluation/education; Activityengagement; Energy conservation   OT Frequency 3-5x/wk   Recommendation   OT Discharge Recommendation Short Term Rehab   Licensure   [place] License Number  [facility]L, [place] Lic# [id number]

## 2018-05-17 NOTE — PLAN OF CARE
Problem: DISCHARGE PLANNING - CARE MANAGEMENT  Goal: Discharge to post-acute care or home with appropriate resources  INTERVENTIONS:  - Conduct assessment to determine patient/family and health care team treatment goals, and need for post-acute services based on payer coverage, community resources, and patient preferences, and barriers to discharge  - Address psychosocial, clinical, and financial barriers to discharge as identified in assessment in conjunction with the patient/family and health care team  - Arrange appropriate level of post-acute services according to patient's   needs and preference and payer coverage in collaboration with the physician and health care team  - Communicate with and update the patient/family, physician, and health care team regarding progress on the discharge plan  - Arrange appropriate transportation to post-acute venues  DCP IS TO HOME VS STR PENDING PROGRESS   Outcome: Progressing  STR placement is planned  Pt has been accepted by [facility] and bed is available at [facility]

## 2018-05-17 NOTE — SOCIAL WORK
SW following to assist with DCP  Skilled rehab facility placement is planned  Pt has been accepted by [facility] and there is currently a bed available for pt at [facility]-PAS request has been submitted  Once completed pt can be transferred whenever ready  SW will continue to follow to assist with transfer when ordered

## 2018-05-17 NOTE — PHYSICAL THERAPY NOTE
PT TREATMENT     05/17/18 1340   Pain Assessment   Pain Assessment Bowser-Baker FACES   Bowser-Baker FACES Pain Rating 8  (L lower leg area with movement)   Restrictions/Precautions   Weight Bearing Precautions Per Order Yes   LLE Weight Bearing Per Order NWB   Other Precautions Chair Alarm; Bed Alarm; Fall Risk;Pain  (NWB LLE)   General   Chart Reviewed Yes   Family/Caregiver Present Yes   Cognition   Arousal/Participation Cooperative   Orientation Level Oriented to person;Oriented to place;Oriented to situation   Comments patient unable to recall therapist from prior visits, with "no memory" of prior therapy session and getting OOB   Subjective   Subjective patient with complaints of increased pain with any movement of LLE and with dangling   Transfers   Sit to Stand 2  Maximal assistance   Additional items Assist x 2;Verbal cues; Impulsive   Stand to Sit 2  Maximal assistance   Additional items Assist x 2;Verbal cues   Stand pivot 2  Maximal assistance   Additional items Assist x 2;Verbal cues   Additional Comments patient anxious and fearful of falling, encouraged by therapist and reassured of all safety measures in place  Patient required max assist to maintain NWB LLE    Ambulation/Elevation   Gait Assistance 2  Maximal assist   Additional items Assist x 2;Verbal cues; Tactile cues   Assistive Device Standard walker   Distance 5 steps from commode to chair with max assist to maintain NWB LLE as well as verbal cuing for gait technique and pattening  Patient OOB in chair at end of session with LLE elevated and safety charm alarm in place   Exercises   Heelslides Supine;5 reps; Left   Knee AROM Long Arc [employer] reps; Left   Ankle Pumps Sitting;10 reps;Right   Assessment   Assessment patient with confusion at times and not remembering prior PT sessions   Patient anxious and fearful of falling but able to tolerate stand pivot transfers and short distance ambulation and will benefit from continued PT with progression as tolerated  Plan   Treatment/Interventions ADL retraining;Functional transfer training;LE strengthening/ROM; Therapeutic exercise; Endurance training;Patient/family training;Equipment eval/education; Bed mobility;Gait training; Compensatory technique education;Cognitive reorientation   PT Frequency Once a day   Recommendation   Recommendation (STR)   Licensure   [place] License Number  Gustavo Bundy PT [id number]

## 2018-05-17 NOTE — PROGRESS NOTES
[facility] Progress Note  Patient: [place] Nimisha 81 y o  female   MRN: [id number]  PCP: Ze Olvera, DO  Unit/Bed#: [address] [place] [address] Encounter: [id number]  Date Of Visit: 18    Subjective:   Nasal congestion  No productive cough  Similar left ankle fracture pain  No loss of sensation of distal toes  No cp sob    Objective:   Vitals:   Temp (24hrs), Av 1 °F (36 7 °C), Min:97 9 °F (36 6 °C), Max:98 4 °F (36 9 °C)    HR:  [69-77] 69  Resp:  [18-20] 18  BP: (132-170)/(60-73) 170/65  SpO2:  [94 %-98 %] 94 %  Body mass index is 38 71 kg/m²  Intake/Output Summary (Last 24 hours) at 18 1043  Last data filed at 18 0857   Gross per 24 hour   Intake                0 ml   Output             2100 ml   Net            -2100 ml       Physical Exam:   General: NAD  HEENT: EOMI, anicteric, oral moist, neck supple, no mass or JVD  Chest: CTAB, no wheeze/rales  Cardiac: RRR, S1/S2, No murmur  Abd: S/ND/NT/BS+  MSK: LLE dressing applied, move all toes, sensation intact  Neuro: AAOx3, moving all extremities  Psychiatric: Mood with normal affect    Additional Data:     Labs:    Results from last 7 days  Lab Units 18  0623   WBC Thousand/uL 6 64   HEMOGLOBIN g/dL 8 9*   HEMATOCRIT % 29 9*   PLATELETS Thousands/uL 221   NEUTROS PCT % 85*   LYMPHS PCT % 8*   MONOS PCT % 5   EOS PCT % 0       Results from last 7 days  Lab Units 18  0623  18  0644   SODIUM mmol/L 138  < > 143   POTASSIUM mmol/L 4 2  < > 3 6   CHLORIDE mmol/L 99*  < > 103   CO2 mmol/L 37*  < > 40*   BUN mg/dL 26*  < > 9   CREATININE mg/dL 0 70  < > 0 70   CALCIUM mg/dL 8 0*  < > 7 9*   TOTAL PROTEIN g/dL  --   --  5 2*   BILIRUBIN TOTAL mg/dL  --   --  0 40   ALK PHOS U/L  --   --  100   ALT U/L  --   --  10*   AST U/L  --   --  14   GLUCOSE RANDOM mg/dL 152*  < > 99   < > = values in this interval not displayed          Recent Results (from the past 24 hour(s))   Fingerstick Glucose (POCT)    Collection Time: 18 11:39 AM   Result Value Ref Range    POC Glucose 183 (H) 65 - 140 mg/dl   Fingerstick Glucose (POCT)    Collection Time: 05/16/18  4:35 PM   Result Value Ref Range    POC Glucose 185 (H) 65 - 140 mg/dl   Fingerstick Glucose (POCT)    Collection Time: 05/16/18  9:54 PM   Result Value Ref Range    POC Glucose 158 (H) 65 - 140 mg/dl   CBC and differential    Collection Time: 05/17/18  6:23 AM   Result Value Ref Range    WBC 6 64 4 31 - 10 16 Thousand/uL    RBC 3 57 (L) 3 81 - 5 12 Million/uL    Hemoglobin 8 9 (L) 11 5 - 15 4 g/dL    Hematocrit 29 9 (L) 34 8 - 46 1 %    MCV 84 82 - 98 fL    MCH 24 9 (L) 26 8 - 34 3 pg    MCHC 29 8 (L) 31 4 - 37 4 g/dL    RDW 16 8 (H) 11 6 - 15 1 %    MPV 12 1 8 9 - 12 7 fL    Platelets 457 813 - 340 Thousands/uL    nRBC 0 /100 WBCs    Neutrophils Relative 85 (H) 43 - 75 %    Immat GRANS % 2 0 - 2 %    Lymphocytes Relative 8 (L) 14 - 44 %    Monocytes Relative 5 4 - 12 %    Eosinophils Relative 0 0 - 6 %    Basophils Relative 0 0 - 1 %    Neutrophils Absolute 5 68 1 85 - 7 62 Thousands/µL    Immature Grans Absolute 0 14 0 00 - 0 20 Thousand/uL    Lymphocytes Absolute 0 51 (L) 0 60 - 4 47 Thousands/µL    Monocytes Absolute 0 30 0 17 - 1 22 Thousand/µL    Eosinophils Absolute 0 00 0 00 - 0 61 Thousand/µL    Basophils Absolute 0 01 0 00 - 0 10 Thousands/µL   Basic metabolic panel    Collection Time: 05/17/18  6:23 AM   Result Value Ref Range    Sodium 138 136 - 145 mmol/L    Potassium 4 2 3 5 - 5 3 mmol/L    Chloride 99 (L) 100 - 108 mmol/L    CO2 37 (H) 21 - 32 mmol/L    Anion Gap 2 (L) 4 - 13 mmol/L    BUN 26 (H) 5 - 25 mg/dL    Creatinine 0 70 0 60 - 1 30 mg/dL    Glucose 152 (H) 65 - 140 mg/dL    Calcium 8 0 (L) 8 3 - 10 1 mg/dL    eGFR 82 ml/min/1 73sq m   Fingerstick Glucose (POCT)    Collection Time: 05/17/18  7:22 AM   Result Value Ref Range    POC Glucose 147 (H) 65 - 140 mg/dl       Cultures:         Imaging:  Cta Head And Neck With And Without Contrast    Result Date: 5/10/2018  Narrative: CTA NECK AND BRAIN WITH AND WITHOUT CONTRAST INDICATION: slurred speech COMPARISON:   3/15/2018 and 5/10/2018 CT; 3/7/2018 CTA TECHNIQUE:  Routine CT imaging of the Brain without contrast   Post contrast imaging was performed after administration of iodinated contrast through the neck and brain  Post contrast axial 0 625 mm images timed to opacify the arterial system  3D rendering was performed on an independent workstation  MIP reconstructions performed  Coronal reconstructions were performed of the noncontrast portion of the brain  Radiation dose length product (DLP) for this visit:  379 75 mGy-cm   This examination, like all CT scans performed in the [facility], was performed utilizing techniques to minimize radiation dose exposure, including the use of iterative  reconstruction and automated exposure control  IV Contrast:  85 mL of iohexol (OMNIPAQUE)  IMAGE QUALITY:   Diagnostic FINDINGS: NONCONTRAST BRAIN PARENCHYMA: Chronic ischemic changes involve supratentorial white matter  Chronic anterolateral left frontal infarction  Proximal 1 8 cm anterior left middle cranial fossa meningioma  These findings are stable  VENTRICLES AND EXTRA-AXIAL SPACES:  Normal for patient's age  VISUALIZED ORBITS AND PARANASAL SINUSES:  Pansinus disease CALVARIUM AND EXTRACRANIAL SOFT TISSUES:   Normal  CERVICAL VASCULATURE AORTIC ARCH AND GREAT VESSELS: Mild atherosclerotic calcification normal caliber aortic arch  Intact great vessels  RIGHT VERTEBRAL ARTERY CERVICAL SEGMENT:  Normal origin  The vessel is normal in caliber throughout the neck  LEFT VERTEBRAL ARTERY CERVICAL SEGMENT:  Normal origin  The vessel is normal in caliber throughout the neck  RIGHT EXTRACRANIAL CAROTID SEGMENT:  Normal caliber common carotid artery  Mild nonstenotic atherosclerotic disease involves the origin of the right internal carotid artery   LEFT EXTRACRANIAL CAROTID SEGMENT:  Normal caliber common carotid artery  Near occlusive atherosclerotic disease is present at the origin of the left internal carotid artery  The more cephalad portion of the LICA is patent through the skull base  These findings are stable  NASCET criteria was used to determine the degree of internal carotid artery diameter stenosis  INTRACRANIAL VASCULATURE INTERNAL CAROTID ARTERIES: Atherosclerotic calcifications without stenosis involve the cavernous ICAs bilaterally  Normal ophthalmic artery origins  Normal ICA terminus  ANTERIOR CIRCULATION:  Symmetric A1 segments and anterior cerebral arteries with normal enhancement  Normal anterior communicating artery  MIDDLE CEREBRAL ARTERY CIRCULATION:  M1 segment and middle cerebral artery branches demonstrate normal enhancement bilaterally  DISTAL VERTEBRAL ARTERIES:  Normal distal vertebral arteries  Posterior inferior cerebellar artery origins are normal  Normal vertebral basilar junction  BASILAR ARTERY:  Basilar artery is normal in caliber  Normal superior cerebellar arteries  POSTERIOR CEREBRAL ARTERIES: Both posterior cerebral arteries arises from the basilar tip  Both arteries demonstrate normal enhancement  Normal posterior communicating arteries  DURAL VENOUS SINUSES:  Normal  NON VASCULAR ANATOMY BONY STRUCTURES:  No acute osseous abnormality  Multilevel degenerative cervical spondylosis again noted SOFT TISSUES OF THE NECK:  Unremarkable  THORACIC INLET:  Unremarkable  Increased groundglass opacity at posterior right upper lobe possibly infectious since it was not evident on prior chest CT from 1/5/2018       Impression: No acute intracranial hemorrhage or mass effect Chronic ischemic changes involving supratentorial white matter and anterolateral left frontal lobe, stable Near occlusive atherosclerotic disease at the origin of the left internal carotid artery, consistent with prior CTA Findings were directly discussed by Dr Rekha Yanez with Dr Alvin Ann  at 8:57 AM, 5/10/2018 Workstation performed: [id number]     Xr Chest Portable    Result Date: 5/13/2018  Narrative: CHEST INDICATION:   wheezing and SOB  COMPARISON:  None EXAM PERFORMED/VIEWS:  XR CHEST PORTABLE FINDINGS:  Port-A-Cath present, tip at the level of the SVC  Multiple overlying EKG leads  Moderate cardiomegaly, tortuous aorta, stable  Mild central pulmonary venous distention suggesting component of venous hypertension  Left hemidiaphragm is obscured and there is blunting of the left costophrenic angle  Increased retrocardiac opacity, suggesting on the left lower lobe atelectasis or infiltrate  Probable small left effusion  No pneumothorax  No pleural effusion  Osseous structures appear within normal limits for patient age  Impression: Left lower lobe process as above  Cardiomegaly  Workstation performed: [id number]     X-ray Chest 1 View Portable    Result Date: 5/10/2018  Narrative: CHEST INDICATION:   stroke  COMPARISON:  3/17/2018, 3/13/2017  EXAM PERFORMED/VIEWS:  XR CHEST PORTABLE FINDINGS:  Stable right internal jugular chest port  Cardiomediastinal silhouette appears unremarkable  The lungs are clear  No pneumothorax or pleural effusion  Stable elevated left hemidiaphragm  Osseous structures appear within normal limits for patient age  Impression: No acute cardiopulmonary disease  Workstation performed: [id number]     Xr Hip/pelv 2-3 Vws Left If Performed    Addendum Date: 5/10/2018 Addendum:   ADDENDUM: ADDENDUM There is a voice recognition software related error in the header of the report  A phrase which reads " right hip " should read, " LEFT HIP "  Result Date: 5/10/2018  Narrative: RIGHT HIP INDICATION:   injury  Fall out of bed  COMPARISON:  Plain radiographs March 14, 2018 VIEWS:  XR HIP/PELV 2-3 VWS RIGHT W PELVIS IF PERFORMED Images: 3 FINDINGS: There is no acute fracture or dislocation  Osseous structures demineralized  Stable appearance of left hip arthroplasty   Mild narrowing of the right hip joint  No lytic or blastic osseous lesions  Soft tissues are unremarkable  The visualized lumbar spine is unremarkable  Impression: Stable postoperative appearance of the left hip  No acute osseous abnormality  Workstation performed: [id number]     Xr Femur 2 Vw Left    Result Date: 5/10/2018  Narrative: LEFT FEMUR INDICATION:   injury  COMPARISON:  None VIEWS:  XR FEMUR 2 VW LEFT Images: 4 FINDINGS: There is a moderate-sized joint effusion  There is a bipolar left hip prostheses present  No obvious femoral fracture is seen  No degenerative changes  No lytic or blastic lesions are seen  Surgical clips in the left inguinal region  Contrast material in the urinary bladder from a CT scan scan  Impression: No acute osseous abnormality  Moderate size joint effusion  Left hip prostheses  Workstation performed: [id number]     Xr Knee 1 Or 2 Views Left    Result Date: 5/10/2018  Narrative: LEFT KNEE INDICATION:   fall, ankle fx  Fall out of bed  Leg pain  COMPARISON:  None VIEWS:  XR KNEE 1 OR 2 VW LEFT Images: 2 FINDINGS: There is no acute fracture or dislocation  Moderate-sized suprapatellar joint effusion  Osseous structures demineralized  Moderate narrowing of the medial joint compartment  Sharpening of the tibial spines  Narrowing of the patellofemoral space  No lytic or blastic lesions are seen  Soft tissues are unremarkable  Impression: Moderate size suprapatellar joint effusion  Workstation performed: [id number]     Xr Tibia Fibula 2 Views Left    Result Date: 5/10/2018  Narrative: LEFT TIBIA AND FIBULA INDICATION:   pain, ankle fx  Fall out of bed  COMPARISON:  March 15, 2018 VIEWS:  XR TIBIA FIBULA 2 VW LEFT FINDINGS: There is posterior dislocation of the talus with respect to the distal tibia  There is an avulsion fracture of the posterior malleolus of the tibia  There is posterior displacement and proximal retraction of the distal fracture fragment   There is a comminuted, oblique fracture of the mid diaphysis of the fibula  There is proximal retraction and posterior angulation of the distal fracture fragment  There is cortical step off involving the medial tibial plateau, better seen on the tibia examination  There is depression of the medial tibial plateau  Degenerative changes in the knee  No lytic or blastic lesions are seen  Moderate size suprapatellar joint effusion  Impression: 1  Medial tibial plateau fracture  2   Fracture of the posterior malleolus of the tibia with posterior dislocation of the ankle  3   Mildly displaced, comminuted fracture of the mid fibula  The study was marked in [facility] for immediate notification  Workstation performed: [id number]     Xr Tibia Fibula 2 Vw Right    Result Date: 4/17/2018  Narrative: RIGHT TIBIA AND FIBULA INDICATION:   [id number]: Other fracture of upper and lower end of right fibula, sequela  COMPARISON:  03/14/2018 VIEWS:  XR TIBIA FIBULA 2 VW RIGHT Images: 2 FINDINGS: Healing fracture of the proximal fibular neck  No degenerative changes  No lytic or blastic lesions are seen  Soft tissues are unremarkable  Impression: Healing fracture of the proximal fibular neck  Workstation performed: [id number]     Xr Ankle 2 Vw Left    Result Date: 5/10/2018  Narrative: LEFT ANKLE INDICATION:   post reduction  COMPARISON:  Earlier today VIEWS:  XR ANKLE 2 VW LEFT FINDINGS: There is improved alignment at the ankle joint compared to the prior study  There is still some widening of the tibiotalar joint anteriorly and medially  Fracture through the medial malleolus extending posteriorly is seen  There is a fracture through the distal fibular shaft  No significant degenerative changes  No lytic or blastic lesions seen  A cast obscures fine detail  There is soft tissue swelling present  Impression: Improved alignment at the tibiotalar joint which is widened anteriorly and medially    Fracture through the medial malleolus extending posteriorly  Distal fibular fracture  Workstation performed: [id number]     Xr Ankle 2 Views Left    Result Date: 5/10/2018  Narrative: LEFT ANKLE INDICATION:   pain  COMPARISON:  None VIEWS:  XR ANKLE 2 VW LEFT Images: 2 FINDINGS: A fracture dislocation of the tibiotalar joint noted including a posterior displaced medial malleolar fracture with posterior dislocation of the tibiotalar joint  The lateral malleolus is intact  Calcaneal spur(s) noted  No lytic or blastic lesions seen  Diffuse osteopenia  Medial dorsal soft tissue swelling identified  Impression: Fracture dislocation of the medial malleolus and tibiotalar joint  Workstation performed: [id number]     Xr Foot 2 Views Left    Result Date: 5/10/2018  Narrative: LEFT FOOT INDICATION:   pain  COMPARISON:  Left ankle 5/10/2018 VIEWS:  XR FOOT 2 VW LEFT Images: 2 FINDINGS: A fracture dislocation at the ankle noted involving the distal tibia  There is no acute fracture throughout the foot  Calcaneal spur(s) noted  No lytic or blastic lesions seen  Diffuse osteopenia  Soft tissues are unremarkable  Impression: 1  Fracture dislocation of the ankle  No acute fracture of the foot  2   Calcaneal spur with diffuse osteopenia  Workstation performed: [id number]     Mri Brain W Wo Contrast    Result Date: 5/11/2018  Narrative: MRI BRAIN WITH AND WITHOUT CONTRAST INDICATION: Status post fall  Evaluate for CVA  COMPARISON:  Prior MRI March 7, 2018 TECHNIQUE: Sagittal T1, axial T2, axial FLAIR, axial T1, axial [place], axial diffusion  Sagittal, axial T1 postcontrast     IV Contrast:  9 mL of Gadobutrol injection (SINGLE-DOSE)  IMAGE QUALITY:   Diagnostic  FINDINGS: BRAIN PARENCHYMA:  Extra-axial briskly enhancing mass left frontal temporal junction on the posterior aspect of the left orbit compatible with meningioma measuring 2 4 x 2 0 x 2 2 cm  Surrounding FLAIR abnormality compatible with vasogenic edema similar  to the prior study    Findings compatible with atypical meningioma  No evidence of recent infarct  There are foci of T2 and FLAIR signal abnormality in the periventricular and subcortical white matter which are typical for microvascular disease in patients of this age group  VENTRICLES:  Normal  SELLA AND PITUITARY GLAND:  Normal  ORBITS:  Bilateral lens replacements PARANASAL SINUSES:  Extensive paranasal sinus mucosal thickening and bilateral mastoid opacification  VASCULATURE:  Evaluation of the major intracranial vasculature demonstrates appropriate flow voids  CALVARIUM AND SKULL BASE:  Normal  EXTRACRANIAL SOFT TISSUES:  Normal      Impression: No acute intracranial abnormality  No evidence of recent infarct  Briskly enhancing extra-axial mass as described at the frontal temporal junction stable in size from prior study with adjacent FLAIR abnormality compatible with edema  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  Workstation performed: [id number]     Ct Stroke Alert Brain    Result Date: 5/10/2018  Narrative: CT BRAIN - STROKE ALERT PROTOCOL INDICATION: 43-year-old female, slurred speech COMPARISON:  3/15/2018 CT TECHNIQUE:  CT examination of the brain was performed  In addition to axial images, coronal reformatted images were created and submitted for interpretation  Radiation dose length product (DLP) for this visit:  1244 57 mGy-cm   This examination, like all CT scans performed in the [facility], was performed utilizing techniques to minimize radiation dose exposure, including the use of iterative reconstruction and automated exposure control  IMAGE QUALITY:  Diagnostic  FINDINGS:  PARENCHYMA:  Chronic ischemic changes involve the lateral left frontal lobe in the MCA distribution, unchanged  Chronic microangiopathic ischemic changes involve supratentorial white matter  These findings are stable   Anterior left middle cranial fossa meningioma measuring approximately 1 8 cm is stable  VENTRICLES AND EXTRA-AXIAL SPACES:  Normal for patient's age   VISUALIZED ORBITS AND PARANASAL SINUSES:  Pansinus disease again evident CALVARIUM AND EXTRACRANIAL SOFT TISSUES:   Normal      Impression: No acute intracranial hemorrhage or mass effect Multifocal chronic ischemic changes including left lateral frontal MCA territory infarction 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 Findings were directly discussed by Dr Rosa M Zimmerman with Dr Monique Head  at 8:57 AM, 5/10/2018 Workstation performed: [id number]     Imaging Reports Reviewed by myself    Last 24 Hours Medication List:     Current Facility-Administered Medications:     acetaminophen (TYLENOL) tablet 650 mg, 650 mg, Oral, Q6H PRN, GILDA Ko    albuterol inhalation solution 2 5 mg, 2 5 mg, Nebulization, Q6H PRN, GILDA Ko, 2 5 mg at 05/14/18 1122    ALPRAZolam (XANAX) tablet 0 5 mg, 0 5 mg, Oral, HS, GILDA Juárez, 0 5 mg at 05/16/18 2136    amLODIPine (NORVASC) tablet 5 mg, 5 mg, Oral, Daily, GILDA Juárez, 5 mg at 05/17/18 0950    apixaban (ELIQUIS) tablet 5 mg, 5 mg, Oral, Q12H, GILDA Juárez, 5 mg at 05/17/18 0951    atorvastatin (LIPITOR) tablet 80 mg, 80 mg, Oral, Daily With Dinner, Danita Slade MD, 80 mg at 05/16/18 1558    benzonatate (TESSALON PERLES) capsule 100 mg, 100 mg, Oral, TID, David Maya MD, 100 mg at 05/17/18 0950    docusate sodium (COLACE) capsule 100 mg, 100 mg, Oral, BID, GILDA Juárez, 100 mg at 05/17/18 0949    fluticasone-salmeterol (ADVAIR) 250-50 mcg/dose inhaler 1 puff, 1 puff, Inhalation, Q12H [address], YAHIR KoNP, 1 puff at 05/17/18 0949    furosemide (LASIX) tablet 20 mg, 20 mg, Oral, Daily, GILDA Phillip, 20 mg at 05/17/18 0950    guaiFENesin (MUCINEX) 12 hr tablet 1,200 mg, 1,200 mg, Oral, Q12H [address], GILDA Ko, 1,200 mg at 05/17/18 0950    HYDROcodone-acetaminophen ([facility]) 5-325 mg per tablet 1 tablet, 1 tablet, Oral, Q4H PRN, GILDA Florian, 1 tablet at 05/16/18 1558    insulin lispro (HumaLOG) 100 units/mL subcutaneous injection 1-5 Units, 1-5 Units, Subcutaneous, TID AC, 1 Units at 05/16/18 1735 **AND** Fingerstick Glucose (POCT), , , TID AC, GILDA Juárez    insulin lispro (HumaLOG) 100 units/mL subcutaneous injection 1-5 Units, 1-5 Units, Subcutaneous, HS, GILDA Juárez, 1 Units at 05/16/18 2235    ipratropium-albuterol (DUO-NEB) 0 5-2 5 mg/3 mL inhalation solution 3 mL, 3 mL, Nebulization, Q4H, GILDA Juárez, 3 mL at 05/17/18 0705    loratadine (CLARITIN) tablet 10 mg, 10 mg, Oral, Daily, GILDA Juárez, 10 mg at 05/17/18 9416    methylPREDNISolone sodium succinate (Solu-MEDROL) injection 40 mg, 40 mg, Intravenous, Q12H [address], GILDA Florian, 40 mg at 05/17/18 0949    metoprolol tartrate (LOPRESSOR) tablet 50 mg, 50 mg, Oral, Q12H [address], Lul Ny, CRNP, 50 mg at 05/17/18 0950    morphine injection 2 mg, 2 mg, Intravenous, Q6H PRN, GILDA Florian, 2 mg at 05/11/18 2135    pantoprazole (PROTONIX) EC tablet 40 mg, 40 mg, Oral, Early Morning, GILDA Juárez, 40 mg at 05/17/18 8236    polyvinyl alcohol (LIQUIFILM TEARS) 1 4 % ophthalmic solution 1 drop, 1 drop, Both Eyes, TID, GILDA Juárez, 1 drop at 05/17/18 0949    potassium chloride (K-DUR,KLOR-CON) CR tablet 10 mEq, 10 mEq, Oral, Daily, GILDA Phillip, 10 mEq at 05/17/18 0950    pregabalin (LYRICA) capsule 300 mg, 300 mg, Oral, BID, GILDA Juárez, 300 mg at 05/17/18 1586    tolterodine (DETROL LA) 24 hr capsule 4 mg, 4 mg, Oral, Daily, GILDA Juárez, 4 mg at 05/17/18 6223     Assessment and Plans:  Hospital Problem List:   Principal Problem:    TIA (transient ischemic attack)  Active Problems:     Moderate persistent asthma    Fall    Stenosis of left internal carotid artery Paroxysmal atrial fibrillation (HCC)    Hypertension    Obesity (BMI 30-39  9)    GERD (gastroesophageal reflux disease)    Hyperlipemia    Fibromyalgia    History of Merkel cell carcinoma    Chronic diastolic CHF (congestive heart failure), NYHA class 1 (HCC)    Displaced fracture of medial malleolus of left tibia, initial encounter for closed fracture    Displaced  comminuted fracture of fibula    Drop in hemoglobin    81 y o  female, lives alone but independenly, with a history of CVA, PAFIB on Eliquis, Severe left carotid stenosis refusing vacular intervention, fibromyalgia, hypokalemia, GERD, Asthma, 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 Cast placement prior to [facility] to [facility]  Continue supportive conservative mgmt: NWB, optimal pain control, PT/OT, rehab placement  · TIA: MRI brain no new infarc  Neurology consult - more likely TIA  Eliquis resumed  Continue statins  Monitor H/H   · Drop in hemoglobin: Baseline Hg 11~12  Now 8~9  Concern is hematoma at sites of fractures  No sign of GIB or GUB  Guaiac -ve  Closely monitor H/H  Closely monitor distal extremity new neuro deficit  · Carotid stenosis, left: Vascular consulted  CTA head and neck shows severe stenosis  Vascular surgery was offering elective CEA however patient is refusing it in the past and now  She will f/u with vascular as outpatient     · Paroxysmal atrial fibrillation: Continue metoprolol, Eliquis  · Hypertension: Continue metoprolol, amlodipine  · Hyperlipemia: Continue statin   · GERD: Continue PPI  · Fibromyalgia: Continue Lyrica  · Hypokalemia: KCl supplement  · Moderate persistent asthma, Chronic respiratory failure with hypoxia: Continue home inhalers  Titrate O2 to keep SpO2>90%  · Iron deficiency anemia: Continue iron supplement  · Left lower extremity chronic lymphedema: 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

## 2018-05-17 NOTE — PLAN OF CARE
Problem: RESPIRATORY - ADULT  Goal: Achieves optimal ventilation and oxygenation  INTERVENTIONS:  - Assess for changes in respiratory status  - Assess for changes in mentation and behavior  - Position to facilitate oxygenation and minimize respiratory effort  - Oxygen administration by appropriate delivery method based on oxygen saturation (per order)   - Encourage broncho-pulmonary hygiene including cough, deep breathe, Incentive Spirometry  - Assess and instruct to report SOB or any respiratory difficulty  - Respiratory Therapy support as indicated    Outcome: Progressing

## 2018-05-18 ENCOUNTER — APPOINTMENT (INPATIENT)
Dept: RADIOLOGY | Facility: HOSPITAL | Age: 83
DRG: 563 | End: 2018-05-18
Payer: MEDICARE

## 2018-05-18 VITALS
WEIGHT: 215.17 LBS | BODY MASS INDEX: 39.6 KG/M2 | OXYGEN SATURATION: 94 % | HEIGHT: 62 IN | RESPIRATION RATE: 18 BRPM | TEMPERATURE: 97.7 F | DIASTOLIC BLOOD PRESSURE: 58 MMHG | SYSTOLIC BLOOD PRESSURE: 121 MMHG | HEART RATE: 67 BPM

## 2018-05-18 LAB
GLUCOSE SERPL-MCNC: 151 MG/DL (ref 65–140)
GLUCOSE SERPL-MCNC: 154 MG/DL (ref 65–140)
GLUCOSE SERPL-MCNC: 196 MG/DL (ref 65–140)

## 2018-05-18 PROCEDURE — 94668 MNPJ CHEST WALL SBSQ: CPT

## 2018-05-18 PROCEDURE — 82948 REAGENT STRIP/BLOOD GLUCOSE: CPT

## 2018-05-18 PROCEDURE — 99239 HOSP IP/OBS DSCHRG MGMT >30: CPT | Performed by: INTERNAL MEDICINE

## 2018-05-18 PROCEDURE — 94760 N-INVAS EAR/PLS OXIMETRY 1: CPT

## 2018-05-18 PROCEDURE — 94640 AIRWAY INHALATION TREATMENT: CPT

## 2018-05-18 PROCEDURE — 73600 X-RAY EXAM OF ANKLE: CPT

## 2018-05-18 PROCEDURE — 97110 THERAPEUTIC EXERCISES: CPT

## 2018-05-18 RX ORDER — HYDROCODONE BITARTRATE AND ACETAMINOPHEN 5; 325 MG/1; MG/1
1 TABLET ORAL EVERY 4 HOURS PRN
Qty: 20 TABLET | Refills: 0
Start: 2018-05-18 | End: 2018-05-28

## 2018-05-18 RX ORDER — BENZONATATE 100 MG/1
100 CAPSULE ORAL 3 TIMES DAILY
Qty: 20 CAPSULE | Refills: 0
Start: 2018-05-18 | End: 2018-05-28

## 2018-05-18 RX ORDER — DOCUSATE SODIUM 100 MG/1
100 CAPSULE, LIQUID FILLED ORAL 2 TIMES DAILY
Qty: 10 CAPSULE | Refills: 0
Start: 2018-05-18 | End: 2018-07-23 | Stop reason: HOSPADM

## 2018-05-18 RX ORDER — IPRATROPIUM BROMIDE AND ALBUTEROL SULFATE 2.5; .5 MG/3ML; MG/3ML
3 SOLUTION RESPIRATORY (INHALATION)
Status: DISCONTINUED | OUTPATIENT
Start: 2018-05-18 | End: 2018-05-18 | Stop reason: HOSPADM

## 2018-05-18 RX ORDER — FLUTICASONE PROPIONATE 50 MCG
1 SPRAY, SUSPENSION (ML) NASAL DAILY
Qty: 16 G | Refills: 0
Start: 2018-05-19

## 2018-05-18 RX ORDER — GUAIFENESIN 1200 MG/1
1200 TABLET, EXTENDED RELEASE ORAL EVERY 12 HOURS SCHEDULED
Refills: 0
Start: 2018-05-18 | End: 2018-05-28

## 2018-05-18 RX ORDER — PREDNISONE 10 MG/1
TABLET ORAL
Qty: 12 TABLET | Refills: 0 | Status: SHIPPED | OUTPATIENT
Start: 2018-05-19 | End: 2018-07-23 | Stop reason: HOSPADM

## 2018-05-18 RX ORDER — ACETAMINOPHEN 325 MG/1
650 TABLET ORAL EVERY 6 HOURS PRN
Qty: 30 TABLET | Refills: 0
Start: 2018-05-18

## 2018-05-18 RX ADMIN — TOLTERODINE TARTRATE 4 MG: 2 CAPSULE, EXTENDED RELEASE ORAL at 09:12

## 2018-05-18 RX ADMIN — BENZONATATE 100 MG: 100 CAPSULE ORAL at 09:07

## 2018-05-18 RX ADMIN — HYDROCODONE BITARTRATE AND ACETAMINOPHEN 1 TABLET: 5; 325 TABLET ORAL at 11:45

## 2018-05-18 RX ADMIN — GUAIFENESIN 1200 MG: 600 TABLET, EXTENDED RELEASE ORAL at 09:10

## 2018-05-18 RX ADMIN — LORATADINE 10 MG: 10 TABLET ORAL at 09:11

## 2018-05-18 RX ADMIN — PREGABALIN 300 MG: 100 CAPSULE ORAL at 18:29

## 2018-05-18 RX ADMIN — IPRATROPIUM BROMIDE AND ALBUTEROL SULFATE 3 ML: .5; 3 SOLUTION RESPIRATORY (INHALATION) at 15:28

## 2018-05-18 RX ADMIN — PREGABALIN 300 MG: 100 CAPSULE ORAL at 09:23

## 2018-05-18 RX ADMIN — INSULIN LISPRO 1 UNITS: 100 INJECTION, SOLUTION INTRAVENOUS; SUBCUTANEOUS at 17:19

## 2018-05-18 RX ADMIN — METOPROLOL TARTRATE 50 MG: 50 TABLET ORAL at 09:11

## 2018-05-18 RX ADMIN — POTASSIUM CHLORIDE 10 MEQ: 750 TABLET, EXTENDED RELEASE ORAL at 09:12

## 2018-05-18 RX ADMIN — DOCUSATE SODIUM 100 MG: 100 CAPSULE, LIQUID FILLED ORAL at 09:08

## 2018-05-18 RX ADMIN — IPRATROPIUM BROMIDE AND ALBUTEROL SULFATE 3 ML: .5; 3 SOLUTION RESPIRATORY (INHALATION) at 08:15

## 2018-05-18 RX ADMIN — INSULIN LISPRO 1 UNITS: 100 INJECTION, SOLUTION INTRAVENOUS; SUBCUTANEOUS at 09:24

## 2018-05-18 RX ADMIN — BENZONATATE 100 MG: 100 CAPSULE ORAL at 17:16

## 2018-05-18 RX ADMIN — FLUTICASONE PROPIONATE 2 SPRAY: 50 SPRAY, METERED NASAL at 09:09

## 2018-05-18 RX ADMIN — DOCUSATE SODIUM 100 MG: 100 CAPSULE, LIQUID FILLED ORAL at 18:21

## 2018-05-18 RX ADMIN — METHYLPREDNISOLONE SODIUM SUCCINATE 40 MG: 40 INJECTION, POWDER, FOR SOLUTION INTRAMUSCULAR; INTRAVENOUS at 09:24

## 2018-05-18 RX ADMIN — ATORVASTATIN CALCIUM 80 MG: 80 TABLET, FILM COATED ORAL at 17:15

## 2018-05-18 RX ADMIN — IPRATROPIUM BROMIDE AND ALBUTEROL SULFATE 3 ML: .5; 3 SOLUTION RESPIRATORY (INHALATION) at 11:38

## 2018-05-18 RX ADMIN — INSULIN LISPRO 1 UNITS: 100 INJECTION, SOLUTION INTRAVENOUS; SUBCUTANEOUS at 12:20

## 2018-05-18 RX ADMIN — FUROSEMIDE 20 MG: 20 TABLET ORAL at 09:09

## 2018-05-18 RX ADMIN — APIXABAN 5 MG: 5 TABLET, FILM COATED ORAL at 09:06

## 2018-05-18 RX ADMIN — FLUTICASONE PROPIONATE AND SALMETEROL 1 PUFF: 50; 250 POWDER RESPIRATORY (INHALATION) at 09:09

## 2018-05-18 RX ADMIN — PANTOPRAZOLE SODIUM 40 MG: 40 TABLET, DELAYED RELEASE ORAL at 06:33

## 2018-05-18 RX ADMIN — Medication 300 UNITS: at 18:24

## 2018-05-18 RX ADMIN — AMLODIPINE BESYLATE 5 MG: 5 TABLET ORAL at 09:06

## 2018-05-18 NOTE — SOCIAL WORK
Discharge ordered  Pt will be transferred to [facility]  for skilled rehab  [facility] scheduled to transport pt via stretcher  Unit, [facility] and pt aware of plan

## 2018-05-18 NOTE — PHYSICAL THERAPY NOTE
PT TREATMENT     05/18/18 0700   Pain Assessment   Pain Assessment 0-10   Pain Score 7   Pain Type Acute pain   Pain Location Leg; Ankle   Pain Orientation Left   Restrictions/Precautions   LLE Weight Bearing Per Order NWB   Other Precautions Contact/isolation; Fall Risk;Pain;Bed Alarm; Chair Alarm;Cognitive   General   Chart Reviewed Yes   Family/Caregiver Present No   Subjective   Subjective "I can't believe how much it hurts when it hangs down"   Bed Mobility   Supine to Sit 5  Supervision   Additional items Bedrails   Transfers   Sit to Stand (mod/max A)   Additional items Assist x 2   Stand to Sit (mod/max A)   Additional items Assist x 2   Stand pivot (mod/max A)   Additional items Assist x 2   Ambulation/Elevation   Gait pattern (NWB LLE)   Gait Assistance (mod/max A)   Additional items Assist x 2  (assist to maintain NWB LLE)   Assistive Device Standard walker   Distance 4-5 steps bed to chair  Pt sat OOB in chair with all needs in reach  (+) chair alarm and RN aware   Balance   Static Sitting Good   Static Standing Poor +   Dynamic Standing Poor +   Ambulatory Poor +   Activity Tolerance   Activity Tolerance Patient limited by fatigue;Patient limited by pain  (and generalized weakness)   Exercises   Hip Flexion AROM; Right;AAROM; Left;10 reps; Sitting   Knee AROM Long Arc Quad AROM; Right;AAROM; Left;10 reps; Sitting   Ankle Pumps AROM; Right;10 reps; Sitting   Assessment   Assessment Pt is improving with trans and amb NWB LLE needing slight decrease in assist overall  Good tolerance to LE ex  Pt will cont to benefit from skilled PT services  Plan   Treatment/Interventions ADL retraining;Functional transfer training;LE strengthening/ROM; Therapeutic exercise; Endurance training;Cognitive reorientation;Patient/family training;Bed mobility;Gait training   PT Frequency Once a day   Recommendation   Recommendation (STR)   Equipment Recommended (std walker)   Licensure   [place] License Number  [id number], [place] [id number]

## 2018-05-18 NOTE — CASE MANAGEMENT
Continued Stay Review    Date: 5/18    Vital Signs: /65 (BP Location: Right arm)   Pulse 64   Temp 97 7 °F (36 5 °C) (Oral)   Resp 18   Ht 5' 2" (1 575 m)   Wt 97 6 kg (215 lb 2 7 oz)   SpO2 96%   BMI 39 35 kg/m²     Medications:   Scheduled Meds:   Current Facility-Administered Medications:  acetaminophen 650 mg Oral Q6H PRN Eleni Thorne, YAHIRNP   albuterol 2 5 mg Nebulization Q6H PRN Eleni Thorne, CRJUANITA   ALPRAZolam 0 5 mg Oral HS Eleni Thorne, CRNP   amLODIPine 5 mg Oral Daily Ledy M Sung, CRNP   apixaban 5 mg Oral Q12H Ledy M Sung, GILDA   atorvastatin 80 mg Oral Daily With Higinio Emmanuel MD   benzonatate 100 mg Oral TID Syed Lay MD   docusate sodium 100 mg Oral BID Eleni Thorne, GILDA   fluticasone 2 spray Each Nare Daily Syed Lay MD   fluticasone-salmeterol 1 puff Inhalation Q12H [address] Eleni Thorne, GILDA   furosemide 20 mg Oral Daily GILDA Caba   guaiFENesin 1,200 mg Oral Q12H [address] Eleni Thorne, GILDA   heparin lock flush 300 Units Intracatheter Q8H PRN Syed Lay MD   HYDROcodone-acetaminophen 1 tablet Oral Q4H PRN Eleni Thorne, GILDA   insulin lispro 1-5 Units Subcutaneous TID AC Eleni Thorne, CRJUANITA   insulin lispro 1-5 Units Subcutaneous HS Eleni Thorne, CRJUANITA   ipratropium-albuterol 3 mL Nebulization 4x Daily Syed Lay MD   loratadine 10 mg Oral Daily Eleni Thorne, GILDA   methylPREDNISolone sodium succinate 40 mg Intravenous Q12H [address] Eleni Thorne, GILDA   metoprolol tartrate 50 mg Oral Q12H [address] Russell Jenkins, GILDA   morphine injection 2 mg Intravenous Q6H PRN Eleni Thorne, GILDA   pantoprazole 40 mg Oral Early Morning Eleni Thorne, GILDA   polyvinyl alcohol 1 drop Both Eyes TID Eleni Thorne, GILDA   potassium chloride 10 mEq Oral Daily GILDA Caba   pregabalin 300 mg Oral BID GILDA Purvis   tolterodine 4 mg Oral Daily GILDA Purvis Continuous Infusions:    PRN Meds:   acetaminophen    albuterol    heparin lock flush    HYDROcodone-acetaminophen    morphine injection    Abnormal Labs/Diagnostic Results: none    Age/Sex: 81 y o  female     Assessment/Plan: note from 5/17  Assessment and Plans:  Hospital Problem List:   Principal Problem:    TIA (transient ischemic attack)  Active Problems: Moderate persistent asthma    Fall    Stenosis of left internal carotid artery    Paroxysmal atrial fibrillation (HCC)    Hypertension    Obesity (BMI 30-39  9)    GERD (gastroesophageal reflux disease)    Hyperlipemia    Fibromyalgia    History of Merkel cell carcinoma    Chronic diastolic CHF (congestive heart failure), NYHA class 1 (HCC)    Displaced fracture of medial malleolus of left tibia, initial encounter for closed fracture    Displaced  comminuted fracture of fibula    Drop in hemoglobin     81 y o  female, lives alone but independenly, with a history of CVA, PAFIB on Eliquis, Severe left carotid stenosis refusing vacular intervention, fibromyalgia, hypokalemia, GERD, Asthma, 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 Cast placement prior to discharge to [facility]  Continue supportive conservative mgmt: NWB, optimal pain control, PT/OT, rehab placement  · TIA: MRI brain no new infarc  Neurology consult - more likely TIA  Eliquis resumed  Continue statins  Monitor H/H   · Drop in hemoglobin: Baseline Hg 11~12  Now 8~9  Concern is hematoma at sites of fractures  No sign of GIB or GUB  Guaiac -ve  Closely monitor H/H   Closely monitor distal extremity new neuro deficit  · Carotid stenosis, left: Vascular consulted  CTA head and neck shows severe stenosis  Vascular surgery was offering elective CEA however patient is refusing it in the past and now  She will f/u with vascular as outpatient  · Paroxysmal atrial fibrillation: Continue metoprolol, Eliquis  · Hypertension: Continue metoprolol, amlodipine  · Hyperlipemia: Continue statin   · GERD: Continue PPI  · Fibromyalgia: Continue Lyrica  · Hypokalemia: KCl supplement  · Moderate persistent asthma, Chronic respiratory failure with hypoxia: Continue home inhalers  Titrate O2 to keep SpO2>90%  · Iron deficiency anemia: Continue iron supplement  · Left lower extremity chronic lymphedema: 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

## 2018-05-18 NOTE — PLAN OF CARE
Problem: PHYSICAL THERAPY ADULT  Goal: Performs mobility at highest level of function for planned discharge setting  See evaluation for individualized goals  Outcome: Progressing  Prognosis: Good  Problem List: Decreased strength, Decreased range of motion, Decreased endurance, Impaired balance, Decreased mobility, Pain  Assessment: Pt is improving with trans and amb NWB LLE needing slight decrease in assist overall  Good tolerance to LE ex  Pt will cont to benefit from skilled PT services  Recommendation:  (STR)          See flowsheet documentation for full assessment

## 2018-05-18 NOTE — [place] UNIVERSAL TRANSFER FORM
[place] UNIVERSAL TRANSFER FORM  (ALL ITEMS MUST BE COMPLETED)    1  TRANSFER FROM: [address]      TRANSFER TO: [facility]    2  DATE OF TRANSFER: 5/18/2018                        TIME OF TRANSFER: STR    3  PATIENT NAME: OCRA Kovacs      YOB: 1935                             GENDER: female    4  LANGUAGE:   English    5  PHYSICIAN NAME:  Gretchen Hummel MD                   PHONE: [phone]  CODE STATUS: Level 1 - Full Code        Out of Hospital DNR Attached: No    7  :                                      :  Extended Emergency Contact Information  Primary Emergency Contact: Reyna Cole [facility] [address] [facility] Phone: [phone]  Relation: [facility] Representative/Proxy:  No           Legal Guardian:  No             NAME OF:           HEALTH CARE REPRESENTATIVE/PROXY:                                         OR           LEGAL GUARDIAN, IF NOT :                                               PHONE:  (Day)           (Night)                        (Cell)    8  REASON FOR TRANSFER: (Must include brief medical history and recent changes in physical function or cognition ) STR            V/S: /58 (BP Location: Right arm)   Pulse 67   Temp 97 7 °F (36 5 °C) (Oral)   Resp 18   Ht 5' 2" (1 575 m)   Wt 97 6 kg (215 lb 2 7 oz)   SpO2 94%   BMI 39 35 kg/m²           PAIN: None    9  PRIMARY DIAGNOSIS: TIA (transient ischemic attack)      Secondary Diagnosis:         Pacemaker: No      Internal Defib: No          Mental Health Diagnosis (if Applicable):    10  RESTRAINTS: No     11  RESPIRATORY NEEDS: Oxygen Device NC , and Flow rate: 3L    12  ISOLATION/PRECAUTION: MRSA and Colonized Yes    13  ALLERGY: Fentanyl and related; Latex; and Penicillins    14  SENSORY:       Vision Glasses, Hearing Good  and Speech Clear    15  SKIN CONDITION: No Wounds    16  DIET: Regular    17   IV ACCESS: None    18  PERSONAL ITEMS SENT WITH PATIENT: Glasses    19  ATTACHED DOCUMENTS: MUST ATTACH CURRENT MEDICATION INFORMATION Face Sheet, MAR, Medication Reconciliation, TAR, POS, Diagnostic Studies, Labs, Respiratory Care, Code Status, Discharge Summary, PT Note, OT Note, ST Note and HX/PE    20  AT RISK ALERTS:Falls        HARM TO: N/A    21  WEIGHT BEARING STATUS:         Left Leg: None        Right Leg: Full    22  MENTAL STATUS:Alert and Oriented    23  FUNCTION:        Walk: Not Able        Transfer: With Help        Toilet: With Help        Feed: Self    24  IMMUNIZATIONS/SCREENING:     There is no immunization history on file for this patient  25  BOWEL: Continent and Date Last BM05/17    32  BLADDER: Continent    27   SENDING FACILITY CONTACT: Aimee Butlre                  Title: RN        Unit: [address]        Phone: [phone] [address] (if known):        Title:        Unit:         Phone:         FORM PREFILLED BY (if applicable)       Title:       Unit:        Phone:         FORM COMPLETED BY Andre Sanchez RN      Title: RN      Phone: [phone]

## 2018-05-18 NOTE — PLAN OF CARE
Activity Intolerance/Impaired Mobility     Mobility/activity is maintained at optimum level for patient Adequate for Discharge        CARDIOVASCULAR - ADULT     Maintains optimal cardiac output and hemodynamic stability Adequate for Discharge     Absence of cardiac dysrhythmias or at baseline rhythm Adequate for Discharge        Communication Impairment     Ability to express needs and understand communication Adequate for Discharge        DISCHARGE PLANNING - CARE MANAGEMENT     Discharge to post-acute care or home with appropriate resources Adequate for Discharge        MUSCULOSKELETAL - ADULT     Maintain or return mobility to safest level of function Adequate for Discharge     Maintain proper alignment of affected body part Adequate for Discharge        Neurological Deficit     Neurological status is stable or improving Adequate for Discharge        Nutrition     Nutrition/Hydration status is improving Adequate for Discharge        Nutrition/Hydration-ADULT     Nutrient/Hydration intake appropriate for improving, restoring or maintaining nutritional needs Adequate for Discharge        Potential for Aspiration     Non-ventilated patient's risk of aspiration is minimized Adequate for Discharge        Potential for Falls     Patient will remain free of falls Adequate for Discharge        Prexisting or High Potential for Compromised Skin Integrity     Skin integrity is maintained or improved Adequate for Discharge        RESPIRATORY - ADULT     Achieves optimal ventilation and oxygenation Adequate for Discharge

## 2018-05-18 NOTE — DISCHARGE SUMMARY
Discharge Summary - [address] [facility]  Patient Information: Annabelle Miller 81 y o  female MRN: [id number]  Unit/Bed#: 207 [facility] Encounter: [id number]    Admission Date: 5/10/2018  Discharge Date: 5/18/2018    Admitting Physician: Kieran Robledo DO  Discharging Physician/Practitioner: Saranya Okeefe MD  PCP: Carmen Peacock DO    Reason for Admission: Fall (pt states was walking to the bathroom and left leg gave out, c/o pain left lower leg)    Admission Diagnoses:  Slurred speech [R47 81]  Ankle pain [M25 579]  Displaced fracture of fibula [S82 409A]  Displaced fracture of medial malleolus of left tibia, initial encounter for closed fracture [S82 52XA]    Discharge Diagnoses:    TIA (transient ischemic attack)    Displaced fracture of medial malleolus of left tibia, initial encounter for closed fracture    Displaced  comminuted fracture of fibula    Moderate persistent asthma    Drop in hemoglobin    Fall    Stenosis of left internal carotid artery    Paroxysmal atrial fibrillation (HCC)    Hypertension    Hyperlipemia    Obesity (BMI 30-39  9)    GERD (gastroesophageal reflux disease)    Fibromyalgia    History of Merkel cell carcinoma    Chronic diastolic CHF (congestive heart failure), NYHA class 1      Consultations During Hospital Stay:  IP CONSULT TO NEUROLOGY  IP CONSULT TO [id number] [address]:   81 y o  female, lives alone but [place], with a history of CVA, PAFIB on Eliquis, Severe left carotid stenosis refusing vacular intervention, fibromyalgia, hypokalemia, GERD, Asthma, 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, left lower extremity lymphedema and weakness at baseline, Obesity, presented to the ER after she fell at home again and sustained left displaced comminuted fracture of tibia and fibula  · TIA: MRI brain no new infarc  Neurology consulted - more likely TIA  Eliquis resumed  Continue statins    · Displaced comminuted fracture of fibula, Displaced fracture of medial malleolus of left tibia: 2/2 mechanical fall  Orthopedics consulted - no surgical indication  Continue supportive conservative mgmt: NWB, optimal pain control, PT/OT, rehab placement  Follow up with Orthopedics service as outpatient within 3~7 days, for repeat clinical evaluation including ? Cast or boot placement  [facility] to [facility] on 5/18/2018  · Drop in hemoglobin: Baseline Hg 11~12  Now 8~9  Concern is hematoma at sites of fractures  No sign of GIB or GUB  Guaiac -ve  Closely monitor H/H  Closely monitor distal extremity new neuro deficit  · Moderate persistent asthma, Chronic respiratory failure with hypoxia: IV solumedrol switched to prednisone taper as instructed  Continue home inhalers  Titrate O2 to keep SpO2>90%  · Carotid stenosis, left: Vascular consulted  CTA head and neck shows severe stenosis  Vascular surgery was offering elective CEA however patient is refusing it in the past and now  She will f/u with vascular as outpatient  · Paroxysmal atrial fibrillation: Continue metoprolol, Eliquis  · Hypertension: Continue metoprolol, amlodipine  · Hyperlipemia: Continue statin   · GERD: Continue PPI  · Fibromyalgia: Continue Lyrica  · Hypokalemia: KCl supplement  · Iron deficiency anemia: Continue iron supplement  · Left lower extremity chronic lymphedema: 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  Continue Venodyne compressive pumps as tolerated  Continue home Lasix  · Chronic diastolic CHF (congestive heart failure), NYHA class 1: Continue home Lasix  · 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  No evidence of recurrence  Continue outpatient follow-up      DVT Prophylaxis: on Eliquis  Code Status: Level 1 - Full Code    Component      Latest Ref Rng & Units 5/10/2018 5/11/2018   Cholesterol      50 - 200 mg/dL  75   Triglycerides      <=150 mg/dL  57   HDL      40 - 60 mg/dL  36 (L)   LDL Calculated      0 - 100 mg/dL  28   Hemoglobin A1C      4 2 - 6 3 % 5 8    EAG mg/dl 120      Imaging while in hospital:  Cta Head And Neck With And Without Contrast    Result Date: 5/10/2018  Narrative: CTA NECK AND BRAIN WITH AND WITHOUT CONTRAST INDICATION: slurred speech COMPARISON:   3/15/2018 and 5/10/2018 CT; 3/7/2018 CTA TECHNIQUE:  Routine CT imaging of the Brain without contrast   Post contrast imaging was performed after administration of iodinated contrast through the neck and brain  Post contrast axial 0 625 mm images timed to opacify the arterial system  3D rendering was performed on an independent workstation  MIP reconstructions performed  Coronal reconstructions were performed of the noncontrast portion of the brain  Radiation dose length product (DLP) for this visit:  379 75 mGy-cm   This examination, like all CT scans performed in the [facility], was performed utilizing techniques to minimize radiation dose exposure, including the use of iterative  reconstruction and automated exposure control  IV Contrast:  85 mL of iohexol (OMNIPAQUE)  IMAGE QUALITY:   Diagnostic FINDINGS: NONCONTRAST BRAIN PARENCHYMA: Chronic ischemic changes involve supratentorial white matter  Chronic anterolateral left frontal infarction  Proximal 1 8 cm anterior left middle cranial fossa meningioma  These findings are stable  VENTRICLES AND EXTRA-AXIAL SPACES:  Normal for patient's age  VISUALIZED ORBITS AND PARANASAL SINUSES:  Pansinus disease CALVARIUM AND EXTRACRANIAL SOFT TISSUES:   Normal  CERVICAL VASCULATURE AORTIC ARCH AND GREAT VESSELS: Mild atherosclerotic calcification normal caliber aortic arch  Intact great vessels  RIGHT VERTEBRAL ARTERY CERVICAL SEGMENT:  Normal origin  The vessel is normal in caliber throughout the neck  LEFT VERTEBRAL ARTERY CERVICAL SEGMENT:  Normal origin  The vessel is normal in caliber throughout the neck  RIGHT EXTRACRANIAL CAROTID SEGMENT:  Normal caliber common carotid artery    Mild nonstenotic atherosclerotic disease involves the origin of the right internal carotid artery  LEFT EXTRACRANIAL CAROTID SEGMENT:  Normal caliber common carotid artery  Near occlusive atherosclerotic disease is present at the origin of the left internal carotid artery  The more cephalad portion of the LICA is patent through the skull base  These findings are stable  NASCET criteria was used to determine the degree of internal carotid artery diameter stenosis  INTRACRANIAL VASCULATURE INTERNAL CAROTID ARTERIES: Atherosclerotic calcifications without stenosis involve the cavernous ICAs bilaterally  Normal ophthalmic artery origins  Normal ICA terminus  ANTERIOR CIRCULATION:  Symmetric A1 segments and anterior cerebral arteries with normal enhancement  Normal anterior communicating artery  MIDDLE CEREBRAL ARTERY CIRCULATION:  M1 segment and middle cerebral artery branches demonstrate normal enhancement bilaterally  DISTAL VERTEBRAL ARTERIES:  Normal distal vertebral arteries  Posterior inferior cerebellar artery origins are normal  Normal vertebral basilar junction  BASILAR ARTERY:  Basilar artery is normal in caliber  Normal superior cerebellar arteries  POSTERIOR CEREBRAL ARTERIES: Both posterior cerebral arteries arises from the basilar tip  Both arteries demonstrate normal enhancement  Normal posterior communicating arteries  DURAL VENOUS SINUSES:  Normal  NON VASCULAR ANATOMY BONY STRUCTURES:  No acute osseous abnormality  Multilevel degenerative cervical spondylosis again noted SOFT TISSUES OF THE NECK:  Unremarkable  THORACIC INLET:  Unremarkable  Increased groundglass opacity at posterior right upper lobe possibly infectious since it was not evident on prior chest CT from 1/5/2018       Impression: No acute intracranial hemorrhage or mass effect Chronic ischemic changes involving supratentorial white matter and anterolateral left frontal lobe, stable Near occlusive atherosclerotic disease at the origin of the left internal carotid artery, consistent with prior CTA Findings were directly discussed by Dr Yumi Will with Dr Jesús Schuster  at 8:57 AM, 5/10/2018 Workstation performed: [id number]     Xr Chest Portable    Result Date: 5/13/2018  Narrative: CHEST INDICATION:   wheezing and SOB  COMPARISON:  None EXAM PERFORMED/VIEWS:  XR CHEST PORTABLE FINDINGS:  Port-A-Cath present, tip at the level of the SVC  Multiple overlying EKG leads  Moderate cardiomegaly, tortuous aorta, stable  Mild central pulmonary venous distention suggesting component of venous hypertension  Left hemidiaphragm is obscured and there is blunting of the left costophrenic angle  Increased retrocardiac opacity, suggesting on the left lower lobe atelectasis or infiltrate  Probable small left effusion  No pneumothorax  No pleural effusion  Osseous structures appear within normal limits for patient age  Impression: Left lower lobe process as above  Cardiomegaly  Workstation performed: [id number]     X-ray Chest 1 View Portable    Result Date: 5/10/2018  Narrative: CHEST INDICATION:   stroke  COMPARISON:  3/17/2018, 3/13/2017  EXAM PERFORMED/VIEWS:  XR CHEST PORTABLE FINDINGS:  Stable right internal jugular chest port  Cardiomediastinal silhouette appears unremarkable  The lungs are clear  No pneumothorax or pleural effusion  Stable elevated left hemidiaphragm  Osseous structures appear within normal limits for patient age  Impression: No acute cardiopulmonary disease  Workstation performed: [id number]     Xr Hip/pelv 2-3 Vws Left If Performed    Addendum Date: 5/10/2018 Addendum:   ADDENDUM: ADDENDUM There is a voice recognition software related error in the header of the report  A phrase which reads " right hip " should read, " LEFT HIP "  Result Date: 5/10/2018  Narrative: RIGHT HIP INDICATION:   injury  Fall out of bed  COMPARISON:  Plain radiographs March 14, 2018 VIEWS:  XR HIP/PELV 2-3 VWS RIGHT W PELVIS IF PERFORMED Images: 3 FINDINGS: There is no acute fracture or dislocation  Osseous structures demineralized  Stable appearance of left hip arthroplasty  Mild narrowing of the right hip joint  No lytic or blastic osseous lesions  Soft tissues are unremarkable  The visualized lumbar spine is unremarkable  Impression: Stable postoperative appearance of the left hip  No acute osseous abnormality  Workstation performed: [id number]     Xr Femur 2 Vw Left    Result Date: 5/10/2018  Narrative: LEFT FEMUR INDICATION:   injury  COMPARISON:  None VIEWS:  XR FEMUR 2 VW LEFT Images: 4 FINDINGS: There is a moderate-sized joint effusion  There is a bipolar left hip prostheses present  No obvious femoral fracture is seen  No degenerative changes  No lytic or blastic lesions are seen  Surgical clips in the left inguinal region  Contrast material in the urinary bladder from a CT scan scan  Impression: No acute osseous abnormality  Moderate size joint effusion  Left hip prostheses  Workstation performed: [id number]     Xr Knee 1 Or 2 Views Left    Result Date: 5/10/2018  Narrative: LEFT KNEE INDICATION:   fall, ankle fx  Fall out of bed  Leg pain  COMPARISON:  None VIEWS:  XR KNEE 1 OR 2 VW LEFT Images: 2 FINDINGS: There is no acute fracture or dislocation  Moderate-sized suprapatellar joint effusion  Osseous structures demineralized  Moderate narrowing of the medial joint compartment  Sharpening of the tibial spines  Narrowing of the patellofemoral space  No lytic or blastic lesions are seen  Soft tissues are unremarkable  Impression: Moderate size suprapatellar joint effusion  Workstation performed: [id number]     Xr Tibia Fibula 2 Views Left    Result Date: 5/10/2018  Narrative: LEFT TIBIA AND FIBULA INDICATION:   pain, ankle fx  Fall out of bed  COMPARISON:  March 15, 2018 VIEWS:  XR TIBIA FIBULA 2 VW LEFT FINDINGS: There is posterior dislocation of the talus with respect to the distal tibia  There is an avulsion fracture of the posterior malleolus of the tibia    There is posterior displacement and proximal retraction of the distal fracture fragment  There is a comminuted, oblique fracture of the mid diaphysis of the fibula  There is proximal retraction and posterior angulation of the distal fracture fragment  There is cortical step off involving the medial tibial plateau, better seen on the tibia examination  There is depression of the medial tibial plateau  Degenerative changes in the knee  No lytic or blastic lesions are seen  Moderate size suprapatellar joint effusion  Impression: 1  Medial tibial plateau fracture  2   Fracture of the posterior malleolus of the tibia with posterior dislocation of the ankle  3   Mildly displaced, comminuted fracture of the mid fibula  The study was marked in [facility] for immediate notification  Workstation performed: [id number]     Xr Ankle 2 Vw Left    Result Date: 5/18/2018  Narrative: LEFT ANKLE INDICATION:   re-eval fracture  COMPARISON:  5/17/2018 VIEWS:  XR ANKLE 2 VW LEFT FINDINGS: Mid fibular diaphysis mildly displaced fracture redemonstrated  Medial malleolus fracture mildly displaced noted  Posterior malleolus fracture also noted on lateral radiograph, mildly displaced  No widening at the medial ankle mortise  Splint in place  No significant degenerative changes  No lytic or blastic lesions seen  Soft tissues are unremarkable  Impression: Medial malleolar, posterior malleolar and mid fibular diaphysis fracture is noted as above  No further widening at the medial ankle mortise, splint in place  Workstation performed: [id number]     Xr Ankle 2 Vw Left    Result Date: 5/17/2018  Narrative: LEFT ANKLE INDICATION:   1 week check up  COMPARISON:  05/10/2018 VIEWS:  XR ANKLE 2 VW LEFT Images: 2 FINDINGS: Fracture through the distal fibular shaft partly included on the study  Fracture through the medial malleolus extending posteriorly  Widening of the ankle mortise medially  An inferior calcaneal spur is present   No lytic or blastic lesions seen  Soft tissues are unremarkable  Impression: Fracture through the medial malleolus  Widening of the ankle mortise  Fracture in the distal fibular shaft  Workstation performed: [id number]     Xr Ankle 2 Vw Left    Result Date: 5/10/2018  Narrative: LEFT ANKLE INDICATION:   post reduction  COMPARISON:  Earlier today VIEWS:  XR ANKLE 2 VW LEFT FINDINGS: There is improved alignment at the ankle joint compared to the prior study  There is still some widening of the tibiotalar joint anteriorly and medially  Fracture through the medial malleolus extending posteriorly is seen  There is a fracture through the distal fibular shaft  No significant degenerative changes  No lytic or blastic lesions seen  A cast obscures fine detail  There is soft tissue swelling present  Impression: Improved alignment at the tibiotalar joint which is widened anteriorly and medially  Fracture through the medial malleolus extending posteriorly  Distal fibular fracture  Workstation performed: [id number]     Xr Ankle 2 Views Left    Result Date: 5/10/2018  Narrative: LEFT ANKLE INDICATION:   pain  COMPARISON:  None VIEWS:  XR ANKLE 2 VW LEFT Images: 2 FINDINGS: A fracture dislocation of the tibiotalar joint noted including a posterior displaced medial malleolar fracture with posterior dislocation of the tibiotalar joint  The lateral malleolus is intact  Calcaneal spur(s) noted  No lytic or blastic lesions seen  Diffuse osteopenia  Medial dorsal soft tissue swelling identified  Impression: Fracture dislocation of the medial malleolus and tibiotalar joint  Workstation performed: [id number]     Xr Foot 2 Views Left    Result Date: 5/10/2018  Narrative: LEFT FOOT INDICATION:   pain  COMPARISON:  Left ankle 5/10/2018 VIEWS:  XR FOOT 2 VW LEFT Images: 2 FINDINGS: A fracture dislocation at the ankle noted involving the distal tibia  There is no acute fracture throughout the foot  Calcaneal spur(s) noted   No lytic or blastic lesions seen  Diffuse osteopenia  Soft tissues are unremarkable  Impression: 1  Fracture dislocation of the ankle  No acute fracture of the foot  2   Calcaneal spur with diffuse osteopenia  Workstation performed: [id number]     Mri Brain W Wo Contrast    Result Date: 5/11/2018  Narrative: MRI BRAIN WITH AND WITHOUT CONTRAST INDICATION: Status post fall  Evaluate for CVA  COMPARISON:  Prior MRI March 7, 2018 TECHNIQUE: Sagittal T1, axial T2, axial FLAIR, axial T1, axial [place], axial diffusion  Sagittal, axial T1 postcontrast     IV Contrast:  9 mL of Gadobutrol injection (SINGLE-DOSE)  IMAGE QUALITY:   Diagnostic  FINDINGS: BRAIN PARENCHYMA:  Extra-axial briskly enhancing mass left frontal temporal junction on the posterior aspect of the left orbit compatible with meningioma measuring 2 4 x 2 0 x 2 2 cm  Surrounding FLAIR abnormality compatible with vasogenic edema similar  to the prior study  Findings compatible with atypical meningioma  No evidence of recent infarct  There are foci of T2 and FLAIR signal abnormality in the periventricular and subcortical white matter which are typical for microvascular disease in patients of this age group  VENTRICLES:  Normal  SELLA AND PITUITARY GLAND:  Normal  ORBITS:  Bilateral lens replacements PARANASAL SINUSES:  Extensive paranasal sinus mucosal thickening and bilateral mastoid opacification  VASCULATURE:  Evaluation of the major intracranial vasculature demonstrates appropriate flow voids  CALVARIUM AND SKULL BASE:  Normal  EXTRACRANIAL SOFT TISSUES:  Normal      Impression: No acute intracranial abnormality  No evidence of recent infarct  Briskly enhancing extra-axial mass as described at the frontal temporal junction stable in size from prior study with adjacent FLAIR abnormality compatible with edema  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   Workstation performed: [id number]     Ct Stroke Alert Brain    Result Date: 5/10/2018  Narrative: CT BRAIN - STROKE ALERT PROTOCOL INDICATION: 81-year-old female, slurred speech COMPARISON:  3/15/2018 CT TECHNIQUE:  CT examination of the brain was performed  In addition to axial images, coronal reformatted images were created and submitted for interpretation  Radiation dose length product (DLP) for this visit:  1244 57 mGy-cm   This examination, like all CT scans performed in the [facility], was performed utilizing techniques to minimize radiation dose exposure, including the use of iterative reconstruction and automated exposure control  IMAGE QUALITY:  Diagnostic  FINDINGS:  PARENCHYMA:  Chronic ischemic changes involve the lateral left frontal lobe in the MCA distribution, unchanged  Chronic microangiopathic ischemic changes involve supratentorial white matter  These findings are stable  Anterior left middle cranial fossa meningioma measuring approximately 1 8 cm is stable  VENTRICLES AND EXTRA-AXIAL SPACES:  Normal for patient's age  VISUALIZED ORBITS AND PARANASAL SINUSES:  Pansinus disease again evident CALVARIUM AND EXTRACRANIAL SOFT TISSUES:   Normal      Impression: No acute intracranial hemorrhage or mass effect Multifocal chronic ischemic changes including left lateral frontal MCA territory infarction 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 Findings were directly discussed by Dr Fatoumata Goldsmith with Dr Fernie Banuelos  at 8:57 AM, 5/10/2018 Workstation performed: [id number]       Allergies:    Allergies   Allergen Reactions    Fentanyl And Related Other (See Comments)     Passed out    Latex Rash    Penicillins Rash       Discharge Medications:  Current Discharge Medication List      START taking these medications    Details   acetaminophen (TYLENOL) 325 mg tablet Take 2 tablets (650 mg total) by mouth every 6 (six) hours as needed for fever  Qty: 30 tablet, Refills: 0    Associated Diagnoses: Closed fracture of right tibia and fibula with routine healing      benzonatate (TESSALON PERLES) 100 mg capsule Take 1 capsule (100 mg total) by mouth 3 (three) times a day for 10 days  Qty: 20 capsule, Refills: 0    Associated Diagnoses: Upper respiratory tract infection, unspecified type      docusate sodium (COLACE) 100 mg capsule Take 1 capsule (100 mg total) by mouth 2 (two) times a day  Qty: 10 capsule, Refills: 0    Associated Diagnoses: Closed fracture of right tibia and fibula with routine healing      fluticasone (FLONASE) 50 mcg/act nasal spray 1 spray into each nostril daily  Qty: 16 g, Refills: 0    Associated Diagnoses: Upper respiratory tract infection, unspecified type      Guaifenesin 1200 MG TB12 Take 1 tablet (1,200 mg total) by mouth every 12 (twelve) hours for 10 days  Refills: 0    Associated Diagnoses: Upper respiratory tract infection, unspecified type      HYDROcodone-acetaminophen (NORCO) 5-325 mg per tablet Take 1 tablet by mouth every 4 (four) hours as needed for pain for up to 10 days Max Daily Amount: 6 tablets  Qty: 20 tablet, Refills: 0    Associated Diagnoses: Closed fracture of right tibia and fibula with routine healing      predniSONE 10 mg tablet Take prednisone 30mg oral daily, taper down 10mg every other day until completion  Qty: 12 tablet, Refills: 0    Associated Diagnoses:  Moderate persistent asthma without complication           Current Discharge Medication List        Current Discharge Medication List      CONTINUE these medications which have NOT CHANGED    Details   albuterol (2 5 mg/3 mL) 0 083 % nebulizer solution Take 2 5 mg by nebulization every 6 (six) hours as needed for wheezing      ALPRAZolam (XANAX) 0 5 mg tablet Take 0 5 mg by mouth daily at bedtime      amLODIPine (NORVASC) 5 mg tablet Take 5 mg by mouth daily      apixaban (ELIQUIS) 5 mg Take 5 mg by mouth every 12 (twelve) hours        atorvastatin (LIPITOR) 40 mg tablet Take 40 mg by mouth daily      esomeprazole (NexIUM) 40 MG capsule Take 40 mg by mouth daily      Fesoterodine Fumarate ER (TOVIAZ) 8 MG TB24 Take by mouth daily at bedtime        fluticasone-salmeterol (ADVAIR) 250-50 mcg/dose inhaler Inhale 1 puff every 12 (twelve) hours This is home medication  Refills: 0      furosemide (LASIX) 20 mg tablet Take 20 mg by mouth daily      loratadine (CLARITIN) 10 mg tablet Take 10 mg by mouth daily      metoprolol tartrate (LOPRESSOR) 50 mg tablet Take 50 mg by mouth every 12 (twelve) hours      potassium chloride (K-DUR) 10 mEq tablet Take 10 mEq by mouth daily  pregabalin (LYRICA) 300 MG capsule Take 300 mg by mouth 2 (two) times a day  polyvinyl alcohol (LIQUIFILM TEARS) 1 4 % ophthalmic solution Administer 1 drop to both eyes 3 (three) times a day  Qty: 15 mL, Refills: 0    Associated Diagnoses: Toxic metabolic encephalopathy           Current Discharge Medication List          Medications were reconciliated and instructions were given to Ms Tammy Abernathy at bedside prior to the discharge  Discharge Day Visit/Exam:   Subjective:  /58 (BP Location: Right arm)   Pulse 67   Temp 97 7 °F (36 5 °C) (Oral)   Resp 18   Ht 5' 2" (1 575 m)   Wt 97 6 kg (215 lb 2 7 oz)   SpO2 94%   BMI 39 35 kg/m²   General: NAD  HEENT: EOMI, anicteric, oral moist, neck supple, no mass or JVD  Chest: CTAB, no wheeze/rales  Cardiac: RRR, S1/S2, No murmur  Abd: S/ND/NT/BS+  MSK: LLE dressing applied, move all toes, sensation intact  Neuro: AAOx3, moving all extremities  Psychiatric: Mood with normal affect    Patient Instructions: Activity: activity as tolerated, LLE NWB  Diet: cardiac diet and low fat, low cholesterol diet  Wound Care: as directed    Discharge instructions/Information to patient and family:(Discharge Medications and Follow up):  See after visit summary for information provided to patient and family        Provisions for Follow-Up Care:  See after visit summary for information related to follow-up care and any pertinent home health orders  Follow-up Informations:  Ann Haider MD  [address] [facility] [address]  [phone]    Follow up in 1 week(s)  For evaluation of left frontotemporal lobe meningioma    Stephani Cortes MD  [address] [facility] [address]  [phone]    Follow up in 3 day(s)  Left fibular shaft fracture and left medial malleolus and posterior malleolus fractures of the distal tibia  ? cast or boot placement  [place], [address]  [phone]    Follow up in 1 week(s)  Fall  Left fibular shaft fracture and left medial malleolus and posterior malleolus fractures of the distal tibia  Disposition: Short-term rehab at [facility]    Planned Readmission: No     Discharge Statement:  I spent 30 minutes discharging the patient  This time was spent on the day of discharge  I had direct contact with the patient on the day of discharge  Greater than 50% of the total time was spent examining patient, answering all patient questions, arranging and discussing plan of care with patient as well as directly providing post-discharge instructions  Additional time then spent on discharge activities  Discharge Medications:  See after visit summary for reconciled discharge medications provided to patient and family

## 2018-05-18 NOTE — DISCHARGE INSTRUCTIONS
Component      Latest Ref Rng & Units 5/10/2018 5/11/2018   Cholesterol      50 - 200 mg/dL  75   Triglycerides      <=150 mg/dL  57   HDL      40 - 60 mg/dL  36 (L)   LDL Calculated      0 - 100 mg/dL  28   Hemoglobin A1C      4 2 - 6 3 % 5 8    EAG      mg/dl 120

## 2018-05-18 NOTE — SOCIAL WORK
SW following to assist with DCP  All plans in place for rehab placement at [facility]  Pt can transfer whenever ready

## 2018-05-21 ENCOUNTER — TRANSCRIBE ORDERS (OUTPATIENT)
Dept: NEUROSURGERY | Facility: CLINIC | Age: 83
End: 2018-05-21

## 2018-05-23 ENCOUNTER — APPOINTMENT (OUTPATIENT)
Dept: RADIOLOGY | Facility: CLINIC | Age: 83
End: 2018-05-23
Payer: COMMERCIAL

## 2018-05-23 ENCOUNTER — OFFICE VISIT (OUTPATIENT)
Dept: OBGYN CLINIC | Facility: CLINIC | Age: 83
End: 2018-05-23

## 2018-05-23 VITALS
HEART RATE: 69 BPM | BODY MASS INDEX: 34.23 KG/M2 | DIASTOLIC BLOOD PRESSURE: 60 MMHG | WEIGHT: 186 LBS | HEIGHT: 62 IN | SYSTOLIC BLOOD PRESSURE: 101 MMHG

## 2018-05-23 DIAGNOSIS — S82.892A CLOSED FRACTURE OF LEFT ANKLE, INITIAL ENCOUNTER: ICD-10-CM

## 2018-05-23 DIAGNOSIS — S82.891A CLOSED FRACTURE OF RIGHT ANKLE, INITIAL ENCOUNTER: ICD-10-CM

## 2018-05-23 DIAGNOSIS — S82.891A CLOSED FRACTURE OF RIGHT ANKLE, INITIAL ENCOUNTER: Primary | ICD-10-CM

## 2018-05-23 PROCEDURE — 99024 POSTOP FOLLOW-UP VISIT: CPT | Performed by: ORTHOPAEDIC SURGERY

## 2018-05-23 PROCEDURE — 73610 X-RAY EXAM OF ANKLE: CPT

## 2018-05-23 NOTE — PROGRESS NOTES
Assessment/Plan:  1  Closed fracture of right ankle, initial encounter  XR ankle 3+ vw right       Joint has fractures that are well aligned on x-ray today  We do not feel she is a good surgical candidate given her lymphedema of this extremity status post lymph node removal previously  She was placed in a long air cast boot today and will remain nonweightbearing  We will see her back in 4 weeks with repeat x-rays at that time  She may remove the boot to shower and for daily skin checks  We did advise strict elevation for swelling  Subjective:   Marcus England is a 81 y o  female who presents today for follow-up of her left ankle, now 2 weeks status post closed treatment of fibular shaft fracture and medial and posterior malleolus fractures  She has been nonweightbearing in her splint  She notes swelling about the leg and also a moderate ache  She denies any calf pain or cramping  She notes good sensation about the lower extremity  Review of Systems      Past Medical History:   Diagnosis Date    Asthma     Cancer ([facility] [address] )     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    Cardiac disease     a fib    Fibromyalgia     Fibromyalgia, primary     GERD (gastroesophageal reflux disease)     Hypertension     Hypokalemia     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, left groin, lung and bladder  Patient treated with chemotherapy and radiation       Past Surgical History:   Procedure Laterality Date    APPENDECTOMY      CATARACT EXTRACTION      CHEST WALL BIOPSY N/A 10/27/2017    Procedure: SINUS EXCISION AND REMOVAL OF FOREIGN BODY, ABDOMEN (WOUND EXPLORATION);   Surgeon: Jess Rubinstein, MD;  Location: [address] [place] [address];  Service: General    EYE SURGERY Bilateral     cataracts     HIP FRACTURE SURGERY Left     HYSTERECTOMY      JOINT REPLACEMENT Left     hip    LYMPHADENECTOMY      PORTACATH PLACEMENT Right        Family History   Problem Relation Age of Onset    Pneumonia Mother     Heart disease Father        Social History     Occupational History    retired      Social History Main Topics    Smoking status: Never Smoker    Smokeless tobacco: Never Used      Comment: never smoke    Alcohol use No    Drug use: No    Sexual activity: Not on file         Current Outpatient Prescriptions:     acetaminophen (TYLENOL) 325 mg tablet, Take 2 tablets (650 mg total) by mouth every 6 (six) hours as needed for fever, Disp: 30 tablet, Rfl: 0    albuterol (2 5 mg/3 mL) 0 083 % nebulizer solution, Take 2 5 mg by nebulization every 6 (six) hours as needed for wheezing, Disp: , Rfl:     ALPRAZolam (XANAX) 0 5 mg tablet, Take 0 5 mg by mouth daily at bedtime, Disp: , Rfl:     amLODIPine (NORVASC) 5 mg tablet, Take 5 mg by mouth daily, Disp: , Rfl:     apixaban (ELIQUIS) 5 mg, Take 5 mg by mouth every 12 (twelve) hours  , Disp: , Rfl:     atorvastatin (LIPITOR) 40 mg tablet, Take 40 mg by mouth daily, Disp: , Rfl:     benzonatate (TESSALON PERLES) 100 mg capsule, Take 1 capsule (100 mg total) by mouth 3 (three) times a day for 10 days, Disp: 20 capsule, Rfl: 0    docusate sodium (COLACE) 100 mg capsule, Take 1 capsule (100 mg total) by mouth 2 (two) times a day, Disp: 10 capsule, Rfl: 0    esomeprazole (NexIUM) 40 MG capsule, Take 40 mg by mouth daily, Disp: , Rfl:     Fesoterodine Fumarate ER (TOVIAZ) 8 MG TB24, Take by mouth daily at bedtime  , Disp: , Rfl:     fluticasone (FLONASE) 50 mcg/act nasal spray, 1 spray into each nostril daily, Disp: 16 g, Rfl: 0    fluticasone-salmeterol (ADVAIR) 250-50 mcg/dose inhaler, Inhale 1 puff every 12 (twelve) hours This is home medication, Disp: , Rfl: 0    furosemide (LASIX) 20 mg tablet, Take 20 mg by mouth daily, Disp: , Rfl:     Guaifenesin 1200 MG TB12, Take 1 tablet (1,200 mg total) by mouth every 12 (twelve) hours for 10 days, Disp: , Rfl: 0    HYDROcodone-acetaminophen (NORCO) 5-325 mg per tablet, Take 1 tablet by mouth every 4 (four) hours as needed for pain for up to 10 days Max Daily Amount: 6 tablets, Disp: 20 tablet, Rfl: 0    loratadine (CLARITIN) 10 mg tablet, Take 10 mg by mouth daily, Disp: , Rfl:     metoprolol tartrate (LOPRESSOR) 50 mg tablet, Take 50 mg by mouth every 12 (twelve) hours, Disp: , Rfl:     polyvinyl alcohol (LIQUIFILM TEARS) 1 4 % ophthalmic solution, Administer 1 drop to both eyes 3 (three) times a day, Disp: 15 mL, Rfl: 0    potassium chloride (K-DUR) 10 mEq tablet, Take 10 mEq by mouth daily  , Disp: , Rfl:     predniSONE 10 mg tablet, Take prednisone 30mg oral daily, taper down 10mg every other day until completion  , Disp: 12 tablet, Rfl: 0    pregabalin (LYRICA) 300 MG capsule, Take 300 mg by mouth 2 (two) times a day , Disp: , Rfl:     Allergies   Allergen Reactions    Fentanyl And Related Other (See Comments)     Passed out    Latex Rash    Penicillins Rash       Objective:  Vitals:    05/23/18 1306   BP: 101/60   Pulse: 69       Ortho Exam   Diffuse swelling ankle into foot  Sensation intact  Tenderness over medial malleolus and fibular shaft  Patient able to move all toes freely  Range of motion testing of ankle deferred  No calf tenderness  Physical Exam    I have personally reviewed pertinent films in PACS and my interpretation is as follows:  X-rays left ankle: Well aligned fibular shaft fracture  Well-aligned medial and posterior malleolus fractures   Slight mortise widening

## 2018-06-19 ENCOUNTER — TELEPHONE (OUTPATIENT)
Dept: NEUROSURGERY | Facility: CLINIC | Age: 83
End: 2018-06-19

## 2018-06-19 NOTE — TELEPHONE ENCOUNTER
06/19/2018-CALLED [address] [place] [address], SPOKE TO GARY  TO RESCHEDULE TODAYS "NO SHOW" APPT  GARY STATES SHE WILL CALL OFFICE BACK TO RESCHEDULE APPT ONCE SHE KNOW MORE

## 2018-06-20 ENCOUNTER — OFFICE VISIT (OUTPATIENT)
Dept: OBGYN CLINIC | Facility: CLINIC | Age: 83
End: 2018-06-20

## 2018-06-20 VITALS — WEIGHT: 196 LBS | HEIGHT: 62 IN | BODY MASS INDEX: 36.07 KG/M2

## 2018-06-20 DIAGNOSIS — S82.891D CLOSED FRACTURE OF RIGHT ANKLE WITH ROUTINE HEALING, SUBSEQUENT ENCOUNTER: Primary | ICD-10-CM

## 2018-06-20 PROCEDURE — 99024 POSTOP FOLLOW-UP VISIT: CPT | Performed by: ORTHOPAEDIC SURGERY

## 2018-06-20 NOTE — PROGRESS NOTES
Subjective:  Jerica Wilson is 6 weeks s/p left medial malleolus fracture, and fibular shaft fracture  She states that she has little to no pain or discomfort in the lower extremity  She denies any pain at the medial malleolus  Healing pain or discomfort that she feels palpation at the fracture site of the fibular shaft  She states that she does have peripheral swelling to the lower extremity has been ongoing issue due to lymph node problems  She has not been weight-bearing up until this point additionally she has not had physical therapy past week as she has recently lost her insurance  She currently denies any paresthesias  Scribe Attestation    I,:   Janessa Elena am acting as a scribe while in the presence of the attending physician :        I,:   Rose Webster MD personally performed the services described in this documentation    as scribed in my presence :            Objective: Moderate to severe edema of the left lower leg  Skin is dry however is intact with no signs of infection or breakdown  There is redness noted around the fibular shaft fracture site  However is mildly tender to touch  Sensation is intact no pain with palpation over the medial malleolus  Radiology Imaging:  X-rays of the left ankle from 6/18/2018 demonstrates a displaced fibular shaft fracture with routine healing  As well as a closed displaced medial malleolus fracture with routine healing  Plan:  Jerica Wilson upon examination imaging today demonstrates a medial malleolus fracture of the left ankle with routine healing as well as a fibular shaft fracture  I feel at this point she may begin to weightbear as tolerated with the boot on and walker  I emphasized the importance of using the walker as she is at higher fall risk  While she is not ambulating she may remove the boot for sleeping more rest and is only to wear the boot while walking    I have provided a clinical note for Jerica Wilson today that she may go home and is able to ambulate on her own power with a walker  I would like to see Louann Rockewll back in 4 weeks for repeat evaluation repeat x-ray

## 2018-07-23 ENCOUNTER — OFFICE VISIT (OUTPATIENT)
Dept: OBGYN CLINIC | Facility: CLINIC | Age: 83
End: 2018-07-23

## 2018-07-23 ENCOUNTER — APPOINTMENT (OUTPATIENT)
Dept: RADIOLOGY | Facility: CLINIC | Age: 83
End: 2018-07-23
Payer: MEDICARE

## 2018-07-23 VITALS — SYSTOLIC BLOOD PRESSURE: 92 MMHG | DIASTOLIC BLOOD PRESSURE: 60 MMHG | HEART RATE: 74 BPM

## 2018-07-23 DIAGNOSIS — S82.891D CLOSED FRACTURE OF RIGHT ANKLE WITH ROUTINE HEALING, SUBSEQUENT ENCOUNTER: ICD-10-CM

## 2018-07-23 DIAGNOSIS — S82.891D CLOSED FRACTURE OF RIGHT ANKLE WITH ROUTINE HEALING, SUBSEQUENT ENCOUNTER: Primary | ICD-10-CM

## 2018-07-23 PROCEDURE — 99024 POSTOP FOLLOW-UP VISIT: CPT | Performed by: ORTHOPAEDIC SURGERY

## 2018-07-23 PROCEDURE — 73610 X-RAY EXAM OF ANKLE: CPT

## 2018-07-23 NOTE — PROGRESS NOTES
Patient Name:  Rae Murphy  MRN:  [id number]    Assessment & Plan    Left ankle closed medial malleolus fracture, and fibular shaft fracture with routine healing    1  Eber Young does demonstrate to be healing her fractures  X-rays of the left lower extremity demonstrates a well aligned medial malleolus fracture with routine healing  Additionally the fibular shaft fracture has not displaced any further, and demonstrates routine healing  I feel this point Eber Young may begin to weightbear as tolerated  I encouraged her to wear the Cam walker boot for prolonged  To weight-bearing she is still a high risk for fall  At like Eber Young to follow up with me in 4 weeks for repeat evaluation  Zuly Delgado is an 81-year-old female who is 10 weeks status post displaced fracture of medial malleolus of the left tibia and closed displaced fracture of the fibular shaft  She states that she is experiencing little to no pain or discomfort  She has been partially weight-bearing with a walker and states she experiences no pain  Only complaint is that of weakness in the ankle  However this is likely due to being immobilized in a Cam walker boot  Today she denies any distal paresthesias  Objective    BP 92/60   Pulse 74     Erythema and edema noted in the left lower extremity  This is due to removal of lymph nodes for to treat cancer the posterior aspect of her left knee, and not a result of fracture  Eber Young demonstrates no point tenderness with palpation over the medial malleolus or the shaft of the fibula  Sensation is intact  Good dorsal pedal pulse  Ankle dorsiflexion:  20°, ankle plantar flexion:  40°  Data Review    I have personally reviewed pertinent films in PACS, and my interpretation follows  X-rays of the left ankle demonstrates a well aligned medial malleolus fracture with routine healing   As well as a closed displaced fracture of the fibular shaft that has not demonstrated further displacement        Scribe Attestation    I,:   Francesca Neely am acting as a scribe while in the presence of the attending physician :        I,:   Caleb Jung MD personally performed the services described in this documentation    as scribed in my presence :

## 2018-07-25 ENCOUNTER — TELEPHONE (OUTPATIENT)
Dept: OBGYN CLINIC | Facility: CLINIC | Age: 83
End: 2018-07-25

## 2018-07-25 NOTE — TELEPHONE ENCOUNTER
I did call Justin Millan back  I spoke with her at length  Apparently Shamar Escalona has not been very compliant with use of her boot or with her weight-bearing restrictions  She also has displayed some confusion to the staff at the nursing home  I did confirm with her that I would like to see Shamar Escalona back this week  They are going to set up transportation  We did talk about possible need for her to have surgical intervention for the ankle

## 2018-07-25 NOTE — TELEPHONE ENCOUNTER
Call from Klaudia Rosales   Call back # [phone] ext [phone]   Dr Cori Sainz is requesting a call from Dr Melina Copeland to discuss patients care  Per patient she received a call from Dr Melina Copeland around [address] [place] [address],[address] on 07/24/18 stating her bone didn't heal properly

## 2018-07-27 ENCOUNTER — APPOINTMENT (OUTPATIENT)
Dept: RADIOLOGY | Facility: CLINIC | Age: 83
End: 2018-07-27
Payer: MEDICARE

## 2018-07-27 ENCOUNTER — OFFICE VISIT (OUTPATIENT)
Dept: OBGYN CLINIC | Facility: CLINIC | Age: 83
End: 2018-07-27

## 2018-07-27 DIAGNOSIS — S82.891D CLOSED FRACTURE OF RIGHT ANKLE WITH ROUTINE HEALING, SUBSEQUENT ENCOUNTER: ICD-10-CM

## 2018-07-27 DIAGNOSIS — S82.892K: Primary | ICD-10-CM

## 2018-07-27 PROCEDURE — 99024 POSTOP FOLLOW-UP VISIT: CPT | Performed by: ORTHOPAEDIC SURGERY

## 2018-07-27 PROCEDURE — 73610 X-RAY EXAM OF ANKLE: CPT

## 2018-07-27 NOTE — PROGRESS NOTES
Assessment/Plan:  1  Fracture dislocation of ankle joint, left, closed, with nonunion, subsequent encounter  Ambulatory referral to Orthopedic Surgery   2  Closed fracture of right ankle with routine healing, subsequent encounter  XR ankle 3+ vw left     Scribe Attestation    I,:   Arvil Handler Call am acting as a scribe while in the presence of the attending physician :        I,:   Eusebio Correa MD personally performed the services described in this documentation    as scribed in my presence :               The fractures of the left ankle are clearly unstable and evident upon x-ray today  I explained to Alonzo Rahman that typically this would require surgery  I am concerned over her complex comorbidities, such as lymphedema, which   places her at risk for infection following a surgery  This was my original concern when managing this fracture  Alonzo Rahman wanted at all costs to treat this injury non operatively from the start  If she did not have such massive swelling and lymphedema about the lower extremity on that side, we certainly would have operated on this  I am referring her to orthopedic trauma surgeon Nicholas Pablo for his opinion  I did also call her daughter who requested also to be contacted after the appointment  I did leave a message to have her call me back with my personal cell number  Subjective:   Salud Bowden is a 81 y o  female who presents  Today for follow-up evaluation per my request   On last visit x-rays were obtained and radiology reported  And anterior dislocation of the tibia as well as a trimalleolar fracture  Vitor Ponce states that she has no complaints of excessive pain  She has been wearing her Cam walker boot  She states she was walking intermittently with only mild discomfort into the foot  Is described as a moderate ache medially    She denies distal paresthesias and currently has no pain with rest       Review of Systems   Constitutional: Negative for chills, fever and unexpected weight change  HENT: Negative for hearing loss, nosebleeds and sore throat  Eyes: Negative for pain, redness and visual disturbance  Respiratory: Positive for shortness of breath and wheezing  Negative for cough  Cardiovascular: Negative for chest pain, palpitations and leg swelling  Gastrointestinal: Negative for abdominal pain, nausea and vomiting  Endocrine: Negative for polydipsia and polyuria  Genitourinary: Negative for dysuria and hematuria  Musculoskeletal:        See HPI   Skin: Negative for rash and wound  Neurological: Negative for dizziness, numbness and headaches  Psychiatric/Behavioral: Negative for decreased concentration and suicidal ideas  The patient is not nervous/anxious  Past Medical History:   Diagnosis Date    Asthma     Cancer ([facility] [address] )     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    Cardiac disease     a fib    Fibromyalgia     Fibromyalgia, primary     GERD (gastroesophageal reflux disease)     Hypertension     Hypokalemia     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, left groin, lung and bladder  Patient treated with chemotherapy and radiation       Past Surgical History:   Procedure Laterality Date    APPENDECTOMY      CATARACT EXTRACTION      CHEST WALL BIOPSY N/A 10/27/2017    Procedure: SINUS EXCISION AND REMOVAL OF FOREIGN BODY, ABDOMEN (WOUND EXPLORATION);   Surgeon: Socrates Pierre MD;  Location: [address] [place] [address];  Service: General    EYE SURGERY Bilateral     cataracts     HIP FRACTURE SURGERY Left     HYSTERECTOMY      JOINT REPLACEMENT Left     hip    LYMPHADENECTOMY      PORTACATH PLACEMENT Right        Family History   Problem Relation Age of Onset    Pneumonia Mother     Heart disease Father        Social History     Occupational History    retired      Social History Main Topics    Smoking status: Never Smoker    Smokeless tobacco: Never Used      Comment: never smoke    Alcohol use No    Drug use: No    Sexual activity: Not on file         Current Outpatient Prescriptions:     acetaminophen (TYLENOL) 325 mg tablet, Take 2 tablets (650 mg total) by mouth every 6 (six) hours as needed for fever, Disp: 30 tablet, Rfl: 0    albuterol (2 5 mg/3 mL) 0 083 % nebulizer solution, Take 2 5 mg by nebulization every 6 (six) hours as needed for wheezing, Disp: , Rfl:     ALPRAZolam (XANAX) 0 5 mg tablet, Take 0 5 mg by mouth daily at bedtime, Disp: , Rfl:     amLODIPine (NORVASC) 5 mg tablet, Take 5 mg by mouth daily, Disp: , Rfl:     apixaban (ELIQUIS) 5 mg, Take 5 mg by mouth every 12 (twelve) hours  , Disp: , Rfl:     atorvastatin (LIPITOR) 40 mg tablet, Take 40 mg by mouth daily, Disp: , Rfl:     esomeprazole (NexIUM) 40 MG capsule, Take 40 mg by mouth daily, Disp: , Rfl:     Fesoterodine Fumarate ER (TOVIAZ) 8 MG TB24, Take by mouth daily at bedtime  , Disp: , Rfl:     fluticasone (FLONASE) 50 mcg/act nasal spray, 1 spray into each nostril daily, Disp: 16 g, Rfl: 0    fluticasone-salmeterol (ADVAIR) 250-50 mcg/dose inhaler, Inhale 1 puff every 12 (twelve) hours This is home medication, Disp: , Rfl: 0    furosemide (LASIX) 20 mg tablet, Take 20 mg by mouth daily, Disp: , Rfl:     loratadine (CLARITIN) 10 mg tablet, Take 10 mg by mouth daily, Disp: , Rfl:     metoprolol tartrate (LOPRESSOR) 50 mg tablet, Take 50 mg by mouth every 12 (twelve) hours, Disp: , Rfl:     polyvinyl alcohol (LIQUIFILM TEARS) 1 4 % ophthalmic solution, Administer 1 drop to both eyes 3 (three) times a day, Disp: 15 mL, Rfl: 0    potassium chloride (K-DUR) 10 mEq tablet, Take 10 mEq by mouth daily  , Disp: , Rfl:     pregabalin (LYRICA) 300 MG capsule, Take 300 mg by mouth 2 (two) times a day , Disp: , Rfl:     Allergies   Allergen Reactions    Fentanyl And Related Other (See Comments)     Passed out    Latex Rash    Penicillins Rash       Objective:   There were no vitals filed for this visit  Left Ankle Exam     Tenderness   The patient is experiencing tenderness in the medial malleolus  Range of Motion   Dorsiflexion: 0   Plantar flexion: 30     Muscle Strength   Dorsiflexion:  4/5   Plantar flexion:  4/5   Peroneal muscle:  4/5    Other   Erythema: absent  Sensation: normal  Pulse: present (2+DP)          Strength/Myotome Testing     Left Ankle/Foot   Dorsiflexion: 4  Plantar flexion: 4      Physical Exam   Constitutional: She is oriented to person, place, and time  She appears well-developed and well-nourished  HENT:   Head: Normocephalic and atraumatic  Eyes: Conjunctivae are normal    Neck: Neck supple  Cardiovascular: Intact distal pulses  Pulmonary/Chest: Effort normal    On   Supplemental oxygen   Neurological: She is alert and oriented to person, place, and time  Skin: Skin is warm and dry  Psychiatric: She has a normal mood and affect  Her behavior is normal    Vitals reviewed  I did view the x-rays taken today and compared them to those taken on previous occasions  My interpretation is as follows  The x-rays taken today demonstrate a displaced and malunited medial malleolus posterior malleolus and fibular shaft fracture with a significant subluxation of the ankle anteriorly  This is markedly different from the previous x-rays taken in May  They are the same as the x-rays taken just a couple of days ago  There are no x-rays to be viewed from between the may be and July visits of this week  Apparently, these were from the rehabilitation facility in June

## 2018-08-06 NOTE — TELEPHONE ENCOUNTER
I did speak with her daughter today for quite some time  We did speak about the fact that she has a very complex case due to the lymphedema  She also previously had a fracture on her contralateral lower extremity very recently  She does have significant medical problems as well and is suffering from some dementia  All of these have caused significant difficulty with treating her fracture  Normally, we would have likely treated her fracture with a surgery to start however her lymphedema was an obvious obstacle that gives her significant increased risk for wound healing complications  I did discuss with her that I referred her to 1 of our trauma specialists and spoke with him directly last week while was on vacation  Unfortunately, she was sent to see the wrong specialist last week but now has an appointment with Dr Catalino Reyna tomorrow morning for further evaluation and possible consideration of surgical intervention  At this point, the ankle fracture has deteriorated significantly and she has a significant subluxation as well of the ankle joint  Unfortunately, she has not been able to be compliant with weight-bearing restrictions according to the nursing facility  I previously spoken with them as well during her care  I also related to her daughter that there was a set of images in June that I am not able to pull up myself from our system  Perhaps they were from an outside facility  Clearly, there is a change that is significant in her ankle fracture over the past 2 months

## 2018-08-06 NOTE — TELEPHONE ENCOUNTER
Caller: Tiffanie Domo daughter  Callback# [phone]  Dr Rachelle Darling is requesting a callback today 08/06 to discuss patient care she is off from work  Please advise thanks

## 2018-08-27 ENCOUNTER — OFFICE VISIT (OUTPATIENT)
Dept: HEMATOLOGY ONCOLOGY | Facility: MEDICAL CENTER | Age: 83
End: 2018-08-27
Payer: MEDICARE

## 2018-08-27 VITALS
HEART RATE: 83 BPM | TEMPERATURE: 99.7 F | HEIGHT: 62 IN | DIASTOLIC BLOOD PRESSURE: 80 MMHG | RESPIRATION RATE: 12 BRPM | BODY MASS INDEX: 36.18 KG/M2 | WEIGHT: 196.6 LBS | SYSTOLIC BLOOD PRESSURE: 126 MMHG | OXYGEN SATURATION: 93 %

## 2018-08-27 DIAGNOSIS — Z85.821 HISTORY OF MERKEL CELL CARCINOMA: Primary | ICD-10-CM

## 2018-08-27 PROCEDURE — 99214 OFFICE O/P EST MOD 30 MIN: CPT | Performed by: INTERNAL MEDICINE

## 2018-08-27 NOTE — PROGRESS NOTES
Pavel Aguero  1935  [address] [facility] [id number]    DISCUSSION  SUMMARY:    29-year-old female diagnosed with a neuroendocrine tumor/Merkel cell approximately 10 years ago with resection, lymph node sampling, radiation and chemotherapy  issues: 1  Merkel cell  From a cancer perspective, Mrs Hilda Cherry feels relatively well and clinically there are no signs for recurrence  Previous/recent scans did not demonstrate any evidence of recurrent Merkel cell  Surveillance is ongoing  Patient is to return in 6 months with blood work before  2  Left lower extremity lymphedema - chronic but stable  Surveillance is ongoing  3  Previous abdominal discharge - resolved  Patient had this for many years  Patient was recently seen by surgery and a fistulous track was removed  Pathology results did not demonstrate any evidence for malignancy  The discharge has stopped  Patient is monitoring  4  Recent lower extremity fractures  Orthopedic follow-up is pending  Patient is to return in 6 months, earlier if issues  Patient knows to call the hematology/oncology office if there are any other questions or concerns  Carefully review your medication list and verify that the list is accurate and up-to-date  Please call the hematology/oncology office if there are medications missing from the list, medications on the list that you are not currently taking or if there is a dosage or instruction that is different from how you're taking that medication  Patient goals and areas of care: cancer surveillance  Patient is able to self-care   _____________________________________________________________________________________    Chief Complaint   Patient presents with    Follow-up     Merkel cell carcinoma     History of Present Illness:    29-year-old female who was previously referred for continued oncology surveillance  Approximately 10 5 years ago, Mrs Viv Jolley was found to have a lesion 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  Although the pathology results were not entirely clear (or available), patient was treated for high-grade neuroendocrine tumor/Merkel cell and received resection (possibly with positive margins) and lymph node dissection of the left groin  Apparently 2 lymph nodes were positive  Patient subsequently received radiation to the groin and tumor bed and also received intravenous chemotherapy (specifics not available)  Patient was followed at [facility] for many years without evidence of recurrence  Mrs Viv Jolley now lives in [place], [place]; needed a new oncologist [facility] oncologist recently retired)  Patient is back for follow-up  Mrs Viv Jolley states feeling +/-  Patient fell recently and fractured bones in both legs  Physical therapy is ongoing  Pain is controlled  No shortness of breath or dyspnea on exertion  No pain control issues in the abdomen or pelvis  Appetite is good, weight is stable  No shortness of breath at rest  No fevers, chills or sweats  No chest pain or pressure  No problems with excessive bruising or bleeding  Left lower extremity swelling is the same as before  Review of Systems   Constitutional: Positive for fatigue  HENT: Negative  Eyes: Negative  Respiratory: Negative  Cardiovascular: Positive for leg swelling  Gastrointestinal: Negative  Endocrine: Negative  Genitourinary: Negative  Musculoskeletal: Positive for arthralgias  Skin: Negative  Allergic/Immunologic: Negative  Neurological: Negative  Hematological: Negative  Psychiatric/Behavioral: Negative  All other systems reviewed and are negative  Patient Active Problem List   Diagnosis    Chest pain    Hypertension    Acute cholecystitis    Abdominal pain    Meningioma (HCC)    Syncope    Hypertension, accelerated    Sepsis ([address] )    Stenosis of left internal carotid artery    Obesity (BMI 30-39  9)  Acute bronchitis    GERD (gastroesophageal reflux disease)    Hyperlipemia    Paroxysmal atrial fibrillation (HCC)    Fibromyalgia    Moderate persistent asthma with acute exacerbation    Open wound of abdominal wall without penetration into peritoneal cavity    Asthma exacerbation    Lymphedema of left leg    Bacteremia    History of Merkel cell carcinoma    Port-a-cath in place    Acute congestive heart failure (HCC)    Moderate persistent asthma    Iron deficiency anemia    Shortness of breath    Fall    Right pontine stroke (HCC)    Chronic diastolic CHF (congestive heart failure), NYHA class 1 (HCC)    Delirium    Closed fracture of right tibia and fibula with routine healing    Toxic metabolic encephalopathy    Delirious    TIA (transient ischemic attack)    Displaced fracture of medial malleolus of left tibia, initial encounter for closed fracture    Displaced  comminuted fracture of fibula    Drop in hemoglobin     Past Medical History:   Diagnosis Date    Asthma     Cancer [address] [address] )     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    Cardiac disease     a fib    Fibromyalgia     Fibromyalgia, primary     GERD (gastroesophageal reflux disease)     Hypertension     Hypokalemia     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[facility] [address] )     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, left groin, lung and bladder  Patient treated with chemotherapy and radiation     Past Surgical History:   Procedure Laterality Date    APPENDECTOMY      CATARACT EXTRACTION      CHEST WALL BIOPSY N/A 10/27/2017    Procedure: SINUS EXCISION AND REMOVAL OF FOREIGN BODY, ABDOMEN (WOUND EXPLORATION);   Surgeon: Chang Han MD;  Location: [address] [place] [address];  Service: General    EYE SURGERY Bilateral     cataracts     HIP FRACTURE SURGERY Left     HYSTERECTOMY      JOINT REPLACEMENT Left     hip    LYMPHADENECTOMY      PORTACATH PLACEMENT Right      Family History   Problem Relation Age of Onset    Pneumonia Mother     Heart disease Father      Social History     Social History    Marital status:       Spouse name: N/A    Number of children: N/A    Years of education: N/A     Occupational History    retired      Social History Main Topics    Smoking status: Never Smoker    Smokeless tobacco: Never Used      Comment: never smoke    Alcohol use No    Drug use: No    Sexual activity: Not on file     Other Topics Concern    Not on file     Social History Narrative    [facility]       Current Outpatient Prescriptions:     acetaminophen (TYLENOL) 325 mg tablet, Take 2 tablets (650 mg total) by mouth every 6 (six) hours as needed for fever, Disp: 30 tablet, Rfl: 0    albuterol (2 5 mg/3 mL) 0 083 % nebulizer solution, Take 2 5 mg by nebulization every 6 (six) hours as needed for wheezing, Disp: , Rfl:     ALPRAZolam (XANAX) 0 5 mg tablet, Take 0 5 mg by mouth daily at bedtime, Disp: , Rfl:     amLODIPine (NORVASC) 5 mg tablet, Take 5 mg by mouth daily, Disp: , Rfl:     apixaban (ELIQUIS) 5 mg, Take 5 mg by mouth every 12 (twelve) hours  , Disp: , Rfl:     atorvastatin (LIPITOR) 40 mg tablet, Take 40 mg by mouth daily, Disp: , Rfl:     esomeprazole (NexIUM) 40 MG capsule, Take 40 mg by mouth daily, Disp: , Rfl:     Fesoterodine Fumarate ER (TOVIAZ) 8 MG TB24, Take by mouth daily at bedtime  , Disp: , Rfl:     fluticasone (FLONASE) 50 mcg/act nasal spray, 1 spray into each nostril daily, Disp: 16 g, Rfl: 0    fluticasone-salmeterol (ADVAIR) 250-50 mcg/dose inhaler, Inhale 1 puff every 12 (twelve) hours This is home medication, Disp: , Rfl: 0    furosemide (LASIX) 20 mg tablet, Take 20 mg by mouth daily, Disp: , Rfl:     loratadine (CLARITIN) 10 mg tablet, Take 10 mg by mouth daily, Disp: , Rfl:     metoprolol tartrate (LOPRESSOR) 50 mg tablet, Take 50 mg by mouth every 12 (twelve) hours, Disp: , Rfl:     polyvinyl alcohol (LIQUIFILM TEARS) 1 4 % ophthalmic solution, Administer 1 drop to both eyes 3 (three) times a day, Disp: 15 mL, Rfl: 0    potassium chloride (K-DUR) 10 mEq tablet, Take 10 mEq by mouth daily  , Disp: , Rfl:     pregabalin (LYRICA) 300 MG capsule, Take 300 mg by mouth 2 (two) times a day , Disp: , Rfl:     Allergies   Allergen Reactions    Fentanyl And Related Other (See Comments)     Passed out    Latex Rash    Penicillins Rash       Vitals:    08/27/18 1303   BP: 126/80   Pulse: 83   Resp: 12   Temp: 99 7 °F (37 6 °C)   SpO2: 93%     Physical Exam   Constitutional: She is oriented to person, place, and time  She appears well-developed and well-nourished  HENT:   Head: Normocephalic and atraumatic  Right Ear: External ear normal    Left Ear: External ear normal    Nose: Nose normal    Mouth/Throat: Oropharynx is clear and moist    Eyes: Conjunctivae and EOM are normal  Pupils are equal, round, and reactive to light  Neck: Normal range of motion  Neck supple  Cardiovascular: Normal rate, regular rhythm, normal heart sounds and intact distal pulses  Pulmonary/Chest: Effort normal    Decreased breath sounds bilaterally, scattered rhonchi, no rales   Abdominal: Soft  Bowel sounds are normal    Obese, nontender, + bowel sounds, cannot palpate liver or spleen, well-healed lower abdominal/pelvic scar   Musculoskeletal: Normal range of motion  She exhibits edema  2+ left lower extremity edema, same as before, no obvious cords, pulses are decreased bilaterally   Neurological: She is alert and oriented to person, place, and time  She has normal reflexes  Skin: Skin is warm  Skin has relatively good color, warm, few ecchymoses, no petechiae   Psychiatric: She has a normal mood and affect   Her behavior is normal  Judgment and thought content normal    Lymphatics: No adenopathy in the neck, supraclavicular region, axilla and groin bilaterally    Performance Status: 1 - Symptomatic but completely ambulatory    Labs:    08/13/2018 BUN = 10 creatinine = 0 6 calcium = 7 9 AST = 13 ALT = 5 alkaline phosphatase = 77 total bilirubin = 0 30 WBC = 4 2 hemoglobin = 10 1 hematocrit = 33 MCV = 80 platelet = 983    2/74/11 BUN = 18 creatinine = 0 8 calcium = 8 2 WBC = 6 1 hemoglobin = 11 3 hematocrit = 35 6 MCV = 80 RDW = 18 2 platelet = 288    Imaging    10/12/17 chest x-ray demonstrated no active pulmonary disease  10/2/17 MRI of tibia/fibula impression stated that there was postoperative changes and scar in the proximal posterior lateral calf but no evidence of tumor recurrence  Patient had diffuse of cutaneous edema throughout the left lower leg  10/2/17 CAT scan of the chest and abdomen/pelvis did not demonstrate any metastatic disease and no significant anasarca throughout the subcutaneous fat of abdomen or pelvis  Patient had gallstones and scattered diverticulosis  Pathology    Case Report   Surgical Pathology Report                         Case: [id number]                                    Authorizing Provider: Dianna Love MD       Collected:           10/27/2017 0848               Ordering Location:     [facility] Received:            10/27/2017 1404                                      Operating Room                                                                Pathologist:           Coco Fair MD                                                               Specimen:    Soft Tissue, Other, abdominal sinus tract                                                  Final Diagnosis   A   Soft Tissue, Other, abdominal sinus tract:  -Benign skin and subcutaneous tissue with mild chronic inflammation and reactive fibrosis   -No evidence of malignancy       Electronically signed by Coco Fair MD on 10/31/2017

## 2018-08-28 ENCOUNTER — OFFICE VISIT (OUTPATIENT)
Dept: OBGYN CLINIC | Facility: HOSPITAL | Age: 83
End: 2018-08-28
Attending: ORTHOPAEDIC SURGERY
Payer: MEDICARE

## 2018-08-28 VITALS — DIASTOLIC BLOOD PRESSURE: 71 MMHG | HEART RATE: 69 BPM | SYSTOLIC BLOOD PRESSURE: 105 MMHG

## 2018-08-28 DIAGNOSIS — S82.892P CLOSED FRACTURE OF LEFT ANKLE WITH MALUNION: Primary | ICD-10-CM

## 2018-08-28 PROCEDURE — 99213 OFFICE O/P EST LOW 20 MIN: CPT | Performed by: ORTHOPAEDIC SURGERY

## 2018-08-28 NOTE — PROGRESS NOTES
81 y o female presents for evaluation of a left ankle fracture  Patient originally fracture ankle on 05/10/2018 and was managed non operatively by Dr Divina Santana  During her last clinical visit, she was found to have posterior subluxation of the ankle and was referred to Dr Vinod Hou for consideration was of operative management  In the clinic today the patient states she has no pain about the ankle  The she states that she has been non compliant with the her nonweightbearing precautions and has been ambulating with a walker  She does not wish to pursue operative management at this time and she feels that she is sufficiently able to ambulate with a walker to go home  She has no other complaints at this time  Review of Systems  Review of systems negative unless otherwise specified in HPI    Past Medical History  Past Medical History:   Diagnosis Date    Asthma     Cancer [address] [address] )     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    Cardiac disease     a fib    Fibromyalgia     Fibromyalgia, primary     GERD (gastroesophageal reflux disease)     Hypertension     Hypokalemia     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, left groin, lung and bladder  Patient treated with chemotherapy and radiation       Past Surgical History  Past Surgical History:   Procedure Laterality Date    APPENDECTOMY      CATARACT EXTRACTION      CHEST WALL BIOPSY N/A 10/27/2017    Procedure: SINUS EXCISION AND REMOVAL OF FOREIGN BODY, ABDOMEN (WOUND EXPLORATION);   Surgeon: Lolly Urbina MD;  Location: [address] [place] [address];  Service: General    EYE SURGERY Bilateral     cataracts     HIP FRACTURE SURGERY Left     HYSTERECTOMY      JOINT REPLACEMENT Left     hip    LYMPHADENECTOMY      PORTACATH PLACEMENT Right        Current Medications  Current Outpatient Prescriptions on File Prior to Visit   Medication Sig Dispense Refill    acetaminophen (TYLENOL) 325 mg tablet Take 2 tablets (650 mg total) by mouth every 6 (six) hours as needed for fever 30 tablet 0    albuterol (2 5 mg/3 mL) 0 083 % nebulizer solution Take 2 5 mg by nebulization every 6 (six) hours as needed for wheezing      ALPRAZolam (XANAX) 0 5 mg tablet Take 0 5 mg by mouth daily at bedtime      amLODIPine (NORVASC) 5 mg tablet Take 5 mg by mouth daily      apixaban (ELIQUIS) 5 mg Take 5 mg by mouth every 12 (twelve) hours        atorvastatin (LIPITOR) 40 mg tablet Take 40 mg by mouth daily      esomeprazole (NexIUM) 40 MG capsule Take 40 mg by mouth daily      Fesoterodine Fumarate ER (TOVIAZ) 8 MG TB24 Take by mouth daily at bedtime        fluticasone (FLONASE) 50 mcg/act nasal spray 1 spray into each nostril daily 16 g 0    fluticasone-salmeterol (ADVAIR) 250-50 mcg/dose inhaler Inhale 1 puff every 12 (twelve) hours This is home medication  0    furosemide (LASIX) 20 mg tablet Take 20 mg by mouth daily      loratadine (CLARITIN) 10 mg tablet Take 10 mg by mouth daily      metoprolol tartrate (LOPRESSOR) 50 mg tablet Take 50 mg by mouth every 12 (twelve) hours      polyvinyl alcohol (LIQUIFILM TEARS) 1 4 % ophthalmic solution Administer 1 drop to both eyes 3 (three) times a day 15 mL 0    potassium chloride (K-DUR) 10 mEq tablet Take 10 mEq by mouth daily   pregabalin (LYRICA) 300 MG capsule Take 300 mg by mouth 2 (two) times a day  No current facility-administered medications on file prior to visit          Recent Labs [facility])    0  Lab Value Date/Time   HCT 29 9 (L) 05/17/2018 0623   HGB 8 9 (L) 05/17/2018 0623   WBC 6 64 05/17/2018 0623   INR 1 06 05/10/2018 0826   CRP 26 5 (H) 01/25/2018 0459   HGBA1C 5 8 05/10/2018 1714         Physical exam  · General: Awake, Alert, Oriented  · Eyes: Pupils equal, round and reactive to light  · Heart: regular rate and rhythm  · Lungs: No audible wheezing  · Abdomen: soft  left Ankle  · Skin intact  · Swelling secondary to lymphedema  · Non pitting pretibial edema   · Motor and sensation intact about the ankle and foot  · Mild tenderness about the ankle with plantar flexion against resistance  · Limb warm and well-perfused    Procedure  No procedures performed during this visit    Imaging  No new imaging obtained during this visit    81-year-old female 3-1/2 months status post left ankle fracture with malreduction   · Weightbearing as tolerated to the left lower extremity an Aircast  · No operative plans at this time given patient's ability to weight bear on the lower extremity without pain and operative risk of correcting the malunion  · P o   Tylenol as needed for pain  · Local modalities:  Rest, ice, compression, elevation  · Patient is to continue her fracture follow-up with Dr Nestor Flores in 6 weeks with new x-rays  · Patient understands and agrees with the plan above

## 2018-09-29 ENCOUNTER — APPOINTMENT (EMERGENCY)
Dept: RADIOLOGY | Facility: HOSPITAL | Age: 83
DRG: 208 | End: 2018-09-29
Attending: EMERGENCY MEDICINE
Payer: MEDICARE

## 2018-09-29 ENCOUNTER — HOSPITAL ENCOUNTER (INPATIENT)
Facility: HOSPITAL | Age: 83
LOS: 6 days | Discharge: NURSING, REHAB OR LONG-TERM CARE | DRG: 208 | End: 2018-10-05
Attending: EMERGENCY MEDICINE | Admitting: ANESTHESIOLOGY
Payer: MEDICARE

## 2018-09-29 ENCOUNTER — APPOINTMENT (EMERGENCY)
Dept: RADIOLOGY | Facility: HOSPITAL | Age: 83
DRG: 208 | End: 2018-09-29
Payer: MEDICARE

## 2018-09-29 DIAGNOSIS — J44.1 COPD WITH ACUTE EXACERBATION (HCC): ICD-10-CM

## 2018-09-29 DIAGNOSIS — J96.92 RESPIRATORY FAILURE WITH HYPERCAPNIA, UNSPECIFIED CHRONICITY (HCC): ICD-10-CM

## 2018-09-29 DIAGNOSIS — J96.02 ACUTE RESPIRATORY FAILURE WITH HYPERCAPNIA (HCC): ICD-10-CM

## 2018-09-29 DIAGNOSIS — J96.92 HYPERCAPNIC RESPIRATORY FAILURE (HCC): Primary | ICD-10-CM

## 2018-09-29 PROBLEM — I48.0 PAROXYSMAL ATRIAL FIBRILLATION (HCC): Chronic | Status: ACTIVE | Noted: 2017-03-13

## 2018-09-29 PROBLEM — G92.8 TOXIC METABOLIC ENCEPHALOPATHY: Status: RESOLVED | Noted: 2018-03-19 | Resolved: 2018-09-29

## 2018-09-29 PROBLEM — K21.9 GERD (GASTROESOPHAGEAL REFLUX DISEASE): Chronic | Status: ACTIVE | Noted: 2017-03-13

## 2018-09-29 PROBLEM — E78.5 HYPERLIPEMIA: Chronic | Status: ACTIVE | Noted: 2017-03-13

## 2018-09-29 PROBLEM — I63.50 RIGHT PONTINE STROKE (HCC): Status: RESOLVED | Noted: 2018-03-08 | Resolved: 2018-09-29

## 2018-09-29 PROBLEM — I50.32 CHRONIC DIASTOLIC CHF (CONGESTIVE HEART FAILURE), NYHA CLASS 1 (HCC): Chronic | Status: ACTIVE | Noted: 2018-03-08

## 2018-09-29 PROBLEM — C4A.9 MERKEL CELL CANCER (HCC): Status: ACTIVE | Noted: 2017-09-25

## 2018-09-29 PROBLEM — R06.02 SHORTNESS OF BREATH: Status: RESOLVED | Noted: 2018-01-08 | Resolved: 2018-09-29

## 2018-09-29 PROBLEM — R71.0 DROP IN HEMOGLOBIN: Status: RESOLVED | Noted: 2018-05-11 | Resolved: 2018-09-29

## 2018-09-29 PROBLEM — R78.81 BACTEREMIA: Status: RESOLVED | Noted: 2017-12-12 | Resolved: 2018-09-29

## 2018-09-29 PROBLEM — E87.2 ACUTE RESPIRATORY ACIDOSIS: Status: ACTIVE | Noted: 2018-09-29

## 2018-09-29 PROBLEM — D50.9 IRON DEFICIENCY ANEMIA: Chronic | Status: ACTIVE | Noted: 2018-01-07

## 2018-09-29 PROBLEM — R41.0 DELIRIUM: Status: RESOLVED | Noted: 2018-03-15 | Resolved: 2018-09-29

## 2018-09-29 PROBLEM — I89.0 LYMPHEDEMA: Chronic | Status: ACTIVE | Noted: 2017-09-25

## 2018-09-29 PROBLEM — Z85.821 HISTORY OF MERKEL CELL CARCINOMA: Chronic | Status: ACTIVE | Noted: 2017-12-13
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
## 2018-09-29 PROBLEM — M79.7 FIBROMYALGIA: Chronic | Status: ACTIVE | Noted: 2017-03-14

## 2018-09-29 PROBLEM — E66.9 OBESITY (BMI 30-39.9): Chronic | Status: ACTIVE | Noted: 2018-01-10

## 2018-09-29 PROBLEM — I89.0 LYMPHEDEMA: Status: ACTIVE | Noted: 2017-09-25

## 2018-09-29 LAB
ALBUMIN SERPL BCP-MCNC: 3 G/DL (ref 3.5–5)
ALP SERPL-CCNC: 127 U/L (ref 46–116)
ALT SERPL W P-5'-P-CCNC: 20 U/L (ref 12–78)
ANION GAP SERPL CALCULATED.3IONS-SCNC: 3 MMOL/L (ref 4–13)
APTT PPP: 30 SECONDS (ref 24–33)
ARTERIAL PATENCY WRIST A: YES
AST SERPL W P-5'-P-CCNC: 25 U/L (ref 5–45)
BACTERIA UR QL AUTO: ABNORMAL /HPF
BASE EXCESS BLDA CALC-SCNC: 12 MMOL/L
BASE EXCESS BLDA CALC-SCNC: 15.4 MMOL/L
BASE EXCESS BLDA CALC-SCNC: 8.2 MMOL/L
BASOPHILS # BLD AUTO: 0.04 THOUSANDS/ΜL (ref 0–0.1)
BASOPHILS NFR BLD AUTO: 0 % (ref 0–1)
BILIRUB SERPL-MCNC: 0.7 MG/DL (ref 0.2–1)
BILIRUB UR QL STRIP: NEGATIVE
BODY TEMPERATURE: 97.4 DEGREES FEHRENHEIT
BODY TEMPERATURE: 97.8 DEGREES FEHRENHEIT
BODY TEMPERATURE: 99.9 DEGREES FEHRENHEIT
BUN SERPL-MCNC: 12 MG/DL (ref 5–25)
CALCIUM SERPL-MCNC: 8.9 MG/DL (ref 8.3–10.1)
CHLORIDE SERPL-SCNC: 98 MMOL/L (ref 100–108)
CLARITY UR: CLEAR
CO2 SERPL-SCNC: 39 MMOL/L (ref 21–32)
COLOR UR: YELLOW
CREAT SERPL-MCNC: 0.73 MG/DL (ref 0.6–1.3)
EOSINOPHIL # BLD AUTO: 0.01 THOUSAND/ΜL (ref 0–0.61)
EOSINOPHIL NFR BLD AUTO: 0 % (ref 0–6)
ERYTHROCYTE [DISTWIDTH] IN BLOOD BY AUTOMATED COUNT: 18.2 % (ref 11.6–15.1)
FINE GRAN CASTS URNS QL MICRO: ABNORMAL /LPF
GFR SERPL CREATININE-BSD FRML MDRD: 77 ML/MIN/1.73SQ M
GLUCOSE SERPL-MCNC: 123 MG/DL (ref 65–140)
GLUCOSE UR STRIP-MCNC: NEGATIVE MG/DL
HCO3 BLDA-SCNC: 37.4 MMOL/L (ref 22–28)
HCO3 BLDA-SCNC: 37.8 MMOL/L (ref 22–28)
HCO3 BLDA-SCNC: 47.9 MMOL/L (ref 22–28)
HCT VFR BLD AUTO: 39.2 % (ref 34.8–46.1)
HGB BLD-MCNC: 11.1 G/DL (ref 11.5–15.4)
HGB UR QL STRIP.AUTO: ABNORMAL
HOROWITZ INDEX BLDA+IHG-RTO: 70 MM[HG]
HYALINE CASTS #/AREA URNS LPF: ABNORMAL /LPF
IMM GRANULOCYTES # BLD AUTO: 0.16 THOUSAND/UL (ref 0–0.2)
IMM GRANULOCYTES NFR BLD AUTO: 2 % (ref 0–2)
INR PPP: 1.05 (ref 0.86–1.16)
IPAP: 12
KETONES UR STRIP-MCNC: NEGATIVE MG/DL
LACTATE SERPL-SCNC: 0.6 MMOL/L (ref 0.5–2)
LEUKOCYTE ESTERASE UR QL STRIP: NEGATIVE
LIPASE SERPL-CCNC: 65 U/L (ref 73–393)
LYMPHOCYTES # BLD AUTO: 0.48 THOUSANDS/ΜL (ref 0.6–4.47)
LYMPHOCYTES NFR BLD AUTO: 4 % (ref 14–44)
MAGNESIUM SERPL-MCNC: 1.7 MG/DL (ref 1.6–2.6)
MCH RBC QN AUTO: 23.8 PG (ref 26.8–34.3)
MCHC RBC AUTO-ENTMCNC: 28.3 G/DL (ref 31.4–37.4)
MCV RBC AUTO: 84 FL (ref 82–98)
MONOCYTES # BLD AUTO: 0.46 THOUSAND/ΜL (ref 0.17–1.22)
MONOCYTES NFR BLD AUTO: 4 % (ref 4–12)
MUCOUS THREADS UR QL AUTO: ABNORMAL
NEUTROPHILS # BLD AUTO: 9.78 THOUSANDS/ΜL (ref 1.85–7.62)
NEUTS SEG NFR BLD AUTO: 90 % (ref 43–75)
NITRITE UR QL STRIP: NEGATIVE
NON VENT TYPE- NRB: 100
NON VENT- BIPAP: ABNORMAL
NON-SQ EPI CELLS URNS QL MICRO: ABNORMAL /HPF
NRBC BLD AUTO-RTO: 0 /100 WBCS
NT-PROBNP SERPL-MCNC: 5759 PG/ML
O2 CT BLDA-SCNC: 15.6 ML/DL (ref 16–23)
O2 CT BLDA-SCNC: 16.5 ML/DL (ref 16–23)
O2 CT BLDA-SCNC: 92 ML/DL (ref 95–100)
OXYHGB MFR BLDA: 97 % (ref 94–97)
OXYHGB MFR BLDA: 98.7 % (ref 94–97)
PCO2 BLDA: 123.1 MM HG (ref 36–44)
PCO2 BLDA: 51.9 MM HG (ref 36–44)
PCO2 BLDA: 85.4 MM HG (ref 36–44)
PCO2 TEMP ADJ BLDA: 121 MM HG (ref 36–44)
PCO2 TEMP ADJ BLDA: 88.1 MM HG (ref 36–44)
PEEP MAX SETTING VENT: 6 CM[H2O]
PEEP RESPIRATORY: 5 CM[H2O]
PH BLD: 7.21 [PH] (ref 7.35–7.45)
PH BLD: 7.25 [PH] (ref 7.35–7.45)
PH BLDA: 7.2 [PH] (ref 7.35–7.45)
PH BLDA: 7.25 [PH] (ref 7.35–7.45)
PH BLDA: 7.47 [PH] (ref 7.35–7.45)
PH UR STRIP.AUTO: 5.5 [PH] (ref 5–9)
PLATELET # BLD AUTO: 152 THOUSANDS/UL (ref 149–390)
PMV BLD AUTO: 11.9 FL (ref 8.9–12.7)
PO2 BLD: 125.7 MM HG (ref 75–129)
PO2 BLD: 200.1 MM HG (ref 75–129)
PO2 BLDA: 121.3 MM HG (ref 75–129)
PO2 BLDA: 202.1 MM HG (ref 75–129)
PO2 BLDA: 61 MM HG (ref 75–129)
POTASSIUM SERPL-SCNC: 4.4 MMOL/L (ref 3.5–5.3)
PROT SERPL-MCNC: 7 G/DL (ref 6.4–8.2)
PROT UR STRIP-MCNC: ABNORMAL MG/DL
PROTHROMBIN TIME: 11 SECONDS (ref 9.4–11.7)
RBC # BLD AUTO: 4.67 MILLION/UL (ref 3.81–5.12)
RBC #/AREA URNS AUTO: ABNORMAL /HPF
SODIUM SERPL-SCNC: 140 MMOL/L (ref 136–145)
SP GR UR STRIP.AUTO: 1.02 (ref 1–1.03)
SPECIMEN SOURCE: ABNORMAL
TROPONIN I SERPL-MCNC: <0.02 NG/ML
UROBILINOGEN UR QL STRIP.AUTO: 0.2 E.U./DL
VENT AC: 20
VENT BIPAP FIO2: 100 %
VENT- AC: AC
VT SETTING VENT: 350 ML
WBC # BLD AUTO: 10.93 THOUSAND/UL (ref 4.31–10.16)
WBC #/AREA URNS AUTO: ABNORMAL /HPF

## 2018-09-29 PROCEDURE — 85025 COMPLETE CBC W/AUTO DIFF WBC: CPT | Performed by: EMERGENCY MEDICINE

## 2018-09-29 PROCEDURE — 85610 PROTHROMBIN TIME: CPT | Performed by: EMERGENCY MEDICINE

## 2018-09-29 PROCEDURE — 82805 BLOOD GASES W/O2 SATURATION: CPT | Performed by: NURSE PRACTITIONER

## 2018-09-29 PROCEDURE — 82805 BLOOD GASES W/O2 SATURATION: CPT | Performed by: EMERGENCY MEDICINE

## 2018-09-29 PROCEDURE — 83605 ASSAY OF LACTIC ACID: CPT | Performed by: EMERGENCY MEDICINE

## 2018-09-29 PROCEDURE — 5A1945Z RESPIRATORY VENTILATION, 24-96 CONSECUTIVE HOURS: ICD-10-PCS | Performed by: EMERGENCY MEDICINE

## 2018-09-29 PROCEDURE — 83690 ASSAY OF LIPASE: CPT | Performed by: EMERGENCY MEDICINE

## 2018-09-29 PROCEDURE — 87086 URINE CULTURE/COLONY COUNT: CPT | Performed by: EMERGENCY MEDICINE

## 2018-09-29 PROCEDURE — 84145 PROCALCITONIN (PCT): CPT | Performed by: NURSE PRACTITIONER

## 2018-09-29 PROCEDURE — 96374 THER/PROPH/DIAG INJ IV PUSH: CPT

## 2018-09-29 PROCEDURE — 83735 ASSAY OF MAGNESIUM: CPT | Performed by: EMERGENCY MEDICINE

## 2018-09-29 PROCEDURE — 71045 X-RAY EXAM CHEST 1 VIEW: CPT

## 2018-09-29 PROCEDURE — 87081 CULTURE SCREEN ONLY: CPT | Performed by: ANESTHESIOLOGY

## 2018-09-29 PROCEDURE — 99291 CRITICAL CARE FIRST HOUR: CPT

## 2018-09-29 PROCEDURE — 87040 BLOOD CULTURE FOR BACTERIA: CPT | Performed by: EMERGENCY MEDICINE

## 2018-09-29 PROCEDURE — 36600 WITHDRAWAL OF ARTERIAL BLOOD: CPT

## 2018-09-29 PROCEDURE — 93005 ELECTROCARDIOGRAM TRACING: CPT

## 2018-09-29 PROCEDURE — 94640 AIRWAY INHALATION TREATMENT: CPT

## 2018-09-29 PROCEDURE — 94760 N-INVAS EAR/PLS OXIMETRY 1: CPT

## 2018-09-29 PROCEDURE — 94660 CPAP INITIATION&MGMT: CPT

## 2018-09-29 PROCEDURE — 80053 COMPREHEN METABOLIC PANEL: CPT | Performed by: EMERGENCY MEDICINE

## 2018-09-29 PROCEDURE — 96361 HYDRATE IV INFUSION ADD-ON: CPT

## 2018-09-29 PROCEDURE — 81001 URINALYSIS AUTO W/SCOPE: CPT | Performed by: EMERGENCY MEDICINE

## 2018-09-29 PROCEDURE — 85730 THROMBOPLASTIN TIME PARTIAL: CPT | Performed by: EMERGENCY MEDICINE

## 2018-09-29 PROCEDURE — 87205 SMEAR GRAM STAIN: CPT | Performed by: NURSE PRACTITIONER

## 2018-09-29 PROCEDURE — 84484 ASSAY OF TROPONIN QUANT: CPT | Performed by: EMERGENCY MEDICINE

## 2018-09-29 PROCEDURE — 87070 CULTURE OTHR SPECIMN AEROBIC: CPT | Performed by: NURSE PRACTITIONER

## 2018-09-29 PROCEDURE — 94002 VENT MGMT INPAT INIT DAY: CPT

## 2018-09-29 PROCEDURE — 0BH18EZ INSERTION OF ENDOTRACHEAL AIRWAY INTO TRACHEA, VIA NATURAL OR ARTIFICIAL OPENING ENDOSCOPIC: ICD-10-PCS | Performed by: EMERGENCY MEDICINE

## 2018-09-29 PROCEDURE — 36415 COLL VENOUS BLD VENIPUNCTURE: CPT | Performed by: EMERGENCY MEDICINE

## 2018-09-29 PROCEDURE — 83880 ASSAY OF NATRIURETIC PEPTIDE: CPT | Performed by: EMERGENCY MEDICINE

## 2018-09-29 PROCEDURE — 94664 DEMO&/EVAL PT USE INHALER: CPT

## 2018-09-29 PROCEDURE — 99291 CRITICAL CARE FIRST HOUR: CPT | Performed by: NURSE PRACTITIONER

## 2018-09-29 PROCEDURE — 70450 CT HEAD/BRAIN W/O DYE: CPT

## 2018-09-29 RX ORDER — IPRATROPIUM BROMIDE AND ALBUTEROL SULFATE 2.5; .5 MG/3ML; MG/3ML
3 SOLUTION RESPIRATORY (INHALATION) ONCE
Status: COMPLETED | OUTPATIENT
Start: 2018-09-29 | End: 2018-09-29

## 2018-09-29 RX ORDER — PROPOFOL 10 MG/ML
5-50 INJECTION, EMULSION INTRAVENOUS
Status: DISCONTINUED | OUTPATIENT
Start: 2018-09-29 | End: 2018-10-01

## 2018-09-29 RX ORDER — BUDESONIDE 0.5 MG/2ML
0.5 INHALANT ORAL 2 TIMES DAILY
COMMUNITY
End: 2018-10-05 | Stop reason: HOSPADM

## 2018-09-29 RX ORDER — PREDNISONE 10 MG/1
10 TABLET ORAL DAILY
COMMUNITY
End: 2018-10-05 | Stop reason: HOSPADM

## 2018-09-29 RX ORDER — CHLORHEXIDINE GLUCONATE 0.12 MG/ML
15 RINSE ORAL EVERY 12 HOURS SCHEDULED
Status: DISCONTINUED | OUTPATIENT
Start: 2018-09-29 | End: 2018-10-04

## 2018-09-29 RX ORDER — ACETAMINOPHEN 650 MG/1
650 SUPPOSITORY RECTAL ONCE
Status: COMPLETED | OUTPATIENT
Start: 2018-09-29 | End: 2018-09-29

## 2018-09-29 RX ORDER — ASPIRIN 81 MG/1
81 TABLET ORAL DAILY
COMMUNITY

## 2018-09-29 RX ORDER — IPRATROPIUM BROMIDE AND ALBUTEROL SULFATE 2.5; .5 MG/3ML; MG/3ML
3 SOLUTION RESPIRATORY (INHALATION)
COMMUNITY

## 2018-09-29 RX ORDER — FUROSEMIDE 40 MG/1
40 TABLET ORAL DAILY
Status: DISCONTINUED | OUTPATIENT
Start: 2018-09-30 | End: 2018-09-30

## 2018-09-29 RX ORDER — NYSTATIN 100000 [USP'U]/G
POWDER TOPICAL 2 TIMES DAILY
Status: DISCONTINUED | OUTPATIENT
Start: 2018-09-30 | End: 2018-10-05 | Stop reason: HOSPADM

## 2018-09-29 RX ORDER — ETOMIDATE 2 MG/ML
20 INJECTION INTRAVENOUS ONCE
Status: COMPLETED | OUTPATIENT
Start: 2018-09-29 | End: 2018-09-29

## 2018-09-29 RX ORDER — SODIUM CHLORIDE 9 MG/ML
125 INJECTION, SOLUTION INTRAVENOUS CONTINUOUS
Status: DISCONTINUED | OUTPATIENT
Start: 2018-09-29 | End: 2018-09-30

## 2018-09-29 RX ORDER — SUCCINYLCHOLINE CHLORIDE 20 MG/ML
1.5 INJECTION INTRAMUSCULAR; INTRAVENOUS ONCE
Status: COMPLETED | OUTPATIENT
Start: 2018-09-29 | End: 2018-09-29

## 2018-09-29 RX ORDER — PREGABALIN 100 MG/1
300 CAPSULE ORAL 2 TIMES DAILY
Status: DISCONTINUED | OUTPATIENT
Start: 2018-09-30 | End: 2018-10-05 | Stop reason: HOSPADM

## 2018-09-29 RX ORDER — OMEGA-3S/DHA/EPA/FISH OIL/D3 300MG-1000
400 CAPSULE ORAL 2 TIMES DAILY
COMMUNITY

## 2018-09-29 RX ORDER — METHYLPREDNISOLONE SODIUM SUCCINATE 125 MG/2ML
125 INJECTION, POWDER, LYOPHILIZED, FOR SOLUTION INTRAMUSCULAR; INTRAVENOUS ONCE
Status: COMPLETED | OUTPATIENT
Start: 2018-09-29 | End: 2018-09-29

## 2018-09-29 RX ORDER — OMEPRAZOLE 20 MG/1
20 CAPSULE, DELAYED RELEASE ORAL DAILY
COMMUNITY

## 2018-09-29 RX ORDER — IPRATROPIUM BROMIDE AND ALBUTEROL SULFATE 2.5; .5 MG/3ML; MG/3ML
3 SOLUTION RESPIRATORY (INHALATION)
Status: DISCONTINUED | OUTPATIENT
Start: 2018-09-30 | End: 2018-10-01

## 2018-09-29 RX ORDER — DOCUSATE SODIUM 100 MG/1
200 CAPSULE, LIQUID FILLED ORAL DAILY
COMMUNITY

## 2018-09-29 RX ORDER — ATORVASTATIN CALCIUM 40 MG/1
40 TABLET, FILM COATED ORAL DAILY
Status: DISCONTINUED | OUTPATIENT
Start: 2018-09-30 | End: 2018-10-05 | Stop reason: HOSPADM

## 2018-09-29 RX ORDER — PHENOL 1.4 %
600 AEROSOL, SPRAY (ML) MUCOUS MEMBRANE 2 TIMES DAILY WITH MEALS
COMMUNITY

## 2018-09-29 RX ORDER — METHYLPREDNISOLONE SODIUM SUCCINATE 40 MG/ML
40 INJECTION, POWDER, LYOPHILIZED, FOR SOLUTION INTRAMUSCULAR; INTRAVENOUS EVERY 6 HOURS
Status: DISCONTINUED | OUTPATIENT
Start: 2018-09-30 | End: 2018-10-01

## 2018-09-29 RX ADMIN — ETOMIDATE 20 MG: 20 INJECTION, SOLUTION INTRAVENOUS at 21:34

## 2018-09-29 RX ADMIN — ACETAMINOPHEN 650 MG: 650 SUPPOSITORY RECTAL at 19:32

## 2018-09-29 RX ADMIN — CEFEPIME HYDROCHLORIDE 2000 MG: 2 INJECTION, POWDER, FOR SOLUTION INTRAVENOUS at 21:36

## 2018-09-29 RX ADMIN — Medication 134 MG: at 21:34

## 2018-09-29 RX ADMIN — AZITHROMYCIN MONOHYDRATE 500 MG: 500 INJECTION, POWDER, LYOPHILIZED, FOR SOLUTION INTRAVENOUS at 23:14

## 2018-09-29 RX ADMIN — IPRATROPIUM BROMIDE AND ALBUTEROL SULFATE 3 ML: .5; 3 SOLUTION RESPIRATORY (INHALATION) at 23:03

## 2018-09-29 RX ADMIN — PROPOFOL 5 MCG/KG/MIN: 10 INJECTION, EMULSION INTRAVENOUS at 21:34

## 2018-09-29 RX ADMIN — CHLORHEXIDINE GLUCONATE 0.12% ORAL RINSE 15 ML: 1.2 LIQUID ORAL at 22:56

## 2018-09-29 RX ADMIN — METHYLPREDNISOLONE SODIUM SUCCINATE 125 MG: 125 INJECTION, POWDER, FOR SOLUTION INTRAMUSCULAR; INTRAVENOUS at 19:55

## 2018-09-29 RX ADMIN — SODIUM CHLORIDE 1000 ML: 0.9 INJECTION, SOLUTION INTRAVENOUS at 19:01

## 2018-09-29 RX ADMIN — IPRATROPIUM BROMIDE AND ALBUTEROL SULFATE 3 ML: .5; 3 SOLUTION RESPIRATORY (INHALATION) at 19:58

## 2018-09-29 RX ADMIN — APIXABAN 5 MG: 5 TABLET, FILM COATED ORAL at 22:56

## 2018-09-29 RX ADMIN — SODIUM CHLORIDE 125 ML/HR: 0.9 INJECTION, SOLUTION INTRAVENOUS at 21:35

## 2018-09-29 RX ADMIN — METHYLPREDNISOLONE SODIUM SUCCINATE 40 MG: 40 INJECTION, POWDER, FOR SOLUTION INTRAMUSCULAR; INTRAVENOUS at 23:46

## 2018-09-29 RX ADMIN — IPRATROPIUM BROMIDE AND ALBUTEROL SULFATE 3 ML: .5; 3 SOLUTION RESPIRATORY (INHALATION) at 19:59

## 2018-09-29 NOTE — ED PROCEDURE NOTE
PROCEDURE  ECG 12 Lead Documentation  Date/Time: 9/29/2018 6:53 PM  Performed by: Hillary Khan by: Danny Bernheim     ECG reviewed by me, the ED Provider: yes    Patient location:  ED  Interpretation:     Interpretation: abnormal    Rate:     ECG rate:  121    ECG rate assessment: tachycardic    Rhythm:     Rhythm: atrial fibrillation    Ectopy:     Ectopy: PVCs    ST segments:     ST segments:  Normal  T waves:     T waves: normal           Deejay Coburn DO  09/29/18 1853

## 2018-09-30 ENCOUNTER — APPOINTMENT (INPATIENT)
Dept: RADIOLOGY | Facility: HOSPITAL | Age: 83
DRG: 208 | End: 2018-09-30
Payer: MEDICARE

## 2018-09-30 PROBLEM — I50.33 ACUTE ON CHRONIC DIASTOLIC CONGESTIVE HEART FAILURE (HCC): Status: ACTIVE | Noted: 2018-03-08

## 2018-09-30 PROBLEM — E87.2 ACUTE RESPIRATORY ACIDOSIS: Status: RESOLVED | Noted: 2018-09-29 | Resolved: 2018-09-30

## 2018-09-30 LAB
ANION GAP SERPL CALCULATED.3IONS-SCNC: 10 MMOL/L (ref 4–13)
ANION GAP SERPL CALCULATED.3IONS-SCNC: 4 MMOL/L (ref 4–13)
ARTERIAL PATENCY WRIST A: YES
BASE EXCESS BLDA CALC-SCNC: 11 MMOL/L
BASOPHILS # BLD AUTO: 0 THOUSANDS/ΜL (ref 0–0.1)
BASOPHILS NFR BLD AUTO: 0 % (ref 0–1)
BODY TEMPERATURE: 98.7 DEGREES FEHRENHEIT
BUN SERPL-MCNC: 18 MG/DL (ref 5–25)
BUN SERPL-MCNC: 23 MG/DL (ref 5–25)
CALCIUM SERPL-MCNC: 8.3 MG/DL (ref 8.3–10.1)
CALCIUM SERPL-MCNC: 8.4 MG/DL (ref 8.3–10.1)
CHLORIDE SERPL-SCNC: 100 MMOL/L (ref 100–108)
CHLORIDE SERPL-SCNC: 98 MMOL/L (ref 100–108)
CO2 SERPL-SCNC: 32 MMOL/L (ref 21–32)
CO2 SERPL-SCNC: 34 MMOL/L (ref 21–32)
CREAT SERPL-MCNC: 0.86 MG/DL (ref 0.6–1.3)
CREAT SERPL-MCNC: 1.02 MG/DL (ref 0.6–1.3)
EOSINOPHIL # BLD AUTO: 0 THOUSAND/ΜL (ref 0–0.61)
EOSINOPHIL NFR BLD AUTO: 0 % (ref 0–6)
ERYTHROCYTE [DISTWIDTH] IN BLOOD BY AUTOMATED COUNT: 17.5 % (ref 11.6–15.1)
GFR SERPL CREATININE-BSD FRML MDRD: 51 ML/MIN/1.73SQ M
GFR SERPL CREATININE-BSD FRML MDRD: 63 ML/MIN/1.73SQ M
GLUCOSE SERPL-MCNC: 172 MG/DL (ref 65–140)
GLUCOSE SERPL-MCNC: 174 MG/DL (ref 65–140)
GLUCOSE SERPL-MCNC: 179 MG/DL (ref 65–140)
GLUCOSE SERPL-MCNC: 182 MG/DL (ref 65–140)
GLUCOSE SERPL-MCNC: 185 MG/DL (ref 65–140)
HCO3 BLDA-SCNC: 35.6 MMOL/L (ref 22–28)
HCT VFR BLD AUTO: 33.5 % (ref 34.8–46.1)
HGB BLD-MCNC: 9.8 G/DL (ref 11.5–15.4)
HOROWITZ INDEX BLDA+IHG-RTO: 50 MM[HG]
IMM GRANULOCYTES # BLD AUTO: 0.03 THOUSAND/UL (ref 0–0.2)
IMM GRANULOCYTES NFR BLD AUTO: 0 % (ref 0–2)
LYMPHOCYTES # BLD AUTO: 0.2 THOUSANDS/ΜL (ref 0.6–4.47)
LYMPHOCYTES NFR BLD AUTO: 3 % (ref 14–44)
MAGNESIUM SERPL-MCNC: 1.6 MG/DL (ref 1.6–2.6)
MCH RBC QN AUTO: 23.7 PG (ref 26.8–34.3)
MCHC RBC AUTO-ENTMCNC: 29.3 G/DL (ref 31.4–37.4)
MCV RBC AUTO: 81 FL (ref 82–98)
MONOCYTES # BLD AUTO: 0.04 THOUSAND/ΜL (ref 0.17–1.22)
MONOCYTES NFR BLD AUTO: 1 % (ref 4–12)
NEUTROPHILS # BLD AUTO: 6.65 THOUSANDS/ΜL (ref 1.85–7.62)
NEUTS SEG NFR BLD AUTO: 96 % (ref 43–75)
NRBC BLD AUTO-RTO: 0 /100 WBCS
O2 CT BLDA-SCNC: 87 ML/DL (ref 95–100)
OXYHGB MFR BLDA: 95 % (ref 94–97)
PCO2 BLDA: 45.2 MM HG (ref 36–44)
PCO2 TEMP ADJ BLDA: 45.3 MM HG (ref 36–44)
PEEP RESPIRATORY: 5 CM[H2O]
PH BLD: 7.5 [PH] (ref 7.35–7.45)
PH BLDA: 7.5 [PH] (ref 7.35–7.45)
PHOSPHATE SERPL-MCNC: 1.6 MG/DL (ref 2.3–4.1)
PLATELET # BLD AUTO: 103 THOUSANDS/UL (ref 149–390)
PMV BLD AUTO: 11.7 FL (ref 8.9–12.7)
PO2 BLD: 49 MM HG (ref 75–129)
PO2 BLDA: 49 MM HG (ref 75–129)
POTASSIUM SERPL-SCNC: 2.9 MMOL/L (ref 3.5–5.3)
POTASSIUM SERPL-SCNC: 4 MMOL/L (ref 3.5–5.3)
PROCALCITONIN SERPL-MCNC: <0.05 NG/ML
RBC # BLD AUTO: 4.13 MILLION/UL (ref 3.81–5.12)
SODIUM SERPL-SCNC: 138 MMOL/L (ref 136–145)
SODIUM SERPL-SCNC: 140 MMOL/L (ref 136–145)
SPECIMEN SOURCE: ABNORMAL
VENT AC: 18
VENT- AC: AC
VT SETTING VENT: 350 ML
WBC # BLD AUTO: 6.92 THOUSAND/UL (ref 4.31–10.16)

## 2018-09-30 PROCEDURE — 82948 REAGENT STRIP/BLOOD GLUCOSE: CPT

## 2018-09-30 PROCEDURE — 83735 ASSAY OF MAGNESIUM: CPT | Performed by: NURSE PRACTITIONER

## 2018-09-30 PROCEDURE — 71250 CT THORAX DX C-: CPT

## 2018-09-30 PROCEDURE — 80048 BASIC METABOLIC PNL TOTAL CA: CPT | Performed by: PHYSICIAN ASSISTANT

## 2018-09-30 PROCEDURE — 84100 ASSAY OF PHOSPHORUS: CPT | Performed by: NURSE PRACTITIONER

## 2018-09-30 PROCEDURE — 94760 N-INVAS EAR/PLS OXIMETRY 1: CPT

## 2018-09-30 PROCEDURE — 99291 CRITICAL CARE FIRST HOUR: CPT | Performed by: ANESTHESIOLOGY

## 2018-09-30 PROCEDURE — 85025 COMPLETE CBC W/AUTO DIFF WBC: CPT | Performed by: NURSE PRACTITIONER

## 2018-09-30 PROCEDURE — 94003 VENT MGMT INPAT SUBQ DAY: CPT

## 2018-09-30 PROCEDURE — 80048 BASIC METABOLIC PNL TOTAL CA: CPT | Performed by: NURSE PRACTITIONER

## 2018-09-30 PROCEDURE — 82805 BLOOD GASES W/O2 SATURATION: CPT | Performed by: NURSE PRACTITIONER

## 2018-09-30 PROCEDURE — 94640 AIRWAY INHALATION TREATMENT: CPT

## 2018-09-30 RX ORDER — POTASSIUM CHLORIDE 14.9 MG/ML
20 INJECTION INTRAVENOUS ONCE
Status: COMPLETED | OUTPATIENT
Start: 2018-09-30 | End: 2018-09-30

## 2018-09-30 RX ORDER — METOPROLOL TARTRATE 5 MG/5ML
5 INJECTION INTRAVENOUS ONCE
Status: COMPLETED | OUTPATIENT
Start: 2018-09-30 | End: 2018-09-30

## 2018-09-30 RX ORDER — SODIUM CHLORIDE, SODIUM GLUCONATE, SODIUM ACETATE, POTASSIUM CHLORIDE, MAGNESIUM CHLORIDE, SODIUM PHOSPHATE, DIBASIC, AND POTASSIUM PHOSPHATE .53; .5; .37; .037; .03; .012; .00082 G/100ML; G/100ML; G/100ML; G/100ML; G/100ML; G/100ML; G/100ML
75 INJECTION, SOLUTION INTRAVENOUS CONTINUOUS
Status: DISCONTINUED | OUTPATIENT
Start: 2018-09-30 | End: 2018-09-30

## 2018-09-30 RX ORDER — MAGNESIUM SULFATE HEPTAHYDRATE 40 MG/ML
4 INJECTION, SOLUTION INTRAVENOUS ONCE
Status: COMPLETED | OUTPATIENT
Start: 2018-09-30 | End: 2018-09-30

## 2018-09-30 RX ORDER — FUROSEMIDE 10 MG/ML
40 INJECTION INTRAMUSCULAR; INTRAVENOUS ONCE
Status: COMPLETED | OUTPATIENT
Start: 2018-09-30 | End: 2018-09-30

## 2018-09-30 RX ORDER — METOPROLOL TARTRATE 50 MG/1
50 TABLET, FILM COATED ORAL EVERY 12 HOURS SCHEDULED
Status: DISCONTINUED | OUTPATIENT
Start: 2018-09-30 | End: 2018-10-05 | Stop reason: HOSPADM

## 2018-09-30 RX ORDER — POTASSIUM CHLORIDE 20MEQ/15ML
40 LIQUID (ML) ORAL ONCE
Status: COMPLETED | OUTPATIENT
Start: 2018-09-30 | End: 2018-09-30

## 2018-09-30 RX ORDER — SODIUM CHLORIDE, SODIUM GLUCONATE, SODIUM ACETATE, POTASSIUM CHLORIDE, MAGNESIUM CHLORIDE, SODIUM PHOSPHATE, DIBASIC, AND POTASSIUM PHOSPHATE .53; .5; .37; .037; .03; .012; .00082 G/100ML; G/100ML; G/100ML; G/100ML; G/100ML; G/100ML; G/100ML
500 INJECTION, SOLUTION INTRAVENOUS ONCE
Status: COMPLETED | OUTPATIENT
Start: 2018-09-30 | End: 2018-09-30

## 2018-09-30 RX ORDER — POTASSIUM CHLORIDE 14.9 MG/ML
20 INJECTION INTRAVENOUS ONCE
Status: DISCONTINUED | OUTPATIENT
Start: 2018-09-30 | End: 2018-09-30

## 2018-09-30 RX ADMIN — METHYLPREDNISOLONE SODIUM SUCCINATE 40 MG: 40 INJECTION, POWDER, FOR SOLUTION INTRAMUSCULAR; INTRAVENOUS at 23:16

## 2018-09-30 RX ADMIN — MAGNESIUM OXIDE TAB 400 MG (241.3 MG ELEMENTAL MG) 400 MG: 400 (241.3 MG) TAB at 08:21

## 2018-09-30 RX ADMIN — IPRATROPIUM BROMIDE AND ALBUTEROL SULFATE 3 ML: .5; 3 SOLUTION RESPIRATORY (INHALATION) at 03:21

## 2018-09-30 RX ADMIN — NYSTATIN 1 APPLICATION: 100000 POWDER TOPICAL at 08:16

## 2018-09-30 RX ADMIN — PREGABALIN 300 MG: 100 CAPSULE ORAL at 08:22

## 2018-09-30 RX ADMIN — FUROSEMIDE 40 MG: 10 INJECTION, SOLUTION INTRAMUSCULAR; INTRAVENOUS at 15:37

## 2018-09-30 RX ADMIN — IPRATROPIUM BROMIDE AND ALBUTEROL SULFATE 3 ML: .5; 3 SOLUTION RESPIRATORY (INHALATION) at 23:55

## 2018-09-30 RX ADMIN — POTASSIUM CHLORIDE 20 MEQ: 200 INJECTION, SOLUTION INTRAVENOUS at 06:09

## 2018-09-30 RX ADMIN — IPRATROPIUM BROMIDE AND ALBUTEROL SULFATE 3 ML: .5; 3 SOLUTION RESPIRATORY (INHALATION) at 15:05

## 2018-09-30 RX ADMIN — APIXABAN 5 MG: 5 TABLET, FILM COATED ORAL at 08:21

## 2018-09-30 RX ADMIN — CHLORHEXIDINE GLUCONATE 0.12% ORAL RINSE 15 ML: 1.2 LIQUID ORAL at 20:41

## 2018-09-30 RX ADMIN — MAGNESIUM SULFATE HEPTAHYDRATE 4 G: 40 INJECTION, SOLUTION INTRAVENOUS at 06:36

## 2018-09-30 RX ADMIN — MAGNESIUM OXIDE TAB 400 MG (241.3 MG ELEMENTAL MG) 400 MG: 400 (241.3 MG) TAB at 17:22

## 2018-09-30 RX ADMIN — METHYLPREDNISOLONE SODIUM SUCCINATE 40 MG: 40 INJECTION, POWDER, FOR SOLUTION INTRAMUSCULAR; INTRAVENOUS at 12:12

## 2018-09-30 RX ADMIN — IPRATROPIUM BROMIDE AND ALBUTEROL SULFATE 3 ML: .5; 3 SOLUTION RESPIRATORY (INHALATION) at 11:48

## 2018-09-30 RX ADMIN — METHYLPREDNISOLONE SODIUM SUCCINATE 40 MG: 40 INJECTION, POWDER, FOR SOLUTION INTRAMUSCULAR; INTRAVENOUS at 17:22

## 2018-09-30 RX ADMIN — Medication 20 MG: at 08:21

## 2018-09-30 RX ADMIN — METOPROLOL TARTRATE 25 MG: 25 TABLET ORAL at 08:22

## 2018-09-30 RX ADMIN — APIXABAN 5 MG: 5 TABLET, FILM COATED ORAL at 20:41

## 2018-09-30 RX ADMIN — PREGABALIN 300 MG: 100 CAPSULE ORAL at 17:22

## 2018-09-30 RX ADMIN — POTASSIUM CHLORIDE 40 MEQ: 20 SOLUTION ORAL at 06:09

## 2018-09-30 RX ADMIN — NYSTATIN 1 APPLICATION: 100000 POWDER TOPICAL at 17:23

## 2018-09-30 RX ADMIN — SODIUM CHLORIDE 125 ML/HR: 0.9 INJECTION, SOLUTION INTRAVENOUS at 01:32

## 2018-09-30 RX ADMIN — CEFEPIME HYDROCHLORIDE 2000 MG: 2 INJECTION, POWDER, FOR SOLUTION INTRAVENOUS at 09:42

## 2018-09-30 RX ADMIN — ATORVASTATIN CALCIUM 40 MG: 40 TABLET, FILM COATED ORAL at 08:21

## 2018-09-30 RX ADMIN — IPRATROPIUM BROMIDE AND ALBUTEROL SULFATE 3 ML: .5; 3 SOLUTION RESPIRATORY (INHALATION) at 07:12

## 2018-09-30 RX ADMIN — METOPROLOL TARTRATE 50 MG: 50 TABLET ORAL at 20:41

## 2018-09-30 RX ADMIN — METOPROLOL TARTRATE 5 MG: 1 INJECTION, SOLUTION INTRAVENOUS at 17:25

## 2018-09-30 RX ADMIN — IPRATROPIUM BROMIDE AND ALBUTEROL SULFATE 3 ML: .5; 3 SOLUTION RESPIRATORY (INHALATION) at 19:22

## 2018-09-30 RX ADMIN — CHLORHEXIDINE GLUCONATE 0.12% ORAL RINSE 15 ML: 1.2 LIQUID ORAL at 08:22

## 2018-09-30 RX ADMIN — METHYLPREDNISOLONE SODIUM SUCCINATE 40 MG: 40 INJECTION, POWDER, FOR SOLUTION INTRAMUSCULAR; INTRAVENOUS at 05:05

## 2018-09-30 RX ADMIN — FUROSEMIDE 40 MG: 10 INJECTION, SOLUTION INTRAMUSCULAR; INTRAVENOUS at 09:42

## 2018-09-30 RX ADMIN — SODIUM CHLORIDE, SODIUM GLUCONATE, SODIUM ACETATE, POTASSIUM CHLORIDE, MAGNESIUM CHLORIDE, SODIUM PHOSPHATE, DIBASIC, AND POTASSIUM PHOSPHATE 75 ML/HR: .53; .5; .37; .037; .03; .012; .00082 INJECTION, SOLUTION INTRAVENOUS at 03:48

## 2018-09-30 RX ADMIN — PROPOFOL 10 MCG/KG/MIN: 10 INJECTION, EMULSION INTRAVENOUS at 08:14

## 2018-09-30 RX ADMIN — AZITHROMYCIN MONOHYDRATE 500 MG: 500 INJECTION, POWDER, LYOPHILIZED, FOR SOLUTION INTRAVENOUS at 21:41

## 2018-09-30 RX ADMIN — SODIUM CHLORIDE, SODIUM GLUCONATE, SODIUM ACETATE, POTASSIUM CHLORIDE, MAGNESIUM CHLORIDE, SODIUM PHOSPHATE, DIBASIC, AND POTASSIUM PHOSPHATE 500 ML: .53; .5; .37; .037; .03; .012; .00082 INJECTION, SOLUTION INTRAVENOUS at 03:47

## 2018-09-30 RX ADMIN — POTASSIUM PHOSPHATE, MONOBASIC AND POTASSIUM PHOSPHATE, DIBASIC 21 MMOL: 224; 236 INJECTION, SOLUTION, CONCENTRATE INTRAVENOUS at 07:29

## 2018-09-30 NOTE — PROGRESS NOTES
Daily Progress Note - Critical Care/ Stepdown   Whitney Smith 81 y o  female MRN: [id number]  Unit/Bed#: ICU 02 Encounter: [id number]    ______________________________________________________________________  Assessment:   Principal Problem:    COPD with acute exacerbation ([address] )  Active Problems:    Acute respiratory failure with hypercapnia (HCC)    Acute on chronic diastolic congestive heart failure (HCC)    Obstructive sleep apnea    Paroxysmal atrial fibrillation (HCC)    Essential hypertension    Obesity (BMI 30-39  9)    GERD (gastroesophageal reflux disease)  Resolved Problems:    Acute respiratory acidosis      * COPD with acute exacerbation (HCC)   Assessment & Plan    · Cont Solumedrol, 125 mg given in the ED, 40 mg Q6H to start in am  · Duonebs Q4H for now  · Restart home inhalers once extubated  · Currently on Cefepime, Zithromax 500 mg IV Q24H  · CXR with some concern for infiltrates on right side, await sputum c/s and procal to r/o any infectious process     Acute respiratory failure with hypercapnia (HCC)   Assessment & Plan    · Secondary to COPD exacerbation, ?pneumonia vs CHF  · Failed BiPAP and required intubation  · Wean vent as tolerated  · Currently on AC RR 14 fio2 50% peep 5  · Diuresis with lasix  · CT chest to better delineate pneumonia vs CHF      Acute respiratory acidosisresolved as of 9/30/2018   Assessment & Plan    · Secondary to hypercapnic resp failure  · Trend ABG     Acute on chronic diastolic congestive heart failure ([facility] [address] )   Assessment & Plan    · CXR with some evidence of volume overload and patient is normally diuretic dependant   · CT chest to better delineate  · Lasix 40mg x 1 and monitor UOP     Obstructive sleep apnea   Assessment & Plan    · CPAP once extubated     Paroxysmal atrial fibrillation (HCC)   Assessment & Plan    · Cont Eliquis  · Restart Metoprolol     GERD (gastroesophageal reflux disease)   Assessment & Plan    · Cont home PPI     Obesity (BMI 30-39  9) Assessment & Plan    · Nutrition c/s pending     Essential hypertension   Assessment & Plan    · Hold all home meds for now until BP improves         Plan:    Neuro:   · Sedation plan: Propofol  · RASS goal: 0 Alert and Calm  · Sedation break plan: daily  · Delirium: CAM ICU positive no   · If yes, intervention: environmental controls  · Pain controlled with: no pain currently  · Pain score: none  · Regulate sleep/wake cycle    CV:   · Rhythm: AF  · Follow rhythm on telemetry    Pulm:   · SBT plan: daily  · Chest PT ordered: no   · Chlorhexidine ordered: yes   · HOB >30 degrees: yes     GI:   · Nutrition/diet plan: start tubefeeding if remains intubated  · Stress ulcer prophylaxis: Omeprazole PO  · Bowel regimen: None currently  · Last BM: prior to admission    FEN:   · Fluid/Diuretic plan: No intervention  · Goal 24 hour fluid balance: even  · Electrolytes repleted: yes  · Goal: K >4 0, Mag >2 0, and Phos >3 0    :   · Indwelling Villar present: yes   · Urinary catheter still needed for Strict I and O in a critically ill patient  ID:   · Abx ordered: Azithromycin  · Day # 2   · Trend temps and WBC count    Heme:   · Trend hgb and plts    Endo:   · Glycemic control plan: Blood glucose controlled on current regimen    Msk/Skin:  · Mobility goal:   · PT consult: yes  · OT consult: yes  · Frequent turning and off-loading    Family:  · Family updated within 24 hours: no   · Family meeting planned today: yes     Lines:  · PIV    VTE Prophylaxis:  · Pharmacologic Prophylaxis: Eliquis  · Mechanical Prophylaxis: sequential compression device    Disposition: Continue ICU care    Code Status: Level 1 - Full Code    Counseling / Coordination of Care  Total Critical Care time spent 35 minutes excluding procedures, teaching and family updates      ______________________________________________________________________    HPI/24hr events: no acute events once intubated ______________________________________________________________________    Physical Exam:   Physical Exam   Constitutional: She appears well-developed and well-nourished  No distress  HENT:   Head: Normocephalic and atraumatic  Eyes: Pupils are equal, round, and reactive to light  Left eye exhibits no discharge  Neck: Normal range of motion  Neck supple  No JVD present  Cardiovascular: Normal rate and regular rhythm  No murmur heard  Pulmonary/Chest: Effort normal and breath sounds normal  No respiratory distress  She has no wheezes  Abdominal: Soft  Bowel sounds are normal  She exhibits no distension  Musculoskeletal:   Left lower extremity lymphedema   Neurological: She is alert  Following commands appropriately   Skin: Skin is warm and dry  Nursing note and vitals reviewed  ______________________________________________________________________  Vitals:    18 0325 18 0400 18 0500 18 0600   BP:  104/62 109/56 108/65   Pulse:  86 84 87   Resp:   18   Temp:  98 7 °F (37 1 °C)     TempSrc:  Tympanic     SpO2: 97% 93% 93% 95%   Weight:       Height:                  Temperature:   Temp (24hrs), Av 8 °F (37 1 °C), Min:98 2 °F (36 8 °C), Max:100 °F (37 8 °C)    Current Temperature: 98 7 °F (37 1 °C)    Weights:   IBW: 54 7 kg    Body mass index is 35 5 kg/m²    Weight (last 2 days)     Date/Time   Weight    18 2330  93 8 (206 79)    18 1850  89 2 (196 65)              Hemodynamic Monitoring:  N/A       Non-Invasive/Invasive Ventilation Settings:  Respiratory    Lab Data (Last 4 hours)       0544            pH, Arterial       (!)7 504           pCO2, Arterial       (!)45 2           pO2, Arterial       (!)49 0           HCO3, Arterial       (!)35 6           Base Excess, Arterial       11 0                O2/Vent Data           Most Recent         Vent Mode   AC/VC      Resp Rate (BPM) (BPM)   14  Comment: new order      Vt (mL) (mL)   350 FIO2 (%) (%)   50      PEEP (cmH2O) (cmH2O)   5      MV   6 8                Lab Results   Component Value Date    PHART 7 504 (H) 09/30/2018    [id number] 45 2 (H) 09/30/2018    PO2ART 49 0 (LL) 09/30/2018    IDG0SNP 35 6 (H) 09/30/2018    BEART 11 0 09/30/2018    SOURCE Radial, Left 09/30/2018     SpO2: SpO2: (!) 86 %  , SpO2 Activity: SpO2 Activity: At Rest, SpO2 Device: O2 Device: Other (comment) (ventilator)    Intake and Outputs:  I/O       09/28 0701 - 09/29 0700 09/29 0701 - 09/30 0700    NG/GT  30    IV Piggyback  450    Total Intake(mL/kg)  480 (5 1)    Urine (mL/kg/hr)  440    Total Output   440    Net   +40              Nutrition:        Diet Orders            Start     Ordered    09/29/18 2215  Diet NPO  Diet effective now     Question Answer Comment   Diet Type NPO    RD to adjust diet per protocol?  Yes        09/29/18 2214            Labs:     Results from last 7 days  Lab Units 09/30/18  0455 09/29/18  1900   WBC Thousand/uL 6 92 10 93*   HEMOGLOBIN g/dL 9 8* 11 1*   HEMATOCRIT % 33 5* 39 2   PLATELETS Thousands/uL 103* 152   NEUTROS PCT % 96* 90*   MONOS PCT % 1* 4       Results from last 7 days  Lab Units 09/30/18  0455 09/29/18  1900   SODIUM mmol/L 140 140   POTASSIUM mmol/L 2 9* 4 4   CHLORIDE mmol/L 98* 98*   CO2 mmol/L 32 39*   BUN mg/dL 18 12   CREATININE mg/dL 0 86 0 73   CALCIUM mg/dL 8 3 8 9   ALK PHOS U/L  --  127*   ALT U/L  --  20   AST U/L  --  25       Results from last 7 days  Lab Units 09/30/18  0455 09/29/18  1900   MAGNESIUM mg/dL 1 6 1 7     Lab Results   Component Value Date    PHOS 1 6 (L) 09/30/2018    PHOS 2 7 05/11/2018    PHOS 2 4 03/07/2018        Results from last 7 days  Lab Units 09/29/18  1900   INR  1 05   PTT seconds 30       0  Lab Value Date/Time   TROPONINI <0 02 09/29/2018 1900   TROPONINI 0 02 05/11/2018 0826   TROPONINI <0 02 05/10/2018 1756   TROPONINI <0 02 05/10/2018 1714   TROPONINI <0 02 05/10/2018 0826   TROPONINI <0 02 03/15/2018 1400   TROPONINI <0 02 02/26/2018 1049   TROPONINI <0 02 01/23/2018 1453   TROPONINI <0 02 01/05/2018 1735   TROPONINI <0 02 01/05/2018 1108   TROPONINI 0 02 12/11/2017 0944   TROPONINI <0 02 03/13/2017 0930   TROPONINI 0 04 (H) 09/21/2016 1111   TROPONINI <0 02 04/27/2016 0307   TROPONINI 0 02 04/26/2016 2133   TROPONINI <0 02 04/26/2016 1818   TROPONINI <0 02 04/26/2016 1244       Results from last 7 days  Lab Units 09/29/18  1901   LACTIC ACID mmol/L 0 6     ABG:  Lab Results   Component Value Date    PHART 7 504 (H) 09/30/2018    TZA4YIP 45 2 (H) 09/30/2018    PO2ART 49 0 (LL) 09/30/2018    [id number] 35 6 (H) 09/30/2018    BEART 11 0 09/30/2018    SOURCE Radial, Left 09/30/2018       Imaging: CXR:  I have personally reviewed pertinent reports  EKG: AF    Micro:  Lab Results   Component Value Date    BLOODCX No Growth After 5 Days  01/23/2018    BLOODCX No Growth After 5 Days  01/23/2018    BLOODCX No Growth After 5 Days  01/05/2018    URINECX >100,000 cfu/ml Mixed Contaminants X5 09/21/2016       Allergies:    Allergies   Allergen Reactions    Fentanyl And Related Other (See Comments)     Passed out    Latex Rash    Penicillins Rash     Medications:   Scheduled Meds:  Current Facility-Administered Medications:  apixaban 5 mg Per NG Tube Q12H [address] Genzak Bhatia, YAHIRNP    atorvastatin 40 mg Oral Daily ita Jannette, YAHIRNP    azithromycin 500 mg Intravenous Q24H GILDA Zhang Last Rate: Stopped (09/30/18 0015)   cefepime 2,000 mg Intravenous Q12H Rakan Dupont DO Last Rate: Stopped (09/29/18 2206)   chlorhexidine 15 mL Swish & Spit Q12H [address] Genita YAHIR BhatiaNP    furosemide 40 mg Oral Daily Genita GILDA Bhatia    influenza vaccine 0 5 mL Intramuscular Prior to discharge Alejandro Ramírez MD    ipratropium-albuterol 3 mL Nebulization Q4H GILDA Zhang    magnesium oxide 400 mg Per NG Tube BID GILDA Zhang    magnesium sulfate 4 g Intravenous Once GILDA Zhang    methylPREDNISolone sodium succinate 40 mg Intravenous Q6H Bria Page, CRNP    multi-electrolyte 75 mL/hr Intravenous Continuous Bria Page CRNP Last Rate: 75 mL/hr (09/30/18 0348)   nystatin  Topical BID Bria Page, CRNP    omeprazole (PRILOSEC) suspension 2 mg/mL 20 mg Oral Daily Bria Page, CRNP    potassium chloride 20 mEq Intravenous Once Bria Page, CRNP Last Rate: 20 mEq (09/30/18 0609)   potassium phosphate 21 mmol Intravenous Once Bria Page, CRNP    pregabalin 300 mg Oral BID Bria Page, CRNP    propofol 5-50 mcg/kg/min Intravenous Titrated Deejay Coburn DO Last Rate: 10 mcg/kg/min (09/30/18 0612)     Continuous Infusions:  multi-electrolyte 75 mL/hr Last Rate: 75 mL/hr (09/30/18 0348)   propofol 5-50 mcg/kg/min Last Rate: 10 mcg/kg/min (09/30/18 0612)     PRN Meds:    influenza vaccine 0 5 mL Prior to discharge       Invasive lines and devices:   Invasive Devices     Peripheral Intravenous Line            Long-Dwell Peripheral IV (Midline) 64/31/74 Right Basilic less than 1 day          Drain            NG/OG/Enteral Tube Orogastric 16 Fr Right mouth less than 1 day    Urethral Catheter Non-latex 6 Fr  less than 1 day          Airway            ETT  Cuffed 7 5 mm less than 1 day                   SIGNATURE: Russell Negron PA-C  DATE: September 30, 2018

## 2018-09-30 NOTE — H&P
History and Physical - Critical Care/ Stepdown   Joe Ireland 81 y o  female MRN: [id number]  Unit/Bed#: ICU 02 Encounter: [id number]    Reason for Admission / Chief Complaint: altered mental status    History of Present Illness:  Joe Ireland is a 81 y o  female who presents to the ED with reported AMS at [facility] since 3:00pm today  She has a PMhx of COPD, DCHF, DAMON, PAF, GERD, PVD and HTN  Per ED patient was reportedly suppose to be d/c home today from rehab after being there 6 months  She was reportedly seen at 3:00 pm and was "sleeping"  Her daughter reports trying to call her and she did not answer the phone  EMS arrived to find the patient not responsive and cyanotoic  She was placed on NRB with some minor improvement in mentation  On arrival to ED she was not very responsive and was trialled on Bipap but her ABG worsened and she was electively intubated  History obtained from child and chart review  Past Medical History:  Past Medical History:   Diagnosis Date    Asthma     Cancer ([facility] [address] )     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    Cardiac disease     a fib    Fibromyalgia     Fibromyalgia, primary     GERD (gastroesophageal reflux disease)     Hypertension     Hypokalemia     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, left groin, lung and bladder  Patient treated with chemotherapy and radiation       Past Surgical History:  Past Surgical History:   Procedure Laterality Date    APPENDECTOMY      CATARACT EXTRACTION      CHEST WALL BIOPSY N/A 10/27/2017    Procedure: SINUS EXCISION AND REMOVAL OF FOREIGN BODY, ABDOMEN (WOUND EXPLORATION);   Surgeon: Rodríguez Rose MD;  Location: [address] [place] [address];  Service: General    EYE SURGERY Bilateral     cataracts     HIP FRACTURE SURGERY Left     HYSTERECTOMY      JOINT REPLACEMENT Left     hip    LYMPHADENECTOMY      PORTACATH PLACEMENT Right        Past Family History:  Family History   Problem Relation Age of Onset    Pneumonia Mother     Heart disease Father        Social History:  History   Smoking Status    Never Smoker   Smokeless Tobacco    Never Used     Comment: never smoke      History   Alcohol Use No     History   Drug Use No     Marital Status:   Exercise History: ambulatory    Medications:  Current Facility-Administered Medications   Medication Dose Route Frequency    apixaban (ELIQUIS) tablet 5 mg  5 mg Per NG Tube Q12H [address]    [START ON 9/30/2018] atorvastatin (LIPITOR) tablet 40 mg  40 mg Oral Daily    azithromycin (ZITHROMAX) 500 mg in sodium chloride 0 9 % 250 mL IVPB  500 mg Intravenous Q24H    cefepime (MAXIPIME) 2 g/50 mL dextrose IVPB  2,000 mg Intravenous Q12H    chlorhexidine (PERIDEX) 0 12 % oral rinse 15 mL  15 mL Swish & Spit Q12H [address]    [START ON 9/30/2018] furosemide (LASIX) tablet 40 mg  40 mg Oral Daily    influenza vaccine, high-dose (FLUZONE HIGH-DOSE) IM injection YANDEL 0 5 mL  0 5 mL Intramuscular Prior to discharge    [START ON 9/30/2018] ipratropium-albuterol (DUO-NEB) 0 5-2 5 mg/3 mL inhalation solution 3 mL  3 mL Nebulization Q4H    [START ON 9/30/2018] magnesium oxide (MAG-OX) tablet 400 mg  400 mg Per NG Tube BID    [START ON 9/30/2018] methylPREDNISolone sodium succinate (Solu-MEDROL) injection 40 mg  40 mg Intravenous Q6H    [START ON 9/30/2018] nystatin (MYCOSTATIN) powder   Topical BID    [START ON 9/30/2018] omeprazole (PRILOSEC) suspension 2 mg/mL  20 mg Oral Daily    [START ON 9/30/2018] pregabalin (LYRICA) capsule 300 mg  300 mg Oral BID    propofol (DIPRIVAN) 1000 mg in 100 mL infusion (premix)  5-50 mcg/kg/min Intravenous Titrated    sodium chloride 0 9 % infusion  125 mL/hr Intravenous Continuous     Home medications:  Prior to Admission medications    Medication Sig Start Date End Date Taking?  Authorizing Provider   albuterol (2 5 mg/3 mL) 0 083 % nebulizer solution Take 2 5 mg by nebulization every 6 (six) hours as needed for wheezing   Yes Historical Provider, MD   ALPRAZolam Charles Glance) 0 5 mg tablet Take 0 5 mg by mouth daily at bedtime   Yes Historical Provider, MD   amLODIPine (NORVASC) 5 mg tablet Take 5 mg by mouth daily   Yes Historical Provider, MD   apixaban (ELIQUIS) 5 mg Take 5 mg by mouth every 12 (twelve) hours     Yes Historical Provider, MD   aspirin (ECOTRIN LOW STRENGTH) 81 mg EC tablet Take 81 mg by mouth daily   Yes Historical Provider, MD   atorvastatin (LIPITOR) 40 mg tablet Take 40 mg by mouth daily   Yes Historical Provider, MD   budesonide (PULMICORT) 0 5 mg/2 mL nebulizer solution Take 0 5 mg by nebulization 2 (two) times a day Rinse mouth after use     Yes Historical Provider, MD   calcium carbonate (OS-MEGHA) 600 MG tablet Take 600 mg by mouth 2 (two) times a day with meals   Yes Historical Provider, MD   cholecalciferol (VITAMIN D3) 400 units tablet Take 400 Units by mouth 2 (two) times a day   Yes Historical Provider, MD   docusate sodium (COLACE) 100 mg capsule Take 200 mg by mouth daily   Yes Historical Provider, MD   Fesoterodine Fumarate ER (TOVIAZ) 8 MG TB24 Take by mouth daily at bedtime     Yes Historical Provider, MD   fluticasone (FLONASE) 50 mcg/act nasal spray 1 spray into each nostril daily 5/19/18  Yes Shakila Sainz MD   fluticasone-salmeterol (ADVAIR) 250-50 mcg/dose inhaler Inhale 1 puff every 12 (twelve) hours This is home medication 1/10/18  Yes Shakila Sainz MD   furosemide (LASIX) 40 mg tablet Take 40 mg by mouth daily     Yes Historical Provider, MD   ipratropium-albuterol (DUO-NEB) 0 5-2 5 mg/3 mL nebulizer solution Take 3 mL by nebulization every 6 (six) hours while awake   Yes Historical Provider, MD   loratadine (CLARITIN) 10 mg tablet Take 10 mg by mouth daily   Yes Historical Provider, MD   magnesium oxide (MAG-OX) 400 mg Take 400 mg by mouth 2 (two) times a day   Yes Historical Provider, MD   metoprolol tartrate (LOPRESSOR) 50 mg tablet Take 50 mg by mouth every 12 (twelve) hours   Yes Historical Provider, MD   omeprazole (PriLOSEC) 20 mg delayed release capsule Take 20 mg by mouth daily   Yes Historical Provider, MD   potassium chloride (K-DUR) 10 mEq tablet Take 10 mEq by mouth daily  Yes Historical Provider, MD   predniSONE 10 mg tablet Take 10 mg by mouth daily   Yes Historical Provider, MD   pregabalin (LYRICA) 300 MG capsule Take 300 mg by mouth 2 (two) times a day  Yes Historical Provider, MD   acetaminophen (TYLENOL) 325 mg tablet Take 2 tablets (650 mg total) by mouth every 6 (six) hours as needed for fever 18   Rigoberto Myers MD   esomeprazole (NexIUM) 40 MG capsule Take 40 mg by mouth daily  18  Historical Provider, MD   polyvinyl alcohol (LIQUIFILM TEARS) 1 4 % ophthalmic solution Administer 1 drop to both eyes 3 (three) times a day 3/19/18 9/29/18  GILDA Mcintosh     Allergies: Allergies   Allergen Reactions    Fentanyl And Related Other (See Comments)     Passed out    Latex Rash    Penicillins Rash       ROS:   Review of Systems   Unable to perform ROS: Patient unresponsive       Vitals:  Vitals:    18 2230 18 2300 18 2306 18 2330   BP: 106/56 115/61  107/59   BP Location:       Pulse: 88 87  88   Resp: 20 20  20   Temp:       TempSrc:       SpO2: 93% 92% 92% 93%   Weight:    93 8 kg (206 lb 12 7 oz)   Height:    5' 4" (1 626 m)     Temperature:   Temp (24hrs), Av 2 °F (37 3 °C), Min:98 4 °F (36 9 °C), Max:100 °F (37 8 °C)    Current Temperature: 98 4 °F (36 9 °C)    Weights:   IBW: 54 7 kg  Body mass index is 35 5 kg/m²      Hemodynamic Monitoring:  N/A     Non-Invasive/Invasive Ventilation Settings:  Respiratory    Lab Data (Last 4 hours)       2254          pH, Arterial       (!)7 203          (!)7 466          pCO2, Arterial       (!)123 1          (!)51 9          pO2, Arterial       (!)202 1          (!)61 0          HCO3, Arterial       (!)47 9          (!)37 4          Base Excess, Arterial 15 4          12 0               O2/Vent Data       1936 09/29 2142   Most Recent       Vent Mode  AC/VC  AC/VC     Non-Invasive Ventilation Mode BiPAP        Resp Rate (BPM) (BPM)  20  20     Vt (mL) (mL)  350  350     FIO2 (%) (%)  50  70     PEEP (cmH2O) (cmH2O)  5  5     MV  5  7 6               Lab Results   Component Value Date    PHART 7 466 (H) 09/29/2018    RZN9INE 51 9 (H) 09/29/2018    PO2ART 61 0 (L) 09/29/2018    AMK7LXS 37 4 (H) 09/29/2018    BEART 12 0 09/29/2018    SOURCE Radial, Left 09/29/2018     SpO2: SpO2: 93 %, SpO2 Device: O2 Device:  (vent)     Physical Exam:  Physical Exam   Constitutional: She appears well-developed and well-nourished  No distress  She is sedated and intubated  HENT:   Head: Normocephalic and atraumatic  Eyes: Pupils are equal, round, and reactive to light  Cardiovascular: Normal rate  An irregularly irregular rhythm present  Pulmonary/Chest: Effort normal  She is intubated  She has decreased breath sounds in the right middle field, the right lower field, the left middle field and the left lower field  Abdominal: Soft  Bowel sounds are normal  She exhibits no distension  Musculoskeletal: She exhibits edema  Neurological: She is unresponsive  GCS eye subscore is 1  GCS verbal subscore is 1  GCS motor subscore is 1  Skin: Skin is warm and dry  Capillary refill takes less than 2 seconds  She is not diaphoretic         Labs:    Results from last 7 days  Lab Units 09/29/18  1900   WBC Thousand/uL 10 93*   HEMOGLOBIN g/dL 11 1*   HEMATOCRIT % 39 2   PLATELETS Thousands/uL 152   NEUTROS PCT % 90*   MONOS PCT % 4      Results from last 7 days  Lab Units 09/29/18  1900   SODIUM mmol/L 140   POTASSIUM mmol/L 4 4   CHLORIDE mmol/L 98*   CO2 mmol/L 39*   BUN mg/dL 12   CREATININE mg/dL 0 73   CALCIUM mg/dL 8 9   ALK PHOS U/L 127*   ALT U/L 20   AST U/L 25       Results from last 7 days  Lab Units 09/29/18  1900   MAGNESIUM mg/dL 1 7            Results from last 7 days  Lab Units 09/29/18  1900   INR  1 05   PTT seconds 30       Results from last 7 days  Lab Units 09/29/18  1901   LACTIC ACID mmol/L 0 6       0  Lab Value Date/Time   TROPONINI <0 02 09/29/2018 1900   TROPONINI 0 02 05/11/2018 0826   TROPONINI <0 02 05/10/2018 1756   Darrelyn Gall <0 02 05/10/2018 1714   TROPONINI <0 02 05/10/2018 0826   TROPONINI <0 02 03/15/2018 1400   TROPONINI <0 02 02/26/2018 1049   TROPONINI <0 02 01/23/2018 1453   TROPONINI <0 02 01/05/2018 1735   TROPONINI <0 02 01/05/2018 1108   TROPONINI 0 02 12/11/2017 0944   TROPONINI <0 02 03/13/2017 0930   TROPONINI 0 04 (H) 09/21/2016 1111   TROPONINI <0 02 04/27/2016 0307   TROPONINI 0 02 04/26/2016 2133   TROPONINI <0 02 04/26/2016 1818   TROPONINI <0 02 04/26/2016 1244       Imaging: CXR: Bibasilar atelectasis vs effusion I have personally reviewed pertinent films in PACS  CT: None   EKG: A-fib This was personally reviewed by myself  Micro:  Blood Culture:   Lab Results   Component Value Date    BLOODCX No Growth After 5 Days  01/23/2018    BLOODCX No Growth After 5 Days  01/23/2018    BLOODCX No Growth After 5 Days  01/05/2018    BLOODCX No Growth After 5 Days  01/05/2018    BLOODCX No Growth After 5 Days  12/13/2017    BLOODCX No Growth After 5 Days   12/13/2017    BLOODCX Staphylococcus epidermidis (A) 12/11/2017     Urine Culture:   Lab Results   Component Value Date    URINECX >100,000 cfu/ml Mixed Contaminants X5 09/21/2016     Sputum Culture: No components found for: SPUTUMCX  Wound Culure: No results found for: WOUNDCULT  ______________________________________________________________________    Assessment:   Principal Problem:    COPD with acute exacerbation ([facility] )  Active Problems:    Acute respiratory failure with hypercapnia ([facility])    Acute respiratory acidosis    Chronic diastolic CHF (congestive heart failure), NYHA class 1 ([facility])    Obstructive sleep apnea    Paroxysmal atrial fibrillation ([facility] )    Essential hypertension Obesity (BMI 30-39  9)    GERD (gastroesophageal reflux disease)  Resolved Problems:    * No resolved hospital problems  *      * COPD with acute exacerbation (HCC)   Assessment & Plan    · Cont Solumedrol, 125 mg given in the ED, 40 mg Q6H to start in am  · Duonebs Q4H for now  · Restart home inhalers once extubated  · Zithromax 500 mg IV Q24H x 5 days  · Will check sputum c/s and procal to r/o any infectious process     Acute respiratory failure with hypercapnia ([facility])   Assessment & Plan    · Secondary to COPD exacerbation  · ED attempted to treat with Bipap and CO2 cont to rise, intubated in ED  · Wean vent as tolerated  · Will trend ABG tonight and in am     Acute respiratory acidosis   Assessment & Plan    · Secondary to hypercapnic resp failure  · Trend ABG     Obstructive sleep apnea   Assessment & Plan    · No current intervention, leave intubated for now     Chronic diastolic CHF (congestive heart failure), NYHA class 1 ([facility])   Assessment & Plan    · Will cont Lasix once BP improves, hold for now in light of soft BP's      Paroxysmal atrial fibrillation ([facility])   Assessment & Plan    · Cont Eliquis, rate controlled currently  · Restart beta blocker when BP's improve     GERD (gastroesophageal reflux disease)   Assessment & Plan    · Cont home PPI     Obesity (BMI 30-39  9)   Assessment & Plan    · Nutrition c/s pending     Essential hypertension   Assessment & Plan    · Hold all home meds for now until BP improves         Plan:    Neuro:   · Sedated prior to intubation will reassess when meds wear off    CV:   · Vitals stable, HR controlled    Pulm:   · Wean FiO2 as tolerated    GI:   · NPO for now, ok to start TF in am if unable to extubate    :  · Villar for accurate output    FEN:   · Labs stable will recheck in am    ID:   · No clear infectious source, no symptoms prior to admission  · Check Procal  · U/A negative    Heme:   · Hgb stable    Endo:   · BS stable, no hx DM  · Will check BS while on steroids    MSK/Skin:   · OOB when able  · PT/OT    Family:  · Family updated: yes daughter at bedside    Disposition: Admit to ICU    Counseling / Coordination of Care  Total Critical Care time spent 50 minutes excluding procedures, teaching and family updates  ______________________________________________________________________    VTE Pharmacologic Prophylaxis: Enoxaparin (Lovenox)  VTE Mechanical Prophylaxis: sequential compression device    Invasive lines and devices: Invasive Devices     Peripheral Intravenous Line            Long-Dwell Peripheral IV (Midline) 94/44/64 Right Basilic less than 1 day          Drain            NG/OG/Enteral Tube Orogastric 16 Fr Right mouth less than 1 day    Urethral Catheter Non-latex 6 Fr  less than 1 day          Airway            ETT  Cuffed 7 5 mm less than 1 day                Code Status: Level 1 - Full Code  POA:    POLST:      Given critical illness, patient length of stay will require greater than two midnights      Signature: GILDA Perez  Date: 9/29/2018

## 2018-09-30 NOTE — PLAN OF CARE
CARDIOVASCULAR - ADULT     Maintains optimal cardiac output and hemodynamic stability Progressing     Absence of cardiac dysrhythmias or at baseline rhythm Progressing        GENITOURINARY - ADULT     Maintains or returns to baseline urinary function Progressing     Absence of urinary retention Progressing     Urinary catheter remains patent Progressing        INFECTION - ADULT     Absence or prevention of progression during hospitalization Progressing     Absence of fever/infection during neutropenic period Progressing        Knowledge Deficit     Patient/family/caregiver demonstrates understanding of disease process, treatment plan, medications, and discharge instructions Progressing        METABOLIC, FLUID AND ELECTROLYTES - ADULT     Electrolytes maintained within normal limits Progressing     Fluid balance maintained Progressing     Glucose maintained within target range Progressing        PAIN - ADULT     Verbalizes/displays adequate comfort level or baseline comfort level Progressing        RESPIRATORY - ADULT     Achieves optimal ventilation and oxygenation Progressing        SAFETY ADULT     Patient will remain free of falls Progressing     Maintain or return to baseline ADL function Progressing     Maintain or return mobility status to optimal level Progressing        SAFETY,RESTRAINT: NV/NON-SELF DESTRUCTIVE BEHAVIOR     Remains free of harm/injury (restraint for non violent/non self-detsructive behavior) Progressing     Returns to optimal restraint-free functioning Progressing        SKIN/TISSUE INTEGRITY - ADULT     Skin integrity remains intact Progressing     Oral mucous membranes remain intact Progressing

## 2018-09-30 NOTE — RESPIRATORY THERAPY NOTE
RT Ventilator Management Note  Jb Orta 81 y o  female MRN: [id number]  Unit/Bed#: ICU 02 Encounter: [id number]      Daily Screen       9/30/2018 0936             Patient safety screen outcome[de-identified] Passed    Spont breathing trial % for 30 min: No    Spont breathing trial outcome[de-identified] Failed    Spont breathing trial reason failed: Oxygen saturation < 88% > than 5 mins    Previous settings resumed: Yes    Name of Medical Team Notified[de-identified] Iglesia Schilling PA-C    RSBI: [id number]            Physical Exam:   Assessment Type: Pre-treatment  General Appearance: Sedated  Respiratory Pattern: Assisted  Chest Assessment: Chest expansion symmetrical  Bilateral Breath Sounds: Diminished, Rhonchi  Cough: Other (Comment) (induced)  Suction: ET Tube  O2 Device: ventilator      Resp Comments: patient sedated  neb treatment given  no distress noted  Received patient orally intubated with 7 5 ETT 23 cm @ lip  Ventilator settings per MD order  BVM @ HOB  Alarms are set and audible  Nebulizer treatments given  Patient tried for CPAP/PS, but was desaturating  Patient placed back on AC/VC mode on ventilator with settings per MD order  Will continue to monitor patient  Sejal Francois, RRT

## 2018-09-30 NOTE — CASE MANAGEMENT
Initial Clinical Review  Admission: Date/Time/Statement: 9/29/18 @ 2123   Orders Placed This Encounter   Procedures    Inpatient Admission (expected length of stay for this patient is greater than two midnights)     Standing Status:   Standing     Number of Occurrences:   1     Order Specific Question:   Admitting Physician     Answer:   Arcelia Ding [[id number]]     Order Specific Question:   Level of Care     Answer:   Critical Care [15]     Order Specific Question:   Estimated length of stay     Answer:   More than 2 Midnights     Order Specific Question:   Certification     Answer:   I certify that inpatient services are medically necessary for this patient for a duration of greater than two midnights  See H&P and MD Progress Notes for additional information about the patient's course of treatment  ED: Date/Time/Mode of Arrival:   ED Arrival Information     Expected Arrival Acuity Means of Arrival Escorted By Service Admission Type    - 9/29/2018 18:44 Immediate Ambulance [facility] Critical Care/ICU Emergency    Arrival Complaint    altered mental status      Chief Complaint:   Chief Complaint   Patient presents with    Altered Mental Status     pt from [facility], per medics pt was found altered, cyanotic  Pt been altered since 3 pm per medics  History of Illness:   Patient presents for evaluation of AMS from NH  EMS reports found patient cynaotic and unresponsive  NH stated they found her like that around 3 pm  Patient was scheduled to be discharged from NH today  On arrival to ED she was not very responsive and was trialled on Bipap but her ABG worsened and she was electively intubated    ED Vital Signs:   ED Triage Vitals   Temperature Pulse Respirations Blood Pressure SpO2   09/29/18 1850 09/29/18 1850 09/29/18 1850 09/29/18 1850 09/29/18 1850   100 °F (37 8 °C) (!) 122 20 161/82 95 %      Temp Source Heart Rate Source Patient Position - Orthostatic VS BP Location FiO2 (%)   09/29/18 1850 09/29/18 1850 09/29/18 1930 09/29/18 1850 09/30/18 0712   Rectal Monitor Lying Left arm 50      Pain Score       09/29/18 2230       No Pain        Wt Readings from Last 1 Encounters:   09/29/18 93 8 kg (206 lb 12 7 oz)   Vital Signs (abnormal): Abnormal Labs/Diagnostic Test Results:   WBC 10 93 HGB 11 1 CL 98 CO2 39 ANION GAP 3 ALK PHOS 127 ALB 3 0 LIPASE 65 BNP 5759   pH, Arterial 7 350 - 7 450 7  251   L    PH ART TC 7 350 - 7 450 7 254   L    pCO2, Arterial 36 0 - 44 0 mm Hg 85 4   HH    PCO2 (TC) Arterial 36 0 - 44 0 mm Hg 88 1   HH    Comment: Results verified by repeat analysis   pO2, Arterial 75 0 - 129 0 mm Hg 121 3     PO2 (TC) Arterial 75 0 - 129 0 mm Hg 125 7     HCO3, Arterial 22 0 - 28 0 mmol/L 37 8   H    Base Excess, Arterial mmol/L 8 2     O2 Content, Arterial 16 0 - 23 0 mL/dL 15 6   L    O2 HGB,Arterial  94 0 - 97 0 % 97 0     SOURCE  Radial, Left     REN TEST  Yes     Temperature Degrees Fehrenheit 99 9     Non Vent type- NRB  100       U/A+PROT, BLOOD  CT HEAD=No acute intracranial abnormality  Microangiopathic changes    Chronic left MCA infarction  EKG=Interpretation: abnormal    Rate:     ECG rate:  121    ECG rate assessment: tachycardic    Rhythm:     Rhythm: atrial fibrillation    Ectopy:     Ectopy: PVCs    ST segments:     ST segments:  Normal  T waves:     T waves: normal    ED Treatment:   Medication Administration from 09/29/2018 1844 to 09/29/2018 2213       Date/Time Order Dose Route Action Action by Comments     09/29/2018 2108 sodium chloride 0 9 % bolus 1,000 mL 0 mL Intravenous Stopped Tania Ambriz RN      09/29/2018 1901 sodium chloride 0 9 % bolus 1,000 mL 1,000 mL Intravenous [address] Daria Esposito RN      09/29/2018 1932 acetaminophen (TYLENOL) rectal suppository 650 mg 650 mg Rectal Given Daria Esposito RN      09/29/2018 1958 ipratropium-albuterol (DUO-NEB) 0 5-2 5 mg/3 mL inhalation solution 3 mL 3 mL Nebulization Given Tania Ambriz RN 09/29/2018 1959 ipratropium-albuterol (DUO-NEB) 0 5-2 5 mg/3 mL inhalation solution 3 mL 3 mL Nebulization Given Deanne Morgan RN      09/29/2018 1955 methylPREDNISolone sodium succinate (Solu-MEDROL) injection 125 mg 125 mg Intravenous Given Deanne Morgan RN      09/29/2018 2134 etomidate (AMIDATE) 2 mg/mL injection 20 mg 20 mg Intravenous Given Mingo Myles RN      09/29/2018 2134 succinylcholine (ANECTINE) 20 mg/mL injection 134 mg 134 mg Intravenous Given Mingo Myles RN      09/29/2018 2136 propofol (DIPRIVAN) 1000 mg in 100 mL infusion (premix) 0 mcg/kg/min Intravenous Stopped Mingo Myles RN hold until patient starts reacting     09/29/2018 2134 propofol (DIPRIVAN) 1000 mg in 100 mL infusion (premix) 5 mcg/kg/min Intravenous [facility] ROSANNE Barlow      09/29/2018 2135 sodium chloride 0 9 % infusion 125 mL/hr Intravenous New [address] Mingo Myles RN      09/29/2018 2206 cefepime (MAXIPIME) 2 g/50 mL dextrose IVPB 0 mg Intravenous Stopped Deanne Morgan RN      09/29/2018 2136 cefepime (MAXIPIME) 2 g/50 mL dextrose IVPB 2,000 mg Intravenous New Bag Mingo Myles RN       Past Medical/Surgical History:    Active Ambulatory Problems     Diagnosis Date Noted    Essential hypertension 04/27/2016    [facility] 09/21/2016    Hypertension, accelerated 09/24/2016    Stenosis of left internal carotid artery 09/25/2016    Obesity (BMI 30-39 9) 01/10/2018    Acute bronchitis 03/13/2017    GERD (gastroesophageal reflux disease) 03/13/2017    Hyperlipemia 03/13/2017    [facility]oxysmal [facility] fibrillation [facility] ) 03/13/2017    Fibromyalgia 03/14/2017    Moderate persistent asthma with acute exacerbation 03/16/2017    Open wound of abdominal wall without penetration into peritoneal cavity 10/27/2017    Asthma exacerbation 12/12/2017    Lymphedema of left leg 12/12/2017    History of Merkel cell carcinoma 12/13/2017    Port-a-cath in place 12/13/2017    Acute congestive heart failure ([facility] ) 01/05/2018    Moderate persistent asthma 01/05/2018    Iron deficiency anemia 01/07/2018    Chronic diastolic CHF (congestive heart failure), NYHA class 1 ([facility] ) 03/08/2018    Closed fracture of right tibia and fibula with routine healing 03/15/2018    TIA (transient ischemic attack) 05/10/2018    Displaced fracture of medial malleolus of left tibia, initial encounter for closed fracture 05/10/2018    Displaced  comminuted fracture of fibula 05/10/2018    Allergic rhinitis 08/22/2013    Arteriosclerotic cardiovascular disease 08/23/2013    [facility] 10/21/2014    Chronic obstructive pulmonary disease ([facility] ) 08/23/2013    Lymphedema 09/25/2017    Stenosis of left carotid artery 10/11/2016     Resolved Ambulatory Problems     Diagnosis Date Noted    Chest pain 04/26/2016    Acute cholecystitis 09/21/2016    Abdominal pain 09/21/2016    Syncope 09/21/2016    Sepsis ([facility] ) 09/24/2016    Hypokalemia 03/14/2017    Acute on chronic respiratory failure ([facility] ) 01/05/2018    Bacteremia 12/12/2017    Shortness of breath 01/08/2018    Fall 03/07/2018    Right pontine stroke ([facility] ) 03/08/2018    Acute on chronic respiratory failure with hypoxia ([facility] ) 03/15/2018    Delirium 03/15/2018    Toxic metabolic encephalopathy 69/96/9337    Delirious     Drop in hemoglobin 05/11/2018    Hypomagnesemia 05/11/2018     Past Medical History:   Diagnosis Date    Asthma     Cancer ([facility] )     Cardiac disease     Fibromyalgia     Fibromyalgia, primary     GERD (gastroesophageal reflux disease)     Hypertension     Hypokalemia     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HCC)    Admitting Diagnosis: Altered mental state [R41 82]  Acute respiratory failure with hypercapnia (HCC) [J96 02]  Hypercapnic respiratory failure (HCC) [J96 92]  Age/Sex: 81 y o  female  Assessment/Plan:   80 YO FEMALE TO ER FROM NURSING FACILITY  PT FOUND UNRESPONSIVE & CYANOTIC BY EMS   PLACED ON NRB & EVENTUALLY INTUBATED IN ER   ADMITTED TO INPATIENT STATUS FOR COPD EXACERBATION    Admission Orders:  ICU  VENTED  NEURO CHECKS Q4H  CONTACT ISOLATION  NPO  NGT/LCS  PT/OT EVAL & TX  ACCUCHECKS WITH COVERAGE SCALE  CONSULT NUTRITION  Scheduled Meds:   Current Facility-Administered Medications:  apixaban 5 mg Per NG Tube Q12H [facility] VernaGILDA Myers    atorvastatin 40 mg Oral Daily VernaGILDA Myers    azithromycin 500 mg Intravenous Q24H VernaGILDA Myers Last Rate: Stopped (09/30/18 0015)   cefepime 2,000 mg Intravenous Q12H Nandofrank Bolaños, DO Last Rate: 2,000 mg (09/30/18 0942)   chlorhexidine 15 mL Swish & Spit Q12H [facility] VernaGILDA Myers    influenza vaccine 0 5 mL Intramuscular Prior to discharge Judge Susan MD    ipratropium-albuterol 3 mL Nebulization Q4H Vernadine GILDA Pickens    magnesium oxide 400 mg Per NG Tube BID Vernadine GILDA Pickens    magnesium sulfate 4 g Intravenous Once Vernadine GILDA Pickens    methylPREDNISolone sodium succinate 40 mg Intravenous Q6H Vernadine GILDA Pickens    metoprolol tartrate 25 mg Oral Q12H [facility] Brigid Vergara PA-C    nystatin  Topical BID Vernadine GILDA Pickens    omeprazole (PRILOSEC) suspension 2 mg/mL 20 mg Oral Daily Vernadine GILDA Pickens    potassium phosphate 21 mmol Intravenous Once Vernadine GILDA Pickens Last Rate: 21 mmol (09/30/18 0729)   pregabalin 300 mg Oral BID Vernadine GILDA Pickens    propofol 5-50 mcg/kg/min Intravenous Titrated Nando Sprung, DO Last Rate: 10 mcg/kg/min (09/30/18 6941)     Continuous Infusions:   propofol 5-50 mcg/kg/min Last Rate: 10 mcg/kg/min (09/30/18 0814)     PRN Meds: influenza vaccine

## 2018-09-30 NOTE — ASSESSMENT & PLAN NOTE
· CXR on 9/29/18:   · CHF; Small bilateral pleural effusions; Atelectasis and/or consolidation in the left lower lobe with some concern for infiltrates on right side  · CT Chest on 9/30/18:   · Bilateral upper lobe and lower lobe groundglass/consolidative opacities suggesting multifocal pneumonia if clinically suspected; Small bilateral pleural effusions, right greater than left;  Findings suggest pulmonary hypertension  · Continue Solumedrol 40mg IV q8h  · Continue Nebulizer treatments q6h  · Continue Day#4: Cefepime and Zithromax 500 mg IV Q24H

## 2018-09-30 NOTE — ED NOTES
Pt intubated by Dr Estee Agee without difficulty, 7 5 ETT, 23 at lip, tube placement verified via auscultation and Co2 detector   Secured by respiratory therapist     Ricky Cisneros RN  09/29/18 8145

## 2018-09-30 NOTE — RESPIRATORY THERAPY NOTE
RT Protocol Note  Inocencia Salinas 81 y o  female MRN: [id number]  Unit/Bed#: ICU 02 Encounter: [id number]    Assessment    Principal Problem:    COPD with acute exacerbation ([facility] )  Active Problems:    Essential hypertension    Obesity (BMI 30-39  9)    GERD (gastroesophageal reflux disease)    Paroxysmal atrial fibrillation (HCC)    Chronic diastolic CHF (congestive heart failure), NYHA class 1 (HCC)    Obstructive sleep apnea    Acute respiratory failure with hypercapnia (HCC)    Acute respiratory acidosis      Home Pulmonary Medications:  Duoneb  Home Devices/Therapy: Ventilator    Past Medical History:   Diagnosis Date    Asthma     Cancer ([facility] )     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    Cardiac disease     a fib    Fibromyalgia     Fibromyalgia, primary     GERD (gastroesophageal reflux disease)     Hypertension     Hypokalemia     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, left groin, lung and bladder  Patient treated with chemotherapy and radiation     Social History     Social History    Marital status:      Spouse name: N/A    Number of children: N/A    Years of education: N/A     Occupational History    retired      Social History Main Topics    Smoking status: Never Smoker    Smokeless tobacco: Never Used      Comment: never smoke    Alcohol use No    Drug use: No    Sexual activity: Not Asked     Other Topics Concern    None     Social History Narrative    Resides at [facility]       Subjective         Objective    Physical Exam:   Assessment Type: Pre-treatment  General Appearance: Sedated  Respiratory Pattern: Assisted  Chest Assessment: Chest expansion symmetrical  Bilateral Breath Sounds: Coarse, Diminished  Suction: Oral, ET Tube    Vitals:  Blood pressure 104/62, pulse 86, temperature 98 7 °F (37 1 °C), temperature source Tympanic, resp   rate 18, height 5' 4" (1 626 m), weight 93 8 kg (206 lb 12 7 oz), SpO2 93 %, not currently breastfeeding        Results from last 7 days  Lab Units 09/29/18  2254 09/29/18 2036   PH ART  7 466* 7 203*   PCO2 ART mm Hg 51 9* 123 1*   PO2 ART mm Hg 61 0* 202 1*   HCO3 ART mmol/L 37 4* 47 9*   BASE EXC ART mmol/L 12 0 15 4   O2 CONTENT ART mL/dL 92 0* 16 5   O2 HGB, ARTERIAL %  --  98 7*   ABG SOURCE  Radial, Left Radial, Right   REN TEST  Yes Yes                 Plan    Respiratory Plan: Vent/NIV/HFNC, Home Bronchodilator Patient pathway        Resp Comments: pt intubated and on vent

## 2018-09-30 NOTE — ED PROVIDER NOTES
History  Chief Complaint   Patient presents with    Altered Mental Status     pt from [facility] center, per medics pt was found altered, cyanotic  Pt been altered since 3 pm per medics  Patient presents for evaluation of AMS from NH  EMS reports found patient cynaotic and unresponsive  NH stated they found her like that around 3 pm  Patient was scheduled to be discharged from NH today  History provided by:  Patient, EMS personnel, nursing home and medical records  History limited by:  Patient unresponsive   used: No    Altered Mental Status       Prior to Admission Medications   Prescriptions Last Dose Informant Patient Reported? Taking? ALPRAZolam (XANAX) 0 5 mg tablet 9/28/2018 at 2100  Yes Yes   Sig: Take 0 5 mg by mouth daily at bedtime   Fesoterodine Fumarate ER (TOVIAZ) 8 MG TB24 9/28/2018 at 2100  Yes Yes   Sig: Take by mouth daily at bedtime     acetaminophen (TYLENOL) 325 mg tablet   No No   Sig: Take 2 tablets (650 mg total) by mouth every 6 (six) hours as needed for fever   albuterol (2 5 mg/3 mL) 0 083 % nebulizer solution   Yes Yes   Sig: Take 2 5 mg by nebulization every 6 (six) hours as needed for wheezing   amLODIPine (NORVASC) 5 mg tablet 9/29/2018 at 0900  Yes Yes   Sig: Take 5 mg by mouth daily   apixaban (ELIQUIS) 5 mg 9/29/2018 at 0900  Yes Yes   Sig: Take 5 mg by mouth every 12 (twelve) hours     aspirin (ECOTRIN LOW STRENGTH) 81 mg EC tablet 9/29/2018 at 0900  Yes Yes   Sig: Take 81 mg by mouth daily   atorvastatin (LIPITOR) 40 mg tablet 9/28/2018 at 1700  Yes Yes   Sig: Take 40 mg by mouth daily   budesonide (PULMICORT) 0 5 mg/2 mL nebulizer solution 9/29/2018 at 2100  Yes Yes   Sig: Take 0 5 mg by nebulization 2 (two) times a day Rinse mouth after use     calcium carbonate (OS-MEGHA) 600 MG tablet 9/29/2018 at 0900  Yes Yes   Sig: Take 600 mg by mouth 2 (two) times a day with meals   cholecalciferol (VITAMIN D3) 400 units tablet 9/29/2018 at 0900  Yes Yes   Sig: Take 400 Units by mouth 2 (two) times a day   docusate sodium (COLACE) 100 mg capsule Past Week at Unknown time  Yes Yes   Sig: Take 200 mg by mouth daily   fluticasone (FLONASE) 50 mcg/act nasal spray 2018 at 0900  No Yes   Si spray into each nostril daily   fluticasone-salmeterol (ADVAIR) 250-50 mcg/dose inhaler 2018 at 0900  No Yes   Sig: Inhale 1 puff every 12 (twelve) hours This is home medication   furosemide (LASIX) 40 mg tablet 2018 at 0900  Yes Yes   Sig: Take 40 mg by mouth daily     ipratropium-albuterol (DUO-NEB) 0 5-2 5 mg/3 mL nebulizer solution 2018 at 1200  Yes Yes   Sig: Take 3 mL by nebulization every 6 (six) hours while awake   loratadine (CLARITIN) 10 mg tablet 2018 at 0900  Yes Yes   Sig: Take 10 mg by mouth daily   magnesium oxide (MAG-OX) 400 mg 2018 at 0900  Yes Yes   Sig: Take 400 mg by mouth 2 (two) times a day   metoprolol tartrate (LOPRESSOR) 50 mg tablet 2018 at 0900  Yes Yes   Sig: Take 50 mg by mouth every 12 (twelve) hours   omeprazole (PriLOSEC) 20 mg delayed release capsule 2018 at 0600  Yes Yes   Sig: Take 20 mg by mouth daily   potassium chloride (K-DUR) 10 mEq tablet 2018 at 0900  Yes Yes   Sig: Take 10 mEq by mouth daily  predniSONE 10 mg tablet 2018 at 0900  Yes Yes   Sig: Take 10 mg by mouth daily   pregabalin (LYRICA) 300 MG capsule 2018 at 0900  Yes Yes   Sig: Take 300 mg by mouth 2 (two) times a day  Facility-Administered Medications: None       Past Medical History:   Diagnosis Date    Asthma     Cancer ([facility] [address] )     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    Cardiac disease     a fib    Fibromyalgia     Fibromyalgia, primary     GERD (gastroesophageal reflux disease)     Hypertension     Hypokalemia     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, left groin, lung and bladder    Patient treated with chemotherapy and radiation       Past Surgical History:   Procedure Laterality Date    APPENDECTOMY      CATARACT EXTRACTION      CHEST WALL BIOPSY N/A 10/27/2017    Procedure: SINUS EXCISION AND REMOVAL OF FOREIGN BODY, ABDOMEN (WOUND EXPLORATION); Surgeon: Stanley Preciado MD;  Location: [address] [place] [address];  Service: General    EYE SURGERY Bilateral     cataracts     HIP FRACTURE SURGERY Left     HYSTERECTOMY      JOINT REPLACEMENT Left     hip    LYMPHADENECTOMY      PORTACATH PLACEMENT Right        Family History   Problem Relation Age of Onset    Pneumonia Mother     Heart disease Father      I have reviewed and agree with the history as documented  Social History   Substance Use Topics    Smoking status: Never Smoker    Smokeless tobacco: Never Used      Comment: never smoke    Alcohol use No        Review of Systems   Unable to perform ROS: Patient unresponsive       Physical Exam  Physical Exam   Constitutional: No distress  HENT:   Mouth/Throat: Oropharynx is clear and moist    Nasal airway in place   Eyes: Pupils are equal, round, and reactive to light  Neck: Normal range of motion  Cardiovascular: Normal rate and intact distal pulses  An irregularly irregular rhythm present  Pulmonary/Chest: No stridor  She is in respiratory distress  She has wheezes  Increased respiratory effort and expiratory wheezes   Abdominal: Soft  There is no tenderness  Musculoskeletal: Normal range of motion  She exhibits edema  Legs:  Neurological:   Not responding but resisting passive movement of the arms and resisting opening the eyes  Skin: Capillary refill takes less than 2 seconds  She is not diaphoretic  Nursing note and vitals reviewed        Vital Signs  ED Triage Vitals   Temperature Pulse Respirations Blood Pressure SpO2   09/29/18 1850 09/29/18 1850 09/29/18 1850 09/29/18 1850 09/29/18 1850   100 °F (37 8 °C) (!) 122 20 161/82 95 %      Temp Source Heart Rate Source Patient Position - Orthostatic VS BP Location FiO2 (%) 09/29/18 1850 09/29/18 1850 09/29/18 1930 09/29/18 1850 --   Rectal Monitor Lying Left arm       Pain Score       --                  Vitals:    09/1936 09/29/18 2006 09/29/18 2109 09/29/18 2142   BP:  129/56 137/86 (!) 88/50   Pulse: (!) 112 97 93 82   Patient Position - Orthostatic VS:  Lying Lying Lying       Visual Acuity      ED Medications  Medications   propofol (DIPRIVAN) 1000 mg in 100 mL infusion (premix) (0 mcg/kg/min × 89 2 kg Intravenous Stopped 9/29/18 2136)   sodium chloride 0 9 % infusion (125 mL/hr Intravenous New Bag 9/29/18 2135)   cefepime (MAXIPIME) 2 g/50 mL dextrose IVPB (0 mg Intravenous Stopped 9/29/18 2206)   apixaban (ELIQUIS) tablet 5 mg (5 mg Per NG Tube Given 9/29/18 2256)   atorvastatin (LIPITOR) tablet 40 mg (not administered)   furosemide (LASIX) tablet 40 mg (not administered)   magnesium oxide (MAG-OX) tablet 400 mg (not administered)   pregabalin (LYRICA) capsule 300 mg (not administered)   chlorhexidine (PERIDEX) 0 12 % oral rinse 15 mL (15 mL Swish & Spit Given 9/29/18 2256)   omeprazole (PRILOSEC) suspension 2 mg/mL (not administered)   ipratropium-albuterol (DUO-NEB) 0 5-2 5 mg/3 mL inhalation solution 3 mL (3 mL Nebulization Given 9/29/18 2303)   methylPREDNISolone sodium succinate (Solu-MEDROL) injection 40 mg (not administered)   azithromycin (ZITHROMAX) 500 mg in sodium chloride 0 9 % 250 mL IVPB (not administered)   sodium chloride 0 9 % bolus 1,000 mL (0 mL Intravenous Stopped 9/29/18 2108)   acetaminophen (TYLENOL) rectal suppository 650 mg (650 mg Rectal Given 9/29/18 1932)   ipratropium-albuterol (DUO-NEB) 0 5-2 5 mg/3 mL inhalation solution 3 mL (3 mL Nebulization Given 9/29/18 1958)   ipratropium-albuterol (DUO-NEB) 0 5-2 5 mg/3 mL inhalation solution 3 mL (3 mL Nebulization Given 9/29/18 1959)   methylPREDNISolone sodium succinate (Solu-MEDROL) injection 125 mg (125 mg Intravenous Given 9/29/18 1955)   etomidate (AMIDATE) 2 mg/mL injection 20 mg (20 mg Intravenous Given 9/29/18 2134)   succinylcholine (ANECTINE) 20 mg/mL injection 134 mg (134 mg Intravenous Given 9/29/18 2134)       Diagnostic Studies  Results Reviewed     Procedure Component Value Units Date/Time    Blood gas, arterial [[id number]]  (Abnormal) Collected:  09/29/18 2036    Lab Status:  Final result Specimen:  Blood, Arterial from Radial, Right Updated:  09/29/18 2109     pH, Arterial 7 203 (LL)     PH ART TC 7 213 (LL)     pCO2, Arterial 123 1 (HH) mm Hg      PCO2 (TC) Arterial 121 0 (HH) mm Hg      pO2, Arterial 202 1 (H) mm Hg      PO2 (TC) Arterial 200 1 (H) mm Hg      HCO3, Arterial 47 9 (H) mmol/L      Base Excess, Arterial 15 4 mmol/L      O2 Content, Arterial 16 5 mL/dL      O2 HGB,Arterial  98 7 (H) %      SOURCE Radial, Right     REN TEST Yes     Temperature 97 8 Degrees Fehrenheit      Non Vent type BIPAP BIPAP     IPAP 12     EPAP 6     BIPAP fio2 100 %     Urine Microscopic [[id number]]  (Abnormal) Collected:  09/29/18 1953    Lab Status:  Final result Specimen:  Urine from Urine, Indwelling Villar Catheter Updated:  09/29/18 2032     RBC, UA 1-2 (A) /hpf      WBC, UA 0-1 (A) /hpf      Epithelial Cells None Seen /hpf      Bacteria, UA Occasional /hpf      Hyaline Casts, UA 0-1 (A) /lpf      Fine granular casts 0-1 /lpf      MUCOUS THREADS Occasional (A)    UA w Reflex to Microscopic [[id number]]  (Abnormal) Collected:  09/29/18 1953    Lab Status:  Final result Specimen:  Urine from Urine, Indwelling Villar Catheter Updated:  09/29/18 2011     Color, UA Yellow     Clarity, UA Clear     Specific Gravity, UA 1 025     pH, UA 5 5     Leukocytes, UA Negative     Nitrite, UA Negative     Protein, UA Trace (A) mg/dl      Glucose, UA Negative mg/dl      Ketones, UA Negative mg/dl      Urobilinogen, UA 0 2 E U /dl      Bilirubin, UA Negative     Blood, UA Small (A)    Urine culture [[id number]] Collected:  09/29/18 1953    Lab Status:   In process Specimen:  Urine from Urine, Indwelling Villar Catheter Updated:  09/29/18 2006    Lactic acid, plasma [[id number]]  (Normal) Collected:  09/29/18 1901    Lab Status:  Final result Specimen:  Blood from Arm, Right Updated:  09/29/18 2001     LACTIC ACID 0 6 mmol/L     Narrative:         Result may be elevated if tourniquet was used during collection  CBC and differential [[id number]]  (Abnormal) Collected:  09/29/18 1900    Lab Status:  Final result Specimen:  Blood from Arm, Right Updated:  09/29/18 1954     WBC 10 93 (H) Thousand/uL      RBC 4 67 Million/uL      Hemoglobin 11 1 (L) g/dL      Hematocrit 39 2 %      MCV 84 fL      MCH 23 8 (L) pg      MCHC 28 3 (L) g/dL      RDW 18 2 (H) %      MPV 11 9 fL      Platelets 622 Thousands/uL      nRBC 0 /100 WBCs      Neutrophils Relative 90 (H) %      Immat GRANS % 2 %      Lymphocytes Relative 4 (L) %      Monocytes Relative 4 %      Eosinophils Relative 0 %      Basophils Relative 0 %      Neutrophils Absolute 9 78 (H) Thousands/µL      Immature Grans Absolute 0 16 Thousand/uL      Lymphocytes Absolute 0 48 (L) Thousands/µL      Monocytes Absolute 0 46 Thousand/µL      Eosinophils Absolute 0 01 Thousand/µL      Basophils Absolute 0 04 Thousands/µL     Narrative: This is an appended report  These results have been appended to a previously verified report      Comprehensive metabolic panel [[id number]]  (Abnormal) Collected:  09/29/18 1900    Lab Status:  Final result Specimen:  Blood from Arm, Right Updated:  09/29/18 1948     Sodium 140 mmol/L      Potassium 4 4 mmol/L      Chloride 98 (L) mmol/L      CO2 39 (H) mmol/L      ANION GAP 3 (L) mmol/L      BUN 12 mg/dL      Creatinine 0 73 mg/dL      Glucose 123 mg/dL      Calcium 8 9 mg/dL      AST 25 U/L      ALT 20 U/L      Alkaline Phosphatase 127 (H) U/L      Total Protein 7 0 g/dL      Albumin 3 0 (L) g/dL      Total Bilirubin 0 70 mg/dL      eGFR 77 ml/min/1 73sq m     Narrative:         National Kidney Disease Education Program recommendations are as follows:  GFR calculation is accurate only with a steady state creatinine  Chronic Kidney disease less than 60 ml/min/1 73 sq  meters  Kidney failure less than 15 ml/min/1 73 sq  meters  Lipase [[id number]]  (Abnormal) Collected:  09/29/18 1900    Lab Status:  Final result Specimen:  Blood from Arm, Right Updated:  09/29/18 1948     Lipase 65 (L) u/L     Magnesium [[id number]]  (Normal) Collected:  09/29/18 1900    Lab Status:  Final result Specimen:  Blood from Arm, Right Updated:  09/29/18 1948     Magnesium 1 7 mg/dL     B-type natriuretic peptide [[id number]]  (Abnormal) Collected:  09/29/18 1900    Lab Status:  Final result Specimen:  Blood from Arm, Right Updated:  09/29/18 1948     NT-proBNP 5,759 (H) pg/mL     Troponin I [[id number]]  (Normal) Collected:  09/29/18 1900    Lab Status:  Final result Specimen:  Blood from Arm, Right Updated:  09/29/18 1932     Troponin I <0 02 ng/mL     Protime-INR [[id number]]  (Normal) Collected:  09/29/18 1900    Lab Status:  Final result Specimen:  Blood from Arm, Left Updated:  09/29/18 1927     Protime 11 0 seconds      INR 1 05    APTT [[id number]]  (Normal) Collected:  09/29/18 1900    Lab Status:  Final result Specimen:  Blood from Arm, Left Updated:  09/29/18 1927     PTT 30 seconds     Blood gas, arterial [[id number]]  (Abnormal) Collected:  09/29/18 1858    Lab Status:  Final result Specimen:  Blood, Arterial from Radial, Left Updated:  09/29/18 1912     pH, Arterial 7 251 (L)     PH ART TC 7 254 (L)     pCO2, Arterial 85 4 (HH) mm Hg      PCO2 (TC) Arterial 88 1 (HH) mm Hg      pO2, Arterial 121 3 mm Hg      PO2 (TC) Arterial 125 7 mm Hg      HCO3, Arterial 37 8 (H) mmol/L      Base Excess, Arterial 8 2 mmol/L      O2 Content, Arterial 15 6 (L) mL/dL      O2 HGB,Arterial  97 0 %      SOURCE Radial, Left     REN TEST Yes     Temperature 99 9 Degrees Fehrenheit      Non Vent type-     Blood culture #1 [[id number]] Collected:  09/29/18 1900    Lab Status:   In process Specimen:  Blood from Arm, Right Updated:  09/29/18 1909    Blood culture #2 [[id number]] Collected:  09/29/18 1900    Lab Status: In process Specimen:  Blood from Arm, Left Updated:  09/29/18 1908                 CT head without contrast   Final Result by Kika Lazaro MD (09/29 1948)      No acute intracranial abnormality  Microangiopathic changes  Chronic left MCA infarction  Workstation performed: [id number]         XR chest 1 view portable    (Results Pending)   XR chest 1 view portable    (Results Pending)              Procedures  Procedures       Phone Contacts  ED Phone Contact    ED Course  ED Course as of Sep 29 2309   Sat Sep 29, 2018   1934 Patient more awake after CT scan  Reaching with both hands for oxygen mask and opening eyes no mild pain stimuli  Will give patient Trial of BiPAP for hypercapnia  Will intubate if no improvement  2109 Both PIV stopped working  Nursing supervisor contacted for midline catheter  At bedside to place line  2112 Repeat ABG showed worsening CO2 retention despite BiPAP and COPD treatment  Will intubate  Lab results and plan discussed with daughter  MDM  Number of Diagnoses or Management Options  Hypercapnic respiratory failure [facility]):   Diagnosis management comments: Pulse ox 94% on NRB indicating adequate oxygenation  CXR: NAD as read by me    Patient was seen immediately on arrival  Patient was maintaining her airway and vitals were acceptable  Taken for emergent CT of the head  Also patient had ABG done and BiPAP started  CT head was negative and ABG showed hypercapnia and mild acidosis  Discussed results with daughter and will try BiPAP and treatments and repeat ABG prior to intubation  Patient was more alert after CT scan reaching with both arms for the mask and opening eyes to mild pain  Repeat ABG was worse and patient mental status did not improve  Decision made to intubate          Amount and/or Complexity of Data Reviewed  Clinical lab tests: ordered and reviewed  Tests in the radiology section of CPT®: ordered and reviewed  Decide to obtain previous medical records or to obtain history from someone other than the patient: yes  Obtain history from someone other than the patient: yes  Review and summarize past medical records: yes  Discuss the patient with other providers: yes  Independent visualization of images, tracings, or specimens: yes    Patient Progress  Patient progress: stable    The patient presented with a condition in which there was a high probability of imminent or life-threatening deterioration, and critical care services (excluding separately billable procedures) totalled 30-74 minutes  Disposition  Final diagnoses:   Hypercapnic respiratory failure ([facility] [address] )     Time reflects when diagnosis was documented in both MDM as applicable and the Disposition within this note     Time User Action Codes Description Comment    9/29/2018  9:21 PM Natalie GRAJEDA Add [J96 92] Hypercapnic respiratory failure ([facility] [address] )     9/29/2018 10:11 PM Opal Connor Add [J96 02] Acute respiratory failure with hypercapnia [facility])       ED Disposition     ED Disposition Condition Comment    Admit  Case was discussed with ELIAZAR Cruz and the patient's admission status was agreed to be admission to the service of Dr Lidia Mcdaniel          Follow-up Information    None         Current Discharge Medication List      CONTINUE these medications which have NOT CHANGED    Details   albuterol (2 5 mg/3 mL) 0 083 % nebulizer solution Take 2 5 mg by nebulization every 6 (six) hours as needed for wheezing      ALPRAZolam (XANAX) 0 5 mg tablet Take 0 5 mg by mouth daily at bedtime      amLODIPine (NORVASC) 5 mg tablet Take 5 mg by mouth daily      apixaban (ELIQUIS) 5 mg Take 5 mg by mouth every 12 (twelve) hours        aspirin (ECOTRIN LOW STRENGTH) 81 mg EC tablet Take 81 mg by mouth daily      atorvastatin (LIPITOR) 40 mg tablet Take 40 mg by mouth daily budesonide (PULMICORT) 0 5 mg/2 mL nebulizer solution Take 0 5 mg by nebulization 2 (two) times a day Rinse mouth after use  calcium carbonate (OS-MEGHA) 600 MG tablet Take 600 mg by mouth 2 (two) times a day with meals      cholecalciferol (VITAMIN D3) 400 units tablet Take 400 Units by mouth 2 (two) times a day      docusate sodium (COLACE) 100 mg capsule Take 200 mg by mouth daily      Fesoterodine Fumarate ER (TOVIAZ) 8 MG TB24 Take by mouth daily at bedtime        fluticasone (FLONASE) 50 mcg/act nasal spray 1 spray into each nostril daily  Qty: 16 g, Refills: 0    Associated Diagnoses: Upper respiratory tract infection, unspecified type      fluticasone-salmeterol (ADVAIR) 250-50 mcg/dose inhaler Inhale 1 puff every 12 (twelve) hours This is home medication  Refills: 0      furosemide (LASIX) 40 mg tablet Take 40 mg by mouth daily        ipratropium-albuterol (DUO-NEB) 0 5-2 5 mg/3 mL nebulizer solution Take 3 mL by nebulization every 6 (six) hours while awake      loratadine (CLARITIN) 10 mg tablet Take 10 mg by mouth daily      magnesium oxide (MAG-OX) 400 mg Take 400 mg by mouth 2 (two) times a day      metoprolol tartrate (LOPRESSOR) 50 mg tablet Take 50 mg by mouth every 12 (twelve) hours      omeprazole (PriLOSEC) 20 mg delayed release capsule Take 20 mg by mouth daily      potassium chloride (K-DUR) 10 mEq tablet Take 10 mEq by mouth daily  predniSONE 10 mg tablet Take 10 mg by mouth daily      pregabalin (LYRICA) 300 MG capsule Take 300 mg by mouth 2 (two) times a day  acetaminophen (TYLENOL) 325 mg tablet Take 2 tablets (650 mg total) by mouth every 6 (six) hours as needed for fever  Qty: 30 tablet, Refills: 0    Associated Diagnoses: Closed fracture of right tibia and fibula with routine healing           No discharge procedures on file      ED Provider  Electronically Signed by           Matteo Conklin,   09/29/18 1303

## 2018-09-30 NOTE — CONSULTS
Consulted for TF recs  Continue to replete K and P  Initiate Jevity Vipin@[employer] advance as tolerated to goal of 50ml/hr x 24hrs, add 1 packet of prosoure, water flushes 120ml q 4hrs provides total w/ propofol 1711cal, 82g pro, 1692ml

## 2018-09-30 NOTE — RESPIRATORY THERAPY NOTE
Transported patient from ED to ICU  RT Corby manually ventilated patient with AMBU bag and oxygen  Upon arrival to ICU placed patient back on vent with documented settings

## 2018-09-30 NOTE — ASSESSMENT & PLAN NOTE
· Likely secondary to Secondary to COPD exacerbation vs  Pneumonia vs  CHF  · Failed BiPAP and required Intubation on admission  · Extubated on 10/1/18  · CT Chest on 9/30/18:   · Bilateral upper lobe and lower lobe groundglass/consolidative opacities suggesting multifocal pneumonia if clinically suspected  Small bilateral pleural effusions, right greater than left   Findings suggest pulmonary hypertension  · Continue Lasix  · Will restart home Lasix 40mg PO qd

## 2018-09-30 NOTE — ASSESSMENT & PLAN NOTE
· CXR on on 9/29/18:   · CHF; Small bilateral pleural effusions;  Atelectasis and/or consolidation in the left lower lobe with some concern for infiltrates on right side  · CT chest: As Above  · Continue Lasix  · Will restart home Lasix 40mg PO qd  · Continue to monitor UOP

## 2018-09-30 NOTE — ED NOTES
After several attempts to establish IV  Midline being established       Felisha Vo RN  09/29/18 1735

## 2018-09-30 NOTE — SOCIAL WORK
Pt currently Intubated and sedate, came in from [facility], will likely return to [facility] when medically stable

## 2018-09-30 NOTE — RESPIRATORY THERAPY NOTE
RT Ventilator Management Note  Lulu Olivares 81 y o  female MRN: [id number]  Unit/Bed#: ICU 02 Encounter: [id number]      Daily Screen     No data found              Physical Exam:   Assessment Type: Pre-treatment  General Appearance: Sedated  Respiratory Pattern: Assisted  Chest Assessment: Chest expansion symmetrical  Bilateral Breath Sounds: Diminished  Suction: Oral, ET Tube      Resp Comments: pt intubated and on vent

## 2018-09-30 NOTE — RESPIRATORY THERAPY NOTE
Patient transported too CT scan at 0950 on transport vent and tolerated well  Returned to ICU around 1025 and placed patient back on ventilator with settings per MD order  Patient awake and alert  No distress noted  Patient comfortable   Janelle Negus, RRT

## 2018-10-01 LAB
ANION GAP SERPL CALCULATED.3IONS-SCNC: 8 MMOL/L (ref 4–13)
ARTERIAL PATENCY WRIST A: YES
ATRIAL RATE: 83 BPM
BACTERIA UR CULT: NORMAL
BASE EXCESS BLDA CALC-SCNC: 14 MMOL/L
BASOPHILS # BLD AUTO: 0.01 THOUSANDS/ΜL (ref 0–0.1)
BASOPHILS NFR BLD AUTO: 0 % (ref 0–1)
BODY TEMPERATURE: 98.3 DEGREES FEHRENHEIT
BUN SERPL-MCNC: 28 MG/DL (ref 5–25)
CALCIUM SERPL-MCNC: 8.5 MG/DL (ref 8.3–10.1)
CHLORIDE SERPL-SCNC: 101 MMOL/L (ref 100–108)
CO2 SERPL-SCNC: 32 MMOL/L (ref 21–32)
CREAT SERPL-MCNC: 1.29 MG/DL (ref 0.6–1.3)
EOSINOPHIL # BLD AUTO: 0 THOUSAND/ΜL (ref 0–0.61)
EOSINOPHIL NFR BLD AUTO: 0 % (ref 0–6)
ERYTHROCYTE [DISTWIDTH] IN BLOOD BY AUTOMATED COUNT: 18.6 % (ref 11.6–15.1)
GFR SERPL CREATININE-BSD FRML MDRD: 39 ML/MIN/1.73SQ M
GLUCOSE SERPL-MCNC: 151 MG/DL (ref 65–140)
GLUCOSE SERPL-MCNC: 163 MG/DL (ref 65–140)
GLUCOSE SERPL-MCNC: 171 MG/DL (ref 65–140)
GLUCOSE SERPL-MCNC: 199 MG/DL (ref 65–140)
HCO3 BLDA-SCNC: 37.6 MMOL/L (ref 22–28)
HCT VFR BLD AUTO: 33.1 % (ref 34.8–46.1)
HGB BLD-MCNC: 10.1 G/DL (ref 11.5–15.4)
HOROWITZ INDEX BLDA+IHG-RTO: 50 MM[HG]
IMM GRANULOCYTES # BLD AUTO: 0.13 THOUSAND/UL (ref 0–0.2)
IMM GRANULOCYTES NFR BLD AUTO: 1 % (ref 0–2)
LYMPHOCYTES # BLD AUTO: 0.25 THOUSANDS/ΜL (ref 0.6–4.47)
LYMPHOCYTES NFR BLD AUTO: 2 % (ref 14–44)
MAGNESIUM SERPL-MCNC: 2.6 MG/DL (ref 1.6–2.6)
MCH RBC QN AUTO: 23.6 PG (ref 26.8–34.3)
MCHC RBC AUTO-ENTMCNC: 30.5 G/DL (ref 31.4–37.4)
MCV RBC AUTO: 77 FL (ref 82–98)
MONOCYTES # BLD AUTO: 0.36 THOUSAND/ΜL (ref 0.17–1.22)
MONOCYTES NFR BLD AUTO: 2 % (ref 4–12)
MRSA NOSE QL CULT: NORMAL
NEUTROPHILS # BLD AUTO: 14 THOUSANDS/ΜL (ref 1.85–7.62)
NEUTS SEG NFR BLD AUTO: 95 % (ref 43–75)
NRBC BLD AUTO-RTO: 0 /100 WBCS
OXYHGB MFR BLDA: 10.5 % (ref 94–97)
PCO2 BLDA: 42.3 MM HG (ref 36–44)
PCO2 TEMP ADJ BLDA: 42 MM HG (ref 36–44)
PEEP RESPIRATORY: 5 CM[H2O]
PH BLD: 7.56 [PH] (ref 7.35–7.45)
PH BLDA: 7.56 [PH] (ref 7.35–7.45)
PHOSPHATE SERPL-MCNC: 3.4 MG/DL (ref 2.3–4.1)
PLATELET # BLD AUTO: 132 THOUSANDS/UL (ref 149–390)
PMV BLD AUTO: 11.7 FL (ref 8.9–12.7)
PO2 BLD: 61 MM HG (ref 75–129)
PO2 BLDA: 61 MM HG (ref 75–129)
POTASSIUM SERPL-SCNC: 3.2 MMOL/L (ref 3.5–5.3)
PROCALCITONIN SERPL-MCNC: <0.05 NG/ML
QRS AXIS: 6 DEGREES
QRSD INTERVAL: 86 MS
QT INTERVAL: 320 MS
QTC INTERVAL: 454 MS
RBC # BLD AUTO: 4.28 MILLION/UL (ref 3.81–5.12)
SODIUM SERPL-SCNC: 141 MMOL/L (ref 136–145)
SPECIMEN SOURCE: ABNORMAL
T WAVE AXIS: 84 DEGREES
VENT - PS: ABNORMAL
VENT- AC: AC
VENTRICULAR RATE: 121 BPM
WBC # BLD AUTO: 14.75 THOUSAND/UL (ref 4.31–10.16)

## 2018-10-01 PROCEDURE — 83735 ASSAY OF MAGNESIUM: CPT | Performed by: PHYSICIAN ASSISTANT

## 2018-10-01 PROCEDURE — 84100 ASSAY OF PHOSPHORUS: CPT | Performed by: PHYSICIAN ASSISTANT

## 2018-10-01 PROCEDURE — 94003 VENT MGMT INPAT SUBQ DAY: CPT

## 2018-10-01 PROCEDURE — 84145 PROCALCITONIN (PCT): CPT | Performed by: NURSE PRACTITIONER

## 2018-10-01 PROCEDURE — 93005 ELECTROCARDIOGRAM TRACING: CPT

## 2018-10-01 PROCEDURE — 97163 PT EVAL HIGH COMPLEX 45 MIN: CPT

## 2018-10-01 PROCEDURE — 94760 N-INVAS EAR/PLS OXIMETRY 1: CPT

## 2018-10-01 PROCEDURE — 82948 REAGENT STRIP/BLOOD GLUCOSE: CPT

## 2018-10-01 PROCEDURE — 92610 EVALUATE SWALLOWING FUNCTION: CPT

## 2018-10-01 PROCEDURE — 36600 WITHDRAWAL OF ARTERIAL BLOOD: CPT

## 2018-10-01 PROCEDURE — 94640 AIRWAY INHALATION TREATMENT: CPT

## 2018-10-01 PROCEDURE — 80048 BASIC METABOLIC PNL TOTAL CA: CPT | Performed by: PHYSICIAN ASSISTANT

## 2018-10-01 PROCEDURE — G8996 SWALLOW CURRENT STATUS: HCPCS

## 2018-10-01 PROCEDURE — 99233 SBSQ HOSP IP/OBS HIGH 50: CPT | Performed by: INTERNAL MEDICINE

## 2018-10-01 PROCEDURE — 97110 THERAPEUTIC EXERCISES: CPT

## 2018-10-01 PROCEDURE — 85025 COMPLETE CBC W/AUTO DIFF WBC: CPT | Performed by: PHYSICIAN ASSISTANT

## 2018-10-01 PROCEDURE — 93010 ELECTROCARDIOGRAM REPORT: CPT | Performed by: INTERNAL MEDICINE

## 2018-10-01 PROCEDURE — G8988 SELF CARE GOAL STATUS: HCPCS

## 2018-10-01 PROCEDURE — G8987 SELF CARE CURRENT STATUS: HCPCS

## 2018-10-01 PROCEDURE — 82805 BLOOD GASES W/O2 SATURATION: CPT | Performed by: STUDENT IN AN ORGANIZED HEALTH CARE EDUCATION/TRAINING PROGRAM

## 2018-10-01 PROCEDURE — 97167 OT EVAL HIGH COMPLEX 60 MIN: CPT

## 2018-10-01 PROCEDURE — G8979 MOBILITY GOAL STATUS: HCPCS

## 2018-10-01 PROCEDURE — G8978 MOBILITY CURRENT STATUS: HCPCS

## 2018-10-01 PROCEDURE — G8997 SWALLOW GOAL STATUS: HCPCS

## 2018-10-01 RX ORDER — ALPRAZOLAM 0.25 MG/1
0.25 TABLET ORAL ONCE
Status: COMPLETED | OUTPATIENT
Start: 2018-10-02 | End: 2018-10-01

## 2018-10-01 RX ORDER — LEVALBUTEROL 1.25 MG/.5ML
1.25 SOLUTION, CONCENTRATE RESPIRATORY (INHALATION)
Status: DISCONTINUED | OUTPATIENT
Start: 2018-10-01 | End: 2018-10-05 | Stop reason: HOSPADM

## 2018-10-01 RX ORDER — METOPROLOL TARTRATE 5 MG/5ML
5 INJECTION INTRAVENOUS ONCE
Status: COMPLETED | OUTPATIENT
Start: 2018-10-01 | End: 2018-10-01

## 2018-10-01 RX ORDER — FUROSEMIDE 10 MG/ML
40 INJECTION INTRAMUSCULAR; INTRAVENOUS ONCE
Status: COMPLETED | OUTPATIENT
Start: 2018-10-01 | End: 2018-10-01

## 2018-10-01 RX ORDER — FUROSEMIDE 40 MG/1
40 TABLET ORAL DAILY
Status: DISCONTINUED | OUTPATIENT
Start: 2018-10-02 | End: 2018-10-05 | Stop reason: HOSPADM

## 2018-10-01 RX ORDER — POTASSIUM CHLORIDE 14.9 MG/ML
20 INJECTION INTRAVENOUS ONCE
Status: COMPLETED | OUTPATIENT
Start: 2018-10-01 | End: 2018-10-01

## 2018-10-01 RX ORDER — POTASSIUM CHLORIDE 14.9 MG/ML
INJECTION INTRAVENOUS
Status: COMPLETED
Start: 2018-10-01 | End: 2018-10-01

## 2018-10-01 RX ORDER — METHYLPREDNISOLONE SODIUM SUCCINATE 40 MG/ML
40 INJECTION, POWDER, LYOPHILIZED, FOR SOLUTION INTRAMUSCULAR; INTRAVENOUS EVERY 8 HOURS SCHEDULED
Status: DISCONTINUED | OUTPATIENT
Start: 2018-10-01 | End: 2018-10-02

## 2018-10-01 RX ORDER — FUROSEMIDE 10 MG/ML
INJECTION INTRAMUSCULAR; INTRAVENOUS
Status: COMPLETED
Start: 2018-10-01 | End: 2018-10-01

## 2018-10-01 RX ORDER — LEVALBUTEROL 1.25 MG/.5ML
SOLUTION, CONCENTRATE RESPIRATORY (INHALATION)
Status: COMPLETED
Start: 2018-10-01 | End: 2018-10-01

## 2018-10-01 RX ORDER — POTASSIUM CHLORIDE 20 MEQ/1
20 TABLET, EXTENDED RELEASE ORAL ONCE
Status: COMPLETED | OUTPATIENT
Start: 2018-10-01 | End: 2018-10-01

## 2018-10-01 RX ADMIN — POTASSIUM CHLORIDE 20 MEQ: 1500 TABLET, EXTENDED RELEASE ORAL at 08:44

## 2018-10-01 RX ADMIN — CHLORHEXIDINE GLUCONATE 0.12% ORAL RINSE 15 ML: 1.2 LIQUID ORAL at 08:43

## 2018-10-01 RX ADMIN — IPRATROPIUM BROMIDE 0.5 MG: 0.5 SOLUTION RESPIRATORY (INHALATION) at 19:36

## 2018-10-01 RX ADMIN — APIXABAN 5 MG: 5 TABLET, FILM COATED ORAL at 08:43

## 2018-10-01 RX ADMIN — PROPOFOL 15 MCG/KG/MIN: 10 INJECTION, EMULSION INTRAVENOUS at 05:54

## 2018-10-01 RX ADMIN — AZITHROMYCIN MONOHYDRATE 500 MG: 500 INJECTION, POWDER, LYOPHILIZED, FOR SOLUTION INTRAVENOUS at 21:54

## 2018-10-01 RX ADMIN — METOPROLOL TARTRATE 50 MG: 50 TABLET ORAL at 08:43

## 2018-10-01 RX ADMIN — POTASSIUM CHLORIDE 20 MEQ: 200 INJECTION, SOLUTION INTRAVENOUS at 10:58

## 2018-10-01 RX ADMIN — INSULIN LISPRO 2 UNITS: 100 INJECTION, SOLUTION INTRAVENOUS; SUBCUTANEOUS at 21:37

## 2018-10-01 RX ADMIN — METOROPROLOL TARTRATE 5 MG: 5 INJECTION, SOLUTION INTRAVENOUS at 04:13

## 2018-10-01 RX ADMIN — IPRATROPIUM BROMIDE 0.5 MG: 0.5 SOLUTION RESPIRATORY (INHALATION) at 14:45

## 2018-10-01 RX ADMIN — INSULIN LISPRO 1 UNITS: 100 INJECTION, SOLUTION INTRAVENOUS; SUBCUTANEOUS at 17:14

## 2018-10-01 RX ADMIN — METOPROLOL TARTRATE 50 MG: 50 TABLET ORAL at 21:36

## 2018-10-01 RX ADMIN — ALPRAZOLAM 0.25 MG: 0.25 TABLET ORAL at 23:59

## 2018-10-01 RX ADMIN — LEVALBUTEROL HYDROCHLORIDE 1.25 MG: 1.25 SOLUTION, CONCENTRATE RESPIRATORY (INHALATION) at 14:45

## 2018-10-01 RX ADMIN — IPRATROPIUM BROMIDE 1 MG: 0.5 SOLUTION RESPIRATORY (INHALATION) at 14:37

## 2018-10-01 RX ADMIN — PREGABALIN 300 MG: 100 CAPSULE ORAL at 17:05

## 2018-10-01 RX ADMIN — NYSTATIN: 100000 POWDER TOPICAL at 17:12

## 2018-10-01 RX ADMIN — Medication 20 MG: at 08:44

## 2018-10-01 RX ADMIN — METOPROLOL TARTRATE 5 MG: 1 INJECTION, SOLUTION INTRAVENOUS at 03:35

## 2018-10-01 RX ADMIN — CEFEPIME HYDROCHLORIDE 2000 MG: 2 INJECTION, POWDER, FOR SOLUTION INTRAVENOUS at 11:04

## 2018-10-01 RX ADMIN — IPRATROPIUM BROMIDE 0.5 MG: 0.5 SOLUTION RESPIRATORY (INHALATION) at 08:23

## 2018-10-01 RX ADMIN — LEVALBUTEROL HYDROCHLORIDE 1.25 MG: 1.25 SOLUTION, CONCENTRATE RESPIRATORY (INHALATION) at 08:23

## 2018-10-01 RX ADMIN — METHYLPREDNISOLONE SODIUM SUCCINATE 40 MG: 40 INJECTION, POWDER, FOR SOLUTION INTRAMUSCULAR; INTRAVENOUS at 21:36

## 2018-10-01 RX ADMIN — MAGNESIUM OXIDE TAB 400 MG (241.3 MG ELEMENTAL MG) 400 MG: 400 (241.3 MG) TAB at 08:44

## 2018-10-01 RX ADMIN — PREGABALIN 300 MG: 100 CAPSULE ORAL at 08:43

## 2018-10-01 RX ADMIN — METHYLPREDNISOLONE SODIUM SUCCINATE 40 MG: 40 INJECTION, POWDER, FOR SOLUTION INTRAMUSCULAR; INTRAVENOUS at 05:50

## 2018-10-01 RX ADMIN — NYSTATIN: 100000 POWDER TOPICAL at 08:47

## 2018-10-01 RX ADMIN — ATORVASTATIN CALCIUM 40 MG: 40 TABLET, FILM COATED ORAL at 08:43

## 2018-10-01 RX ADMIN — LEVALBUTEROL HYDROCHLORIDE 1.25 MG: 1.25 SOLUTION, CONCENTRATE RESPIRATORY (INHALATION) at 19:36

## 2018-10-01 RX ADMIN — FUROSEMIDE 40 MG: 10 INJECTION, SOLUTION INTRAMUSCULAR; INTRAVENOUS at 11:35

## 2018-10-01 RX ADMIN — MAGNESIUM OXIDE TAB 400 MG (241.3 MG ELEMENTAL MG) 400 MG: 400 (241.3 MG) TAB at 17:05

## 2018-10-01 RX ADMIN — METHYLPREDNISOLONE SODIUM SUCCINATE 40 MG: 40 INJECTION, POWDER, FOR SOLUTION INTRAMUSCULAR; INTRAVENOUS at 15:00

## 2018-10-01 RX ADMIN — POTASSIUM CHLORIDE: 200 INJECTION, SOLUTION INTRAVENOUS at 11:00

## 2018-10-01 RX ADMIN — CHLORHEXIDINE GLUCONATE 0.12% ORAL RINSE 15 ML: 1.2 LIQUID ORAL at 21:36

## 2018-10-01 RX ADMIN — POTASSIUM CHLORIDE 20 MEQ: 200 INJECTION, SOLUTION INTRAVENOUS at 08:36

## 2018-10-01 RX ADMIN — CEFEPIME HYDROCHLORIDE 2000 MG: 2 INJECTION, POWDER, FOR SOLUTION INTRAVENOUS at 23:48

## 2018-10-01 RX ADMIN — IPRATROPIUM BROMIDE AND ALBUTEROL SULFATE 3 ML: .5; 3 SOLUTION RESPIRATORY (INHALATION) at 03:12

## 2018-10-01 RX ADMIN — APIXABAN 5 MG: 5 TABLET, FILM COATED ORAL at 21:37

## 2018-10-01 NOTE — RESPIRATORY THERAPY NOTE
Extubated to 50% venti mask, ROSANNE López at bedside, diminished BS, no stridor, tolerated well, SPO2 95%

## 2018-10-01 NOTE — RESPIRATORY THERAPY NOTE
RT Ventilator Management Note  Sean Levy 81 y o  female MRN: [id number]  Unit/Bed#: ICU 02 Encounter: [id number]      Daily Screen       9/30/2018 0936             Patient safety screen outcome[de-identified] Passed    Spont breathing trial % for 30 min: No    Spont breathing trial outcome[de-identified] Failed    Spont breathing trial reason failed: Oxygen saturation < 88% > than 5 mins    Previous settings resumed: Yes    Name of Medical Team Notified[de-identified] Charisse Landaverde PA-C    RSBI: 33            Physical Exam:   Assessment Type: Pre-treatment  General Appearance: Alert, Awake  Respiratory Pattern: Normal, Assisted  Chest Assessment: Chest expansion symmetrical  Bilateral Breath Sounds: Diminished, Coarse  Suction: Oral, ET Tube  O2 Device: ventilator      Resp Comments: pt awake on vent

## 2018-10-01 NOTE — PHYSICAL THERAPY NOTE
PT EVALUATION   10/01/18 1425   Pain Assessment   Pain Assessment No/denies pain   Home Living   Type of Home Apartment   Home Layout One level;Elevator   Bathroom Equipment Shower chair   Home Equipment Walker   Additional Comments patient reports living alone prior to STR and using roller walker at all times  Most recently, patient in STR for 6 months and ambulating recently with walker and assist patient reports almost ready for return home as per documentation   Prior Function   Level of [place] Needs assistance with ADLs and functional mobility; Needs assistance with IADLs   Lives With Facility staff  (patient in [facility])   Receives Help From Personal care attendant   ADL Assistance Needs assistance   IADLs Needs assistance   Falls in the last 6 months 1 to 4   Comments patient most recently in STR, admitted from there with acute respiratory failure   Restrictions/Precautions   Other Precautions Contact/isolation; Chair Alarm; Bed Alarm; Fall Risk;O2   General   Additional Pertinent History chart reviewed, patient admitted with AMS, respiratory failure, on vent and extubated this AM   Family/Caregiver Present No   Cognition   Overall Cognitive Status WFL   Arousal/Participation Cooperative   Orientation Level Oriented to person;Oriented to place;Oriented to situation   Following Commands Follows multistep commands with increased time or repetition   RLE Assessment   RLE Assessment (ROM WFL, strength 3+/4-)   LLE Assessment   LLE Assessment (ROM WFL, strength 3+/3 with past history of CA as per pt)   Coordination   Movements are Fluid and Coordinated 0   Bed Mobility   Supine to Sit 4  Minimal assistance   Additional items Assist x 1;Verbal cues;LE management   Sit to Supine 3  Moderate assistance   Additional items Assist x 1;Verbal cues;LE management   Transfers   Sit to Stand Unable to assess  (limited with ICU constraints and recent extubation)   Ambulation/Elevation   Gait Assistance Not tested Balance   Static Sitting Fair +   Dynamic Sitting Fair -   Static Standing (unable )   Dynamic Standing (unable )   Ambulatory (unable)   Activity Tolerance   Activity Tolerance Patient tolerated treatment well;Patient limited by fatigue;Treatment limited secondary to medical complications (Comment)  (in ICU recent extubation)   Nurse Made Aware yes   Assessment   Prognosis Good   Problem List Decreased strength;Decreased range of motion;Decreased endurance; Impaired balance;Decreased mobility; Decreased coordination;Pain   Assessment Patient seen for Physical Therapy evaluation  Patient admitted with COPD with acute exacerbation ([facility] [address] )  Comorbidities affecting patient's physical performance include:COPD, DCHF, DAMON, PAF, GERD, PVD and HTN, L ankle fracture, gait dysfunction, fall  Personal factors affecting patient at time of initial evaluation include: ambulating with assistive device, inability to ambulate household distances, inability to navigate community distances, inability to navigate level surfaces without external assistance, inability to perform dynamic tasks in community, limited home support, positive fall history, inability to perform physical activity, decreased social skills and inability to perform IADLS   Prior to admission, patient was requiring assist for functional mobility with walker, requiring assist for ADLS, requiring assist for IADLS, ambulating household distance, in short term rehab and having 24 hour care   Please find objective findings from Physical Therapy assessment regarding body systems outlined above with impairments and limitations including weakness, decreased ROM, impaired balance, decreased endurance, impaired coordination, gait deviations, pain, decreased activity tolerance, decreased functional mobility tolerance, fall risk and SOB upon exertion    The Barthel Index was used as a functional outcome tool presenting with a score of 30 today indicating marked limitations of functional mobility and ADLS  Patient's clinical presentation is currently unstable/unpredictable as seen in patient's presentation of vital sign response, changing level of pain, varying levels of cognitive performance, increased fall risk, new onset of impairment of functional mobility, decreased endurance and new onset of weakness  Pt would benefit from continued Physical Therapy treatment to address deficits as defined above and maximize level of functional mobility  As demonstrated by objective findings, the assigned level of complexity for this evaluation is high  Goals   Patient Goals get stronger, go home   STG Expiration Date 10/08/18   Short Term Goal #1 assess transfers and gait with roller walker    Short Term Goal #2 gait endurance to 30 feet, strength BLEs 3+/4-all To meet patient goal of getting   LTG Expiration Date 10/15/18   Long Term Goal #1 transfers and gait with min assist of 1   Long Term Goal #2 gait endurance to 60 feet, strength BLEs 4-/5   Plan   Treatment/Interventions ADL retraining;Functional transfer training;LE strengthening/ROM; Therapeutic exercise; Endurance training;Patient/family training;Equipment eval/education; Bed mobility;Gait training; Compensatory technique education   PT Frequency 5x/wk   Recommendation   Recommendation (STR)   Barthel Index   Feeding 5   Bathing 0   Grooming Score 0   Dressing Score 5   Bladder Score 0   Bowels Score 10   Toilet Use Score 5   Transfers (Bed/Chair) Score 5   Mobility (Level Surface) Score 0   Stairs Score 0   Barthel Index Score 30   Licensure   [place] License Number  Shashank Fowler PT [id number]       PT TREATMENT  Time In:1410  Time Out:1425  Total Time: 15min      S:  Patient reports "tenderness in LLE to touch due to old Ca issues"  O:  Supine to sit with min assist of 1  -sitting on bed edge with min assist to supervision x 4 min with completion of exercise: LAQs, ankle pumps and hip flexion all 10 reps BLEs  A: patient recently extubated, therefore OOB/standing activity deferred at this time and patient will benefit from continued PT with progression as tolerated      P:  DC recommendation: Amy Smith, [id number]

## 2018-10-01 NOTE — PLAN OF CARE
CARDIOVASCULAR - ADULT     Maintains optimal cardiac output and hemodynamic stability Progressing     Absence of cardiac dysrhythmias or at baseline rhythm Progressing        GENITOURINARY - ADULT     Maintains or returns to baseline urinary function Progressing     Absence of urinary retention Progressing     Urinary catheter remains patent Progressing        INFECTION - ADULT     Absence or prevention of progression during hospitalization Progressing     Absence of fever/infection during neutropenic period Progressing        Knowledge Deficit     Patient/family/caregiver demonstrates understanding of disease process, treatment plan, medications, and discharge instructions Progressing        METABOLIC, FLUID AND ELECTROLYTES - ADULT     Electrolytes maintained within normal limits Progressing     Fluid balance maintained Progressing     Glucose maintained within target range Progressing        Nutrition/Hydration-ADULT     Nutrient/Hydration intake appropriate for improving, restoring or maintaining nutritional needs Progressing        PAIN - ADULT     Verbalizes/displays adequate comfort level or baseline comfort level Progressing        Potential for Falls     Patient will remain free of falls Progressing        Prexisting or High Potential for Compromised Skin Integrity     Skin integrity is maintained or improved Progressing        RESPIRATORY - ADULT     Achieves optimal ventilation and oxygenation Progressing        SAFETY ADULT     Patient will remain free of falls Progressing     Maintain or return to baseline ADL function Progressing     Maintain or return mobility status to optimal level Progressing        SAFETY,RESTRAINT: NV/NON-SELF DESTRUCTIVE BEHAVIOR     Remains free of harm/injury (restraint for non violent/non self-detsructive behavior) Progressing     Returns to optimal restraint-free functioning Progressing        SKIN/TISSUE INTEGRITY - ADULT     Skin integrity remains intact Progressing     Oral mucous membranes remain intact Progressing

## 2018-10-01 NOTE — PROGRESS NOTES
Daily Progress Note - Critical Care/ Stepdown   Tiffany Travis 81 y o  female MRN: [id number]  Unit/Bed#: ICU 02 Encounter: [id number]    ______________________________________________________________________  Assessment:   Principal Problem:    COPD with acute exacerbation ([address] )  Active Problems:    Acute respiratory failure with hypercapnia (HCC)    Acute on chronic diastolic congestive heart failure (HCC)    Obstructive sleep apnea    Paroxysmal atrial fibrillation (HCC)    Essential hypertension    Obesity (BMI 30-39  9)    GERD (gastroesophageal reflux disease)  Resolved Problems:    Acute respiratory acidosis      * COPD with acute exacerbation (HCC)   Assessment & Plan    · CXR with some concern for infiltrates on right side, await sputum c/s and procal to r/o any infectious process  · Continue Solumedrol 40mg   · Will change frequency to q8h  · Continue Nebulizer treatments q6h  · Continue Day#3: Cefepime and Zithromax 500 mg IV Q24H     Acute respiratory failure with hypercapnia (HCC)   Assessment & Plan    · Secondary to COPD exacerbation, ?pneumonia vs CHF  · Failed BiPAP and required intubation on admission  · Wean vent as tolerated  · Currently on AC RR 14 fio2 50% peep 5  · Spontaneous Breathing Trial Daily  · Will attempt Extubation this AM  · CT Chest: Bilateral upper lobe and lower lobe groundglass/consolidative opacities suggesting multifocal pneumonia if clinically suspected  Small bilateral pleural effusions, right greater than left   Findings suggest pulmonary hypertension  · Continue Lasix  · Will restart home Lasix on 10/2/18     Acute respiratory acidosisresolved as of 9/30/2018   Assessment & Plan    · Secondary to hypercapnic resp failure  · Trend ABG     Acute on chronic diastolic congestive heart failure (HCC)   Assessment & Plan    · CXR with some evidence of volume overload and patient is normally diuretic dependant   · CT chest: As Above  · Continue IV Lasix 40mg  · Monitor UOP  · Transition to home Lasix on 10/2/18     Obstructive sleep apnea   Assessment & Plan    · CPAP once extubated     Paroxysmal atrial fibrillation (HCC)   Assessment & Plan    · Cont Eliquis  · Restart Metoprolol     GERD (gastroesophageal reflux disease)   Assessment & Plan    · Cont home PPI     Obesity (BMI 30-39  9)   Assessment & Plan    · Nutrition c/s pending     Essential hypertension   Assessment & Plan    · Hold all home meds for now until BP improves         Plan:    Neuro:   · Extubate this AM  · Regulate sleep/wake cycle    CV:   · Rhythm: Atrial Fibrillation  · Follow rhythm on telemetry  · Continue Anticoagulant: Eliquis 5mg q12h  · Continue Rate Control: Lopressor 50mg PO q12h    Pulm:   · COPD Exacerbation  · Continue Azithromycin and Cefepime  · Continue Solumedrol 40mg; will change frequency to q8h   · Continue Nebulizer Treatments q6h  · SBT plan:  · Chest PT ordered: yes   · Chlorhexidine ordered: yes   · HOB >30 degrees: yes     GI:   · Nutrition/diet plan: NPO  · Obtain bedside speech/swallow evaluation  · Advance Diet as Tolerated   · Stress ulcer prophylaxis: No prophylaxis needed  · Bowel regimen: None currently    FEN:   · Fluid/Diuretic plan: Needs diuresis lasix PO   · Will restart home medication: Lasix 40mg PO qd on 10/2/18  · Goal 24 hour fluid balance: Euvoliemic  · Electrolytes repleted: yes  · Goal: K >4 0, Mag >2 0, and Phos >3 0    :   · Indwelling Villar present: yes   · Urinary catheter still needed for Strict I and O in a critically ill patient  ID:   · Abx ordered: Azithromycin and Cefepime  · Day #3  · Trend temps and WBC count    Heme:   · Trend hgb and plts    Endo:   · Current Glucose: 199  · Glycemic control plan:  Will start ISS    Msk/Skin:  · Mobility goal: OOB  · PT consult: yes  · OT consult: yes  · Frequent turning and off-loading    Family:  · Family updated within 24 hours: yes   · Family meeting planned today: no     Lines:  · None    VTE Prophylaxis:  · Pharmacologic Prophylaxis: Eliquis  · Mechanical Prophylaxis: sequential compression device    Disposition: Transfer to Stepdown    Code Status: Level 1 - Full Code    Counseling / Coordination of Care  Total Critical Care time spent 30 minutes excluding procedures, teaching and family updates  ______________________________________________________________________    HPI/24hr events: No Acute Overnight Events    ______________________________________________________________________    Physical Exam:   Physical Exam   Constitutional: She appears well-developed and well-nourished  No distress  HENT:   Head: Normocephalic and atraumatic  Eyes: Pupils are equal, round, and reactive to light  Conjunctivae and EOM are normal  Right eye exhibits no discharge  Left eye exhibits no discharge  No scleral icterus  Neck: Normal range of motion  Neck supple  No JVD present  Cardiovascular: Normal heart sounds and intact distal pulses  Exam reveals no gallop and no friction rub  No murmur heard  Irregular Irregular   Pulmonary/Chest: Effort normal and breath sounds normal  No respiratory distress  She has no wheezes  She has no rales  She exhibits no tenderness  Fine Crackles   Abdominal: Soft  Bowel sounds are normal  She exhibits no distension and no mass  There is no tenderness  There is no rebound and no guarding  No hernia  Musculoskeletal: She exhibits no edema  Skin: Capillary refill takes less than 2 seconds  She is not diaphoretic  Nursing note and vitals reviewed    ____________________________________________________________________  Vitals:    10/01/18 1000 10/01/18 1100 10/01/18 1125 10/01/18 1200   BP: 107/72 153/89  129/79   BP Location:    Left arm   Pulse: (!) 111 (!) 111  (!) 116   Resp: (!) 25 22  (!) 26   Temp:       TempSrc:       SpO2: 95% 92% 96% 95%   Weight:       Height:           Temperature:   Temp (24hrs), Av 6 °F (36 4 °C), Min:97 3 °F (36 3 °C), Max:97 8 °F (36 6 °C)    Current Temperature: (!) 97 3 °F (36 3 °C)    Weights:   IBW: 54 7 kg    Body mass index is 35 76 kg/m²  Weight (last 2 days)     Date/Time   Weight    10/01/18 0551  94 5 (208 34)    09/29/18 2330  93 8 (206 79)    09/29/18 1850  89 2 (196 65)              Hemodynamic Monitoring:  N/A     Non-Invasive/Invasive Ventilation Settings:  Respiratory    Lab Data (Last 4 hours)      10/01 1109            pH, Arterial       (!)7 556           pCO2, Arterial       42 3           pO2, Arterial       (!)61 0           HCO3, Arterial       (!)37 6           Base Excess, Arterial       14 0                O2/Vent Data       10/01 1000   Most Recent         Vent Mode   CPAP/PS Spont      FIO2 (%) (%)   50      PEEP (cmH2O) (cmH2O)   5      Pressure Support (cmH2O) (cmH20) 5  5      MV (Obs)   6 37      RSBI   64                Lab Results   Component Value Date    PHART 7 556 (H) 10/01/2018    NZW0RFE 42 3 10/01/2018    PO2ART 61 0 (L) 10/01/2018    JTO6FAM 37 6 (H) 10/01/2018    BEART 14 0 10/01/2018    SOURCE Brachial, Left 10/01/2018     SpO2: SpO2: 95 %    Intake and Outputs:  I/O       09/29 0701 - 09/30 0700 09/30 0701 - 10/01 0700 10/01 0701 - 10/02 0700    I V  (mL/kg) 1491 8 (15 9) 337 2 (3 6)     NG/GT 30  30    IV Piggyback 700 750     Total Intake(mL/kg) 2221 8 (23 7) 1087 2 (11 5) 30 (0 3)    Urine (mL/kg/hr) 440 1055 (0 5) 60 (0 1)    Total Output 440 1055 60    Net +1781 8 +32 2 -30               Nutrition:        Diet Orders            Start     Ordered    10/01/18 0809  Room Service  Once     Question:  Type of Service  Answer:  Room Service - Appropriate with Assistance    10/01/18 0808    09/29/18 2215  Diet NPO  Diet effective now     Question Answer Comment   Diet Type NPO    RD to adjust diet per protocol?  Yes        09/29/18 2214        Labs:     Results from last 7 days  Lab Units 10/01/18  0446 09/30/18  0455 09/29/18  1900   WBC Thousand/uL 14 75* 6 92 10 93*   HEMOGLOBIN g/dL 10 1* 9 8* 11 1*   HEMATOCRIT % 33 1* 33 5* 39 2   PLATELETS Thousands/uL 132* 103* 152   NEUTROS PCT % 95* 96* 90*   MONOS PCT % 2* 1* 4       Results from last 7 days  Lab Units 10/01/18  0446 09/30/18  1940 09/30/18  0455 09/29/18  1900   SODIUM mmol/L 141 138 140 140   POTASSIUM mmol/L 3 2* 4 0 2 9* 4 4   CHLORIDE mmol/L 101 100 98* 98*   CO2 mmol/L 32 34* 32 39*   BUN mg/dL 28* 23 18 12   CREATININE mg/dL 1 29 1 02 0 86 0 73   CALCIUM mg/dL 8 5 8 4 8 3 8 9   ALK PHOS U/L  --   --   --  127*   ALT U/L  --   --   --  20   AST U/L  --   --   --  25       Results from last 7 days  Lab Units 10/01/18  0446 09/30/18  0455 09/29/18  1900   MAGNESIUM mg/dL 2 6 1 6 1 7     Lab Results   Component Value Date    PHOS 3 4 10/01/2018    PHOS 1 6 (L) 09/30/2018    PHOS 2 7 05/11/2018        Results from last 7 days  Lab Units 09/29/18  1900   INR  1 05   PTT seconds 30       0  Lab Value Date/Time   TROPONINI <0 02 09/29/2018 1900   TROPONINI 0 02 05/11/2018 0826   TROPONINI <0 02 05/10/2018 1756   TROPONINI <0 02 05/10/2018 1714   TROPONINI <0 02 05/10/2018 0826   TROPONINI <0 02 03/15/2018 1400   TROPONINI <0 02 02/26/2018 1049   TROPONINI <0 02 01/23/2018 1453   TROPONINI <0 02 01/05/2018 1735   TROPONINI <0 02 01/05/2018 1108   TROPONINI 0 02 12/11/2017 0944   TROPONINI <0 02 03/13/2017 0930   TROPONINI 0 04 (H) 09/21/2016 1111   TROPONINI <0 02 04/27/2016 0307   TROPONINI 0 02 04/26/2016 2133   TROPONINI <0 02 04/26/2016 1818   TROPONINI <0 02 04/26/2016 1244       Results from last 7 days  Lab Units 09/29/18  1901   LACTIC ACID mmol/L 0 6     ABG:  Lab Results   Component Value Date    PHART 7 556 (H) 10/01/2018    OKB3DED 42 3 10/01/2018    PO2ART 61 0 (L) 10/01/2018    JWC0YCM 37 6 (H) 10/01/2018    BEART 14 0 10/01/2018    SOURCE Brachial, Left 10/01/2018       Imaging: CXR:  I have personally reviewed pertinent reports  ECHO:  I have personally reviewed pertinent reports      CT:  I have personally reviewed pertinent reports  Micro:  Lab Results   Component Value Date    BLOODCX No Growth at 24 hrs  09/29/2018    BLOODCX No Growth at 24 hrs  09/29/2018    BLOODCX No Growth After 5 Days  01/23/2018    URINECX No Growth <1000 cfu/mL 09/29/2018    URINECX >100,000 cfu/ml Mixed Contaminants X5 09/21/2016    SPUTUMCULTUR Culture too young- will reincubate 09/29/2018       Allergies: Allergies   Allergen Reactions    Fentanyl And Related Other (See Comments)     Passed out    Latex Rash    Penicillins Rash     Medications:   Scheduled Meds:    Current Facility-Administered Medications:  apixaban 5 mg Per NG Tube Q12H [address] GILDA Calderon    atorvastatin 40 mg Oral Daily GILDA Calderon    azithromycin 500 mg Intravenous Q24H GILDA Calderon Last Rate: 500 mg (09/30/18 2141)   cefepime 2,000 mg Intravenous Q12H Cosme Gandhi MD Last Rate: Stopped (10/01/18 9000)   chlorhexidine 15 mL Swish & Spit Q12H [address] GILDA Calderon    furosemide        influenza vaccine 0 5 mL Intramuscular Prior to discharge Melva Mims MD    ipratropium        ipratropium 0 5 mg Nebulization Q6H GILDA Ivey    levalbuterol        levalbuterol 1 25 mg Nebulization Q6H GILDA Ivey    magnesium oxide 400 mg Per NG Tube BID GILDA Calderon    methylPREDNISolone sodium succinate 40 mg Intravenous Q8H Lawson Jones MD    metoprolol tartrate 50 mg Oral Q12H [address] Brigid Vergara PA-C    nystatin  Topical BID GILDA Calderon    omeprazole (PRILOSEC) suspension 2 mg/mL 20 mg Oral Daily GILDA Calderon    potassium chloride        pregabalin 300 mg Oral BID GILDA Calderon    propofol 5-50 mcg/kg/min Intravenous Titrated Terressa Precise, DO Last Rate: Stopped (10/01/18 0900)     Continuous Infusions:    propofol 5-50 mcg/kg/min Last Rate: Stopped (10/01/18 0900)     PRN Meds:    influenza vaccine 0 5 mL Prior to discharge       Invasive lines and devices:   Invasive Devices     Peripheral Intravenous Line            Long-Dwell Peripheral IV (Midline) 93/00/59 Right Basilic 1 day          Drain            Urethral Catheter Non-latex 6 Fr  1 day                     SIGNATURE: King Екатерина MD  DATE: October 1, 2018

## 2018-10-01 NOTE — PLAN OF CARE
Problem: SLP ADULT - SWALLOWING, IMPAIRED  Goal: Initial SLP swallow eval performed  Outcome: Completed Date Met: 10/01/18

## 2018-10-01 NOTE — OCCUPATIONAL THERAPY NOTE
OT EVALUATION     10/01/18 1505   Restrictions/Precautions   Other Precautions Chair Alarm; Bed Alarm; Fall Risk;O2   Pain Assessment   Pain Assessment No/denies pain   Home Living   Type of Home Apartment   Home Layout One level;Elevator   Bathroom Equipment Shower chair   Home Equipment Walker   Additional Comments pt admitted from STR at [facility]  Patient reports assist for ADLS but walks independently with a RW   Prior Function   Level of [place] Needs assistance with IADLs; Needs assistance with ADLs and functional mobility   Lives With Facility staff  ([facility])   Receives Help From Personal care attendant   ADL Assistance Needs assistance   IADLs Needs assistance   Falls in the last 6 months 1 to 4   ADL   Eating Assistance 5  Supervision/Setup   Grooming Assistance 5  Supervision/Setup   Grooming Deficit Wash/dry face   UB Bathing Assistance 4  Minimal Assistance   LB Bathing Assistance 3  Moderate Assistance   UB Dressing Assistance 4  Minimal Assistance    [address] 3  Moderate Assistance   Toileting Assistance  3  Moderate Assistance   Bed Mobility   Rolling R 4  Minimal assistance   Rolling L 4  Minimal assistance   Supine to Sit 4  Minimal assistance  (per PT)   Sit to Supine 3  Moderate assistance  (per PT)   Transfers   Sit to Stand Unable to assess  (pt just extubated )   Balance   Static Sitting Fair +  (per PT)   Dynamic Sitting Fair -  (per PT)   Activity Tolerance   Activity Tolerance Patient limited by fatigue;Treatment limited secondary to medical complications (Comment)   RUE Assessment   RUE Assessment WFL  (3+/4-/5)   LUE Assessment   LUE Assessment WFL  (3+/4-/5)   Hand Function   Gross Motor Coordination Functional   Fine Motor Coordination Functional   Cognition   Overall Cognitive Status WFL   Arousal/Participation Cooperative   Attention Within functional limits   Orientation Level Oriented X4   Following Commands Follows multistep commands with increased time or repetition Assessment   Limitation Decreased ADL status; Decreased UE strength;Decreased Safe judgement during ADL;Decreased endurance;Decreased high-level ADLs; Decreased self-care trans  (decreased balance and mobility )   Prognosis Good   Assessment Patient evaluated by Occupational Therapy  Patient admitted with COPD with acute exacerbation ([facility] [address] )  The patients occupational profile, medical and therapy history includes a extensive additional review of physical, cognitive, or psychosocial history related to current functional performance  Comorbidities affecting functional mobility and ADLS include:fibromyalgia, asthma, cardiac disease, hypertension and cancer  Prior to admission, patient was independent with functional mobility with walker, requiring assist for ADLS, requiring assist for IADLS and in short term rehab  The evaluation identifies the following performance deficits: weakness, impaired balance, decreased endurance, increased fall risk, new onset of impairment of functional mobility, decreased ADLS, decreased IADLS, decreased activity tolerance, decreased safety awareness, impaired judgement, SOB upon exertion and decreased strength, that result in activity limitations and/or participation restrictions  This evaluation requires clinical decision making of high complexity, because the patient presents with comorbidites that affect occupational performance and required significant modification of tasks or assistance with consideration of multiple treatment options  The Barthel Index was used as a functional outcome tool presenting with a score of 30, indicating marked limitations of functional mobility and ADLS  Patient will benefit from skilled Occupational Therapy services to address above deficits and facilitate a safe return to prior level of function     Goals   Patient Goals get stronger, go home   STG Time Frame (1-7 days)   Short Term Goal  Goals established to promote patient goal of getting stronger and going home:  Patient will increase standing tolerance to 3 minutes during ADL task to decrease assistance level and decrease fall risk; Patient will increase bed mobility to min assist in preparation for ADLS and transfers; Patient will increase functional mobility to and from bathroom with rolling walker with min assist to increase performance with ADLS and to use a toilet; Patient will tolerate 10 minutes of UE ROM/strengthening to increase general activity tolerance and performance in ADLS/IADLS; Patient will improve functional activity tolerance to 10 minutes of sustained functional tasks to increase participation in basic self-care and decrease assistance level;  Patient will be able to to verbalize understanding and perform energy conservation/proper body mechanics during ADLS and functional mobility at least 75% of the time with minimal cueing to decrease signs of fatigue and increase stamina to return to prior level of function; Patient will increase dynamic sitting balance to fair to improve the ability to sit at edge of bed or on a chair for ADLS;  Patient will increase dynamic standing balance to fair- to improve postural stability and decrease fall risk during standing ADLS and transfers  LTG Time Frame (8-14 days)   Long Term Goal Goals established to promote patient goal of getting stronger and going home:  Patient will increase standing tolerance to 6 minutes during ADL task to decrease assistance level and decrease fall risk; Patient will increase bed mobility to supervision in preparation for ADLS and transfers;  Patient will increase functional mobility to and from bathroom with rolling walker with supervision to increase performance with ADLS and to use a toilet; Patient will tolerate 20 minutes of UE ROM/strengthening to increase general activity tolerance and performance in ADLS/IADLS; Patient will improve functional activity tolerance to 20 minutes of sustained functional tasks to increase participation in basic self-care and decrease assistance level;  Patient will be able to to verbalize understanding and perform energy conservation/proper body mechanics during ADLS and functional mobility at least 90% of the time without cueing to decrease signs of fatigue and increase stamina to return to prior level of function; Patient will increase dynamic sitting balance to fair+ to improve the ability to sit at edge of bed or on a chair for ADLS;  Patient will increase dynamic standing balance to fair to improve postural stability and decrease fall risk during standing ADLS and transfers  Functional Transfer Goals   Pt Will Perform All Functional Transfers (STG min assist LTG supervision )   ADL Goals   Pt Will Perform Eating (STG independent )   Pt Will Perform Grooming (STG independent )   Pt Will Perform Bathing (STG min assist LTG supervision )   Pt Will Perform UE Dressing (STG supervision LTG Independent )   Pt Will Perform LE Dressing (STG min assist LTG supervision )   Pt Will Perform Toileting (STG min assist LTG supervision )   Plan   Treatment Interventions ADL retraining;Functional transfer training; Endurance training;UE strengthening/ROM; Patient/family training;Equipment evaluation/education; Activityengagement; Energy conservation; Compensatory technique education   Goal Expiration Date 10/15/18   Treatment Day 0   OT Frequency 3-5x/wk   Recommendation   OT Discharge Recommendation Short Term Rehab   Barthel Index   Feeding 5   Bathing 0   Grooming Score 0   Dressing Score 5   Bladder Score 0   Bowels Score 10   Toilet Use Score 5   Transfers (Bed/Chair) Score 5   Mobility (Level Surface) Score 0   Stairs Score 0   Barthel Index Score 30   Licensure   [place] License Number  Juanita Rod August 88 OTR/L [id number]

## 2018-10-01 NOTE — SPEECH THERAPY NOTE
Speech-Language Pathology Bedside Swallow Evaluation        Patient Name: Tatum Harmon    UXSUN'O Date: 10/1/2018     Problem List  Patient Active Problem List   Diagnosis    Essential hypertension    Meningioma ([address] )    Hypertension, accelerated    Stenosis of left internal carotid artery    Obesity (BMI 30-39  9)    Acute bronchitis    GERD (gastroesophageal reflux disease)    Hyperlipemia    Paroxysmal atrial fibrillation (HCC)    Fibromyalgia    Moderate persistent asthma with acute exacerbation    Open wound of abdominal wall without penetration into peritoneal cavity    Asthma exacerbation    Lymphedema of left leg    History of Merkel cell carcinoma    Port-A-Cath in place    Acute congestive heart failure (HCC)    Moderate persistent asthma    Iron deficiency anemia    Acute on chronic diastolic congestive heart failure (HCC)    Closed fracture of right tibia and fibula with routine healing    TIA (transient ischemic attack)    Displaced fracture of medial malleolus of left tibia, initial encounter for closed fracture    Displaced  comminuted fracture of fibula    Allergic rhinitis    Arteriosclerotic cardiovascular disease    Atherosclerotic peripheral vascular disease (HCC)    Chronic obstructive pulmonary disease (HCC)    Lymphedema    Obstructive sleep apnea    Stenosis of left carotid artery    Acute respiratory failure with hypercapnia (HCC)    COPD with acute exacerbation (HCC)       Past Medical History  Past Medical History:   Diagnosis Date    Asthma     Cancer (HCC)     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    Cardiac disease     a fib    Fibromyalgia     Fibromyalgia, primary     GERD (gastroesophageal reflux disease)     Hypertension     Hypokalemia     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[facility] [address] )     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, left groin, lung and bladder    Patient treated with chemotherapy and radiation       Past Surgical History  Past Surgical History:   Procedure Laterality Date    APPENDECTOMY      CATARACT EXTRACTION      CHEST WALL BIOPSY N/A 10/27/2017    Procedure: SINUS EXCISION AND REMOVAL OF FOREIGN BODY, ABDOMEN (WOUND EXPLORATION); Surgeon: Tony Morris MD;  Location: [address] [place] [address];  Service: General    EYE SURGERY Bilateral     cataracts     HIP FRACTURE SURGERY Left     HYSTERECTOMY      JOINT REPLACEMENT Left     hip    LYMPHADENECTOMY      PORTACATH PLACEMENT Right          Current Medical Status  Pt is a 81 y o  female who presented to [address] [place] [address] with hx of atrial fibrillation, HTN, untreated left carotic stenosis, diastolic dysfunction and chronic left lower extremity lymphedema, admitted in May for medial malleolar fracture, who was nearing discharge from inpatient rehab when she was found unresponsive and cyanotic  PaCO2 was 88 on presentation to [facility]  She received a trial of BiPAP, but  PaCO2 increased to 121, prompting intubation  FiO2 was gradually weaned and did well with spontaneous breathing trial therefore she was extubated at 12pm today  Swallow eval ordered to determine potential for diet s/p extubation  Patient is known to this department and was seen in January and March of this year for evaluation- on both occasions swallowing was [facility] for regular solids and thin liquids  Past medical history:   Please see H&P for details    Special Studies:  9/30 CT Chest: 1  Bilateral upper lobe and lower lobe groundglass/consolidative opacities suggesting multifocal pneumonia if clinically suspected  Please follow-up to radiographic resolution with CT chest in 2 months posttreatment  2  Small bilateral pleural effusions, right greater than left  3  Findings suggest pulmonary hypertension  4  Endotracheal tube just above the marshall, consider retraction by approximately 2 to 3 cm     9/29 CXR: 1  Tip of endotracheal tube 2 cm above the marshall  2   Mild CHF   3   Probable atelectasis and/or consolidation in the left lower lobe  9/29 CXR: 1   CHF  2   Small bilateral pleural effusions  3   Atelectasis and/or consolidation in the left lower lobe    9/29 CT Head: No acute intracranial abnormality  Microangiopathic changes  Chronic left MCA infarction  Social/Education/Vocational Hx:  Pt lives alone prior to [facility] Information   Current Risks for Dysphagia & Aspiration: recent intubation and change in respiratory status     Current Symptoms/Concerns: change in respiratory status and s/p extubation    Current Diet: NPO      Baseline Diet: regular diet and thin liquids      Baseline Assessment   Behavior/Cognition: alert    Speech/Language Status: able to participate in conversation and able to follow commands    Patient Positioning: upright in bed      Swallow Mechanism Exam   Facial: symmetrical  Labial: WFL  Lingual: WFL  Velum: symmetrical  Mandible: adequate ROM  Dentition: adequate- missing some dentition  Vocal quality:hoarse - patient reports that this is her baseline- MPD was [facility] at 14-15secs  Volitional Cough: strong/productive   Respiratory 4L vis NC upon my arrival- increased to 6L via NC with intake  Swallow Mechanics: WFL upon dry swallow      Consistencies Assessed and Performance   Consistencies Administered: thin liquids, nectar thick, puree and mixed consistency  Specific materials administered included applesauce, peaches with and without juice- liquids were administered via single and consecutive straw and cup sips  - patient was also seen with whole pills with thin and nectar thick liquids    Oral Stage:  Mastication was mildly prolonged yet adequate with the materials administered today  Bolus formation and transfer were functional with no significant oral residue noted  Unable to rule out premature spill given drop in O2 and intermittent throat clear  Pharyngeal Stage:   Swallowing initiation appeared prompt- mildly delayed at times    Laryngeal rise was palpated and judged to be mildly reduced  Patient with intermittent throat clearing with soft solids, mixed peaches and thin liquids  She also demonstrated consistent drop in SPO2 with all presentations while on 4L  O2 increased to 6L with slight drop with puree however remained WFL above 88/89  Drop to 83 persisted with soft solids on 6L  Variable response with thin liquids however with pills with liquid drop noted to 84  No coughing or change in vocal quality noted  Esophageal Concerns: none reported    Strategies and Efficacy: Patient did benefit from controlled rate/vol of intake and was noted to independently utilize external pacing with minimal cues  Patient was educated on recommended strategies and diet recommendations for improved safety  Reciprocal comprehension was verbally expressed  Summary   s/s suggestive of minimal oral and suspected moderate pharyngeal dysphagia as described above  Patient does appear to be at risk for aspiration at this time given recent extubation and s/s of aspiration with thin and soft solids  Suspect reduced respiratory swallow coordination and likely edema with reduced sensation s/p extubation  Will continue to follow  Recommendations: puree/level 1 diet and nectar thick liquids     Recommended Form of Meds: whole with thickened liquid and/or whole with puree     Aspiration precautions and compensatory swallowing strategies: upright posture, only feed when fully alert, slow rate of feeding, small bites/sips and monitor O2 with intake- do not allow PO if it drops below 88      Results Reviewed with: patient, RN, MD and CRNP     Treatment Recommendations: Darnell Yu will continue to follow    Dysphagia Goals: pt will tolerate regular textures with thin liquids without s/s of aspiration x3 and pt will participate in VBS as indicated    CORA Simon S , [id number] [facility]  Speech Language Pathologist   [id number]

## 2018-10-02 LAB
ANION GAP SERPL CALCULATED.3IONS-SCNC: 4 MMOL/L (ref 4–13)
ATRIAL RATE: 182 BPM
BACTERIA SPT RESP CULT: NORMAL
BASOPHILS # BLD AUTO: 0.01 THOUSANDS/ΜL (ref 0–0.1)
BASOPHILS NFR BLD AUTO: 0 % (ref 0–1)
BUN SERPL-MCNC: 36 MG/DL (ref 5–25)
CALCIUM SERPL-MCNC: 8.2 MG/DL (ref 8.3–10.1)
CHLORIDE SERPL-SCNC: 100 MMOL/L (ref 100–108)
CO2 SERPL-SCNC: 34 MMOL/L (ref 21–32)
CREAT SERPL-MCNC: 1.2 MG/DL (ref 0.6–1.3)
EOSINOPHIL # BLD AUTO: 0 THOUSAND/ΜL (ref 0–0.61)
EOSINOPHIL NFR BLD AUTO: 0 % (ref 0–6)
ERYTHROCYTE [DISTWIDTH] IN BLOOD BY AUTOMATED COUNT: 18.9 % (ref 11.6–15.1)
GFR SERPL CREATININE-BSD FRML MDRD: 42 ML/MIN/1.73SQ M
GLUCOSE SERPL-MCNC: 130 MG/DL (ref 65–140)
GLUCOSE SERPL-MCNC: 142 MG/DL (ref 65–140)
GLUCOSE SERPL-MCNC: 143 MG/DL (ref 65–140)
GLUCOSE SERPL-MCNC: 183 MG/DL (ref 65–140)
GLUCOSE SERPL-MCNC: 185 MG/DL (ref 65–140)
GRAM STN SPEC: NORMAL
HCT VFR BLD AUTO: 31.4 % (ref 34.8–46.1)
HGB BLD-MCNC: 9.5 G/DL (ref 11.5–15.4)
IMM GRANULOCYTES # BLD AUTO: 0.09 THOUSAND/UL (ref 0–0.2)
IMM GRANULOCYTES NFR BLD AUTO: 1 % (ref 0–2)
LYMPHOCYTES # BLD AUTO: 0.28 THOUSANDS/ΜL (ref 0.6–4.47)
LYMPHOCYTES NFR BLD AUTO: 2 % (ref 14–44)
MAGNESIUM SERPL-MCNC: 2.7 MG/DL (ref 1.6–2.6)
MCH RBC QN AUTO: 24.1 PG (ref 26.8–34.3)
MCHC RBC AUTO-ENTMCNC: 30.3 G/DL (ref 31.4–37.4)
MCV RBC AUTO: 80 FL (ref 82–98)
MONOCYTES # BLD AUTO: 0.28 THOUSAND/ΜL (ref 0.17–1.22)
MONOCYTES NFR BLD AUTO: 2 % (ref 4–12)
NEUTROPHILS # BLD AUTO: 14.41 THOUSANDS/ΜL (ref 1.85–7.62)
NEUTS SEG NFR BLD AUTO: 95 % (ref 43–75)
NRBC BLD AUTO-RTO: 0 /100 WBCS
PHOSPHATE SERPL-MCNC: 4.3 MG/DL (ref 2.3–4.1)
PLATELET # BLD AUTO: 125 THOUSANDS/UL (ref 149–390)
PMV BLD AUTO: 11.9 FL (ref 8.9–12.7)
POTASSIUM SERPL-SCNC: 4.2 MMOL/L (ref 3.5–5.3)
QRS AXIS: -4 DEGREES
QRSD INTERVAL: 86 MS
QT INTERVAL: 314 MS
QTC INTERVAL: 445 MS
RBC # BLD AUTO: 3.94 MILLION/UL (ref 3.81–5.12)
SODIUM SERPL-SCNC: 138 MMOL/L (ref 136–145)
T WAVE AXIS: 9 DEGREES
VENTRICULAR RATE: 121 BPM
WBC # BLD AUTO: 15.07 THOUSAND/UL (ref 4.31–10.16)

## 2018-10-02 PROCEDURE — 82948 REAGENT STRIP/BLOOD GLUCOSE: CPT

## 2018-10-02 PROCEDURE — 83735 ASSAY OF MAGNESIUM: CPT | Performed by: STUDENT IN AN ORGANIZED HEALTH CARE EDUCATION/TRAINING PROGRAM

## 2018-10-02 PROCEDURE — 85025 COMPLETE CBC W/AUTO DIFF WBC: CPT | Performed by: STUDENT IN AN ORGANIZED HEALTH CARE EDUCATION/TRAINING PROGRAM

## 2018-10-02 PROCEDURE — 94760 N-INVAS EAR/PLS OXIMETRY 1: CPT

## 2018-10-02 PROCEDURE — 80048 BASIC METABOLIC PNL TOTAL CA: CPT | Performed by: STUDENT IN AN ORGANIZED HEALTH CARE EDUCATION/TRAINING PROGRAM

## 2018-10-02 PROCEDURE — 92526 ORAL FUNCTION THERAPY: CPT

## 2018-10-02 PROCEDURE — 93010 ELECTROCARDIOGRAM REPORT: CPT | Performed by: INTERNAL MEDICINE

## 2018-10-02 PROCEDURE — 84100 ASSAY OF PHOSPHORUS: CPT | Performed by: STUDENT IN AN ORGANIZED HEALTH CARE EDUCATION/TRAINING PROGRAM

## 2018-10-02 PROCEDURE — 99232 SBSQ HOSP IP/OBS MODERATE 35: CPT | Performed by: INTERNAL MEDICINE

## 2018-10-02 PROCEDURE — 94640 AIRWAY INHALATION TREATMENT: CPT

## 2018-10-02 RX ORDER — ALPRAZOLAM 0.25 MG/1
0.25 TABLET ORAL ONCE
Status: COMPLETED | OUTPATIENT
Start: 2018-10-02 | End: 2018-10-02

## 2018-10-02 RX ORDER — METHYLPREDNISOLONE SODIUM SUCCINATE 40 MG/ML
40 INJECTION, POWDER, LYOPHILIZED, FOR SOLUTION INTRAMUSCULAR; INTRAVENOUS EVERY 12 HOURS SCHEDULED
Status: DISCONTINUED | OUTPATIENT
Start: 2018-10-02 | End: 2018-10-03

## 2018-10-02 RX ADMIN — CEFEPIME HYDROCHLORIDE 2000 MG: 2 INJECTION, POWDER, FOR SOLUTION INTRAVENOUS at 11:24

## 2018-10-02 RX ADMIN — CHLORHEXIDINE GLUCONATE 0.12% ORAL RINSE 15 ML: 1.2 LIQUID ORAL at 21:54

## 2018-10-02 RX ADMIN — METOPROLOL TARTRATE 50 MG: 50 TABLET ORAL at 21:53

## 2018-10-02 RX ADMIN — NYSTATIN: 100000 POWDER TOPICAL at 08:17

## 2018-10-02 RX ADMIN — IPRATROPIUM BROMIDE 0.5 MG: 0.5 SOLUTION RESPIRATORY (INHALATION) at 08:03

## 2018-10-02 RX ADMIN — LEVALBUTEROL HYDROCHLORIDE 1.25 MG: 1.25 SOLUTION, CONCENTRATE RESPIRATORY (INHALATION) at 07:44

## 2018-10-02 RX ADMIN — MAGNESIUM OXIDE TAB 400 MG (241.3 MG ELEMENTAL MG) 400 MG: 400 (241.3 MG) TAB at 08:16

## 2018-10-02 RX ADMIN — METHYLPREDNISOLONE SODIUM SUCCINATE 40 MG: 40 INJECTION, POWDER, FOR SOLUTION INTRAMUSCULAR; INTRAVENOUS at 05:20

## 2018-10-02 RX ADMIN — ATORVASTATIN CALCIUM 40 MG: 40 TABLET, FILM COATED ORAL at 08:15

## 2018-10-02 RX ADMIN — INSULIN LISPRO 2 UNITS: 100 INJECTION, SOLUTION INTRAVENOUS; SUBCUTANEOUS at 21:53

## 2018-10-02 RX ADMIN — Medication 20 MG: at 08:41

## 2018-10-02 RX ADMIN — CEFEPIME HYDROCHLORIDE 2000 MG: 2 INJECTION, POWDER, FOR SOLUTION INTRAVENOUS at 23:59

## 2018-10-02 RX ADMIN — MAGNESIUM OXIDE TAB 400 MG (241.3 MG ELEMENTAL MG) 400 MG: 400 (241.3 MG) TAB at 17:39

## 2018-10-02 RX ADMIN — IPRATROPIUM BROMIDE 0.5 MG: 0.5 SOLUTION RESPIRATORY (INHALATION) at 02:53

## 2018-10-02 RX ADMIN — IPRATROPIUM BROMIDE 0.5 MG: 0.5 SOLUTION RESPIRATORY (INHALATION) at 13:30

## 2018-10-02 RX ADMIN — LEVALBUTEROL HYDROCHLORIDE 1.25 MG: 1.25 SOLUTION, CONCENTRATE RESPIRATORY (INHALATION) at 13:30

## 2018-10-02 RX ADMIN — PREGABALIN 300 MG: 100 CAPSULE ORAL at 17:40

## 2018-10-02 RX ADMIN — PREGABALIN 300 MG: 100 CAPSULE ORAL at 08:16

## 2018-10-02 RX ADMIN — APIXABAN 5 MG: 5 TABLET, FILM COATED ORAL at 21:53

## 2018-10-02 RX ADMIN — LEVALBUTEROL HYDROCHLORIDE 1.25 MG: 1.25 SOLUTION, CONCENTRATE RESPIRATORY (INHALATION) at 02:53

## 2018-10-02 RX ADMIN — METOPROLOL TARTRATE 50 MG: 50 TABLET ORAL at 08:15

## 2018-10-02 RX ADMIN — NYSTATIN: 100000 POWDER TOPICAL at 18:59

## 2018-10-02 RX ADMIN — AZITHROMYCIN MONOHYDRATE 500 MG: 500 INJECTION, POWDER, LYOPHILIZED, FOR SOLUTION INTRAVENOUS at 22:20

## 2018-10-02 RX ADMIN — IPRATROPIUM BROMIDE 0.5 MG: 0.5 SOLUTION RESPIRATORY (INHALATION) at 19:42

## 2018-10-02 RX ADMIN — FUROSEMIDE 40 MG: 40 TABLET ORAL at 08:15

## 2018-10-02 RX ADMIN — ALPRAZOLAM 0.25 MG: 0.25 TABLET ORAL at 21:53

## 2018-10-02 RX ADMIN — APIXABAN 5 MG: 5 TABLET, FILM COATED ORAL at 08:16

## 2018-10-02 RX ADMIN — METHYLPREDNISOLONE SODIUM SUCCINATE 40 MG: 40 INJECTION, POWDER, FOR SOLUTION INTRAMUSCULAR; INTRAVENOUS at 21:54

## 2018-10-02 RX ADMIN — INSULIN LISPRO 1 UNITS: 100 INJECTION, SOLUTION INTRAVENOUS; SUBCUTANEOUS at 17:36

## 2018-10-02 RX ADMIN — CHLORHEXIDINE GLUCONATE 0.12% ORAL RINSE 15 ML: 1.2 LIQUID ORAL at 08:15

## 2018-10-02 RX ADMIN — LEVALBUTEROL HYDROCHLORIDE 1.25 MG: 1.25 SOLUTION, CONCENTRATE RESPIRATORY (INHALATION) at 19:42

## 2018-10-02 NOTE — PROGRESS NOTES
Progress Note - Pulmonary   Aquilla Meckel 81 y o  female MRN: [id number]  Unit/Bed#: [address] [place] [address] Encounter: [id number]    Diana Jensen is 81 and was admitted on 9/29 to [facility] ICU on admission for acute hypercapneic respiratory failure and symptoms of somnolence on presentation w/ an initial Abg w/ PH 7 25/88 and progressive acidosis and hypercapnea despite Bipap therapy  She was intubated the day of admission on 9/29 and remained intubated until 10/1  The presumptive cause of her respiratory failure was gas exchange impairment due to COPD exacerbation  She was treated with steroids, ATC, duonebs, and Zithromax  She has a notable Hx of Chronic DCHF, PAF, Obesity, DAMON, COPD, GERD  She notes    Assessment/Plan:   -Acute Exacerbation of COPD:  Improving:  Continue Xopenex and Atrovent q 6 hours, decrease Solu-Medrol to 20 mg q 8 hours  -Acute on Chronic Hypercapneic Respiratory Failure:  Patient mentating well, no need for BiPAP tonight  -HCAP with bilateral pleural effusions:  Continuing cefepime, can likely discontinue today or tomorrow, procalcitonin is a been negative  -PAF w/ medication induced coagulopathy:  Rate controlled:  Continue Eliquis  -Acute on Chronic DCHF:  Continue daily diuresis per primary team  -DAMON:  Patient reports she does not wear CPAP at night, continue oxygen tonight, patient to follow up for repeat sleep study  -GERD:  Continue Prilosec  -Dysphagia:  Follow up with aspiration cough precautions, upright slow eating,  -patient currently on a pureed diet with nectar thick liquids    -patient to need rehabilitation        ______________________________________________________________________    Subjective: Pt seen and examined at bedside    Patient states I feel washed out today     Tele Events:     Vitals:   Temp:  [97 1 °F (36 2 °C)-98 3 °F (36 8 °C)] 98 3 °F (36 8 °C)  HR:  [] 75  Resp:  [16-41] 18  BP: (114-138)/(60-86) 120/69  Weight (last 2 days)     Date/Time   Weight 10/02/18 0531  95 6 (210 76)    10/01/18 0551  94 5 (208 34)            Oxygen Therapy  SpO2: 96 %  O2 Flow Rate (L/min): 4 L/min    IV Infusions:       Nutrition:        Diet Orders            Start     Ordered    10/02/18 1143  Diet Dysphagia/Modified Consistency; Dysphagia 3-Dental Soft; Thin Liquid; Dysphagia 3-Dental Soft  Diet effective now     Question Answer Comment   Diet Type Dysphagia/Modified Consistency    Dysphagia/Modified Consistency Dysphagia 3-Dental Soft    Liquid Modifier Thin Liquid    Other Restriction(s): Dysphagia 3-Dental Soft    RD to adjust diet per protocol? Yes        10/02/18 1142    10/01/18 0809  Room Service  Once     Question:  Type of Service  Answer:  Room Service - Appropriate with Assistance    10/01/18 0808          Ins/Outs:   I/O       09/30 0701 - 10/01 0700 10/01 0701 - 10/02 0700 10/02 0701 - 10/03 0700    P  O   900     I V  (mL/kg) 337 2 (3 6) 87 9 (0 9)     NG/GT  30     IV Piggyback 750 550     Total Intake(mL/kg) 1087 2 (11 5) 1567 9 (16 4)     Urine (mL/kg/hr) 1055 (0 5) 1180 (0 5) 200 (0 3)    Total Output 1055 1180 200    Net +32 2 +387 9 -200                 Lines/Drains:  Invasive Devices     Peripheral Intravenous Line            Long-Dwell Peripheral IV (Midline) 86/84/29 Right Basilic 2 days                 Active medications:  Scheduled Meds:  Current Facility-Administered Medications:  apixaban 5 mg Per NG Tube Q12H [address] GILDA Vyas    atorvastatin 40 mg Oral Daily GILDA Vyas    azithromycin 500 mg Intravenous Q24H GILDA Vyas Last Rate: 500 mg (10/01/18 2154)   cefepime 2,000 mg Intravenous Q12H Dolores Kowalski MD Last Rate: 2,000 mg (10/02/18 1124)   chlorhexidine 15 mL Swish & Spit Q12H [address] GILDA Vyas    furosemide 40 mg Oral Daily Dolores Kowalski MD    influenza vaccine 0 5 mL Intramuscular Prior to discharge Arturo Gomez MD    insulin lispro 1-6 Units Subcutaneous TID Urvashi Robertson MD    insulin lispro 2-12 Units Subcutaneous HS Navi Sommers MD    ipratropium 0 5 mg Nebulization Q6H GILDA Ivey    levalbuterol 1 25 mg Nebulization Q6H GILDA Ivey    magnesium oxide 400 mg Per NG Tube BID GILDA Vargas    methylPREDNISolone sodium succinate 40 mg Intravenous Q12H [facility] Navi Sommers MD    metoprolol tartrate 50 mg Oral Q12H [facility] Brigid Vergara PA-C    nystatin  Topical BID GILDA Vargas    omeprazole (PRILOSEC) suspension 2 mg/mL 20 mg Oral Daily GILDA Vargas    pregabalin 300 mg Oral BID GILDA Vargas      PRN Meds:  influenza vaccine 0 5 mL Prior to discharge     ____________________________________________________________________      Physical Exam   Constitutional: She is oriented to person, place, and time  She appears well-developed and well-nourished  HENT:   Head: Normocephalic and atraumatic  Eyes: Pupils are equal, round, and reactive to light  Conjunctivae are normal    Neck: Normal range of motion  Neck supple  Cardiovascular: Normal rate, regular rhythm and normal heart sounds  Pulmonary/Chest: Effort normal  No accessory muscle usage  No tachypnea  No respiratory distress  She has decreased breath sounds in the right middle field, the right lower field, the left middle field and the left lower field  She has no wheezes  She has no rhonchi  She has rales in the right lower field  She exhibits no tenderness  Abdominal: Soft  Bowel sounds are normal  She exhibits no distension  There is no tenderness  There is no rebound and no guarding  Obese body habitus   Musculoskeletal: Normal range of motion  She exhibits no edema  Neurological: She is alert and oriented to person, place, and time  Skin: Skin is warm and dry     Psychiatric: She has a normal mood and affect            ____________________________________________________________________    Labs:   CBC:   Results from last 7 days  Lab Units 10/02/18  0525 10/01/18  0446 09/30/18  0455   WBC Thousand/uL 15 07* 14 75* 6 92   HEMOGLOBIN g/dL 9 5* 10 1* 9 8*   HEMATOCRIT % 31 4* 33 1* 33 5*   MCV fL 80* 77* 81*   PLATELETS Thousands/uL 125* 132* 103*     CMP:   Results from last 7 days  Lab Units 10/02/18  0525 10/01/18  0446 09/30/18  1940  09/29/18  1900   SODIUM mmol/L 138 141 138  < > 140   POTASSIUM mmol/L 4 2 3 2* 4 0  < > 4 4   CHLORIDE mmol/L 100 101 100  < > 98*   CO2 mmol/L 34* 32 34*  < > 39*   BUN mg/dL 36* 28* 23  < > 12   CREATININE mg/dL 1 20 1 29 1 02  < > 0 73   CALCIUM mg/dL 8 2* 8 5 8 4  < > 8 9   AST U/L  --   --   --   --  25   ALT U/L  --   --   --   --  20   ALK PHOS U/L  --   --   --   --  127*   EGFR ml/min/1 73sq m 42 39 51  < > 77   < > = values in this interval not displayed  Magnesium:   Results from last 7 days  Lab Units 10/02/18  0525   MAGNESIUM mg/dL 2 7*     Phosphorous:   Results from last 7 days  Lab Units 10/02/18  0525   PHOSPHORUS mg/dL 4 3*     Troponin:   Results from last 7 days  Lab Units 09/29/18  1900   TROPONIN I ng/mL <0 02     PT/INR:   Results from last 7 days  Lab Units 09/29/18  1900   PTT seconds 30   INR  1 05     Lactic Acid:   Results from last 7 days  Lab Units 09/29/18  1901   LACTIC ACID mmol/L 0 6     BNP:   Results from last 7 days  Lab Units 09/29/18  1900   NT-PRO BNP pg/mL 5,759*     TSH:     Procalcitonin: Invalid input(s): PROCALCITONIN      Imaging:  None current      Micro: Lab Results   Component Value Date    BLOODCX No Growth at 48 hrs  09/29/2018    BLOODCX No Growth at 48 hrs  09/29/2018    BLOODCX No Growth After 5 Days   01/23/2018    URINECX No Growth <1000 cfu/mL 09/29/2018    URINECX >100,000 cfu/ml Mixed Contaminants X5 09/21/2016    SPUTUMCULTUR 1+ Growth of  09/29/2018    MRSACULTURE  09/29/2018     No Methicillin Resistant Staphlyococcus aureus (MRSA) isolated            Invalid input(s): LEGIONELLAURINARYANTIGEN        Code Status: Level 1 - Full Code

## 2018-10-02 NOTE — PROGRESS NOTES
Transfer Note - ICU/Stepdown Transfer to [facility]/MS tele   Nilam Dempsey 81 y o  female MRN: [id number]  [facility]   Unit/Bed#: ICU 05 Encounter: [id number]    Code Status: Level 1 - Full Code    Reason for ICU/Stepdown admission: COPD w  Acute exacerbation    Active problems: Principal Problem:    COPD with acute exacerbation (HCC)  Active Problems:    Acute respiratory failure with hypercapnia (HCC)    Acute on chronic diastolic congestive heart failure (HCC)    Obstructive sleep apnea    Paroxysmal atrial fibrillation (HCC)    Essential hypertension    Obesity (BMI 30-39  9)    GERD (gastroesophageal reflux disease)  Resolved Problems:    Acute respiratory acidosis      * COPD with acute exacerbation ([facility] [address] )   Assessment & Plan    · CXR on 9/29/18:   · CHF; Small bilateral pleural effusions; Atelectasis and/or consolidation in the left lower lobe with some concern for infiltrates on right side  · CT Chest on 9/30/18:   · Bilateral upper lobe and lower lobe groundglass/consolidative opacities suggesting multifocal pneumonia if clinically suspected; Small bilateral pleural effusions, right greater than left; Findings suggest pulmonary hypertension  · Continue Solumedrol 40mg IV q8h  · Continue Nebulizer treatments q6h  · Continue Day#4: Cefepime and Zithromax 500 mg IV Q24H     Acute respiratory failure with hypercapnia (HCC)   Assessment & Plan    · Likely secondary to Secondary to COPD exacerbation vs  Pneumonia vs  CHF  · Failed BiPAP and required Intubation on admission  · Extubated on 10/1/18  · CT Chest on 9/30/18:   · Bilateral upper lobe and lower lobe groundglass/consolidative opacities suggesting multifocal pneumonia if clinically suspected  Small bilateral pleural effusions, right greater than left   Findings suggest pulmonary hypertension  · Continue Lasix  · Will restart home Lasix 40mg PO qd     Acute respiratory acidosisresolved as of 9/30/2018   Assessment & Plan    · Secondary to hypercapnic resp failure  · Trend ABG     Acute on chronic diastolic congestive heart failure ([address] )   Assessment & Plan    · CXR on on 9/29/18:   · CHF; Small bilateral pleural effusions; Atelectasis and/or consolidation in the left lower lobe with some concern for infiltrates on right side  · CT chest: As Above  · Continue Lasix  · Will restart home Lasix 40mg PO qd  · Continue to monitor UOP     Obstructive sleep apnea   Assessment & Plan    · Extubated on 10/1/18  · NC 2L at night     Paroxysmal atrial fibrillation (HCC)   Assessment & Plan    · Continue Anticoagulation: Eliquis 5mg PO q12h  · Continue Rate Control: Metoprolol 50mg PO q12h     GERD (gastroesophageal reflux disease)   Assessment & Plan    · Continue home medication: Omeprazole 20mg PO qd     Obesity (BMI 30-39  9)   Assessment & Plan    · Counseled on Diet/Lifestyle Modification     Essential hypertension   Assessment & Plan    · Resume home medications: Amlodipine 5mg PO qd         Consultants:   · None    History of Present Illness/Summary of clinical course:      (as per H&P)  Alberta Aranda is a 81 y o  female who presents to the ED with reported AMS at [facility] since 3:00pm today  She has a PMhx of COPD, DCHF, DAMON, PAF, GERD, PVD and HTN  Per ED patient was reportedly suppose to be d/c home today from rehab after being there 6 months  She was reportedly seen at 3:00 pm and was "sleeping"  Her daughter reports trying to call her and she did not answer the phone  EMS arrived to find the patient not responsive and cyanotoic  She was placed on NRB with some minor improvement in mentation  On arrival to ED she was not very responsive and was trialled on Bipap but her ABG worsened and she was electively intubated  History obtained from child and chart review  Please refer to today's progress note for further clinical details       Recent or scheduled procedures: None    Outstanding/pending diagnostics: None       Mobilization Plan: [facility]  [facility]    Nutrition Plan: Dysphagia Diet; Advance Diet as Tolerated    Discharge Plan: Completion of ABx course; Home     Specific Diagnosis Plan: None        [  ] Family aware of transfer out of critical care: yes       Spoke with Dr Adrián Baldwin regarding transfer @ 8:50am  Patient accepted to their service          Janell Willis MD

## 2018-10-02 NOTE — SOCIAL WORK
CM spoke with the pt at the bedside  Pt was doing rehab with [facility], plans for return when medically stable   Pt is a MBP for pneumonia, Jelena updated via VM msg and allscripts referral

## 2018-10-02 NOTE — PROGRESS NOTES
Daily Progress Note - Critical Care/ Stepdown   Stephen Frausto 81 y o  female MRN: [id number]  Unit/Bed#: ICU 02 Encounter: [id number]    ______________________________________________________________________  Assessment:   Principal Problem:    COPD with acute exacerbation ([address] )  Active Problems:    Acute respiratory failure with hypercapnia (HCC)    Acute on chronic diastolic congestive heart failure (HCC)    Obstructive sleep apnea    Paroxysmal atrial fibrillation (HCC)    Essential hypertension    Obesity (BMI 30-39  9)    GERD (gastroesophageal reflux disease)  Resolved Problems:    Acute respiratory acidosis      * COPD with acute exacerbation ([facility] [address] )   Assessment & Plan    · CXR on 9/29/18:   · CHF; Small bilateral pleural effusions; Atelectasis and/or consolidation in the left lower lobe with some concern for infiltrates on right side  · CT Chest on 9/30/18:   · Bilateral upper lobe and lower lobe groundglass/consolidative opacities suggesting multifocal pneumonia if clinically suspected; Small bilateral pleural effusions, right greater than left; Findings suggest pulmonary hypertension  · Continue Solumedrol 40mg IV q8h  · Continue Nebulizer treatments q6h  · Continue Day#4: Cefepime and Zithromax 500 mg IV Q24H     Acute respiratory failure with hypercapnia (HCC)   Assessment & Plan    · Likely secondary to Secondary to COPD exacerbation vs  Pneumonia vs  CHF  · Failed BiPAP and required Intubation on admission  · Extubated on 10/1/18  · CT Chest on 9/30/18:   · Bilateral upper lobe and lower lobe groundglass/consolidative opacities suggesting multifocal pneumonia if clinically suspected  Small bilateral pleural effusions, right greater than left   Findings suggest pulmonary hypertension  · Continue Lasix  · Will restart home Lasix 40mg PO qd     Acute respiratory acidosisresolved as of 9/30/2018   Assessment & Plan    · Secondary to hypercapnic resp failure  · Trend ABG     Acute on chronic diastolic congestive heart failure ([facility] [address] )   Assessment & Plan    · CXR on on 9/29/18:   · CHF; Small bilateral pleural effusions; Atelectasis and/or consolidation in the left lower lobe with some concern for infiltrates on right side  · CT chest: As Above  · Continue Lasix  · Will restart home Lasix 40mg PO qd  · Continue to monitor UOP     Obstructive sleep apnea   Assessment & Plan    · Extubated on 10/1/18  · NC 2L at night     Paroxysmal atrial fibrillation (HCC)   Assessment & Plan    · Continue Anticoagulation: Eliquis 5mg PO q12h  · Continue Rate Control: Metoprolol 50mg PO q12h     GERD (gastroesophageal reflux disease)   Assessment & Plan    · Continue home medication: Omeprazole 20mg PO qd     Obesity (BMI 30-39  9)   Assessment & Plan    · Counseled on Diet/Lifestyle Modification     Essential hypertension   Assessment & Plan    · Resume home medications: Amlodipine 5mg PO qd         Plan:    Neuro:   · No Acute Issues  · Regulate sleep/wake cycle    CV:   · Diastolic CHF  · Continue home Lasix 20mg PO qd  · Atrial Fibrillation  · Continue AC: Eliquis 5mg  · Continue Rate Control: Metoprolol 50mg  · Rhythm: Atrial Fibrillation  · Follow rhythm on telemetry    Pulm:   · COPD Exacerbation vs  PNA  · Continue Solumedrol 40mg IV q12h  · Continue Day #4 Abx: Cefepime and Azithromycin  · Continue Nebulizer prn  · SBT plan:   · Chest PT ordered: yes   · Chlorhexidine ordered: yes   · HOB >30 degrees: yes     GI:   · Nutrition/diet plan: Dysphagia Diet - Thin Liquids  · Advance Diet as Tolerated  · Stress ulcer prophylaxis: Omeprazole PO  · Bowel regimen: None currently    FEN:   · Fluid/Diuretic plan: Needs diuresis lasix PO  · Goal 24 hour fluid balance: Net Negative  · Electrolytes repleted: yes  · Goal: K >4 0, Mag >2 0, and Phos >3 0    :   · Indwelling Villar present: no    ID:   · Abx ordered: Azithromycin and Cefepime  · Day # 4  · Trend temps and WBC count    Heme:   · Trend hgb and plts    Endo:   · Current Blood Glucose: 142  · Glycemic control plan: Blood glucose controlled on current regimen    Msk/Skin:  · Mobility goal: OOB w  Walker  · PT consult: yes  · OT consult: yes  · Frequent turning and off-loading    Family:  · Family updated within 24 hours: yes   · Family meeting planned today: no     Lines:  · None    VTE Prophylaxis:  · Pharmacologic Prophylaxis: Eliquis  · Mechanical Prophylaxis: sequential compression device    Disposition: Transfer to general medical floor    Code Status: Level 1 - Full Code    Counseling / Coordination of Care  Total Critical Care time spent 30 minutes excluding procedures, teaching and family updates  ______________________________________________________________________    HPI/24hr events:  Patient seen and examined at bedside  No acute events overnight    _____________________________________________________________________    Physical Exam:   Physical Exam   Constitutional: She is oriented to person, place, and time  She appears well-developed and well-nourished  HENT:   Head: Normocephalic and atraumatic  Eyes: Pupils are equal, round, and reactive to light  Conjunctivae and EOM are normal  Right eye exhibits no discharge  Neck: Normal range of motion  Neck supple  Cardiovascular: Normal heart sounds and intact distal pulses  An irregularly irregular rhythm present  Exam reveals no gallop and no friction rub  No murmur heard  Pulmonary/Chest: Effort normal and breath sounds normal  No respiratory distress  She has no wheezes  She has no rales  She exhibits no tenderness  Abdominal: Soft  Bowel sounds are normal  She exhibits no distension  There is no tenderness  There is no rebound and no guarding  No hernia  Musculoskeletal: She exhibits no edema  Neurological: She is alert and oriented to person, place, and time  Skin: Skin is warm  Capillary refill takes less than 2 seconds     Nursing note and vitals reviewed  ________________________________________________________________  Vitals:    10/02/18 7973 10/02/18 0531 10/02/18 0600 10/02/18 0654   BP:   124/72    BP Location:       Pulse: 89  92    Resp: 19  16    Temp: (!) 97 4 °F (36 3 °C)   (!) 97 1 °F (36 2 °C)   TempSrc: Temporal   Temporal   SpO2: 96%  98%    Weight:  95 6 kg (210 lb 12 2 oz)     Height:           Temperature:   Temp (24hrs), Av 3 °F (36 3 °C), Min:97 1 °F (36 2 °C), Max:97 6 °F (36 4 °C)    Current Temperature: (!) 97 1 °F (36 2 °C)    Weights:   IBW: 54 7 kg    Body mass index is 36 18 kg/m²  Weight (last 2 days)     Date/Time   Weight    10/02/18 0531  95 6 (210 76)    10/01/18 0551  94 5 (208 34)              Hemodynamic Monitoring:  N/A       Non-Invasive/Invasive Ventilation Settings:  Respiratory    Lab Data (Last 4 hours)    None         O2/Vent Data (Last 4 hours)    None              Lab Results   Component Value Date    PHART 7 556 (H) 10/01/2018    OIY8MUB 42 3 10/01/2018    PO2ART 61 0 (L) 10/01/2018    YLA2QHT 37 6 (H) 10/01/2018    BEART 14 0 10/01/2018    SOURCE Brachial, Left 10/01/2018     SpO2: SpO2: 98 %    Intake and Outputs:  I/O       701 - 10/01 0700 10/01 0701 - 10/02 07    P  O   750    I V  (mL/kg) 337 2 (3 6) 87 9 (0 9)    NG/GT  30    IV Piggyback 750 250    Total Intake(mL/kg) 1087 2 (11 5) 1117 9 (11 7)    Urine (mL/kg/hr) 1055 (0 5) 935 (0 4)    Total Output 1055 935    Net +32 2 +182 9              Nutrition:        Diet Orders            Start     Ordered    10/01/18 1708  Diet Dysphagia/Modified Consistency; Dysphagia 1-Pureed; Dysphagia 1-Pureed; Thin Liquid  Diet effective now     Question Answer Comment   Diet Type Dysphagia/Modified Consistency    Dysphagia/Modified Consistency Dysphagia 1-Pureed    Other Restriction(s): Dysphagia 1-Pureed    Liquid Modifier Thin Liquid    RD to adjust diet per protocol?  Yes        10/01/18 1707    10/01/18 0809  Room Service  Once     Question:  Type of Service  Answer:  Room Service - Appropriate with Assistance    10/01/18 0808        Labs:     Results from last 7 days  Lab Units 10/02/18  0525 10/01/18  0446 09/30/18  0455 09/29/18  1900   WBC Thousand/uL 15 07* 14 75* 6 92 10 93*   HEMOGLOBIN g/dL 9 5* 10 1* 9 8* 11 1*   HEMATOCRIT % 31 4* 33 1* 33 5* 39 2   PLATELETS Thousands/uL 125* 132* 103* 152   NEUTROS PCT %  --  95* 96* 90*   MONOS PCT %  --  2* 1* 4       Results from last 7 days  Lab Units 10/02/18  0525 10/01/18  0446 09/30/18  1940  09/29/18  1900   SODIUM mmol/L 138 141 138  < > 140   POTASSIUM mmol/L 4 2 3 2* 4 0  < > 4 4   CHLORIDE mmol/L 100 101 100  < > 98*   CO2 mmol/L 34* 32 34*  < > 39*   BUN mg/dL 36* 28* 23  < > 12   CREATININE mg/dL 1 20 1 29 1 02  < > 0 73   CALCIUM mg/dL 8 2* 8 5 8 4  < > 8 9   ALK PHOS U/L  --   --   --   --  127*   ALT U/L  --   --   --   --  20   AST U/L  --   --   --   --  25   < > = values in this interval not displayed      Results from last 7 days  Lab Units 10/02/18  0525 10/01/18  0446 09/30/18  0455   MAGNESIUM mg/dL 2 7* 2 6 1 6     Lab Results   Component Value Date    PHOS 4 3 (H) 10/02/2018    PHOS 3 4 10/01/2018    PHOS 1 6 (L) 09/30/2018        Results from last 7 days  Lab Units 09/29/18  1900   INR  1 05   PTT seconds 30       0  Lab Value Date/Time   TROPONINI <0 02 09/29/2018 1900   TROPONINI 0 02 05/11/2018 0826   TROPONINI <0 02 05/10/2018 1756   TROPONINI <0 02 05/10/2018 1714   TROPONINI <0 02 05/10/2018 0826   TROPONINI <0 02 03/15/2018 1400   TROPONINI <0 02 02/26/2018 1049   TROPONINI <0 02 01/23/2018 1453   TROPONINI <0 02 01/05/2018 1735   TROPONINI <0 02 01/05/2018 1108   TROPONINI 0 02 12/11/2017 0944   TROPONINI <0 02 03/13/2017 0930   TROPONINI 0 04 (H) 09/21/2016 1111   TROPONINI <0 02 04/27/2016 0307   TROPONINI 0 02 04/26/2016 2133   TROPONINI <0 02 04/26/2016 1818   TROPONINI <0 02 04/26/2016 1244       Results from last 7 days  Lab Units 09/29/18  1901   LACTIC ACID mmol/L 0 6 ABG:  Lab Results   Component Value Date    PHART 7 556 (H) 10/01/2018    DZB6LRP 42 3 10/01/2018    PO2ART 61 0 (L) 10/01/2018    CQY9DDP 37 6 (H) 10/01/2018    BEART 14 0 10/01/2018    SOURCE Brachial, Left 10/01/2018       Imaging: CXR:  I have personally reviewed pertinent reports  ECHO:  I have personally reviewed pertinent reports  CT:  I have personally reviewed pertinent reports  Micro:  Lab Results   Component Value Date    BLOODCX No Growth at 48 hrs  09/29/2018    BLOODCX No Growth at 48 hrs  09/29/2018    BLOODCX No Growth After 5 Days  01/23/2018    URINECX No Growth <1000 cfu/mL 09/29/2018    URINECX >100,000 cfu/ml Mixed Contaminants X5 09/21/2016    SPUTUMCULTUR Culture too young- will reincubate 09/29/2018       Allergies:    Allergies   Allergen Reactions    Fentanyl And Related Other (See Comments)     Passed out    Latex Rash    Penicillins Rash     Medications:   Scheduled Meds:    Current Facility-Administered Medications:  apixaban 5 mg Per NG Tube Q12H [address] GILDA Calderon    atorvastatin 40 mg Oral Daily GILDA Calderon    azithromycin 500 mg Intravenous Q24H GILDA Calderon Last Rate: 500 mg (10/01/18 2154)   cefepime 2,000 mg Intravenous Q12H Cosme Gandhi MD Last Rate: 2,000 mg (10/01/18 1710)   chlorhexidine 15 mL Swish & Spit Q12H [address] GILDA Calderon    furosemide 40 mg Oral Daily Cosme Gandhi MD    influenza vaccine 0 5 mL Intramuscular Prior to discharge Melva Mism MD    insulin lispro 1-6 Units Subcutaneous TID AC Cosme Gandhi MD    insulin lispro 2-12 Units Subcutaneous HS Cosme Gandhi MD    ipratropium 0 5 mg Nebulization Q6H GILDA Ivey    levalbuterol 1 25 mg Nebulization Q6H GILDA Ivey    magnesium oxide 400 mg Per NG Tube BID GILDA Calderon    methylPREDNISolone sodium succinate 40 mg Intravenous Q8H [facility] Michelle Rincon MD    metoprolol tartrate 50 mg Oral Q12H [facility] PA-NASREEN    nystatin  Topical BID GILDA Calderon    omeprazole (PRILOSEC) suspension 2 mg/mL 20 mg Oral Daily GILDA Calderon    pregabalin 300 mg Oral BID GILDA Calderon      Continuous Infusions:   PRN Meds:    influenza vaccine 0 5 mL Prior to discharge       Invasive lines and devices:   Invasive Devices     Peripheral Intravenous Line            Long-Dwell Peripheral IV (Midline) 52/81/71 Right Basilic 2 days                   SIGNATURE: Cosme Gandhi MD  DATE: October 2, 2018

## 2018-10-02 NOTE — SPEECH THERAPY NOTE
Speech Language/Pathology    Speech/Language Pathology Progress Note    Patient Name: Sean Levy  [id number] Date: 10/2/2018     Problem List  Patient Active Problem List   Diagnosis    Essential hypertension    Meningioma ([address] )    Hypertension, accelerated    Stenosis of left internal carotid artery    Obesity (BMI 30-39  9)    Acute bronchitis    GERD (gastroesophageal reflux disease)    Hyperlipemia    Paroxysmal atrial fibrillation (HCC)    Fibromyalgia    Moderate persistent asthma with acute exacerbation    Open wound of abdominal wall without penetration into peritoneal cavity    Asthma exacerbation    Lymphedema of left leg    History of Merkel cell carcinoma    Port-A-Cath in place    Acute congestive heart failure (HCC)    Moderate persistent asthma    Iron deficiency anemia    Acute on chronic diastolic congestive heart failure (HCC)    Closed fracture of right tibia and fibula with routine healing    TIA (transient ischemic attack)    Displaced fracture of medial malleolus of left tibia, initial encounter for closed fracture    Displaced  comminuted fracture of fibula    Allergic rhinitis    Arteriosclerotic cardiovascular disease    Atherosclerotic peripheral vascular disease (HCC)    Chronic obstructive pulmonary disease (HCC)    Lymphedema    Obstructive sleep apnea    Stenosis of left carotid artery    Acute respiratory failure with hypercapnia (HCC)    COPD with acute exacerbation (HCC)        Past Medical History  Past Medical History:   Diagnosis Date    Asthma     Cancer (HCC)     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 [address] )     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, left groin, lung and bladder    Patient treated with chemotherapy and radiation        Past Surgical History  Past Surgical History:   Procedure Laterality Date    APPENDECTOMY      CATARACT EXTRACTION      CHEST WALL BIOPSY N/A 10/27/2017    Procedure: SINUS EXCISION AND REMOVAL OF FOREIGN BODY, ABDOMEN (WOUND EXPLORATION); Surgeon: May Chong MD;  Location: [address] [place] [address];  Service: General    EYE SURGERY Bilateral     cataracts     HIP FRACTURE SURGERY Left     HYSTERECTOMY      JOINT REPLACEMENT Left     hip    LYMPHADENECTOMY      PORTACATH PLACEMENT Right      Subjective:  Patient awake and alert, seated upright in chair  RN and MD both report that patient does not want to eat puree foods and thickened liquids  They report that mental status has improved  Patient will be transferred out of ICU today  Objective:  Patient is on a puree diet with thin liquids  Recommendation from bedside swallowing evaluation was puree with nectar thickened liquids  Patient with thin liquid water at the bedside  Patient reports that she typically eats softer foods at home due to loss of upper teeth  Patient swallowed successive swallows of thin liquid from a straw and dental soft solids  No delay in the initiation of the swallow, good laryngeal elevation, no coughing or choking during or following the swallow, clear voice quality following each swallow  Discussed options of soft diet including Dysphagia 2 and Dysphagia 3  Patient does not want her meats ground  Assessment:  Swallow skills are safe and WNL to upgrade diet to Dysphagia 3 and thin liquids  Plan/Recommendations:  1)  Upgrade diet to Dysphagia 3 with thin liquids  2)  Aspiration precautions  3)  Will follow to ensure diet tolerance      CORA Addison S , [address] [facility] Pathologist  [phone]

## 2018-10-03 PROBLEM — J18.9 HEALTHCARE-ASSOCIATED PNEUMONIA: Status: ACTIVE | Noted: 2018-10-03

## 2018-10-03 LAB
ANION GAP SERPL CALCULATED.3IONS-SCNC: 5 MMOL/L (ref 4–13)
BASOPHILS # BLD AUTO: 0 THOUSANDS/ΜL (ref 0–0.1)
BASOPHILS NFR BLD AUTO: 0 % (ref 0–1)
BUN SERPL-MCNC: 36 MG/DL (ref 5–25)
CALCIUM SERPL-MCNC: 8.2 MG/DL (ref 8.3–10.1)
CHLORIDE SERPL-SCNC: 98 MMOL/L (ref 100–108)
CO2 SERPL-SCNC: 33 MMOL/L (ref 21–32)
CREAT SERPL-MCNC: 1.06 MG/DL (ref 0.6–1.3)
EOSINOPHIL # BLD AUTO: 0 THOUSAND/ΜL (ref 0–0.61)
EOSINOPHIL NFR BLD AUTO: 0 % (ref 0–6)
ERYTHROCYTE [DISTWIDTH] IN BLOOD BY AUTOMATED COUNT: 18.6 % (ref 11.6–15.1)
GFR SERPL CREATININE-BSD FRML MDRD: 49 ML/MIN/1.73SQ M
GLUCOSE SERPL-MCNC: 123 MG/DL (ref 65–140)
GLUCOSE SERPL-MCNC: 124 MG/DL (ref 65–140)
GLUCOSE SERPL-MCNC: 134 MG/DL (ref 65–140)
GLUCOSE SERPL-MCNC: 166 MG/DL (ref 65–140)
GLUCOSE SERPL-MCNC: 176 MG/DL (ref 65–140)
HCT VFR BLD AUTO: 32.4 % (ref 34.8–46.1)
HGB BLD-MCNC: 9.6 G/DL (ref 11.5–15.4)
IMM GRANULOCYTES # BLD AUTO: 0.04 THOUSAND/UL (ref 0–0.2)
IMM GRANULOCYTES NFR BLD AUTO: 0 % (ref 0–2)
LYMPHOCYTES # BLD AUTO: 0.25 THOUSANDS/ΜL (ref 0.6–4.47)
LYMPHOCYTES NFR BLD AUTO: 3 % (ref 14–44)
MAGNESIUM SERPL-MCNC: 2.7 MG/DL (ref 1.6–2.6)
MCH RBC QN AUTO: 23.7 PG (ref 26.8–34.3)
MCHC RBC AUTO-ENTMCNC: 29.6 G/DL (ref 31.4–37.4)
MCV RBC AUTO: 80 FL (ref 82–98)
MONOCYTES # BLD AUTO: 0.25 THOUSAND/ΜL (ref 0.17–1.22)
MONOCYTES NFR BLD AUTO: 3 % (ref 4–12)
NEUTROPHILS # BLD AUTO: 8.85 THOUSANDS/ΜL (ref 1.85–7.62)
NEUTS SEG NFR BLD AUTO: 94 % (ref 43–75)
NRBC BLD AUTO-RTO: 0 /100 WBCS
PLATELET # BLD AUTO: 109 THOUSANDS/UL (ref 149–390)
PMV BLD AUTO: 12.6 FL (ref 8.9–12.7)
POTASSIUM SERPL-SCNC: 4.4 MMOL/L (ref 3.5–5.3)
RBC # BLD AUTO: 4.05 MILLION/UL (ref 3.81–5.12)
SODIUM SERPL-SCNC: 136 MMOL/L (ref 136–145)
WBC # BLD AUTO: 9.39 THOUSAND/UL (ref 4.31–10.16)

## 2018-10-03 PROCEDURE — 94760 N-INVAS EAR/PLS OXIMETRY 1: CPT

## 2018-10-03 PROCEDURE — 80048 BASIC METABOLIC PNL TOTAL CA: CPT | Performed by: NURSE PRACTITIONER

## 2018-10-03 PROCEDURE — 90662 IIV NO PRSV INCREASED AG IM: CPT | Performed by: ANESTHESIOLOGY

## 2018-10-03 PROCEDURE — 99232 SBSQ HOSP IP/OBS MODERATE 35: CPT | Performed by: INTERNAL MEDICINE

## 2018-10-03 PROCEDURE — 82948 REAGENT STRIP/BLOOD GLUCOSE: CPT

## 2018-10-03 PROCEDURE — 83735 ASSAY OF MAGNESIUM: CPT | Performed by: NURSE PRACTITIONER

## 2018-10-03 PROCEDURE — 92526 ORAL FUNCTION THERAPY: CPT

## 2018-10-03 PROCEDURE — 85025 COMPLETE CBC W/AUTO DIFF WBC: CPT | Performed by: NURSE PRACTITIONER

## 2018-10-03 PROCEDURE — 94640 AIRWAY INHALATION TREATMENT: CPT

## 2018-10-03 PROCEDURE — G0008 ADMIN INFLUENZA VIRUS VAC: HCPCS | Performed by: ANESTHESIOLOGY

## 2018-10-03 RX ORDER — ALPRAZOLAM 0.25 MG/1
0.25 TABLET ORAL
Status: DISCONTINUED | OUTPATIENT
Start: 2018-10-03 | End: 2018-10-05 | Stop reason: HOSPADM

## 2018-10-03 RX ORDER — METHYLPREDNISOLONE SODIUM SUCCINATE 40 MG/ML
20 INJECTION, POWDER, LYOPHILIZED, FOR SOLUTION INTRAMUSCULAR; INTRAVENOUS EVERY 8 HOURS SCHEDULED
Status: DISCONTINUED | OUTPATIENT
Start: 2018-10-03 | End: 2018-10-03

## 2018-10-03 RX ORDER — METHYLPREDNISOLONE SODIUM SUCCINATE 40 MG/ML
20 INJECTION, POWDER, LYOPHILIZED, FOR SOLUTION INTRAMUSCULAR; INTRAVENOUS EVERY 12 HOURS SCHEDULED
Status: DISCONTINUED | OUTPATIENT
Start: 2018-10-03 | End: 2018-10-03

## 2018-10-03 RX ORDER — SODIUM CHLORIDE FOR INHALATION 0.9 %
VIAL, NEBULIZER (ML) INHALATION
Status: DISPENSED
Start: 2018-10-03 | End: 2018-10-03

## 2018-10-03 RX ORDER — METHYLPREDNISOLONE SODIUM SUCCINATE 40 MG/ML
20 INJECTION, POWDER, LYOPHILIZED, FOR SOLUTION INTRAMUSCULAR; INTRAVENOUS EVERY 12 HOURS SCHEDULED
Status: DISCONTINUED | OUTPATIENT
Start: 2018-10-04 | End: 2018-10-05

## 2018-10-03 RX ORDER — LEVALBUTEROL 1.25 MG/.5ML
1.25 SOLUTION, CONCENTRATE RESPIRATORY (INHALATION) EVERY 8 HOURS PRN
Status: DISCONTINUED | OUTPATIENT
Start: 2018-10-03 | End: 2018-10-05 | Stop reason: HOSPADM

## 2018-10-03 RX ORDER — ACETAMINOPHEN 325 MG/1
650 TABLET ORAL EVERY 6 HOURS PRN
Status: DISCONTINUED | OUTPATIENT
Start: 2018-10-03 | End: 2018-10-05 | Stop reason: HOSPADM

## 2018-10-03 RX ADMIN — METHYLPREDNISOLONE SODIUM SUCCINATE 20 MG: 40 INJECTION, POWDER, FOR SOLUTION INTRAMUSCULAR; INTRAVENOUS at 13:54

## 2018-10-03 RX ADMIN — ALPRAZOLAM 0.25 MG: 0.25 TABLET ORAL at 22:06

## 2018-10-03 RX ADMIN — INSULIN LISPRO 1 UNITS: 100 INJECTION, SOLUTION INTRAVENOUS; SUBCUTANEOUS at 16:45

## 2018-10-03 RX ADMIN — LEVALBUTEROL HYDROCHLORIDE 1.25 MG: 1.25 SOLUTION, CONCENTRATE RESPIRATORY (INHALATION) at 20:38

## 2018-10-03 RX ADMIN — METOPROLOL TARTRATE 50 MG: 50 TABLET ORAL at 08:09

## 2018-10-03 RX ADMIN — NYSTATIN: 100000 POWDER TOPICAL at 17:23

## 2018-10-03 RX ADMIN — CHLORHEXIDINE GLUCONATE 0.12% ORAL RINSE 15 ML: 1.2 LIQUID ORAL at 22:07

## 2018-10-03 RX ADMIN — INSULIN LISPRO 2 UNITS: 100 INJECTION, SOLUTION INTRAVENOUS; SUBCUTANEOUS at 22:12

## 2018-10-03 RX ADMIN — MAGNESIUM OXIDE TAB 400 MG (241.3 MG ELEMENTAL MG) 400 MG: 400 (241.3 MG) TAB at 17:22

## 2018-10-03 RX ADMIN — ACETAMINOPHEN 650 MG: 325 TABLET, FILM COATED ORAL at 14:04

## 2018-10-03 RX ADMIN — MAGNESIUM OXIDE TAB 400 MG (241.3 MG ELEMENTAL MG) 400 MG: 400 (241.3 MG) TAB at 08:09

## 2018-10-03 RX ADMIN — FUROSEMIDE 40 MG: 40 TABLET ORAL at 08:09

## 2018-10-03 RX ADMIN — APIXABAN 5 MG: 5 TABLET, FILM COATED ORAL at 08:09

## 2018-10-03 RX ADMIN — IPRATROPIUM BROMIDE 0.5 MG: 0.5 SOLUTION RESPIRATORY (INHALATION) at 20:38

## 2018-10-03 RX ADMIN — IPRATROPIUM BROMIDE 0.5 MG: 0.5 SOLUTION RESPIRATORY (INHALATION) at 14:34

## 2018-10-03 RX ADMIN — AZITHROMYCIN MONOHYDRATE 500 MG: 500 INJECTION, POWDER, LYOPHILIZED, FOR SOLUTION INTRAVENOUS at 22:13

## 2018-10-03 RX ADMIN — ATORVASTATIN CALCIUM 40 MG: 40 TABLET, FILM COATED ORAL at 08:08

## 2018-10-03 RX ADMIN — PREGABALIN 300 MG: 100 CAPSULE ORAL at 17:22

## 2018-10-03 RX ADMIN — APIXABAN 5 MG: 5 TABLET, FILM COATED ORAL at 22:06

## 2018-10-03 RX ADMIN — INFLUENZA A VIRUS A/MICHIGAN/45/2015 X-275 (H1N1) ANTIGEN (FORMALDEHYDE INACTIVATED), INFLUENZA A VIRUS A/SINGAPORE/INFIMH-16-0019/2016 IVR-186 (H3N2) ANTIGEN (FORMALDEHYDE INACTIVATED), AND INFLUENZA B VIRUS B/MARYLAND/15/2016 BX-69A (A B/COLORADO/6/2017-LIKE VIRUS) ANTIGEN (FORMALDEHYDE INACTIVATED) 0.5 ML: 60; 60; 60 INJECTION, SUSPENSION INTRAMUSCULAR at 15:06

## 2018-10-03 RX ADMIN — CHLORHEXIDINE GLUCONATE 0.12% ORAL RINSE 15 ML: 1.2 LIQUID ORAL at 08:09

## 2018-10-03 RX ADMIN — LEVALBUTEROL HYDROCHLORIDE 1.25 MG: 1.25 SOLUTION, CONCENTRATE RESPIRATORY (INHALATION) at 14:34

## 2018-10-03 RX ADMIN — PREGABALIN 300 MG: 100 CAPSULE ORAL at 08:09

## 2018-10-03 RX ADMIN — METHYLPREDNISOLONE SODIUM SUCCINATE 40 MG: 40 INJECTION, POWDER, FOR SOLUTION INTRAMUSCULAR; INTRAVENOUS at 08:08

## 2018-10-03 RX ADMIN — METOPROLOL TARTRATE 50 MG: 50 TABLET ORAL at 22:06

## 2018-10-03 RX ADMIN — LEVALBUTEROL HYDROCHLORIDE 1.25 MG: 1.25 SOLUTION, CONCENTRATE RESPIRATORY (INHALATION) at 04:58

## 2018-10-03 RX ADMIN — CEFEPIME HYDROCHLORIDE 2000 MG: 2 INJECTION, POWDER, FOR SOLUTION INTRAVENOUS at 11:19

## 2018-10-03 RX ADMIN — CEFEPIME HYDROCHLORIDE 2000 MG: 2 INJECTION, POWDER, FOR SOLUTION INTRAVENOUS at 23:50

## 2018-10-03 RX ADMIN — NYSTATIN: 100000 POWDER TOPICAL at 08:10

## 2018-10-03 NOTE — ASSESSMENT & PLAN NOTE
Secondary to COPD exacerbation multifocal pneumonia  Patient required intubation on admission and was extubated on October 1, 2018  CT of the chest showed bilateral upper lobe and lower lobe ground-glass/consolidative opacities suggesting multifocal pneumonia with small bilateral pleural effusions and pulmonary hypertension  Patient is currently doing well on 4 liters oxygen

## 2018-10-03 NOTE — PROGRESS NOTES
Progress Note - Dominik Diaz 1935, 81 y o  female MRN: [id number]    Unit/Bed#: [address] [place] [address] Encounter: [id number]    Primary Care Provider: Mary Regan DO   Date and time admitted to hospital: 9/29/2018  6:45 PM        Acute respiratory failure with hypercapnia [facility])   Assessment & Plan    Secondary to COPD exacerbation multifocal pneumonia  Patient required intubation on admission and was extubated on October 1, 2018  CT of the chest showed bilateral upper lobe and lower lobe ground-glass/consolidative opacities suggesting multifocal pneumonia with small bilateral pleural effusions and pulmonary hypertension  Patient is currently doing well on 4 liters oxygen     * COPD with acute exacerbation [facility])   Assessment & Plan    Patient is improving  Will decrease Solu-Medrol to 20 milligram IV q 8 hours  Continue nebulizers every 6 hours and p r n  Healthcare-associated pneumonia   Assessment & Plan    CT chest showed bilateral upper lobe and lower lobe ground-glass/consolidative opacity suggesting multifocal pneumonia  Sputum cultures grew mixed respiratory daniel  Procalcitonin level has been negative  Patient has been on IV cefepime day#5  Possibly can stop the IV cefepime after 7 day treatment     Acute on chronic diastolic congestive heart failure (HCC)   Assessment & Plan    CT of the chest showed small bilateral pleural effusions  No clinical evidence of fluid overload  Continue Lasix 40 milligram p o  Daily  Echo from January 2018 showed EF of 60-76%, grade 1 diastolic dysfunction     Paroxysmal atrial fibrillation [facility])   Assessment & Plan    Patient is on metoprolol 50 milligram p o  B i d  And anticoagulation with Eliquis 5 milligram p o  B i d      Essential hypertension   Assessment & Plan    Continue metoprolol 50 milligram p o  B i d   And Lasix     Obstructive sleep apnea   Assessment & Plan    Continue oxygen supplementation at night           VTE Pharmacologic Prophylaxis: Pharmacologic: Apixaban (Eliquis)  Mechanical VTE Prophylaxis in Place: No    Patient Centered Rounds: I have performed bedside rounds with nursing staff today  Discussions with Specialists or Other Care Team Provider: Yes    Education and Discussions with Family / Patient:Yes    Time Spent for Care: 45 minutes  More than 50% of total time spent on counseling and coordination of care as described above  Current Length of Stay: 4 day(s)    Current Patient Status: Inpatient     Discharge Plan:  Nursing home    Code Status: Level 1 - Full Code      Subjective:   Patient is feeling tired this morning as she did not sleep well last night  Patient has improved shortness of breath  Occasional dry cough  Denies any abdominal pain, chest pain, palpitations  Objective:       Vitals:   Temp (24hrs), Av 1 °F (36 7 °C), Min:97 5 °F (36 4 °C), Max:98 4 °F (36 9 °C)    HR:  [73-92] 73  Resp:  [17-20] 20  BP: (109-134)/(57-71) 132/71  SpO2:  [90 %-99 %] 94 %  Body mass index is 36 1 kg/m²  Input and Output Summary (last 24 hours): Intake/Output Summary (Last 24 hours) at 10/03/18 1147  Last data filed at 10/03/18 1024   Gross per 24 hour   Intake              410 ml   Output             1325 ml   Net             -915 ml       Physical Exam:     Physical Exam   Constitutional: No distress  HENT:   Head: Normocephalic and atraumatic  Nose: Nose normal    Eyes: Pupils are equal, round, and reactive to light  Conjunctivae are normal    Neck: Normal range of motion  Neck supple  Cardiovascular: Normal rate, regular rhythm and normal heart sounds  Pulmonary/Chest: Effort normal  No respiratory distress  She has decreased breath sounds  She has no wheezes  She has no rales  She exhibits no tenderness  Abdominal: Soft  Bowel sounds are normal  She exhibits no distension  There is no tenderness  There is no rebound and no guarding  Musculoskeletal: She exhibits edema     Trace bilateral edema Neurological: She is alert  No cranial nerve deficit  Skin: Skin is warm and dry  No rash noted  Additional Data:     Labs:      Results from last 7 days  Lab Units 10/03/18  0507   WBC Thousand/uL 9 39   HEMOGLOBIN g/dL 9 6*   HEMATOCRIT % 32 4*   PLATELETS Thousands/uL 109*   NEUTROS PCT % 94*   LYMPHS PCT % 3*   MONOS PCT % 3*   EOS PCT % 0       Results from last 7 days  Lab Units 10/03/18  0507  09/29/18  1900   SODIUM mmol/L 136  < > 140   POTASSIUM mmol/L 4 4  < > 4 4   CHLORIDE mmol/L 98*  < > 98*   CO2 mmol/L 33*  < > 39*   BUN mg/dL 36*  < > 12   CREATININE mg/dL 1 06  < > 0 73   CALCIUM mg/dL 8 2*  < > 8 9   ALK PHOS U/L  --   --  127*   ALT U/L  --   --  20   AST U/L  --   --  25   < > = values in this interval not displayed  Results from last 7 days  Lab Units 09/29/18  1900   INR  1 05       * I Have Reviewed All Lab Data Listed Above  * Additional Pertinent Lab Tests Reviewed: All Labs For Current Hospital Admission Reviewed    Imaging:  Xr Chest 1 View Portable    Result Date: 10/1/2018  Narrative: CHEST INDICATION:   post intubation  COMPARISON:  Portable chest from September 29, 2018 at 2009 hours EXAM PERFORMED/VIEWS:  XR CHEST PORTABLE  2142 hours FINDINGS:  Endotracheal tube present with tip 2 cm above marshall  Nasogastric tube extends into the stomach  Right-sided Port-A-Cath terminates in expected location of SVC  Heart enlarged  Pulmonary vessels prominent  Small bilateral pleural effusions  Probable left lower lobe atelectasis or consolidation  Osseous structures appear within normal limits for patient age  Impression: 1  Tip of endotracheal tube 2 cm above the marshall  2   Mild CHF  3   Probable atelectasis and/or consolidation in the left lower lobe  Workstation performed: [id number]     Xr Chest 1 View Portable    Result Date: 10/1/2018  Narrative: CHEST INDICATION:   SOB  Altered mental status  COMPARISON:  May 13, 2018   EXAM PERFORMED/VIEWS:  XR CHEST PORTABLE FINDINGS:  Right-sided Port-A-Cath extends into superior vena cava  Heart enlarged  Pulmonary vessels prominent  Small bilateral pleural effusions  Possible atelectasis or consolidation in the left lower lobe  No evidence of pneumothorax  Osseous structures appear within normal limits for patient age  Impression: 1  CHF  2   Small bilateral pleural effusions  3   Atelectasis and/or consolidation in the left lower lobe  Workstation performed: [id number]     Ct Head Without Contrast    Result Date: 9/29/2018  Narrative: CT BRAIN - WITHOUT CONTRAST INDICATION:   AMS  COMPARISON:  5/10/2018 TECHNIQUE:  CT examination of the brain was performed  In addition to axial images, coronal 2D reformatted images were created and submitted for interpretation  Radiation dose length product (DLP) for this visit:  1400 77 mGy-cm   This examination, like all CT scans performed in the [facility], was performed utilizing techniques to minimize radiation dose exposure, including the use of iterative reconstruction and automated exposure control  IMAGE QUALITY:  Diagnostic  FINDINGS: PARENCHYMA: Decreased attenuation is noted in periventricular and subcortical white matter demonstrating an appearance that is statistically most likely to represent mild microangiopathic change; this appearance is similar when compared to most recent prior examination  No CT signs of acute infarction  Stable left middle cranial fossa meningioma measuring 2 4 x 1 5 cm  No acute parenchymal hemorrhage  A chronic left frontal lobe infarction with secondary inflammation stable  VENTRICLES AND EXTRA-AXIAL SPACES:  Normal for the patient's age  VISUALIZED ORBITS AND PARANASAL SINUSES:  No acute abnormality involving the orbits  Mild scattered sinus mucosal thickening is noted  No fluid levels are seen  CALVARIUM AND EXTRACRANIAL SOFT TISSUES:  Normal      Impression: No acute intracranial abnormality  Microangiopathic changes  Chronic left MCA infarction  Workstation performed: [id number]     Ct Chest Wo Contrast    Result Date: 9/30/2018  Narrative: CT CHEST WITHOUT IV CONTRAST INDICATION:   Shortness of breath  COMPARISON:  None  TECHNIQUE: CT examination of the chest was performed without intravenous contrast   Axial, sagittal, and coronal 2D reformatted images were created from the source data and submitted for interpretation  Radiation dose length product (DLP) for this visit:  561 15 mGy-cm   This examination, like all CT scans performed in the [facility], was performed utilizing techniques to minimize radiation dose exposure, including the use of iterative  reconstruction and automated exposure control  FINDINGS: Endotracheal within the trachea with tip just above the marshall, consider retraction by approximately 3 cm  Enteric tube/feeding tube identified coursing into the stomach  LUNGS:  Bilateral upper lobe and lower lobe consolidative and groundglass opacities possibly related to multifocal pneumonia  Bibasilar consolidation could also relate to atelectasis  PLEURA:  Small bilateral pleural effusions, right greater the left  HEART/GREAT VESSELS:  Enlarged pulmonary artery 3 1 cm suggesting pulmonary hypertension  MEDIASTINUM AND JASWINDER:  Mediastinal adenopathy likely reactive  CHEST WALL AND LOWER NECK:   Unremarkable  VISUALIZED STRUCTURES IN THE UPPER ABDOMEN:  Partially visualized right upper pole renal cyst  OSSEOUS STRUCTURES:  No acute fracture or destructive osseous lesion  Impression: 1  Bilateral upper lobe and lower lobe groundglass/consolidative opacities suggesting multifocal pneumonia if clinically suspected  Please follow-up to radiographic resolution with CT chest in 2 months posttreatment  2  Small bilateral pleural effusions, right greater than left  3  Findings suggest pulmonary hypertension  4  Endotracheal tube just above the marshall, consider retraction by approximately 2 to 3 cm  Workstation performed: [id number]     Imaging Reports Reviewed by myself    Cultures:   Blood Culture:   Lab Results   Component Value Date    BLOODCX No Growth at 72 hrs  09/29/2018    BLOODCX No Growth at 72 hrs  09/29/2018    BLOODCX No Growth After 5 Days  01/23/2018    BLOODCX No Growth After 5 Days  01/23/2018    BLOODCX No Growth After 5 Days  01/05/2018    BLOODCX No Growth After 5 Days  01/05/2018    BLOODCX No Growth After 5 Days   12/13/2017     Urine Culture:   Lab Results   Component Value Date    URINECX No Growth <1000 cfu/mL 09/29/2018    URINECX >100,000 cfu/ml Mixed Contaminants X5 09/21/2016     Sputum Culture: No components found for: SPUTUMCX  Wound Culture: No results found for: WOUNDCULT    Last 24 Hours Medication List:     Current Facility-Administered Medications:  apixaban 5 mg Per NG Tube Q12H [address] GILDA Mcneill    atorvastatin 40 mg Oral Daily GILDA Mcneill    azithromycin 500 mg Intravenous Q24H GILDA Mcneill Last Rate: Stopped (10/02/18 7290)   cefepime 2,000 mg Intravenous Q12H Janell Willis MD Last Rate: 2,000 mg (10/03/18 1119)   chlorhexidine 15 mL Swish & Spit Q12H [address] GILDA Mcneill    furosemide 40 mg Oral Daily Janell Willis MD    influenza vaccine 0 5 mL Intramuscular Prior to discharge Ranjana Poon MD    insulin lispro 1-6 Units Subcutaneous TID AC Janell Willis MD    insulin lispro 2-12 Units Subcutaneous HS Janell Willis MD    ipratropium 0 5 mg Nebulization Q6H GILDA Ivey    levalbuterol 1 25 mg Nebulization Q6H GILDA Ivey    levalbuterol 1 25 mg Nebulization Q8H PRN GILDA Miller    magnesium oxide 400 mg Per NG Tube BID GILDA Mcneill    methylPREDNISolone sodium succinate 20 mg Intravenous [facility] Amish Merlos PA-C    metoprolol tartrate 50 mg Oral Q12H [address] Brigid Vergara PA-C    nystatin  Topical BID GILDA Mcneill    omeprazole (PRILOSEC) suspension 2 mg/mL 20 mg Oral Daily Donte Irma CRNP    pregabalin 300 mg Oral BID GILDA Bean    sodium chloride             Today, Patient Was Seen By: Yuri Pleitez MD    ** Please Note: Dragon 360 Dictation voice to text software may have been used in the creation of this document   **

## 2018-10-03 NOTE — ASSESSMENT & PLAN NOTE
CT chest showed bilateral upper lobe and lower lobe ground-glass/consolidative opacity suggesting multifocal pneumonia  Sputum cultures grew mixed respiratory daniel  Procalcitonin level has been negative  Patient has been on IV cefepime day#5  Possibly can stop the IV cefepime after 7 day treatment

## 2018-10-03 NOTE — CASE MANAGEMENT
Continued Stay Review    Date:     Vital Signs: /71 (BP Location: Left arm)   Pulse 73   Temp 98 4 °F (36 9 °C) (Oral)   Resp 20   Ht 5' 4" (1 626 m)   Wt 95 4 kg (210 lb 5 1 oz)   SpO2 94%   BMI 36 10 kg/m²     Medications:   Scheduled Meds:   Current Facility-Administered Medications:  apixaban 5 mg Per NG Tube Q12H [address] Maya Clas, CRNP    atorvastatin 40 mg Oral Daily Maya Clas, CRNP    azithromycin 500 mg Intravenous Q24H Maya Clas, CRNP Last Rate: Stopped (10/02/18 2320)   cefepime 2,000 mg Intravenous Q12H Jose Guadalupe Austin MD Last Rate: 2,000 mg (10/03/18 1119)   chlorhexidine 15 mL Swish & Spit Q12H [address] Maya Ramseys, CRNP    furosemide 40 mg Oral Daily Jose Guadalupe Austin MD    influenza vaccine 0 5 mL Intramuscular Prior to discharge Chong Kay MD    insulin lispro 1-6 Units Subcutaneous TID AC Jose Guadalupe Austin MD    insulin lispro 2-12 Units Subcutaneous HS Jose Guadalupe Austin MD    ipratropium 0 5 mg Nebulization Q6H GILDA Ivey    levalbuterol 1 25 mg Nebulization Q6H GILDA Ivey    levalbuterol 1 25 mg Nebulization Q8H PRN GILDA Miller    magnesium oxide 400 mg Per NG Tube BID Maya Ramseys, GILDA    methylPREDNISolone sodium succinate 20 mg Intravenous [facility] Prudencwellington Valdez PA-C    metoprolol tartrate 50 mg Oral Q12H [address] Brigid Vergara PA-C    nystatin  Topical BID Maya Ramseys, GILDA    omeprazole (PRILOSEC) suspension 2 mg/mL 20 mg Oral Daily Maya Clas, CRNP    pregabalin 300 mg Oral BID Maya Clas, CRNP    sodium chloride          Continuous Infusions:    PRN Meds: influenza vaccine    levalbuterol    Abnormal Labs/Diagnostic Results:   HEMOGLOBIN g/dL 9 6*   HEMATOCRIT % 32 4*   PLATELETS Thousands/uL 109*   NEUTROS PCT % 94*   LYMPHS PCT % 3*   MONOS PCT % 3*       Age/Sex: 81 y o  female     Assessment/Plan:   Acute respiratory failure with hypercapnia (HCC)   Assessment & Plan     Secondary to COPD exacerbation multifocal pneumonia  Patient required intubation on admission and was extubated on October 1, 2018  CT of the chest showed bilateral upper lobe and lower lobe ground-glass/consolidative opacities suggesting multifocal pneumonia with small bilateral pleural effusions and pulmonary hypertension  Patient is currently doing well on 4 liters oxygen      * COPD with acute exacerbation [facility])   Assessment & Plan     Patient is improving  Will decrease Solu-Medrol to 20 milligram IV q 8 hours  Continue nebulizers every 6 hours and p r n       Healthcare-associated pneumonia   Assessment & Plan     CT chest showed bilateral upper lobe and lower lobe ground-glass/consolidative opacity suggesting multifocal pneumonia  Sputum cultures grew mixed respiratory daniel  Procalcitonin level has been negative  Patient has been on IV cefepime day#5  Possibly can stop the IV cefepime after 7 day treatment      Acute on chronic diastolic congestive heart failure (HCC)   Assessment & Plan     CT of the chest showed small bilateral pleural effusions  No clinical evidence of fluid overload  Continue Lasix 40 milligram p o  Daily  Echo from January 2018 showed EF of 23-54%, grade 1 diastolic dysfunction      Paroxysmal atrial fibrillation (HCC)   Assessment & Plan     Patient is on metoprolol 50 milligram p o  B i d  And anticoagulation with Eliquis 5 milligram p o  B i d       Essential hypertension   Assessment & Plan     Continue metoprolol 50 milligram p o  B i d   And Lasix      Obstructive sleep apnea   Assessment & Plan     Continue oxygen supplementation at night               VTE Pharmacologic Prophylaxis:   Pharmacologic: Apixaban (Eliquis)    Discharge Plan:

## 2018-10-03 NOTE — OCCUPATIONAL THERAPY NOTE
Occupational Therapy    Pt has declined OT services for today stating she is just not up to it  He daughter passed away two weeks ago and she is having a down day  Offered toileting, UB ex, etc with pt continuing to decline  OT will follow as appropriate      Yelitza Maya OTR/L [id number]

## 2018-10-03 NOTE — PHYSICAL THERAPY NOTE
Attempted to see pt for PT, however pt refuses stating that her daughter just  2 weeks ago and it is "just catching up to me" " I'm really bummed out"  Will follow  Alton Ash PT  [id number]

## 2018-10-03 NOTE — ASSESSMENT & PLAN NOTE
Patient is improving  Will decrease Solu-Medrol to 20 milligram IV q 8 hours  Continue nebulizers every 6 hours and p r n

## 2018-10-03 NOTE — PLAN OF CARE
Subjective:   CHIEF COMPLAINT:   Chief Complaint   Patient presents with   • Head        HPI: Shanda Hernandes is a 64 year oldFemale Patient presents with episodic headache and shooting pain radiating along the right side of her head toward her temple. Patient states that this is been going on for about a month. She notices pain worsens when she attempts to lift heavy items at work. 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 Patient subsequently had the aneurysm clipped. Patient states that she has not had follow-up since that time for her cerebral aneurysm. She does see Dr. Beltre from neurology for other problems. Patient states that she has not taken any medication for her pain. Denies sore throat, ear pain, hearing changes, changes in vision or speech, focal weakness, numbness or tingling. No recent travel. Denies any nausea, photophobia, dizziness, vision changes, aura, stiff neck and fever. She denies any recent or remote head or neck injury. Patient has a tremor which is being treated with amantadine.    REVIEW OF SYSTEMS:   A review of systems was performed and findings relevant to this complaint are included in the HPI.  Eye Problem(s): Negative, visual blurring and double vision.  ENT Problem(s): Negative, vertigo and sinus trouble.  Cardiovascular problem(s): Negative, chest pain, dyspnea on exertion, exertional chest pain or pressure, orthopnea, palpitations and shortness of breath.  Respiratory problem(s): Negative, cough, hemoptysis, shortness of breath and wheezing.  Gastro-intestinal problem(s): Negative GI, abdominal pain, diarrhea, nausea and vomiting.  Genito-urinary problem(s): Negative and dysuria.  Musculoskeletal problem(s): Negative, back pain, muscular weakness, cramping and joint swelling or redness.  Integumentary problem(s): Negative, bruising and rash.  Neurological problem(s): Negative,  Problem: SLP ADULT - SWALLOWING, IMPAIRED  Goal: Advance to least restrictive diet without signs or symptoms of aspiration for planned discharge setting  See evaluation for individualized goals         Outcome: Progressing aphasia or dysarthria, floresita-paresis, numbness or tingling of hands, numbness or tingling of feet, syncope, seizures and unsteady gait and frequent falls.   Endocrine problem(s): Negative, thyroid disorder and diabetes.  Hematologic and/or Lymphatic problem(s): Negative, anemia, easy bruising, bleeding disorder, fever, chills, weight loss and swollen nodes.    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/14   • Bipolar disorder, unspecified (CMS/HCC)     on med   • Dysplasia of cervix (uteri)    • Mucous polyp of cervix 10/14/99    3mm removed, benign   • Other and unspecified hyperlipidemia     Hyperlipidemia   • PAST MEDICAL HISTORY      cerebreal anuerysm   • Unspecified disorder of skin and subcutaneous tissue 00    Lt chest wall skin lesion   • Unspecified hemorrhoids without mention of complication 3/26/14   • Varicella without mention of complication      Past Surgical History:   Procedure Laterality Date   • Colonoscopy remove lesion by snare  3/26/14    Dr Valentine   • Colposcopy bx cervix endocerv curr  95,95, 3/23/95    Colposcopy   • Conization cervix,loop electrd  95    LEEP, mod dysplasia of cx   • Induced abortn by dil/evac  's     elective termination of pregnancy   • Laparoscopy,tubal cautery      Tubal Ligation   • Obstetrical care,vag deliv only      [facility]   • Past surgical history      brain surgery for aneurysm, rt posterior cerebral artery,  Dr. Stevenson,   [facility]     History   Smoking Status   • Current Some Day Smoker   • Packs/day: 0.50   • Years: 32.00   • Types: Cigarettes   • Start date: 1970   • Last attempt to quit: 2017   Smokeless Tobacco   • Never Used     Comment: presently 3-4 cigarettes/day        Objective:     PHYSICAL EXAM:   Visit Vitals  /80   Pulse 60   Temp 98 °F (36.7 °C) (Tympanic)   Resp 16   Wt 75.8 kg   LMP 2007   SpO2 97%   BMI 30.06 kg/m²     GENERAL: Appears stated  age, is in no apparent distress and is well-developed and well-nourished.  LYMPH NODES: No cervical adenopathy, no supraclavicular adenopathy, no axillary adenopathy and no inguinal adenopathy.  SKIN normal color, normal texture, normal turgor, no skin rashes, no atypical appearing skin lesions and no bruises.  HEAD: Normocephalic and nontender.  EYES: Pupils are equal and reactive to light and accommodation, extraocular movements are full, visual fields are full, sclerae and conjunctivae are normal, lids and lashes are normal. Fundoscopic exam is normal, visual acuity is grossly normal.  EARS: Pinna and external ear is normal bilaterally, external auditory canals are normal, tympanic membranes are normal, middle ear space is normal bilaterally. There is no discomfort on traction of the pinna or palpation of the tragus and auditory acuity is grossly normal.  MOUTH/THROAT: Tongue is midline and appears normal, oropharynx appears normal, soft palate and uvula are normal and oral mucosa is normal.  NECK: Neck is supple, no thyromegaly, no anterior cervical adenopathy, no posterior cervical adenopathy, no supraclavicular adenopathy, no carotid bruits noted. There is full range of motion and no palpable mass.  CHEST: Contour is normal with normal AP diameter, normal respiratory excursion and respiratory effort is not labored.  HEART: Normal PMI, normal rate and rhythm, S1 and S2 normal, no S3 or S4, no murmurs and no extra heart sounds.  ABDOMEN: Abdomen is soft, normal active bowel sounds, nontender, without masses, without hepatomegaly, without splenomegaly and no pulsatile masses.  NEUROLOGIC: Cranial nerves 2 through 12 normal, motor strength normal, gait and station normal, coordination normal, Romberg negative, DTR's normal and symmetric. Patient has a tremor that seems to worsen with intention. She is able to perform finger to nose testing without ataxia.  EXTREMITIES: No clubbing, no cyanosis, no edema and normal  muscle tone and development bilaterally.      DIAGNOSTICS:   CT:1. Postop changes right frontotemporal craniotomy and aneurysm clipping  with associated artifact.  2. Encephalomalacia changes from prior right temporal lobe infarct.  3. No acute intracranial mass or hemorrhage within visualized portions of  the brain.  ESR: WNL  CRP: WNL      Assessment:     1. Acute nonintractable headache, unspecified headache type        Plan:   Follow up with Neurology next week for further workup.  Tylenol as needed for symptom control.  Go to the ER if worsening headache, persistent headache, fever, vomiting, neck pain, acute neurologic changes such as changes in vision, changes in speech, focal weakness, numbness or tingling.  The patient indicated understanding of the diagnosis and agreed with the plan of care.     The patient was seen and examined by Anastasiia jackson PA-C working in collaboration with Dr Redmond.

## 2018-10-03 NOTE — ASSESSMENT & PLAN NOTE
CT of the chest showed small bilateral pleural effusions  No clinical evidence of fluid overload  Continue Lasix 40 milligram p o   Daily  Echo from January 2018 showed EF of 97-28%, grade 1 diastolic dysfunction

## 2018-10-03 NOTE — ASSESSMENT & PLAN NOTE
Patient is on metoprolol 50 milligram p o  B i d   And anticoagulation with Eliquis 5 milligram p o  B i d

## 2018-10-03 NOTE — SPEECH THERAPY NOTE
Speech Language/Pathology    Speech/Language Pathology Progress Note    Patient Name: Lulu GONZALEZ Date: 10/3/2018    Subjective:  Patient was seen upright in the chair with her lunch tray  Patient with request for upgraded diet as she remains on dysph 3 textures  Objective:  Patient consumed turkey, broccoli and macaroni and cheese with thin liquids via straw sips  Mastication was adequate with the materials administered today  Bolus formation and transfer were functional with nosignificant oral residue noted  No overt s/s reduced oral control  Swallowing initiation appeared prompt  Laryngeal rise was palpated and judged to be within functional limits  No coughing, throat clearing, change in vocal quality or respiratory status noted today  Patient was noted to have some belching which she reports is premorbid and 2' chronic GERD which she takes Nexium for  Education on reflux precautions provided  Reciprocal comprehension was verbally expressed  Assessment:  Patients oropharyngeal swallow function appears to be [facility] and back to baseline- she does not appear to be at risk for aspiration  Plan/Recommendations:  Upgrade to regular textures - Continue thin liquids    Continue aspiration precautions    ST will continue to follow      CORA Leach S , [id number] [facility]  Speech Language Pathologist   [phone]

## 2018-10-04 LAB
ANION GAP SERPL CALCULATED.3IONS-SCNC: 4 MMOL/L (ref 4–13)
BUN SERPL-MCNC: 33 MG/DL (ref 5–25)
CALCIUM SERPL-MCNC: 8.3 MG/DL (ref 8.3–10.1)
CHLORIDE SERPL-SCNC: 99 MMOL/L (ref 100–108)
CO2 SERPL-SCNC: 35 MMOL/L (ref 21–32)
CREAT SERPL-MCNC: 0.96 MG/DL (ref 0.6–1.3)
ERYTHROCYTE [DISTWIDTH] IN BLOOD BY AUTOMATED COUNT: 18.5 % (ref 11.6–15.1)
GFR SERPL CREATININE-BSD FRML MDRD: 55 ML/MIN/1.73SQ M
GLUCOSE SERPL-MCNC: 108 MG/DL (ref 65–140)
GLUCOSE SERPL-MCNC: 116 MG/DL (ref 65–140)
GLUCOSE SERPL-MCNC: 136 MG/DL (ref 65–140)
GLUCOSE SERPL-MCNC: 149 MG/DL (ref 65–140)
GLUCOSE SERPL-MCNC: 94 MG/DL (ref 65–140)
HCT VFR BLD AUTO: 33.9 % (ref 34.8–46.1)
HGB BLD-MCNC: 10 G/DL (ref 11.5–15.4)
MCH RBC QN AUTO: 23.8 PG (ref 26.8–34.3)
MCHC RBC AUTO-ENTMCNC: 29.5 G/DL (ref 31.4–37.4)
MCV RBC AUTO: 81 FL (ref 82–98)
PLATELET # BLD AUTO: 94 THOUSANDS/UL (ref 149–390)
PMV BLD AUTO: 12.7 FL (ref 8.9–12.7)
POTASSIUM SERPL-SCNC: 4 MMOL/L (ref 3.5–5.3)
RBC # BLD AUTO: 4.21 MILLION/UL (ref 3.81–5.12)
SODIUM SERPL-SCNC: 138 MMOL/L (ref 136–145)
WBC # BLD AUTO: 7.06 THOUSAND/UL (ref 4.31–10.16)

## 2018-10-04 PROCEDURE — 94760 N-INVAS EAR/PLS OXIMETRY 1: CPT

## 2018-10-04 PROCEDURE — 80048 BASIC METABOLIC PNL TOTAL CA: CPT | Performed by: NURSE PRACTITIONER

## 2018-10-04 PROCEDURE — 99232 SBSQ HOSP IP/OBS MODERATE 35: CPT | Performed by: INTERNAL MEDICINE

## 2018-10-04 PROCEDURE — 85027 COMPLETE CBC AUTOMATED: CPT | Performed by: NURSE PRACTITIONER

## 2018-10-04 PROCEDURE — 82948 REAGENT STRIP/BLOOD GLUCOSE: CPT

## 2018-10-04 PROCEDURE — 97110 THERAPEUTIC EXERCISES: CPT

## 2018-10-04 PROCEDURE — 94640 AIRWAY INHALATION TREATMENT: CPT

## 2018-10-04 RX ADMIN — METHYLPREDNISOLONE SODIUM SUCCINATE 20 MG: 40 INJECTION, POWDER, FOR SOLUTION INTRAMUSCULAR; INTRAVENOUS at 08:49

## 2018-10-04 RX ADMIN — APIXABAN 5 MG: 5 TABLET, FILM COATED ORAL at 08:16

## 2018-10-04 RX ADMIN — Medication 20 MG: at 08:50

## 2018-10-04 RX ADMIN — IPRATROPIUM BROMIDE 0.5 MG: 0.5 SOLUTION RESPIRATORY (INHALATION) at 08:36

## 2018-10-04 RX ADMIN — IPRATROPIUM BROMIDE 0.5 MG: 0.5 SOLUTION RESPIRATORY (INHALATION) at 21:36

## 2018-10-04 RX ADMIN — LEVALBUTEROL HYDROCHLORIDE 1.25 MG: 1.25 SOLUTION, CONCENTRATE RESPIRATORY (INHALATION) at 21:39

## 2018-10-04 RX ADMIN — METOPROLOL TARTRATE 50 MG: 50 TABLET ORAL at 20:15

## 2018-10-04 RX ADMIN — CEFEPIME HYDROCHLORIDE 2000 MG: 2 INJECTION, POWDER, FOR SOLUTION INTRAVENOUS at 12:31

## 2018-10-04 RX ADMIN — ALPRAZOLAM 0.25 MG: 0.25 TABLET ORAL at 20:16

## 2018-10-04 RX ADMIN — IPRATROPIUM BROMIDE 0.5 MG: 0.5 SOLUTION RESPIRATORY (INHALATION) at 21:34

## 2018-10-04 RX ADMIN — LEVALBUTEROL HYDROCHLORIDE 1.25 MG: 1.25 SOLUTION, CONCENTRATE RESPIRATORY (INHALATION) at 08:37

## 2018-10-04 RX ADMIN — APIXABAN 5 MG: 5 TABLET, FILM COATED ORAL at 20:15

## 2018-10-04 RX ADMIN — PREGABALIN 300 MG: 100 CAPSULE ORAL at 17:25

## 2018-10-04 RX ADMIN — MAGNESIUM OXIDE TAB 400 MG (241.3 MG ELEMENTAL MG) 400 MG: 400 (241.3 MG) TAB at 08:16

## 2018-10-04 RX ADMIN — METOPROLOL TARTRATE 50 MG: 50 TABLET ORAL at 08:16

## 2018-10-04 RX ADMIN — IPRATROPIUM BROMIDE 0.5 MG: 0.5 SOLUTION RESPIRATORY (INHALATION) at 14:12

## 2018-10-04 RX ADMIN — LEVALBUTEROL HYDROCHLORIDE 1.25 MG: 1.25 SOLUTION, CONCENTRATE RESPIRATORY (INHALATION) at 14:12

## 2018-10-04 RX ADMIN — ATORVASTATIN CALCIUM 40 MG: 40 TABLET, FILM COATED ORAL at 08:15

## 2018-10-04 RX ADMIN — METHYLPREDNISOLONE SODIUM SUCCINATE 20 MG: 40 INJECTION, POWDER, FOR SOLUTION INTRAMUSCULAR; INTRAVENOUS at 20:16

## 2018-10-04 RX ADMIN — FUROSEMIDE 40 MG: 40 TABLET ORAL at 08:16

## 2018-10-04 RX ADMIN — CHLORHEXIDINE GLUCONATE 0.12% ORAL RINSE 15 ML: 1.2 LIQUID ORAL at 08:15

## 2018-10-04 RX ADMIN — PREGABALIN 300 MG: 100 CAPSULE ORAL at 08:16

## 2018-10-04 RX ADMIN — MAGNESIUM OXIDE TAB 400 MG (241.3 MG ELEMENTAL MG) 400 MG: 400 (241.3 MG) TAB at 17:25

## 2018-10-04 NOTE — PROGRESS NOTES
Progress Note - Caryn Ricci 1935, 81 y o  female MRN: [id number]    Unit/Bed#: [address] [place] [address] Encounter: [id number]    Primary Care Provider: Cherylene Parsley, DO   Date and time admitted to hospital: 9/29/2018  6:45 PM        Acute respiratory failure with hypercapnia [facility])   Assessment & Plan    Secondary to COPD exacerbation multifocal pneumonia  Patient required intubation on admission and was extubated on October 1, 2018  CT of the chest showed bilateral upper lobe and lower lobe ground-glass/consolidative opacities suggesting multifocal pneumonia with small bilateral pleural effusions and pulmonary hypertension  Pain finished 5 day course of Zithromax and will be finishing 5 days of cefepime today     * COPD with acute exacerbation [facility])   Assessment & Plan    Patient is improving  Continue Solu-Medrol taper-will decrease Solu-Medrol to 20 milligram IV q 12 hours and possible transition to prednisone taper in a m  Continue nebulizers every 6 hours and p r n  Healthcare-associated pneumonia   Assessment & Plan    CT chest showed bilateral upper lobe and lower lobe ground-glass/consolidative opacity suggesting multifocal pneumonia  Sputum cultures grew mixed respiratory daniel  Procalcitonin level has been negative  Patient has been on IV cefepime day#5  Possibly can stop the IV cefepime after 7 day treatment     Acute on chronic diastolic congestive heart failure (HCC)   Assessment & Plan    CT of the chest showed small bilateral pleural effusions  No clinical evidence of fluid overload  Continue Lasix 40 milligram p o  Daily  Echo from January 2018 showed EF of 42-43%, grade 1 diastolic dysfunction     Paroxysmal atrial fibrillation [facility])   Assessment & Plan    Patient is on metoprolol 50 milligram p o  B i d  And anticoagulation with Eliquis 5 milligram p o  B i d      Essential hypertension   Assessment & Plan    Continue metoprolol 50 milligram p o  B i d   And Lasix     Obstructive sleep apnea Assessment & Plan    Continue oxygen supplementation at night as patient is not comfortable using CPAP/BiPAP           VTE Pharmacologic Prophylaxis:   Pharmacologic: Apixaban (Eliquis)  Mechanical VTE Prophylaxis in Place: Yes    Patient Centered Rounds: I have performed bedside rounds with nursing staff today  Discussions with Specialists or Other Care Team Provider: Yes    Education and Discussions with Family / Patient:Yes    Time Spent for Care: 30 minutes  More than 50% of total time spent on counseling and coordination of care as described above  Current Length of Stay: 5 day(s)    Current Patient Status: Inpatient     Discharge Plan:  Nursing home    Code Status: Level 1 - Full Code      Subjective:   Patient is feeling much better today  Improved shortness of breath and cough  Patient denies any chest pain, abdominal pain, diarrhea or constipation      Objective:       Vitals:   Temp (24hrs), Av 9 °F (36 6 °C), Min:97 7 °F (36 5 °C), Max:98 °F (36 7 °C)    HR:  [71-83] 77  Resp:  [18-19] 19  BP: (137-146)/(67-78) 142/78  SpO2:  [94 %-98 %] 94 %  Body mass index is 35 72 kg/m²  Input and Output Summary (last 24 hours): Intake/Output Summary (Last 24 hours) at 10/04/18 1526  Last data filed at 10/04/18 1052   Gross per 24 hour   Intake              300 ml   Output             1150 ml   Net             -850 ml       Physical Exam:     Physical Exam   Constitutional: No distress  HENT:   Head: Normocephalic and atraumatic  Nose: Nose normal    Eyes: Pupils are equal, round, and reactive to light  Conjunctivae are normal    Neck: Normal range of motion  Neck supple  Cardiovascular: Normal rate, regular rhythm and normal heart sounds  Pulmonary/Chest: Effort normal  No respiratory distress  She has decreased breath sounds  She has no wheezes  She has no rales  She exhibits no tenderness  Abdominal: Soft  Bowel sounds are normal  She exhibits no distension   There is no tenderness  There is no rebound and no guarding  Musculoskeletal: She exhibits edema  Neurological: She is alert  No cranial nerve deficit  Skin: Skin is warm and dry  No rash noted  Additional Data:     Labs:      Results from last 7 days  Lab Units 10/04/18  0544 10/03/18  0507   WBC Thousand/uL 7 06 9 39   HEMOGLOBIN g/dL 10 0* 9 6*   HEMATOCRIT % 33 9* 32 4*   PLATELETS Thousands/uL 94* 109*   NEUTROS PCT %  --  94*   LYMPHS PCT %  --  3*   MONOS PCT %  --  3*   EOS PCT %  --  0       Results from last 7 days  Lab Units 10/04/18  0544  09/29/18  1900   SODIUM mmol/L 138  < > 140   POTASSIUM mmol/L 4 0  < > 4 4   CHLORIDE mmol/L 99*  < > 98*   CO2 mmol/L 35*  < > 39*   BUN mg/dL 33*  < > 12   CREATININE mg/dL 0 96  < > 0 73   CALCIUM mg/dL 8 3  < > 8 9   ALK PHOS U/L  --   --  127*   ALT U/L  --   --  20   AST U/L  --   --  25   < > = values in this interval not displayed  Results from last 7 days  Lab Units 09/29/18  1900   INR  1 05       * I Have Reviewed All Lab Data Listed Above  * Additional Pertinent Lab Tests Reviewed: All Labs For Current Hospital Admission Reviewed    Imaging:  Xr Chest 1 View Portable    Result Date: 10/1/2018  Narrative: CHEST INDICATION:   post intubation  COMPARISON:  Portable chest from September 29, 2018 at 2009 hours EXAM PERFORMED/VIEWS:  XR CHEST PORTABLE  2142 hours FINDINGS:  Endotracheal tube present with tip 2 cm above marshall  Nasogastric tube extends into the stomach  Right-sided Port-A-Cath terminates in expected location of SVC  Heart enlarged  Pulmonary vessels prominent  Small bilateral pleural effusions  Probable left lower lobe atelectasis or consolidation  Osseous structures appear within normal limits for patient age  Impression: 1  Tip of endotracheal tube 2 cm above the marshall  2   Mild CHF  3   Probable atelectasis and/or consolidation in the left lower lobe   Workstation performed: [id number]     Xr Chest 1 View Portable    Result Date: 10/1/2018  Narrative: CHEST INDICATION:   SOB  Altered mental status  COMPARISON:  May 13, 2018  EXAM PERFORMED/VIEWS:  XR CHEST PORTABLE FINDINGS:  Right-sided Port-A-Cath extends into superior vena cava  Heart enlarged  Pulmonary vessels prominent  Small bilateral pleural effusions  Possible atelectasis or consolidation in the left lower lobe  No evidence of pneumothorax  Osseous structures appear within normal limits for patient age  Impression: 1  CHF  2   Small bilateral pleural effusions  3   Atelectasis and/or consolidation in the left lower lobe  Workstation performed: [id number]     Ct Head Without Contrast    Result Date: 9/29/2018  Narrative: CT BRAIN - WITHOUT CONTRAST INDICATION:   AMS  COMPARISON:  5/10/2018 TECHNIQUE:  CT examination of the brain was performed  In addition to axial images, coronal 2D reformatted images were created and submitted for interpretation  Radiation dose length product (DLP) for this visit:  1400 77 mGy-cm   This examination, like all CT scans performed in the [facility], was performed utilizing techniques to minimize radiation dose exposure, including the use of iterative reconstruction and automated exposure control  IMAGE QUALITY:  Diagnostic  FINDINGS: PARENCHYMA: Decreased attenuation is noted in periventricular and subcortical white matter demonstrating an appearance that is statistically most likely to represent mild microangiopathic change; this appearance is similar when compared to most recent prior examination  No CT signs of acute infarction  Stable left middle cranial fossa meningioma measuring 2 4 x 1 5 cm  No acute parenchymal hemorrhage  A chronic left frontal lobe infarction with secondary inflammation stable  VENTRICLES AND EXTRA-AXIAL SPACES:  Normal for the patient's age  VISUALIZED ORBITS AND PARANASAL SINUSES:  No acute abnormality involving the orbits  Mild scattered sinus mucosal thickening is noted    No fluid levels are seen  CALVARIUM AND EXTRACRANIAL SOFT TISSUES:  Normal      Impression: No acute intracranial abnormality  Microangiopathic changes  Chronic left MCA infarction  Workstation performed: [id number]     Ct Chest Wo Contrast    Result Date: 9/30/2018  Narrative: CT CHEST WITHOUT IV CONTRAST INDICATION:   Shortness of breath  COMPARISON:  None  TECHNIQUE: CT examination of the chest was performed without intravenous contrast   Axial, sagittal, and coronal 2D reformatted images were created from the source data and submitted for interpretation  Radiation dose length product (DLP) for this visit:  561 15 mGy-cm   This examination, like all CT scans performed in the [facility], was performed utilizing techniques to minimize radiation dose exposure, including the use of iterative  reconstruction and automated exposure control  FINDINGS: Endotracheal within the trachea with tip just above the marshall, consider retraction by approximately 3 cm  Enteric tube/feeding tube identified coursing into the stomach  LUNGS:  Bilateral upper lobe and lower lobe consolidative and groundglass opacities possibly related to multifocal pneumonia  Bibasilar consolidation could also relate to atelectasis  PLEURA:  Small bilateral pleural effusions, right greater the left  HEART/GREAT VESSELS:  Enlarged pulmonary artery 3 1 cm suggesting pulmonary hypertension  MEDIASTINUM AND JASWINDER:  Mediastinal adenopathy likely reactive  CHEST WALL AND LOWER NECK:   Unremarkable  VISUALIZED STRUCTURES IN THE UPPER ABDOMEN:  Partially visualized right upper pole renal cyst  OSSEOUS STRUCTURES:  No acute fracture or destructive osseous lesion  Impression: 1  Bilateral upper lobe and lower lobe groundglass/consolidative opacities suggesting multifocal pneumonia if clinically suspected  Please follow-up to radiographic resolution with CT chest in 2 months posttreatment  2  Small bilateral pleural effusions, right greater than left   3  Findings suggest pulmonary hypertension  4  Endotracheal tube just above the marshall, consider retraction by approximately 2 to 3 cm  Workstation performed: [id number]     Imaging Reports Reviewed by myself    Cultures:   Blood Culture:   Lab Results   Component Value Date    BLOODCX No Growth After 4 Days  09/29/2018    BLOODCX No Growth After 4 Days  09/29/2018    BLOODCX No Growth After 5 Days  01/23/2018    BLOODCX No Growth After 5 Days  01/23/2018    BLOODCX No Growth After 5 Days  01/05/2018    BLOODCX No Growth After 5 Days  01/05/2018    BLOODCX No Growth After 5 Days   12/13/2017     Urine Culture:   Lab Results   Component Value Date    URINECX No Growth <1000 cfu/mL 09/29/2018    URINECX >100,000 cfu/ml Mixed Contaminants X5 09/21/2016     Sputum Culture: No components found for: SPUTUMCX  Wound Culture: No results found for: WOUNDCULT    Last 24 Hours Medication List:     Current Facility-Administered Medications:  acetaminophen 650 mg Oral Q6H PRN Gnia Mejia MD    ALPRAZolam 0 25 mg Oral HS PRN GILDA Miller    apixaban 5 mg Per NG Tube Q12H [facility] GILDA Jain    atorvastatin 40 mg Oral Daily GILDA Jain    cefepime 2,000 mg Intravenous Q12H Caroline Tavera MD Last Rate: 2,000 mg (10/04/18 1231)   furosemide 40 mg Oral Daily Caroline Tavera MD    insulin lispro 1-6 Units Subcutaneous TID AC Caroline Tavera MD    insulin lispro 2-12 Units Subcutaneous HS Caroline Tavera MD    ipratropium 0 5 mg Nebulization Q6H GILDA Ivey    levalbuterol 1 25 mg Nebulization Q6H GILDA Ivey    levalbuterol 1 25 mg Nebulization Q8H PRN GILDA Miller    magnesium oxide 400 mg Per NG Tube BID GILDA Jain    methylPREDNISolone sodium succinate 20 mg Intravenous Q12H [address] [facility], PADannyC    metoprolol tartrate 50 mg Oral Q12H [facility] Brigid Vergara PA-C    nystatin  Topical BID GILDA Jain    omeprazole (PRILOSEC) suspension 2 mg/mL 20 mg Oral Daily GILDA Palma    pregabalin 300 mg Oral BID GILDA Palma         Today, Patient Was Seen By: Judy Mcdonald MD    ** Please Note: Dragon 360 Dictation voice to text software may have been used in the creation of this document   **

## 2018-10-04 NOTE — ASSESSMENT & PLAN NOTE
Secondary to COPD exacerbation multifocal pneumonia  Patient required intubation on admission and was extubated on October 1, 2018  CT of the chest showed bilateral upper lobe and lower lobe ground-glass/consolidative opacities suggesting multifocal pneumonia with small bilateral pleural effusions and pulmonary hypertension  Pain finished 5 day course of Zithromax and will be finishing 5 days of cefepime today

## 2018-10-04 NOTE — PLAN OF CARE
Problem: RESPIRATORY - ADULT  Goal: Achieves optimal ventilation and oxygenation  INTERVENTIONS:  - Assess for changes in respiratory status  - Assess for changes in mentation and behavior  - Position to facilitate oxygenation and minimize respiratory effort  - Oxygen administration by appropriate delivery method based on oxygen saturation (per order) or ABGs  - Initiate smoking cessation education as indicated  - Encourage broncho-pulmonary hygiene including cough, deep breathe, Incentive Spirometry  - Assess and instruct to report SOB or any respiratory difficulty  - Respiratory Therapy support as indicated   Outcome: Progressing  POC due 10/7/18 5339

## 2018-10-04 NOTE — ASSESSMENT & PLAN NOTE
CT of the chest showed small bilateral pleural effusions  No clinical evidence of fluid overload  Continue Lasix 40 milligram p o   Daily  Echo from January 2018 showed EF of 68-18%, grade 1 diastolic dysfunction

## 2018-10-04 NOTE — PROGRESS NOTES
Progress Note - Pulmonary   Priyanka Parker 81 y o  female MRN: [id number]  Unit/Bed#: [address] [place] [address] Encounter: [id number]      Leo Hatchet is 82 y/o F resident at [facility] and was admitted on 9/29 to [facility] ICU on admission for acute hypercapneic respiratory failure and symptoms of somnolence on presentation w/ an initial Abg w/ PH 7 25/88 and progressive acidosis and hypercapnea despite Bipap therapy  She was intubated the day of admission on 9/29 and remained intubated until 10/1  The presumptive cause of her respiratory failure was gas exchange impairment due to COPD exacerbation  She was treated with steroids, ATC, duonebs, and Zithromax    She is being treated for HCAP      She has a notable Hx of Chronic DCHF, PAF, Obesity, DAMON, chronic respiratory failure with oxygen dependence on 2 L nasal cannula, COPD, GERD, 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     Assessment/Plan:   -Acute Exacerbation of COPD / (non categorized 2/2 no previous spirometry on file) and Acute on Chronic Hypoxemic and Hypercapneic Respiratory Failure:    -continues to improve:  Continue Xopenex and Atrovent q 6 hours, decreased Solu-Medrol to 20 mg q 12 on 10/3 w/ plan to decrease again tomorrow on 10/5    -pt w/ chronically elevated PCO2 but normalized at 7 46 51/61  -pt should ideally be on bipap at night if not minimum of CPAP  -pt non compliant w/ and refuses to wear CPAP > uncomfortable  -wears 4L NC at night comfortably here in hospital, patient states she is normally on 2 L at home  -patient will need outpatient complete pulmonary function testing prior to follow-up  -patient would need to follow up within 7 days of discharge  -patient has been noncompliant with follow-up in the past and has been well over a year since seen in the office  -HCAP with bilateral pleural effusions:    -had total of 5 days zithromax  -can finish cefepime today for total of 5 days  -Chronic Paroxysmal Atrial Fibrillation w/ medication induced coagulopathy:    -rate controlled > continue eliquis per primary team   -Acute on Chronic DCHF:    -Continue daily diuresis per primary team w/ home dose 40 mg lasix  -DAMON:    -Patient reports she does not wear CPAP at night, continue oxygen tonight,   -patient to follow up for repeat sleep study as an outpatient  -GERD:  Continue Prilosec  -Dysphagia:  Follow up with aspiration cough precautions, upright slow eating,  -patient currently on a pureed diet with nectar thick liquids  -Obesity w/ BMI 35 7:  Weight loss education per dietary recommended > would improve DAMON     -patient to need STR  -complete PFT in interim prior to f/u   -2-4 L home 02 on d/c goal spo2 90-92%  -f/u in 7 days                 ______________________________________________________________________    Subjective: Pt seen and examined at bedside   " I feel much better today"    Tele Events:     Vitals:   Temp:  [97 6 °F (36 4 °C)-97 7 °F (36 5 °C)] 97 7 °F (36 5 °C)  HR:  [71-84] 71  Resp:  [18-20] 18  BP: (137-146)/(62-76) 146/76  Weight (last 2 days)     Date/Time   Weight    10/04/18 0600  94 4 (208 11)    10/03/18 0600  95 4 (210 32)    10/02/18 0531  95 6 (210 76)            Oxygen Therapy  SpO2: 96 %  O2 Flow Rate (L/min): 4 L/min    IV Infusions:       Nutrition:        Diet Orders            Start     Ordered    10/03/18 1513  Diet Akhil/CHO Controlled; Consistent Carbohydrate Diet Level 2 (5 carb servings/75 grams CHO/meal)  Diet effective now     Question Answer Comment   Diet Type Akhil/CHO Controlled    Akhil/CHO Controlled Consistent Carbohydrate Diet Level 2 (5 carb servings/75 grams CHO/meal)    RD to adjust diet per protocol? Yes        10/03/18 1512    10/01/18 0809  Room Service  Once     Question:  Type of Service  Answer:  Room Service - Appropriate with Assistance    10/01/18 0808          Ins/Outs:   I/O       10/02 0701 - 10/03 0700 10/03 0701 - 10/04 0700 10/04 0701 - 10/05 0700    P  O   360     IV Piggyback 50 300 Total Intake(mL/kg) 50 (0 5) 660 (7)     Urine (mL/kg/hr) 725 (0 3) 1400 (0 6) 750 (2)    Total Output 725 1400 750    Net -485 -890 -750                 Lines/Drains:  Invasive Devices     Peripheral Intravenous Line            Long-Dwell Peripheral IV (Midline) 33/70/12 Right Basilic 4 days                 Active medications:  Scheduled Meds:  Current Facility-Administered Medications:  acetaminophen 650 mg Oral Q6H PRN Rachana Barnett MD    ALPRAZolam 0 25 mg Oral HS PRN GILDA Miller    apixaban 5 mg Per NG Tube Q12H [address] GILDA Bishop    atorvastatin 40 mg Oral Daily GILDA Bishop    cefepime 2,000 mg Intravenous Q12H Kaylee Hazel MD Last Rate: Stopped (10/04/18 0020)   chlorhexidine 15 mL Swish & Spit Q12H [address] GILDA Bishop    furosemide 40 mg Oral Daily Kaylee Hazle MD    insulin lispro 1-6 Units Subcutaneous TID AC Kaylee Hazel MD    insulin lispro 2-12 Units Subcutaneous HS Kaylee Hazel MD    ipratropium 0 5 mg Nebulization Q6H GILDA Ivey    levalbuterol 1 25 mg Nebulization Q6H GILDA Ivey    levalbuterol 1 25 mg Nebulization Q8H PRN GILDA Miller    magnesium oxide 400 mg Per NG Tube BID GILDA Bishop    methylPREDNISolone sodium succinate 20 mg Intravenous Q12H [address] Cristiana Avila PA-C    metoprolol tartrate 50 mg Oral Q12H [address] Brigid Vergara PA-C    nystatin  Topical BID GILDA Bishop    omeprazole (PRILOSEC) suspension 2 mg/mL 20 mg Oral Daily GILDA Bishop    pregabalin 300 mg Oral BID GILDA Bishop      PRN Meds:  acetaminophen 650 mg Q6H PRN   ALPRAZolam 0 25 mg HS PRN   levalbuterol 1 25 mg Q8H PRN     ____________________________________________________________________      Physical Exam   Constitutional: She is oriented to person, place, and time  She appears well-developed and well-nourished  HENT:   Head: Normocephalic and atraumatic     Eyes: Pupils are equal, round, and reactive to light  Conjunctivae are normal    Neck: Normal range of motion  Neck supple  Cardiovascular: Normal rate, regular rhythm and normal heart sounds  Pulmonary/Chest: Effort normal  No accessory muscle usage  No tachypnea  No respiratory distress  She has decreased breath sounds in the right lower field and the left lower field  She has no wheezes  She has no rhonchi  She has no rales  She exhibits no tenderness  Abdominal: Soft  Bowel sounds are normal    Severely obese body habitus   Musculoskeletal: Normal range of motion  She exhibits no edema  Tenderness: :  Follow-up chest x-ray in the morning  Neurological: She is alert and oriented to person, place, and time  Skin: Skin is warm and dry  Psychiatric: She has a normal mood and affect            ____________________________________________________________________    Labs:   CBC:   Results from last 7 days  Lab Units 10/04/18  0544 10/03/18  0507 10/02/18  0525   WBC Thousand/uL 7 06 9 39 15 07*   HEMOGLOBIN g/dL 10 0* 9 6* 9 5*   HEMATOCRIT % 33 9* 32 4* 31 4*   MCV fL 81* 80* 80*   PLATELETS Thousands/uL 94* 109* 125*     CMP:   Results from last 7 days  Lab Units 10/04/18  0544 10/03/18  0507 10/02/18  0525  09/29/18  1900   SODIUM mmol/L 138 136 138  < > 140   POTASSIUM mmol/L 4 0 4 4 4 2  < > 4 4   CHLORIDE mmol/L 99* 98* 100  < > 98*   CO2 mmol/L 35* 33* 34*  < > 39*   BUN mg/dL 33* 36* 36*  < > 12   CREATININE mg/dL 0 96 1 06 1 20  < > 0 73   CALCIUM mg/dL 8 3 8 2* 8 2*  < > 8 9   AST U/L  --   --   --   --  25   ALT U/L  --   --   --   --  20   ALK PHOS U/L  --   --   --   --  127*   EGFR ml/min/1 73sq m 55 49 42  < > 77   < > = values in this interval not displayed    Magnesium:   Results from last 7 days  Lab Units 10/03/18  0507   MAGNESIUM mg/dL 2 7*     Phosphorous:   Results from last 7 days  Lab Units 10/02/18  0525   PHOSPHORUS mg/dL 4 3*     Troponin:   Results from last 7 days  Lab Units 09/29/18  1900   TROPONIN I ng/mL <0 02     PT/INR:   Results from last 7 days  Lab Units 09/29/18  1900   PTT seconds 30   INR  1 05     Lactic Acid:   Results from last 7 days  Lab Units 09/29/18  1901   LACTIC ACID mmol/L 0 6     BNP:   Results from last 7 days  Lab Units 09/29/18  1900   NT-PRO BNP pg/mL 5,759*     TSH:     Procalcitonin: Invalid input(s): PROCALCITONIN      Imaging:  None today      Micro: Lab Results   Component Value Date    BLOODCX No Growth After 4 Days  09/29/2018    BLOODCX No Growth After 4 Days  09/29/2018    BLOODCX No Growth After 5 Days   01/23/2018    URINECX No Growth <1000 cfu/mL 09/29/2018    URINECX >100,000 cfu/ml Mixed Contaminants X5 09/21/2016    SPUTUMCULTUR 1+ Growth of  09/29/2018    MRSACULTURE  09/29/2018     No Methicillin Resistant Staphlyococcus aureus (MRSA) isolated            Invalid input(s): LEGIONELLAURINARYANTIGEN        Code Status: Level 1 - Full Code

## 2018-10-04 NOTE — ASSESSMENT & PLAN NOTE
Patient is improving  Continue Solu-Medrol taper-will decrease Solu-Medrol to 20 milligram IV q 12 hours and possible transition to prednisone taper in a m  Continue nebulizers every 6 hours and p r n

## 2018-10-04 NOTE — OCCUPATIONAL THERAPY NOTE
OT TREATMENT       10/04/18 1326   Restrictions/Precautions   Other Precautions Contact/isolation; Chair Alarm; Bed Alarm; Fall Risk;O2   Pain Assessment   Pain Assessment No/denies pain   ADL   Where Assessed Supine, bed   Grooming Assistance 5  Supervision/Setup   Grooming Deficit Wash/dry hands; Wash/dry face;Brushing hair   Bed Mobility   Supine to Sit 4  Minimal assistance   Additional items Assist x 1   Sit to Supine 4  Minimal assistance   Additional items Assist x 1; Increased time required;Verbal cues   Transfers   Sit to Stand 4  Minimal assistance   Additional items Assist x 1   Stand to Sit 4  Minimal assistance   Additional items Assist x 1   Therapeutic Exercise - ROM   UE-ROM Yes   ROM- Right Upper Extremities   R Shoulder Flexion;ABduction; Extension; External rotation; Internal rotation;AROM   R Elbow AROM;Elbow flexion;Elbow extension   R Wrist AROM; Wrist flexion;Wrist extension   R Position Supine   Equipment Dowel   R Weight/Reps/Sets 15 reps x 2 sets; 2#   ROM - Left Upper Extremities    L Shoulder AAROM; Flexion;ABduction; Extension; External rotation; Internal rotation   L Elbow AROM;Elbow flexion;Elbow extension   L Wrist AROM; Wrist flexion;Wrist extension   L Hand AROM   L Position Supine   Equipment Dowel   L Weight/Reps/Sets 15 reps x 2 sets; 2#   Cognition   Overall Cognitive Status WFL   Activity Tolerance   Activity Tolerance Patient limited by fatigue   Assessment   Assessment Pt demonstrating improved strength and functional activity tolerance allowing for increased active participation with ADL and mobility tasks with less assistance  Deferred sitting OOB as she just returned to bed from sitting up all morning  Pt left with all needs within reach and tab alarm in place  Cont OT per POC  Plan   Treatment Interventions ADL retraining;Functional transfer training; Endurance training;UE strengthening/ROM; Patient/family training;Equipment evaluation/education; Activityengagement; Energy conservation; Compensatory technique education   Treatment Day 1   OT Frequency 3-5x/wk   Recommendation   OT Discharge Recommendation Short Term Rehab   Licensure   [place] License Number  Author Few Kenda Kayser [place], [place]ENAT[place]/L, [place] Lic# [id number]

## 2018-10-05 VITALS
HEART RATE: 99 BPM | HEIGHT: 64 IN | TEMPERATURE: 98.2 F | OXYGEN SATURATION: 94 % | BODY MASS INDEX: 35.53 KG/M2 | SYSTOLIC BLOOD PRESSURE: 128 MMHG | RESPIRATION RATE: 18 BRPM | DIASTOLIC BLOOD PRESSURE: 64 MMHG | WEIGHT: 208.11 LBS

## 2018-10-05 LAB
BACTERIA BLD CULT: NORMAL
BACTERIA BLD CULT: NORMAL
GLUCOSE SERPL-MCNC: 136 MG/DL (ref 65–140)
GLUCOSE SERPL-MCNC: 167 MG/DL (ref 65–140)

## 2018-10-05 PROCEDURE — 99239 HOSP IP/OBS DSCHRG MGMT >30: CPT | Performed by: INTERNAL MEDICINE

## 2018-10-05 PROCEDURE — 82948 REAGENT STRIP/BLOOD GLUCOSE: CPT

## 2018-10-05 PROCEDURE — 94640 AIRWAY INHALATION TREATMENT: CPT

## 2018-10-05 PROCEDURE — 94760 N-INVAS EAR/PLS OXIMETRY 1: CPT

## 2018-10-05 PROCEDURE — 99232 SBSQ HOSP IP/OBS MODERATE 35: CPT | Performed by: INTERNAL MEDICINE

## 2018-10-05 RX ORDER — PREDNISONE 10 MG/1
10 TABLET ORAL DAILY
Refills: 0
Start: 2018-10-05 | End: 2019-02-15 | Stop reason: HOSPADM

## 2018-10-05 RX ORDER — PREDNISONE 1 MG/1
5 TABLET ORAL DAILY
Status: DISCONTINUED | OUTPATIENT
Start: 2018-10-18 | End: 2018-10-05 | Stop reason: HOSPADM

## 2018-10-05 RX ORDER — PREDNISONE 20 MG/1
40 TABLET ORAL DAILY
Status: DISCONTINUED | OUTPATIENT
Start: 2018-10-06 | End: 2018-10-05 | Stop reason: HOSPADM

## 2018-10-05 RX ORDER — PREDNISONE 20 MG/1
20 TABLET ORAL DAILY
Status: DISCONTINUED | OUTPATIENT
Start: 2018-10-12 | End: 2018-10-05 | Stop reason: HOSPADM

## 2018-10-05 RX ORDER — PREDNISONE 10 MG/1
10 TABLET ORAL DAILY
Status: DISCONTINUED | OUTPATIENT
Start: 2018-10-15 | End: 2018-10-05 | Stop reason: HOSPADM

## 2018-10-05 RX ADMIN — MAGNESIUM OXIDE TAB 400 MG (241.3 MG ELEMENTAL MG) 400 MG: 400 (241.3 MG) TAB at 08:17

## 2018-10-05 RX ADMIN — LEVALBUTEROL HYDROCHLORIDE 1.25 MG: 1.25 SOLUTION, CONCENTRATE RESPIRATORY (INHALATION) at 08:40

## 2018-10-05 RX ADMIN — METHYLPREDNISOLONE SODIUM SUCCINATE 20 MG: 40 INJECTION, POWDER, FOR SOLUTION INTRAMUSCULAR; INTRAVENOUS at 08:17

## 2018-10-05 RX ADMIN — IPRATROPIUM BROMIDE 0.5 MG: 0.5 SOLUTION RESPIRATORY (INHALATION) at 14:57

## 2018-10-05 RX ADMIN — INSULIN LISPRO 1 UNITS: 100 INJECTION, SOLUTION INTRAVENOUS; SUBCUTANEOUS at 12:30

## 2018-10-05 RX ADMIN — ATORVASTATIN CALCIUM 40 MG: 40 TABLET, FILM COATED ORAL at 08:18

## 2018-10-05 RX ADMIN — CEFEPIME HYDROCHLORIDE 2000 MG: 2 INJECTION, POWDER, FOR SOLUTION INTRAVENOUS at 00:23

## 2018-10-05 RX ADMIN — METOPROLOL TARTRATE 50 MG: 50 TABLET ORAL at 08:18

## 2018-10-05 RX ADMIN — PREGABALIN 300 MG: 100 CAPSULE ORAL at 08:17

## 2018-10-05 RX ADMIN — LEVALBUTEROL HYDROCHLORIDE 1.25 MG: 1.25 SOLUTION, CONCENTRATE RESPIRATORY (INHALATION) at 14:57

## 2018-10-05 RX ADMIN — IPRATROPIUM BROMIDE 0.5 MG: 0.5 SOLUTION RESPIRATORY (INHALATION) at 08:40

## 2018-10-05 RX ADMIN — FUROSEMIDE 40 MG: 40 TABLET ORAL at 08:18

## 2018-10-05 RX ADMIN — APIXABAN 5 MG: 5 TABLET, FILM COATED ORAL at 08:18

## 2018-10-05 NOTE — ASSESSMENT & PLAN NOTE
Patient is improving  Patient received IV Solu-Medrol taper during the hospital stay and will be discharged on prednisone taper  Continue nebulizers and Advair

## 2018-10-05 NOTE — [place] UNIVERSAL TRANSFER FORM
[place] UNIV[place] TRANSFER FORM  (ALL ITEMS MUST BE COMPLETED)    1  TRANSFER FROM: [address]      TRANSFER TO: Dhruv Rodrigues     2  DATE OF TRANSFER: 10/5/2018                        TIME OF TRANSFER: 15:30    3  PATIENT NAME: Hiram Arvizu      YOB: 1935                             GENDER: female    4  LANGUAGE:   English    5  PHYSICIAN NAME:  Claudy Porter MD                   PHONE: [phone]    6  CODE STATUS: Level 1 - Full Code        Out of Hospital DNR Attached: No    7  :                                      :  Extended Emergency Contact Information  Primary Emergency Contact: Jaja Gutierrez  Address: [place], [address] [place] [address] Phone: [phone]  Relation: [facility] Representative/Proxy:  No           Legal Guardian:  No             NAME OF:           HEALTH CARE REPRESENTATIVE/PROXY:                                         OR           LEGAL GUARDIAN, IF NOT :                                               PHONE:  (Day)           (Night)                        (Cell)    8  REASON FOR TRANSFER: (Must include brief medical history and recent changes in physical function or cognition ) COPD exacerbation             V/S: /64 (BP Location: Left arm)   Pulse 99   Temp 98 2 °F (36 8 °C) (Oral)   Resp 18   Ht 5' 4" (1 626 m)   Wt 94 4 kg (208 lb 1 8 oz)   SpO2 94%   BMI 35 72 kg/m²           PAIN: None    9  PRIMARY DIAGNOSIS: COPD with acute exacerbation ([address] )      Secondary Diagnosis:         Pacemaker: No      Internal Defib: No          Mental Health Diagnosis (if Applicable):    10  RESTRAINTS: No     11  RESPIRATORY NEEDS: Oxygen Device NC, and Flow rate: 2L/min    12  ISOLATION/PRECAUTION: None    13  ALLERGY: Fentanyl and related; Latex; and Penicillins    14  SENSORY:       Vision Poor and Glasses    15   SKIN CONDITION: No Wounds    16  DIET: Regular    17  IV ACCESS: Othermidline, R arm    18  PERSONAL ITEMS SENT WITH PATIENT: Glasses and Otherclothing, purse    19  ATTACHED DOCUMENTS: MUST ATTACH CURRENT MEDICATION INFORMATION Face Sheet, MAR, Medication Reconciliation, Diagnostic Studies, Labs, Respiratory Care, Code Status, Discharge Summary, PT Note, OT Note and HX/PE    20  AT RISK ALERTS:Falls        HARM TO: N/A    21  WEIGHT BEARING STATUS:         Left Leg: Full        Right Leg: Full    22  MENTAL STATUS:Alert    23  FUNCTION:        Walk: Self        Transfer: Self        Toilet: Self        Feed: Self    24  IMMUNIZATIONS/SCREENING:     Immunization History   Administered Date(s) Administered    Influenza, high dose seasonal 0 5 mL 10/03/2018       25  BOWEL: Continent    26  BLADDER: Continent    27   SENDING FACILITY CONTACT:                   Title:         Unit:         Phone:           [address] (if known):        Title:        Unit:         Phone:         FORM PREFILLED BY (if applicable)       Title:       Unit:        Phone:         FORM COMPLETED BY Soledad Wright RN      Title: [facility]      Phone: [phone]

## 2018-10-05 NOTE — PLAN OF CARE
CARDIOVASCULAR - ADULT     Maintains optimal cardiac output and hemodynamic stability Progressing     Absence of cardiac dysrhythmias or at baseline rhythm Progressing        GENITOURINARY - ADULT     Maintains or returns to baseline urinary function Progressing     Absence of urinary retention Progressing     Urinary catheter remains patent Progressing        INFECTION - ADULT     Absence or prevention of progression during hospitalization Progressing     Absence of fever/infection during neutropenic period Progressing        Knowledge Deficit     Patient/family/caregiver demonstrates understanding of disease process, treatment plan, medications, and discharge instructions Progressing        METABOLIC, FLUID AND ELECTROLYTES - ADULT     Electrolytes maintained within normal limits Progressing     Fluid balance maintained Progressing     Glucose maintained within target range Progressing        Nutrition/Hydration-ADULT     Nutrient/Hydration intake appropriate for improving, restoring or maintaining nutritional needs Progressing        PAIN - ADULT     Verbalizes/displays adequate comfort level or baseline comfort level Progressing        Potential for Falls     Patient will remain free of falls Progressing        Prexisting or High Potential for Compromised Skin Integrity     Skin integrity is maintained or improved Progressing        RESPIRATORY - ADULT     Achieves optimal ventilation and oxygenation Progressing        SAFETY ADULT     Patient will remain free of falls Progressing     Maintain or return to baseline ADL function Progressing     Maintain or return mobility status to optimal level Progressing        SKIN/TISSUE INTEGRITY - ADULT     Skin integrity remains intact Progressing     Oral mucous membranes remain intact Progressing

## 2018-10-05 NOTE — PROGRESS NOTES
Progress Note - Pulmonary   Orbeti Sigala 81 y o  female MRN: [id number]  Unit/Bed#: [address] [place] [address] Encounter: [id number]      Mary Lemus is 80 y/o F resident at [facility] and was admitted on 9/29 to [facility] ICU on admission for acute hypercapneic respiratory failure and symptoms of somnolence on presentation w/ an initial Abg w/ [address] 7 25/88 and progressive acidosis and hypercapnea despite Bipap therapy   She was intubated the day of admission on 9/29 and remained intubated until 10/1  The presumptive cause of her respiratory failure was gas exchange impairment due to COPD exacerbation   She was treated with steroids, ATC, duonebs, and Zithromax    She is being treated for HCAP      She has a notable Hx of Chronic DCHF, PAF, Obesity, DAMON, chronic respiratory failure with oxygen dependence on 2 L nasal cannula, COPD, GERD, 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     Assessment/Plan:   -Acute Exacerbation of COPD / (non categorized 2/2 no previous spirometry on file) and Acute on Chronic Hypoxemic and Hypercapneic Respiratory Failure:    -continues to improve:  Continue Xopenex and Atrovent q 6 hours,   -d/c solumedrol today  -prednisone taper   -pt w/ chronically elevated PCO2 but normalized at 7 46 51/61  -pt should ideally be on bipap at night if not minimum of CPAP  -pt non compliant w/ and refuses to wear CPAP > uncomfortable  -wears 4L NC at night comfortably here in hospital, patient states she is normally on 2 L at home  -patient will need outpatient complete pulmonary function testing prior to follow-up  -f/u in office in 7-10 days  -HCAP with bilateral pleural effusions:    -had total of 5 days zithromax  -cefepime day # 6 > d/c today   -Chronic Paroxysmal Atrial Fibrillation w/ medication induced coagulopathy:    -rate controlled > NOAC per primary team   -Acute on Chronic DCHF:    -Continue daily diuresis per primary team w/ home dose 40 mg lasix  -DAMON:    -Patient reports she does not wear CPAP at night, continue oxygen tonight,   -patient to follow up for repeat sleep study as an outpatient  -Wandy Carrasco  -Dysphagia:  Follow up with aspiration precautions, upright slow eating,  -patient currently on a pureed diet with nectar thick liquids  -Obesity w/ BMI 35 7:  Weight loss education per dietary recommended > would improve DAMON     -patient to need ST>going back to [facility] today  -complete PFT in interim prior to f/u   -2-4 L home 02 on d/c goal spo2 90-92%  -f/u in 7-10 days  -discharge with incentive spirometer and flutter valve  -encourage use TID at minimum              ______________________________________________________________________    Subjective: Pt seen and examined at bedside  Pt states " I'm back down to 2 L again"  She has no complaints    Tele Events:     Vitals:   Temp:  [98 °F (36 7 °C)-98 5 °F (36 9 °C)] 98 2 °F (36 8 °C)  HR:  [71-99] 99  Resp:  [18-19] 18  BP: (128-142)/(64-80) 128/64  Weight (last 2 days)     Date/Time   Weight    10/04/18 0600  94 4 (208 11)    10/03/18 0600  95 4 (210 32)            Oxygen Therapy  SpO2: 93 %  O2 Flow Rate (L/min): 2 L/min    IV Infusions:       Nutrition:        Diet Orders            Start     Ordered    10/03/18 1513  Diet Akhil/CHO Controlled; Consistent Carbohydrate Diet Level 2 (5 carb servings/75 grams CHO/meal)  Diet effective now     Question Answer Comment   Diet Type Akhil/CHO Controlled    Akhil/CHO Controlled Consistent Carbohydrate Diet Level 2 (5 carb servings/75 grams CHO/meal)    RD to adjust diet per protocol? Yes        10/03/18 1512    10/01/18 0809  Room Service  Once     Question:  Type of Service  Answer:  Room Service - Appropriate with Assistance    10/01/18 0808          Ins/Outs:   I/O       10/03 0701 - 10/04 0700 10/04 0701 - 10/05 0700 10/05 0701 - 10/06 0700    P  O  360      IV Piggyback 300      Total Intake(mL/kg) 660 (7)      Urine (mL/kg/hr) 1400 (0 6) 1550 (0 7) 100 (0 2)    Total Output 1400 1550 100 Net -740 -1550 -100           Unmeasured Urine Occurrence  1 x           Lines/Drains:  Invasive Devices     Peripheral Intravenous Line            Long-Dwell Peripheral IV (Midline) 84/74/89 Right Basilic 5 days                 Active medications:  Scheduled Meds:  Current Facility-Administered Medications:  acetaminophen 650 mg Oral Q6H PRN Genet Hu MD    ALPRAZolam 0 25 mg Oral HS PRN GILAD Miller    apixaban 5 mg Per NG Tube Q12H [address] Tiffanie MarcelinaGILDA poon    atorvastatin 40 mg Oral Daily Tiffanie Marcelina, GILDA    cefepime 2,000 mg Intravenous Q12H Dolores Kowalski MD Last Rate: 2,000 mg (10/05/18 0023)   furosemide 40 mg Oral Daily Dolores Kowalski MD    insulin lispro 1-6 Units Subcutaneous TID AC Dolores Kowalski MD    insulin lispro 2-12 Units Subcutaneous HS Dolores Kowalski MD    ipratropium 0 5 mg Nebulization Q6H Sarah Patel, GILDA    levalbuterol 1 25 mg Nebulization Q6H Sarah Patel, GILDA    levalbuterol 1 25 mg Nebulization Q8H PRN GILDA Miller    magnesium oxide 400 mg Per NG Tube BID Tiffanie Marceilna, GILDA    methylPREDNISolone sodium succinate 20 mg Intravenous Q12H [address] Diana Redmond PA-C    metoprolol tartrate 50 mg Oral Q12H [address] Brigid Vergara PA-C    nystatin  Topical BID Tiffanie Marcelina, GILDA    omeprazole (PRILOSEC) suspension 2 mg/mL 20 mg Oral Daily Tiffanie Marcelina, GILDA    pregabalin 300 mg Oral BID GILDA Vyas      PRN Meds:  acetaminophen 650 mg Q6H PRN   ALPRAZolam 0 25 mg HS PRN   levalbuterol 1 25 mg Q8H PRN     ____________________________________________________________________      Physical Exam   Constitutional: She is oriented to person, place, and time  She appears well-developed and well-nourished  HENT:   Head: Normocephalic and atraumatic  Eyes: Pupils are equal, round, and reactive to light  Conjunctivae are normal    Neck: Normal range of motion  Neck supple     Cardiovascular: Normal rate, regular rhythm and normal heart sounds  Pulmonary/Chest: Effort normal  No accessory muscle usage  No tachypnea  No respiratory distress  She has decreased breath sounds in the right lower field and the left lower field  She has no wheezes  She has no rhonchi  She has no rales  She exhibits no tenderness  Abdominal: Soft  Bowel sounds are normal    Severe abdominal obesity   Musculoskeletal: Normal range of motion  She exhibits no edema  Neurological: She is alert and oriented to person, place, and time  Skin: Skin is warm and dry  Psychiatric: She has a normal mood and affect            ____________________________________________________________________    Labs:   CBC:   Results from last 7 days  Lab Units 10/04/18  0544 10/03/18  0507 10/02/18  0525   WBC Thousand/uL 7 06 9 39 15 07*   HEMOGLOBIN g/dL 10 0* 9 6* 9 5*   HEMATOCRIT % 33 9* 32 4* 31 4*   MCV fL 81* 80* 80*   PLATELETS Thousands/uL 94* 109* 125*     CMP:   Results from last 7 days  Lab Units 10/04/18  0544 10/03/18  0507 10/02/18  0525  09/29/18  1900   SODIUM mmol/L 138 136 138  < > 140   POTASSIUM mmol/L 4 0 4 4 4 2  < > 4 4   CHLORIDE mmol/L 99* 98* 100  < > 98*   CO2 mmol/L 35* 33* 34*  < > 39*   BUN mg/dL 33* 36* 36*  < > 12   CREATININE mg/dL 0 96 1 06 1 20  < > 0 73   CALCIUM mg/dL 8 3 8 2* 8 2*  < > 8 9   AST U/L  --   --   --   --  25   ALT U/L  --   --   --   --  20   ALK PHOS U/L  --   --   --   --  127*   EGFR ml/min/1 73sq m 55 49 42  < > 77   < > = values in this interval not displayed    Magnesium:   Results from last 7 days  Lab Units 10/03/18  0507   MAGNESIUM mg/dL 2 7*     Phosphorous:   Results from last 7 days  Lab Units 10/02/18  0525   PHOSPHORUS mg/dL 4 3*     Troponin:   Results from last 7 days  Lab Units 09/29/18  1900   TROPONIN I ng/mL <0 02     PT/INR:   Results from last 7 days  Lab Units 09/29/18  1900   PTT seconds 30   INR  1 05     Lactic Acid:   Results from last 7 days  Lab Units 09/29/18  1901   LACTIC ACID mmol/L 0 6     BNP: Results from last 7 days  Lab Units 09/29/18  1900   NT-PRO BNP pg/mL 5,759*     TSH:     Procalcitonin: Invalid input(s): PROCALCITONIN      Imaging: NA      Micro: Lab Results   Component Value Date    BLOODCX No Growth After 5 Days  09/29/2018    BLOODCX No Growth After 5 Days  09/29/2018    BLOODCX No Growth After 5 Days   01/23/2018    URINECX No Growth <1000 cfu/mL 09/29/2018    URINECX >100,000 cfu/ml Mixed Contaminants X5 09/21/2016    SPUTUMCULTUR 1+ Growth of  09/29/2018    MRSACULTURE  09/29/2018     No Methicillin Resistant Staphlyococcus aureus (MRSA) isolated            Invalid input(s): LEGIONELLAURINARYANTIGEN        Code Status: Level 1 - Full Code

## 2018-10-05 NOTE — DISCHARGE SUMMARY
Discharge- Whitney Smith 1935, 81 y o  female MRN: [id number]    Unit/Bed#: [address] [place] [address] Encounter: [id number]    Primary Care Provider: Randy Howard DO   Date and time admitted to hospital: 9/29/2018  6:45 PM        Acute respiratory failure with hypercapnia [facility])   Assessment & Plan    Resolved   Secondary to COPD exacerbation multifocal pneumonia  Patient required intubation on admission and was extubated on October 1, 2018  CT of the chest showed bilateral upper lobe and lower lobe ground-glass/consolidative opacities suggesting multifocal pneumonia with small bilateral pleural effusions and pulmonary hypertension  Pain finished 5 day course of Zithromax and cefepime  Patient's procalcitonin level was normal     * COPD with acute exacerbation [facility])   Assessment & Plan    Patient is improving  Patient received IV Solu-Medrol taper during the hospital stay and will be discharged on prednisone taper  Continue nebulizers and Advair     Healthcare-associated pneumonia   Assessment & Plan    CT chest showed bilateral upper lobe and lower lobe ground-glass/consolidative opacity suggesting multifocal pneumonia  Sputum cultures grew mixed respiratory daniel  Procalcitonin level has been negative  Patient received 5 day course of IV cefepime and Zithromax     Acute on chronic diastolic congestive heart failure ([address] )   Assessment & Plan    CT of the chest showed small bilateral pleural effusions  No clinical evidence of fluid overload  Continue Lasix 40 milligram p o  Daily  Echo from January 2018 showed EF of 28-18%, grade 1 diastolic dysfunction     Paroxysmal atrial fibrillation [facility])   Assessment & Plan    Patient is on metoprolol 50 milligram p o  B i d  And anticoagulation with Eliquis 5 milligram p o  B i d      Essential hypertension   Assessment & Plan    Continue metoprolol 50 milligram p o  B i d   And Lasix     Obstructive sleep apnea   Assessment & Plan    Continue oxygen supplementation at night as patient is not comfortable using CPAP/BiPAP      Altered mental status secondary to toxic metabolic encephalopathy due to COPD exacerbation and pneumonia    Discharging Physician / Practitioner: Barbara Rowland MD  PCP: Johana Carvalho DO  Admission Date: 9/29/2018  Discharge Date: 10/05/18    Reason for Admission: Altered Mental Status (pt from [facility] center, per medics pt was found altered, cyanotic  Pt been altered since 3 pm per medics  )        Resolved Problems  Date Reviewed: 10/5/2018          Resolved    Acute respiratory acidosis 9/30/2018     Resolved by  Michael Benavides PA-C          Consultations During Hospital Stay:  IP CONSULT TO CASE MANAGEMENT  IP CONSULT TO NUTRITION SERVICES      Significant Findings / Test Results:     ·   Xr Chest 1 View Portable    Result Date: 10/1/2018     Impression: 1  Tip of endotracheal tube 2 cm above the marshall  2   Mild CHF  3   Probable atelectasis and/or consolidation in the left lower lobe  Workstation performed: [id number]     Xr Chest 1 View Portable    Result Date: 10/1/2018    Impression: 1  CHF  2   Small bilateral pleural effusions  3   Atelectasis and/or consolidation in the left lower lobe  Workstation performed: [id number]     Ct Head Without Contrast    Result Date: 9/29/2018    Impression: No acute intracranial abnormality  Microangiopathic changes  Chronic left MCA infarction  Workstation performed: [id number]     Ct Chest Wo Contrast    Result Date: 9/30/2018      Impression: 1  Bilateral upper lobe and lower lobe groundglass/consolidative opacities suggesting multifocal pneumonia if clinically suspected  Please follow-up to radiographic resolution with CT chest in 2 months posttreatment  2  Small bilateral pleural effusions, right greater than left  3  Findings suggest pulmonary hypertension  4  Endotracheal tube just above the marshall, consider retraction by approximately 2 to 3 cm   Workstation performed: [id number]        Outpatient Tests Requested:  · Follow-up with PCP and Dr Zaman    Complications:  None    Reason for Admission:  Altered mental status    Hospital Course:     Alberta Aranda is a 81 y o  female patient with a PMH of COPD, CHF, obstructive sleep apnea, proximal atrial fibrillation, hypertension who originally presented to the hospital on 9/29/2018 due to altered mental status  Patient was unresponsive and cyanotic at the nursing home  Patient was initially tried on BiPAP and later electively intubated and was admitted to ICU  Later patient had CT chest which showed right upper lobe patchy alveolar infiltrate concerning for possible pneumonia  Patient was treated with IV cefepime and azithromycin  Patient was also started on IV Solu-Medrol which was slowly tapered during the hospital stay  Later patient was extubated and was transferred to the floor  Patient finished 5 day course of azithromycin and cefepime during the hospital stay  Patient continued to improve and was down to 2 liters of oxygen which she uses chronically  Patient will be discharged to nursing home on prednisone taper  Please see above list of diagnoses and related plan for additional information  Condition at Discharge: stable     Discharge Day Visit / Exam:     Subjective:  Patient is feeling much better  Denies any shortness of breath, cough, abdominal pain, nausea or vomiting  Vitals: Blood Pressure: 128/64 (10/05/18 1236)  Pulse: 99 (10/05/18 1236)  Temperature: 98 2 °F (36 8 °C) (10/05/18 1236)  Temp Source: Oral (10/05/18 1236)  Respirations: 18 (10/05/18 1236)  Height: 5' 4" (162 6 cm) (09/29/18 2330)  Weight - Scale: 94 4 kg (208 lb 1 8 oz) (10/04/18 0600)  SpO2: 94 % (10/05/18 1458)  Exam:   Physical Exam   Constitutional: No distress  HENT:   Head: Normocephalic and atraumatic  Nose: Nose normal    Eyes: Pupils are equal, round, and reactive to light  Conjunctivae are normal    Neck: Normal range of motion  Neck supple  Cardiovascular: Normal rate, regular rhythm and normal heart sounds  Pulmonary/Chest: Effort normal and breath sounds normal  No respiratory distress  She has no wheezes  She has no rales  She exhibits no tenderness  Abdominal: Soft  Bowel sounds are normal  She exhibits no distension  There is no tenderness  There is no rebound and no guarding  Musculoskeletal: She exhibits no edema  Neurological: She is alert  No cranial nerve deficit  Skin: Skin is warm and dry  No rash noted  Discharge instructions/Information to patient and family:   See after visit summary for information provided to patient and family  Provisions for Follow-Up Care:  See after visit summary for information related to follow-up care and any pertinent home health orders  Disposition:     Home    Planned Readmission:  No     Discharge Statement:  I spent 35 minutes discharging the patient  This time was spent on the day of discharge  I had direct contact with the patient on the day of discharge  Greater than 50% of the total time was spent examining patient, answering all patient questions, arranging and discussing plan of care with patient as well as directly providing post-discharge instructions  Additional time then spent on discharge activities  Discharge Medications:  See after visit summary for reconciled discharge medications provided to patient and family        ** Please Note: This note has been constructed using a voice recognition system **

## 2018-10-05 NOTE — ASSESSMENT & PLAN NOTE
CT of the chest showed small bilateral pleural effusions  No clinical evidence of fluid overload  Continue Lasix 40 milligram p o   Daily  Echo from January 2018 showed EF of 92-88%, grade 1 diastolic dysfunction

## 2018-10-05 NOTE — ASSESSMENT & PLAN NOTE
CT chest showed bilateral upper lobe and lower lobe ground-glass/consolidative opacity suggesting multifocal pneumonia  Sputum cultures grew mixed respiratory daniel  Procalcitonin level has been negative  Patient received 5 day course of IV cefepime and Zithromax

## 2018-10-05 NOTE — NURSING NOTE
Pt left via stretcher accompanied by transport team  Discharge instructions given to receiving facility  Midline intact  Non-smoker  Up to date with immunizations  No further questions at this time

## 2018-10-05 NOTE — SOCIAL WORK
Discharge ordered  Pt will be transferred back to [facility]  for skilled rehab  [place] scheduled to transport via stretcher   ROSANNE Streeter), [facility] and pt/pt's daughter Wing Salas) aware of plan

## 2018-10-05 NOTE — ASSESSMENT & PLAN NOTE
Resolved   Secondary to COPD exacerbation multifocal pneumonia  Patient required intubation on admission and was extubated on October 1, 2018  CT of the chest showed bilateral upper lobe and lower lobe ground-glass/consolidative opacities suggesting multifocal pneumonia with small bilateral pleural effusions and pulmonary hypertension  Pain finished 5 day course of Zithromax and cefepime  Patient's procalcitonin level was normal

## 2018-10-24 ENCOUNTER — HOSPITAL ENCOUNTER (OUTPATIENT)
Dept: RADIOLOGY | Facility: HOSPITAL | Age: 83
Discharge: HOME | End: 2018-10-24
Attending: SURGERY
Payer: MEDICARE

## 2018-10-24 ENCOUNTER — TRANSCRIBE ORDERS (OUTPATIENT)
Dept: ADMINISTRATIVE | Facility: HOSPITAL | Age: 83
End: 2018-10-24

## 2018-10-24 DIAGNOSIS — I65.22 STENOSIS OF LEFT INTERNAL CAROTID ARTERY: ICD-10-CM

## 2018-10-24 PROCEDURE — 93880 EXTRACRANIAL BILAT STUDY: CPT | Performed by: SURGERY

## 2018-10-24 PROCEDURE — 93880 EXTRACRANIAL BILAT STUDY: CPT

## 2018-10-25 ENCOUNTER — HOSPITAL ENCOUNTER (OUTPATIENT)
Dept: RADIOLOGY | Facility: HOSPITAL | Age: 83
Discharge: HOME | End: 2018-10-25
Attending: ORTHOPAEDIC SURGERY
Payer: MEDICARE

## 2018-10-25 ENCOUNTER — OFFICE VISIT (OUTPATIENT)
Dept: OBGYN CLINIC | Facility: HOSPITAL | Age: 83
End: 2018-10-25
Payer: MEDICARE

## 2018-10-25 VITALS — SYSTOLIC BLOOD PRESSURE: 94 MMHG | HEART RATE: 88 BPM | DIASTOLIC BLOOD PRESSURE: 61 MMHG

## 2018-10-25 DIAGNOSIS — M25.572 PAIN, JOINT, ANKLE AND FOOT, LEFT: ICD-10-CM

## 2018-10-25 DIAGNOSIS — S82.892P CLOSED FRACTURE OF LEFT ANKLE WITH MALUNION: Primary | ICD-10-CM

## 2018-10-25 PROCEDURE — 99213 OFFICE O/P EST LOW 20 MIN: CPT | Performed by: ORTHOPAEDIC SURGERY

## 2018-10-25 PROCEDURE — 73600 X-RAY EXAM OF ANKLE: CPT

## 2018-10-25 NOTE — PROGRESS NOTES
Assessment/Plan:  Assessment/Plan   Problem List Items Addressed This Visit     None      Visit Diagnoses     Closed fracture of left ankle with malunion    -  Primary    Pain, joint, ankle and foot, left        Relevant Orders    XR ankle 2 vw left        Status post left ankle fracture treated non operatively and currently has no pain  Patient does not wish to have any surgical intervention for her left ankle at this time  Patient wished to follow-up an as-needed basis regarding her left ankle  Advised patient today she does not need to come to [facility] for any further orthopedic issue at this time  Subjective:   Patient ID: Stephen Frausto is a 81 y o  female  HPI    Patient is status post left ankle fracture dislocation in May of 2018  Patient states she is discontinued use of the ankle Aircast   She states having no pain in her left ankle  She denies any numbness and tingling  Patient continues to do exercises on her own  She states she is not wish to have any surgical intervention regarding her left ankle at this time  The following portions of the patient's history were reviewed and updated as appropriate: allergies, current medications, past family history, past medical history, past social history and past surgical history  Review of Systems  All review of systems are reviewed and are negative  Objective:  Ortho Exam   · General: Awake, Alert, Oriented  · Eyes: Pupils equal, round and reactive to light  · Heart: regular rate and rhythm  · Lungs: No audible wheezing  · Examination patient's left ankle no gross deformity active dorsiflexion in neutral position plantar flexion 20° no pain with internal-external rotation active ankle dorsi plantar flexion inversion eversion sensation intact distal pulses present      Physical Exam    I have personally reviewed pertinent films in PACS    X-rays reveal ankle fracture dislocation without interval change compared to previous films in acceptable alignment

## 2018-10-30 ENCOUNTER — TELEPHONE (OUTPATIENT)
Dept: VASCULAR SURGERY | Facility: CLINIC | Age: 83
End: 2018-10-30

## 2018-10-30 NOTE — TELEPHONE ENCOUNTER
----- Message from Allyson Mota MD sent at 10/30/2018  9:01 AM EDT -----  Please schedule OV f/u  Non emergent  ----- Message -----  From: Sarthak Minor  Sent: 10/25/2018  10:05 AM  To: Allyson Mota MD    PLS REV VAS LAB STUDY

## 2018-10-30 NOTE — TELEPHONE ENCOUNTER
[facility] Dan[facility]

## 2018-12-11 ENCOUNTER — EPISODE CHANGES (OUTPATIENT)
Dept: CASE MANAGEMENT | Facility: HOSPITAL | Age: 83
End: 2018-12-11

## 2018-12-13 ENCOUNTER — PATIENT OUTREACH (OUTPATIENT)
Dept: CASE MANAGEMENT | Facility: OTHER | Age: 83
End: 2018-12-13

## 2018-12-21 ENCOUNTER — PATIENT OUTREACH (OUTPATIENT)
Dept: CASE MANAGEMENT | Facility: OTHER | Age: 83
End: 2018-12-21

## 2018-12-21 ENCOUNTER — EPISODE CHANGES (OUTPATIENT)
Dept: CASE MANAGEMENT | Facility: HOSPITAL | Age: 83
End: 2018-12-21

## 2019-01-03 ENCOUNTER — PATIENT OUTREACH (OUTPATIENT)
Dept: CASE MANAGEMENT | Facility: OTHER | Age: 84
End: 2019-01-03

## 2019-02-10 ENCOUNTER — APPOINTMENT (EMERGENCY)
Dept: RADIOLOGY | Facility: HOSPITAL | Age: 84
DRG: 871 | End: 2019-02-10
Attending: EMERGENCY MEDICINE
Payer: MEDICARE

## 2019-02-10 ENCOUNTER — HOSPITAL ENCOUNTER (INPATIENT)
Facility: HOSPITAL | Age: 84
LOS: 4 days | Discharge: NURSING, REHAB OR LONG-TERM CARE | DRG: 871 | End: 2019-02-15
Attending: EMERGENCY MEDICINE | Admitting: INTERNAL MEDICINE
Payer: MEDICARE

## 2019-02-10 DIAGNOSIS — R78.81 BACTEREMIA: ICD-10-CM

## 2019-02-10 DIAGNOSIS — J44.1 COPD EXACERBATION (HCC): Primary | ICD-10-CM

## 2019-02-10 DIAGNOSIS — R07.9 CHEST PAIN: ICD-10-CM

## 2019-02-10 DIAGNOSIS — A41.9 SEPSIS (HCC): ICD-10-CM

## 2019-02-10 DIAGNOSIS — R21 RASH: ICD-10-CM

## 2019-02-10 PROBLEM — I48.0 PAROXYSMAL ATRIAL FIBRILLATION (HCC): Chronic | Status: RESOLVED | Noted: 2017-03-13 | Resolved: 2019-02-10

## 2019-02-10 PROBLEM — S82.201D CLOSED FRACTURE OF RIGHT TIBIA AND FIBULA WITH ROUTINE HEALING: Status: RESOLVED | Noted: 2018-03-15 | Resolved: 2019-02-10

## 2019-02-10 PROBLEM — S82.52XA DISPLACED FRACTURE OF MEDIAL MALLEOLUS OF LEFT TIBIA, INITIAL ENCOUNTER FOR CLOSED FRACTURE: Status: RESOLVED | Noted: 2018-05-10 | Resolved: 2019-02-10

## 2019-02-10 PROBLEM — S82.401D CLOSED FRACTURE OF RIGHT TIBIA AND FIBULA WITH ROUTINE HEALING: Status: RESOLVED | Noted: 2018-03-15 | Resolved: 2019-02-10

## 2019-02-10 PROBLEM — S31.109A: Status: RESOLVED | Noted: 2017-10-27 | Resolved: 2019-02-10

## 2019-02-10 PROBLEM — I50.9 ACUTE CONGESTIVE HEART FAILURE (HCC): Status: RESOLVED | Noted: 2018-01-05 | Resolved: 2019-02-10

## 2019-02-10 PROBLEM — S82.409A: Status: RESOLVED | Noted: 2018-05-10 | Resolved: 2019-02-10

## 2019-02-10 PROBLEM — G45.9 TIA (TRANSIENT ISCHEMIC ATTACK): Status: RESOLVED | Noted: 2018-05-10 | Resolved: 2019-02-10

## 2019-02-10 PROBLEM — J18.9 HEALTHCARE-ASSOCIATED PNEUMONIA: Status: RESOLVED | Noted: 2018-10-03 | Resolved: 2019-02-10

## 2019-02-10 PROBLEM — J45.41 MODERATE PERSISTENT ASTHMA WITH ACUTE EXACERBATION: Status: RESOLVED | Noted: 2017-03-16 | Resolved: 2019-02-10

## 2019-02-10 PROBLEM — I89.0 LYMPHEDEMA: Chronic | Status: RESOLVED | Noted: 2017-09-25 | Resolved: 2019-02-10

## 2019-02-10 PROBLEM — J20.9 ACUTE BRONCHITIS: Status: RESOLVED | Noted: 2017-03-13 | Resolved: 2019-02-10

## 2019-02-10 PROBLEM — J45.901 ASTHMA EXACERBATION: Status: RESOLVED | Noted: 2017-12-12 | Resolved: 2019-02-10

## 2019-02-10 LAB
ALBUMIN SERPL BCP-MCNC: 2.6 G/DL (ref 3.5–5)
ALP SERPL-CCNC: 98 U/L (ref 46–116)
ALT SERPL W P-5'-P-CCNC: 11 U/L (ref 12–78)
ANION GAP SERPL CALCULATED.3IONS-SCNC: 3 MMOL/L (ref 4–13)
APTT PPP: 30 SECONDS (ref 26–38)
AST SERPL W P-5'-P-CCNC: 12 U/L (ref 5–45)
BACTERIA UR QL AUTO: ABNORMAL /HPF
BASOPHILS # BLD AUTO: 0.04 THOUSANDS/ΜL (ref 0–0.1)
BASOPHILS NFR BLD AUTO: 1 % (ref 0–1)
BILIRUB SERPL-MCNC: 0.5 MG/DL (ref 0.2–1)
BILIRUB UR QL STRIP: NEGATIVE
BUN SERPL-MCNC: 16 MG/DL (ref 5–25)
CALCIUM SERPL-MCNC: 8.4 MG/DL (ref 8.3–10.1)
CHLORIDE SERPL-SCNC: 99 MMOL/L (ref 100–108)
CLARITY UR: CLEAR
CO2 SERPL-SCNC: 36 MMOL/L (ref 21–32)
COLOR UR: ABNORMAL
CREAT SERPL-MCNC: 0.98 MG/DL (ref 0.6–1.3)
EOSINOPHIL # BLD AUTO: 0.2 THOUSAND/ΜL (ref 0–0.61)
EOSINOPHIL NFR BLD AUTO: 3 % (ref 0–6)
ERYTHROCYTE [DISTWIDTH] IN BLOOD BY AUTOMATED COUNT: 15.4 % (ref 11.6–15.1)
GFR SERPL CREATININE-BSD FRML MDRD: 54 ML/MIN/1.73SQ M
GLUCOSE SERPL-MCNC: 121 MG/DL (ref 65–140)
GLUCOSE UR STRIP-MCNC: NEGATIVE MG/DL
HCT VFR BLD AUTO: 45.3 % (ref 34.8–46.1)
HGB BLD-MCNC: 13.6 G/DL (ref 11.5–15.4)
HGB UR QL STRIP.AUTO: ABNORMAL
IMM GRANULOCYTES # BLD AUTO: 0.05 THOUSAND/UL (ref 0–0.2)
IMM GRANULOCYTES NFR BLD AUTO: 1 % (ref 0–2)
INR PPP: 0.99 (ref 0.86–1.16)
KETONES UR STRIP-MCNC: NEGATIVE MG/DL
LACTATE SERPL-SCNC: 1.2 MMOL/L (ref 0.5–2)
LEUKOCYTE ESTERASE UR QL STRIP: NEGATIVE
LIPASE SERPL-CCNC: 77 U/L (ref 73–393)
LYMPHOCYTES # BLD AUTO: 0.84 THOUSANDS/ΜL (ref 0.6–4.47)
LYMPHOCYTES NFR BLD AUTO: 11 % (ref 14–44)
MAGNESIUM SERPL-MCNC: 1.7 MG/DL (ref 1.6–2.6)
MCH RBC QN AUTO: 26.5 PG (ref 26.8–34.3)
MCHC RBC AUTO-ENTMCNC: 30 G/DL (ref 31.4–37.4)
MCV RBC AUTO: 88 FL (ref 82–98)
MONOCYTES # BLD AUTO: 0.51 THOUSAND/ΜL (ref 0.17–1.22)
MONOCYTES NFR BLD AUTO: 7 % (ref 4–12)
NEUTROPHILS # BLD AUTO: 6.08 THOUSANDS/ΜL (ref 1.85–7.62)
NEUTS SEG NFR BLD AUTO: 77 % (ref 43–75)
NITRITE UR QL STRIP: NEGATIVE
NON-SQ EPI CELLS URNS QL MICRO: ABNORMAL /HPF
NRBC BLD AUTO-RTO: 0 /100 WBCS
NT-PROBNP SERPL-MCNC: 1851 PG/ML
PH UR STRIP.AUTO: 7 [PH] (ref 5–9)
PLATELET # BLD AUTO: 156 THOUSANDS/UL (ref 149–390)
PMV BLD AUTO: 10.1 FL (ref 8.9–12.7)
POTASSIUM SERPL-SCNC: 3.7 MMOL/L (ref 3.5–5.3)
PROT SERPL-MCNC: 6.1 G/DL (ref 6.4–8.2)
PROT UR STRIP-MCNC: NEGATIVE MG/DL
PROTHROMBIN TIME: 10.4 SECONDS (ref 9.4–11.7)
RBC # BLD AUTO: 5.14 MILLION/UL (ref 3.81–5.12)
RBC #/AREA URNS AUTO: ABNORMAL /HPF
SODIUM SERPL-SCNC: 138 MMOL/L (ref 136–145)
SP GR UR STRIP.AUTO: <=1.005 (ref 1–1.03)
TROPONIN I SERPL-MCNC: <0.02 NG/ML
UROBILINOGEN UR QL STRIP.AUTO: 0.2 E.U./DL
WBC # BLD AUTO: 7.72 THOUSAND/UL (ref 4.31–10.16)
WBC #/AREA URNS AUTO: ABNORMAL /HPF

## 2019-02-10 PROCEDURE — 87040 BLOOD CULTURE FOR BACTERIA: CPT | Performed by: EMERGENCY MEDICINE

## 2019-02-10 PROCEDURE — 84145 PROCALCITONIN (PCT): CPT | Performed by: NURSE PRACTITIONER

## 2019-02-10 PROCEDURE — 87077 CULTURE AEROBIC IDENTIFY: CPT | Performed by: EMERGENCY MEDICINE

## 2019-02-10 PROCEDURE — 87147 CULTURE TYPE IMMUNOLOGIC: CPT | Performed by: EMERGENCY MEDICINE

## 2019-02-10 PROCEDURE — 83880 ASSAY OF NATRIURETIC PEPTIDE: CPT | Performed by: EMERGENCY MEDICINE

## 2019-02-10 PROCEDURE — 84484 ASSAY OF TROPONIN QUANT: CPT | Performed by: EMERGENCY MEDICINE

## 2019-02-10 PROCEDURE — 85610 PROTHROMBIN TIME: CPT | Performed by: EMERGENCY MEDICINE

## 2019-02-10 PROCEDURE — 93005 ELECTROCARDIOGRAM TRACING: CPT

## 2019-02-10 PROCEDURE — 87081 CULTURE SCREEN ONLY: CPT | Performed by: NURSE PRACTITIONER

## 2019-02-10 PROCEDURE — 87633 RESP VIRUS 12-25 TARGETS: CPT | Performed by: NURSE PRACTITIONER

## 2019-02-10 PROCEDURE — 94640 AIRWAY INHALATION TREATMENT: CPT

## 2019-02-10 PROCEDURE — 85730 THROMBOPLASTIN TIME PARTIAL: CPT | Performed by: EMERGENCY MEDICINE

## 2019-02-10 PROCEDURE — 96375 TX/PRO/DX INJ NEW DRUG ADDON: CPT

## 2019-02-10 PROCEDURE — 83605 ASSAY OF LACTIC ACID: CPT | Performed by: EMERGENCY MEDICINE

## 2019-02-10 PROCEDURE — 87186 SC STD MICRODIL/AGAR DIL: CPT | Performed by: EMERGENCY MEDICINE

## 2019-02-10 PROCEDURE — 85025 COMPLETE CBC W/AUTO DIFF WBC: CPT | Performed by: EMERGENCY MEDICINE

## 2019-02-10 PROCEDURE — 80053 COMPREHEN METABOLIC PANEL: CPT | Performed by: EMERGENCY MEDICINE

## 2019-02-10 PROCEDURE — 83735 ASSAY OF MAGNESIUM: CPT | Performed by: EMERGENCY MEDICINE

## 2019-02-10 PROCEDURE — 71045 X-RAY EXAM CHEST 1 VIEW: CPT

## 2019-02-10 PROCEDURE — 99220 PR INITIAL OBSERVATION CARE/DAY 70 MINUTES: CPT | Performed by: NURSE PRACTITIONER

## 2019-02-10 PROCEDURE — 36415 COLL VENOUS BLD VENIPUNCTURE: CPT | Performed by: EMERGENCY MEDICINE

## 2019-02-10 PROCEDURE — 99285 EMERGENCY DEPT VISIT HI MDM: CPT

## 2019-02-10 PROCEDURE — 84484 ASSAY OF TROPONIN QUANT: CPT | Performed by: NURSE PRACTITIONER

## 2019-02-10 PROCEDURE — 83690 ASSAY OF LIPASE: CPT | Performed by: EMERGENCY MEDICINE

## 2019-02-10 PROCEDURE — 81001 URINALYSIS AUTO W/SCOPE: CPT | Performed by: NURSE PRACTITIONER

## 2019-02-10 PROCEDURE — 96374 THER/PROPH/DIAG INJ IV PUSH: CPT

## 2019-02-10 RX ORDER — ASPIRIN 81 MG/1
81 TABLET ORAL DAILY
Status: DISCONTINUED | OUTPATIENT
Start: 2019-02-11 | End: 2019-02-15 | Stop reason: HOSPADM

## 2019-02-10 RX ORDER — ALPRAZOLAM 0.5 MG/1
0.5 TABLET ORAL
Status: DISCONTINUED | OUTPATIENT
Start: 2019-02-10 | End: 2019-02-15 | Stop reason: HOSPADM

## 2019-02-10 RX ORDER — PREGABALIN 100 MG/1
300 CAPSULE ORAL 2 TIMES DAILY
Status: DISCONTINUED | OUTPATIENT
Start: 2019-02-10 | End: 2019-02-10

## 2019-02-10 RX ORDER — IPRATROPIUM BROMIDE AND ALBUTEROL SULFATE 2.5; .5 MG/3ML; MG/3ML
3 SOLUTION RESPIRATORY (INHALATION)
Status: DISCONTINUED | OUTPATIENT
Start: 2019-02-10 | End: 2019-02-11

## 2019-02-10 RX ORDER — ACETAMINOPHEN 325 MG/1
650 TABLET ORAL ONCE
Status: COMPLETED | OUTPATIENT
Start: 2019-02-10 | End: 2019-02-10

## 2019-02-10 RX ORDER — FLUTICASONE FUROATE AND VILANTEROL 200; 25 UG/1; UG/1
1 POWDER RESPIRATORY (INHALATION) DAILY
Status: DISCONTINUED | OUTPATIENT
Start: 2019-02-11 | End: 2019-02-15 | Stop reason: HOSPADM

## 2019-02-10 RX ORDER — POTASSIUM CHLORIDE 750 MG/1
10 TABLET, EXTENDED RELEASE ORAL DAILY
Status: DISCONTINUED | OUTPATIENT
Start: 2019-02-11 | End: 2019-02-15 | Stop reason: HOSPADM

## 2019-02-10 RX ORDER — FUROSEMIDE 10 MG/ML
40 INJECTION INTRAMUSCULAR; INTRAVENOUS
Status: DISCONTINUED | OUTPATIENT
Start: 2019-02-10 | End: 2019-02-11

## 2019-02-10 RX ORDER — ALBUTEROL SULFATE 2.5 MG/3ML
5 SOLUTION RESPIRATORY (INHALATION) ONCE
Status: COMPLETED | OUTPATIENT
Start: 2019-02-10 | End: 2019-02-10

## 2019-02-10 RX ORDER — ACETAMINOPHEN 325 MG/1
650 TABLET ORAL EVERY 6 HOURS PRN
Status: DISCONTINUED | OUTPATIENT
Start: 2019-02-10 | End: 2019-02-15 | Stop reason: HOSPADM

## 2019-02-10 RX ORDER — METHYLPREDNISOLONE SODIUM SUCCINATE 125 MG/2ML
125 INJECTION, POWDER, LYOPHILIZED, FOR SOLUTION INTRAMUSCULAR; INTRAVENOUS ONCE
Status: COMPLETED | OUTPATIENT
Start: 2019-02-10 | End: 2019-02-10

## 2019-02-10 RX ORDER — PREGABALIN 100 MG/1
100 CAPSULE ORAL 2 TIMES DAILY
Status: DISCONTINUED | OUTPATIENT
Start: 2019-02-10 | End: 2019-02-15 | Stop reason: HOSPADM

## 2019-02-10 RX ORDER — OXYBUTYNIN CHLORIDE 5 MG/1
10 TABLET, EXTENDED RELEASE ORAL DAILY
Status: DISCONTINUED | OUTPATIENT
Start: 2019-02-11 | End: 2019-02-15 | Stop reason: HOSPADM

## 2019-02-10 RX ORDER — CALCIUM CARBONATE 500(1250)
1 TABLET ORAL 2 TIMES DAILY WITH MEALS
Status: DISCONTINUED | OUTPATIENT
Start: 2019-02-11 | End: 2019-02-15 | Stop reason: HOSPADM

## 2019-02-10 RX ORDER — PANTOPRAZOLE SODIUM 20 MG/1
20 TABLET, DELAYED RELEASE ORAL
Status: DISCONTINUED | OUTPATIENT
Start: 2019-02-11 | End: 2019-02-15 | Stop reason: HOSPADM

## 2019-02-10 RX ORDER — OMEGA-3S/DHA/EPA/FISH OIL/D3 300MG-1000
400 CAPSULE ORAL 2 TIMES DAILY
Status: DISCONTINUED | OUTPATIENT
Start: 2019-02-10 | End: 2019-02-15 | Stop reason: HOSPADM

## 2019-02-10 RX ORDER — METHYLPREDNISOLONE SODIUM SUCCINATE 40 MG/ML
40 INJECTION, POWDER, LYOPHILIZED, FOR SOLUTION INTRAMUSCULAR; INTRAVENOUS EVERY 8 HOURS
Status: DISCONTINUED | OUTPATIENT
Start: 2019-02-11 | End: 2019-02-11

## 2019-02-10 RX ORDER — ONDANSETRON 2 MG/ML
4 INJECTION INTRAMUSCULAR; INTRAVENOUS EVERY 6 HOURS PRN
Status: DISCONTINUED | OUTPATIENT
Start: 2019-02-10 | End: 2019-02-15 | Stop reason: HOSPADM

## 2019-02-10 RX ORDER — METOPROLOL TARTRATE 50 MG/1
50 TABLET, FILM COATED ORAL EVERY 12 HOURS SCHEDULED
Status: DISCONTINUED | OUTPATIENT
Start: 2019-02-10 | End: 2019-02-15 | Stop reason: HOSPADM

## 2019-02-10 RX ORDER — AMLODIPINE BESYLATE 5 MG/1
5 TABLET ORAL DAILY
Status: DISCONTINUED | OUTPATIENT
Start: 2019-02-11 | End: 2019-02-15 | Stop reason: HOSPADM

## 2019-02-10 RX ORDER — FLUTICASONE PROPIONATE 50 MCG
1 SPRAY, SUSPENSION (ML) NASAL DAILY
Status: DISCONTINUED | OUTPATIENT
Start: 2019-02-11 | End: 2019-02-15 | Stop reason: HOSPADM

## 2019-02-10 RX ORDER — ATORVASTATIN CALCIUM 40 MG/1
40 TABLET, FILM COATED ORAL
Status: DISCONTINUED | OUTPATIENT
Start: 2019-02-11 | End: 2019-02-15 | Stop reason: HOSPADM

## 2019-02-10 RX ORDER — LORATADINE 10 MG/1
10 TABLET ORAL DAILY
Status: DISCONTINUED | OUTPATIENT
Start: 2019-02-11 | End: 2019-02-15 | Stop reason: HOSPADM

## 2019-02-10 RX ORDER — POTASSIUM CHLORIDE 750 MG/1
10 TABLET, FILM COATED, EXTENDED RELEASE ORAL DAILY
Status: DISCONTINUED | OUTPATIENT
Start: 2019-02-11 | End: 2019-02-10 | Stop reason: CLARIF

## 2019-02-10 RX ORDER — ALBUTEROL SULFATE 2.5 MG/3ML
2.5 SOLUTION RESPIRATORY (INHALATION) EVERY 6 HOURS PRN
Status: DISCONTINUED | OUTPATIENT
Start: 2019-02-10 | End: 2019-02-15 | Stop reason: HOSPADM

## 2019-02-10 RX ORDER — VANCOMYCIN HYDROCHLORIDE 1 G/200ML
1000 INJECTION, SOLUTION INTRAVENOUS ONCE
Status: DISCONTINUED | OUTPATIENT
Start: 2019-02-10 | End: 2019-02-10

## 2019-02-10 RX ADMIN — ALPRAZOLAM 0.5 MG: 0.5 TABLET ORAL at 21:16

## 2019-02-10 RX ADMIN — ACETAMINOPHEN 650 MG: 325 TABLET, FILM COATED ORAL at 21:17

## 2019-02-10 RX ADMIN — APIXABAN 5 MG: 5 TABLET, FILM COATED ORAL at 21:16

## 2019-02-10 RX ADMIN — CHOLECALCIFEROL (VITAMIN D3) 10 MCG (400 UNIT) TABLET 400 UNITS: at 21:16

## 2019-02-10 RX ADMIN — AZITHROMYCIN MONOHYDRATE 500 MG: 500 INJECTION, POWDER, LYOPHILIZED, FOR SOLUTION INTRAVENOUS at 19:35

## 2019-02-10 RX ADMIN — ALBUTEROL SULFATE 5 MG: 2.5 SOLUTION RESPIRATORY (INHALATION) at 18:06

## 2019-02-10 RX ADMIN — METHYLPREDNISOLONE SODIUM SUCCINATE 125 MG: 125 INJECTION, POWDER, FOR SOLUTION INTRAMUSCULAR; INTRAVENOUS at 18:40

## 2019-02-10 RX ADMIN — METOPROLOL TARTRATE 50 MG: 50 TABLET, FILM COATED ORAL at 21:16

## 2019-02-10 RX ADMIN — FUROSEMIDE 40 MG: 10 INJECTION, SOLUTION INTRAMUSCULAR; INTRAVENOUS at 21:17

## 2019-02-10 RX ADMIN — PREGABALIN 100 MG: 100 CAPSULE ORAL at 21:16

## 2019-02-10 RX ADMIN — IPRATROPIUM BROMIDE 0.5 MG: 0.5 SOLUTION RESPIRATORY (INHALATION) at 18:07

## 2019-02-10 RX ADMIN — Medication 400 MG: at 21:17

## 2019-02-10 NOTE — ED PROVIDER NOTES
History  Chief Complaint   Patient presents with    Chest Pain     has had intermiottant chest pain since this morning, c/o increase diff breathing     HPI      This is an 52-year-old female with past medical history of Merkel cell cancer status post resection, COPD, atrial fibrillation on Eliquis couple GERD, hypertension, presents today with shortness of breath  Patient has a positive flu the family's already whereabouts  Patient comes in febrile increased work of breathing, shortness of breath and chest pain  Patient states she feels a knot in her stomach  States she cannot take a deep breath in  States she has been wheezing and using a breathing treatment which not having helping  States she has been coughing as well  Denies any abdominal pain nausea vomiting  No back pain  81 year female that presents today with COPD exacerbation  Will send off a septic workup  Patient will require admission to the hospital patient is tachypneic with increased work of breathing  Prior to Admission Medications   Prescriptions Last Dose Informant Patient Reported? Taking?    ALPRAZolam (XANAX) 0 5 mg tablet 2/9/2019 at Unknown time  Yes Yes   Sig: Take 0 5 mg by mouth daily at bedtime   Fesoterodine Fumarate ER (TOVIAZ) 8 MG TB24 2/9/2019 at Unknown time  Yes Yes   Sig: Take by mouth daily at bedtime     acetaminophen (TYLENOL) 325 mg tablet Past Week at Unknown time  No Yes   Sig: Take 2 tablets (650 mg total) by mouth every 6 (six) hours as needed for fever   albuterol (2 5 mg/3 mL) 0 083 % nebulizer solution 2/10/2019 at Unknown time  Yes Yes   Sig: Take 2 5 mg by nebulization every 6 (six) hours as needed for wheezing   amLODIPine (NORVASC) 5 mg tablet 2/10/2019 at Unknown time  Yes Yes   Sig: Take 5 mg by mouth daily   apixaban (ELIQUIS) 5 mg 2/10/2019 at Unknown time  Yes Yes   Sig: Take 5 mg by mouth every 12 (twelve) hours     aspirin (ECOTRIN LOW STRENGTH) 81 mg EC tablet Not Taking at Unknown time  Yes No   Sig: Take 81 mg by mouth daily   atorvastatin (LIPITOR) 40 mg tablet Unknown at Unknown time  Yes No   Sig: Take 40 mg by mouth daily   calcium carbonate (OS-MEGHA) 600 MG tablet 2/10/2019 at Unknown time  Yes Yes   Sig: Take 600 mg by mouth 2 (two) times a day with meals   cholecalciferol (VITAMIN D3) 400 units tablet 2/10/2019 at Unknown time  Yes Yes   Sig: Take 400 Units by mouth 2 (two) times a day   docusate sodium (COLACE) 100 mg capsule Not Taking at Unknown time  Yes No   Sig: Take 200 mg by mouth daily   fluticasone (FLONASE) 50 mcg/act nasal spray 2/10/2019 at Unknown time  No Yes   Si spray into each nostril daily   fluticasone-salmeterol (ADVAIR) 250-50 mcg/dose inhaler 2/10/2019 at Unknown time  No Yes   Sig: Inhale 1 puff every 12 (twelve) hours This is home medication   furosemide (LASIX) 40 mg tablet Not Taking at Unknown time  Yes No   Sig: Take 40 mg by mouth daily     ipratropium-albuterol (DUO-NEB) 0 5-2 5 mg/3 mL nebulizer solution 2/10/2019 at Unknown time  Yes Yes   Sig: Take 3 mL by nebulization every 6 (six) hours while awake   loratadine (CLARITIN) 10 mg tablet 2/10/2019 at Unknown time  Yes Yes   Sig: Take 10 mg by mouth daily   magnesium oxide (MAG-OX) 400 mg 2/10/2019 at Unknown time  Yes Yes   Sig: Take 400 mg by mouth 2 (two) times a day   metoprolol tartrate (LOPRESSOR) 50 mg tablet 2/10/2019 at Unknown time  Yes Yes   Sig: Take 50 mg by mouth every 12 (twelve) hours   omeprazole (PriLOSEC) 20 mg delayed release capsule 2/10/2019 at Unknown time  Yes Yes   Sig: Take 20 mg by mouth daily   potassium chloride (K-DUR) 10 mEq tablet 2/10/2019 at Unknown time  Yes Yes   Sig: Take 10 mEq by mouth daily     predniSONE 10 mg tablet Not Taking at Unknown time  No No   Sig: Take 1 tablet (10 mg total) by mouth daily   Patient not taking: Reported on 2/10/2019   pregabalin (LYRICA) 300 MG capsule   Yes No   Sig: Take 100 mg by mouth 2 (two) times a day       Facility-Administered Medications: None       Past Medical History:   Diagnosis Date    Asthma     Atrial fibrillation (HCC)     Cancer (HCC)     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    Cardiac disease     a fib    COPD (chronic obstructive pulmonary disease) (HCC)     Fibromyalgia     Fibromyalgia, primary     GERD (gastroesophageal reflux disease)     History of methicillin resistant staphylococcus aureus (MRSA)     Positive MRSA (nares) 9/21/2016  Negative nasal culture 9/29/2018 - Isolation discontinued 10/4/2018    Hypertension     Hypokalemia     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[address] )     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, left groin, lung and bladder  Patient treated with chemotherapy and radiation       Past Surgical History:   Procedure Laterality Date    APPENDECTOMY      CATARACT EXTRACTION      CHEST WALL BIOPSY N/A 10/27/2017    Procedure: SINUS EXCISION AND REMOVAL OF FOREIGN BODY, ABDOMEN (WOUND EXPLORATION); Surgeon: Lisandro Orosco MD;  Location: [address] [place] [address];  Service: General    EYE SURGERY Bilateral     cataracts     HIP FRACTURE SURGERY Left     HYSTERECTOMY      JOINT REPLACEMENT Left     hip    LYMPHADENECTOMY      PORTACATH PLACEMENT Right        Family History   Problem Relation Age of Onset    Pneumonia Mother     Heart disease Father      I have reviewed and agree with the history as documented  Social History     Tobacco Use    Smoking status: Former Smoker    Smokeless tobacco: Never Used    Tobacco comment: 0 5 PPD for 4 year   Substance Use Topics    Alcohol use: Not Currently    Drug use: No        Review of Systems   Constitutional: Positive for chills and fatigue  Negative for diaphoresis and fever  HENT: Negative  Respiratory: Positive for cough, shortness of breath and wheezing  Cardiovascular: Positive for chest pain and leg swelling  Negative for palpitations     Gastrointestinal: Negative for abdominal distention, abdominal pain, nausea and vomiting  Genitourinary: Negative  Musculoskeletal: Negative  Skin: Negative  Neurological: Positive for weakness  Psychiatric/Behavioral: Negative  All other systems reviewed and are negative  Physical Exam  Physical Exam   Constitutional: She is oriented to person, place, and time  She appears well-developed  She appears ill  She appears distressed  HENT:   Head: Normocephalic and atraumatic  Eyes: Pupils are equal, round, and reactive to light  EOM are normal    Neck: Normal range of motion  Neck supple  Cardiovascular: Normal rate, normal heart sounds and normal pulses  An irregularly irregular rhythm present  Exam reveals no S3 and no S4  No murmur heard  Pulmonary/Chest: Breath sounds normal  Accessory muscle usage present  Tachypnea noted  Abdominal: Soft  Bowel sounds are normal  She exhibits no distension  There is no tenderness  There is no rebound and no guarding  Musculoskeletal: Normal range of motion  Right lower leg: She exhibits no tenderness and no edema  Left lower leg: She exhibits edema  She exhibits no tenderness  Left leg swelling with erythema  2+ DP  No warmth     Neurological: She is alert and oriented to person, place, and time  Skin: Skin is warm and dry  Capillary refill takes less than 2 seconds  There is pallor  Psychiatric: She has a normal mood and affect  Vitals reviewed        Vital Signs  ED Triage Vitals [02/10/19 1740]   Temperature Pulse Respirations Blood Pressure SpO2   (!) 100 6 °F (38 1 °C) (!) 112 22 (!) 197/129 92 %      Temp Source Heart Rate Source Patient Position - Orthostatic VS BP Location FiO2 (%)   Tympanic Monitor Lying Left arm --      Pain Score       6           Vitals:    02/10/19 1900 02/10/19 1930 02/10/19 1945 02/10/19 2036   BP: 168/77 150/67  134/88   Pulse: (!) 106 (!) 108 (!) 110 (!) 110   Patient Position - Orthostatic VS:    Lying       Visual Acuity      ED Medications  Medications albuterol inhalation solution 2 5 mg (has no administration in time range)   ALPRAZolam Harlon Needle) tablet 0 5 mg (0 5 mg Oral Given 2/10/19 2116)   amLODIPine (NORVASC) tablet 5 mg (has no administration in time range)   apixaban (ELIQUIS) tablet 5 mg (5 mg Oral Given 2/10/19 2116)   aspirin (ECOTRIN LOW STRENGTH) EC tablet 81 mg (has no administration in time range)   atorvastatin (LIPITOR) tablet 40 mg (has no administration in time range)   calcium carbonate (OYSTER SHELL,OSCAL) 500 mg tablet 1 tablet (has no administration in time range)   cholecalciferol (VITAMIN D3) tablet 400 Units (400 Units Oral Given 2/10/19 2116)   oxybutynin (DITROPAN-XL) 24 hr tablet 10 mg (has no administration in time range)   fluticasone (FLONASE) 50 mcg/act nasal spray 1 spray (has no administration in time range)   fluticasone-vilanterol (BREO ELLIPTA) 200-25 MCG/INH inhaler 1 puff (has no administration in time range)   ipratropium-albuterol (DUO-NEB) 0 5-2 5 mg/3 mL inhalation solution 3 mL (3 mL Nebulization Not Given 2/10/19 2137)   loratadine (CLARITIN) tablet 10 mg (has no administration in time range)   magnesium oxide (MAG-OX) tablet 400 mg (400 mg Oral Given 2/10/19 2117)   metoprolol tartrate (LOPRESSOR) tablet 50 mg (50 mg Oral Given 2/10/19 2116)   pantoprazole (PROTONIX) EC tablet 20 mg (has no administration in time range)   ondansetron (ZOFRAN) injection 4 mg (has no administration in time range)   furosemide (LASIX) injection 40 mg (40 mg Intravenous Given 2/10/19 2117)   methylPREDNISolone sodium succinate (Solu-MEDROL) injection 40 mg (has no administration in time range)   acetaminophen (TYLENOL) tablet 650 mg (has no administration in time range)   potassium chloride (K-DUR,KLOR-CON) CR tablet 10 mEq (has no administration in time range)   pregabalin (LYRICA) capsule 100 mg (100 mg Oral Given 2/10/19 2116)   albuterol inhalation solution 5 mg (5 mg Nebulization Given 2/10/19 7646)   ipratropium (ATROVENT) 0 02 % inhalation solution 0 5 mg (0 5 mg Nebulization Given 2/10/19 1807)   methylPREDNISolone sodium succinate (Solu-MEDROL) injection 125 mg (125 mg Intravenous Given 2/10/19 1840)   azithromycin (ZITHROMAX) 500 mg in sodium chloride 0 9% 250mL IVPB 500 mg (500 mg Intravenous New Bag 2/10/19 1935)   acetaminophen (TYLENOL) tablet 650 mg (650 mg Oral Given 2/10/19 2117)       Diagnostic Studies  Results Reviewed     Procedure Component Value Units Date/Time    UA w Reflex to Microscopic [[id number]]  (Abnormal) Collected:  02/10/19 2148    Lab Status:  Final result Specimen:  Urine, Clean Catch Updated:  02/10/19 2205     Color, UA Light Yellow     Clarity, UA Clear     Specific Gravity, UA <=1 005     pH, UA 7 0     Leukocytes, UA Negative     Nitrite, UA Negative     Protein, UA Negative mg/dl      Glucose, UA Negative mg/dl      Ketones, UA Negative mg/dl      Urobilinogen, UA 0 2 E U /dl      Bilirubin, UA Negative     Blood, UA Trace-lysed    B-type natriuretic peptide [[id number]]  (Abnormal) Collected:  02/10/19 1817    Lab Status:  Final result Specimen:  Blood from Central Venous Line Updated:  02/10/19 1849     NT-proBNP 1,851 pg/mL     Magnesium [[id number]]  (Normal) Collected:  02/10/19 1817    Lab Status:  Final result Specimen:  Blood from Central Venous Line Updated:  02/10/19 1849     Magnesium 1 7 mg/dL     Lipase [[id number]]  (Normal) Collected:  02/10/19 1817    Lab Status:  Final result Specimen:  Blood from Central Venous Line Updated:  02/10/19 1849     Lipase 77 u/L     Lactic acid, plasma [[id number]]  (Normal) Collected:  02/10/19 1817    Lab Status:  Final result Specimen:  Blood from Central Venous Line Updated:  02/10/19 1848     LACTIC ACID 1 2 mmol/L     Narrative:       Result may be elevated if tourniquet was used during collection      Troponin I [[id number]]  (Normal) Collected:  02/10/19 1817    Lab Status:  Final result Specimen:  Blood from Central Venous Line Updated:  02/10/19 1845 Troponin I <0 02 ng/mL     Comprehensive metabolic panel [[id number]]  (Abnormal) Collected:  02/10/19 1817    Lab Status:  Final result Specimen:  Blood from Central Venous Line Updated:  02/10/19 1843     Sodium 138 mmol/L      Potassium 3 7 mmol/L      Chloride 99 mmol/L      CO2 36 mmol/L      ANION GAP 3 mmol/L      BUN 16 mg/dL      Creatinine 0 98 mg/dL      Glucose 121 mg/dL      Calcium 8 4 mg/dL      AST 12 U/L      ALT 11 U/L      Alkaline Phosphatase 98 U/L      Total Protein 6 1 g/dL      Albumin 2 6 g/dL      Total Bilirubin 0 50 mg/dL      eGFR 54 ml/min/1 73sq m     Narrative:       National Kidney Disease Education Program recommendations are as follows:  GFR calculation is accurate only with a steady state creatinine  Chronic Kidney disease less than 60 ml/min/1 73 sq  meters  Kidney failure less than 15 ml/min/1 73 sq  meters  Blood culture #1 [[id number]] Collected:  02/10/19 1837    Lab Status:   In process Specimen:  Blood from Hand, Left Updated:  02/10/19 1841    Protime-INR [[id number]]  (Normal) Collected:  02/10/19 1817    Lab Status:  Final result Specimen:  Blood from Central Venous Line Updated:  02/10/19 1839     Protime 10 4 seconds      INR 0 99    APTT [[id number]]  (Normal) Collected:  02/10/19 1817    Lab Status:  Final result Specimen:  Blood from Central Venous Line Updated:  02/10/19 1839     PTT 30 seconds     CBC and differential [[id number]]  (Abnormal) Collected:  02/10/19 1817    Lab Status:  Final result Specimen:  Blood from Central Venous Line Updated:  02/10/19 1825     WBC 7 72 Thousand/uL      RBC 5 14 Million/uL      Hemoglobin 13 6 g/dL      Hematocrit 45 3 %      MCV 88 fL      MCH 26 5 pg      MCHC 30 0 g/dL      RDW 15 4 %      MPV 10 1 fL      Platelets 130 Thousands/uL      nRBC 0 /100 WBCs      Neutrophils Relative 77 %      Immat GRANS % 1 %      Lymphocytes Relative 11 %      Monocytes Relative 7 %      Eosinophils Relative 3 %      Basophils Relative 1 % Neutrophils Absolute 6 08 Thousands/µL      Immature Grans Absolute 0 05 Thousand/uL      Lymphocytes Absolute 0 84 Thousands/µL      Monocytes Absolute 0 51 Thousand/µL      Eosinophils Absolute 0 20 Thousand/µL      Basophils Absolute 0 04 Thousands/µL     Blood culture #2 [[id number]] Collected:  02/10/19 1817    Lab Status: In process Specimen:  Blood from Central Venous Line Updated:  02/10/19 1821                 XR chest 1 view portable    (Results Pending)   CT chest wo contrast    (Results Pending)              Procedures  Procedures       Phone Contacts  ED Phone Contact    ED Course  ED Course as of Feb 10 2332   Sun Feb 10, 2019   1758 Procedure Note: EKG  Date/Time: 02/10/19 5:58 PM   Performed by: Bre Campoverde by: Gaby Sanchez   Indications / Diagnosis: CP  ECG reviewed by me, the ED Provider: yes   The EKG demonstrates:  Rhythm:  Atrial fibrillation  Intervals:  AFib  Axis: normal axis  QRS/Blocks: normal QRS  ST Changes: No acute ST Changes, no STD/DAVID  AFib with RVR        1929 Patient improving currently  States a little bit better  Still tachypneic  Will admit for COPD exacerbation                                   MDM    Disposition  Final diagnoses:   COPD exacerbation ([facility] )   Chest pain     Time reflects when diagnosis was documented in both MDM as applicable and the Disposition within this note     Time User Action Codes Description Comment    2/10/2019  7:36 PM Trista Miller  8  [J44 1] [facility]     2/10/2019  7:36 PM Gia Alvarez [R07 9] Chest pain       ED Disposition     ED Disposition Condition Date/Time Comment    Admit Stable [facility] Feb 10, 2019  7:36 PM Case was discussed with medicine and the patient's admission status was agreed to be Admission Status: observation status to the service of Dr Huizar Billing           Follow-up Information    None         Current Discharge Medication List      CONTINUE these medications which have NOT CHANGED    Details acetaminophen (TYLENOL) 325 mg tablet Take 2 tablets (650 mg total) by mouth every 6 (six) hours as needed for fever  Qty: 30 tablet, Refills: 0    Associated Diagnoses: Closed fracture of right tibia and fibula with routine healing      albuterol (2 5 mg/3 mL) 0 083 % nebulizer solution Take 2 5 mg by nebulization every 6 (six) hours as needed for wheezing      ALPRAZolam (XANAX) 0 5 mg tablet Take 0 5 mg by mouth daily at bedtime      amLODIPine (NORVASC) 5 mg tablet Take 5 mg by mouth daily      apixaban (ELIQUIS) 5 mg Take 5 mg by mouth every 12 (twelve) hours        calcium carbonate (OS-MEGHA) 600 MG tablet Take 600 mg by mouth 2 (two) times a day with meals      cholecalciferol (VITAMIN D3) 400 units tablet Take 400 Units by mouth 2 (two) times a day      Fesoterodine Fumarate ER (TOVIAZ) 8 MG TB24 Take by mouth daily at bedtime        fluticasone (FLONASE) 50 mcg/act nasal spray 1 spray into each nostril daily  Qty: 16 g, Refills: 0    Associated Diagnoses: Upper respiratory tract infection, unspecified type      fluticasone-salmeterol (ADVAIR) 250-50 mcg/dose inhaler Inhale 1 puff every 12 (twelve) hours This is home medication  Refills: 0      ipratropium-albuterol (DUO-NEB) 0 5-2 5 mg/3 mL nebulizer solution Take 3 mL by nebulization every 6 (six) hours while awake      loratadine (CLARITIN) 10 mg tablet Take 10 mg by mouth daily      magnesium oxide (MAG-OX) 400 mg Take 400 mg by mouth 2 (two) times a day      metoprolol tartrate (LOPRESSOR) 50 mg tablet Take 50 mg by mouth every 12 (twelve) hours      omeprazole (PriLOSEC) 20 mg delayed release capsule Take 20 mg by mouth daily      potassium chloride (K-DUR) 10 mEq tablet Take 10 mEq by mouth daily        aspirin (ECOTRIN LOW STRENGTH) 81 mg EC tablet Take 81 mg by mouth daily      atorvastatin (LIPITOR) 40 mg tablet Take 40 mg by mouth daily      docusate sodium (COLACE) 100 mg capsule Take 200 mg by mouth daily      furosemide (LASIX) 40 mg tablet Take 40 mg by mouth daily        predniSONE 10 mg tablet Take 1 tablet (10 mg total) by mouth daily  Refills: 0    Comments: Take 4 tablets for 3 days, 3 tablets for 3 days, 2 tablets for 3 days, 1 tablet for 3 days and stop  Associated Diagnoses: COPD with acute exacerbation (HCC)      pregabalin (LYRICA) 300 MG capsule Take 100 mg by mouth 2 (two) times a day            No discharge procedures on file      ED Provider  Electronically Signed by           Leva Bosworth, MD  02/10/19 6149

## 2019-02-11 ENCOUNTER — APPOINTMENT (OUTPATIENT)
Dept: RADIOLOGY | Facility: HOSPITAL | Age: 84
DRG: 871 | End: 2019-02-11
Payer: MEDICARE

## 2019-02-11 PROBLEM — J45.20 MILD INTERMITTENT ASTHMA WITHOUT COMPLICATION: Status: ACTIVE | Noted: 2018-01-05

## 2019-02-11 PROBLEM — J96.02 ACUTE RESPIRATORY FAILURE WITH HYPERCAPNIA (HCC): Status: RESOLVED | Noted: 2018-09-29 | Resolved: 2019-02-11

## 2019-02-11 PROBLEM — J45.31 MILD PERSISTENT ASTHMA WITH EXACERBATION: Status: ACTIVE | Noted: 2018-09-29

## 2019-02-11 LAB
ANION GAP SERPL CALCULATED.3IONS-SCNC: 3 MMOL/L (ref 4–13)
ATRIAL RATE: 416 BPM
BUN SERPL-MCNC: 16 MG/DL (ref 5–25)
CALCIUM SERPL-MCNC: 8.9 MG/DL (ref 8.3–10.1)
CHLORIDE SERPL-SCNC: 102 MMOL/L (ref 100–108)
CHOLEST SERPL-MCNC: 122 MG/DL (ref 50–200)
CO2 SERPL-SCNC: 38 MMOL/L (ref 21–32)
CREAT SERPL-MCNC: 0.94 MG/DL (ref 0.6–1.3)
ERYTHROCYTE [DISTWIDTH] IN BLOOD BY AUTOMATED COUNT: 15.5 % (ref 11.6–15.1)
GFR SERPL CREATININE-BSD FRML MDRD: 57 ML/MIN/1.73SQ M
GLUCOSE P FAST SERPL-MCNC: 209 MG/DL (ref 65–99)
GLUCOSE SERPL-MCNC: 209 MG/DL (ref 65–140)
HCT VFR BLD AUTO: 50.5 % (ref 34.8–46.1)
HDLC SERPL-MCNC: 55 MG/DL (ref 40–60)
HGB BLD-MCNC: 14.9 G/DL (ref 11.5–15.4)
LDLC SERPL CALC-MCNC: 56 MG/DL (ref 0–100)
MCH RBC QN AUTO: 26 PG (ref 26.8–34.3)
MCHC RBC AUTO-ENTMCNC: 29.5 G/DL (ref 31.4–37.4)
MCV RBC AUTO: 88 FL (ref 82–98)
NONHDLC SERPL-MCNC: 67 MG/DL
PLATELET # BLD AUTO: 185 THOUSANDS/UL (ref 149–390)
PMV BLD AUTO: 11.1 FL (ref 8.9–12.7)
POTASSIUM SERPL-SCNC: 3.3 MMOL/L (ref 3.5–5.3)
PROCALCITONIN SERPL-MCNC: <0.05 NG/ML
PROCALCITONIN SERPL-MCNC: <0.05 NG/ML
QRS AXIS: -13 DEGREES
QRSD INTERVAL: 82 MS
QT INTERVAL: 314 MS
QTC INTERVAL: 436 MS
RBC # BLD AUTO: 5.73 MILLION/UL (ref 3.81–5.12)
SODIUM SERPL-SCNC: 143 MMOL/L (ref 136–145)
T WAVE AXIS: 37 DEGREES
TRIGL SERPL-MCNC: 55 MG/DL
TROPONIN I SERPL-MCNC: <0.02 NG/ML
TROPONIN I SERPL-MCNC: <0.02 NG/ML
VENTRICULAR RATE: 116 BPM
WBC # BLD AUTO: 8.99 THOUSAND/UL (ref 4.31–10.16)

## 2019-02-11 PROCEDURE — 97163 PT EVAL HIGH COMPLEX 45 MIN: CPT

## 2019-02-11 PROCEDURE — 93010 ELECTROCARDIOGRAM REPORT: CPT | Performed by: INTERNAL MEDICINE

## 2019-02-11 PROCEDURE — 94640 AIRWAY INHALATION TREATMENT: CPT

## 2019-02-11 PROCEDURE — 99232 SBSQ HOSP IP/OBS MODERATE 35: CPT | Performed by: INTERNAL MEDICINE

## 2019-02-11 PROCEDURE — 85027 COMPLETE CBC AUTOMATED: CPT | Performed by: NURSE PRACTITIONER

## 2019-02-11 PROCEDURE — 99222 1ST HOSP IP/OBS MODERATE 55: CPT | Performed by: INTERNAL MEDICINE

## 2019-02-11 PROCEDURE — 84484 ASSAY OF TROPONIN QUANT: CPT | Performed by: NURSE PRACTITIONER

## 2019-02-11 PROCEDURE — 97535 SELF CARE MNGMENT TRAINING: CPT

## 2019-02-11 PROCEDURE — G8988 SELF CARE GOAL STATUS: HCPCS

## 2019-02-11 PROCEDURE — 80061 LIPID PANEL: CPT | Performed by: NURSE PRACTITIONER

## 2019-02-11 PROCEDURE — 97167 OT EVAL HIGH COMPLEX 60 MIN: CPT

## 2019-02-11 PROCEDURE — G8987 SELF CARE CURRENT STATUS: HCPCS

## 2019-02-11 PROCEDURE — 99221 1ST HOSP IP/OBS SF/LOW 40: CPT | Performed by: INTERNAL MEDICINE

## 2019-02-11 PROCEDURE — 80048 BASIC METABOLIC PNL TOTAL CA: CPT | Performed by: NURSE PRACTITIONER

## 2019-02-11 PROCEDURE — 71250 CT THORAX DX C-: CPT

## 2019-02-11 PROCEDURE — 94760 N-INVAS EAR/PLS OXIMETRY 1: CPT

## 2019-02-11 PROCEDURE — 84145 PROCALCITONIN (PCT): CPT | Performed by: NURSE PRACTITIONER

## 2019-02-11 PROCEDURE — G8979 MOBILITY GOAL STATUS: HCPCS

## 2019-02-11 PROCEDURE — G8978 MOBILITY CURRENT STATUS: HCPCS

## 2019-02-11 RX ORDER — IPRATROPIUM BROMIDE AND ALBUTEROL SULFATE 2.5; .5 MG/3ML; MG/3ML
3 SOLUTION RESPIRATORY (INHALATION) 2 TIMES DAILY
Status: DISCONTINUED | OUTPATIENT
Start: 2019-02-12 | End: 2019-02-15 | Stop reason: HOSPADM

## 2019-02-11 RX ORDER — FUROSEMIDE 10 MG/ML
40 INJECTION INTRAMUSCULAR; INTRAVENOUS DAILY
Status: DISCONTINUED | OUTPATIENT
Start: 2019-02-12 | End: 2019-02-12

## 2019-02-11 RX ORDER — METHYLPREDNISOLONE SODIUM SUCCINATE 40 MG/ML
20 INJECTION, POWDER, LYOPHILIZED, FOR SOLUTION INTRAMUSCULAR; INTRAVENOUS EVERY 8 HOURS
Status: DISCONTINUED | OUTPATIENT
Start: 2019-02-11 | End: 2019-02-12

## 2019-02-11 RX ORDER — POTASSIUM CHLORIDE 20 MEQ/1
40 TABLET, EXTENDED RELEASE ORAL ONCE
Status: COMPLETED | OUTPATIENT
Start: 2019-02-11 | End: 2019-02-11

## 2019-02-11 RX ADMIN — PANTOPRAZOLE SODIUM 20 MG: 20 TABLET, DELAYED RELEASE ORAL at 05:27

## 2019-02-11 RX ADMIN — METOPROLOL TARTRATE 50 MG: 50 TABLET, FILM COATED ORAL at 09:21

## 2019-02-11 RX ADMIN — ASPIRIN 81 MG: 81 TABLET, COATED ORAL at 09:20

## 2019-02-11 RX ADMIN — POTASSIUM CHLORIDE 40 MEQ: 1500 TABLET, EXTENDED RELEASE ORAL at 11:21

## 2019-02-11 RX ADMIN — LORATADINE 10 MG: 10 TABLET ORAL at 09:20

## 2019-02-11 RX ADMIN — Medication 1 TABLET: at 17:31

## 2019-02-11 RX ADMIN — CHOLECALCIFEROL (VITAMIN D3) 10 MCG (400 UNIT) TABLET 400 UNITS: at 09:21

## 2019-02-11 RX ADMIN — ACETAMINOPHEN 650 MG: 325 TABLET, FILM COATED ORAL at 17:30

## 2019-02-11 RX ADMIN — IPRATROPIUM BROMIDE AND ALBUTEROL SULFATE 3 ML: 2.5; .5 SOLUTION RESPIRATORY (INHALATION) at 13:20

## 2019-02-11 RX ADMIN — PREGABALIN 100 MG: 100 CAPSULE ORAL at 17:31

## 2019-02-11 RX ADMIN — ALPRAZOLAM 0.5 MG: 0.5 TABLET ORAL at 21:28

## 2019-02-11 RX ADMIN — Medication 400 MG: at 09:20

## 2019-02-11 RX ADMIN — FLUTICASONE PROPIONATE 1 SPRAY: 50 SPRAY, METERED NASAL at 11:20

## 2019-02-11 RX ADMIN — APIXABAN 5 MG: 5 TABLET, FILM COATED ORAL at 21:28

## 2019-02-11 RX ADMIN — POTASSIUM CHLORIDE 10 MEQ: 750 TABLET, EXTENDED RELEASE ORAL at 09:21

## 2019-02-11 RX ADMIN — FLUTICASONE FUROATE AND VILANTEROL TRIFENATATE 1 PUFF: 200; 25 POWDER RESPIRATORY (INHALATION) at 11:19

## 2019-02-11 RX ADMIN — METHYLPREDNISOLONE SODIUM SUCCINATE 40 MG: 40 INJECTION, POWDER, FOR SOLUTION INTRAMUSCULAR; INTRAVENOUS at 04:54

## 2019-02-11 RX ADMIN — APIXABAN 5 MG: 5 TABLET, FILM COATED ORAL at 09:21

## 2019-02-11 RX ADMIN — AMLODIPINE BESYLATE 5 MG: 5 TABLET ORAL at 09:21

## 2019-02-11 RX ADMIN — PREGABALIN 100 MG: 100 CAPSULE ORAL at 09:21

## 2019-02-11 RX ADMIN — OXYBUTYNIN CHLORIDE 10 MG: 5 TABLET, EXTENDED RELEASE ORAL at 09:21

## 2019-02-11 RX ADMIN — FUROSEMIDE 40 MG: 10 INJECTION, SOLUTION INTRAMUSCULAR; INTRAVENOUS at 09:20

## 2019-02-11 RX ADMIN — IPRATROPIUM BROMIDE AND ALBUTEROL SULFATE 3 ML: 2.5; .5 SOLUTION RESPIRATORY (INHALATION) at 07:22

## 2019-02-11 RX ADMIN — CHOLECALCIFEROL (VITAMIN D3) 10 MCG (400 UNIT) TABLET 400 UNITS: at 17:31

## 2019-02-11 RX ADMIN — METHYLPREDNISOLONE SODIUM SUCCINATE 20 MG: 40 INJECTION, POWDER, FOR SOLUTION INTRAMUSCULAR; INTRAVENOUS at 21:28

## 2019-02-11 RX ADMIN — ATORVASTATIN CALCIUM 40 MG: 40 TABLET, FILM COATED ORAL at 17:32

## 2019-02-11 RX ADMIN — METHYLPREDNISOLONE SODIUM SUCCINATE 20 MG: 40 INJECTION, POWDER, FOR SOLUTION INTRAMUSCULAR; INTRAVENOUS at 12:18

## 2019-02-11 RX ADMIN — Medication 400 MG: at 17:31

## 2019-02-11 RX ADMIN — Medication 1 TABLET: at 09:20

## 2019-02-11 NOTE — ASSESSMENT & PLAN NOTE
-remained on steroid wean into this admission  -patient is significantly improving  -is on 20 mg q 12 of steroids  -cardiology is following and appreciate their recommendations  -needs complete pulmonary function testing with diffusing capacity

## 2019-02-11 NOTE — CONSULTS
Consultation - Pulmonary Medicine  Jb Orta 81 y o  female MRN: [id number]  Unit/Bed#: [address] [place] [address] Encounter: [id number]    Assessment/Plan:    Acute on chronic diastolic congestive heart failure ([facility] )  Assessment & Plan  -patient current being diuresed with 40 mg twice daily Lasix    Obstructive sleep apnea  Assessment & Plan  -history of DAMON  -wears nighttime oxygen  -will need dedicated sleep study in the outpatient setting    Obesity (BMI 30-39  9)  Assessment & Plan  -likely contributes to hypoventilatory component  -requires outpatient PFT testing    * COPD with acute exacerbation ([facility] )  Assessment & Plan  -unclear as to whether dyspnea is related to COPD  -perhaps some element of on resolved COPD exacerbation component from previous  -remained on steroid wean into this admission  -patient is not overtly exacerbated with wheezing  -would consider rapid wean of steroids  -would still consider diuresing this patient  -cardiology is following and appreciate their recommendations  -needs complete pulmonary function testing with diffusing capacity    Atrial fibrillation ([facility] )  Assessment & Plan  -chronic  -on anticoagulation with Eliquis    Lymphedema of left leg  Assessment & Plan  -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        Thank you for this consultation; we will be happy to follow with you     ______________________________________________________________________      History of Present Illness   Physician Requesting Consult: Lauren Mcginnis MD  Reason for Consult / Principal Problem:  Question of COPD exacerbation  HX and PE limited by:     HPI:  Jb Orta is a 81 y o  female  who presents with chest pain and dyspnea        She has a longstanding history of chronic diastolic heart failure, history of obstructive sleep apnea, chronic paroxysmal atrial fibrillation on NOAC with Eliquis, HTN, hyperlipidemia, history of reported chronic obstructive pulmonary disease, and chronic hypoxemic respiratory failure with oxygen dependence of 2 L nasal cannula home  She also has a history of Merkel cell cancer post resection of her left lower extremity lymph nodes  She reported that her symptoms started the evening of admission where she suddenly developed some substernal chest pain and then some subsequent dyspnea  She felt this though she became increasingly short of breath, came to the hospital at the urging of her daughter  The emergency department she presented with a history of recent cough, shortness of breath, and recently had had a history of the flu, however, she has not had any of the symptoms recently  She has history of chronic left lower extremity swelling and erythema post resection of her lymph nodes  In the emergency department she was tachypneic, though, she did not have any symptoms of wheezing  She was irregularly irregular and reported to have had accessory muscle use  She had a mildly elevated proBNP of 1,851  Her chest CT imaging did show left fissure effusion and a very small right-sided basilar effusion  Both of which were significantly improved from her hospitalization back in September  She was given a nebulizer and steroid therapy in the emergency department presumptive that this was a COPD exacerbation and was admitted for round-the-clock nebulization and IV steroid therapy  The out patient setting she was finishing up a steroid taper for which she had been on 40 mg, 30, 20, 10 x 3 days each  She was given 125 mg Solu-Medrol x1 and 2 x nebulization the emergency department  She was admitted to the hospitalist service and placed on 40 mg every 8 hours of Solu-Medrol  She was also given magnesium therapy  She was also given a dose of azithromycin x1  A respiratory pathogen profile and blood cultures were sent  MRSA screen was also sent  In the inpatient setting she has done well  She displayed no signs of wheezing    She is maintained on her normal oxygen amount of 2 L nasal cannula  Currently she is symptom free  In the outpatient setting she uses Advair and DuoNeb  She is managed by her primary care physician in the outpatient setting  She does not see a pulmonologist   She has not previously had pulmonary function testing  She has recently had 2 recurrent admissions both her hip fractures and subsequent prolonged stays at rehabilitation facilities  Pulmonology was consulted for question of COPD exacerbation    HPI    Smoking history:  She reports that she herself Never smoked personally  However, 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  Occupational history:  [employer]  Retired in the last decade  Travel history:  No recent travel  Recently resides at [address] [place] [address]   Constitutional: Negative for activity change, appetite change, chills, fatigue and fever  HENT: Negative for postnasal drip, rhinorrhea, sinus pressure and sinus pain  Eyes: Negative for visual disturbance  Respiratory: Positive for chest tightness and shortness of breath  Negative for apnea, cough, wheezing and stridor  Cardiovascular: Positive for leg swelling  Negative for chest pain  Chronic left lower extremity swelling and erythema   Gastrointestinal: Negative for diarrhea, nausea and vomiting  Endocrine: Negative for cold intolerance and heat intolerance  Musculoskeletal: Negative for arthralgias, back pain, joint swelling and myalgias  Skin: Negative for color change  Neurological: Negative for syncope and light-headedness  Hematological: Negative for adenopathy  Psychiatric/Behavioral: Negative for confusion and sleep disturbance         Past Medical/Surgical History  Past Medical History:   Diagnosis Date    Asthma     Atrial fibrillation ([address] )     Cancer (HCC)     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    Cardiac disease     a fib    COPD (chronic obstructive pulmonary disease) (HCC)     Fibromyalgia     Fibromyalgia, primary     GERD (gastroesophageal reflux disease)     History of methicillin resistant staphylococcus aureus (MRSA)     Positive MRSA (nares) 9/21/2016  Negative nasal culture 9/29/2018 - Isolation discontinued 10/4/2018    Hypertension     Hypokalemia     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[address] )     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, left groin, lung and bladder  Patient treated with chemotherapy and radiation     Past Surgical History:   Procedure Laterality Date    APPENDECTOMY      CATARACT EXTRACTION      CHEST WALL BIOPSY N/A 10/27/2017    Procedure: SINUS EXCISION AND REMOVAL OF FOREIGN BODY, ABDOMEN (WOUND EXPLORATION);   Surgeon: Hao Eduardo MD;  Location: [address] [place] [address];  Service: General    EYE SURGERY Bilateral     cataracts     HIP FRACTURE SURGERY Left     HYSTERECTOMY      JOINT REPLACEMENT Left     hip    LYMPHADENECTOMY      PORTACATH PLACEMENT Right        Social History  Social History     Substance and Sexual Activity   Alcohol Use Not Currently     Social History     Substance and Sexual Activity   Drug Use No     Social History     Tobacco Use   Smoking Status Former Smoker   Smokeless Tobacco Never Used   Tobacco Comment    0 5 PPD for 4 year       Family History  Family History   Problem Relation Age of Onset    Pneumonia Mother     Heart disease Father        Allergies  Allergies   Allergen Reactions    Fentanyl And Related Other (See Comments)     Passed out    Latex Rash    Penicillins Rash       Home Meds:   Medications Prior to Admission   Medication Sig Dispense Refill Last Dose    acetaminophen (TYLENOL) 325 mg tablet Take 2 tablets (650 mg total) by mouth every 6 (six) hours as needed for fever 30 tablet 0 Past Week at Unknown time    albuterol (2 5 mg/3 mL) 0 083 % nebulizer solution Take 2 5 mg by nebulization every 6 (six) hours as needed for wheezing   2/10/2019 at Unknown time  ALPRAZolam (XANAX) 0 5 mg tablet Take 0 5 mg by mouth daily at bedtime   2/9/2019 at Unknown time    amLODIPine (NORVASC) 5 mg tablet Take 5 mg by mouth daily   2/10/2019 at Unknown time    apixaban (ELIQUIS) 5 mg Take 5 mg by mouth every 12 (twelve) hours     2/10/2019 at Unknown time    calcium carbonate (OS-MEGHA) 600 MG tablet Take 600 mg by mouth 2 (two) times a day with meals   2/10/2019 at Unknown time    cholecalciferol (VITAMIN D3) 400 units tablet Take 400 Units by mouth 2 (two) times a day   2/10/2019 at Unknown time    Fesoterodine Fumarate ER (TOVIAZ) 8 MG TB24 Take by mouth daily at bedtime     2/9/2019 at Unknown time    fluticasone (FLONASE) 50 mcg/act nasal spray 1 spray into each nostril daily 16 g 0 2/10/2019 at Unknown time    fluticasone-salmeterol (ADVAIR) 250-50 mcg/dose inhaler Inhale 1 puff every 12 (twelve) hours This is home medication  0 2/10/2019 at Unknown time    ipratropium-albuterol (DUO-NEB) 0 5-2 5 mg/3 mL nebulizer solution Take 3 mL by nebulization every 6 (six) hours while awake   2/10/2019 at Unknown time    loratadine (CLARITIN) 10 mg tablet Take 10 mg by mouth daily   2/10/2019 at Unknown time    magnesium oxide (MAG-OX) 400 mg Take 400 mg by mouth 2 (two) times a day   2/10/2019 at Unknown time    metoprolol tartrate (LOPRESSOR) 50 mg tablet Take 50 mg by mouth every 12 (twelve) hours   2/10/2019 at Unknown time    omeprazole (PriLOSEC) 20 mg delayed release capsule Take 20 mg by mouth daily   2/10/2019 at Unknown time    potassium chloride (K-DUR) 10 mEq tablet Take 10 mEq by mouth daily     2/10/2019 at Unknown time    aspirin (ECOTRIN LOW STRENGTH) 81 mg EC tablet Take 81 mg by mouth daily   Not Taking at Unknown time    atorvastatin (LIPITOR) 40 mg tablet Take 40 mg by mouth daily   Unknown at Unknown time    docusate sodium (COLACE) 100 mg capsule Take 200 mg by mouth daily   Not Taking at Unknown time    furosemide (LASIX) 40 mg tablet Take 40 mg by mouth daily     Not Taking at Unknown time    predniSONE 10 mg tablet Take 1 tablet (10 mg total) by mouth daily (Patient not taking: Reported on 2/10/2019)  0 Not Taking at Unknown time    pregabalin (LYRICA) 300 MG capsule Take 100 mg by mouth 2 (two) times a day    Taking     Current Meds:   Scheduled Meds:  Current Facility-Administered Medications:  acetaminophen 650 mg Oral Q6H PRN Wiley Carlin, GILDA   albuterol 2 5 mg Nebulization Q6H PRN Wiley Carlin, GILDA   ALPRAZolam 0 5 mg Oral HS Wiley Carlin, YAHIRNP   amLODIPine 5 mg Oral Daily Wiley Carlin, YAHIRNP   apixaban 5 mg Oral Q12H [facility] Wiley Carlin, YAHIRNP   aspirin 81 mg Oral Daily Wiley Carlin, YAHIRNP   atorvastatin 40 mg Oral Daily With Dinner Wiley Carlin, YAHIRNP   calcium carbonate 1 tablet Oral BID With Meals Wiley Carlin, GILDA   cholecalciferol 400 Units Oral BID Wiley Carlin, YAHIRNP   fluticasone 1 spray Each Nare Daily Wiley Carlin, YAHIRNP   fluticasone-vilanterol 1 puff Inhalation Daily Wiley Carlin, YAHIRNP   furosemide 40 mg Intravenous BID (diuretic) Wiley Carlin, YAHIRNP   ipratropium-albuterol 3 mL Nebulization Q6H Wiley Carlin, GILDA   loratadine 10 mg Oral Daily Wiley Carlin, GILDA   magnesium oxide 400 mg Oral BID Wiley Carlin, GILDA   methylPREDNISolone sodium succinate 40 mg Intravenous Q8H Wiley Carlin, YAHIRNP   metoprolol tartrate 50 mg Oral Q12H [facility] Wiley Carlin, GILDA   ondansetron 4 mg Intravenous Q6H PRN Wiley Carlin, GILDA   oxybutynin 10 mg Oral Daily Wiley Carlin, GILDA   pantoprazole 20 mg Oral Early Morning Wiley Carlin, GILDA   potassium chloride 10 mEq Oral Daily Wiely Carlin, GILDA   potassium chloride 40 mEq Oral Once Birdie Weinstein MD   pregabalin 100 mg Oral BID GILDA Pool     PRN Meds:  acetaminophen 650 mg Q6H PRN   albuterol 2 5 mg Q6H PRN   ondansetron 4 mg Q6H PRN ____________________________________________________________________    Objective   Vitals:   Temp:  [97 6 °F (36 4 °C)-100 6 °F (38 1 °C)] 97 6 °F (36 4 °C)  HR:  [] 74  Resp:  [18-22] 18  BP: (134-197)/() 137/84  Weight (last 2 days)     Date/Time   Weight    02/10/19 2036   94 5 (208 34)            Oxygen Therapy  SpO2: 92 %  O2 Flow Rate (L/min): 3 L/min    IV Infusions:        Nutrition:        Diet Orders   (From admission, onward)            Start     Ordered    02/11/19 0949  Room Service  Once     Question:  Type of Service  Answer:  Room Service-Appropriate    02/11/19 2405    02/10/19 2035  Diet Cardiovascular; Cardiac  Diet effective now     Question Answer Comment   Diet Type Cardiovascular    Cardiac Cardiac    RD to adjust diet per protocol? Yes        02/10/19 2034            Ins/Outs:   I/O     None            Lines/Drains:  Invasive Devices     Central Venous Catheter Line            Port A Cath 02/10/19 Right Chest less than 1 day                ____________________________________________________________________      Physical Exam   Constitutional: She is oriented to person, place, and time  She appears well-developed and well-nourished  HENT:   Head: Normocephalic and atraumatic  Eyes: Pupils are equal, round, and reactive to light  Conjunctivae are normal    Neck: Normal range of motion  Neck supple  Cardiovascular: Normal rate, regular rhythm and normal heart sounds  Pulmonary/Chest: Effort normal and breath sounds normal  No respiratory distress  She has no wheezes  She has no rales  She exhibits no tenderness  Abdominal: Soft  Bowel sounds are normal    Musculoskeletal: Normal range of motion  She exhibits no edema  Neurological: She is alert and oriented to person, place, and time  Skin: Skin is warm and dry     Psychiatric: She has a normal mood and affect        ____________________________________________________________________    Labs:   CBC: Results from last 7 days   Lab Units 02/11/19  0441 02/10/19  1817   WBC Thousand/uL 8 99 7 72   HEMOGLOBIN g/dL 14 9 13 6   HEMATOCRIT % 50 5* 45 3   MCV fL 88 88   PLATELETS Thousands/uL 185 156     CMP: Results from last 7 days   Lab Units 02/11/19  0441 02/10/19  1817   POTASSIUM mmol/L 3 3* 3 7   CHLORIDE mmol/L 102 99*   CO2 mmol/L 38* 36*   BUN mg/dL 16 16   CREATININE mg/dL 0 94 0 98   CALCIUM mg/dL 8 9 8 4   AST U/L  --  12   ALT U/L  --  11*   ALK PHOS U/L  --  98   EGFR ml/min/1 73sq m 57 54     Magnesium:   Results from last 7 days   Lab Units 02/10/19  1817   MAGNESIUM mg/dL 1 7     Phosphorous:     Troponin:   Results from last 7 days   Lab Units 02/11/19  0441 02/10/19  2352   TROPONIN I ng/mL <0 02 <0 02     PT/INR:   Results from last 7 days   Lab Units 02/10/19  1817   PTT seconds 30   INR  0 99     Lactic Acid:   Results from last 7 days   Lab Units 02/10/19  1817   LACTIC ACID mmol/L 1 2     BNP:   Results from last 7 days   Lab Units 02/10/19  1817   NT-PRO BNP pg/mL 1,851*     ABG:      Procalcitonin: Invalid input(s): PROCALCITONIN    Imaging:   XR chest 1 view portable   Final Result by Lee Packer MD (02/11 0188)      Small left pleural effusion basilar atelectasis  Stable cardiomegaly  Workstation performed: [id number]         CT chest wo contrast    (Results Pending)         Micro: Lab Results   Component Value Date    BLOODCX No Growth After 5 Days  09/29/2018    BLOODCX No Growth After 5 Days  09/29/2018    BLOODCX No Growth After 5 Days   01/23/2018    URINECX No Growth <1000 cfu/mL 09/29/2018    URINECX >100,000 cfu/ml Mixed Contaminants X5 09/21/2016    SPUTUMCULTUR 1+ Growth of  09/29/2018    MRSACULTURE  09/29/2018     No Methicillin Resistant Staphlyococcus aureus (MRSA) isolated        ____________________________________________________________________      Code Status: Level 1 - Full Code

## 2019-02-11 NOTE — ASSESSMENT & PLAN NOTE
· Start Lasix IV push b i d   In light of pedal edema, elevated BNP, increasing shortness of breath  · Monitor daily weights and intake and output  · Echo 1/18 - EF of 49-32%, grade 1 diastolic dysfunction

## 2019-02-11 NOTE — ASSESSMENT & PLAN NOTE
· Continue eliquis and metoprolol  · Currently in rapid atrial fibrillation, likely secondary to fever  · Continue to closely monitor

## 2019-02-11 NOTE — PHYSICAL THERAPY NOTE
PT EVALUATION     02/11/19 1040   Pain Assessment   Pain Assessment 0-10   Pain Score 4  (LLE area)   Home Living   Type of Home SNF  ([facility])   Home Equipment [facility]   Additional Comments patient [place] [address] independently prior to admission and propels WC to dining room for meals  Limited walking due to limited staff assist as per patient   Prior Function   Level of [place] Needs assistance with ADLs and functional mobility   Lives With Facility staff   Receives Help From Personal care attendant   ADL Assistance Needs assistance   IADLs Needs assistance   Restrictions/Precautions   Other Precautions Chair Alarm; Bed Alarm; Fall Risk;Pain   General   Additional Pertinent History chart reviewed, patient admitted with COPD exacerbation//SOB   Cognition   Overall Cognitive Status Impaired  (poor historian at time)   Arousal/Participation Cooperative   Attention Within functional limits   Orientation Level Oriented to person;Oriented to place;Oriented to situation   Following Commands Follows one step commands with increased time or repetition   RLE Assessment   RLE Assessment   (ROM WFL, strength 3+/5)   LLE Assessment   LLE Assessment   (ROm WFL, strength 3+/5)   Coordination   Movements are Fluid and Coordinated 0   Transfers   Sit to Stand   (min to mod assist )   Additional items Verbal cues   Stand to Sit 4  Minimal assistance   Additional items Assist x 1;Verbal cues   Ambulation/Elevation   Gait Assistance Not tested  (patient being transported for testing)   Balance   Static Sitting Fair +   Dynamic Sitting Fair   Static Standing Fair -   Activity Tolerance   Activity Tolerance Patient limited by fatigue;Patient limited by pain   Nurse Made Aware yes   Assessment   Prognosis Good   Problem List Decreased strength;Decreased range of motion;Decreased endurance; Impaired balance;Decreased mobility;Pain;Decreased coordination   Assessment Patient seen for Physical Therapy evaluation  Patient admitted with COPD with acute exacerbation ([facility] )  Comorbidities affecting patient's physical performance include:COPD on 2 L nasal cannula at home, diastolic heart failure, DAMON, paroxysmal atrial fibrillation, hypertension, hyperlipidemia who presents with chest pain and shortness of breath   Personal factors affecting patient at time of initial evaluation include: ambulating with assistive device, inability to ambulate household distances, inability to navigate community distances, inability to navigate level surfaces without external assistance, inability to perform dynamic tasks in community, inability to perform physical activity, inability to perform ADLS and inability to perform IADLS   Prior to admission, patient was requiring assist for functional mobility with walker/WC, independent with ADLS, requiring assist for IADLS, a resident of long term care and having 24 hour care   Please find objective findings from Physical Therapy assessment regarding body systems outlined above with impairments and limitations including weakness, decreased ROM, impaired balance, decreased endurance, impaired coordination, gait deviations, pain, decreased activity tolerance, decreased functional mobility tolerance and fall risk  The Barthel Index was used as a functional outcome tool presenting with a score of 45 today indicating marked limitations of functional mobility and ADLS  Patient's clinical presentation is currently unstable/unpredictable as seen in patient's presentation of vital sign response, changing level of pain, increased fall risk, new onset of impairment of functional mobility, decreased endurance and new onset of weakness  Pt would benefit from continued Physical Therapy treatment to address deficits as defined above and maximize level of functional mobility  As demonstrated by objective findings, the assigned level of complexity for this evaluation is high     Goals   Patient Goals to go home and walk more   STG Expiration Date 02/18/19   Short Term Goal #1 transfers and gait with roller walker with min assist of 1   Short Term Goal #2 gait endurance to 40 feet, strength BLes 3+/4-   LTG Expiration Date 02/25/19   Long Term Goal #1 transfers and gait with roller walker with supervision   Long Term Goal #2 gait endurance to 60 feet, strength BLEs 4-/5   Plan   Treatment/Interventions ADL retraining;Functional transfer training;LE strengthening/ROM; Therapeutic exercise; Endurance training;Patient/family training;Equipment eval/education; Bed mobility;Gait training; Compensatory technique education   PT Frequency 5x/wk   Recommendation   Recommendation Short-term skilled PT   Barthel Index   Feeding 10   Bathing 0   Grooming Score 0   Dressing Score 5   Bladder Score 10   Bowels Score 10   Toilet Use Score 5   Transfers (Bed/Chair) Score 5   Mobility (Level Surface) Score 0   Stairs Score 0   Barthel Index Score 39   Licensure   [place] License Number  [facility] [id number]

## 2019-02-11 NOTE — ASSESSMENT & PLAN NOTE
· Secondary to COPD exacerbation  · Continue supplemental oxygen, on 3L at present, 2L at baseline at home  · Continue solumedrol, zithromax

## 2019-02-11 NOTE — SOCIAL WORK
SW following to monitor needs and assist with planning  Pt is a long term resident at [facility]  Per Jelena admissions pt's bed is on Medicaid hold  Plan is for pt to return to [facility] when discharged  SW will follow to monitor progress and assist with transfer back when ready

## 2019-02-11 NOTE — ASSESSMENT & PLAN NOTE
Patient had flu a few weeks ago which essentially resolved  This afternoon, patient started difficulty breathing and chest discomfort  Chest pain worsened over the course of the evening and she went to her daughter's for dinner  Did not have her O2 with her when she went to her daughters, pain and breathing worsened      · Admit to medicine  · Continue solumedrol, duonebs, advair  · Continue azithromycin for now - send PCT  · Sputum culture, respiratory pathogen  · Oxygen increased to 3L nasal cannula, on 2L baseline at home  · Respiratory protocol   ·  CT of the chest  · CP improved

## 2019-02-11 NOTE — CONSULTS
Consultation - Cardiology   Tony Bansal 81 y o  female MRN: [id number]  Unit/Bed#: [address] [place] [address] Encounter: [id number]    Assessment/Plan     Assessment:  1  Acute exacerbation of COPD/asthma  2  Chest discomfort   3  Chronic atrial fibrillation  4  Acute on chronic diastolic heart failure  5  Hypertension  6  Obesity with obstructive sleep apnea for which she does not use CPAP  7  Chronic lymphedema of the left lower extremity  8  Hypokalemia    Plan:  Patient has been admitted to the hospitalist service  1  Continue her medications as she was taking at [facility]  2  In light of her complaint of chest pressure, will obtain Lexiscan nuclear stress test in the a m  To evaluate for ischemia  3  We will decrease her Lasix to once a day at 40 mg IV and continue monitor I&O and electrolytes  4  Potassium supplementation as ordered per primary team  5  PT and OT evaluation to increase patient's lower extremity strength and ambulation  eHistory of Present Illness   Physician Requesting Consult: Comfort Durant MD  Reason for Consult / Principal Problem:  Chest pressure  HPI: Tony Bansal is a 81y o  year old female who has a history of COPD, asthma, atrial fibrillation which is chronic, GERD, hypertension and 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 and now has chronic lymphedema in her left lower extremity  She presented to the emergency room for evaluation of shortness of breath and chest pressure  She currently resides at the [facility], she states on 02/10/2019, she awoke and had some mild midsternal chest pressure  She initially thought the pressure was due to her history of asthma  She states there were no relieving or worsening symptoms  She was scheduled for dinner at her daughter's house, she left the facility, wanted her daughters  She did note some difficulty getting in her daughter's house as she has 3 steps into the home    She states she became very short of breath after climbing the steps, and despite rest the shortness of breath in the chest pressure did not resolve  Patient came to the emergency room for further evaluation    Twelve lead EKG demonstrated atrial fibrillation with unifocal PVCs, but did not demonstrate any acute or ischemic changes  Patient had 2 negative troponins since admission  She was given 40 mg of IV Lasix in the emergency room for BNP of 1851  Dewey Nguyen Patient states when she awoke this morning the chest discomfort had resolved  Inpatient consult to Cardiology  Consult performed by: GILDA Holt  Consult ordered by: GILDA Forrester          Review of Systems   Constitutional: Negative for activity change, fatigue, fever and unexpected weight change  HENT: Negative for congestion, drooling, hearing loss, rhinorrhea, sinus pain and trouble swallowing  Eyes: Negative for photophobia, redness and visual disturbance  Respiratory: Positive for cough, chest tightness, shortness of breath and stridor  Negative for apnea  Cardiovascular: Positive for chest pain  Chronic lymphedema in the left lower extremity   Gastrointestinal: Negative  Endocrine: Negative for polydipsia, polyphagia and polyuria  Genitourinary: Negative  Musculoskeletal: Positive for arthralgias, back pain and gait problem  Neurological: Negative for dizziness, speech difficulty, weakness and light-headedness  Hematological: Negative  Psychiatric/Behavioral: Negative  Historical Information   Past Medical History:   Diagnosis Date    Asthma     Atrial fibrillation ([address] )     Cancer (HCC)     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    Cardiac disease     a fib    COPD (chronic obstructive pulmonary disease) (HCC)     Fibromyalgia     Fibromyalgia, primary     GERD (gastroesophageal reflux disease)     History of methicillin resistant staphylococcus aureus (MRSA)     Positive MRSA (nares) 9/21/2016   Negative nasal culture 9/29/2018 - Isolation discontinued 10/4/2018    Hypertension     Hypokalemia     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[address] )     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, left groin, lung and bladder  Patient treated with chemotherapy and radiation     Past Surgical History:   Procedure Laterality Date    APPENDECTOMY      CATARACT EXTRACTION      CHEST WALL BIOPSY N/A 10/27/2017    Procedure: SINUS EXCISION AND REMOVAL OF FOREIGN BODY, ABDOMEN (WOUND EXPLORATION);   Surgeon: Lissette Rodrigues MD;  Location: [address] [place] [address];  Service: General    EYE SURGERY Bilateral     cataracts     HIP FRACTURE SURGERY Left     HYSTERECTOMY      JOINT REPLACEMENT Left     hip    LYMPHADENECTOMY      PORTACATH PLACEMENT Right      Social History     Substance and Sexual Activity   Alcohol Use Not Currently     Social History     Substance and Sexual Activity   Drug Use No     Social History     Tobacco Use   Smoking Status Former Smoker   Smokeless Tobacco Never Used   Tobacco Comment    0 5 PPD for 4 year     Family History:   Family History   Problem Relation Age of Onset    Pneumonia Mother     Heart disease Father        Meds/Allergies   all current active meds have been reviewed, current meds:   Current Facility-Administered Medications   Medication Dose Route Frequency    acetaminophen (TYLENOL) tablet 650 mg  650 mg Oral Q6H PRN    albuterol inhalation solution 2 5 mg  2 5 mg Nebulization Q6H PRN    ALPRAZolam (XANAX) tablet 0 5 mg  0 5 mg Oral HS    amLODIPine (NORVASC) tablet 5 mg  5 mg Oral Daily    apixaban (ELIQUIS) tablet 5 mg  5 mg Oral Q12H [address]    aspirin (ECOTRIN LOW STRENGTH) EC tablet 81 mg  81 mg Oral Daily    atorvastatin (LIPITOR) tablet 40 mg  40 mg Oral Daily With Dinner    calcium carbonate (OYSTER SHELL,OSCAL) 500 mg tablet 1 tablet  1 tablet Oral BID With Meals    cholecalciferol (VITAMIN D3) tablet 400 Units  400 Units Oral BID    fluticasone (FLONASE) 50 mcg/act nasal spray 1 spray  1 spray Each Nare Daily    fluticasone-vilanterol (BREO ELLIPTA) 200-25 MCG/INH inhaler 1 puff  1 puff Inhalation Daily    furosemide (LASIX) injection 40 mg  40 mg Intravenous BID (diuretic)    ipratropium-albuterol (DUO-NEB) 0 5-2 5 mg/3 mL inhalation solution 3 mL  3 mL Nebulization Q6H    loratadine (CLARITIN) tablet 10 mg  10 mg Oral Daily    magnesium oxide (MAG-OX) tablet 400 mg  400 mg Oral BID    methylPREDNISolone sodium succinate (Solu-MEDROL) injection 20 mg  20 mg Intravenous Q8H    metoprolol tartrate (LOPRESSOR) tablet 50 mg  50 mg Oral Q12H [address]    ondansetron (ZOFRAN) injection 4 mg  4 mg Intravenous Q6H PRN    oxybutynin (DITROPAN-XL) 24 hr tablet 10 mg  10 mg Oral Daily    pantoprazole (PROTONIX) EC tablet 20 mg  20 mg Oral Early Morning    potassium chloride (K-DUR,KLOR-CON) CR tablet 10 mEq  10 mEq Oral Daily    pregabalin (LYRICA) capsule 100 mg  100 mg Oral BID    and PTA meds:   Prior to Admission Medications   Prescriptions Last Dose Informant Patient Reported? Taking?    ALPRAZolam (XANAX) 0 5 mg tablet 2/9/2019 at Unknown time  Yes Yes   Sig: Take 0 5 mg by mouth daily at bedtime   Fesoterodine Fumarate ER (TOVIAZ) 8 MG TB24 2/9/2019 at Unknown time  Yes Yes   Sig: Take by mouth daily at bedtime     acetaminophen (TYLENOL) 325 mg tablet Past Week at Unknown time  No Yes   Sig: Take 2 tablets (650 mg total) by mouth every 6 (six) hours as needed for fever   albuterol (2 5 mg/3 mL) 0 083 % nebulizer solution 2/10/2019 at Unknown time  Yes Yes   Sig: Take 2 5 mg by nebulization every 6 (six) hours as needed for wheezing   amLODIPine (NORVASC) 5 mg tablet 2/10/2019 at Unknown time  Yes Yes   Sig: Take 5 mg by mouth daily   apixaban (ELIQUIS) 5 mg 2/10/2019 at Unknown time  Yes Yes   Sig: Take 5 mg by mouth every 12 (twelve) hours     aspirin (ECOTRIN LOW STRENGTH) 81 mg EC tablet Not Taking at Unknown time  Yes No   Sig: Take 81 mg by mouth daily   atorvastatin (LIPITOR) 40 mg tablet Unknown at Unknown time  Yes No   Sig: Take 40 mg by mouth daily   calcium carbonate (OS-MEGHA) 600 MG tablet 2/10/2019 at Unknown time  Yes Yes   Sig: Take 600 mg by mouth 2 (two) times a day with meals   cholecalciferol (VITAMIN D3) 400 units tablet 2/10/2019 at Unknown time  Yes Yes   Sig: Take 400 Units by mouth 2 (two) times a day   docusate sodium (COLACE) 100 mg capsule Not Taking at Unknown time  Yes No   Sig: Take 200 mg by mouth daily   fluticasone (FLONASE) 50 mcg/act nasal spray 2/10/2019 at Unknown time  No Yes   Si spray into each nostril daily   fluticasone-salmeterol (ADVAIR) 250-50 mcg/dose inhaler 2/10/2019 at Unknown time  No Yes   Sig: Inhale 1 puff every 12 (twelve) hours This is home medication   furosemide (LASIX) 40 mg tablet Not Taking at Unknown time  Yes No   Sig: Take 40 mg by mouth daily     ipratropium-albuterol (DUO-NEB) 0 5-2 5 mg/3 mL nebulizer solution 2/10/2019 at Unknown time  Yes Yes   Sig: Take 3 mL by nebulization every 6 (six) hours while awake   loratadine (CLARITIN) 10 mg tablet 2/10/2019 at Unknown time  Yes Yes   Sig: Take 10 mg by mouth daily   magnesium oxide (MAG-OX) 400 mg 2/10/2019 at Unknown time  Yes Yes   Sig: Take 400 mg by mouth 2 (two) times a day   metoprolol tartrate (LOPRESSOR) 50 mg tablet 2/10/2019 at Unknown time  Yes Yes   Sig: Take 50 mg by mouth every 12 (twelve) hours   omeprazole (PriLOSEC) 20 mg delayed release capsule 2/10/2019 at Unknown time  Yes Yes   Sig: Take 20 mg by mouth daily   potassium chloride (K-DUR) 10 mEq tablet 2/10/2019 at Unknown time  Yes Yes   Sig: Take 10 mEq by mouth daily     predniSONE 10 mg tablet Not Taking at Unknown time  No No   Sig: Take 1 tablet (10 mg total) by mouth daily   Patient not taking: Reported on 2/10/2019   pregabalin (LYRICA) 300 MG capsule   Yes No   Sig: Take 100 mg by mouth 2 (two) times a day       Facility-Administered Medications: None     Allergies   Allergen Reactions    Fentanyl And Related Other (See Comments)     Passed out    Latex Rash    Penicillins Rash       Objective   Vitals: Blood pressure 137/84, pulse 74, temperature 97 6 °F (36 4 °C), temperature source Oral, resp  rate 18, height 5' 2" (1 575 m), weight 94 5 kg (208 lb 5 4 oz), SpO2 92 %, not currently breastfeeding  Orthostatic Blood Pressures      Most Recent Value   Blood Pressure  137/84 filed at 02/11/2019 3698   Patient Position - Orthostatic VS  Lying filed at 02/11/2019 4948          No intake or output data in the 24 hours ending 02/11/19 1246    Invasive Devices     Central Venous Catheter Line            Port A Cath 02/10/19 Right Chest less than 1 day                Physical Exam   Constitutional: She is oriented to person, place, and time  She appears well-developed and well-nourished  No distress  HENT:   Head: Normocephalic and atraumatic  Right Ear: External ear normal    Left Ear: External ear normal    Eyes: Pupils are equal, round, and reactive to light  Conjunctivae are normal  Right eye exhibits no discharge  Left eye exhibits no discharge  No scleral icterus  Neck: Neck supple  No JVD present  No thyromegaly present  Cardiovascular: Normal rate, intact distal pulses and normal pulses  An irregular rhythm present  Exam reveals no gallop and no distant heart sounds  No murmur heard  Pulmonary/Chest: Effort normal  No accessory muscle usage  No respiratory distress  Decreased but clear, no wheezing   Abdominal: Soft  Bowel sounds are normal  She exhibits no distension  There is no tenderness  Musculoskeletal: She exhibits edema  Chronic left lower extremity edema secondary to lymphedema   Neurological: She is alert and oriented to person, place, and time  Excellent historian   Skin: Skin is warm and dry  Capillary refill takes less than 2 seconds  She is not diaphoretic  Lab Results:   I have personally reviewed pertinent lab results      CBC with diff:   Results from last 7 days   Lab Units 02/11/19  0441   WBC Thousand/uL 8 99   RBC Million/uL 5 73*   HEMOGLOBIN g/dL 14 9   HEMATOCRIT % 50 5*   MCV fL 88   MCH pg 26 0*   MCHC g/dL 29 5*   RDW % 15 5*   MPV fL 11 1   PLATELETS Thousands/uL 185     CMP:   Results from last 7 days   Lab Units 02/11/19  0441 02/10/19  1817   SODIUM mmol/L 143 138   POTASSIUM mmol/L 3 3* 3 7   CHLORIDE mmol/L 102 99*   CO2 mmol/L 38* 36*   BUN mg/dL 16 16   CREATININE mg/dL 0 94 0 98   CALCIUM mg/dL 8 9 8 4   AST U/L  --  12   ALT U/L  --  11*   ALK PHOS U/L  --  98   EGFR ml/min/1 73sq m 57 54     Troponin:   0   Lab Value Date/Time    TROPONINI <0 02 02/11/2019 0441    TROPONINI <0 02 02/10/2019 2352    TROPONINI <0 02 02/10/2019 1817    TROPONINI <0 02 09/29/2018 1900    TROPONINI 0 02 05/11/2018 0826    TROPONINI <0 02 05/10/2018 1756    TROPONINI <0 02 05/10/2018 1714    TROPONINI <0 02 05/10/2018 0826    TROPONINI <0 02 03/15/2018 1400    TROPONINI <0 02 02/26/2018 1049    TROPONINI <0 02 01/23/2018 1453    TROPONINI <0 02 01/05/2018 1735    TROPONINI <0 02 01/05/2018 1108    TROPONINI 0 02 12/11/2017 0944    TROPONINI <0 02 03/13/2017 0930    TROPONINI 0 04 (H) 09/21/2016 1111    TROPONINI <0 02 04/27/2016 0307    TROPONINI 0 02 04/26/2016 2133    TROPONINI <0 02 04/26/2016 1818    TROPONINI <0 02 04/26/2016 1244     BNP:   Results from last 7 days   Lab Units 02/11/19  0441   POTASSIUM mmol/L 3 3*   CHLORIDE mmol/L 102   CO2 mmol/L 38*   BUN mg/dL 16   CREATININE mg/dL 0 94   CALCIUM mg/dL 8 9   EGFR ml/min/1 73sq m 57     Coags:   Results from last 7 days   Lab Units 02/10/19  1817   PTT seconds 30   INR  0 99     TSH:     Magnesium:   Results from last 7 days   Lab Units 02/10/19  1817   MAGNESIUM mg/dL 1 7     Lipid Profile:   Results from last 7 days   Lab Units 02/11/19  0441   HDL mg/dL 55   LDL CALC mg/dL 56   TRIGLYCERIDES mg/dL 55     Imaging: I have personally reviewed pertinent reports      EKG:  Atrial fibrillation with PVCs no ST segment changes concerning for ischemia  VTE Prophylaxis: Sequential compression device Loly Hunt     Code Status: Level 1 - Full Code  Advance Directive and Living Will:      Power of :    POLST:      Hira Doyle, [facility]

## 2019-02-11 NOTE — OCCUPATIONAL THERAPY NOTE
Occupational Therapy Evaluation/Treatment       02/11/19 1010   Note Type   Note type Eval/Treat   Restrictions/Precautions   Other Precautions Chair Alarm; Bed Alarm;Telemetry;Cognitive   Pain Assessment   Pain Assessment 0-10   Pain Score 4   Pain Location Leg   Pain Orientation Left  (with activity )   Home Living   Type of Home SNF  ([facility])   Home Equipment Wheelchair-manual;Walker   Additional Comments pt reports getting OOB to wheelchair independently daily  Wheels herself around and to [facility]  Patient reports having a walker but not walking much due to staff not helping her  Prior Function   Level of [place] Needs assistance with ADLs and functional mobility; Needs assistance with IADLs   Lives With Facility staff   Receives Help From Personal care attendant   ADL Assistance Needs assistance   IADLs Needs assistance   ADL   Eating Assistance 5  Supervision/Setup   Grooming Assistance 4  Minimal Assistance   UB Bathing Assistance 4  Minimal Assistance   LB Bathing Assistance 3  Moderate Assistance   UB Dressing Assistance 4  Minimal Assistance   LB Dressing Assistance 3  Moderate Assistance   Toileting Assistance  3  Moderate Assistance   Bed Mobility   Supine to Sit 3  Moderate assistance   Transfers   Sit to Stand 3  Moderate assistance   Stand to Sit 3  Moderate assistance   Stand pivot 3  Moderate assistance   Additional items   (RW)   Functional Mobility   Functional Mobility 3  Moderate assistance   Additional Comments few steps bed to commode   Additional items Rolling walker   Balance   Static Sitting Fair +   Dynamic Sitting Fair   Static Standing Fair -   Dynamic Standing Poor +   Activity Tolerance   Activity Tolerance Patient limited by fatigue;Patient limited by pain  (LLE, chronic lymphedema LLE)   Medical Staff Kunal Mick Nguyen present at end of session   Nurse Made Aware yes   RUE Assessment   RUE Assessment WFL  (4-/5)   LUE Assessment   LUE Assessment WFL  (4-/5) Cognition   Overall Cognitive Status Impaired   Arousal/Participation Cooperative   Attention Within functional limits   Orientation Level Oriented to person;Oriented to place  (pt thought originally it was 2020 but then remembered)   Following Commands Follows one step commands with increased time or repetition   Comments pt reports felling confused this am    Assessment   Limitation Decreased ADL status; Decreased UE strength;Decreased Safe judgement during ADL;Decreased endurance;Decreased cognition;Decreased high-level ADLs; Decreased self-care trans  (decreased balance and mobility )   Prognosis Good   Assessment Patient evaluated by Occupational Therapy  Patient admitted with COPD with acute exacerbation ([facility] [address] )  The patients occupational profile, medical and therapy history includes a extensive additional review of physical, cognitive, or psychosocial history related to current functional performance  Comorbidities affecting functional mobility and ADLS include: fibromyalgia, afib, asthma, cardiac disease, COPD and cancer  Prior to admission, patient was completing stand pivot transfers independently to wheelchair then self propelling to dining cheung,  requiring assist for ADLS and requiring assist for IADLS  The evaluation identifies the following performance deficits: weakness, impaired balance, decreased endurance, increased fall risk, new onset of impairment of functional mobility, decreased ADLS, decreased IADLS, pain, decreased activity tolerance, decreased safety awareness, impaired judgement, decreased cognition and decreased strength, that result in activity limitations and/or participation restrictions  This evaluation requires clinical decision making of high complexity, because the patient presents with comorbidites that affect occupational performance and required significant modification of tasks or assistance with consideration of multiple treatment options    The Barthel Index was used as a functional outcome tool presenting with a score of 45, indicating marked limitations of functional mobility and ADLS  Patient will benefit from skilled Occupational Therapy services to address above deficits and facilitate a safe return to prior level of function  Goals   Patient Goals go home   STG Time Frame   (1-7 days)   Short Term Goal  Goals established to promote patient goal of going home:  Patient will increase standing tolerance to 2 minutes during ADL task to decrease assistance level and decrease fall risk; Patient will increase bed mobility to min assist in preparation for ADLS and transfers; Patient will increase functional mobility to and from bathroom with rolling walker with min assist to increase performance with ADLS and to use a toilet; Patient will tolerate 10 minutes of UE ROM/strengthening to increase general activity tolerance and performance in ADLS/IADLS; Patient will improve functional activity tolerance to 10 minutes of sustained functional tasks to increase participation in basic self-care and decrease assistance level;  Patient will be able to to verbalize understanding and perform energy conservation/proper body mechanics during ADLS and functional mobility at least 75% of the time with minimal cueing to decrease signs of fatigue and increase stamina to return to prior level of function; Patient will increase dynamic sitting balance to fair+ to improve the ability to sit at edge of bed or on a chair for ADLS;  Patient will increase dynamic standing balance to fair to improve postural stability and decrease fall risk during standing ADLS and transfers  LTG Time Frame   (8-14 days)   Long Term Goal Goals established to promote patient goal of going home:  Patient will increase standing tolerance to 4 minutes during ADL task to decrease assistance level and decrease fall risk; Patient will increase bed mobility to supervision in preparation for ADLS and transfers;  Patient will increase functional mobility to and from bathroom with rolling walker with supervision to increase performance with ADLS and to use a toilet; Patient will tolerate 20 minutes of UE ROM/strengthening to increase general activity tolerance and performance in ADLS/IADLS; Patient will improve functional activity tolerance to 20 minutes of sustained functional tasks to increase participation in basic self-care and decrease assistance level;  Patient will be able to to verbalize understanding and perform energy conservation/proper body mechanics during ADLS and functional mobility at least 90% of the time without cueing to decrease signs of fatigue and increase stamina to return to prior level of function; Patient will increase dynamic sitting balance to good to improve the ability to sit at edge of bed or on a chair for ADLS;  Patient will increase dynamic standing balance to fair+ to improve postural stability and decrease fall risk during standing ADLS and transfers  Functional Transfer Goals   Pt Will Perform All Functional Transfers   (STG supervision LTG independent )   ADL Goals   Pt Will Perform Grooming   (STG independent )   Pt Will Perform Bathing   (STG min assist LTG supervision )   Pt Will Perform UE Dressing   (STG independent )   Pt Will Perform LE Dressing   (STG min assist LTG supervision )   Pt Will Perform Toileting   (STG min assist LTG supervision )   Plan   Treatment Interventions ADL retraining;Functional transfer training;UE strengthening/ROM; Endurance training;Patient/family training;Equipment evaluation/education; Activityengagement; Compensatory technique education;Cognitive reorientation   Goal Expiration Date 02/25/19   OT Frequency 3-5x/wk   Additional Treatment Session   Start Time 1000   End Time 1010   Treatment Assessment Patient seen for ADL training  Patient completed commode transfer with mod/min assist with verbal cues for safety    Hygiene for urination with setup min assist in stance for balance  Functional mobility 5 feet with RW commode to bed  Tolerated well  Cooperative and pleasant  Patient seated on edge of bed with nursing aide present      Recommendation   OT Discharge Recommendation Short Term Rehab  (return to SNF with PT/OT)   Barthel Index   Feeding 10   Bathing 0   Grooming Score 0   Dressing Score 5   Bladder Score 10   Bowels Score 10   Toilet Use Score 5   Transfers (Bed/Chair) Score 5   Mobility (Level Surface) Score 0   Stairs Score 0   Barthel Index Score 45   Licensure   [place] License Number  Diannejean Weems [birth year] OTR/L [id number]

## 2019-02-11 NOTE — H&P
H&P- Bird Bowen 1935, 81 y o  female MRN: [id number]    Unit/Bed#: [address] [place] [address] Encounter: [id number]    Primary Care Provider: Dev Florence DO   Date and time admitted to hospital: 2/10/2019  5:35 PM        * COPD with acute exacerbation [facility])  Assessment & Plan  Patient had flu a few weeks ago which essentially resolved  This afternoon, patient started difficulty breathing and chest discomfort  Chest pain worsened over the course of the evening and she went to her daughter's for dinner  Did not have her O2 with her when she went to her daughters, pain and breathing worsened  · Admit to medicine  · Continue solumedrol, duonebs, advair  · Continue azithromycin for now - send PCT  · Sputum culture, respiratory pathogen  · Oxygen increased to 3L nasal cannula, on 2L baseline at home  · Respiratory protocol   ·  CT of the chest  · CP improved    Acute respiratory failure with hypercapnia (HCC)  Assessment & Plan  · Secondary to COPD exacerbation  · Continue supplemental oxygen, on 3L at present, 2L at baseline at home  · Continue solumedrol, zithromax    Acute on chronic diastolic congestive heart failure (HCC)  Assessment & Plan  · Start Lasix IV push b i d   In light of pedal edema, elevated BNP, increasing shortness of breath  · Monitor daily weights and intake and output  · Echo 1/18 - EF of 61-33%, grade 1 diastolic dysfunction    Atrial fibrillation (HCC)  Assessment & Plan  · Continue eliquis and metoprolol  · Currently in rapid atrial fibrillation, likely secondary to fever  · Continue to closely monitor    Hypertension  Assessment & Plan  Continue norvasc, metoprolol    Obstructive sleep apnea  Assessment & Plan  Does not wear CPAP at home, continue supplemental oxygen    Allergic rhinitis  Assessment & Plan  Continue Claritin    Moderate persistent asthma  Assessment & Plan  Continue nebulizers    Lymphedema of left leg  Assessment & Plan  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  Unchanged per patient    Hyperlipemia  Assessment & Plan  Continue statin    GERD (gastroesophageal reflux disease)  Assessment & Plan  Continue PPI    Obesity (BMI 30-39  9)  Assessment & Plan  Dietary and lifestyle modification    Stenosis of left internal carotid artery  Assessment & Plan  Continue aspirin and statin      VTE Prophylaxis: Apixaban (Eliquis)  / sequential compression device   Code Status: Level 1 - Full Code    Anticipated Length of Stay:  Patient will be admitted on an Observation basis with an anticipated length of stay of  Less than 2 midnights  Justification for Hospital Stay: COPD exacerbation, CHF exacerbation    Total Time for Visit, including Counseling / Coordination of Care: 30 minutes  Greater than 50% of this total time spent on direct patient counseling and coordination of care  Chief Complaint:   Chest Pain (has had intermiottant chest pain since this morning, c/o increase diff breathing)      History of Present Illness:    Dominik Diaz is a 81 y o  female with a PMH of COPD on 2 L nasal cannula at home, diastolic heart failure, DAMON, paroxysmal atrial fibrillation, hypertension, hyperlipidemia who presents with chest pain and shortness of breath  Patient states she was diagnosed with flu a couple of weeks ago  All of those symptoms have essentially resolved  This afternoon around lunchtime she developed midsternal chest discomfort  There was no radiation  It was pretty severe  As the evening progressed she became short of breath  She went to her daughter's house where she does not have any continuous oxygen  Her respiratory status declined  She felt increasingly short of breath and her daughter drove her to the hospital   In the ER patient was in mild respiratory distress  She was given nebulizer treatments and Solu-Medrol with improvement  On exam her chest discomfort is reproducible with palpation    The pain and shortness of breath have significantly resolved  Patient is in no distress and is speaking in full sentences  She is on 3 L with ordered oxygen saturation of 91%  She is typically on 2 L nasal cannula at home  She is admitted for further management  Review of Systems:    Review of Systems   Constitutional: Positive for chills and fever  HENT: Negative  Eyes: Negative  Respiratory: Positive for cough, chest tightness, shortness of breath and wheezing  Cardiovascular: Positive for chest pain  Gastrointestinal: Negative  Endocrine: Negative  Genitourinary: Negative  Musculoskeletal: Positive for gait problem (Wheelchair dependent)  Skin: Negative  Allergic/Immunologic: Negative  Hematological: Negative  Psychiatric/Behavioral: Negative  Past Medical and Surgical History:     Past Medical History:   Diagnosis Date    Asthma     Atrial fibrillation ([facility] )     Cancer (HCC)     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    Cardiac disease     a fib    COPD (chronic obstructive pulmonary disease) (HCC)     Fibromyalgia     Fibromyalgia, primary     GERD (gastroesophageal reflux disease)     History of methicillin resistant staphylococcus aureus (MRSA)     Positive MRSA (nares) 9/21/2016  Negative nasal culture 9/29/2018 - Isolation discontinued 10/4/2018    Hypertension     Hypokalemia     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[facility] )     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, left groin, lung and bladder  Patient treated with chemotherapy and radiation       Past Surgical History:   Procedure Laterality Date    APPENDECTOMY      CATARACT EXTRACTION      CHEST WALL BIOPSY N/A 10/27/2017    Procedure: SINUS EXCISION AND REMOVAL OF FOREIGN BODY, ABDOMEN (WOUND EXPLORATION);   Surgeon: Mahendra Castaneda MD;  Location: [address] [place] [address];  Service: General    EYE SURGERY Bilateral     cataracts     HIP FRACTURE SURGERY Left     HYSTERECTOMY      JOINT REPLACEMENT Left     hip    LYMPHADENECTOMY      PORTACATH PLACEMENT Right        Meds/Allergies:    Prior to Admission medications    Medication Sig Start Date End Date Taking?  Authorizing Provider   acetaminophen (TYLENOL) 325 mg tablet Take 2 tablets (650 mg total) by mouth every 6 (six) hours as needed for fever 5/18/18   Syed Lay MD   albuterol (2 5 mg/3 mL) 0 083 % nebulizer solution Take 2 5 mg by nebulization every 6 (six) hours as needed for wheezing    Historical Provider, MD   ALPRAZolam Anusha Buys) 0 5 mg tablet Take 0 5 mg by mouth daily at bedtime    Historical Provider, MD   amLODIPine (NORVASC) 5 mg tablet Take 5 mg by mouth daily    Historical Provider, MD   apixaban (ELIQUIS) 5 mg Take 5 mg by mouth every 12 (twelve) hours      Historical Provider, MD   aspirin (ECOTRIN LOW STRENGTH) 81 mg EC tablet Take 81 mg by mouth daily    Historical Provider, MD   atorvastatin (LIPITOR) 40 mg tablet Take 40 mg by mouth daily    Historical Provider, MD   calcium carbonate (OS-MEGHA) 600 MG tablet Take 600 mg by mouth 2 (two) times a day with meals    Historical Provider, MD   cholecalciferol (VITAMIN D3) 400 units tablet Take 400 Units by mouth 2 (two) times a day    Historical Provider, MD   docusate sodium (COLACE) 100 mg capsule Take 200 mg by mouth daily    Historical Provider, MD   Fesoterodine Fumarate ER (TOVIAZ) 8 MG TB24 Take by mouth daily at bedtime      Historical Provider, MD   fluticasone (FLONASE) 50 mcg/act nasal spray 1 spray into each nostril daily 5/19/18   Syed Lay MD   fluticasone-salmeterol (ADVAIR) 250-50 mcg/dose inhaler Inhale 1 puff every 12 (twelve) hours This is home medication 1/10/18   Syed Lay MD   furosemide (LASIX) 40 mg tablet Take 40 mg by mouth daily      Historical Provider, MD   ipratropium-albuterol (DUO-NEB) 0 5-2 5 mg/3 mL nebulizer solution Take 3 mL by nebulization every 6 (six) hours while awake    Historical Provider, MD   loratadine (CLARITIN) 10 mg tablet Take 10 mg by mouth daily    Historical Provider, MD magnesium oxide (MAG-OX) 400 mg Take 400 mg by mouth 2 (two) times a day    Historical Provider, MD   metoprolol tartrate (LOPRESSOR) 50 mg tablet Take 50 mg by mouth every 12 (twelve) hours    Historical Provider, MD   omeprazole (PriLOSEC) 20 mg delayed release capsule Take 20 mg by mouth daily    Historical Provider, MD   potassium chloride (K-DUR) 10 mEq tablet Take 10 mEq by mouth daily  Historical Provider, MD   predniSONE 10 mg tablet Take 1 tablet (10 mg total) by mouth daily 10/5/18   Osmani Poole MD   pregabalin (LYRICA) 300 MG capsule Take 300 mg by mouth 2 (two) times a day  Historical Provider, MD       Allergies: Allergies   Allergen Reactions    Fentanyl And Related Other (See Comments)     Passed out    Latex Rash    Penicillins Rash       Social History:     Marital Status:    Substance Use History:   Social History     Substance and Sexual Activity   Alcohol Use No     Social History     Tobacco Use   Smoking Status Former Smoker   Smokeless Tobacco Never Used   Tobacco Comment    0 5 PPD for 4 year     Social History     Substance and Sexual Activity   Drug Use No       Family History:    Family History   Problem Relation Age of Onset    Pneumonia Mother     Heart disease Father        Physical Exam:     Vitals:   Blood Pressure: 134/88 (02/10/19 2036)  Pulse: (!) 110 (02/10/19 2036)  Temperature: 98 5 °F (36 9 °C) (02/10/19 2036)  Temp Source: Oral (02/10/19 2036)  Respirations: 22 (02/10/19 2036)  Height: 5' 2" (157 5 cm) (02/10/19 2036)  Weight - Scale: 94 5 kg (208 lb 5 4 oz) (02/10/19 2036)  SpO2: 92 % (02/10/19 2036)    Physical Exam   Constitutional: She is oriented to person, place, and time  She appears well-developed and well-nourished  HENT:   Head: Normocephalic and atraumatic  Eyes: Pupils are equal, round, and reactive to light  Neck: Normal range of motion  Cardiovascular: Normal rate and regular rhythm     No murmur ( reproducible chest wall tenderness) heard  Pulmonary/Chest: Effort normal and breath sounds normal  No respiratory distress ( lungs are clear)  She has no wheezes  Abdominal: Soft  Bowel sounds are normal  She exhibits no distension  There is no tenderness  Musculoskeletal: Normal range of motion  Edema:  left lower extremity lymphedema  Neurological: She is alert and oriented to person, place, and time  Skin: Skin is warm and dry  Psychiatric: She has a normal mood and affect  Nursing note and vitals reviewed  Additional Data:     Lab Results: I have personally reviewed pertinent reports  Results from last 7 days   Lab Units 02/10/19  1817   WBC Thousand/uL 7 72   HEMOGLOBIN g/dL 13 6   HEMATOCRIT % 45 3   PLATELETS Thousands/uL 156   NEUTROS PCT % 77*     Results from last 7 days   Lab Units 02/10/19  1817   SODIUM mmol/L 138   POTASSIUM mmol/L 3 7   CHLORIDE mmol/L 99*   CO2 mmol/L 36*   BUN mg/dL 16   CREATININE mg/dL 0 98   CALCIUM mg/dL 8 4   TOTAL BILIRUBIN mg/dL 0 50   ALK PHOS U/L 98   ALT U/L 11*   AST U/L 12     Results from last 7 days   Lab Units 02/10/19  1817   INR  0 99     Results from last 7 days   Lab Units 02/10/19  1817   TROPONIN I ng/mL <0 02         Results from last 7 days   Lab Units 02/10/19  1817   LACTIC ACID mmol/L 1 2       Imaging: I have personally reviewed pertinent reports  XR chest 1 view portable    (Results Pending)   CT chest wo contrast    (Results Pending)       XR chest 1 view portable    (Results Pending)   CT chest wo contrast    (Results Pending)       EKG, Pathology, and Other Studies Reviewed on Admission:   · EKG: afib with RVR    Allscripts / Epic Records Reviewed: Yes     ** Please Note: This note has been constructed using a voice recognition system   **

## 2019-02-11 NOTE — PHYSICIAN ADVISOR
Current patient class: Observation  The patient is currently on Hospital Day: 2 at [address] [place] [address]      The patient was admitted to the hospital at N/A on N/A for the following diagnosis:  Chest pain [R07 9]  COPD exacerbation ([address] ) [J44 1]       There is documentation in the medical record of an expected length of stay of at least 2 midnights  The patient is therefore expected to satisfy the 2 midnight benchmark and given the 2 midnight presumption is appropriate for INPATIENT ADMISSION  Given this expectation of a satisfying stay, CMS instructs us that the patient is most often appropriate for inpatient admission under part A provided medical necessity is documented in the chart  After review of the relevant documentation, labs, vital signs and test results, the patient is appropriate for INPATIENT ADMISSION  Admission to the hospital as an inpatient is a complex decision making process which requires the practitioner to consider the patients presenting complaint, history and physical examination and all relevant testing  With this in mind, in this case, the patient was deemed appropriate for INPATIENT ADMISSION  After review of the documentation and testing available at the time of the admission I concur with this clinical determination of medical necessity  Rationale is as follows: The patient is a 81 yrs old Female who presented to the ED at 2/10/2019  5:35 PM with a chief complaint of Chest Pain (has had intermiottant chest pain since this morning, c/o increase diff breathing)     The patient presented on this admission with increased chest pressure shortness of breath  The patient has a history of COPD on 2 L nasal cannula at home  The patient is being admitted with acute respiratory failure with hypercapnia, acute on chronic diastolic congestive heart failure and COPD with acute exacerbation    The plan of care includes intravenous steroids, intravenous Lasix, pulmonary cardiology consultations, respiratory protocol, CT scan of the chest, repeat labs  This patient is appropriate for inpatient admission continued hospitalization is necessary to ensure stabilization of her clinical status  The patients vitals on arrival were ED Triage Vitals [02/10/19 1740]   Temperature Pulse Respirations Blood Pressure SpO2   (!) 100 6 °F (38 1 °C) (!) 112 22 (!) 197/129 92 %      Temp Source Heart Rate Source Patient Position - Orthostatic VS BP Location FiO2 (%)   Tympanic Monitor Lying Left arm --      Pain Score       6           Past Medical History:   Diagnosis Date    Asthma     Atrial fibrillation (HCC)     Cancer (HCC)     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    Cardiac disease     a fib    COPD (chronic obstructive pulmonary disease) (HCC)     Fibromyalgia     Fibromyalgia, primary     GERD (gastroesophageal reflux disease)     History of methicillin resistant staphylococcus aureus (MRSA)     Positive MRSA (nares) 9/21/2016  Negative nasal culture 9/29/2018 - Isolation discontinued 10/4/2018    Hypertension     Hypokalemia     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[address] )     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, left groin, lung and bladder  Patient treated with chemotherapy and radiation     Past Surgical History:   Procedure Laterality Date    APPENDECTOMY      CATARACT EXTRACTION      CHEST WALL BIOPSY N/A 10/27/2017    Procedure: SINUS EXCISION AND REMOVAL OF FOREIGN BODY, ABDOMEN (WOUND EXPLORATION);   Surgeon: Pati Cosme MD;  Location: [address] [place] [address];  Service: General    EYE SURGERY Bilateral     cataracts     HIP FRACTURE SURGERY Left     HYSTERECTOMY      JOINT REPLACEMENT Left     hip    LYMPHADENECTOMY      PORTACATH PLACEMENT Right            Consults have been placed to:   IP CONSULT TO PULMONOLOGY  IP CONSULT TO CARDIOLOGY    Vitals:    02/11/19 0722 02/11/19 0728 02/11/19 1321 02/11/19 1356   BP:  137/84  103/59   BP Location:  Left arm  Left arm   Pulse:  74  80   Resp:  18  20   Temp:  97 6 °F (36 4 °C)  97 6 °F (36 4 °C)   TempSrc:  Oral  Oral   SpO2: 93% 92% 95% 90%   Weight:       Height:           Most recent labs:    Recent Labs     02/10/19  1817  02/11/19  0441   WBC 7 72  --  8 99   HGB 13 6  --  14 9   HCT 45 3  --  50 5*     --  185   K 3 7  --  3 3*   CALCIUM 8 4  --  8 9   BUN 16  --  16   CREATININE 0 98  --  0 94   LIPASE 77  --   --    INR 0 99  --   --    TROPONINI <0 02   < > <0 02   AST 12  --   --    ALT 11*  --   --    ALKPHOS 98  --   --     < > = values in this interval not displayed         Scheduled Meds:  Current Facility-Administered Medications:  acetaminophen 650 mg Oral Q6H PRN GILDA Erwin   albuterol 2 5 mg Nebulization Q6H PRN GILDA Erwin   ALPRAZolam 0 5 mg Oral HS GILDA Erwin   amLODIPine 5 mg Oral Daily GILDA Erwin   apixaban 5 mg Oral Q12H [facility] GILDA Erwin   aspirin 81 mg Oral Daily GILDA Erwin   atorvastatin 40 mg Oral Daily With Dinner GILDA Erwin   calcium carbonate 1 tablet Oral BID With Meals GILDA Erwin   cholecalciferol 400 Units Oral BID GILDA Erwin   fluticasone 1 spray Each Nare Daily GILDA Erwin   fluticasone-vilanterol 1 puff Inhalation Daily GILDA Erwin   [START ON 2/12/2019] furosemide 40 mg Intravenous Daily GILDA Tristan   ipratropium-albuterol 3 mL Nebulization Q6H GILDA Erwin   loratadine 10 mg Oral Daily GILDA Erwin   magnesium oxide 400 mg Oral BID GILDA Erwin   methylPREDNISolone sodium succinate 20 mg Intravenous Q8H Mary Kate Bustillo PA-C   metoprolol tartrate 50 mg Oral Q12H [facility] GILDA Erwin   ondansetron 4 mg Intravenous Q6H PRN Claressa Mahendra, CRNP   oxybutynin 10 mg Oral Daily GILDA Erwin   pantoprazole 20 mg Oral Early Morning GILDA Erwin   potassium chloride 10 mEq Oral Daily Melita PHILIPPE GILDA Heard   pregabalin 100 mg Oral BID GILDA Valdez     Continuous Infusions:   PRN Meds:   acetaminophen    albuterol    ondansetron    Surgical procedures (if appropriate):

## 2019-02-11 NOTE — UTILIZATION REVIEW
Initial Clinical Review    Admission: Date/Time/Statement:   Admission Orders (From admission, onward)    Ordered        02/10/19 1937  Place in Observation (expected length of stay for this patient is less than two midnights)  Once     Order ID Start Status   [id number] 02/10/19 1937 Completed              Orders Placed This Encounter   Procedures    Place in Observation (expected length of stay for this patient is less than two midnights)     Standing Status:   Standing     Number of Occurrences:   1     Order Specific Question:   Admitting Physician     Answer:   Araceli Finch [[id number]]     Order Specific Question:   Level of Care     Answer:   Med Surg [[id number]]     ED: Date/Time/Mode of Arrival:   ED Arrival Information     Expected Arrival Acuity Means of Arrival Escorted By Service Admission Type    - 2/10/2019 17:34 Urgent Wheelchair Family Member Hospitalist Urgent    Arrival Complaint    chest pain        Chief Complaint:   Chief Complaint   Patient presents with    Chest Pain     has had intermiottant chest pain since this morning, c/o increase diff breathing     History of Illness:   81-year-old female with past medical history of Merkel cell cancer status post resection, COPD, atrial fibrillation on Eliquis couple GERD, hypertension, presents today with shortness of breath  Patient has a positive flu the family's already whereabouts  Patient comes in febrile increased work of breathing, shortness of breath and chest pain  Patient states she feels a knot in her stomach  States she cannot take a deep breath in  States she has been wheezing and using a breathing treatment which not having helping  States she has been coughing as well  Denies any abdominal pain nausea vomiting  No back pain    ED Vital Signs:   ED Triage Vitals [02/10/19 1740]   Temperature Pulse Respirations Blood Pressure SpO2   (!) 100 6 °F (38 1 °C) (!) 112 22 (!) 197/129 92 %      Temp Source Heart Rate Source Patient Position - Orthostatic VS BP Location FiO2 (%)   Tympanic Monitor Lying Left arm --      Pain Score       6        Wt Readings from Last 1 Encounters:   02/10/19 94 5 kg (208 lb 5 4 oz)     Vital Signs (abnormal):   , 106, 110  Pertinent Labs/Diagnostic Test Results:   BNP 1851 K 3 3 CO2 38 ANION GAP 3 GLUC 209   TROP NEG  U/A+BLOOD  CXR=Small left pleural effusion basilar atelectasis  Stable cardiomegaly  EKG=Atrial fibrillation  Intervals:  AFib  Axis: normal axis  QRS/Blocks: normal QRS  ST Changes: No acute ST Changes, no STD/DAVID  AFib with RVR  ED Treatment:   Medication Administration from 02/10/2019 1733 to 02/10/2019 2033       Date/Time Order Dose Route Action Action by Comments     02/10/2019 1806 albuterol inhalation solution 5 mg 5 mg Nebulization Given Elias Murillo RN      02/10/2019 1807 ipratropium (ATROVENT) 0 02 % inhalation solution 0 5 mg 0 5 mg Nebulization Given Elias Murillo RN      02/10/2019 1840 methylPREDNISolone sodium succinate (Solu-MEDROL) injection 125 mg 125 mg Intravenous Given Xochilt Reid RN      02/10/2019 1935 azithromycin (ZITHROMAX) 500 mg in sodium chloride 0 9% 250mL IVPB 500 mg 500 mg Intravenous New Bag Elias Murillo RN         Past Medical/Surgical History:    Active Ambulatory Problems     Diagnosis Date Noted    Meningioma ([facility] ) 09/21/2016    Stenosis of left internal carotid artery 09/25/2016    Obesity (BMI 30-39 9) 01/10/2018    GERD (gastroesophageal reflux disease) 03/13/2017    Hyperlipemia 03/13/2017    Fibromyalgia 03/14/2017    Lymphedema of left leg 12/12/2017    History of Merkel cell carcinoma 12/13/2017    Port-A-Cath in place 12/13/2017    Moderate persistent asthma 01/05/2018    Iron deficiency anemia 01/07/2018    Acute on chronic diastolic congestive heart failure ([facility] ) 03/08/2018    Allergic rhinitis 08/22/2013    Arteriosclerotic cardiovascular disease 08/23/2013    Atherosclerotic peripheral vascular disease ([facility] ) 10/21/2014  Obstructive sleep apnea 08/22/2013    Stenosis of left carotid artery 10/11/2016    Acute respiratory failure with hypercapnia ([address] ) 09/29/2018    COPD with acute exacerbation ([address] ) 09/29/2018     Resolved Ambulatory Problems     Diagnosis Date Noted    Chest pain 04/26/2016    Essential hypertension 04/27/2016    Acute cholecystitis 09/21/2016    Abdominal pain 09/21/2016    Syncope 09/21/2016    Hypertension, accelerated 09/24/2016    Sepsis ([address] ) 09/24/2016    Acute bronchitis 03/13/2017    Paroxysmal atrial fibrillation (HCC) 03/13/2017    Hypokalemia 03/14/2017    Moderate persistent asthma with acute exacerbation 03/16/2017    Open wound of abdominal wall without penetration into peritoneal cavity 10/27/2017    Asthma exacerbation 12/12/2017    Acute on chronic respiratory failure ([address] ) 01/05/2018    Bacteremia 12/12/2017    Acute congestive heart failure ([address] ) 01/05/2018    Shortness of breath 01/08/2018    Fall 03/07/2018    Right pontine stroke ([address] ) 03/08/2018    Acute on chronic respiratory failure with hypoxia ([address] ) 03/15/2018    Delirium 03/15/2018    Closed fracture of right tibia and fibula with routine healing 03/15/2018    Toxic metabolic encephalopathy 52/94/2291    Delirious     TIA (transient ischemic attack) 05/10/2018    Displaced fracture of medial malleolus of left tibia, initial encounter for closed fracture 05/10/2018    Displaced  comminuted fracture of fibula 05/10/2018    Drop in hemoglobin 05/11/2018    Hypomagnesemia 05/11/2018    Chronic obstructive pulmonary disease ([address] ) 08/23/2013    Lymphedema 09/25/2017    Acute respiratory acidosis 09/29/2018    Healthcare-associated pneumonia 10/03/2018     Past Medical History:   Diagnosis Date    Asthma     Atrial fibrillation ([address] )     Cancer ([address] )     Cardiac disease     COPD (chronic obstructive pulmonary disease) (HCC)     Fibromyalgia     Fibromyalgia, primary     GERD (gastroesophageal reflux disease)     History of methicillin resistant staphylococcus aureus (MRSA)     Hypertension     Hypokalemia     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HCC)      Admitting Diagnosis: Chest pain [R07 9]  COPD exacerbation (HCC) [J44 1]  Age/Sex: 81 y o  female  Assessment/Plan:   COPD with acute exacerbation ([facility] )  Assessment & Plan  Patient had flu a few weeks ago which essentially resolved  This afternoon, patient started difficulty breathing and chest discomfort  Chest pain worsened over the course of the evening and she went to her daughter's for dinner  Did not have her O2 with her when she went to her daughters, pain and breathing worsened  · Admit to medicine  · Continue solumedrol, duonebs, advair  · Continue azithromycin for now - send PCT  · Sputum culture, respiratory pathogen  · Oxygen increased to 3L nasal cannula, on 2L baseline at home  · Respiratory protocol   ·  CT of the chest  · CP improved     Acute respiratory failure with hypercapnia (HCC)  Assessment & Plan  · Secondary to COPD exacerbation  · Continue supplemental oxygen, on 3L at present, 2L at baseline at home  · Continue solumedrol, zithromax     Acute on chronic diastolic congestive heart failure (HCC)  Assessment & Plan  · Start Lasix IV push b i d   In light of pedal edema, elevated BNP, increasing shortness of breath  · Monitor daily weights and intake and output  · Echo 1/18 - EF of 43-80%, grade 1 diastolic dysfunction     Atrial fibrillation (HCC)  Assessment & Plan  · Continue eliquis and metoprolol  · Currently in rapid atrial fibrillation, likely secondary to fever  · Continue to closely monitor  Admission Orders:  TELEMETRY  CHF EDUCATION  PT/OT [facility] & [address] [place] [address]  O2 TO KEEP SATS>92%  Scheduled Meds:   Current Facility-Administered Medications:  acetaminophen 650 mg Oral Q6H PRN Pollie Peppers, CRNP   albuterol 2 5 mg Nebulization Q6H PRN Pollie Peppers, CRNP   ALPRAZolam 0 5 mg Oral HS Melita PHILIPPE [place], CRNP   amLODIPine 5 mg Oral Daily Joellen Jumper, CRNP   apixaban 5 mg Oral Q12H [address] Joellen Jumper, CRNP   aspirin 81 mg Oral Daily Joellen Jumper, CRNP   atorvastatin 40 mg Oral Daily With [address], CRNP   calcium carbonate 1 tablet Oral BID With Meals Joellen Jumper, CRNP   cholecalciferol 400 Units Oral BID Joellen Jumper, CRNP   fluticasone 1 spray Each Nare Daily Joellen Jumper, CRNP   fluticasone-vilanterol 1 puff Inhalation Daily Joellen Jumper, CRNP   furosemide 40 mg Intravenous BID (diuretic) Joellen Jumper, CRNP   ipratropium-albuterol 3 mL Nebulization Q6H Joellen Jumper, CRNP   loratadine 10 mg Oral Daily Joellen Jumper, CRNP   magnesium oxide 400 mg Oral BID Joellen Jumper, CRNP   methylPREDNISolone sodium succinate 40 mg Intravenous Q8H Joellen Jumper, CRNP   metoprolol tartrate 50 mg Oral Q12H [address] Joellen Jumper, CRNP   ondansetron 4 mg Intravenous Q6H PRN Joellen Jumper, CRNP   oxybutynin 10 mg Oral Daily Joellen Jumper, CRNP   pantoprazole 20 mg Oral Early Morning Joellen Jumper, CRNP   potassium chloride 10 mEq Oral Daily Joellen Jumper, CRNP   pregabalin 100 mg Oral BID Joellen Jumper, CRNP     Continuous Infusions:    PRN Meds:   acetaminophen    albuterol    ondansetron

## 2019-02-11 NOTE — ASSESSMENT & PLAN NOTE
-history of DAMON  -wears nighttime oxygen 2 L  -will need dedicated sleep study in the outpatient setting

## 2019-02-12 ENCOUNTER — APPOINTMENT (INPATIENT)
Dept: RADIOLOGY | Facility: HOSPITAL | Age: 84
DRG: 871 | End: 2019-02-12
Payer: MEDICARE

## 2019-02-12 ENCOUNTER — APPOINTMENT (INPATIENT)
Dept: NON INVASIVE DIAGNOSTICS | Facility: HOSPITAL | Age: 84
DRG: 871 | End: 2019-02-12
Payer: MEDICARE

## 2019-02-12 PROBLEM — J96.01 ACUTE RESPIRATORY FAILURE WITH HYPOXIA (HCC): Status: ACTIVE | Noted: 2018-09-29

## 2019-02-12 LAB
ADENOVIRUS: NOT DETECTED
ANION GAP SERPL CALCULATED.3IONS-SCNC: 6 MMOL/L (ref 4–13)
BUN SERPL-MCNC: 29 MG/DL (ref 5–25)
C PNEUM DNA SPEC QL NAA+PROBE: NOT DETECTED
CALCIUM SERPL-MCNC: 8.9 MG/DL (ref 8.3–10.1)
CHEST PAIN STATEMENT: NORMAL
CHLORIDE SERPL-SCNC: 99 MMOL/L (ref 100–108)
CO2 SERPL-SCNC: 33 MMOL/L (ref 21–32)
CREAT SERPL-MCNC: 1 MG/DL (ref 0.6–1.3)
FLUAV H1 RNA SPEC QL NAA+PROBE: NOT DETECTED
FLUAV H3 RNA SPEC QL NAA+PROBE: NOT DETECTED
FLUAV RNA SPEC QL NAA+PROBE: NOT DETECTED
FLUBV RNA SPEC QL NAA+PROBE: NOT DETECTED
GFR SERPL CREATININE-BSD FRML MDRD: 53 ML/MIN/1.73SQ M
GLUCOSE SERPL-MCNC: 190 MG/DL (ref 65–140)
HBOV DNA SPEC QL NAA+PROBE: NOT DETECTED
HCOV 229E RNA SPEC QL NAA+PROBE: NOT DETECTED
HCOV HKU1 RNA SPEC QL NAA+PROBE: NOT DETECTED
HCOV NL63 RNA SPEC QL NAA+PROBE: NOT DETECTED
HCOV OC43 RNA SPEC QL NAA+PROBE: NOT DETECTED
HPIV1 RNA SPEC QL NAA+PROBE: NOT DETECTED
HPIV2 RNA SPEC QL NAA+PROBE: NOT DETECTED
HPIV3 RNA SPEC QL NAA+PROBE: NOT DETECTED
HPIV4 RNA SPEC QL NAA+PROBE: NOT DETECTED
M PNEUMO DNA SPEC QL NAA+PROBE: NOT DETECTED
MAX DIASTOLIC BP: 79 MMHG
MAX HEART RATE: 139 BPM
MAX PREDICTED HEART RATE: 138 BPM
MAX. SYSTOLIC BP: 169 MMHG
METAPNEUMOVIRUS: NOT DETECTED
MRSA NOSE QL CULT: NORMAL
POTASSIUM SERPL-SCNC: 4.4 MMOL/L (ref 3.5–5.3)
PROTOCOL NAME: NORMAL
REASON FOR TERMINATION: NORMAL
RHINOVIRUS RNA SPEC QL NAA+PROBE: NOT DETECTED
RSV A RNA SPEC QL NAA+PROBE: NOT DETECTED
RSV B RNA SPEC QL NAA+PROBE: DETECTED
SODIUM SERPL-SCNC: 138 MMOL/L (ref 136–145)
TARGET HR FORMULA: NORMAL
TEST INDICATION: NORMAL
TIME IN EXERCISE PHASE: NORMAL

## 2019-02-12 PROCEDURE — 99232 SBSQ HOSP IP/OBS MODERATE 35: CPT | Performed by: FAMILY MEDICINE

## 2019-02-12 PROCEDURE — 80048 BASIC METABOLIC PNL TOTAL CA: CPT | Performed by: INTERNAL MEDICINE

## 2019-02-12 PROCEDURE — 78452 HT MUSCLE IMAGE SPECT MULT: CPT | Performed by: INTERNAL MEDICINE

## 2019-02-12 PROCEDURE — 99232 SBSQ HOSP IP/OBS MODERATE 35: CPT | Performed by: INTERNAL MEDICINE

## 2019-02-12 PROCEDURE — 93017 CV STRESS TEST TRACING ONLY: CPT

## 2019-02-12 PROCEDURE — 94640 AIRWAY INHALATION TREATMENT: CPT

## 2019-02-12 PROCEDURE — 94760 N-INVAS EAR/PLS OXIMETRY 1: CPT

## 2019-02-12 PROCEDURE — 78452 HT MUSCLE IMAGE SPECT MULT: CPT

## 2019-02-12 PROCEDURE — 93016 CV STRESS TEST SUPVJ ONLY: CPT | Performed by: INTERNAL MEDICINE

## 2019-02-12 PROCEDURE — A9502 TC99M TETROFOSMIN: HCPCS

## 2019-02-12 PROCEDURE — 93018 CV STRESS TEST I&R ONLY: CPT | Performed by: INTERNAL MEDICINE

## 2019-02-12 RX ORDER — METHYLPREDNISOLONE SODIUM SUCCINATE 40 MG/ML
20 INJECTION, POWDER, LYOPHILIZED, FOR SOLUTION INTRAMUSCULAR; INTRAVENOUS EVERY 12 HOURS SCHEDULED
Status: DISCONTINUED | OUTPATIENT
Start: 2019-02-12 | End: 2019-02-15 | Stop reason: HOSPADM

## 2019-02-12 RX ORDER — NYSTATIN 100000 [USP'U]/G
1 POWDER TOPICAL 2 TIMES DAILY
Status: DISCONTINUED | OUTPATIENT
Start: 2019-02-12 | End: 2019-02-15 | Stop reason: HOSPADM

## 2019-02-12 RX ADMIN — Medication 1 TABLET: at 16:31

## 2019-02-12 RX ADMIN — Medication 400 MG: at 17:44

## 2019-02-12 RX ADMIN — CHOLECALCIFEROL (VITAMIN D3) 10 MCG (400 UNIT) TABLET 400 UNITS: at 17:44

## 2019-02-12 RX ADMIN — Medication 1 TABLET: at 09:15

## 2019-02-12 RX ADMIN — CHOLECALCIFEROL (VITAMIN D3) 10 MCG (400 UNIT) TABLET 400 UNITS: at 09:15

## 2019-02-12 RX ADMIN — FLUTICASONE PROPIONATE 1 SPRAY: 50 SPRAY, METERED NASAL at 09:20

## 2019-02-12 RX ADMIN — METHYLPREDNISOLONE SODIUM SUCCINATE 20 MG: 40 INJECTION, POWDER, FOR SOLUTION INTRAMUSCULAR; INTRAVENOUS at 05:09

## 2019-02-12 RX ADMIN — METOPROLOL TARTRATE 50 MG: 50 TABLET, FILM COATED ORAL at 12:59

## 2019-02-12 RX ADMIN — OXYBUTYNIN CHLORIDE 10 MG: 5 TABLET, EXTENDED RELEASE ORAL at 09:15

## 2019-02-12 RX ADMIN — REGADENOSON 0.4 MG: 0.08 INJECTION, SOLUTION INTRAVENOUS at 10:03

## 2019-02-12 RX ADMIN — NYSTATIN 1 APPLICATION: 100000 POWDER TOPICAL at 12:58

## 2019-02-12 RX ADMIN — NYSTATIN 1 APPLICATION: 100000 POWDER TOPICAL at 22:02

## 2019-02-12 RX ADMIN — ATORVASTATIN CALCIUM 40 MG: 40 TABLET, FILM COATED ORAL at 16:31

## 2019-02-12 RX ADMIN — PREGABALIN 100 MG: 100 CAPSULE ORAL at 09:16

## 2019-02-12 RX ADMIN — ALPRAZOLAM 0.5 MG: 0.5 TABLET ORAL at 22:01

## 2019-02-12 RX ADMIN — PREGABALIN 100 MG: 100 CAPSULE ORAL at 17:44

## 2019-02-12 RX ADMIN — VANCOMYCIN HYDROCHLORIDE 750 MG: 750 INJECTION, SOLUTION INTRAVENOUS at 18:12

## 2019-02-12 RX ADMIN — METOPROLOL TARTRATE 50 MG: 50 TABLET, FILM COATED ORAL at 22:02

## 2019-02-12 RX ADMIN — FLUTICASONE FUROATE AND VILANTEROL TRIFENATATE 1 PUFF: 200; 25 POWDER RESPIRATORY (INHALATION) at 09:20

## 2019-02-12 RX ADMIN — POTASSIUM CHLORIDE 10 MEQ: 750 TABLET, EXTENDED RELEASE ORAL at 09:15

## 2019-02-12 RX ADMIN — LORATADINE 10 MG: 10 TABLET ORAL at 09:16

## 2019-02-12 RX ADMIN — METHYLPREDNISOLONE SODIUM SUCCINATE 20 MG: 40 INJECTION, POWDER, FOR SOLUTION INTRAMUSCULAR; INTRAVENOUS at 22:02

## 2019-02-12 RX ADMIN — PANTOPRAZOLE SODIUM 20 MG: 20 TABLET, DELAYED RELEASE ORAL at 05:09

## 2019-02-12 RX ADMIN — Medication 400 MG: at 09:15

## 2019-02-12 RX ADMIN — APIXABAN 5 MG: 5 TABLET, FILM COATED ORAL at 22:01

## 2019-02-12 RX ADMIN — AMLODIPINE BESYLATE 5 MG: 5 TABLET ORAL at 09:15

## 2019-02-12 RX ADMIN — ASPIRIN 81 MG: 81 TABLET, COATED ORAL at 09:15

## 2019-02-12 RX ADMIN — IPRATROPIUM BROMIDE AND ALBUTEROL SULFATE 3 ML: 2.5; .5 SOLUTION RESPIRATORY (INHALATION) at 08:17

## 2019-02-12 RX ADMIN — VANCOMYCIN HYDROCHLORIDE 1500 MG: 10 INJECTION, POWDER, LYOPHILIZED, FOR SOLUTION INTRAVENOUS at 06:28

## 2019-02-12 RX ADMIN — APIXABAN 5 MG: 5 TABLET, FILM COATED ORAL at 09:15

## 2019-02-12 NOTE — ASSESSMENT & PLAN NOTE
Blood cultures 2 of 2 sets growing gram-positive cocci in clusters  Final culture grew Staph epidermidis   Continue IV vancomycin through 2/18  Repeat blood cultures in 2 weeks after completion of IV antibiotic and results to be forwarded to ID to determine if port needs to come out    Okay to use port for IV antibiotics on discharge as per ID

## 2019-02-12 NOTE — PROGRESS NOTES
[facility] Progress Note  Patient: Annabelle Miller 81 y o  female   MRN: [id number]  PCP: Carmen Peacock DO  Unit/Bed#: [address] [place] [address] Encounter: [id number]  Date Of Visit: 02/12/19    Problem List:    Principal Problem:    Mild persistent asthma with exacerbation  Active Problems:    Acute on chronic diastolic congestive heart failure (HCC)    Acute respiratory failure with hypoxia (HCC)    SIRS (systemic inflammatory response syndrome) ([address] )    Stenosis of left internal carotid artery    Obesity (BMI 30-39  9)    GERD (gastroesophageal reflux disease)    Hyperlipemia    Lymphedema of left leg    Bacteremia    Allergic rhinitis    Obstructive sleep apnea    Hypertension    Chronic atrial fibrillation (HCC)      Assessment & Plan:    * Mild persistent asthma with exacerbation  Assessment & Plan  · Patient had flu a few weeks ago which essentially resolved  · Likely due to RSV B infection  · CT of the chest showed no evidence of pneumonia  · Procalcitonin level x2 has been negative  · Symptoms improving  Decrease dose of IV Solu-Medrol to 20 milligram IV q 12 hours  · Continue Breo Ellipta and nebulizers  · Pulmonary consult appreciated    Acute on chronic diastolic congestive heart failure (HCC)  Assessment & Plan  · Now appears euvolemic  IV Lasix discontinued  · Monitor strict I&O and daily weight  · Echo 1/18 - EF of 48-31%, grade 1 diastolic dysfunction  · As patient also complained of some chest pain, she had stress test done today which showed no evidence of ischemia  · Cardiology consult appreciated    Acute respiratory failure with hypoxia (HCC)  Assessment & Plan  · Likely secondary to component of exacerbation of mild persistent asthma, CHF  · Continue supplemental O2 by nasal cannula  Patient usually uses 2 liters of oxygen at night and p r n  As needed during the day  Chronic atrial fibrillation (HCC)  Assessment & Plan  · Continue eliquis and metoprolol    · Patient was initially atrial fibrillation with rapid ventricular rate likely secondary to fever but now rate controlled    Hypertension  Assessment & Plan  Continue Lopressor, norvasc    Obstructive sleep apnea  Assessment & Plan  Does not wear CPAP at home  Patient uses 2 liters of oxygen at night  Allergic rhinitis  Assessment & Plan  Continue Claritin  Bacteremia  Assessment & Plan  Blood cultures 2 of 2 sets growing gram-positive cocci in clusters  Follow-up on final cultures and sensitivity  Id consult  IV vancomycin for now    Lymphedema of left leg  Assessment & Plan  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  Unchanged per patient  Hyperlipemia  Assessment & Plan  Continue statin  GERD (gastroesophageal reflux disease)  Assessment & Plan  Continue Protonix    Obesity (BMI 30-39  9)  Assessment & Plan  Dietary and lifestyle modification  Stenosis of left internal carotid artery  Assessment & Plan  Continue aspirin and statin  SIRS (systemic inflammatory response syndrome) (HCC)  Assessment & Plan  Patient had temperature of 100 6°  on day of admission which has since resolved  CT chest showed no evidence of pneumonia  Respiratory pathogen profile positive for RSV B  Procalcitonin x2 is negative        VTE Pharmacologic Prophylaxis:   Pharmacologic: Apixaban (Eliquis)  Mechanical VTE Prophylaxis in Place: No    Patient Centered Rounds: I have performed bedside rounds with nursing staff today  Discussions with Specialists or Other Care Team Provider: Yes    Education and Discussions with Family / Patient:Yes    Time Spent for Care: 30 minutes  More than 50% of total time spent on counseling and coordination of care as described above      Current Length of Stay: 1 day(s)    Current Patient Status: Inpatient     Discharge Plan: Back to NH    Code Status: Level 1 - Full Code    Certification Statement: The patient will continue to require additional inpatient hospital stay due to Bacteremia, asthma exacerbation      Subjective:   Reports occasional cough  States that breathing has improved    Objective:     Vitals:   Temp (24hrs), Av 6 °F (36 4 °C), Min:97 5 °F (36 4 °C), Max:97 8 °F (36 6 °C)    Temp:  [97 5 °F (36 4 °C)-97 8 °F (36 6 °C)] 97 5 °F (36 4 °C)  HR:  [80-96] 96  Resp:  [16-18] 18  BP: (109-146)/(63-79) 109/63  SpO2:  [91 %-93 %] 93 %  Body mass index is 38 1 kg/m²  Input and Output Summary (last 24 hours): Intake/Output Summary (Last 24 hours) at 2019 1633  Last data filed at 2019 0900  Gross per 24 hour   Intake    Output 875 ml   Net -875 ml       Physical Exam:     Physical Exam   Constitutional: She is oriented to person, place, and time  She appears well-developed and well-nourished  No distress  HENT:   Head: Normocephalic and atraumatic  Eyes: EOM are normal  Right eye exhibits no discharge  Left eye exhibits no discharge  No scleral icterus  Neck: Neck supple  Cardiovascular:   Murmur heard  Irregularly, irregular rate and rhythm   Pulmonary/Chest: Effort normal and breath sounds normal  No respiratory distress  She has no wheezes  She has no rales  Abdominal: Soft  Bowel sounds are normal  She exhibits no distension  There is no tenderness  Musculoskeletal: She exhibits edema (Left L E)  Neurological: She is alert and oriented to person, place, and time  No cranial nerve deficit  Skin: Skin is dry  She is not diaphoretic         Additional Data:     Labs:    Results from last 7 days   Lab Units 19  0441 02/10/19  1817   WBC Thousand/uL 8 99 7 72   HEMOGLOBIN g/dL 14 9 13 6   HEMATOCRIT % 50 5* 45 3   PLATELETS Thousands/uL 185 156   NEUTROS PCT %  --  77*   LYMPHS PCT %  --  11*   MONOS PCT %  --  7   EOS PCT %  --  3     Results from last 7 days   Lab Units 19  0514  02/10/19  1817   POTASSIUM mmol/L 4 4   < > 3 7   CHLORIDE mmol/L 99*   < > 99*   CO2 mmol/L 33*   < > 36*   BUN mg/dL 29*   < > 16   CREATININE mg/dL 1 00   < > 0 98 CALCIUM mg/dL 8 9   < > 8 4   ALK PHOS U/L  --   --  98   ALT U/L  --   --  11*   AST U/L  --   --  12    < > = values in this interval not displayed  Results from last 7 days   Lab Units 02/10/19  1817   INR  0 99       * I Have Reviewed All Lab Data Listed Above  * Additional Pertinent Lab Tests Reviewed: All Labs For Current Hospital Admission Reviewed    Imaging:  Xr Chest 1 View Portable    Result Date: 2/11/2019  Narrative: CHEST INDICATION:   chest pain, shortness of breath  COMPARISON:  9/29/2018 EXAM PERFORMED/VIEWS:  XR CHEST PORTABLE Images: 1 FINDINGS:  Right-sided Port-A-Cath terminates at the cavoatrial junction  Heart shadow is enlarged but unchanged from prior exam  Small left pleural effusion with basilar atelectasis noted  Upper lobe and right lung grossly clear  No pneumothorax  Osseous structures appear within normal limits for patient age  Impression: Small left pleural effusion basilar atelectasis  Stable cardiomegaly  Workstation performed: [id number]     Ct Chest Wo Contrast    Result Date: 2/11/2019  Narrative: CT CHEST WITHOUT IV CONTRAST INDICATION:   Interstitial lung disease  COMPARISON:  Chest CT dated 9/30/2018 an 1/20/2014  TECHNIQUE: CT examination of the chest was performed without intravenous contrast   Axial, sagittal, and coronal 2D reformatted images were created from the source data and submitted for interpretation  Radiation dose length product (DLP) for this visit:  487 38 mGy-cm   This examination, like all CT scans performed in the [facility], was performed utilizing techniques to minimize radiation dose exposure, including the use of iterative  reconstruction and automated exposure control  FINDINGS: LUNGS:  Dense peripheral consolidation posteriorly in the right lower lobe  Bandlike consolidation in the left upper lobe along the major fissure  Other linear scattered opacities in the left lung    Morphology of all these consolidations most suggestive of atelectasis No endobronchial lesions  No significant interlobular septal thickening or interstitial nodularity  PLEURA:  No effusion or pneumothorax  HEART/GREAT VESSELS:  Central venous catheter tip at the cavoatrial junction  Left atrial enlargement  Scattered coronary calcifications  No pericardial thickening or effusion  MEDIASTINUM AND JASWINDER:  Unremarkable  CHEST WALL AND LOWER NECK:   No abnormality around the patient's chest wall port  VISUALIZED STRUCTURES IN THE UPPER ABDOMEN:  Stable right renal cyst  OSSEOUS STRUCTURES: Multiple benign intraosseous hemangiomata spine  No acute fracture or destructive osseous lesion  Impression: 1  Scattered consolidations in both lungs have morphologies most suggestive of atelectasis  Multifocal pneumonia is less likely  Otherwise, no acute thoracic findings  2   No evidence for interstitial lung disease  Workstation performed: [id number]     Imaging Reports Reviewed by myself    Cultures:   Blood Culture:   Lab Results   Component Value Date    BLOODCX No Growth After 5 Days  09/29/2018    BLOODCX No Growth After 5 Days  09/29/2018    BLOODCX No Growth After 5 Days  01/23/2018    BLOODCX No Growth After 5 Days  01/23/2018    BLOODCX No Growth After 5 Days  01/05/2018    BLOODCX No Growth After 5 Days  01/05/2018    BLOODCX No Growth After 5 Days   12/13/2017     Urine Culture:   Lab Results   Component Value Date    URINECX No Growth <1000 cfu/mL 09/29/2018    URINECX >100,000 cfu/ml Mixed Contaminants X5 09/21/2016     Sputum Culture: No components found for: SPUTUMCX  Wound Culture: No results found for: WOUNDCULT    Last 24 Hours Medication List:     Current Facility-Administered Medications:  acetaminophen 650 mg Oral Q6H PRN [place], CRNP   albuterol 2 5 mg Nebulization Q6H PRN [place], CRNP   ALPRAZolam 0 5 mg Oral HS [place], CRNP   amLODIPine 5 mg Oral Daily [place], CRNP   apixaban 5 mg Oral Q12H [address] Twanna Holt, CRNP   aspirin 81 mg Oral Daily [place], CRNP   atorvastatin 40 mg Oral Daily With [address], CRNP   calcium carbonate 1 tablet Oral BID With Meals Twanna [place], CRNP   cholecalciferol 400 Units Oral BID [place], CRNP   fluticasone 1 spray Each Nare Daily [place], CRNP   fluticasone-vilanterol 1 puff Inhalation Daily [place], CRNP   ipratropium-albuterol 3 mL Nebulization BID Judy Mcdonald MD   loratadine 10 mg Oral Daily [place], CRNP   magnesium oxide 400 mg Oral BID [place], CRNP   methylPREDNISolone sodium succinate 20 mg Intravenous Q12H [facility], DO   metoprolol tartrate 50 mg Oral Q12H [facility] Twanna Holt, CRNP   nystatin 1 application Topical BID Lyndsey Revankar, DO   ondansetron 4 mg Intravenous Q6H PRN [place], CRNP   oxybutynin 10 mg Oral Daily [place], CRNP   pantoprazole 20 mg Oral Early Morning Twanna [place], CRNP   potassium chloride 10 mEq Oral Daily [place], CRNP   pregabalin 100 mg Oral BID [place], CRNP   vancomycin 10 mg/kg (Adjusted) Intravenous Q12H Patito Shipley DO        Today, Patient Was Seen By: Patito Shipley DO    ** Please Note: "This note has been constructed using a voice recognition system  Therefore there may be syntax, spelling, and/or grammatical errors   Please call if you have any questions  "**

## 2019-02-12 NOTE — ASSESSMENT & PLAN NOTE
Patient had temperature of 100 6° last night with rapid atrial fibrillation  CT chest showed no evidence of pneumonia  Likely secondary to viral syndrome  Respiratory pathogen profile is pending  Procalcitonin x2 is negative  Hold off on antibiotics

## 2019-02-12 NOTE — ASSESSMENT & PLAN NOTE
· Patient had flu a few weeks ago which essentially resolved      · Likely worsened by while prodrome  · CT of the chest showed no evidence of pneumonia  · Procalcitonin level x2 has been negative  · Patient was started on Solu-Medrol 40 milligram IV q 8 hours which will be tapered to 20 milligram IV q 8 hours  · Continue Breo Ellipta and nebulizers  · Pulmonary consult appreciated

## 2019-02-12 NOTE — ASSESSMENT & PLAN NOTE
· Likely secondary to component of exacerbation of mild persistent asthma, CHF  · Continue supplemental O2 by nasal cannula  Patient normally uses 2 liters of oxygen at night and p r n   As needed during the day

## 2019-02-12 NOTE — PROGRESS NOTES
Progress Note - Cardiology   Bird Bowen 81 y o  female MRN: [id number]  Unit/Bed#: 2 [place] [address] Encounter: [id number]  02/12/19  11:27 AM        Assessment:  1  Chest pain - stress test was done today - no ischemia or infarction  2  Chronic atrial fibrillation - HR elevated due to holding of metoprolol  3  Acute on chronic diastolic CHF - appears euvolemic  BUN increasing  DC IV Lasix  4  Hypertension   5  Bacteremia - will obtain 2D echocardiogram  6  Pneumonia  7  COPD/asthma    Plan:  As above  Resume PO diuretics tomorrow  Subjective: Bird Bowen denies any chest pain or shortness of breath  No overnight events  Blood cultures abnormal for gram positive cocci       Meds/Allergies   current meds:   Current Facility-Administered Medications   Medication Dose Route Frequency    acetaminophen (TYLENOL) tablet 650 mg  650 mg Oral Q6H PRN    albuterol inhalation solution 2 5 mg  2 5 mg Nebulization Q6H PRN    ALPRAZolam (XANAX) tablet 0 5 mg  0 5 mg Oral HS    amLODIPine (NORVASC) tablet 5 mg  5 mg Oral Daily    apixaban (ELIQUIS) tablet 5 mg  5 mg Oral Q12H [address]    aspirin (ECOTRIN LOW STRENGTH) EC tablet 81 mg  81 mg Oral Daily    atorvastatin (LIPITOR) tablet 40 mg  40 mg Oral Daily With Dinner    calcium carbonate (OYSTER SHELL,OSCAL) 500 mg tablet 1 tablet  1 tablet Oral BID With Meals    cholecalciferol (VITAMIN D3) tablet 400 Units  400 Units Oral BID    fluticasone (FLONASE) 50 mcg/act nasal spray 1 spray  1 spray Each Nare Daily    fluticasone-vilanterol (BREO ELLIPTA) 200-25 MCG/INH inhaler 1 puff  1 puff Inhalation Daily    furosemide (LASIX) injection 40 mg  40 mg Intravenous Daily    ipratropium-albuterol (DUO-NEB) 0 5-2 5 mg/3 mL inhalation solution 3 mL  3 mL Nebulization BID    loratadine (CLARITIN) tablet 10 mg  10 mg Oral Daily    magnesium oxide (MAG-OX) tablet 400 mg  400 mg Oral BID    methylPREDNISolone sodium succinate (Solu-MEDROL) injection 20 mg  20 mg Intravenous Q8H    metoprolol tartrate (LOPRESSOR) tablet 50 mg  50 mg Oral Q12H [facility]    nystatin (MYCOSTATIN) powder 1 application  1 application Topical BID    ondansetron (ZOFRAN) injection 4 mg  4 mg Intravenous Q6H PRN    oxybutynin (DITROPAN-XL) 24 hr tablet 10 mg  10 mg Oral Daily    pantoprazole (PROTONIX) EC tablet 20 mg  20 mg Oral Early Morning    potassium chloride (K-DUR,KLOR-CON) CR tablet 10 mEq  10 mEq Oral Daily    pregabalin (LYRICA) capsule 100 mg  100 mg Oral BID     Allergies   Allergen Reactions    Fentanyl And Related Other (See Comments)     Passed out    Latex Rash    Penicillins Rash       Objective   Vitals: Blood pressure 146/79, pulse 80, temperature 97 8 °F (36 6 °C), temperature source Oral, resp  rate 16, height 5' 2" (1 575 m), weight 94 5 kg (208 lb 5 4 oz), SpO2 93 %, not currently breastfeeding , Body mass index is 38 1 kg/m²  Intake/Output Summary (Last 24 hours) at 2/12/2019 1127  Last data filed at 2/12/2019 0222  Gross per 24 hour   Intake    Output 500 ml   Net -500 ml       Physical Exam   Constitutional: She appears healthy  No distress  HENT:   Nose: Nose normal    Mouth/Throat: Oropharynx is clear  Eyes: Pupils are equal, round, and reactive to light  Conjunctivae are normal    Neck: Neck supple  No JVD present  Cardiovascular: Normal rate  An irregularly irregular rhythm present  Exam reveals no distant heart sounds and no friction rub  Murmur heard  Pulmonary/Chest: Effort normal and breath sounds normal  She has no wheezes  She has no rales  Abdominal: Soft  She exhibits no distension  There is no tenderness  Musculoskeletal: She exhibits edema (left leg)  Neurological: She is alert and oriented to person, place, and time  Skin: Skin is warm and dry  No rash noted         Lab Results:     Troponins:   Results from last 7 days   Lab Units 02/11/19  0441 02/10/19  2352 02/10/19  1817   TROPONIN I ng/mL <0 02 <0 02 <0 02       Recent Results (from the past 24 hour(s))   Basic metabolic panel    Collection Time: 19  5:14 AM   Result Value Ref Range    Sodium 138 136 - 145 mmol/L    Potassium 4 4 3 5 - 5 3 mmol/L    Chloride 99 (L) 100 - 108 mmol/L    CO2 33 (H) 21 - 32 mmol/L    ANION GAP 6 4 - 13 mmol/L    BUN 29 (H) 5 - 25 mg/dL    Creatinine 1 00 0 60 - 1 30 mg/dL    Glucose 190 (H) 65 - 140 mg/dL    Calcium 8 9 8 3 - 10 1 mg/dL    eGFR 53 ml/min/1 73sq m          Cardiac testing:   Results for orders placed during the hospital encounter of 18   Echo complete with contrast if indicated    Narrative [address]  [address] [facility]  [phone]    Transthoracic Echocardiogram  2D, M-mode, Doppler, and Color Doppler    Study date:  2018    Patient: Davina Butterfield  MR number: [id number]  Account number: [de-identified]  : 1935  Age: 81 years  Gender: Female  Status: Routine  Location: Bedside  Height: 62 in  Weight: 187 7 lb  BP: 134/ 59 mmHg    Indications: Shortness of Breath Evaluate for suspected cardiac source of embolism  Diagnoses: 786 05 - SHORTNESS OF BREATH    Sonographer:  Deanna Hess  Interpreting Physician:  Mariaelena Ricketts DO  Primary Physician:  Alban Hunter DO  Group:  [facility]    SUMMARY    LEFT VENTRICLE:  The cavity was small  Systolic function was hyperdynamic by visual assessment  Ejection fraction was estimated in the range of 70 % to 75 %  There were no regional wall motion abnormalities  There was mild concentric hypertrophy  Doppler parameters were consistent with abnormal left ventricular relaxation (grade 1 diastolic dysfunction)  Doppler parameters were consistent with elevated mean left atrial filling pressure  MITRAL VALVE:  There was mild regurgitation  AORTIC VALVE:  There was no evidence for stenosis  There was no regurgitation  TRICUSPID VALVE:  There was mild regurgitation    Pulmonary artery systolic pressure was mildly increased  HISTORY: PRIOR HISTORY: Shortness of Breath    PROCEDURE: The procedure was performed at the bedside  This was a routine study  The transthoracic approach was used  The study included complete 2D imaging, M-mode, complete spectral Doppler, and color Doppler  The heart rate was 77 bpm,  at the start of the study  This was a technically difficult study  LEFT VENTRICLE: The cavity was small  Systolic function was hyperdynamic by visual assessment  Ejection fraction was estimated in the range of 70 % to 75 %  There were no regional wall motion abnormalities  There was mild concentric  hypertrophy  DOPPLER: Doppler parameters were consistent with abnormal left ventricular relaxation (grade 1 diastolic dysfunction)  Doppler parameters were consistent with elevated mean left atrial filling pressure  RIGHT VENTRICLE: The size was normal  Systolic function was normal  DOPPLER: Systolic pressure was within the normal range  LEFT ATRIUM: Size was normal  No thrombus was identified  RIGHT ATRIUM: Size was normal     MITRAL VALVE: Valve structure was normal  There was normal leaflet separation  No echocardiographic evidence for prolapse  DOPPLER: The transmitral velocity was within the normal range  There was no evidence for stenosis  There was mild  regurgitation  AORTIC VALVE: The valve was trileaflet  Leaflets exhibited normal cuspal separation and sclerosis  DOPPLER: Transaortic velocity was within the normal range  There was no evidence for stenosis  There was no regurgitation  TRICUSPID VALVE: The valve structure was normal  There was normal leaflet separation  DOPPLER: The transtricuspid velocity was within the normal range  There was mild regurgitation  Pulmonary artery systolic pressure was mildly increased  Estimated peak PA pressure was 42 mmHg  PULMONIC VALVE: Leaflets exhibited normal thickness, no calcification, and normal cuspal separation   DOPPLER: The transpulmonic velocity was within the normal range  There was mild regurgitation  PERICARDIUM: There was no thickening  There was no pericardial effusion  AORTA: The root exhibited normal size  PULMONARY ARTERY: The size was normal  The morphology appeared normal     SYSTEM MEASUREMENT TABLES    2D mode  AoR Diam 2D: 3 1 cm  LA Diam (2D): 4 2 cm  LA/Ao (2D): 1 35  FS (2D Teich): 53 9 %  IVSd (2D): 1 08 cm  LVDEV: 73 4 cm³  LVESV: 10 9 cm³  LVIDd(2D): 4 08 cm  LVISd (2D): 1 88 cm  LVOT Area 2D: 3 46 cm squared  LVPWd (2D): 1 05 cm  SV (Teich): 62 5 cm³    Apical four chamber  LVEF A4C: 84 %    Unspecified Scan Mode  ANNIE Cont Eq (Peak Pacheco): 1 94 cm squared  ANNIE Cont Eq (VTI): 1 81 cm squared  LVOT (VTI): 26 8 cm  LVOT Diam : 2 1 cm  LVOT Vmax: 1210 mm/s  LVOT Vmax; Mean: 996 mm/s  Peak Grad ; Mean: 4 mm[Hg]  SV (LVOT): 81 cm³  VTI;Mean: 4 mm[Hg]  MV Peak A Pacheco: 1240 mm/s  MV Peak E Pacheco  Mean: 731 mm/s  RVSP: 36 mm[Hg]  Max P mm[Hg]  V Max: 2790 mm/s  Vmax: 2800 mm/s  TAPSE: 2 2 cm    [facility] [place] Commission Accredited Echocardiography Laboratory    Prepared and electronically signed by    Yared Valera DO  Signed 2018 11:30:10       No results found for this or any previous visit  No results found for this or any previous visit  No results found for this or any previous visit  No results found for this or any previous visit    Results for orders placed during the hospital encounter of 16   NM myocardial perfusion spect (rx stress and/or rest)    Narrative [address]  [address] [facility] [address] [phone]    Rest/Stress Gated SPECT Myocardial Perfusion Imaging After Regadenoson    Patient: Fei Barrera  MR number: [id number]  Account number: [de-identified]  : 1935  Age: 79 years  Gender: Female  Status: Outpatient  Location: Stress lab  Height: 65 in  Weight: 195 lb  BP: 160/ 90 mmHg    Allergies: FENTANYL AND RELATED, LATEX, PENICILLINS    Diagnosis: R07 9 - Chest pain, unspecified    Primary Physician:  General Bosworth, DO  RN:  Cuba Hunt RN  Group:  [facility]  Report Prepared By[de-identified]  Cuba Hunt RN  Interpreting Physician:  Soren Kahn MD    INDICATIONS: Detection of coronary artery disease  HISTORY: The patient is a 79year old  female  Coronary artery disease  risk factors: family history of premature coronary artery disease  Co-morbidity: history of lung disease- asthma Medications: a beta blocker  PHYSICAL EXAM: Baseline physical exam screening: no wheezes audible  REST ECG: Normal sinus rhythm at 63 bpm  LAD noted  No ST-T changes present  The ECG showed 1° AV block  PROCEDURE: The study was performed in the stress lab  A regadenoson infusion  pharmacologic stress test was performed  Gated SPECT myocardial perfusion  imaging was performed after stress and at rest  Systolic blood pressure was 160  mmHg, at the start of the study  Diastolic blood pressure was 90 mmHg, at the  start of the study  The heart rate was 67 bpm, at the start of the study  IV  double checked  Regadenoson protocol:  Time HR bpm SBP mmHg DBP mmHg Symptoms ST change Rhythm/conduct  Baseline 09:38 63 160 90 none -- --  Immediate 09:52 78 130 70 [place] none --  1 min 09:53 79 164 80 subsiding none same as above  2 min 09:54 70 170 90 none -- --  3 min 09:55 71 166 90 none -- same as above  IV aminophylline ( 150 mg) was administered at the 2nd minute of  STRESS SUMMARY: Duration of pharmacologic stress was 3 min  Maximal heart rate  during stress was 79 bpm  The rate-pressure product for the peak heart rate and  blood pressure was 50179  There was no chest pain during stress  The stress  test was terminated due to protocol completion  Pre oxygen saturation: 88 %  Peak oxygen saturation: 93 %  The stress ECG was negative for ischemia and  normal  There were no stress arrhythmias or conduction abnormalities      ISOTOPE ADMINISTRATION:  Resting isotope administration Stress isotope administration  Agent Tetrofosmin Tetrofosmin  Dose 10 8 mCi 30 6 mCi  Date 04/27/2016 04/27/2016  Injection time 08:15 10:00  Imaging time 08:55 12:05  Injection-image interval 40 min 125 min  Time from 1st injection -- 3 5 hours    The radiopharmaceutical was injected at the peak effect of pharmacologic  stress  MYOCARDIAL PERFUSION IMAGING:  The image quality was fair  The patient kept both arms on the side during image  acquisition  Left ventricular size was normal     PERFUSION DEFECTS:  -  There was a moderate-sized, mild intensity, fixed myocardial perfusion  defect of the inferior and anterolateral wall likely due to diaphragm  attenuation  GATED SPECT:  The calculated left ventricular ejection fraction was 75 %  LVED was 62 mL  LVES was 16 mL  Left ventricular ejection fraction was within normal limits by  visual estimate  There was no left ventricular regional abnormality  SUMMARY:  -  Stress results: There was no chest pain during stress  -  ECG conclusions: The stress ECG was negative for ischemia and normal   -  Perfusion imaging: The image quality was fair  The patient kept both arms on  the side during image acquisition  Left ventricular size was normal  There was  a moderate-sized, mild intensity, fixed myocardial perfusion defect of the  inferior and anterolateral wall likely due to diaphragm attenuation  -  Gated  SPECT: The calculated left ventricular ejection fraction was 75 %  LVED was 62  mL  LVES was 16 mL  Left ventricular ejection fraction was within normal limits  by visual estimate  There was no left ventricular regional abnormality  IMPRESSIONS: Normal study  There was image artifact, without diagnostic  evidence for perfusion abnormality   Left ventricular systolic function was  normal     Prepared and signed by    Lamont Pritchett MD  Signed 04/27/2016 13:59:19           EKG/TELE: Personally reviewed  Atrial fibrillation with rate     Imaging: I have personally reviewed pertinent films in PACS

## 2019-02-12 NOTE — ASSESSMENT & PLAN NOTE
· Continue eliquis and metoprolol  · Patient was initially atrial fibrillation with rapid ventricular rate likely secondary to fever  · Heart rate is currently controlled on metoprolol 50 milligram p o  B i d

## 2019-02-12 NOTE — PROGRESS NOTES
Progress Note - Nick Weaver 1935, 81 y o  female MRN: [id number]    Unit/Bed#: 2 [place] [address] Encounter: [id number]    Primary Care Provider: Holland Armas DO   Date and time admitted to hospital: 2/10/2019  5:35 PM        Acute respiratory failure with [facility]capnia ([facility]  Assessment & Plan  · Likely secondary to component of exacerbation of mild persistent asthma, CHF and viral syndrome  · Continue supplemental O2 at 3 liters   Patient usually uses 2 liters of oxygen at night and p r n  As needed during the day  * Mild persistent asthma with exacerbation  Assessment & Plan  · Patient had flu a few weeks ago which essentially resolved  · Likely worsened by while prodrome  · CT of the chest showed no evidence of pneumonia  · Procalcitonin level x2 has been negative  · Patient was started on Solu-Medrol 40 milligram IV q 8 hours which will be tapered to 20 milligram IV q 8 hours  · Continue Breo Ellipta and nebulizers  · Pulmonary consult appreciated    Acute on chronic diastolic congestive heart failure (HCC)  Assessment & Plan  · Continue Lasix 40 milligram IV daily  · Monitor strict I&O and daily weight  · Echo 1/18 - EF of 10-51%, grade 1 diastolic dysfunction  · Patient also scheduled for nuclear stress test in a m  As patient also complained of some chest pain  · Cardiology consult appreciated    SIRS (systemic inflammatory response syndrome) ([facility] )  Assessment & Plan  Patient had temperature of 100 6° last night with rapid atrial fibrillation  CT chest showed no evidence of pneumonia  Likely secondary to viral syndrome  Respiratory pathogen profile is pending  Procalcitonin x2 is negative  Hold off on antibiotics    Atrial fibrillation (HCC)  Assessment & Plan  · Continue eliquis and metoprolol  · Patient was initially atrial fibrillation with rapid ventricular rate likely secondary to fever  · Heart rate is currently controlled on metoprolol 50 milligram p o  B i d      Hypertension  Assessment & Plan  Continue norvasc, metoprolol    Stenosis of left internal carotid artery  Assessment & Plan  Continue aspirin and statin    Obstructive sleep apnea  Assessment & Plan  Does not wear CPAP at home  Patient uses 2 liters of oxygen at night  Hyperlipemia  Assessment & Plan  Continue statin    Lymphedema of left leg  Assessment & Plan  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  Unchanged per patient    Obesity (BMI 30-39  9)  Assessment & Plan  Dietary and lifestyle modification    GERD (gastroesophageal reflux disease)  Assessment & Plan  Continue PPI      VTE Pharmacologic Prophylaxis:   Pharmacologic: Apixaban (Eliquis)  Mechanical VTE Prophylaxis in Place: No    Patient Centered Rounds: I have performed bedside rounds with nursing staff today  Discussions with Specialists or Other Care Team Provider: [facility]  Education and Discussions with Family / Patient:Yes  Time Spent for Care: 45 minutes  More than 50% of total time spent on counseling and coordination of care as described above  Current Length of Stay: 0 day(s)  Current Patient Status: Inpatient     Discharge Plan:  Nursing home    Code Status: Level 1 - Full Code      Subjective:   Patient has improved shortness of breath today  Denies any chest pain, palpitations, abdominal pain, nausea or vomiting  Denies any cough      Objective:     Vitals:   Temp (24hrs), Av 9 °F (36 6 °C), Min:97 6 °F (36 4 °C), Max:98 5 °F (36 9 °C)    Temp:  [97 6 °F (36 4 °C)-98 5 °F (36 9 °C)] 97 6 °F (36 4 °C)  HR:  [] 80  Resp:  [18-22] 20  BP: (103-140)/(59-88) 103/59  SpO2:  [90 %-95 %] 90 %  Body mass index is 38 1 kg/m²  Input and Output Summary (last 24 hours):   No intake or output data in the 24 hours ending 19     Physical Exam:     Physical Exam   Constitutional: No distress  HENT:   Head: Normocephalic and atraumatic  Nose: Nose normal    Eyes: Pupils are equal, round, and reactive to light   Conjunctivae are normal    Neck: Normal range of motion  Neck supple  Cardiovascular: Normal rate and regular rhythm  Murmur heard  Pulmonary/Chest: Effort normal  No respiratory distress  She has no wheezes  She has no rales  Abdominal: Soft  Bowel sounds are normal  She exhibits no distension  There is no tenderness  There is no rebound and no guarding  Musculoskeletal: She exhibits edema  Lymphedema of the left leg   Neurological: She is alert  No cranial nerve deficit  Skin: Skin is warm and dry  No rash noted  Additional Data:     Labs:    Results from last 7 days   Lab Units 02/11/19  0441 02/10/19  1817   WBC Thousand/uL 8 99 7 72   HEMOGLOBIN g/dL 14 9 13 6   HEMATOCRIT % 50 5* 45 3   PLATELETS Thousands/uL 185 156   NEUTROS PCT %  --  77*     Results from last 7 days   Lab Units 02/11/19  0441 02/10/19  1817   SODIUM mmol/L 143 138   POTASSIUM mmol/L 3 3* 3 7   CHLORIDE mmol/L 102 99*   CO2 mmol/L 38* 36*   BUN mg/dL 16 16   CREATININE mg/dL 0 94 0 98   CALCIUM mg/dL 8 9 8 4   TOTAL BILIRUBIN mg/dL  --  0 50   ALK PHOS U/L  --  98   ALT U/L  --  11*   AST U/L  --  12     Results from last 7 days   Lab Units 02/10/19  1817   INR  0 99     Results from last 7 days   Lab Units 02/11/19  0441 02/10/19  2352 02/10/19  1817   TROPONIN I ng/mL <0 02 <0 02 <0 02     Lab Results   Component Value Date/Time    HGBA1C 5 8 05/10/2018 05:14 PM         Results from last 7 days   Lab Units 02/11/19  0441 02/10/19  2352 02/10/19  1817   LACTIC ACID mmol/L  --   --  1 2   PROCALCITONIN ng/ml <0 05 <0 05  --        * I Have Reviewed All Lab Data Listed Above  * Additional Pertinent Lab Tests Reviewed: [place] [address] Admission Reviewed    Imaging:     CT chest wo contrast   Final Result by Babar Rodríguez MD (02/11 1653)      1  Scattered consolidations in both lungs have morphologies most suggestive of atelectasis  Multifocal pneumonia is less likely  Otherwise, no acute thoracic findings        2  No evidence for interstitial lung disease  Workstation performed: [id number]         XR chest 1 view portable   Final Result by Gonzalo Castillo MD (02/11 2182)      Small left pleural effusion basilar atelectasis  Stable cardiomegaly  Workstation performed: [id number]           Imaging Reports Reviewed by myself    Cultures:   Blood Culture:   Lab Results   Component Value Date    BLOODCX No Growth After 5 Days  09/29/2018    BLOODCX No Growth After 5 Days  09/29/2018    BLOODCX No Growth After 5 Days  01/23/2018    BLOODCX No Growth After 5 Days  01/23/2018    BLOODCX No Growth After 5 Days  01/05/2018    BLOODCX No Growth After 5 Days   01/05/2018     Urine Culture:   Lab Results   Component Value Date    URINECX No Growth <1000 cfu/mL 09/29/2018    URINECX >100,000 cfu/ml Mixed Contaminants X5 09/21/2016     Sputum Culture: No components found for: SPUTUMCX  Wound Culture: No results found for: WOUNDCULT    Last 24 Hours Medication List:     Current Facility-Administered Medications:  acetaminophen 650 mg Oral Q6H PRN Laureen Falcon, YAHIRNP   albuterol 2 5 mg Nebulization Q6H PRN Laureen Falcon, GILDA   ALPRAZolam 0 5 mg Oral HS Laureen Falcon, YAHIRNP   amLODIPine 5 mg Oral Daily Laureen Falcon, YAHIRNP   apixaban 5 mg Oral Q12H [address] Yovanikii Ezekiel, YAHIRNP   aspirin 81 mg Oral Daily Laureen Falcon, YAHIRNP   atorvastatin 40 mg Oral Daily With Dinner Laureen Falcon, CRNP   calcium carbonate 1 tablet Oral BID With Meals Laureen Falcon, GILDA   cholecalciferol 400 Units Oral BID Laureen Falcon, GILDA   fluticasone 1 spray Each Nare Daily Laureen Falcon, GILDA   fluticasone-vilanterol 1 puff Inhalation Daily GILDA Bernstein   [START ON 2/12/2019] furosemide 40 mg Intravenous Daily Kyle Ngo, CRNP   ipratropium-albuterol 3 mL Nebulization Q6H Laureen Falcon, GILDA   loratadine 10 mg Oral Daily Laureen Falcon, GILDA   magnesium oxide 400 mg Oral BID GILDA Bernstein methylPREDNISolone sodium succinate 20 mg Intravenous Q8H Milli Burks PA-C   metoprolol tartrate 50 mg Oral Q12H [facility] [place] Slim, CRNP   ondansetron 4 mg Intravenous Q6H PRN [place] Slim, CRNP   oxybutynin 10 mg Oral Daily [place] Slim, CRNP   pantoprazole 20 mg Oral Early Morning [place] Slim, CRNP   potassium chloride 10 mEq Oral Daily [place] Slim, CRNP   pregabalin 100 mg Oral BID [place] Slim, CRNP        Today, Patient Was Seen By: Mary Lofton MD    ** Please Note: Dragon 360 Dictation voice to text software may have been used in the creation of this document   **

## 2019-02-12 NOTE — PLAN OF CARE
Problem: Potential for Falls  Goal: Patient will remain free of falls  Description  INTERVENTIONS:  - Assess patient frequently for physical needs  -  Identify cognitive and physical deficits and behaviors that affect risk of falls    -  [place] fall precautions as indicated by assessment   - Educate patient/family on patient safety including physical limitations  - Instruct patient to call for assistance with activity based on assessment  - Modify environment to reduce risk of injury  - Consider OT/PT consult to assist with strengthening/mobility  Outcome: Progressing     Problem: Prexisting or High Potential for Compromised Skin Integrity  Goal: Skin integrity is maintained or improved  Description  INTERVENTIONS:  - Identify patients at risk for skin breakdown  - Assess and monitor skin integrity  - Assess and monitor nutrition and hydration status  - Monitor labs (i e  albumin)  - Assess for incontinence   - Turn and reposition patient  - Assist with mobility/ambulation  - Relieve pressure over bony prominences  - Avoid friction and shearing  - Provide appropriate hygiene as needed including keeping skin clean and dry  - Evaluate need for skin moisturizer/barrier cream  - Collaborate with interdisciplinary team (i e  Nutrition, Rehabilitation, etc )   - Patient/family teaching  Outcome: Progressing     Problem: RESPIRATORY - ADULT  Goal: Achieves optimal ventilation and oxygenation  Description  INTERVENTIONS:  - Assess for changes in respiratory status  - Assess for changes in mentation and behavior  - Position to facilitate oxygenation and minimize respiratory effort  - Oxygen administration by appropriate delivery method based on oxygen saturation (per order) or ABGs  - Initiate smoking cessation education as indicated  - Encourage broncho-pulmonary hygiene including cough, deep breathe, Incentive Spirometry  - Assess the need for suctioning and aspirate as needed  - Assess and instruct to report SOB or any respiratory difficulty  - Respiratory Therapy support as indicated  Outcome: Progressing     Problem: CARDIOVASCULAR - ADULT  Goal: Maintains optimal cardiac output and hemodynamic stability  Description  INTERVENTIONS:  - Monitor I/O, vital signs and rhythm  - Monitor for S/S and trends of decreased cardiac output i e  bleeding, hypotension  - Administer and titrate ordered vasoactive medications to optimize hemodynamic stability  - Assess quality of pulses, skin color and temperature  - Assess for signs of decreased coronary artery perfusion - ex   Angina  - Instruct patient to report change in severity of symptoms  Outcome: Progressing  Goal: Absence of cardiac dysrhythmias or at baseline rhythm  Description  INTERVENTIONS:  - Continuous cardiac monitoring, monitor vital signs, obtain 12 lead EKG if indicated  - Administer antiarrhythmic and heart rate control medications as ordered  - Monitor electrolytes and administer replacement therapy as ordered  Outcome: Progressing     Problem: METABOLIC, FLUID AND ELECTROLYTES - ADULT  Goal: Electrolytes maintained within normal limits  Description  INTERVENTIONS:  - Monitor labs and assess patient for signs and symptoms of electrolyte imbalances  - Administer electrolyte replacement as ordered  - Monitor response to electrolyte replacements, including repeat lab results as appropriate  - Instruct patient on fluid and nutrition as appropriate  Outcome: Progressing  Goal: Fluid balance maintained  Description  INTERVENTIONS:  - Monitor labs and assess for signs and symptoms of volume excess or deficit  - Monitor I/O and WT  - Instruct patient on fluid and nutrition as appropriate  Outcome: Progressing     Problem: SKIN/TISSUE INTEGRITY - ADULT  Goal: Skin integrity remains intact  Description  INTERVENTIONS  - Identify patients at risk for skin breakdown  - Assess and monitor skin integrity  - Assess and monitor nutrition and hydration status  - Monitor labs (i e  albumin)  - Assess for incontinence   - Turn and reposition patient  - Assist with mobility/ambulation  - Relieve pressure over bony prominences  - Avoid friction and shearing  - Provide appropriate hygiene as needed including keeping skin clean and dry  - Evaluate need for skin moisturizer/barrier cream  - Collaborate with interdisciplinary team (i e  Nutrition, Rehabilitation, etc )   - Patient/family teaching  Outcome: Progressing  Goal: Incision(s), wounds(s) or drain site(s) healing without S/S of infection  Description  INTERVENTIONS  - Assess and document risk factors for skin impairment   - Assess and document dressing, incision, wound bed, drain sites and surrounding tissue  - Initiate Nutrition services consult and/or wound management as needed  Outcome: Progressing     Problem: DISCHARGE PLANNING - CARE MANAGEMENT  Goal: Discharge to post-acute care or home with appropriate resources  Description  INTERVENTIONS:  - Conduct assessment to determine patient/family and health care team treatment goals, and need for post-acute services based on payer coverage, community resources, and patient preferences, and barriers to discharge  - Address psychosocial, clinical, and financial barriers to discharge as identified in assessment in conjunction with the patient/family and health care team  - Arrange appropriate level of post-acute services according to patient's   needs and preference and payer coverage in collaboration with the physician and health care team  - Communicate with and update the patient/family, physician, and health care team regarding progress on the discharge plan  - Arrange appropriate transportation to post-acute venues    TRANSFER BACK TO [address] [place] [address]   Outcome: Progressing

## 2019-02-12 NOTE — ASSESSMENT & PLAN NOTE
· Likely secondary to component of exacerbation of mild persistent asthma, CHF and viral syndrome  · Continue supplemental O2 at 3 liters   Patient usually uses 2 liters of oxygen at night and p r n  As needed during the day

## 2019-02-12 NOTE — PROGRESS NOTES
Notified that pt's blood culture is positive for Gram positive cocci in clusters  Notes indicate that pt was febrile with antibiotics on hold as viral syndrome was suspected  Procalcitonin level was negative x2  This may be a contaminant however will given a one time dose of Vancomycin and follow up on identification

## 2019-02-12 NOTE — PROGRESS NOTES
Progress Note - Pulmonary   Ramona Fabian 81 y o  female MRN: [id number]  Unit/Bed#: [address] [place] [address] Encounter: [id number]    Assessment:  Dyspnea improved  RSV respiratory tract infection  Nasal swab positive for RSV-B  Acute exacerbation mild to moderate persistent asthma improved  Also some of her dyspnea may been due to acute on chronic diastolic congestive heart failure  History of sleep-related hypoxemia  Patient uses 2 L of oxygen at home at bedtime  History Merkel cell cancer  Patient had resection of left lower extremity lymph nodes and does have some chronic edema of her left leg  She previously had a mass 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 and had radiation and chemotherapy for 2 years  She did have metastasis to the groin ProAir  Patient still has a right-sided Port-A-Cath in place  Positive blood cultures 2 of 2 sets from 02/10/2019 showing gram-positive cocci in clusters  Awaiting final identification on blood culture to help determine if this is true infection or possible skin contaminant    Plan: Will decrease methylprednisone to 20 mg every 12 hours  Continue neb treatments with DuoNeb q i d  Patient was given 1 dose of vancomycin yesterday and may need to consider re-dosing this pending final results of blood cultures      Subjective:   Has some mild shortness of breath at times  States has occasional cough productive for small amount of yellow mucus  Objective:     Vitals: Blood pressure 113/65, pulse 89, temperature 97 5 °F (36 4 °C), temperature source Oral, resp  rate 18, height 5' 2" (1 575 m), weight 94 5 kg (208 lb 5 4 oz), SpO2 93 %, not currently breastfeeding  ,Body mass index is 38 1 kg/m²  Intake/Output Summary (Last 24 hours) at 2/12/2019 1221  Last data filed at 2/12/2019 0900  Gross per 24 hour   Intake    Output 875 ml   Net -875 ml       Physical Exam: Physical Exam   Constitutional: She is oriented to person, place, and time  She appears well-developed and well-nourished   No distress  On 2 L of oxygen O2 saturation 94%   HENT:   Head: Normocephalic  Nose: Nose normal    Mouth/Throat: Oropharynx is clear and moist  No oropharyngeal exudate  Eyes: Pupils are equal, round, and reactive to light  Conjunctivae are normal    Neck: Neck supple  No JVD present  No tracheal deviation present  Cardiovascular: Normal rate, regular rhythm and normal heart sounds  Pulmonary/Chest: Effort normal    Lung sounds are decreased but clear   Abdominal: Soft  She exhibits no distension  There is no tenderness  There is no guarding  Musculoskeletal:   Has +1 edema of right lower extremity +2 edema left lower extremity   Lymphadenopathy:     She has no cervical adenopathy  Neurological: She is alert and oriented to person, place, and time  Skin: Skin is warm and dry  No rash noted  Psychiatric: She has a normal mood and affect  Her behavior is normal  Thought content normal         Labs: I have personally reviewed pertinent lab results  , ABG:   Lab Results   Component Value Date    PHART 7 556 (H) 10/01/2018    PPQ8LGW 42 3 10/01/2018    PO2ART 61 0 (L) 10/01/2018    EQS6VZU 37 6 (H) 10/01/2018    BEART 14 0 10/01/2018    SOURCE Brachial, Left 10/01/2018   , BNP: No results found for: BNP, CBC:   Lab Results   Component Value Date    WBC 8 99 02/11/2019    HGB 14 9 02/11/2019    HCT 50 5 (H) 02/11/2019    MCV 88 02/11/2019     02/11/2019    ADJUSTEDWBC 4 00 (L) 09/25/2016    MCH 26 0 (L) 02/11/2019    MCHC 29 5 (L) 02/11/2019    RDW 15 5 (H) 02/11/2019    MPV 11 1 02/11/2019    NRBC 0 02/10/2019   , CMP:   Lab Results   Component Value Date    K 4 4 02/12/2019    CL 99 (L) 02/12/2019    CO2 33 (H) 02/12/2019    BUN 29 (H) 02/12/2019    CREATININE 1 00 02/12/2019    CALCIUM 8 9 02/12/2019    AST 12 02/10/2019    ALT 11 (L) 02/10/2019    ALKPHOS 98 02/10/2019    EGFR 53 02/12/2019   , PT/INR:   Lab Results   Component Value Date    INR 0 99 02/10/2019   , Troponin: No results found for: TROPONIN    Imaging and other studies: I have personally reviewed pertinent reports     and I have personally reviewed pertinent films in PACS

## 2019-02-12 NOTE — ASSESSMENT & PLAN NOTE
Patient had temperature of 100 6° on day of admission which has since resolved, likely due to RSV B infection  CT chest showed no evidence of pneumonia  Procalcitonin x2 is negative

## 2019-02-12 NOTE — PROGRESS NOTES
Vancomycin Assessment    Pia Cantu is a 81 y o  female who is currently receiving vancomycin 750 mg IV q 12 h for blood culture showed gram-positive cocci in clusters   Relevant clinical data and objective history reviewed:  Creatinine   Date Value Ref Range Status   02/12/2019 1 00 0 60 - 1 30 mg/dL Final     Comment:     Standardized to IDMS reference method   02/11/2019 0 94 0 60 - 1 30 mg/dL Final     Comment:     Standardized to IDMS reference method   02/10/2019 0 98 0 60 - 1 30 mg/dL Final     Comment:     Standardized to IDMS reference method     /63 (BP Location: Left arm)   Pulse 96   Temp 97 5 °F (36 4 °C) (Oral)   Resp 18   Ht 5' 2" (1 575 m)   Wt 94 5 kg (208 lb 5 4 oz)   SpO2 93%   BMI 38 10 kg/m²   I/O last 3 completed shifts:  In: -   Out: 500 [Urine:500]  Lab Results   Component Value Date/Time    BUN 29 (H) 02/12/2019 05:14 AM    WBC 8 99 02/11/2019 04:41 AM    HGB 14 9 02/11/2019 04:41 AM    HCT 50 5 (H) 02/11/2019 04:41 AM    MCV 88 02/11/2019 04:41 AM     02/11/2019 04:41 AM     Temp Readings from Last 3 Encounters:   02/12/19 97 5 °F (36 4 °C) (Oral)   10/05/18 98 2 °F (36 8 °C) (Oral)   08/27/18 99 7 °F (37 6 °C)     Vancomycin Days of Therapy: 1    Assessment/Plan  The patient is currently on vancomycin utilizing scheduled dosing based on adjusted body weight (due to obesity)  Baseline risks associated with therapy include: pre-existing renal impairment, concomitant nephrotoxic medications, advanced age and dehydration  The patient is currently receiving 750 mg IV q 12 h and is clinically appropriate and dose will be continued  Pharmacy will also follow closely for s/sx of nephrotoxicity, infusion reactions and appropriateness of therapy  BMP and CBC will be ordered per protocol  Plan for trough as patient approaches steady state, prior to the 3rd dose at approximately 1730 on 2/13/19    Due to infection severity, will target a trough of 15-20 (appropriate for most indications)   Pharmacy will continue to follow the patient?s culture results and clinical progress daily      Jeffrey Edward, Pharmacist

## 2019-02-12 NOTE — ASSESSMENT & PLAN NOTE
· Patient had flu a few weeks prior to admission which essentially resolved  · Likely due to RSV B infection  · CT of the chest showed no evidence of pneumonia  · Procalcitonin level x2 has been negative  · Symptoms improved  Off IV Solu-Medrol and transitioned to p o   Prednisone taper on discharge  · Continue Advair and nebulizers

## 2019-02-12 NOTE — ASSESSMENT & PLAN NOTE
· Now appears euvolemic  IV Lasix was discontinued and patient started on p   O  Lasix   · Echo 1/18 - EF of 22-11%, grade 1 diastolic dysfunction  · As patient also complained of some chest pain, she had stress test done which showed no evidence of ischemia  · Repeat echo during this admission showed normal EF of 55% with grade 1 diastolic dysfunction

## 2019-02-12 NOTE — ASSESSMENT & PLAN NOTE
· Continue Lasix 40 milligram IV daily  · Monitor strict I&O and daily weight  · Echo 1/18 - EF of 46-05%, grade 1 diastolic dysfunction  · Patient also scheduled for nuclear stress test in a m  As patient also complained of some chest pain    · Cardiology consult appreciated

## 2019-02-12 NOTE — ASSESSMENT & PLAN NOTE
· Continue metoprolol, eliquis  · Patient was initially atrial fibrillation with rapid ventricular rate likely secondary to fever but now rate controlled

## 2019-02-13 ENCOUNTER — APPOINTMENT (INPATIENT)
Dept: NON INVASIVE DIAGNOSTICS | Facility: HOSPITAL | Age: 84
DRG: 871 | End: 2019-02-13
Payer: MEDICARE

## 2019-02-13 LAB
ANION GAP SERPL CALCULATED.3IONS-SCNC: 5 MMOL/L (ref 4–13)
BUN SERPL-MCNC: 32 MG/DL (ref 5–25)
CALCIUM SERPL-MCNC: 9.2 MG/DL (ref 8.3–10.1)
CHLORIDE SERPL-SCNC: 100 MMOL/L (ref 100–108)
CO2 SERPL-SCNC: 32 MMOL/L (ref 21–32)
CREAT SERPL-MCNC: 1.12 MG/DL (ref 0.6–1.3)
GFR SERPL CREATININE-BSD FRML MDRD: 46 ML/MIN/1.73SQ M
GLUCOSE SERPL-MCNC: 175 MG/DL (ref 65–140)
POTASSIUM SERPL-SCNC: 5.1 MMOL/L (ref 3.5–5.3)
SODIUM SERPL-SCNC: 137 MMOL/L (ref 136–145)
VANCOMYCIN TROUGH SERPL-MCNC: 20.2 UG/ML (ref 10–20)

## 2019-02-13 PROCEDURE — 94760 N-INVAS EAR/PLS OXIMETRY 1: CPT

## 2019-02-13 PROCEDURE — 80202 ASSAY OF VANCOMYCIN: CPT | Performed by: FAMILY MEDICINE

## 2019-02-13 PROCEDURE — 99232 SBSQ HOSP IP/OBS MODERATE 35: CPT | Performed by: FAMILY MEDICINE

## 2019-02-13 PROCEDURE — 80048 BASIC METABOLIC PNL TOTAL CA: CPT | Performed by: FAMILY MEDICINE

## 2019-02-13 PROCEDURE — 99223 1ST HOSP IP/OBS HIGH 75: CPT | Performed by: INTERNAL MEDICINE

## 2019-02-13 PROCEDURE — 94640 AIRWAY INHALATION TREATMENT: CPT

## 2019-02-13 PROCEDURE — 99232 SBSQ HOSP IP/OBS MODERATE 35: CPT | Performed by: INTERNAL MEDICINE

## 2019-02-13 RX ADMIN — OXYBUTYNIN CHLORIDE 10 MG: 5 TABLET, EXTENDED RELEASE ORAL at 08:55

## 2019-02-13 RX ADMIN — LORATADINE 10 MG: 10 TABLET ORAL at 08:57

## 2019-02-13 RX ADMIN — VANCOMYCIN HYDROCHLORIDE 750 MG: 750 INJECTION, SOLUTION INTRAVENOUS at 19:22

## 2019-02-13 RX ADMIN — PREGABALIN 100 MG: 100 CAPSULE ORAL at 08:56

## 2019-02-13 RX ADMIN — APIXABAN 5 MG: 5 TABLET, FILM COATED ORAL at 08:56

## 2019-02-13 RX ADMIN — NYSTATIN 1 APPLICATION: 100000 POWDER TOPICAL at 22:00

## 2019-02-13 RX ADMIN — ATORVASTATIN CALCIUM 40 MG: 40 TABLET, FILM COATED ORAL at 16:10

## 2019-02-13 RX ADMIN — METOPROLOL TARTRATE 50 MG: 50 TABLET, FILM COATED ORAL at 08:58

## 2019-02-13 RX ADMIN — VANCOMYCIN HYDROCHLORIDE 750 MG: 750 INJECTION, SOLUTION INTRAVENOUS at 06:38

## 2019-02-13 RX ADMIN — CHOLECALCIFEROL (VITAMIN D3) 10 MCG (400 UNIT) TABLET 400 UNITS: at 08:56

## 2019-02-13 RX ADMIN — ALBUTEROL SULFATE 2.5 MG: 2.5 SOLUTION RESPIRATORY (INHALATION) at 16:27

## 2019-02-13 RX ADMIN — Medication 400 MG: at 18:10

## 2019-02-13 RX ADMIN — PREGABALIN 100 MG: 100 CAPSULE ORAL at 18:10

## 2019-02-13 RX ADMIN — Medication 1 TABLET: at 16:09

## 2019-02-13 RX ADMIN — ALPRAZOLAM 0.5 MG: 0.5 TABLET ORAL at 22:00

## 2019-02-13 RX ADMIN — Medication 1 TABLET: at 08:56

## 2019-02-13 RX ADMIN — POTASSIUM CHLORIDE 10 MEQ: 750 TABLET, EXTENDED RELEASE ORAL at 08:57

## 2019-02-13 RX ADMIN — PANTOPRAZOLE SODIUM 20 MG: 20 TABLET, DELAYED RELEASE ORAL at 06:37

## 2019-02-13 RX ADMIN — APIXABAN 5 MG: 5 TABLET, FILM COATED ORAL at 21:59

## 2019-02-13 RX ADMIN — AMLODIPINE BESYLATE 5 MG: 5 TABLET ORAL at 08:58

## 2019-02-13 RX ADMIN — FLUTICASONE FUROATE AND VILANTEROL TRIFENATATE 1 PUFF: 200; 25 POWDER RESPIRATORY (INHALATION) at 08:59

## 2019-02-13 RX ADMIN — Medication 400 MG: at 09:04

## 2019-02-13 RX ADMIN — METHYLPREDNISOLONE SODIUM SUCCINATE 20 MG: 40 INJECTION, POWDER, FOR SOLUTION INTRAMUSCULAR; INTRAVENOUS at 22:00

## 2019-02-13 RX ADMIN — NYSTATIN 1 APPLICATION: 100000 POWDER TOPICAL at 08:55

## 2019-02-13 RX ADMIN — METHYLPREDNISOLONE SODIUM SUCCINATE 20 MG: 40 INJECTION, POWDER, FOR SOLUTION INTRAMUSCULAR; INTRAVENOUS at 08:55

## 2019-02-13 RX ADMIN — FLUTICASONE PROPIONATE 1 SPRAY: 50 SPRAY, METERED NASAL at 08:59

## 2019-02-13 RX ADMIN — METOPROLOL TARTRATE 50 MG: 50 TABLET, FILM COATED ORAL at 22:00

## 2019-02-13 RX ADMIN — ASPIRIN 81 MG: 81 TABLET, COATED ORAL at 08:56

## 2019-02-13 RX ADMIN — CHOLECALCIFEROL (VITAMIN D3) 10 MCG (400 UNIT) TABLET 400 UNITS: at 18:10

## 2019-02-13 NOTE — PLAN OF CARE
Problem: Potential for Falls  Goal: Patient will remain free of falls  Description  INTERVENTIONS:  - Assess patient frequently for physical needs  -  Identify cognitive and physical deficits and behaviors that affect risk of falls    -  [place] fall precautions as indicated by assessment   - Educate patient/family on patient safety including physical limitations  - Instruct patient to call for assistance with activity based on assessment  - Modify environment to reduce risk of injury  - Consider OT/PT consult to assist with strengthening/mobility  Outcome: Progressing     Problem: Prexisting or High Potential for Compromised Skin Integrity  Goal: Skin integrity is maintained or improved  Description  INTERVENTIONS:  - Identify patients at risk for skin breakdown  - Assess and monitor skin integrity  - Assess and monitor nutrition and hydration status  - Monitor labs (i e  albumin)  - Assess for incontinence   - Turn and reposition patient  - Assist with mobility/ambulation  - Relieve pressure over bony prominences  - Avoid friction and shearing  - Provide appropriate hygiene as needed including keeping skin clean and dry  - Evaluate need for skin moisturizer/barrier cream  - Collaborate with interdisciplinary team (i e  Nutrition, Rehabilitation, etc )   - Patient/family teaching  Outcome: Progressing     Problem: RESPIRATORY - ADULT  Goal: Achieves optimal ventilation and oxygenation  Description  INTERVENTIONS:  - Assess for changes in respiratory status  - Assess for changes in mentation and behavior  - Position to facilitate oxygenation and minimize respiratory effort  - Oxygen administration by appropriate delivery method based on oxygen saturation (per order) or ABGs  - Initiate smoking cessation education as indicated  - Encourage broncho-pulmonary hygiene including cough, deep breathe, Incentive Spirometry  - Assess the need for suctioning and aspirate as needed  - Assess and instruct to report SOB or any respiratory difficulty  - Respiratory Therapy support as indicated  Outcome: Progressing     Problem: CARDIOVASCULAR - ADULT  Goal: Maintains optimal cardiac output and hemodynamic stability  Description  INTERVENTIONS:  - Monitor I/O, vital signs and rhythm  - Monitor for S/S and trends of decreased cardiac output i e  bleeding, hypotension  - Administer and titrate ordered vasoactive medications to optimize hemodynamic stability  - Assess quality of pulses, skin color and temperature  - Assess for signs of decreased coronary artery perfusion - ex   Angina  - Instruct patient to report change in severity of symptoms  Outcome: Progressing  Goal: Absence of cardiac dysrhythmias or at baseline rhythm  Description  INTERVENTIONS:  - Continuous cardiac monitoring, monitor vital signs, obtain 12 lead EKG if indicated  - Administer antiarrhythmic and heart rate control medications as ordered  - Monitor electrolytes and administer replacement therapy as ordered  Outcome: Progressing     Problem: METABOLIC, FLUID AND ELECTROLYTES - ADULT  Goal: Electrolytes maintained within normal limits  Description  INTERVENTIONS:  - Monitor labs and assess patient for signs and symptoms of electrolyte imbalances  - Administer electrolyte replacement as ordered  - Monitor response to electrolyte replacements, including repeat lab results as appropriate  - Instruct patient on fluid and nutrition as appropriate  Outcome: Progressing  Goal: Fluid balance maintained  Description  INTERVENTIONS:  - Monitor labs and assess for signs and symptoms of volume excess or deficit  - Monitor I/O and WT  - Instruct patient on fluid and nutrition as appropriate  Outcome: Progressing     Problem: SKIN/TISSUE INTEGRITY - ADULT  Goal: Skin integrity remains intact  Description  INTERVENTIONS  - Identify patients at risk for skin breakdown  - Assess and monitor skin integrity  - Assess and monitor nutrition and hydration status  - Monitor labs (i e  albumin)  - Assess for incontinence   - Turn and reposition patient  - Assist with mobility/ambulation  - Relieve pressure over bony prominences  - Avoid friction and shearing  - Provide appropriate hygiene as needed including keeping skin clean and dry  - Evaluate need for skin moisturizer/barrier cream  - Collaborate with interdisciplinary team (i e  Nutrition, Rehabilitation, etc )   - Patient/family teaching  Outcome: Progressing  Goal: Incision(s), wounds(s) or drain site(s) healing without S/S of infection  Description  INTERVENTIONS  - Assess and document risk factors for skin impairment   - Assess and document dressing, incision, wound bed, drain sites and surrounding tissue  - Initiate Nutrition services consult and/or wound management as needed  Outcome: Progressing     Problem: DISCHARGE PLANNING - CARE MANAGEMENT  Goal: Discharge to post-acute care or home with appropriate resources  Description  INTERVENTIONS:  - Conduct assessment to determine patient/family and health care team treatment goals, and need for post-acute services based on payer coverage, community resources, and patient preferences, and barriers to discharge  - Address psychosocial, clinical, and financial barriers to discharge as identified in assessment in conjunction with the patient/family and health care team  - Arrange appropriate level of post-acute services according to patient's   needs and preference and payer coverage in collaboration with the physician and health care team  - Communicate with and update the patient/family, physician, and health care team regarding progress on the discharge plan  - Arrange appropriate transportation to post-acute venues    TRANSFER BACK TO [address] [place] [address]   Outcome: Progressing

## 2019-02-13 NOTE — CONSULTS
Consultation - Infectious Disease   Caryn Ricci 81 y o  female MRN: [id number]  Unit/Bed#: [address] [place] [address] Encounter: [id number]      IMPRESSION & RECOMMENDATIONS:   Impression/Recommendations:  1  Sepsis, POA  Fever and tachycardia  Due to #2  Unclear relevance of #3  Procalcitonin normal   Clinically stable and nontoxic  Rec:  · Continue antibiotics as below  · Follow temperatures closely  · Check CBC in a m  · Supportive care as per the primary service    2   RSV tracheobronchitis  Clinically stable without respiratory distress  Rec:  · Continue supportive care    3  GPC bacteremia  Two sets with late growth, 1 drawn from line  Consider contaminant  Consider bacterial pulmonary superinfection in setting of # 2  Consider port infection  Rec:  · Continue vancomycin for now  · Pharmacy consult for vancomycin dosing  · Follow-up identification and susceptibilities  · If both blood cultures are the same, would consider real bacteremia and will need course of IV antibiotics and consideration of port removal  · With both blood cultures are different, would consider contaminant and discontinue antibiotics    4  Port a cath in place  No evidence of alanis cellulitis    Discussed in detail with Dr Ale Hung  Antibiotics:  Vancomycin # 2    Thank you for this consultation  We will follow along with you  HISTORY OF PRESENT ILLNESS:  Reason for Consult: Bacteremia    HPI: Caryn Ricci is a 81 y o  female history of COPD who initially presented to the emergency department on 02/10 complaining of intermittent chest pain and shortness of breath  She states that several weeks ago she was diagnosed with influenza and the setting of an outbreak at her nursing facility  Upon presentation she was noted to be febrile with tachycardia and tachypnea  Her labs revealed a normal WBC  She underwent a CT of the chest which showed scattered atelectasis versus pneumonia    She was given nebulizers and IV steroids and was started on azithromycin and Lasix  She had an RSV PCR which was positive  She had blood cultures from admission which are now growing gram-positive cocci in clusters  She was started on vancomycin  Of note the patient has a Port-A-Cath in place for a prior history of Merkel cell cancer  REVIEW OF SYSTEMS:  Patient currently feels well and is eager to go home  Denies any fevers, chills, or sweats  Her breathing feels improved  A complete system-based review of systems is otherwise negative  PAST MEDICAL HISTORY:  Past Medical History:   Diagnosis Date    Asthma     Atrial fibrillation ([facility] [address] )     Cancer (HCC)     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    Cardiac disease     a fib    COPD (chronic obstructive pulmonary disease) (HCC)     Fibromyalgia     Fibromyalgia, primary     GERD (gastroesophageal reflux disease)     History of methicillin resistant staphylococcus aureus (MRSA)     Positive MRSA (nares) 9/21/2016  Negative nasal culture 9/29/2018 - Isolation discontinued 10/4/2018    Hypertension     Hypokalemia     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[facility] [address] )     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, left groin, lung and bladder  Patient treated with chemotherapy and radiation     Past Surgical History:   Procedure Laterality Date    APPENDECTOMY      CATARACT EXTRACTION      CHEST WALL BIOPSY N/A 10/27/2017    Procedure: SINUS EXCISION AND REMOVAL OF FOREIGN BODY, ABDOMEN (WOUND EXPLORATION);   Surgeon: Ernestina Clifford MD;  Location: [address] [place] [address];  Service: General    EYE SURGERY Bilateral     cataracts     HIP FRACTURE SURGERY Left     HYSTERECTOMY      JOINT REPLACEMENT Left     hip    LYMPHADENECTOMY      PORTACATH PLACEMENT Right        FAMILY HISTORY:  Non-contributory    SOCIAL HISTORY:  Social History     Substance and Sexual Activity   Alcohol Use Not Currently     Social History     Substance and Sexual Activity   Drug Use No     Social History Tobacco Use   Smoking Status Former Smoker   Smokeless Tobacco Never Used   Tobacco Comment    0 5 PPD for 4 year       ALLERGIES:  Allergies   Allergen Reactions    Fentanyl And Related Other (See Comments)     Passed out    Latex Rash    Penicillins Rash       MEDICATIONS:  All current active medications have been reviewed  PHYSICAL EXAM:  Vitals:  Temp:  [97 5 °F (36 4 °C)-97 9 °F (36 6 °C)] 97 9 °F (36 6 °C)  HR:  [75-96] 77  Resp:  [16-18] 18  BP: (109-124)/(63-73) 124/73  SpO2:  [93 %-95 %] 95 %  Temp (24hrs), Av 6 °F (36 4 °C), Min:97 5 °F (36 4 °C), Max:97 9 °F (36 6 °C)  Current: Temperature: 97 9 °F (36 6 °C)     Physical Exam:  General:  Well-nourished, well-developed, in no acute distress  Eyes:  Conjunctive clear with no hemorrhages or effusions  Oropharynx:  No ulcers, no lesions  Neck:  Supple, no lymphadenopathy  Chest:  Right anterior chest wall port intact without erythema  Lungs:  Clear to auscultation bilaterally, no accessory muscle use  Cardiac:  Regular rate and rhythm, no murmurs  Abdomen:  Soft, non-tender, non-distended  Extremities:  No peripheral cyanosis, clubbing, or edema  Skin:  No rashes, no ulcers  Neurological:  Moves all four extremities spontaneously, sensation grossly intact    LABS, IMAGING, & OTHER STUDIES:  Lab Results:  I have personally reviewed pertinent labs    Results from last 7 days   Lab Units 19  0557 19  0514 02/11/19  0441 02/10/19  1817   POTASSIUM mmol/L 5 1 4 4 3 3* 3 7   CHLORIDE mmol/L 100 99* 102 99*   CO2 mmol/L 32 33* 38* 36*   BUN mg/dL 32* 29* 16 16   CREATININE mg/dL 1 12 1 00 0 94 0 98   EGFR ml/min/1 73sq m 46 53 57 54   CALCIUM mg/dL 9 2 8 9 8 9 8 4   AST U/L  --   --   --  12   ALT U/L  --   --   --  11*   ALK PHOS U/L  --   --   --  98     Results from last 7 days   Lab Units 19  0441 02/10/19  1817   WBC Thousand/uL 8 99 7 72   HEMOGLOBIN g/dL 14 9 13 6   PLATELETS Thousands/uL 185 156     Results from last 7 days Lab Units 02/10/19  2122 02/10/19  1837 02/10/19  1817   GRAM STAIN RESULT   --  Gram positive cocci in clusters* Gram positive cocci in clusters*   MRSA CULTURE ONLY  No Methicillin Resistant Staphlyococcus aureus (MRSA) isolated  --   --        Imaging Studies:   I have personally reviewed pertinent imaging study reports and images in PACS  CT chest scattered consolidation suggestive of atelectasis  EKG, Pathology, and Other Studies:   I have personally reviewed pertinent reports

## 2019-02-13 NOTE — PLAN OF CARE
Problem: Potential for Falls  Goal: Patient will remain free of falls  Description  INTERVENTIONS:  - Assess patient frequently for physical needs  -  Identify cognitive and physical deficits and behaviors that affect risk of falls    -  [place] fall precautions as indicated by assessment   - Educate patient/family on patient safety including physical limitations  - Instruct patient to call for assistance with activity based on assessment  - Modify environment to reduce risk of injury  - Consider OT/PT consult to assist with strengthening/mobility  2/13/2019 1454 by Stevenson Akhtar RN  Outcome: Progressing  2/13/2019 1454 by Stevenson Akhtar RN  Outcome: Progressing  2/13/2019 1453 by Stevenson Akhtar RN  Outcome: Progressing  2/13/2019 1039 by Stevenson Akhtar RN  Outcome: Progressing     Problem: Prexisting or High Potential for Compromised Skin Integrity  Goal: Skin integrity is maintained or improved  Description  INTERVENTIONS:  - Identify patients at risk for skin breakdown  - Assess and monitor skin integrity  - Assess and monitor nutrition and hydration status  - Monitor labs (i e  albumin)  - Assess for incontinence   - Turn and reposition patient  - Assist with mobility/ambulation  - Relieve pressure over bony prominences  - Avoid friction and shearing  - Provide appropriate hygiene as needed including keeping skin clean and dry  - Evaluate need for skin moisturizer/barrier cream  - Collaborate with interdisciplinary team (i e  Nutrition, Rehabilitation, etc )   - Patient/family teaching  2/13/2019 1454 by Stevenson Akhtar RN  Outcome: Progressing  2/13/2019 1454 by Stevenson Akhtar RN  Outcome: Progressing  2/13/2019 1453 by Stevenson Akhtar RN  Outcome: Progressing  2/13/2019 1039 by Stevenson Akhtar RN  Outcome: Progressing     Problem: RESPIRATORY - ADULT  Goal: Achieves optimal ventilation and oxygenation  Description  INTERVENTIONS:  - Assess for changes in respiratory status  - Assess for changes in mentation and behavior  - Position to facilitate oxygenation and minimize respiratory effort  - Oxygen administration by appropriate delivery method based on oxygen saturation (per order) or ABGs  - Initiate smoking cessation education as indicated  - Encourage broncho-pulmonary hygiene including cough, deep breathe, Incentive Spirometry  - Assess the need for suctioning and aspirate as needed  - Assess and instruct to report SOB or any respiratory difficulty  - Respiratory Therapy support as indicated  2/13/2019 1454 by Rosemary Ziegler RN  Outcome: Progressing  2/13/2019 1454 by Rosemary Ziegler RN  Outcome: Progressing  2/13/2019 1453 by Rosemary Ziegler RN  Outcome: Progressing  2/13/2019 1039 by Rosemary Ziegler RN  Outcome: Progressing     Problem: CARDIOVASCULAR - ADULT  Goal: Maintains optimal cardiac output and hemodynamic stability  Description  INTERVENTIONS:  - Monitor I/O, vital signs and rhythm  - Monitor for S/S and trends of decreased cardiac output i e  bleeding, hypotension  - Administer and titrate ordered vasoactive medications to optimize hemodynamic stability  - Assess quality of pulses, skin color and temperature  - Assess for signs of decreased coronary artery perfusion - ex   Angina  - Instruct patient to report change in severity of symptoms  2/13/2019 1454 by Rosemary Ziegler RN  Outcome: Progressing  2/13/2019 1454 by Rosemary Ziegler RN  Outcome: Progressing  2/13/2019 1453 by Rosemary Ziegler RN  Outcome: Progressing  2/13/2019 1039 by Rosemary Ziegler RN  Outcome: Progressing  Goal: Absence of cardiac dysrhythmias or at baseline rhythm  Description  INTERVENTIONS:  - Continuous cardiac monitoring, monitor vital signs, obtain 12 lead EKG if indicated  - Administer antiarrhythmic and heart rate control medications as ordered  - Monitor electrolytes and administer replacement therapy as ordered  2/13/2019 1454 by Rosemary Ziegler RN  Outcome: Progressing  2/13/2019 1454 by Leonela Pena RN  Outcome: Progressing  2/13/2019 1453 by Leonela Pena RN  Outcome: Progressing  2/13/2019 1039 by Leonela Pena RN  Outcome: Progressing     Problem: METABOLIC, FLUID AND ELECTROLYTES - ADULT  Goal: Electrolytes maintained within normal limits  Description  INTERVENTIONS:  - Monitor labs and assess patient for signs and symptoms of electrolyte imbalances  - Administer electrolyte replacement as ordered  - Monitor response to electrolyte replacements, including repeat lab results as appropriate  - Instruct patient on fluid and nutrition as appropriate  2/13/2019 1454 by Leonela Pena RN  Outcome: Progressing  2/13/2019 1454 by Leonela Pena RN  Outcome: Progressing  2/13/2019 1453 by Leonela Pena RN  Outcome: Progressing  2/13/2019 1039 by Leonela Pena RN  Outcome: Progressing  Goal: Fluid balance maintained  Description  INTERVENTIONS:  - Monitor labs and assess for signs and symptoms of volume excess or deficit  - Monitor I/O and WT  - Instruct patient on fluid and nutrition as appropriate  2/13/2019 1454 by Leonela Pena RN  Outcome: Progressing  2/13/2019 1454 by Leonela Pena RN  Outcome: Progressing  2/13/2019 1453 by Leonela Pena RN  Outcome: Progressing  2/13/2019 1039 by Leonela Pena RN  Outcome: Progressing     Problem: SKIN/TISSUE INTEGRITY - ADULT  Goal: Skin integrity remains intact  Description  INTERVENTIONS  - Identify patients at risk for skin breakdown  - Assess and monitor skin integrity  - Assess and monitor nutrition and hydration status  - Monitor labs (i e  albumin)  - Assess for incontinence   - Turn and reposition patient  - Assist with mobility/ambulation  - Relieve pressure over bony prominences  - Avoid friction and shearing  - Provide appropriate hygiene as needed including keeping skin clean and dry  - Evaluate need for skin moisturizer/barrier cream  - Collaborate with interdisciplinary team (i e  Nutrition, Rehabilitation, etc )   - Patient/family teaching  2/13/2019 1454 by Lillie Robertson RN  Outcome: Progressing  2/13/2019 1454 by Lillie Robertson RN  Outcome: Progressing  2/13/2019 1453 by Lillie Robertson RN  Outcome: Progressing  2/13/2019 1039 by Lillie Robertson RN  Outcome: Progressing  Goal: Incision(s), wounds(s) or drain site(s) healing without S/S of infection  Description  INTERVENTIONS  - Assess and document risk factors for skin impairment   - Assess and document dressing, incision, wound bed, drain sites and surrounding tissue  - Initiate Nutrition services consult and/or wound management as needed  2/13/2019 1454 by Lillie Robertson RN  Outcome: Progressing  2/13/2019 1454 by Lillie Robertson RN  Outcome: Progressing  2/13/2019 1453 by Lillie Robertson RN  Outcome: Progressing  2/13/2019 1039 by Lillie Robertson RN  Outcome: Progressing     Problem: DISCHARGE PLANNING - CARE MANAGEMENT  Goal: Discharge to post-acute care or home with appropriate resources  Description  INTERVENTIONS:  - Conduct assessment to determine patient/family and health care team treatment goals, and need for post-acute services based on payer coverage, community resources, and patient preferences, and barriers to discharge  - Address psychosocial, clinical, and financial barriers to discharge as identified in assessment in conjunction with the patient/family and health care team  - Arrange appropriate level of post-acute services according to patient's   needs and preference and payer coverage in collaboration with the physician and health care team  - Communicate with and update the patient/family, physician, and health care team regarding progress on the discharge plan  - Arrange appropriate transportation to post-acute venues    TRANSFER BACK TO [address] [place] [address]   2/13/2019 1454 by Lillie Robertson RN  Outcome: Progressing  2/13/2019 1454 by Lillie Robertson RN  Outcome: Progressing  2/13/2019 1453 by Eliazar Bermudez RN  Outcome: Progressing  2/13/2019 1039 by Eliazar Bermudez RN  Outcome: Progressing

## 2019-02-13 NOTE — PROGRESS NOTES
Progress Note - Pulmonary   Santino Moctezuma 81 y o  female MRN: [id number]  Unit/Bed#: [address] [place] [address] Encounter: [id number]    Assessment:  RSV respiratory tract infection  Patient had minimal cough and breathing has improved  Acute exacerbation mild to moderate persistent asthma improved  No wheezing today  The blood cultures 2 of 2 sets from 02/10/19  Blood cultures reveal Staphlycoccus epidermidis  She does have Port-A-Cath in place so this may represent Port-A-Cath infection  Right-sided Port-A-Cath was placed in the past when she was treated for her Merkel cell cancer  Patient with chronic diastolic congestive heart failure  Plan:  Continue methylprednisone 20 mg IV every 12 hr today and patient breathing is stable tomorrow can change to p o  Prednisone  Continue Breo 200 mcg 1 puff daily  IV vancomycin as ordered      Subjective:   Patient states her breathing is improved  Objective:     Vitals: Blood pressure 118/74, pulse 76, temperature 97 8 °F (36 6 °C), temperature source Oral, resp  rate 20, height 5' 2" (1 575 m), weight 94 6 kg (208 lb 8 9 oz), SpO2 94 %, not currently breastfeeding  ,Body mass index is 38 15 kg/m²  Intake/Output Summary (Last 24 hours) at 2/13/2019 1716  Last data filed at 2/13/2019 1144  Gross per 24 hour   Intake 370 ml   Output 1300 ml   Net -930 ml       Physical Exam: Physical Exam   Constitutional: She is oriented to person, place, and time  She appears well-developed and well-nourished  No distress  Patient is on 2 L of oxygen   HENT:   Head: Normocephalic  Nose: Nose normal    Mouth/Throat: Oropharynx is clear and moist  No oropharyngeal exudate  Eyes: Pupils are equal, round, and reactive to light  Conjunctivae are normal    Neck: Neck supple  No JVD present  No tracheal deviation present  Cardiovascular: Normal rate, regular rhythm and normal heart sounds  Pulmonary/Chest: Effort normal    Lung sounds are clear   Abdominal: Soft   She exhibits no distension  There is no tenderness  There is no guarding  Musculoskeletal: She exhibits no edema  No edema right lower extremity  Has chronic mild edema left lower extremity  Lymphadenopathy:     She has no cervical adenopathy  Neurological: She is alert and oriented to person, place, and time  Skin: Skin is warm and dry  No rash noted  Psychiatric: She has a normal mood and affect  Her behavior is normal  Thought content normal         Labs: I have personally reviewed pertinent lab results  , ABG:   Lab Results   Component Value Date    PHART 7 556 (H) 10/01/2018    ZQV0VRS 42 3 10/01/2018    PO2ART 61 0 (L) 10/01/2018    NLE0UKT 37 6 (H) 10/01/2018    BEART 14 0 10/01/2018    SOURCE Brachial, Left 10/01/2018   , BNP: No results found for: BNP, CBC:   Lab Results   Component Value Date    WBC 8 99 02/11/2019    HGB 14 9 02/11/2019    HCT 50 5 (H) 02/11/2019    MCV 88 02/11/2019     02/11/2019    ADJUSTEDWBC 4 00 (L) 09/25/2016    MCH 26 0 (L) 02/11/2019    MCHC 29 5 (L) 02/11/2019    RDW 15 5 (H) 02/11/2019    MPV 11 1 02/11/2019    NRBC 0 02/10/2019   , CMP:   Lab Results   Component Value Date    K 5 1 02/13/2019     02/13/2019    CO2 32 02/13/2019    BUN 32 (H) 02/13/2019    CREATININE 1 12 02/13/2019    CALCIUM 9 2 02/13/2019    AST 12 02/10/2019    ALT 11 (L) 02/10/2019    ALKPHOS 98 02/10/2019    EGFR 46 02/13/2019   , PT/INR:   Lab Results   Component Value Date    INR 0 99 02/10/2019   , Troponin: No results found for: TROPONIN    Imaging and other studies: I have personally reviewed pertinent reports     and I have personally reviewed pertinent films in PACS

## 2019-02-14 ENCOUNTER — APPOINTMENT (INPATIENT)
Dept: NON INVASIVE DIAGNOSTICS | Facility: HOSPITAL | Age: 84
DRG: 871 | End: 2019-02-14
Payer: MEDICARE

## 2019-02-14 PROBLEM — J20.5 RSV BRONCHITIS: Status: ACTIVE | Noted: 2019-02-14

## 2019-02-14 LAB
ANION GAP SERPL CALCULATED.3IONS-SCNC: 3 MMOL/L (ref 4–13)
BACTERIA BLD CULT: ABNORMAL
BACTERIA BLD CULT: ABNORMAL
BUN SERPL-MCNC: 28 MG/DL (ref 5–25)
CALCIUM SERPL-MCNC: 9 MG/DL (ref 8.3–10.1)
CHLORIDE SERPL-SCNC: 100 MMOL/L (ref 100–108)
CO2 SERPL-SCNC: 38 MMOL/L (ref 21–32)
CREAT SERPL-MCNC: 1.1 MG/DL (ref 0.6–1.3)
ERYTHROCYTE [DISTWIDTH] IN BLOOD BY AUTOMATED COUNT: 15.4 % (ref 11.6–15.1)
GFR SERPL CREATININE-BSD FRML MDRD: 47 ML/MIN/1.73SQ M
GLUCOSE SERPL-MCNC: 223 MG/DL (ref 65–140)
GRAM STN SPEC: ABNORMAL
GRAM STN SPEC: ABNORMAL
HCT VFR BLD AUTO: 40.7 % (ref 34.8–46.1)
HGB BLD-MCNC: 12.2 G/DL (ref 11.5–15.4)
MCH RBC QN AUTO: 26.3 PG (ref 26.8–34.3)
MCHC RBC AUTO-ENTMCNC: 30 G/DL (ref 31.4–37.4)
MCV RBC AUTO: 88 FL (ref 82–98)
PLATELET # BLD AUTO: 210 THOUSANDS/UL (ref 149–390)
PMV BLD AUTO: 11 FL (ref 8.9–12.7)
POTASSIUM SERPL-SCNC: 4.3 MMOL/L (ref 3.5–5.3)
RBC # BLD AUTO: 4.64 MILLION/UL (ref 3.81–5.12)
SODIUM SERPL-SCNC: 141 MMOL/L (ref 136–145)
VANCOMYCIN TROUGH SERPL-MCNC: 18.1 UG/ML (ref 10–20)
WBC # BLD AUTO: 8.74 THOUSAND/UL (ref 4.31–10.16)

## 2019-02-14 PROCEDURE — 80048 BASIC METABOLIC PNL TOTAL CA: CPT | Performed by: FAMILY MEDICINE

## 2019-02-14 PROCEDURE — 99232 SBSQ HOSP IP/OBS MODERATE 35: CPT | Performed by: INTERNAL MEDICINE

## 2019-02-14 PROCEDURE — 97110 THERAPEUTIC EXERCISES: CPT

## 2019-02-14 PROCEDURE — 94760 N-INVAS EAR/PLS OXIMETRY 1: CPT

## 2019-02-14 PROCEDURE — 99232 SBSQ HOSP IP/OBS MODERATE 35: CPT | Performed by: FAMILY MEDICINE

## 2019-02-14 PROCEDURE — 94640 AIRWAY INHALATION TREATMENT: CPT

## 2019-02-14 PROCEDURE — 93306 TTE W/DOPPLER COMPLETE: CPT

## 2019-02-14 PROCEDURE — 99233 SBSQ HOSP IP/OBS HIGH 50: CPT | Performed by: INTERNAL MEDICINE

## 2019-02-14 PROCEDURE — 97535 SELF CARE MNGMENT TRAINING: CPT

## 2019-02-14 PROCEDURE — 85027 COMPLETE CBC AUTOMATED: CPT | Performed by: FAMILY MEDICINE

## 2019-02-14 PROCEDURE — 87040 BLOOD CULTURE FOR BACTERIA: CPT | Performed by: INTERNAL MEDICINE

## 2019-02-14 PROCEDURE — 80202 ASSAY OF VANCOMYCIN: CPT | Performed by: FAMILY MEDICINE

## 2019-02-14 RX ORDER — FUROSEMIDE 40 MG/1
40 TABLET ORAL DAILY
Status: DISCONTINUED | OUTPATIENT
Start: 2019-02-15 | End: 2019-02-15 | Stop reason: HOSPADM

## 2019-02-14 RX ORDER — POLYETHYLENE GLYCOL 3350 17 G/17G
17 POWDER, FOR SOLUTION ORAL DAILY
Status: DISCONTINUED | OUTPATIENT
Start: 2019-02-14 | End: 2019-02-15 | Stop reason: HOSPADM

## 2019-02-14 RX ADMIN — Medication 400 MG: at 17:32

## 2019-02-14 RX ADMIN — CHOLECALCIFEROL (VITAMIN D3) 10 MCG (400 UNIT) TABLET 400 UNITS: at 09:16

## 2019-02-14 RX ADMIN — ASPIRIN 81 MG: 81 TABLET, COATED ORAL at 09:15

## 2019-02-14 RX ADMIN — METHYLPREDNISOLONE SODIUM SUCCINATE 20 MG: 40 INJECTION, POWDER, FOR SOLUTION INTRAMUSCULAR; INTRAVENOUS at 21:11

## 2019-02-14 RX ADMIN — PREGABALIN 100 MG: 100 CAPSULE ORAL at 17:32

## 2019-02-14 RX ADMIN — PREGABALIN 100 MG: 100 CAPSULE ORAL at 09:16

## 2019-02-14 RX ADMIN — VANCOMYCIN HYDROCHLORIDE 750 MG: 750 INJECTION, SOLUTION INTRAVENOUS at 17:58

## 2019-02-14 RX ADMIN — NYSTATIN 1 APPLICATION: 100000 POWDER TOPICAL at 09:15

## 2019-02-14 RX ADMIN — IPRATROPIUM BROMIDE AND ALBUTEROL SULFATE 3 ML: 2.5; .5 SOLUTION RESPIRATORY (INHALATION) at 08:16

## 2019-02-14 RX ADMIN — FLUTICASONE FUROATE AND VILANTEROL TRIFENATATE 1 PUFF: 200; 25 POWDER RESPIRATORY (INHALATION) at 09:18

## 2019-02-14 RX ADMIN — METOPROLOL TARTRATE 50 MG: 50 TABLET, FILM COATED ORAL at 21:16

## 2019-02-14 RX ADMIN — Medication 1 TABLET: at 17:32

## 2019-02-14 RX ADMIN — PANTOPRAZOLE SODIUM 20 MG: 20 TABLET, DELAYED RELEASE ORAL at 05:49

## 2019-02-14 RX ADMIN — ATORVASTATIN CALCIUM 40 MG: 40 TABLET, FILM COATED ORAL at 17:32

## 2019-02-14 RX ADMIN — Medication 400 MG: at 09:16

## 2019-02-14 RX ADMIN — FLUTICASONE PROPIONATE 1 SPRAY: 50 SPRAY, METERED NASAL at 09:18

## 2019-02-14 RX ADMIN — METOPROLOL TARTRATE 50 MG: 50 TABLET, FILM COATED ORAL at 09:15

## 2019-02-14 RX ADMIN — NYSTATIN 1 APPLICATION: 100000 POWDER TOPICAL at 21:25

## 2019-02-14 RX ADMIN — POLYETHYLENE GLYCOL 3350 17 G: 17 POWDER, FOR SOLUTION ORAL at 14:34

## 2019-02-14 RX ADMIN — ALPRAZOLAM 0.5 MG: 0.5 TABLET ORAL at 21:16

## 2019-02-14 RX ADMIN — LORATADINE 10 MG: 10 TABLET ORAL at 09:16

## 2019-02-14 RX ADMIN — OXYBUTYNIN CHLORIDE 10 MG: 5 TABLET, EXTENDED RELEASE ORAL at 09:15

## 2019-02-14 RX ADMIN — METHYLPREDNISOLONE SODIUM SUCCINATE 20 MG: 40 INJECTION, POWDER, FOR SOLUTION INTRAMUSCULAR; INTRAVENOUS at 09:16

## 2019-02-14 RX ADMIN — POTASSIUM CHLORIDE 10 MEQ: 750 TABLET, EXTENDED RELEASE ORAL at 09:16

## 2019-02-14 RX ADMIN — APIXABAN 5 MG: 5 TABLET, FILM COATED ORAL at 09:16

## 2019-02-14 RX ADMIN — CHOLECALCIFEROL (VITAMIN D3) 10 MCG (400 UNIT) TABLET 400 UNITS: at 17:32

## 2019-02-14 RX ADMIN — Medication 1 TABLET: at 09:15

## 2019-02-14 RX ADMIN — APIXABAN 5 MG: 5 TABLET, FILM COATED ORAL at 21:11

## 2019-02-14 RX ADMIN — VANCOMYCIN HYDROCHLORIDE 750 MG: 750 INJECTION, SOLUTION INTRAVENOUS at 05:49

## 2019-02-14 RX ADMIN — AMLODIPINE BESYLATE 5 MG: 5 TABLET ORAL at 09:16

## 2019-02-14 NOTE — ASSESSMENT & PLAN NOTE
-Staph epidermidis x2  -patient remains on antibiotics  -infectious Disease is following  -consideration for port removal

## 2019-02-14 NOTE — PROGRESS NOTES
Progress Note - Infectious Disease   Lulu Olivares 81 y o  female MRN: [id number]  Unit/Bed#: [address] [place] [address] Encounter: [id number]      Impression/Recommendations:  1  Sepsis, POA  Fever and tachycardia  Due to #2  Unclear relevance of #3  Procalcitonin normal   Clinically stable and nontoxic  Improved  Rec:  ? Continue antibiotics as below  ? Supportive care as per the primary service     2  RSV tracheobronchitis  Clinically stable without respiratory distress  Rec:  ? Continue supportive care     3  Staph epidermidis bacteremia  Two sets with late growth, 1 drawn from line  Consider contaminant  Consider port infection, less likely  Patient desires treatment plan which will get her out of the hospital sooner  Rec:  ? Continue vancomycin for 4 more days through 19  ? Pharmacy consult for vancomycin dosing  ? Check repeat blood cultures now for completeness  ? Check surveillance blood cultures 2 weeks after IV antibiotics complete  If bacteremia recurs will need to have port removal at that time      4   Port a cath in place  No evidence of alanis cellulitis     Discussed in detail with the patient, Dr Armand Ha, and Dr Lucy Valencia     Antibiotics:  Vancomycin #3    Subjective:  Patient seen on PM rounds  Feels well and wants to go back to her nursing facility  Denies fevers, chills, sweats, nausea, vomiting, or diarrhea  States the port has not recently been accessed other than routinely flashing  24 Hour Events:  No documented fevers, chills, sweats, nausea, vomiting, or diarrhea      Objective:  Vitals:  Temp:  [97 6 °F (36 4 °C)-98 7 °F (37 1 °C)] 98 7 °F (37 1 °C)  HR:  [70-74] 70  Resp:  [16-18] 16  BP: (128-142)/(74-86) 128/74  SpO2:  [94 %-95 %] 95 %  Temp (24hrs), Av °F (36 7 °C), Min:97 6 °F (36 4 °C), Max:98 7 °F (37 1 °C)  Current: Temperature: 98 7 °F (37 1 °C)    Physical Exam:   General:  No acute distress  Eyes:  Normal lids and conjunctivae  ENT:  Normal external ears and nose  Neck:  Neck symmetric with midline trachea  Pulmonary:  Normal respiratory effort without accessory muscle use  Cardiovascular:  Regular rate and rhythm; no peripheral edema  Gastrointestinal:  No tenderness or distention  Musculoskeletal:  No digital clubbing or cyanosis  Skin:  No visible rashes; No palpable nodules  Neurologic:  Sensation grossly intact to light touch  Psychiatric:  Alert and oriented; Normal mood    Lab Results:  I have personally reviewed pertinent labs  Results from last 7 days   Lab Units 02/14/19  0601 02/13/19  0557 02/12/19  0514  02/10/19  1817   POTASSIUM mmol/L 4 3 5 1 4 4   < > 3 7   CHLORIDE mmol/L 100 100 99*   < > 99*   CO2 mmol/L 38* 32 33*   < > 36*   BUN mg/dL 28* 32* 29*   < > 16   CREATININE mg/dL 1 10 1 12 1 00   < > 0 98   EGFR ml/min/1 73sq m 47 46 53   < > 54   CALCIUM mg/dL 9 0 9 2 8 9   < > 8 4   AST U/L  --   --   --   --  12   ALT U/L  --   --   --   --  11*   ALK PHOS U/L  --   --   --   --  98    < > = values in this interval not displayed  Results from last 7 days   Lab Units 02/14/19  0601 02/11/19  0441 02/10/19  1817   WBC Thousand/uL 8 74 8 99 7 72   HEMOGLOBIN g/dL 12 2 14 9 13 6   PLATELETS Thousands/uL 210 185 156     Results from last 7 days   Lab Units 02/10/19  2122 02/10/19  1837 02/10/19  1817   BLOOD CULTURE   --  Staphylococcus epidermidis* Staphylococcus epidermidis*   GRAM STAIN RESULT   --  Gram positive cocci in clusters* Gram positive cocci in clusters*   MRSA CULTURE ONLY  No Methicillin Resistant Staphlyococcus aureus (MRSA) isolated  --   --        Imaging Studies:   I have personally reviewed pertinent imaging study reports and images in PACS  EKG, Pathology, and Other Studies:   I have personally reviewed pertinent reports

## 2019-02-14 NOTE — PROGRESS NOTES
[facility] Progress Note  Patient: Harry Tate 81 y o  female   MRN: [id number]  PCP: Urmila Graves DO  Unit/Bed#: [address] [place] [address] Encounter: [id number]  Date Of Visit: 02/13/19    Problem List:    Principal Problem:    Mild persistent asthma with exacerbation  Active Problems:    Acute on chronic diastolic congestive heart failure ([address] )    Acute respiratory failure with hypoxia ([address] )    Sepsis ([address] )    Stenosis of left internal carotid artery    Obesity (BMI 30-39  9)    GERD (gastroesophageal reflux disease)    Hyperlipemia    Lymphedema of left leg    Bacteremia    Allergic rhinitis    Obstructive sleep apnea    Hypertension    Chronic atrial fibrillation (HCC)      Assessment & Plan:    * Mild persistent asthma with exacerbation  Assessment & Plan  · Patient had flu a few weeks ago which essentially resolved  · Likely due to RSV B infection  · CT of the chest showed no evidence of pneumonia  · Procalcitonin level x2 has been negative  · Symptoms improving  Continue IV Solu-Medrol 20 milligram IV q 12 hours with possible transition to p o  Prednisone tomorrow if improving  · Continue Breo Ellipta and nebulizers  · Pulmonary consult appreciated    Acute on chronic diastolic congestive heart failure (HCC)  Assessment & Plan  · Now appears euvolemic  IV Lasix was discontinued  · Monitor strict I&O and daily weight  · Echo 1/18 - EF of 67-42%, grade 1 diastolic dysfunction  · As patient also complained of some chest pain, she had stress test done which showed no evidence of ischemia  · Cardiology consult appreciated    Acute respiratory failure with hypoxia ([address] )  Assessment & Plan  · Likely secondary to component of exacerbation of mild persistent asthma, CHF  · Continue supplemental O2 by nasal cannula  Patient usually uses 2 liters of oxygen at night and p r n  As needed during the day      Chronic atrial fibrillation (HCC)  Assessment & Plan  · Continue eliquis and metoprolol  · Patient was initially atrial fibrillation with rapid ventricular rate likely secondary to fever but now rate controlled    Hypertension  Assessment & Plan  Continue Lopressor, norvasc  Obstructive sleep apnea  Assessment & Plan  Does not wear CPAP at home  Patient uses 2 liters of oxygen at night  Allergic rhinitis  Assessment & Plan  Continue Claritin    Bacteremia  Assessment & Plan  Blood cultures 2 of 2 sets growing gram-positive cocci in clusters  Final culture grew Staph epidermidis   Id consult appreciated  IV vancomycin for now  Repeat echo ordered    Lymphedema of left leg  Assessment & Plan  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  Unchanged per patient  Hyperlipemia  Assessment & Plan  Continue statin    GERD (gastroesophageal reflux disease)  Assessment & Plan  Continue Protonix  Obesity (BMI 30-39  9)  Assessment & Plan  Dietary and lifestyle modification    Stenosis of left internal carotid artery  Assessment & Plan  Continue aspirin and statin    Sepsis [facility])  Assessment & Plan  Patient had temperature of 100 6°  on day of admission which has since resolved  CT chest showed no evidence of pneumonia  Respiratory pathogen profile positive for RSV B  Procalcitonin x2 is negative        VTE Pharmacologic Prophylaxis:   Pharmacologic: Apixaban (Eliquis)  Mechanical VTE Prophylaxis in Place: No    Patient Centered Rounds: I have performed bedside rounds with nursing staff today  Discussions with Specialists or Other Care Team Provider: Yes    Education and Discussions with Family / Patient:Yes    Time Spent for Care: 30 minutes  More than 50% of total time spent on counseling and coordination of care as described above      Current Length of Stay: 2 day(s)    Current Patient Status: Inpatient     Discharge Plan: Back to NH    Code Status: Level 1 - Full Code    Certification Statement: The patient will continue to require additional inpatient hospital stay due to Bacteremia, asthma exacerbation      Subjective:   States breathing has improved  Wants to know when she can be discharged    Objective:     Vitals:   Temp (24hrs), Av 7 °F (36 5 °C), Min:97 5 °F (36 4 °C), Max:97 9 °F (36 6 °C)    Temp:  [97 5 °F (36 4 °C)-97 9 °F (36 6 °C)] 97 7 °F (36 5 °C)  HR:  [74-78] 74  Resp:  [16-20] 18  BP: (118-137)/(64-74) 137/74  SpO2:  [94 %-95 %] 95 %  Body mass index is 38 15 kg/m²  Input and Output Summary (last 24 hours): Intake/Output Summary (Last 24 hours) at 2019  Last data filed at 2019 1815  Gross per 24 hour   Intake 370 ml   Output 1750 ml   Net -1380 ml       Physical Exam:     Physical Exam   Constitutional: She is oriented to person, place, and time  She appears well-developed and well-nourished  No distress  HENT:   Head: Normocephalic and atraumatic  Eyes: EOM are normal  Right eye exhibits no discharge  Left eye exhibits no discharge  No scleral icterus  Neck: Neck supple  Cardiovascular:   Murmur heard  Irregularly, irregular rate and rhythm   Pulmonary/Chest: Effort normal and breath sounds normal  No respiratory distress  She has no wheezes  She has no rales  Abdominal: Soft  Bowel sounds are normal  She exhibits no distension  There is no tenderness  Musculoskeletal: She exhibits edema (LEft L E)  Neurological: She is alert and oriented to person, place, and time  No cranial nerve deficit  Skin: Skin is dry  She is not diaphoretic         Additional Data:     Labs:    Results from last 7 days   Lab Units 19  0441 02/10/19  1817   WBC Thousand/uL 8 99 7 72   HEMOGLOBIN g/dL 14 9 13 6   HEMATOCRIT % 50 5* 45 3   PLATELETS Thousands/uL 185 156   NEUTROS PCT %  --  77*   LYMPHS PCT %  --  11*   MONOS PCT %  --  7   EOS PCT %  --  3     Results from last 7 days   Lab Units 19  0557  02/10/19  1817   POTASSIUM mmol/L 5 1   < > 3 7   CHLORIDE mmol/L 100   < > 99*   CO2 mmol/L 32   < > 36*   BUN mg/dL 32*   < > 16   CREATININE mg/dL 1 12   < > 0 98   CALCIUM mg/dL 9 2   < > 8 4   ALK PHOS U/L  --   --  98   ALT U/L  --   --  11*   AST U/L  --   --  12    < > = values in this interval not displayed  Results from last 7 days   Lab Units 02/10/19  1817   INR  0 99       * I Have Reviewed All Lab Data Listed Above  * Additional Pertinent Lab Tests Reviewed: All Labs For Current Hospital Admission Reviewed    Imaging:  Xr Chest 1 View Portable    Result Date: 2/11/2019  Narrative: CHEST INDICATION:   chest pain, shortness of breath  COMPARISON:  9/29/2018 EXAM PERFORMED/VIEWS:  XR CHEST PORTABLE Images: 1 FINDINGS:  Right-sided Port-A-Cath terminates at the cavoatrial junction  Heart shadow is enlarged but unchanged from prior exam  Small left pleural effusion with basilar atelectasis noted  Upper lobe and right lung grossly clear  No pneumothorax  Osseous structures appear within normal limits for patient age  Impression: Small left pleural effusion basilar atelectasis  Stable cardiomegaly  Workstation performed: [id number]     Ct Chest Wo Contrast    Result Date: 2/11/2019  Narrative: CT CHEST WITHOUT IV CONTRAST INDICATION:   Interstitial lung disease  COMPARISON:  Chest CT dated 9/30/2018 an 1/20/2014  TECHNIQUE: CT examination of the chest was performed without intravenous contrast   Axial, sagittal, and coronal 2D reformatted images were created from the source data and submitted for interpretation  Radiation dose length product (DLP) for this visit:  487 38 mGy-cm   This examination, like all CT scans performed in the [facility], was performed utilizing techniques to minimize radiation dose exposure, including the use of iterative  reconstruction and automated exposure control  FINDINGS: LUNGS:  Dense peripheral consolidation posteriorly in the right lower lobe  Bandlike consolidation in the left upper lobe along the major fissure  Other linear scattered opacities in the left lung    Morphology of all these consolidations most suggestive of atelectasis No endobronchial lesions  No significant interlobular septal thickening or interstitial nodularity  PLEURA:  No effusion or pneumothorax  HEART/GREAT VESSELS:  Central venous catheter tip at the cavoatrial junction  Left atrial enlargement  Scattered coronary calcifications  No pericardial thickening or effusion  MEDIASTINUM AND JASWINDER:  Unremarkable  CHEST WALL AND LOWER NECK:   No abnormality around the patient's chest wall port  VISUALIZED STRUCTURES IN THE UPPER ABDOMEN:  Stable right renal cyst  OSSEOUS STRUCTURES: Multiple benign intraosseous hemangiomata spine  No acute fracture or destructive osseous lesion  Impression: 1  Scattered consolidations in both lungs have morphologies most suggestive of atelectasis  Multifocal pneumonia is less likely  Otherwise, no acute thoracic findings  2   No evidence for interstitial lung disease  Workstation performed: [id number]     Imaging Reports Reviewed by myself    Cultures:   Blood Culture:   Lab Results   Component Value Date    BLOODCX Staphylococcus epidermidis (A) 02/10/2019    BLOODCX Staphylococcus epidermidis (A) 02/10/2019    BLOODCX No Growth After 5 Days  09/29/2018    BLOODCX No Growth After 5 Days  09/29/2018    BLOODCX No Growth After 5 Days  01/23/2018    BLOODCX No Growth After 5 Days  01/23/2018    BLOODCX No Growth After 5 Days   01/05/2018     Urine Culture:   Lab Results   Component Value Date    URINECX No Growth <1000 cfu/mL 09/29/2018    URINECX >100,000 cfu/ml Mixed Contaminants X5 09/21/2016     Sputum Culture: No components found for: SPUTUMCX  Wound Culture: No results found for: WOUNDCULT    Last 24 Hours Medication List:     Current Facility-Administered Medications:  acetaminophen 650 mg Oral Q6H PRN GILDA Cutler    albuterol 2 5 mg Nebulization Q6H PRN GILDA Cutler    ALPRAZolam 0 5 mg Oral HS GILDA Ctuler    amLODIPine 5 mg Oral Daily Asael Romero CRNP    apixaban 5 mg Oral Q12H [facility] Van Snide, CRNP    aspirin 81 mg Oral Daily Roseanne Snide, CRNP    atorvastatin 40 mg Oral Daily With [address], CRNP    calcium carbonate 1 tablet Oral BID With Meals Roseanne Snide, CRNP    cholecalciferol 400 Units Oral BID Van Snide, CRNP    fluticasone 1 spray Each Nare Daily Van Snide, CRNP    fluticasone-vilanterol 1 puff Inhalation Daily Van Snide, CRNP    ipratropium-albuterol 3 mL Nebulization BID Comfort Durant MD    loratadine 10 mg Oral Daily Van Snide, CRNP    magnesium oxide 400 mg Oral BID Van Snide, CRNP    methylPREDNISolone sodium succinate 20 mg Intravenous Q12H [facility] Daniel Panchal,     metoprolol tartrate 50 mg Oral Q12H [facility] Van Snide, CRNP    nystatin 1 application Topical BID Lyndsey Barragan DO    ondansetron 4 mg Intravenous Q6H PRN Roseanne Snide, CRNP    oxybutynin 10 mg Oral Daily Van Snide, CRNP    pantoprazole 20 mg Oral Early Morning Van Snide, CRNP    potassium chloride 10 mEq Oral Daily Roseanne Snide, CRNP    pregabalin 100 mg Oral BID Roseanne Snide, CRNP    vancomycin 10 mg/kg (Adjusted) Intravenous Q12H Lyndsey Barragan, DO Last Rate: 750 mg (02/13/19 1922)        Today, Patient Was Seen By: Latricia Rodriguez DO    ** Please Note: "This note has been constructed using a voice recognition system  Therefore there may be syntax, spelling, and/or grammatical errors   Please call if you have any questions  "**

## 2019-02-14 NOTE — PHYSICAL THERAPY NOTE
PT TREATMENT     02/14/19 1114   Pain Assessment   Pain Assessment No/denies pain   Restrictions/Precautions   Other Precautions Fall Risk;Bed Alarm; Chair Alarm;O2   General   Chart Reviewed Yes   Family/Caregiver Present No   Subjective   Subjective "ok"   Bed Mobility   Supine to Sit 5  Supervision   Sit to Supine 5  Supervision   Transfers   Sit to Stand 5  Supervision   Stand to Sit 5  Supervision   Stand pivot 5  Supervision   Additional items   (bed to chair)   Ambulation/Elevation   Gait Assistance 5  Supervision   Assistive Device Rolling walker   Distance 30 feet with change in direction;pt midly SOB at end of ambulation  Pt requested to return to supine in bed  Balance   Static Sitting Fair +   Static Standing Fair   Dynamic Standing Fair   Ambulatory Fair   Activity Tolerance   Activity Tolerance Patient limited by fatigue   Assessment   Assessment Pt continues to make steady progress with bed mob, trans, amb and mobility with the RW  Pt will cont to benefit from skilled PT services  Plan   Treatment/Interventions ADL retraining;Functional transfer training;LE strengthening/ROM; Therapeutic exercise; Endurance training;Patient/family training;Bed mobility;Gait training; Compensatory technique education   PT Frequency 5x/wk   Recommendation   Recommendation Short-term skilled PT   Licensure   [place] License Number  [id number] [place] [address] [id number]

## 2019-02-14 NOTE — PROGRESS NOTES
Progress Note - Cardiology   [facility]     Nikki Lancaster 81 y o  female MRN: [id number]  : 1935  Unit/Bed#: [address] [place] [address] Encounter: [id number]    Assessment and Plan:   1  Chronic atrial fibrillation:  Rate is controlled, continue patient's current medications    2  Bacteremia:  2 of 2 sets of blood cultures were positive for Staph coccus epidermidis  Continue IVs as per primary team   Transthoracic echocardiogram is pending  3  Hypertension:  Appears to be slowly elevating  Patient is off her Lasix which she was taking at the [facility] for chronic lymphedema  It Lasix was discontinued due to acute kidney injury  Continue her current medications and we will reintroduce Lasix 40 mg p  O  Once a day starting tomorrow as her kidney functions returned to baseline  4  COPD/asthma/RSV:  Care per the primary team    5  Noncardiac chest pain:  Patient with nonischemic Lexiscan study while here in the hospital     6  Chronic lymphedema of left lower extremity    Subjective / Objective:   Patient seen and examined  2D echocardiograms is pending for evaluation of possible endocarditis  Patient with 2 of 2 sets of blood cultures positive for Staph coccus epidermidis, awaiting the ID notes  Vitals: Blood pressure 142/86, pulse 73, temperature 97 6 °F (36 4 °C), temperature source Temporal, resp  rate 18, height 5' 2" (1 575 m), weight 94 6 kg (208 lb 8 9 oz), SpO2 95 %, not currently breastfeeding  Vitals:    19 0600 19 0600   Weight: 94 6 kg (208 lb 8 9 oz) 94 6 kg (208 lb 8 9 oz)     Body mass index is 38 15 kg/m²    BP Readings from Last 3 Encounters:   19 142/86   10/25/18 94/61   10/05/18 128/64     Orthostatic Blood Pressures      Most Recent Value   Blood Pressure  142/86 filed at 2019 0730   Patient Position - Orthostatic VS  Lying filed at 2019 0730        I/O       701 -  07 -  0700 02/14 0701 - 02/15 07 P O  260 360     I V  (mL/kg)  10 (0 1)     Total Intake(mL/kg) 260 (2 7) 370 (3 9)     Urine (mL/kg/hr) 1425 (0 6) 1400 (0 6) 400 (0 7)    Total Output 1425 1400 400    Net -1165 -1030 -400           Unmeasured Urine Occurrence  3 x         Invasive Devices     Central Venous Catheter Line            Port A Cath 02/10/19 Right Chest 3 days                  Intake/Output Summary (Last 24 hours) at 2/14/2019 1242  Last data filed at 2/14/2019 1201  Gross per 24 hour   Intake    Output 1150 ml   Net -1150 ml         Physical Exam:   Physical Exam    Neurologic:  Alert & oriented x 3,  no focal deficits noted   Constitutional:  Well developed, well nourished,  With no acute distress  Eyes:  PERRL, conjunctiva normal   HENT:  Atraumatic, external ears normal, nose normal,   NECK: Normal range of motion, no tenderness, neck is supple , No JVP  Respiratory:  Bilateral air entry mostly clear to auscultation  Cardiovascular: S1-S2 regular with a 2/6 ejection systolic murmur  S4 is heard  GI:  Soft, nondistended, normal bowel sounds, nontender, no hepatosplenomegaly appreciated  Musculoskeletal:  No tenderness, no deformities      Extremities:  Mild edema and distal pulses are present  Psychiatric:  Speech and behavior appropriate             Medications/ Allergies:     Current Facility-Administered Medications:  acetaminophen 650 mg Oral Q6H PRN Stann Curet, CRNP    albuterol 2 5 mg Nebulization Q6H PRN Stann Curet, CRNP    ALPRAZolam 0 5 mg Oral HS Stann Curet, CRNP    amLODIPine 5 mg Oral Daily Stann Curet, CRNP    apixaban 5 mg Oral Q12H [facility] Stann Curet, CRNP    aspirin 81 mg Oral Daily Stann Curet, CRNP    atorvastatin 40 mg Oral Daily With Dinner Stann Curet, CRNP    calcium carbonate 1 tablet Oral BID With Meals Stann Curet, CRNP    cholecalciferol 400 Units Oral BID Stann Curet, CRNP    fluticasone 1 spray Each Nare Daily Stann Curet, CRNP    fluticasone-vilanterol 1 puff Inhalation Daily GILDA Fischer    [START ON 2/15/2019] furosemide 40 mg Oral Daily GILDA Pathak    ipratropium-albuterol 3 mL Nebulization BID Melany Martínez MD    loratadine 10 mg Oral Daily GILDA Fischer    magnesium oxide 400 mg Oral BID GILDA Fischer    methylPREDNISolone sodium succinate 20 mg Intravenous Q12H [facility] Dante Zaman, DO    metoprolol tartrate 50 mg Oral Q12H [facility] GILDA Fischer    nystatin 1 application Topical BID Lyndsey Revcaryl, DO    ondansetron 4 mg Intravenous Q6H PRN GILDA Fischer    oxybutynin 10 mg Oral Daily GILDA Fischer    pantoprazole 20 mg Oral Early Morning GILDA Fischer    potassium chloride 10 mEq Oral Daily GILDA Fischer    pregabalin 100 mg Oral BID GILDA Fischer    vancomycin 10 mg/kg (Adjusted) Intravenous Q12H Lyndsey Revankar, DO Last Rate: 750 mg (02/14/19 0549)       acetaminophen 650 mg Q6H PRN   albuterol 2 5 mg Q6H PRN   ondansetron 4 mg Q6H PRN     Allergies   Allergen Reactions    Fentanyl And Related Other (See Comments)     Passed out    Latex Rash    Penicillins Rash       VTE Pharmacologic Prophylaxis:   Sequential compression device (Venodyne)     Labs:   Troponins:  Results from last 7 days   Lab Units 02/11/19  0441 02/10/19  2352 02/10/19  1817   TROPONIN I ng/mL <0 02 <0 02 <0 02     CBC with diff:  Results from last 7 days   Lab Units 02/14/19  0601 02/11/19  0441 02/10/19  1817   WBC Thousand/uL 8 74 8 99 7 72   HEMOGLOBIN g/dL 12 2 14 9 13 6   HEMATOCRIT % 40 7 50 5* 45 3   MCV fL 88 88 88   PLATELETS Thousands/uL 210 185 156   MCH pg 26 3* 26 0* 26 5*   MCHC g/dL 30 0* 29 5* 30 0*   RDW % 15 4* 15 5* 15 4*   MPV fL 11 0 11 1 10 1   NRBC AUTO /100 WBCs  --   --  0     CMP:  Results from last 7 days   Lab Units 02/14/19  0601 02/13/19  0557 02/12/19  0514 02/11/19  0441 02/10/19  1817   SODIUM mmol/L 141 137 138 143 138   POTASSIUM mmol/L 4 3 5 1 4 4 3 3* 3 7 CHLORIDE mmol/L 100 100 99* 102 99*   CO2 mmol/L 38* 32 33* 38* 36*   ANION GAP mmol/L 3* 5 6 3* 3*   BUN mg/dL 28* 32* 29* 16 16   CREATININE mg/dL 1 10 1 12 1 00 0 94 0 98   GLUCOSE FASTING mg/dL  --   --   --  209*  --    CALCIUM mg/dL 9 0 9 2 8 9 8 9 8 4   AST U/L  --   --   --   --  12   ALT U/L  --   --   --   --  11*   ALK PHOS U/L  --   --   --   --  98   TOTAL PROTEIN g/dL  --   --   --   --  6 1*   ALBUMIN g/dL  --   --   --   --  2 6*   TOTAL BILIRUBIN mg/dL  --   --   --   --  0 50   EGFR ml/min/1 73sq m 47 46 53 57 54     Magnesium:  Results from last 7 days   Lab Units 02/10/19  1817   MAGNESIUM mg/dL 1 7     Coags:  Results from last 7 days   Lab Units 02/10/19  1817   PTT seconds 30   INR  0 99     TSH:    Lipid Profile:  Results from last 7 days   Lab Units 02/11/19  0441   CHOLESTEROL mg/dL 122   TRIGLYCERIDES mg/dL 55   HDL mg/dL 55   LDL CALC mg/dL 56        Imaging & Testing   I have personally reviewed pertinent reports  Xr Chest 1 View Portable    Result Date: 2/11/2019  Narrative: CHEST INDICATION:   chest pain, shortness of breath  COMPARISON:  9/29/2018 EXAM PERFORMED/VIEWS:  XR CHEST PORTABLE Images: 1 FINDINGS:  Right-sided Port-A-Cath terminates at the cavoatrial junction  Heart shadow is enlarged but unchanged from prior exam  Small left pleural effusion with basilar atelectasis noted  Upper lobe and right lung grossly clear  No pneumothorax  Osseous structures appear within normal limits for patient age  Impression: Small left pleural effusion basilar atelectasis  Stable cardiomegaly  Workstation performed: [id number]     Ct Chest Wo Contrast    Result Date: 2/11/2019  Narrative: CT CHEST WITHOUT IV CONTRAST INDICATION:   Interstitial lung disease  COMPARISON:  Chest CT dated 9/30/2018 an 1/20/2014   TECHNIQUE: CT examination of the chest was performed without intravenous contrast   Axial, sagittal, and coronal 2D reformatted images were created from the source data and submitted for interpretation  Radiation dose length product (DLP) for this visit:  487 38 mGy-cm   This examination, like all CT scans performed in the [facility], was performed utilizing techniques to minimize radiation dose exposure, including the use of iterative  reconstruction and automated exposure control  FINDINGS: LUNGS:  Dense peripheral consolidation posteriorly in the right lower lobe  Bandlike consolidation in the left upper lobe along the major fissure  Other linear scattered opacities in the left lung  Morphology of all these consolidations most suggestive of atelectasis No endobronchial lesions  No significant interlobular septal thickening or interstitial nodularity  PLEURA:  No effusion or pneumothorax  HEART/GREAT VESSELS:  Central venous catheter tip at the cavoatrial junction  Left atrial enlargement  Scattered coronary calcifications  No pericardial thickening or effusion  MEDIASTINUM AND JASWINDER:  Unremarkable  CHEST WALL AND LOWER NECK:   No abnormality around the patient's chest wall port  VISUALIZED STRUCTURES IN THE UPPER ABDOMEN:  Stable right renal cyst  OSSEOUS STRUCTURES: Multiple benign intraosseous hemangiomata spine  No acute fracture or destructive osseous lesion  Impression: 1  Scattered consolidations in both lungs have morphologies most suggestive of atelectasis  Multifocal pneumonia is less likely  Otherwise, no acute thoracic findings  2   No evidence for interstitial lung disease   Workstation performed: [id number]        Cardiac testing:   Results for orders placed during the hospital encounter of 18   Echo complete with contrast if indicated    Narrative [address]  [address] [facility] [phone]    Transthoracic Echocardiogram  2D, M-mode, Doppler, and Color Doppler    Study date:  2018    Patient: Mame Reyes  MR number: [id number]  Account number: [de-identified]  : 1935  Age: 81 years  Gender: Female  Status: Routine  Location: Bedside  Height: 62 in  Weight: 187 7 lb  BP: 134/ 59 mmHg    Indications: Shortness of Breath Evaluate for suspected cardiac source of embolism  Diagnoses: 786 05 - SHORTNESS OF BREATH    Sonographer:  Judie Nissen  Interpreting Physician:  Todd Main DO  Primary Physician:  Gaye Rollins DO  Group:  [facility]    SUMMARY    LEFT VENTRICLE:  The cavity was small  Systolic function was hyperdynamic by visual assessment  Ejection fraction was estimated in the range of 70 % to 75 %  There were no regional wall motion abnormalities  There was mild concentric hypertrophy  Doppler parameters were consistent with abnormal left ventricular relaxation (grade 1 diastolic dysfunction)  Doppler parameters were consistent with elevated mean left atrial filling pressure  MITRAL VALVE:  There was mild regurgitation  AORTIC VALVE:  There was no evidence for stenosis  There was no regurgitation  TRICUSPID VALVE:  There was mild regurgitation  Pulmonary artery systolic pressure was mildly increased  HISTORY: PRIOR HISTORY: Shortness of Breath    PROCEDURE: The procedure was performed at the bedside  This was a routine study  The transthoracic approach was used  The study included complete 2D imaging, M-mode, complete spectral Doppler, and color Doppler  The heart rate was 77 bpm,  at the start of the study  This was a technically difficult study  LEFT VENTRICLE: The cavity was small  Systolic function was hyperdynamic by visual assessment  Ejection fraction was estimated in the range of 70 % to 75 %  There were no regional wall motion abnormalities  There was mild concentric  hypertrophy  DOPPLER: Doppler parameters were consistent with abnormal left ventricular relaxation (grade 1 diastolic dysfunction)  Doppler parameters were consistent with elevated mean left atrial filling pressure      RIGHT VENTRICLE: The size was normal  Systolic function was normal  DOPPLER: Systolic pressure was within the normal range  LEFT ATRIUM: Size was normal  No thrombus was identified  RIGHT ATRIUM: Size was normal     MITRAL VALVE: Valve structure was normal  There was normal leaflet separation  No echocardiographic evidence for prolapse  DOPPLER: The transmitral velocity was within the normal range  There was no evidence for stenosis  There was mild  regurgitation  AORTIC VALVE: The valve was trileaflet  Leaflets exhibited normal cuspal separation and sclerosis  DOPPLER: Transaortic velocity was within the normal range  There was no evidence for stenosis  There was no regurgitation  TRICUSPID VALVE: The valve structure was normal  There was normal leaflet separation  DOPPLER: The transtricuspid velocity was within the normal range  There was mild regurgitation  Pulmonary artery systolic pressure was mildly increased  Estimated peak PA pressure was 42 mmHg  PULMONIC VALVE: Leaflets exhibited normal thickness, no calcification, and normal cuspal separation  DOPPLER: The transpulmonic velocity was within the normal range  There was mild regurgitation  PERICARDIUM: There was no thickening  There was no pericardial effusion  AORTA: The root exhibited normal size      PULMONARY ARTERY: The size was normal  The morphology appeared normal     SYSTEM MEASUREMENT TABLES    2D mode  AoR Diam 2D: 3 1 cm  LA Diam (2D): 4 2 cm  LA/Ao (2D): 1 35  FS (2D Teich): 53 9 %  IVSd (2D): 1 08 cm  LVDEV: 73 4 cm³  LVESV: 10 9 cm³  LVIDd(2D): 4 08 cm  LVISd (2D): 1 88 cm  LVOT Area 2D: 3 46 cm squared  LVPWd (2D): 1 05 cm  SV (Teich): 62 5 cm³    Apical four chamber  LVEF A4C: 84 %    Unspecified Scan Mode  ANNIE Cont Eq (Peak Pacheco): 1 94 cm squared  ANNIE Cont Eq (VTI): 1 81 cm squared  LVOT (VTI): 26 8 cm  LVOT Diam : 2 1 cm  LVOT Vmax: 1210 mm/s  LVOT Vmax; Mean: 996 mm/s  Peak Grad ; Mean: 4 mm[Hg]  SV (LVOT): 81 cm³  VTI;Mean: 4 mm[Hg]  MV Peak A Pacheco: 1240 mm/s  MV Peak E Pacheco  Mean: 731 mm/s  RVSP: 36 mm[Hg]  Max P mm[Hg]  V Max: 2790 mm/s  Vmax: 2800 mm/s  TAPSE: 2 2 cm    [facility] Accredited Echocardiography Laboratory    Prepared and electronically signed by    Colton Cabrera DO  Signed 2018 11:30:10         GILDA Cervantes        "This note has been constructed using a voice recognition system  Therefore there may be syntax, spelling, and/or grammatical errors   Please call if you have any questions  "

## 2019-02-14 NOTE — OCCUPATIONAL THERAPY NOTE
OT TREATMENT         02/14/19 1055   Restrictions/Precautions   Other Precautions Chair Alarm; Bed Alarm; Fall Risk;Pain;O2   Pain Assessment   Pain Assessment No/denies pain   ADL   Where Assessed Edge of bed   Grooming Assistance 4  Minimal Assistance   Grooming Deficit Setup; Increased time to complete   UB Dressing Assistance 4  Minimal Assistance   UB Dressing Deficit Setup; Increased time to complete   LB Dressing Assistance 3  Moderate Assistance   LB Dressing Deficit Setup;Steadying; Increased time to complete   Functional Standing Tolerance   Time 1 minute x 3   Activity functional ambulation; marching in place   Comments Suupervision with RW and cues   Bed Mobility   Rolling R 7  Independent   Additional items Bedrails   Rolling L 7  Independent   Additional items Bedrails   Supine to Sit 5  Supervision   Sit to Supine 5  Supervision   Transfers   Sit to Stand 5  Supervision   Additional items Verbal cues   Stand to Sit 5  Supervision   Stand pivot 5  Supervision   Additional items Verbal cues   Functional Mobility   Functional Mobility 5  Supervision   Additional Comments 30 feet x 2 trials; rest in between   Additional items Rolling walker   Therapeutic Excerise-Strength   UE Strength Yes   Right Upper Extremity- Strength   R Shoulder Flexion; Extension   R Elbow Elbow flexion;Elbow extension   R Wrist Wrist flexion;Wrist extension   R Hand   (flex/ext)   R Position Seated   Equipment Dowel   R Weight/Reps/Sets 10 reps with 1 5#   Left Upper Extremity-Strength   L Shoulder Flexion; Extension   L Elbow Elbow flexion;Elbow extension   L Wrist Wrist flexion;Wrist extension   L Hand   (flex/ext)   L Position Seated   Equipment Dowel   L Weights/Reps/Sets 10 reps with 1 5#   Cognition   Arousal/Participation Alert; Cooperative   Attention Attends with cues to redirect   Orientation Level Oriented X4   Following Commands Follows multistep commands with increased time or repetition   Activity Tolerance   Activity Tolerance Patient limited by fatigue   Assessment   Assessment Pt demonstrating improved cognition, strength, balance and functional activity tolerance allowing for increased active participation with ADL and mobility tasks  Education provided to pt regarding simple HEP and standing balance activities that can be done with nursing supervision to maximize functional independence and strength  Good progress towards goals  Pt returned to bed per her request with all needs within reach  Cont OT per POC  Plan   Treatment Interventions ADL retraining;Functional transfer training;UE strengthening/ROM; Endurance training;Patient/family training;Equipment evaluation/education; Activityengagement; Compensatory technique education   OT Frequency 3-5x/wk   Recommendation   OT Discharge Recommendation Short Term Rehab   Licensure   [place] License Number  [facility] Robbie Richards [facility] [address] [birth year], OTR/L, [place] Lic# [id number]

## 2019-02-14 NOTE — PROGRESS NOTES
Progress Note - Pulmonary   Molly Lock 81 y o  female MRN: [id number]  Unit/Bed#: 2 [place] [address] Encounter: [id number]      Assessment/Plan:     Obstructive sleep apnea  Assessment & Plan  -history of DAMON  -wears nighttime oxygen 2 L  -will need dedicated sleep study in the outpatient setting    Obesity (BMI 30-39  9)  Assessment & Plan  -likely contributes to hypoventilatory component  -requires outpatient PFT testing    * Mild persistent asthma with exacerbation  Assessment & Plan  -remained on steroid wean into this admission  -patient is significantly improving  -is on 20 mg q 12 of steroids  -cardiology is following and appreciate their recommendations  -needs complete pulmonary function testing with diffusing capacity    RSV bronchitis  Assessment & Plan  -supportive care  -currently on steroids 20 mg q 12  -will start oral prednisone taper tomorrow    Chronic atrial fibrillation (HCC)  Assessment & Plan  -chronic  -on anticoagulation with Eliquis    Acute respiratory failure with hypoxia ([address] )  Assessment & Plan  -continue to wean oxygen as tolerated for O2 sat 92-94%    Bacteremia  Assessment & Plan  -Staph epidermidis x2  -patient remains on antibiotics  -infectious Disease is following  -consideration for port removal    Lymphedema of left leg  Assessment & Plan  -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            ______________________________________________________________________    Subjective: Pt seen and examined at bedside  No significant complaints  Patient states she would like to home    We discussed her bacteremia and likely need for port removal, pending Infectious Disease and surgical impression    Tele Events:     Vitals:   Temp:  [97 6 °F (36 4 °C)-98 7 °F (37 1 °C)] 98 7 °F (37 1 °C)  HR:  [70-74] 70  Resp:  [16-18] 16  BP: (128-142)/(74-86) 128/74  Weight (last 2 days)     Date/Time   Weight    02/14/19 0600   94 6 (208 56)    02/13/19 0600   94 6 (208 56) 02/12/19 0600   94 5 (208 34)            Oxygen Therapy  SpO2: 95 %  O2 Flow Rate (L/min): 2 L/min    IV Infusions:       Nutrition:        Diet Orders   (From admission, onward)            Start     Ordered    02/12/19 1209  Diet Cardiovascular; Cardiac  Effective tomorrow     Question Answer Comment   Diet Type Cardiovascular    Cardiac Cardiac    RD to adjust diet per protocol? Yes        02/12/19 1209    02/11/19 0949  Room Service  Once     Question:  Type of Service  Answer:  Room Service-Appropriate    02/11/19 0948          Ins/Outs:   I/O       02/12 0701 - 02/13 0700 02/13 0701 - 02/14 0700 02/14 0701 - 02/15 0700    P  O  260 360     I V  (mL/kg)  10 (0 1)     Total Intake(mL/kg) 260 (2 7) 370 (3 9)     Urine (mL/kg/hr) 1425 (0 6) 1400 (0 6) 400 (0 5)    Total Output 1425 1400 400    Net -1165 -1030 -400           Unmeasured Urine Occurrence  3 x           Lines/Drains:  Invasive Devices     Central Venous Catheter Line            Port A Cath 02/10/19 Right Chest 3 days                 Active medications:  Scheduled Meds:  Current Facility-Administered Medications:  acetaminophen 650 mg Oral Q6H PRN GILDA Crowder    albuterol 2 5 mg Nebulization Q6H PRN GILDA Crowder    ALPRAZolam 0 5 mg Oral HS GILDA Crowder    amLODIPine 5 mg Oral Daily GILDA Crowder    apixaban 5 mg Oral Q12H [facility] GILDA Crowder    aspirin 81 mg Oral Daily GILDA Crowder    atorvastatin 40 mg Oral Daily With Dinner GILDA Crowder    calcium carbonate 1 tablet Oral BID With Meals GILDA Crowder    cholecalciferol 400 Units Oral BID GIDLA Crowder    fluticasone 1 spray Each Nare Daily GILDA Crowder    fluticasone-vilanterol 1 puff Inhalation Daily GILDA Crowder    [START ON 2/15/2019] furosemide 40 mg Oral Daily GILDA Urban    ipratropium-albuterol 3 mL Nebulization BID Lauren Mcginnis MD    loratadine 10 mg Oral Daily Melita PHILIPPE Christos Hyman, CRNP    magnesium oxide 400 mg Oral BID Burton Talmage, CRNP    methylPREDNISolone sodium succinate 20 mg Intravenous Q12H [facility] Robert Devlin, DO    metoprolol tartrate 50 mg Oral Q12H [facility] Burton Talmage, CRNP    nystatin 1 application Topical BID Lyndsey Revankar, DO    ondansetron 4 mg Intravenous Q6H PRN Burton Alessia, CRNP    oxybutynin 10 mg Oral Daily Burton Talmage, CRNP    pantoprazole 20 mg Oral Early Morning Burton Alessia, CRNP    polyethylene glycol 17 g Oral Daily Lyndsey Revankar, DO    potassium chloride 10 mEq Oral Daily Burton Alessia, CRNP    pregabalin 100 mg Oral BID Burton Alessia, CRNP    vancomycin 10 mg/kg (Adjusted) Intravenous Q12H Lyndsey Revankar, DO Last Rate: 750 mg (02/14/19 0549)     PRN Meds:  acetaminophen 650 mg Q6H PRN   albuterol 2 5 mg Q6H PRN   ondansetron 4 mg Q6H PRN     ____________________________________________________________________      Physical Exam   Constitutional: She is oriented to person, place, and time  She appears well-developed  Appears chronically ill    On 2 L nasal cannula   HENT:   Head: Normocephalic and atraumatic  Eyes: Pupils are equal, round, and reactive to light  Conjunctivae are normal    Neck: Normal range of motion  Neck supple  Cardiovascular: Normal rate, regular rhythm and normal heart sounds  Pulmonary/Chest: Effort normal and breath sounds normal  No respiratory distress  She has no wheezes  She has no rales  She exhibits no tenderness  Abdominal: Soft  Bowel sounds are normal    Musculoskeletal: Normal range of motion  She exhibits no edema  Chronic left lower extremity edema and chronic erythema   Neurological: She is alert and oriented to person, place, and time  Skin: Skin is warm and dry     Psychiatric: She has a normal mood and affect            ____________________________________________________________________    Labs:   CBC: Results from last 7 days   Lab Units 02/14/19  0601 02/11/19  0441 02/10/19  1817   WBC Thousand/uL 8 74 8 99 7 72   HEMOGLOBIN g/dL 12 2 14 9 13 6   HEMATOCRIT % 40 7 50 5* 45 3   MCV fL 88 88 88   PLATELETS Thousands/uL 210 185 156     CMP: Results from last 7 days   Lab Units 02/14/19  0601 02/13/19  0557 02/12/19  0514  02/10/19  1817   POTASSIUM mmol/L 4 3 5 1 4 4   < > 3 7   CHLORIDE mmol/L 100 100 99*   < > 99*   CO2 mmol/L 38* 32 33*   < > 36*   BUN mg/dL 28* 32* 29*   < > 16   CREATININE mg/dL 1 10 1 12 1 00   < > 0 98   CALCIUM mg/dL 9 0 9 2 8 9   < > 8 4   AST U/L  --   --   --   --  12   ALT U/L  --   --   --   --  11*   ALK PHOS U/L  --   --   --   --  98   EGFR ml/min/1 73sq m 47 46 53   < > 54    < > = values in this interval not displayed  No components found for: ABG    Magnesium:   Results from last 7 days   Lab Units 02/10/19  1817   MAGNESIUM mg/dL 1 7     Phosphorous:     Troponin:   Results from last 7 days   Lab Units 02/11/19  0441 02/10/19  2352   TROPONIN I ng/mL <0 02 <0 02     PT/INR:   Results from last 7 days   Lab Units 02/10/19  1817   PTT seconds 30   INR  0 99     Lactic Acid:   Results from last 7 days   Lab Units 02/10/19  1817   LACTIC ACID mmol/L 1 2     BNP:   Results from last 7 days   Lab Units 02/10/19  1817   NT-PRO BNP pg/mL 1,851*     TSH:     Procalcitonin: Invalid input(s): PROCALCITONIN      Imaging:   CT chest wo contrast   Final Result by Lisa Hay MD (02/11 8743)      1  Scattered consolidations in both lungs have morphologies most suggestive of atelectasis  Multifocal pneumonia is less likely  Otherwise, no acute thoracic findings  2   No evidence for interstitial lung disease  Workstation performed: [id number]         XR chest 1 view portable   Final Result by Wilfrid Blanco MD (02/11 3634)      Small left pleural effusion basilar atelectasis  Stable cardiomegaly              Workstation performed: [id number]               Micro: Lab Results   Component Value Date    BLOODCX Staphylococcus epidermidis (A) 02/10/2019    BLOODCX Staphylococcus epidermidis (A) 02/10/2019    BLOODCX No Growth After 5 Days  09/29/2018    BLOODCX No Growth After 5 Days   09/29/2018    URINECX No Growth <1000 cfu/mL 09/29/2018    URINECX >100,000 cfu/ml Mixed Contaminants X5 09/21/2016    SPUTUMCULTUR 1+ Growth of  09/29/2018    MRSACULTURE  02/10/2019     No Methicillin Resistant Staphlyococcus aureus (MRSA) isolated            Invalid input(s): LEGIONELLAURINARYANTIGEN        Code Status: Level 1 - Full Code

## 2019-02-14 NOTE — PROGRESS NOTES
Vancomycin Assessment    Germain Prado is a 81 y o  female who is currently receiving vancomycin 750mg IVPB q12 hours for other gram positive cocci in clusters   Relevant clinical data and objective history reviewed:  Creatinine   Date Value Ref Range Status   02/13/2019 1 12 0 60 - 1 30 mg/dL Final     Comment:     Standardized to IDMS reference method   02/12/2019 1 00 0 60 - 1 30 mg/dL Final     Comment:     Standardized to IDMS reference method   02/11/2019 0 94 0 60 - 1 30 mg/dL Final     Comment:     Standardized to IDMS reference method     /74 (BP Location: Left arm)   Pulse 76   Temp 97 8 °F (36 6 °C) (Oral)   Resp 20   Ht 5' 2" (1 575 m)   Wt 94 6 kg (208 lb 8 9 oz)   SpO2 94%   BMI 38 15 kg/m²   I/O last 3 completed shifts: In: 56 [P O :620; I V :10]  Out: 2525 [Urine:2525]  Lab Results   Component Value Date/Time    BUN 32 (H) 02/13/2019 05:57 AM    WBC 8 99 02/11/2019 04:41 AM    HGB 14 9 02/11/2019 04:41 AM    HCT 50 5 (H) 02/11/2019 04:41 AM    MCV 88 02/11/2019 04:41 AM     02/11/2019 04:41 AM     Temp Readings from Last 3 Encounters:   02/13/19 97 8 °F (36 6 °C) (Oral)   10/05/18 98 2 °F (36 8 °C) (Oral)   08/27/18 99 7 °F (37 6 °C)     Vancomycin Days of Therapy: 2    Assessment/Plan  The patient is currently on vancomycin utilizing scheduled dosing based on adjusted body weight (due to obesity)  Baseline risks associated with therapy include: concomitant nephrotoxic medications and advanced age  The patient is currently receiving 750mg IVPB q12 hours and is clinically appropriate and dose will be continued  Pharmacy will also follow closely for s/sx of nephrotoxicity, infusion reactions and appropriateness of therapy  BMP and CBC will be ordered per protocol  Plan for trough prior to the 6th  dose at approximately 1730 on 2/14  It will be drawn to confirm the appropriateness  of the current dose in light of the patient's declining renal function   Due to infection severity, will target a trough of 15-20 (appropriate for most indications)   Pharmacy will continue to follow the patient?s culture results and clinical progress daily      Arturo Bear, Pharmacist

## 2019-02-14 NOTE — PROGRESS NOTES
Vanco level returned critical @ 20 2, per Dr Evan Akins, pharmacy to adjust dose accordingly  Spoke to pharmacist Pj Morales, he is contacting clinical pharmacist to determine proper dose  Pj Morales called back to advise to give regular dose as it is w/i therapeutic level  Confirmed give 750 mg over 60 min, he states that is correct and he is doing the documentation  Dose given

## 2019-02-14 NOTE — PLAN OF CARE
Problem: PHYSICAL THERAPY ADULT  Goal: Performs mobility at highest level of function for planned discharge setting  See evaluation for individualized goals    Outcome: Progressing

## 2019-02-15 ENCOUNTER — TRANSITIONAL CARE MANAGEMENT (OUTPATIENT)
Dept: FAMILY MEDICINE CLINIC | Facility: CLINIC | Age: 84
End: 2019-02-15

## 2019-02-15 VITALS
WEIGHT: 207.45 LBS | RESPIRATION RATE: 18 BRPM | BODY MASS INDEX: 38.18 KG/M2 | HEART RATE: 87 BPM | SYSTOLIC BLOOD PRESSURE: 156 MMHG | HEIGHT: 62 IN | DIASTOLIC BLOOD PRESSURE: 60 MMHG | OXYGEN SATURATION: 95 % | TEMPERATURE: 98 F

## 2019-02-15 PROBLEM — J96.01 ACUTE RESPIRATORY FAILURE WITH HYPOXIA (HCC): Status: RESOLVED | Noted: 2018-09-29 | Resolved: 2019-02-15

## 2019-02-15 PROBLEM — I50.33 ACUTE ON CHRONIC DIASTOLIC CONGESTIVE HEART FAILURE (HCC): Status: RESOLVED | Noted: 2018-03-08 | Resolved: 2019-02-15

## 2019-02-15 PROCEDURE — 94640 AIRWAY INHALATION TREATMENT: CPT

## 2019-02-15 PROCEDURE — 87040 BLOOD CULTURE FOR BACTERIA: CPT | Performed by: INTERNAL MEDICINE

## 2019-02-15 PROCEDURE — 99239 HOSP IP/OBS DSCHRG MGMT >30: CPT | Performed by: FAMILY MEDICINE

## 2019-02-15 PROCEDURE — 94760 N-INVAS EAR/PLS OXIMETRY 1: CPT

## 2019-02-15 PROCEDURE — 93306 TTE W/DOPPLER COMPLETE: CPT | Performed by: INTERNAL MEDICINE

## 2019-02-15 RX ORDER — PREDNISONE 10 MG/1
TABLET ORAL
Refills: 0 | Status: SHIPPED | OUTPATIENT
Start: 2019-02-15 | End: 2019-03-15 | Stop reason: ALTCHOICE

## 2019-02-15 RX ORDER — NYSTATIN 100000 [USP'U]/G
1 POWDER TOPICAL 2 TIMES DAILY
Qty: 15 G | Refills: 0 | Status: SHIPPED | OUTPATIENT
Start: 2019-02-15

## 2019-02-15 RX ADMIN — AMLODIPINE BESYLATE 5 MG: 5 TABLET ORAL at 08:42

## 2019-02-15 RX ADMIN — METOPROLOL TARTRATE 50 MG: 50 TABLET, FILM COATED ORAL at 08:41

## 2019-02-15 RX ADMIN — LORATADINE 10 MG: 10 TABLET ORAL at 08:41

## 2019-02-15 RX ADMIN — CHOLECALCIFEROL (VITAMIN D3) 10 MCG (400 UNIT) TABLET 400 UNITS: at 08:41

## 2019-02-15 RX ADMIN — PREGABALIN 100 MG: 100 CAPSULE ORAL at 08:42

## 2019-02-15 RX ADMIN — IPRATROPIUM BROMIDE AND ALBUTEROL SULFATE 3 ML: 2.5; .5 SOLUTION RESPIRATORY (INHALATION) at 08:25

## 2019-02-15 RX ADMIN — Medication 400 MG: at 08:41

## 2019-02-15 RX ADMIN — FLUTICASONE FUROATE AND VILANTEROL TRIFENATATE 1 PUFF: 200; 25 POWDER RESPIRATORY (INHALATION) at 08:42

## 2019-02-15 RX ADMIN — OXYBUTYNIN CHLORIDE 10 MG: 5 TABLET, EXTENDED RELEASE ORAL at 08:41

## 2019-02-15 RX ADMIN — FLUTICASONE PROPIONATE 1 SPRAY: 50 SPRAY, METERED NASAL at 08:42

## 2019-02-15 RX ADMIN — VANCOMYCIN HYDROCHLORIDE 750 MG: 750 INJECTION, SOLUTION INTRAVENOUS at 06:29

## 2019-02-15 RX ADMIN — ASPIRIN 81 MG: 81 TABLET, COATED ORAL at 08:41

## 2019-02-15 RX ADMIN — METHYLPREDNISOLONE SODIUM SUCCINATE 20 MG: 40 INJECTION, POWDER, FOR SOLUTION INTRAMUSCULAR; INTRAVENOUS at 08:43

## 2019-02-15 RX ADMIN — Medication 1 TABLET: at 08:42

## 2019-02-15 RX ADMIN — POLYETHYLENE GLYCOL 3350 17 G: 17 POWDER, FOR SOLUTION ORAL at 08:43

## 2019-02-15 RX ADMIN — POTASSIUM CHLORIDE 10 MEQ: 750 TABLET, EXTENDED RELEASE ORAL at 08:41

## 2019-02-15 RX ADMIN — FUROSEMIDE 40 MG: 40 TABLET ORAL at 08:41

## 2019-02-15 RX ADMIN — APIXABAN 5 MG: 5 TABLET, FILM COATED ORAL at 08:41

## 2019-02-15 RX ADMIN — PANTOPRAZOLE SODIUM 20 MG: 20 TABLET, DELAYED RELEASE ORAL at 06:32

## 2019-02-15 NOTE — PLAN OF CARE
Problem: Potential for Falls  Goal: Patient will remain free of falls  Description  INTERVENTIONS:  - Assess patient frequently for physical needs  -  Identify cognitive and physical deficits and behaviors that affect risk of falls    -  [place] fall precautions as indicated by assessment   - Educate patient/family on patient safety including physical limitations  - Instruct patient to call for assistance with activity based on assessment  - Modify environment to reduce risk of injury  - Consider OT/PT consult to assist with strengthening/mobility  Outcome: Progressing     Problem: Prexisting or High Potential for Compromised Skin Integrity  Goal: Skin integrity is maintained or improved  Description  INTERVENTIONS:  - Identify patients at risk for skin breakdown  - Assess and monitor skin integrity  - Assess and monitor nutrition and hydration status  - Monitor labs (i e  albumin)  - Assess for incontinence   - Turn and reposition patient  - Assist with mobility/ambulation  - Relieve pressure over bony prominences  - Avoid friction and shearing  - Provide appropriate hygiene as needed including keeping skin clean and dry  - Evaluate need for skin moisturizer/barrier cream  - Collaborate with interdisciplinary team (i e  Nutrition, Rehabilitation, etc )   - Patient/family teaching  Outcome: Progressing     Problem: RESPIRATORY - ADULT  Goal: Achieves optimal ventilation and oxygenation  Description  INTERVENTIONS:  - Assess for changes in respiratory status  - Assess for changes in mentation and behavior  - Position to facilitate oxygenation and minimize respiratory effort  - Oxygen administration by appropriate delivery method based on oxygen saturation (per order) or ABGs  - Initiate smoking cessation education as indicated  - Encourage broncho-pulmonary hygiene including cough, deep breathe, Incentive Spirometry  - Assess the need for suctioning and aspirate as needed  - Assess and instruct to report SOB or any respiratory difficulty  - Respiratory Therapy support as indicated  Outcome: Progressing     Problem: CARDIOVASCULAR - ADULT  Goal: Maintains optimal cardiac output and hemodynamic stability  Description  INTERVENTIONS:  - Monitor I/O, vital signs and rhythm  - Monitor for S/S and trends of decreased cardiac output i e  bleeding, hypotension  - Administer and titrate ordered vasoactive medications to optimize hemodynamic stability  - Assess quality of pulses, skin color and temperature  - Assess for signs of decreased coronary artery perfusion - ex   Angina  - Instruct patient to report change in severity of symptoms  Outcome: Progressing  Goal: Absence of cardiac dysrhythmias or at baseline rhythm  Description  INTERVENTIONS:  - Continuous cardiac monitoring, monitor vital signs, obtain 12 lead EKG if indicated  - Administer antiarrhythmic and heart rate control medications as ordered  - Monitor electrolytes and administer replacement therapy as ordered  Outcome: Progressing     Problem: METABOLIC, FLUID AND ELECTROLYTES - ADULT  Goal: Electrolytes maintained within normal limits  Description  INTERVENTIONS:  - Monitor labs and assess patient for signs and symptoms of electrolyte imbalances  - Administer electrolyte replacement as ordered  - Monitor response to electrolyte replacements, including repeat lab results as appropriate  - Instruct patient on fluid and nutrition as appropriate  Outcome: Progressing  Goal: Fluid balance maintained  Description  INTERVENTIONS:  - Monitor labs and assess for signs and symptoms of volume excess or deficit  - Monitor I/O and WT  - Instruct patient on fluid and nutrition as appropriate  Outcome: Progressing     Problem: SKIN/TISSUE INTEGRITY - ADULT  Goal: Skin integrity remains intact  Description  INTERVENTIONS  - Identify patients at risk for skin breakdown  - Assess and monitor skin integrity  - Assess and monitor nutrition and hydration status  - Monitor labs (i e  albumin)  - Assess for incontinence   - Turn and reposition patient  - Assist with mobility/ambulation  - Relieve pressure over bony prominences  - Avoid friction and shearing  - Provide appropriate hygiene as needed including keeping skin clean and dry  - Evaluate need for skin moisturizer/barrier cream  - Collaborate with interdisciplinary team (i e  Nutrition, Rehabilitation, etc )   - Patient/family teaching  Outcome: Progressing  Goal: Incision(s), wounds(s) or drain site(s) healing without S/S of infection  Description  INTERVENTIONS  - Assess and document risk factors for skin impairment   - Assess and document dressing, incision, wound bed, drain sites and surrounding tissue  - Initiate Nutrition services consult and/or wound management as needed  Outcome: Progressing     Problem: DISCHARGE PLANNING - CARE MANAGEMENT  Goal: Discharge to post-acute care or home with appropriate resources  Description  INTERVENTIONS:  - Conduct assessment to determine patient/family and health care team treatment goals, and need for post-acute services based on payer coverage, community resources, and patient preferences, and barriers to discharge  - Address psychosocial, clinical, and financial barriers to discharge as identified in assessment in conjunction with the patient/family and health care team  - Arrange appropriate level of post-acute services according to patient's   needs and preference and payer coverage in collaboration with the physician and health care team  - Communicate with and update the patient/family, physician, and health care team regarding progress on the discharge plan  - Arrange appropriate transportation to post-acute venues    TRANSFER BACK TO [address] [place] [address]   Outcome: Progressing

## 2019-02-15 NOTE — PROGRESS NOTES
Vancomycin IV Pharmacy-to-Dose Consultation    Santino Moctezuma is a 81 y o  female who is currently receiving Vancomycin IV with management by the Pharmacy Consult service  Assessment/Plan:  The patient was reviewed  Renal function is stable and no signs or symptoms of nephrotoxicity and/or infusion reactions were documented in the chart  Based on today?s assessment, continue current vancomycin (day # 4) dosing of 750 mg q12h, with a plan for trough to be drawn at 17:30 on 2-21-19  We will continue to follow the patient?s culture results and clinical progress daily      Juanita Cherry, Pharmacist

## 2019-02-15 NOTE — PROGRESS NOTES
[facility] Progress Note  Patient: Lee Eans 81 y o  female   MRN: [id number]  PCP: Ze Olvera DO  Unit/Bed#: [address] [place] [address] Encounter: [id number]  Date Of Visit: 02/15/19    Problem List:    Principal Problem:    Mild persistent asthma with exacerbation  Active Problems:    Acute on chronic diastolic congestive heart failure ([address] )    Acute respiratory failure with hypoxia ([address] )    Sepsis ([address] )    Stenosis of left internal carotid artery    Obesity (BMI 30-39  9)    GERD (gastroesophageal reflux disease)    Hyperlipemia    Lymphedema of left leg    Bacteremia    Allergic rhinitis    Obstructive sleep apnea    Hypertension    Chronic atrial fibrillation (HCC)      Assessment & Plan:    * Mild persistent asthma with exacerbation  Assessment & Plan  · Patient had flu a few weeks prior to admission which essentially resolved  · Likely due to RSV B infection  · CT of the chest showed no evidence of pneumonia  · Procalcitonin level x2 has been negative  · Symptoms improved  Continue IV Solu-Medrol 20 milligram IV q 12 hours with possible transition to p o  Prednisone tomorrow if improving  · Continue Breo Ellipta and nebulizers  · Pulmonary consult appreciated    Acute on chronic diastolic congestive heart failure (HCC)  Assessment & Plan  · Now appears euvolemic  IV Lasix was discontinued and patient started on p  O  Lasix starting tomorrow   · Monitor strict I&O and daily weight  · Echo 1/18 - EF of 78-48%, grade 1 diastolic dysfunction  · As patient also complained of some chest pain, she had stress test done which showed no evidence of ischemia  · Cardiology consult appreciated    Acute respiratory failure with hypoxia ([address] )  Assessment & Plan  · Likely secondary to component of exacerbation of mild persistent asthma, CHF  · Continue supplemental O2 by nasal cannula  Patient normally uses 2 liters of oxygen at night and p r n  As needed during the day      Chronic atrial fibrillation [facility])  Assessment & Plan  · Continue eliquis and metoprolol  · Patient was initially atrial fibrillation with rapid ventricular rate likely secondary to fever but now rate controlled    Hypertension  Assessment & Plan  Continue Lopressor, norvasc  Obstructive sleep apnea  Assessment & Plan  Does not wear CPAP at home  Patient uses 2 liters of oxygen at night    Allergic rhinitis  Assessment & Plan  Continue Claritin  Bacteremia  Assessment & Plan  Blood cultures 2 of 2 sets growing gram-positive cocci in clusters  Final culture grew Staph epidermidis   Id consult appreciated  Continue IV vancomycin through 2/18  Repeat blood cultures in 2 weeks after completion of IV antibiotic  Repeat echo ordered    Lymphedema of left leg  Assessment & Plan  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  Unchanged per patient    Hyperlipemia  Assessment & Plan  Continue statin  GERD (gastroesophageal reflux disease)  Assessment & Plan  Continue Protonix    Obesity (BMI 30-39  9)  Assessment & Plan  Dietary and lifestyle modification  Stenosis of left internal carotid artery  Assessment & Plan  Continue aspirin and statin  Sepsis [facility])  Assessment & Plan  Patient had temperature of 100 6°  on day of admission which has since resolved, likely due to RSV B infection  CT chest showed no evidence of pneumonia  Procalcitonin x2 is negative        VTE Pharmacologic Prophylaxis:   Pharmacologic: Apixaban (Eliquis)  Mechanical VTE Prophylaxis in Place: No    Patient Centered Rounds: I have performed bedside rounds with nursing staff today  Discussions with Specialists or Other Care Team Provider: Yes    Education and Discussions with Family / Patient:Yes    Time Spent for Care: 30 minutes  More than 50% of total time spent on counseling and coordination of care as described above      Current Length of Stay: 4 day(s)    Current Patient Status: Inpatient     Discharge Plan: Back to NH    Code Status: Level 1 - Full Code    Certification Statement: The patient will continue to require additional inpatient hospital stay due to Bacteremia, asthma exacerbation      Subjective:   States breathing is back at her baseline  Denies any chest pain    Objective:     Vitals:   Temp (24hrs), Av 1 °F (36 7 °C), Min:97 6 °F (36 4 °C), Max:98 7 °F (37 1 °C)    Temp:  [97 6 °F (36 4 °C)-98 7 °F (37 1 °C)] 98 °F (36 7 °C)  HR:  [70-83] 83  Resp:  [16-18] 18  BP: (128-157)/(70-86) 135/70  SpO2:  [94 %-95 %] 95 %  Body mass index is 38 15 kg/m²  Input and Output Summary (last 24 hours): Intake/Output Summary (Last 24 hours) at 2/15/2019 0020  Last data filed at 2019  Gross per 24 hour   Intake    Output 950 ml   Net -950 ml       Physical Exam:     Physical Exam   Constitutional: She is oriented to person, place, and time  She appears well-developed and well-nourished  No distress  HENT:   Head: Normocephalic and atraumatic  Eyes: EOM are normal  Right eye exhibits no discharge  Left eye exhibits no discharge  No scleral icterus  Neck: Neck supple  Cardiovascular:   Murmur heard  Irregularly, irregular rate and rhythm   Pulmonary/Chest: Effort normal and breath sounds normal  No respiratory distress  She has no wheezes  She has no rales  Abdominal: Soft  Bowel sounds are normal  She exhibits no distension  There is no tenderness  Musculoskeletal: She exhibits edema (left L E)  Neurological: She is alert and oriented to person, place, and time  No cranial nerve deficit  Skin: Skin is dry  She is not diaphoretic         Additional Data:     Labs:    Results from last 7 days   Lab Units 19  0601  02/10/19  1817   WBC Thousand/uL 8 74   < > 7 72   HEMOGLOBIN g/dL 12 2   < > 13 6   HEMATOCRIT % 40 7   < > 45 3   PLATELETS Thousands/uL 210   < > 156   NEUTROS PCT %  --   --  77*   LYMPHS PCT %  --   --  11*   MONOS PCT %  --   --  7   EOS PCT %  --   --  3    < > = values in this interval not displayed  Results from last 7 days   Lab Units 02/14/19  0601  02/10/19  1817   POTASSIUM mmol/L 4 3   < > 3 7   CHLORIDE mmol/L 100   < > 99*   CO2 mmol/L 38*   < > 36*   BUN mg/dL 28*   < > 16   CREATININE mg/dL 1 10   < > 0 98   CALCIUM mg/dL 9 0   < > 8 4   ALK PHOS U/L  --   --  98   ALT U/L  --   --  11*   AST U/L  --   --  12    < > = values in this interval not displayed  Results from last 7 days   Lab Units 02/10/19  1817   INR  0 99       * I Have Reviewed All Lab Data Listed Above  * Additional Pertinent Lab Tests Reviewed: All Labs For Current Hospital Admission Reviewed    Imaging:  Xr Chest 1 View Portable    Result Date: 2/11/2019  Narrative: CHEST INDICATION:   chest pain, shortness of breath  COMPARISON:  9/29/2018 EXAM PERFORMED/VIEWS:  XR CHEST PORTABLE Images: 1 FINDINGS:  Right-sided Port-A-Cath terminates at the cavoatrial junction  Heart shadow is enlarged but unchanged from prior exam  Small left pleural effusion with basilar atelectasis noted  Upper lobe and right lung grossly clear  No pneumothorax  Osseous structures appear within normal limits for patient age  Impression: Small left pleural effusion basilar atelectasis  Stable cardiomegaly  Workstation performed: [id number]     Ct Chest Wo Contrast    Result Date: 2/11/2019  Narrative: CT CHEST WITHOUT IV CONTRAST INDICATION:   Interstitial lung disease  COMPARISON:  Chest CT dated 9/30/2018 an 1/20/2014  TECHNIQUE: CT examination of the chest was performed without intravenous contrast   Axial, sagittal, and coronal 2D reformatted images were created from the source data and submitted for interpretation  Radiation dose length product (DLP) for this visit:  487 38 mGy-cm   This examination, like all CT scans performed in the [facility], was performed utilizing techniques to minimize radiation dose exposure, including the use of iterative  reconstruction and automated exposure control   FINDINGS: LUNGS: Dense peripheral consolidation posteriorly in the right lower lobe  Bandlike consolidation in the left upper lobe along the major fissure  Other linear scattered opacities in the left lung  Morphology of all these consolidations most suggestive of atelectasis No endobronchial lesions  No significant interlobular septal thickening or interstitial nodularity  PLEURA:  No effusion or pneumothorax  HEART/GREAT VESSELS:  Central venous catheter tip at the cavoatrial junction  Left atrial enlargement  Scattered coronary calcifications  No pericardial thickening or effusion  MEDIASTINUM AND JASWINDER:  Unremarkable  CHEST WALL AND LOWER NECK:   No abnormality around the patient's chest wall port  VISUALIZED STRUCTURES IN THE UPPER ABDOMEN:  Stable right renal cyst  OSSEOUS STRUCTURES: Multiple benign intraosseous hemangiomata spine  No acute fracture or destructive osseous lesion  Impression: 1  Scattered consolidations in both lungs have morphologies most suggestive of atelectasis  Multifocal pneumonia is less likely  Otherwise, no acute thoracic findings  2   No evidence for interstitial lung disease  Workstation performed: [id number]     Imaging Reports Reviewed by myself    Cultures:   Blood Culture:   Lab Results   Component Value Date    BLOODCX Staphylococcus epidermidis (A) 02/10/2019    BLOODCX Staphylococcus epidermidis (A) 02/10/2019    BLOODCX No Growth After 5 Days  09/29/2018    BLOODCX No Growth After 5 Days  09/29/2018    BLOODCX No Growth After 5 Days  01/23/2018    BLOODCX No Growth After 5 Days  01/23/2018    BLOODCX No Growth After 5 Days   01/05/2018     Urine Culture:   Lab Results   Component Value Date    URINECX No Growth <1000 cfu/mL 09/29/2018    URINECX >100,000 cfu/ml Mixed Contaminants X5 09/21/2016     Sputum Culture: No components found for: SPUTUMCX  Wound Culture: No results found for: WOUNDCULT    Last 24 Hours Medication List:     Current Facility-Administered Medications:  acetaminophen 650 mg Oral Q6H PRN Asael Romero, CRNP    albuterol 2 5 mg Nebulization Q6H PRN Asael Romero, CRNP    ALPRAZolam 0 5 mg Oral HS Asael Romero, CRNP    amLODIPine 5 mg Oral Daily Asael Heatonjudy, CRNP    apixaban 5 mg Oral Q12H [facility] Asael Romero, CRNP    aspirin 81 mg Oral Daily Aasel Romero, CRNP    atorvastatin 40 mg Oral Daily With [address], CRNP    calcium carbonate 1 tablet Oral BID With Meals Asael Romero, CRNP    cholecalciferol 400 Units Oral BID Asael Romero, CRNP    fluticasone 1 spray Each Nare Daily Asael Romero, CRNP    fluticasone-vilanterol 1 puff Inhalation Daily Asael Romero, CRNP    furosemide 40 mg Oral Daily Levora Doyle, CRNP    ipratropium-albuterol 3 mL Nebulization BID Fabian Grimes MD    loratadine 10 mg Oral Daily Asael Heatonjudy, CRNP    magnesium oxide 400 mg Oral BID Asael Heatonjudy, CRNP    methylPREDNISolone sodium succinate 20 mg Intravenous Q12H [facility] Karin Rios DO    metoprolol tartrate 50 mg Oral Q12H [facility] Asael Romero, CRNP    nystatin 1 application Topical BID Lyndsey Revcaryl, DO    ondansetron 4 mg Intravenous Q6H PRN Asael Romero, CRNP    oxybutynin 10 mg Oral Daily Asael Heatonjudy, CRNP    pantoprazole 20 mg Oral Early Morning Asael Romero, CRNP    polyethylene glycol 17 g Oral Daily Lyndsey Revankar, DO    potassium chloride 10 mEq Oral Daily Asael Heatonjudy, CRNP    pregabalin 100 mg Oral BID Asael Heatonjudy, CRNP    vancomycin 10 mg/kg (Adjusted) Intravenous Q12H Lyndsey Revankar, DO Last Rate: 750 mg (02/14/19 1758)        Today, Patient Was Seen By: Melita Winn DO    ** Please Note: "This note has been constructed using a voice recognition system  Therefore there may be syntax, spelling, and/or grammatical errors   Please call if you have any questions  "**

## 2019-02-15 NOTE — PROGRESS NOTES
Vancomycin IV Pharmacy-to-Dose Consultation    Molly Lock is a 81 y o  female who is currently receiving Vancomycin IV with management by the Pharmacy Consult service  Assessment/Plan:  The patient was reviewed  Renal function is stable and no signs or symptoms of nephrotoxicity and/or infusion reactions were documented in the chart  Based on today?s assessment, continue current vancomycin (day # 3) dosing of 750 mg  IV Q 12 H   Last trough drawn on 02/14/19 @ 1740 prior to 6th dose is 18 1  Pharmacy will continue to monitor renal function  for signs of nephrotoxicity  We will continue to follow the patient?s culture results and clinical progress daily        Antoinette Li, Pharmacist

## 2019-02-15 NOTE — DISCHARGE INSTRUCTIONS
Blood cultures X 2 - check 2 weeks after completion of antibiotics, atleast 1 from port (Forward results of blood culture to Dr Smiley Aggarwal)    Continue vancomycin through 2/18/19 and then stop

## 2019-02-15 NOTE — SOCIAL WORK
Per PCP, pt stable for DC to [facility] today for Skilled care  Spoke with the pt at the bedside, she is ready to return to the Center  IMM discussed and signed  [place] referred for transportation, pt on oxygen at 2L and [place] aware   scheduled for 1230-1pm today  Unit RN and PCP aware of  time

## 2019-02-15 NOTE — [place] UNIVERSAL TRANSFER FORM
[place] UNIVERSAL TRANSFER FORM  (ALL ITEMS MUST BE COMPLETED)    1  TRANSFER FROM: [address]      TRANSFER TO: [facility]     2  DATE OF TRANSFER: 2/15/2019                        TIME OF TRANSFER: 1300    3  PATIENT NAME: Alexis Tripathi      YOB: 1935                             GENDER: female    4  LANGUAGE:   English    5  PHYSICIAN NAME:  Kieran Robledo DO                   PHONE: [phone]    6  CODE STATUS: Level 1 - Full Code        Out of Hospital DNR Attached: No    7  :                                      :  Extended Emergency Contact Information  Primary Emergency Contact: Lavell Crespo  Address: [facility] [place] [address] Phone: [phone]  Relation: Daughter           Health Care Representative/Proxy:  No           Legal Guardian:  No             NAME OF:           HEALTH CARE REPRESENTATIVE/PROXY:                                         OR           LEGAL GUARDIAN, IF NOT :                                               PHONE:  (Day)           (Night)                        (Cell)    8  REASON FOR TRANSFER: (Must include brief medical history and recent changes in physical function or cognition ) Living Facility            V/S: /60 (BP Location: Left arm)   Pulse 87   Temp 98 °F (36 7 °C) (Oral)   Resp 18   Ht 5' 2" (1 575 m)   Wt 94 1 kg (207 lb 7 3 oz)   SpO2 95%   BMI 37 94 kg/m²           PAIN: None    9  PRIMARY DIAGNOSIS: Mild persistent asthma with exacerbation      Secondary Diagnosis:         Pacemaker: No      Internal Defib: No          Mental Health Diagnosis (if Applicable):    10  RESTRAINTS: No     11  RESPIRATORY NEEDS: None    12  ISOLATION/PRECAUTION: None    13  ALLERGY: Fentanyl and related; Latex; and Penicillins    14  SENSORY:       Vision Good, Hearing Good  and Speech Clear    15   SKIN CONDITION: No Wounds    16  DIET: Regular    17  IV ACCESS: None    18  PERSONAL ITEMS SENT WITH PATIENT: None    19  ATTACHED DOCUMENTS: MUST ATTACH CURRENT MEDICATION INFORMATION Face Sheet and Discharge Summary    20  AT RISK ALERTS:Falls        HARM TO: N/A    21  WEIGHT BEARING STATUS:         Left Leg: Full        Right Leg: Full    22  MENTAL STATUS: Alert And Oriented  23  FUNCTION:        Walk: Self        Transfer: Self        Toilet: Self        Feed: Self    24  IMMUNIZATIONS/SCREENING:     Immunization History   Administered Date(s) Administered    Influenza, high dose seasonal 0 5 mL 10/03/2018       25  BOWEL: Continent    26  BLADDER: Continent    27   SENDING FACILITY CONTACT: Jeremy Fowler                  Title: RN        Unit: 2s        Phone: [phone] [address] (if known):        Title:        Unit:         Phone:         FORM PREFILLED BY (if applicable)       Title:       Unit:        Phone:         FORM COMPLETED BY Arin Busby RN      Title: ROSANNE      Phone: [phone]

## 2019-02-15 NOTE — DISCHARGE SUMMARY
Discharge Summary - [address] [facility]    Patient Information: Jb Orta 81 y o  female MRN: [id number]  Unit/Bed#: [address] [place] [address] Encounter: [id number]    Discharging Physician / Practitioner: Myriam Rice DO  PCP: Ulices Victoria DO  Admission Date: 2/10/2019  Discharge Date: 02/15/19    Reason for Admission: Chest Pain (has had intermiottant chest pain since this morning, c/o increase diff breathing)      Discharge Diagnoses:     Principal Problem:    Mild persistent asthma with exacerbation  Active Problems:    Bacteremia    Stenosis of left internal carotid artery    Obesity (BMI 30-39  9)    GERD (gastroesophageal reflux disease)    Hyperlipemia    Lymphedema of left leg    Allergic rhinitis    Obstructive sleep apnea    Hypertension    Chronic atrial fibrillation (HCC)  Resolved Problems:    Acute on chronic diastolic congestive heart failure (HCC)    Acute respiratory failure with hypoxia (HCC)    Sepsis (HCC)        * Mild persistent asthma with exacerbation  Assessment & Plan  · Patient had flu a few weeks prior to admission which essentially resolved  · Likely due to RSV B infection  · CT of the chest showed no evidence of pneumonia  · Procalcitonin level x2 has been negative  · Symptoms improved  Off IV Solu-Medrol and transitioned to p o  Prednisone taper on discharge  · Continue Advair and nebulizers    Bacteremia  Assessment & Plan  Blood cultures 2 of 2 sets growing gram-positive cocci in clusters  Final culture grew Staph epidermidis   Continue IV vancomycin through 2/18  Repeat blood cultures in 2 weeks after completion of IV antibiotic and results to be forwarded to ID to determine if port needs to come out  Okay to use port for IV antibiotics on discharge as per ID    Acute on chronic diastolic congestive heart failure (HCC)resolved as of 2/15/2019  Assessment & Plan  · Now appears euvolemic  IV Lasix was discontinued and patient started on p   O  Lasix   · Echo 1/18 - EF of 48-67%, grade 1 diastolic dysfunction  · As patient also complained of some chest pain, she had stress test done which showed no evidence of ischemia  · Repeat echo during this admission showed normal EF of 55% with grade 1 diastolic dysfunction    Acute respiratory failure with hypoxia (HCC)resolved as of 2/15/2019  Assessment & Plan  · Likely secondary to component of exacerbation of mild persistent asthma, CHF  · Continue supplemental O2 by nasal cannula  Patient normally uses 2 liters of oxygen at night and p r n  As needed during the day    Chronic atrial fibrillation (HCC)  Assessment & Plan  · Continue metoprolol, eliquis  · Patient was initially atrial fibrillation with rapid ventricular rate likely secondary to fever but now rate controlled    Hypertension  Assessment & Plan  Continue Lopressor, norvasc    Obstructive sleep apnea  Assessment & Plan  Does not wear CPAP at home  Patient uses 2 liters of oxygen at night    Allergic rhinitis  Assessment & Plan  Continue Claritin    Lymphedema of left leg  Assessment & Plan  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  Unchanged per patient  Hyperlipemia  Assessment & Plan  Continue statin    GERD (gastroesophageal reflux disease)  Assessment & Plan  Continue Prilosec    Obesity (BMI 30-39  9)  Assessment & Plan  Dietary and lifestyle modification    Stenosis of left internal carotid artery  Assessment & Plan  Continue aspirin, statin  Sepsis (HCC)resolved as of 2/15/2019  Assessment & Plan  Patient had temperature of 100 6°  on day of admission which has since resolved, likely due to RSV B infection  CT chest showed no evidence of pneumonia  Procalcitonin x2 is negative        Consultations During Hospital Stay:  IP CONSULT TO PULMONOLOGY  IP CONSULT TO CARDIOLOGY  IP CONSULT TO INFECTIOUS DISEASES  IP CONSULT TO PHARMACY    Procedures Performed:     · Stress test  · Echo    Significant Findings:     · See hospital course and above    Imaging while in hospital:    Xr Chest 1 View Portable    Result Date: 2/11/2019  Narrative: CHEST INDICATION:   chest pain, shortness of breath  COMPARISON:  9/29/2018 EXAM PERFORMED/VIEWS:  XR CHEST PORTABLE Images: 1 FINDINGS:  Right-sided Port-A-Cath terminates at the cavoatrial junction  Heart shadow is enlarged but unchanged from prior exam  Small left pleural effusion with basilar atelectasis noted  Upper lobe and right lung grossly clear  No pneumothorax  Osseous structures appear within normal limits for patient age  Impression: Small left pleural effusion basilar atelectasis  Stable cardiomegaly  Workstation performed: [id number]     Ct Chest Wo Contrast    Result Date: 2/11/2019  Narrative: CT CHEST WITHOUT IV CONTRAST INDICATION:   Interstitial lung disease  COMPARISON:  Chest CT dated 9/30/2018 an 1/20/2014  TECHNIQUE: CT examination of the chest was performed without intravenous contrast   Axial, sagittal, and coronal 2D reformatted images were created from the source data and submitted for interpretation  Radiation dose length product (DLP) for this visit:  487 38 mGy-cm   This examination, like all CT scans performed in the [facility], was performed utilizing techniques to minimize radiation dose exposure, including the use of iterative  reconstruction and automated exposure control  FINDINGS: LUNGS:  Dense peripheral consolidation posteriorly in the right lower lobe  Bandlike consolidation in the left upper lobe along the major fissure  Other linear scattered opacities in the left lung  Morphology of all these consolidations most suggestive of atelectasis No endobronchial lesions  No significant interlobular septal thickening or interstitial nodularity  PLEURA:  No effusion or pneumothorax  HEART/GREAT VESSELS:  Central venous catheter tip at the cavoatrial junction  Left atrial enlargement  Scattered coronary calcifications    No pericardial thickening or effusion  MEDIASTINUM AND JASWINDER:  Unremarkable  CHEST WALL AND LOWER NECK:   No abnormality around the patient's chest wall port  VISUALIZED STRUCTURES IN THE UPPER ABDOMEN:  Stable right renal cyst  OSSEOUS STRUCTURES: Multiple benign intraosseous hemangiomata spine  No acute fracture or destructive osseous lesion  Impression: 1  Scattered consolidations in both lungs have morphologies most suggestive of atelectasis  Multifocal pneumonia is less likely  Otherwise, no acute thoracic findings  2   No evidence for interstitial lung disease  Workstation performed: [id number]       Incidental Findings:   · none    Test Results Pending at Discharge (will require follow up):   · As per After Visit Summary     Outpatient Tests Requested:  · BMP, Blood cultures X 2    Complications:  See hospital course and above    Hospital Course:     Bird Bowen is a 81 y o  female patient who originally presented to the hospital on 2/10/2019 due to chest pain, shortness of breath  In the ER she was noted to be in mild respiratory distress  She was given nebulizer treatments and Solu-Medrol with improvement  Patient was admitted and seen by Cardiology, Pulmonary and Infectious Disease  She was started on IV Solu-Medrol and IV Lasix  she was also on IV antibiotics for bacteremia  Her symptoms did improve while here  Patient being discharged on IV vancomycin to complete her course at [facility], [facility]  She was cleared by all consultants involved in her care prior to discharge  Discharge plan was discussed with patient and offered to call family to update discharge plan but patient declined    Please see above list of diagnoses and related plan for additional information  Condition at Discharge: stable     Discharge Day Visit / Exam:     Subjective:  Denies any shortness of breath   Wants to go back to [address]: Blood Pressure: 156/60 (02/15/19 0745)  Pulse: 87 (02/15/19 0745)  Temperature: 98 °F (36 7 °C) (02/15/19 0745)  Temp Source: Oral (02/15/19 0745)  Respirations: 18 (02/15/19 0745)  Height: 5' 2" (157 5 cm) (02/10/19 2036)  Weight - Scale: 94 1 kg (207 lb 7 3 oz) (02/15/19 0600)  SpO2: 95 % (02/15/19 0827)  Exam:   Physical Exam   Constitutional: She is oriented to person, place, and time  She appears well-developed and well-nourished  No distress  HENT:   Head: Normocephalic and atraumatic  Mouth/Throat: Oropharynx is clear and moist    Eyes: EOM are normal  Right eye exhibits no discharge  Left eye exhibits no discharge  No scleral icterus  Neck: Neck supple  Cardiovascular:   Murmur heard  Irregularly, irregular rate and rhythm   Pulmonary/Chest: Effort normal and breath sounds normal  No respiratory distress  She has no wheezes  She has no rales  Abdominal: Soft  Bowel sounds are normal  She exhibits no distension  There is no tenderness  Musculoskeletal: She exhibits edema (Left L E)  Neurological: She is alert and oriented to person, place, and time  No cranial nerve deficit  Skin: Skin is dry  She is not diaphoretic  Psychiatric: She has a normal mood and affect  Discharge instructions/Information to patient and family:(Discharge Medications and Follow up):   See after visit summary for information provided to patient and family  Provisions for Follow-Up Care:  See after visit summary for information related to follow-up care and any pertinent home health orders  Disposition: [address] [place] [address]    Planned Readmission:  No     Discharge Statement:  I spent > 30 minutes discharging the patient  This time was spent on the day of discharge  I had direct contact with the patient on the day of discharge  Greater than 50% of the total time was spent examining patient, answering all patient questions, arranging and discussing plan of care with patient as well as directly providing post-discharge instructions    Additional time then spent on discharge activities  Discharge Medications:  See after visit summary for reconciled discharge medications provided to patient and family  ** Please Note: "This note has been constructed using a voice recognition system  Therefore there may be syntax, spelling, and/or grammatical errors   Please call if you have any questions  "**

## 2019-02-19 ENCOUNTER — EPISODE CHANGES (OUTPATIENT)
Dept: CASE MANAGEMENT | Facility: HOSPITAL | Age: 84
End: 2019-02-19

## 2019-02-19 LAB — BACTERIA BLD CULT: NORMAL

## 2019-02-20 LAB — BACTERIA BLD CULT: NORMAL

## 2019-02-22 ENCOUNTER — PATIENT OUTREACH (OUTPATIENT)
Dept: CASE MANAGEMENT | Facility: OTHER | Age: 84
End: 2019-02-22

## 2019-02-26 ENCOUNTER — APPOINTMENT (EMERGENCY)
Dept: RADIOLOGY | Facility: HOSPITAL | Age: 84
End: 2019-02-26
Payer: MEDICARE

## 2019-02-26 ENCOUNTER — OFFICE VISIT (OUTPATIENT)
Dept: PULMONOLOGY | Facility: MEDICAL CENTER | Age: 84
End: 2019-02-26
Payer: MEDICARE

## 2019-02-26 ENCOUNTER — HOSPITAL ENCOUNTER (EMERGENCY)
Facility: HOSPITAL | Age: 84
Discharge: HOME | End: 2019-02-26
Attending: EMERGENCY MEDICINE | Admitting: EMERGENCY MEDICINE
Payer: MEDICARE

## 2019-02-26 VITALS
DIASTOLIC BLOOD PRESSURE: 80 MMHG | HEART RATE: 76 BPM | TEMPERATURE: 98 F | OXYGEN SATURATION: 95 % | RESPIRATION RATE: 22 BRPM | SYSTOLIC BLOOD PRESSURE: 140 MMHG

## 2019-02-26 VITALS
RESPIRATION RATE: 12 BRPM | SYSTOLIC BLOOD PRESSURE: 122 MMHG | TEMPERATURE: 98.4 F | WEIGHT: 198 LBS | BODY MASS INDEX: 36.44 KG/M2 | HEIGHT: 62 IN | OXYGEN SATURATION: 95 % | HEART RATE: 87 BPM | DIASTOLIC BLOOD PRESSURE: 76 MMHG

## 2019-02-26 DIAGNOSIS — R40.0 SOMNOLENCE: Primary | ICD-10-CM

## 2019-02-26 DIAGNOSIS — J44.9 COPD (CHRONIC OBSTRUCTIVE PULMONARY DISEASE) (HCC): Primary | ICD-10-CM

## 2019-02-26 LAB
ALBUMIN SERPL BCP-MCNC: 2.8 G/DL (ref 3.5–5)
ALP SERPL-CCNC: 119 U/L (ref 46–116)
ALT SERPL W P-5'-P-CCNC: 18 U/L (ref 12–78)
ANION GAP SERPL CALCULATED.3IONS-SCNC: 2 MMOL/L (ref 4–13)
AST SERPL W P-5'-P-CCNC: 12 U/L (ref 5–45)
BASOPHILS # BLD AUTO: 0.02 THOUSANDS/ΜL (ref 0–0.1)
BASOPHILS NFR BLD AUTO: 0 % (ref 0–1)
BILIRUB SERPL-MCNC: 0.3 MG/DL (ref 0.2–1)
BUN SERPL-MCNC: 19 MG/DL (ref 5–25)
CALCIUM SERPL-MCNC: 8.8 MG/DL (ref 8.3–10.1)
CHLORIDE SERPL-SCNC: 99 MMOL/L (ref 100–108)
CO2 SERPL-SCNC: 38 MMOL/L (ref 21–32)
CREAT SERPL-MCNC: 1 MG/DL (ref 0.6–1.3)
EOSINOPHIL # BLD AUTO: 0 THOUSAND/ΜL (ref 0–0.61)
EOSINOPHIL NFR BLD AUTO: 0 % (ref 0–6)
ERYTHROCYTE [DISTWIDTH] IN BLOOD BY AUTOMATED COUNT: 16.2 % (ref 11.6–15.1)
GFR SERPL CREATININE-BSD FRML MDRD: 53 ML/MIN/1.73SQ M
GLUCOSE SERPL-MCNC: 225 MG/DL (ref 65–140)
HCT VFR BLD AUTO: 45.3 % (ref 34.8–46.1)
HGB BLD-MCNC: 13.6 G/DL (ref 11.5–15.4)
IMM GRANULOCYTES # BLD AUTO: 0.06 THOUSAND/UL (ref 0–0.2)
IMM GRANULOCYTES NFR BLD AUTO: 1 % (ref 0–2)
LYMPHOCYTES # BLD AUTO: 0.71 THOUSANDS/ΜL (ref 0.6–4.47)
LYMPHOCYTES NFR BLD AUTO: 7 % (ref 14–44)
MCH RBC QN AUTO: 26.5 PG (ref 26.8–34.3)
MCHC RBC AUTO-ENTMCNC: 30 G/DL (ref 31.4–37.4)
MCV RBC AUTO: 88 FL (ref 82–98)
MONOCYTES # BLD AUTO: 0.19 THOUSAND/ΜL (ref 0.17–1.22)
MONOCYTES NFR BLD AUTO: 2 % (ref 4–12)
NEUTROPHILS # BLD AUTO: 9.88 THOUSANDS/ΜL (ref 1.85–7.62)
NEUTS SEG NFR BLD AUTO: 90 % (ref 43–75)
NRBC BLD AUTO-RTO: 0 /100 WBCS
PLATELET # BLD AUTO: 184 THOUSANDS/UL (ref 149–390)
PMV BLD AUTO: 11.1 FL (ref 8.9–12.7)
POTASSIUM SERPL-SCNC: 4.1 MMOL/L (ref 3.5–5.3)
PROT SERPL-MCNC: 6.1 G/DL (ref 6.4–8.2)
RBC # BLD AUTO: 5.14 MILLION/UL (ref 3.81–5.12)
SODIUM SERPL-SCNC: 139 MMOL/L (ref 136–145)
WBC # BLD AUTO: 10.86 THOUSAND/UL (ref 4.31–10.16)

## 2019-02-26 PROCEDURE — 80053 COMPREHEN METABOLIC PANEL: CPT | Performed by: EMERGENCY MEDICINE

## 2019-02-26 PROCEDURE — 99285 EMERGENCY DEPT VISIT HI MDM: CPT

## 2019-02-26 PROCEDURE — 36415 COLL VENOUS BLD VENIPUNCTURE: CPT | Performed by: EMERGENCY MEDICINE

## 2019-02-26 PROCEDURE — 99214 OFFICE O/P EST MOD 30 MIN: CPT | Performed by: NURSE PRACTITIONER

## 2019-02-26 PROCEDURE — 93005 ELECTROCARDIOGRAM TRACING: CPT

## 2019-02-26 PROCEDURE — 85025 COMPLETE CBC W/AUTO DIFF WBC: CPT | Performed by: EMERGENCY MEDICINE

## 2019-02-26 PROCEDURE — 71045 X-RAY EXAM CHEST 1 VIEW: CPT

## 2019-02-26 PROCEDURE — 94640 AIRWAY INHALATION TREATMENT: CPT

## 2019-02-26 RX ORDER — ATORVASTATIN CALCIUM 10 MG/1
40 TABLET, FILM COATED ORAL DAILY
COMMUNITY
Start: 2018-01-15

## 2019-02-26 RX ORDER — IPRATROPIUM BROMIDE AND ALBUTEROL SULFATE 2.5; .5 MG/3ML; MG/3ML
3 SOLUTION RESPIRATORY (INHALATION) ONCE
Status: COMPLETED | OUTPATIENT
Start: 2019-02-26 | End: 2019-02-26

## 2019-02-26 RX ORDER — POLYETHYLENE GLYCOL 3350 17 G/17G
17 POWDER, FOR SOLUTION ORAL
COMMUNITY

## 2019-02-26 RX ORDER — FERROUS SULFATE 325(65) MG
65 TABLET ORAL
COMMUNITY

## 2019-02-26 RX ORDER — FESOTERODINE FUMARATE 8 MG/1
1 TABLET, EXTENDED RELEASE ORAL DAILY
COMMUNITY
Start: 2018-01-22 | End: 2019-03-15 | Stop reason: ALTCHOICE

## 2019-02-26 RX ORDER — ESOMEPRAZOLE MAGNESIUM 40 MG/1
40 CAPSULE, DELAYED RELEASE ORAL DAILY
COMMUNITY
Start: 2018-03-05

## 2019-02-26 RX ORDER — POTASSIUM CHLORIDE 750 MG/1
10 TABLET, EXTENDED RELEASE ORAL DAILY
COMMUNITY
Start: 2017-12-27

## 2019-02-26 RX ORDER — FOLIC ACID 1 MG/1
1 TABLET ORAL
COMMUNITY

## 2019-02-26 RX ORDER — ALPRAZOLAM 0.5 MG/1
0.5 TABLET ORAL DAILY
COMMUNITY
Start: 2017-09-27

## 2019-02-26 RX ORDER — FUROSEMIDE 20 MG/1
20 TABLET ORAL DAILY
COMMUNITY
Start: 2018-01-15 | End: 2019-03-15 | Stop reason: ALTCHOICE

## 2019-02-26 RX ADMIN — IPRATROPIUM BROMIDE AND ALBUTEROL SULFATE 3 ML: 2.5; .5 SOLUTION RESPIRATORY (INHALATION) at 16:18

## 2019-02-26 NOTE — PROGRESS NOTES
Assessment/Plan:     Problem List Items Addressed This Visit        Other    Somnolence - Primary     Patient appears to be hypersomnolent  Vital signs are stable  I did speak with the nurse practitioner at [facility]  he reports the patient had TIA like symptoms this previous Friday with change in mental status  At that time she refused  Hospitalization  She is a full code  She is very hypersomnolent and unable to participate in today's physical exam   She is unable to ask questions, and apparently this is much different from her previous mental status while hospitalized  I have made the decision to Center to the emergency room for further evaluation  HPI:  Carolina Kate is an 67-year-old female with history of long-term resident of [facility]  He is here today for hospital follow-up  She has a ride at the office with decreased level of consciousness  O2 sat on 3 L is 96%  No follow-ups on file  Patient's blood pressure is 122/76 pulse 87 in temp was 98 5°  Patient was hospitalized for mild persistent asthma with exacerbation  It was felt that she likely had are SV be infection  CT of the chest showed no evidence of pneumonia and procalcitonin level was negative  Patient also had bacteremia that showed 2 of 2 sets growing gram-positive cocci in clusters  Final culture grew Staph epidermis  She was to continue IV vancomycin through February 18th  I did speak to the nurse practitioner [facility]  According to her report, patient was alert and oriented this morning  However she did note that it was suspected she had TIA on this previous Friday  Apparently patient refused to be transferred to hospital at that time  She has unable to answer questions appropriately  She does open her eyes intermittently      Patient has acute on chronic diastolic congestive heart failure that was resolved as of 02/15/2019  All questions are answered to the patient's satisfaction and understanding  She verbalizes understanding  She is encouraged to call with any further questions or concerns  Portions of the record may have been created with voice recognition software  Occasional wrong word or "sound a like" substitutions may have occurred due to the inherent limitations of voice recognition software  Read the chart carefully and recognize, using context, where substitutions have occurred  Electronically Signed by GILDA Alex    ______________________________________________________________________    Chief Complaint:   Chief Complaint   Patient presents with    COPD     HFU 2/14    Shortness of Breath     pt states alright  no cough       Patient ID: Thuy Che is a 81 y o  y o  female has a past medical history of Asthma, Atrial fibrillation ([facility] ), Cancer ([facility] ), Cardiac disease, [facility] Fibromyalgia, Fibromyalgia, primary, GERD (gastroesophageal reflux disease), History of methicillin resistant staphylococcus aureus (MRSA), Hypertension, Hypokalemia, and 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[facility] )  2/26/2019  Patient presents today for follow-up visit  HPI    Review of Systems    Smoking history: She reports that she has quit smoking   She has never used smokeless tobacco     The following portions of the patient's history were reviewed and updated as appropriate: current medications, past family history, past medical history, past social history, past surgical history and problem list     Immunization History   Administered Date(s) Administered    Influenza, high dose seasonal 0 5 mL 10/03/2018     Current Outpatient Medications   Medication Sig Dispense Refill    acetaminophen (TYLENOL) 325 mg tablet Take 2 tablets (650 mg total) by mouth every 6 (six) hours as needed for fever 30 tablet 0    albuterol (2 5 mg/3 mL) 0 083 % nebulizer solution Take 2 5 mg by nebulization every 6 (six) hours as needed for wheezing      ALPRAZolam Osie Cools) 0 5 mg tablet Take 0 5 mg by mouth daily at bedtime      ALPRAZolam (XANAX) 0 5 mg tablet Take 0 5 mg by mouth daily      amLODIPine (NORVASC) 5 mg tablet Take 5 mg by mouth daily      apixaban (ELIQUIS) 5 mg Take 5 mg by mouth every 12 (twelve) hours        aspirin (ECOTRIN LOW STRENGTH) 81 mg EC tablet Take 81 mg by mouth daily      atorvastatin (LIPITOR) 10 mg tablet Take 10 mg by mouth daily      atorvastatin (LIPITOR) 40 mg tablet Take 40 mg by mouth daily      calcium carbonate (OS-MEGHA) 600 MG tablet Take 600 mg by mouth 2 (two) times a day with meals      cholecalciferol (VITAMIN D3) 400 units tablet Take 400 Units by mouth 2 (two) times a day      docusate sodium (COLACE) 100 mg capsule Take 200 mg by mouth daily      esomeprazole (NexIUM) 40 MG capsule Take 40 mg by mouth daily      ferrous sulfate 325 (65 Fe) mg tablet Take 65 mg by mouth      Fesoterodine Fumarate ER (TOVIAZ) 8 MG TB24 Take by mouth daily at bedtime        Fesoterodine Fumarate ER 8 MG TB24 Take 1 tablet by mouth daily      fluticasone (FLONASE) 50 mcg/act nasal spray 1 spray into each nostril daily 16 g 0    fluticasone-salmeterol (ADVAIR DISKUS) 250-50 mcg/dose inhaler Inhale 1 puff 2 (two) times a day      fluticasone-salmeterol (ADVAIR) 250-50 mcg/dose inhaler Inhale 1 puff every 12 (twelve) hours This is home medication  0    folic acid (FOLVITE) 1 mg tablet Take 1 mg by mouth      furosemide (LASIX) 20 mg tablet Take 20 mg by mouth daily      furosemide (LASIX) 40 mg tablet Take 40 mg by mouth daily        ipratropium-albuterol (COMBIVENT RESPIMAT) inhaler Inhale 1 puff      ipratropium-albuterol (DUO-NEB) 0 5-2 5 mg/3 mL nebulizer solution Take 3 mL by nebulization every 6 (six) hours while awake      loratadine (CLARITIN) 10 mg tablet Take 10 mg by mouth daily      magnesium oxide (MAG-OX) 400 mg Take 400 mg by mouth 2 (two) times a day      metoprolol tartrate (LOPRESSOR) 25 mg tablet Take 25 mg by mouth 2 (two) times a day      metoprolol tartrate (LOPRESSOR) 50 mg tablet Take 50 mg by mouth every 12 (twelve) hours      nystatin (MYCOSTATIN) powder Apply 1 application topically 2 (two) times a day 15 g 0    omeprazole (PriLOSEC) 20 mg delayed release capsule Take 20 mg by mouth daily      polyethylene glycol (GLYCOLAX) powder Take 17 g by mouth      potassium chloride (K-DUR) 10 mEq tablet Take 10 mEq by mouth daily   potassium chloride (K-DUR,KLOR-CON) 10 mEq tablet Take 10 mEq by mouth daily      predniSONE 10 mg tablet 40 mg daily X 3 days, then 30 mg by mouth daily for 3 days, then 20 mg by mouth daily for 3 days, then 10 mg by mouth daily for 3 days, then stop  0    pregabalin (LYRICA) 300 MG capsule Take 100 mg by mouth 2 (two) times a day        No current facility-administered medications for this visit  Allergies: Fentanyl and related; Latex; and Penicillins    Objective:  Vitals:    02/26/19 1452   BP: 122/76   BP Location: Left arm   Patient Position: Sitting   Cuff Size: Extra-Large   Pulse: 87   Resp: 12   Temp: 98 4 °F (36 9 °C)   TempSrc: Tympanic   SpO2: 95%   Weight: 89 8 kg (198 lb)   Height: 5' 2" (1 575 m)   Oxygen Therapy  SpO2: 95 %(on 3 liters of 02)    Wt Readings from Last 3 Encounters:   02/26/19 89 8 kg (198 lb)   02/15/19 94 1 kg (207 lb 7 3 oz)   10/04/18 94 4 kg (208 lb 1 8 oz)     Body mass index is 36 21 kg/m²  Physical Exam   Constitutional: She appears well-developed  She appears ill  She appears distressed  HENT:   Head: Normocephalic and atraumatic  Mouth/Throat: Oropharynx is clear and moist    Eyes: Pupils are equal, round, and reactive to light  EOM are normal    Neck: Normal range of motion  Neck supple  Cardiovascular: Normal rate and regular rhythm  Pulmonary/Chest: Effort normal and breath sounds normal    Abdominal: Soft  Musculoskeletal: Normal range of motion  Right lower leg: Normal  She exhibits edema          Left lower leg: She exhibits edema  Neurological: She is disoriented  Skin: Skin is warm and dry  Psychiatric:     Patient arrived to waiting room with eyes closed  She does open eyes on command but has decreased responses  She was able to grab both my hands and appear to have equal hand grasp         Lab Review:   Admission on 02/10/2019, Discharged on 02/15/2019   Component Date Value    WBC 02/10/2019 7 72     RBC 02/10/2019 5 14*    Hemoglobin 02/10/2019 13 6     Hematocrit 02/10/2019 45 3     MCV 02/10/2019 88     MCH 02/10/2019 26 5*    MCHC 02/10/2019 30 0*    RDW 02/10/2019 15 4*    MPV 02/10/2019 10 1     Platelets 49/34/6562 156     nRBC 02/10/2019 0     Neutrophils Relative 02/10/2019 77*    Immat GRANS % 02/10/2019 1     Lymphocytes Relative 02/10/2019 11*    Monocytes Relative 02/10/2019 7     Eosinophils Relative 02/10/2019 3     Basophils Relative 02/10/2019 1     Neutrophils Absolute 02/10/2019 6 08     Immature Grans Absolute 02/10/2019 0 05     Lymphocytes Absolute 02/10/2019 0 84     Monocytes Absolute 02/10/2019 0 51     Eosinophils Absolute 02/10/2019 0 20     Basophils Absolute 02/10/2019 0 04     Sodium 02/10/2019 138     Potassium 02/10/2019 3 7     Chloride 02/10/2019 99*    CO2 02/10/2019 36*    ANION GAP 02/10/2019 3*    BUN 02/10/2019 16     Creatinine 02/10/2019 0 98     Glucose 02/10/2019 121     Calcium 02/10/2019 8 4     AST 02/10/2019 12     ALT 02/10/2019 11*    Alkaline Phosphatase 02/10/2019 98     Total Protein 02/10/2019 6 1*    Albumin 02/10/2019 2 6*    Total Bilirubin 02/10/2019 0 50     eGFR 02/10/2019 54     Protime 02/10/2019 10 4     INR 02/10/2019 0 99     PTT 02/10/2019 30     Blood Culture 02/10/2019 Staphylococcus epidermidis*    Gram Stain Result 02/10/2019 Gram positive cocci in clusters*    Blood Culture 02/10/2019 Staphylococcus epidermidis*    Gram Stain Result 02/10/2019 Gram positive cocci in clusters*    LACTIC ACID 02/10/2019 1 2     Troponin I 02/10/2019 <0 02     NT-proBNP 02/10/2019 1,851*    Magnesium 02/10/2019 1 7     Lipase 02/10/2019 77     Color, UA 02/10/2019 Light Yellow     Clarity, UA 02/10/2019 Clear     Specific Gravity, UA 02/10/2019 <=1 005     pH, UA 02/10/2019 7 0     Leukocytes, UA 02/10/2019 Negative     Nitrite, UA 02/10/2019 Negative     Protein, UA 02/10/2019 Negative     Glucose, UA 02/10/2019 Negative     Ketones, UA 02/10/2019 Negative     Urobilinogen, UA 02/10/2019 0 2     Bilirubin, UA 02/10/2019 Negative     Blood, UA 02/10/2019 Trace-lysed*    Procalcitonin 02/10/2019 <0 05     Troponin I 02/10/2019 <0 02     Troponin I 02/11/2019 <0 02     INFLU A/MATRIX GENE 02/10/2019 Not Detected     Influenza A/H1 02/10/2019 Not Detected     Influenza A/H3 02/10/2019 Not Detected     INFLUENZA B 02/10/2019 Not Detected     RSV A 02/10/2019 Not Detected     RSV B 02/10/2019 Detected*    Coronavirus 229E 02/10/2019 Not Detected     Coronavirus OC43 02/10/2019 Not Detected     Coronavirus NL63 02/10/2019 Not Detected     Coronavirus HKU1 02/10/2019 Not Detected     METAPNEUMOVIRUS 02/10/2019 Not Detected     RHINOVIRUS 02/10/2019 Not Detected     ADENOVIRUS 02/10/2019 Not Detected     PARAINFLUENZA 1 02/10/2019 Not Detected     PARAINFLUENZA 2 02/10/2019 Not Detected     PARAINFLUENZA 3 02/10/2019 Not Detected     Parainfluenza 4 02/10/2019 Not Detected     Human Bocavirus 02/10/2019 Not Detected     Chlamydophila pneumoniae 02/10/2019 Not Detected     Mycoplasma pneumoniae 02/10/2019 Not Detected     MRSA Culture Only 02/10/2019 No Methicillin Resistant Staphlyococcus aureus (MRSA) isolated     RBC, UA 02/10/2019 0-1*    WBC, UA 02/10/2019 0-1*    Epithelial Cells 02/10/2019 Occasional     Bacteria, UA 02/10/2019 Occasional     Procalcitonin 02/11/2019 <0 05     WBC 02/11/2019 8 99     RBC 02/11/2019 5 73*    Hemoglobin 02/11/2019 14 9     Hematocrit 02/11/2019 50 5*    MCV 02/11/2019 88     MCH 02/11/2019 26 0*    MCHC 02/11/2019 29 5*    RDW 02/11/2019 15 5*    Platelets 25/57/0528 185     MPV 02/11/2019 11 1     Sodium 02/11/2019 143     Potassium 02/11/2019 3 3*    Chloride 02/11/2019 102     CO2 02/11/2019 38*    ANION GAP 02/11/2019 3*    BUN 02/11/2019 16     Creatinine 02/11/2019 0 94     Glucose 02/11/2019 209*    Glucose, Fasting 02/11/2019 209*    Calcium 02/11/2019 8 9     eGFR 02/11/2019 57     Cholesterol 02/11/2019 122     Triglycerides 02/11/2019 55     HDL, Direct 02/11/2019 55     LDL Calculated 02/11/2019 56     Non-HDL-Chol (CHOL-HDL) 02/11/2019 67     Ventricular Rate 02/10/2019 116     Atrial Rate 02/10/2019 416     QRSD Interval 02/10/2019 82     QT Interval 02/10/2019 314     QTC Interval 02/10/2019 436     QRS Axis 02/10/2019 -13     T Wave Axis 02/10/2019 37     Sodium 02/12/2019 138     Potassium 02/12/2019 4 4     Chloride 02/12/2019 99*    CO2 02/12/2019 33*    ANION GAP 02/12/2019 6     BUN 02/12/2019 29*    Creatinine 02/12/2019 1 00     Glucose 02/12/2019 190*    Calcium 02/12/2019 8 9     eGFR 02/12/2019 53     Protocol Name 02/12/2019 LEXISCAN     Time In Exercise Phase 02/12/2019 00:01:00     MAX   SYSTOLIC BP 51/73/7482 488     Max Diastolic Bp 17/36/3246 79     Max Heart Rate 02/12/2019 139     Max Predicted Heart Rate 02/12/2019 138     Reason for Termination 02/12/2019 PROTOCOL COMPLETED     Test Indication 02/12/2019 Chest Discomfort,SOB     Target Hr Formular 02/12/2019 (220 - Age)*100%     Chest Pain Statement 02/12/2019 none     Sodium 02/13/2019 137     Potassium 02/13/2019 5 1     Chloride 02/13/2019 100     CO2 02/13/2019 32     ANION GAP 02/13/2019 5     BUN 02/13/2019 32*    Creatinine 02/13/2019 1 12     Glucose 02/13/2019 175*    Calcium 02/13/2019 9 2     eGFR 02/13/2019 46     Vancomycin Tr 02/13/2019 20 2*    Sodium 02/14/2019 141     Potassium 02/14/2019 4 3  Chloride 02/14/2019 100     CO2 02/14/2019 38*    ANION GAP 02/14/2019 3*    BUN 02/14/2019 28*    Creatinine 02/14/2019 1 10     Glucose 02/14/2019 223*    Calcium 02/14/2019 9 0     eGFR 02/14/2019 47     WBC 02/14/2019 8 74     RBC 02/14/2019 4 64     Hemoglobin 02/14/2019 12 2     Hematocrit 02/14/2019 40 7     MCV 02/14/2019 88     MCH 02/14/2019 26 3*    MCHC 02/14/2019 30 0*    RDW 02/14/2019 15 4*    Platelets 17/59/1671 210     MPV 02/14/2019 11 0     Vancomycin Tr 02/14/2019 18 1     Blood Culture 02/14/2019 No Growth After 5 Days   Blood Culture 02/15/2019 No Growth After 5 Days  Admission on 09/29/2018, Discharged on 10/05/2018   No results displayed because visit has over 200 results  Diagnostics:  I have personally reviewed pertinent films in PACS    Office Spirometry Results:     ESS:    Xr Chest 1 View Portable    Result Date: 2/11/2019  Narrative: CHEST INDICATION:   chest pain, shortness of breath  COMPARISON:  9/29/2018 EXAM PERFORMED/VIEWS:  XR CHEST PORTABLE Images: 1 FINDINGS:  Right-sided Port-A-Cath terminates at the cavoatrial junction  Heart shadow is enlarged but unchanged from prior exam  Small left pleural effusion with basilar atelectasis noted  Upper lobe and right lung grossly clear  No pneumothorax  Osseous structures appear within normal limits for patient age  Impression: Small left pleural effusion basilar atelectasis  Stable cardiomegaly  Workstation performed: [id number]     Ct Chest Wo Contrast    Result Date: 2/11/2019  Narrative: CT CHEST WITHOUT IV CONTRAST INDICATION:   Interstitial lung disease  COMPARISON:  Chest CT dated 9/30/2018 an 1/20/2014  TECHNIQUE: CT examination of the chest was performed without intravenous contrast   Axial, sagittal, and coronal 2D reformatted images were created from the source data and submitted for interpretation  Radiation dose length product (DLP) for this visit:  487 38 mGy-cm     This examination, like all CT scans performed in the [facility], was performed utilizing techniques to minimize radiation dose exposure, including the use of iterative  reconstruction and automated exposure control  FINDINGS: LUNGS:  Dense peripheral consolidation posteriorly in the right lower lobe  Bandlike consolidation in the left upper lobe along the major fissure  Other linear scattered opacities in the left lung  Morphology of all these consolidations most suggestive of atelectasis No endobronchial lesions  No significant interlobular septal thickening or interstitial nodularity  PLEURA:  No effusion or pneumothorax  HEART/GREAT VESSELS:  Central venous catheter tip at the cavoatrial junction  Left atrial enlargement  Scattered coronary calcifications  No pericardial thickening or effusion  MEDIASTINUM AND JASWINDER:  Unremarkable  CHEST WALL AND LOWER NECK:   No abnormality around the patient's chest wall port  VISUALIZED STRUCTURES IN THE UPPER ABDOMEN:  Stable right renal cyst  OSSEOUS STRUCTURES: Multiple benign intraosseous hemangiomata spine  No acute fracture or destructive osseous lesion  Impression: 1  Scattered consolidations in both lungs have morphologies most suggestive of atelectasis  Multifocal pneumonia is less likely  Otherwise, no acute thoracic findings  2   No evidence for interstitial lung disease   Workstation performed: [id number]

## 2019-02-26 NOTE — ED PROCEDURE NOTE
PROCEDURE  ECG 12 Lead Documentation  Date/Time: 2/26/2019 4:18 PM  Performed by: Yash Paige DO  Authorized by: Yash Paige DO     ECG reviewed by me, the ED Provider: yes    Patient location:  ED  Interpretation:     Interpretation: abnormal    Rate:     ECG rate:  91    ECG rate assessment: normal    Rhythm:     Rhythm: sinus rhythm    Ectopy:     Ectopy: PVCs    ST segments:     ST segments:  Normal  T waves:     T waves: normal           Yash Paige DO  02/26/19 1619

## 2019-02-26 NOTE — ASSESSMENT & PLAN NOTE
Patient appears to be hypersomnolent  Vital signs are stable  I did speak with the nurse practitioner at [facility]  he reports the patient had TIA like symptoms this previous Friday with change in mental status  At that time she refused  Hospitalization  She is a full code  She is very hypersomnolent and unable to participate in today's physical exam   She is unable to ask questions, and apparently this is much different from her previous mental status while hospitalized  I have made the decision to Center to the emergency room for further evaluation

## 2019-02-27 NOTE — ED PROVIDER NOTES
History  Chief Complaint   Patient presents with    Shortness of Breath     pt presents to the ed with sob for several days  pt states she went to her pulmonologist today and was sent here for evaluation  pt states she has a stong hx of pneumonia      Patient presents from outpatient appointment for AMS  As per EMS patient was lethargic at her appointment  PAtient arrives awake and alert unsure of why she is here  She is keenly responsive  States SOB but no worse than normal  Wondering when she can go home  States she was just tired at the office because she likes to nap in the afternoon  History provided by:  Patient   used: No    Shortness of Breath       Prior to Admission Medications   Prescriptions Last Dose Informant Patient Reported? Taking?    ALPRAZolam (XANAX) 0 5 mg tablet  Outside Facility (Specify) Yes No   Sig: Take 0 5 mg by mouth daily at bedtime   ALPRAZolam (XANAX) 0 5 mg tablet  Outside Facility (Specify) Yes No   Sig: Take 0 5 mg by mouth daily   Fesoterodine Fumarate ER (TOVIAZ) 8 MG TB24  Outside Facility (Specify) Yes No   Sig: Take by mouth daily at bedtime     Fesoterodine Fumarate ER 8 MG TB24  Outside Facility (Specify) Yes No   Sig: Take 1 tablet by mouth daily   acetaminophen (TYLENOL) 325 mg tablet  Outside Facility (Specify) No No   Sig: Take 2 tablets (650 mg total) by mouth every 6 (six) hours as needed for fever   albuterol (2 5 mg/3 mL) 0 083 % nebulizer solution  Outside Facility (Specify) Yes No   Sig: Take 2 5 mg by nebulization every 6 (six) hours as needed for wheezing   amLODIPine (NORVASC) 5 mg tablet  Outside Facility (Specify) Yes No   Sig: Take 5 mg by mouth daily   apixaban (ELIQUIS) 5 mg  Outside Facility (Specify) Yes No   Sig: Take 5 mg by mouth every 12 (twelve) hours     aspirin (ECOTRIN LOW STRENGTH) 81 mg EC tablet  Outside Facility (Specify) Yes No   Sig: Take 81 mg by mouth daily   atorvastatin (LIPITOR) 10 mg tablet  Outside Facility (Specify) Yes No   Sig: Take 10 mg by mouth daily   atorvastatin (LIPITOR) 40 mg tablet  Outside Facility (Specify) Yes No   Sig: Take 40 mg by mouth daily   calcium carbonate (OS-MEGHA) 600 MG tablet  Outside Facility (Specify) Yes No   Sig: Take 600 mg by mouth 2 (two) times a day with meals   cholecalciferol (VITAMIN D3) 400 units tablet  Outside Facility (Specify) Yes No   Sig: Take 400 Units by mouth 2 (two) times a day   docusate sodium (COLACE) 100 mg capsule  Outside Facility (Specify) Yes No   Sig: Take 200 mg by mouth daily   esomeprazole (NexIUM) 40 MG capsule  Outside Facility (Specify) Yes No   Sig: Take 40 mg by mouth daily   ferrous sulfate 325 (65 Fe) mg tablet  Outside Facility (Specify) Yes No   Sig: Take 65 mg by mouth   fluticasone (FLONASE) 50 mcg/act nasal spray  Outside Facility (Specify) No No   Si spray into each nostril daily   fluticasone-salmeterol (ADVAIR DISKUS) 250-50 mcg/dose inhaler  Outside Facility (Specify) Yes No   Sig: Inhale 1 puff 2 (two) times a day   fluticasone-salmeterol (ADVAIR) 250-50 mcg/dose inhaler  Outside Facility (Specify) No No   Sig: Inhale 1 puff every 12 (twelve) hours This is home medication   folic acid (FOLVITE) 1 mg tablet  Outside Facility (Specify) Yes No   Sig: Take 1 mg by mouth   furosemide (LASIX) 20 mg tablet  Outside Facility (Specify) Yes No   Sig: Take 20 mg by mouth daily   furosemide (LASIX) 40 mg tablet  Outside Facility (Specify) Yes No   Sig: Take 40 mg by mouth daily     ipratropium-albuterol (COMBIVENT RESPIMAT) inhaler  Outside Facility (Specify) Yes No   Sig: Inhale 1 puff   ipratropium-albuterol (DUO-NEB) 0 5-2 5 mg/3 mL nebulizer solution  Outside Facility (Specify) Yes No   Sig: Take 3 mL by nebulization every 6 (six) hours while awake   loratadine (CLARITIN) 10 mg tablet  Outside Facility (Specify) Yes No   Sig: Take 10 mg by mouth daily   magnesium oxide (MAG-OX) 400 mg  Outside Facility (Specify) Yes No   Sig: Take 400 mg by mouth 2 (two) times a day   metoprolol tartrate (LOPRESSOR) 25 mg tablet  Outside Facility (Specify) Yes No   Sig: Take 25 mg by mouth 2 (two) times a day   metoprolol tartrate (LOPRESSOR) 50 mg tablet  Outside Facility (Specify) Yes No   Sig: Take 50 mg by mouth every 12 (twelve) hours   nystatin (MYCOSTATIN) powder  Outside Facility (Specify) No No   Sig: Apply 1 application topically 2 (two) times a day   omeprazole (PriLOSEC) 20 mg delayed release capsule  Outside Facility (Specify) Yes No   Sig: Take 20 mg by mouth daily   polyethylene glycol (GLYCOLAX) powder  Outside Facility (Specify) Yes No   Sig: Take 17 g by mouth   potassium chloride (K-DUR) 10 mEq tablet  Outside Facility (Specify) Yes No   Sig: Take 10 mEq by mouth daily  potassium chloride (K-DUR,KLOR-CON) 10 mEq tablet  Outside Facility (Specify) Yes No   Sig: Take 10 mEq by mouth daily   predniSONE 10 mg tablet  Outside Facility (Specify) No No   Si mg daily X 3 days, then 30 mg by mouth daily for 3 days, then 20 mg by mouth daily for 3 days, then 10 mg by mouth daily for 3 days, then stop   pregabalin (LYRICA) 300 MG capsule  Outside Facility (Specify) Yes No   Sig: Take 100 mg by mouth 2 (two) times a day       Facility-Administered Medications: None       Past Medical History:   Diagnosis Date    Asthma     Atrial fibrillation (HCC)     Cancer (HCC)     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    Cardiac disease     a fib    COPD (chronic obstructive pulmonary disease) (HCC)     Fibromyalgia     Fibromyalgia, primary     GERD (gastroesophageal reflux disease)     History of methicillin resistant staphylococcus aureus (MRSA)     Positive MRSA (nares) 2016  Negative nasal culture 2018 - Isolation discontinued 10/4/2018    Hypertension     Hypokalemia     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, left groin, lung and bladder    Patient treated with chemotherapy and radiation Past Surgical History:   Procedure Laterality Date    APPENDECTOMY      CATARACT EXTRACTION      CHEST WALL BIOPSY N/A 10/27/2017    Procedure: SINUS EXCISION AND REMOVAL OF FOREIGN BODY, ABDOMEN (WOUND EXPLORATION); Surgeon: Louann Jacome MD;  Location: [address] [place] [address];  Service: General    EYE SURGERY Bilateral     cataracts     HIP FRACTURE SURGERY Left     HYSTERECTOMY      JOINT REPLACEMENT Left     hip    LYMPHADENECTOMY      PORTACATH PLACEMENT Right        Family History   Problem Relation Age of Onset    Pneumonia Mother     Heart disease Father      I have reviewed and agree with the history as documented  Social History     Tobacco Use    Smoking status: Former Smoker    Smokeless tobacco: Never Used    Tobacco comment: 0 5 PPD for 4 year   Substance Use Topics    Alcohol use: Not Currently    Drug use: No        Review of Systems   Respiratory: Positive for shortness of breath  All other systems reviewed and are negative  Physical Exam  Physical Exam   Constitutional: She is oriented to person, place, and time  No distress  HENT:   Mouth/Throat: Oropharynx is clear and moist    Eyes: Pupils are equal, round, and reactive to light  Neck: Normal range of motion  Cardiovascular: Normal rate, regular rhythm and intact distal pulses  Pulmonary/Chest: Effort normal  She has wheezes  Abdominal: Soft  There is no tenderness  Musculoskeletal: Normal range of motion  She exhibits edema  Neurological: She is alert and oriented to person, place, and time  No cranial nerve deficit  She exhibits normal muscle tone  Coordination normal    Skin: Capillary refill takes less than 2 seconds  She is not diaphoretic  Nursing note and vitals reviewed        Vital Signs  ED Triage Vitals   Temperature Pulse Respirations Blood Pressure SpO2   19 1606 19 1606 19 1606 19 1615 19 1606   97 5 °F (36 4 °C) 91 18 146/80 97 %      Temp Source Heart Rate Source Patient Position - Orthostatic VS BP Location FiO2 (%)   02/26/19 1615 02/26/19 1615 02/26/19 1615 02/26/19 1615 --   Tympanic Monitor Lying Left arm       Pain Score       02/26/19 1615       No Pain           Vitals:    02/26/19 1606 02/26/19 1615 02/26/19 1730 02/26/19 2024   BP:  146/80  140/80   Pulse: 91 78 66 76   Patient Position - Orthostatic VS:  Lying  Lying       Visual Acuity      ED Medications  Medications   ipratropium-albuterol (DUO-NEB) 0 5-2 5 mg/3 mL inhalation solution 3 mL (3 mL Nebulization Given 2/26/19 1618)       Diagnostic Studies  Results Reviewed     Procedure Component Value Units Date/Time    CBC and differential [[id number]]  (Abnormal) Collected:  02/26/19 1612    Lab Status:  Final result Specimen:  Blood from Hand, Left Updated:  02/26/19 1659     WBC 10 86 Thousand/uL      RBC 5 14 Million/uL      Hemoglobin 13 6 g/dL      Hematocrit 45 3 %      MCV 88 fL      MCH 26 5 pg      MCHC 30 0 g/dL      RDW 16 2 %      MPV 11 1 fL      Platelets 331 Thousands/uL      nRBC 0 /100 WBCs      Neutrophils Relative 90 %      Immat GRANS % 1 %      Lymphocytes Relative 7 %      Monocytes Relative 2 %      Eosinophils Relative 0 %      Basophils Relative 0 %      Neutrophils Absolute 9 88 Thousands/µL      Immature Grans Absolute 0 06 Thousand/uL      Lymphocytes Absolute 0 71 Thousands/µL      Monocytes Absolute 0 19 Thousand/µL      Eosinophils Absolute 0 00 Thousand/µL      Basophils Absolute 0 02 Thousands/µL     Narrative: This is an appended report  These results have been appended to a previously verified report      Comprehensive metabolic panel [[id number]]  (Abnormal) Collected:  02/26/19 1612    Lab Status:  Final result Specimen:  Blood from Hand, Left Updated:  02/26/19 1635     Sodium 139 mmol/L      Potassium 4 1 mmol/L      Chloride 99 mmol/L      CO2 38 mmol/L      ANION GAP 2 mmol/L      BUN 19 mg/dL      Creatinine 1 00 mg/dL      Glucose 225 mg/dL      Calcium 8 8 mg/dL      AST 12 U/L      ALT 18 U/L      Alkaline Phosphatase 119 U/L      Total Protein 6 1 g/dL      Albumin 2 8 g/dL      Total Bilirubin 0 30 mg/dL      eGFR 53 ml/min/1 73sq m     Narrative:       National Kidney Disease Education Program recommendations are as follows:  GFR calculation is accurate only with a steady state creatinine  Chronic Kidney disease less than 60 ml/min/1 73 sq  meters  Kidney failure less than 15 ml/min/1 73 sq  meters  UA w Reflex to Microscopic [[id number]]     Lab Status:  No result Specimen:  Urine                  XR chest 1 view portable   Final Result by Laura Martinez MD (02/26 8491)      No significant interval change in the left basilar opacity, likely reflecting left pleural effusion with left lower lobe atelectasis versus pneumonia  Workstation performed: [id number]                    Procedures  Procedures       Phone Contacts  ED Phone Contact    ED Course                               MDM  Number of Diagnoses or Management Options  COPD (chronic obstructive pulmonary disease) ([facility] [address] ):   Diagnosis management comments: Pulse ox 95% on NC indicating adequate oxygenation  CXR: NAD, similar to prior as read by me    Patient remained awake and alert in the ER in no acute distress          Amount and/or Complexity of Data Reviewed  Clinical lab tests: ordered  Tests in the radiology section of CPT®: ordered and reviewed  Decide to obtain previous medical records or to obtain history from someone other than the patient: yes  Review and summarize past medical records: yes  Independent visualization of images, tracings, or specimens: yes    Patient Progress  Patient progress: stable      Disposition  Final diagnoses:   COPD (chronic obstructive pulmonary disease) ([facility] [address] )     Time reflects when diagnosis was documented in both MDM as applicable and the Disposition within this note     Time User Action Codes Description Comment    2/26/2019  5:45 PM Jennifer Dupont [J44 9] COPD (chronic obstructive pulmonary disease) [facility])       ED Disposition     ED Disposition Condition Date/Time Comment    Discharge Stable Tue Feb 26, 2019  5:45 PM Aimee Sri discharge to home/self care              Follow-up Information     Follow up With Specialties Details Why Contact Info Additional Information    John De Anda, DO Family Medicine In 3 days  [address] [facility]       [address] [facility] Emergency Department Emergency Medicine  If symptoms worsen [address] [facility] [address]  [phone] [facility], [place], [place], [address]          Discharge Medication List as of 2/26/2019  5:45 PM      CONTINUE these medications which have NOT CHANGED    Details   acetaminophen (TYLENOL) 325 mg tablet Take 2 tablets (650 mg total) by mouth every 6 (six) hours as needed for fever, Starting Fri 5/18/2018, No Print      albuterol (2 5 mg/3 mL) 0 083 % nebulizer solution Take 2 5 mg by nebulization every 6 (six) hours as needed for wheezing, Historical Med      !! ALPRAZolam (XANAX) 0 5 mg tablet Take 0 5 mg by mouth daily at bedtime, Historical Med      !! ALPRAZolam (XANAX) 0 5 mg tablet Take 0 5 mg by mouth daily, Starting Wed 9/27/2017, Historical Med      amLODIPine (NORVASC) 5 mg tablet Take 5 mg by mouth daily, Historical Med      apixaban (ELIQUIS) 5 mg Take 5 mg by mouth every 12 (twelve) hours  , Historical Med      aspirin (ECOTRIN LOW STRENGTH) 81 mg EC tablet Take 81 mg by mouth daily, Historical Med      !! atorvastatin (LIPITOR) 10 mg tablet Take 10 mg by mouth daily, Starting Mon 1/15/2018, Historical Med      !! atorvastatin (LIPITOR) 40 mg tablet Take 40 mg by mouth daily, Historical Med      calcium carbonate (OS-MEGHA) 600 MG tablet Take 600 mg by mouth 2 (two) times a day with meals, Historical Med      cholecalciferol (VITAMIN D3) 400 units tablet Take 400 Units by mouth 2 (two) times a day, Historical Med docusate sodium (COLACE) 100 mg capsule Take 200 mg by mouth daily, Historical Med      esomeprazole (NexIUM) 40 MG capsule Take 40 mg by mouth daily, Starting Mon 3/5/2018, Historical Med      ferrous sulfate 325 (65 Fe) mg tablet Take 65 mg by mouth, Historical Med      !! Fesoterodine Fumarate ER (TOVIAZ) 8 MG TB24 Take by mouth daily at bedtime  , Historical Med      !!  Fesoterodine Fumarate ER 8 MG TB24 Take 1 tablet by mouth daily, Starting Mon 1/22/2018, Historical Med      fluticasone (FLONASE) 50 mcg/act nasal spray 1 spray into each nostril daily, Starting Sat 5/19/2018, No Print      !! fluticasone-salmeterol (ADVAIR DISKUS) 250-50 mcg/dose inhaler Inhale 1 puff 2 (two) times a day, Starting Mon 8/14/2017, Historical Med      !! fluticasone-salmeterol (ADVAIR) 250-50 mcg/dose inhaler Inhale 1 puff every 12 (twelve) hours This is home medication, Starting Wed 1/10/2018, No Print      folic acid (FOLVITE) 1 mg tablet Take 1 mg by mouth, Historical Med      !! furosemide (LASIX) 20 mg tablet Take 20 mg by mouth daily, Starting Mon 1/15/2018, Historical Med      !! furosemide (LASIX) 40 mg tablet Take 40 mg by mouth daily  , Historical Med      ipratropium-albuterol (COMBIVENT RESPIMAT) inhaler Inhale 1 puff, Historical Med      ipratropium-albuterol (DUO-NEB) 0 5-2 5 mg/3 mL nebulizer solution Take 3 mL by nebulization every 6 (six) hours while awake, Historical Med      loratadine (CLARITIN) 10 mg tablet Take 10 mg by mouth daily, Historical Med      magnesium oxide (MAG-OX) 400 mg Take 400 mg by mouth 2 (two) times a day, Historical Med      !! metoprolol tartrate (LOPRESSOR) 25 mg tablet Take 25 mg by mouth 2 (two) times a day, Starting Tue 6/6/2017, Historical Med      !! metoprolol tartrate (LOPRESSOR) 50 mg tablet Take 50 mg by mouth every 12 (twelve) hours, Historical Med      nystatin (MYCOSTATIN) powder Apply 1 application topically 2 (two) times a day, Starting Fri 2/15/2019, Print omeprazole (PriLOSEC) 20 mg delayed release capsule Take 20 mg by mouth daily, Historical Med      polyethylene glycol (GLYCOLAX) powder Take 17 g by mouth, Historical Med      potassium chloride (K-DUR) 10 mEq tablet Take 10 mEq by mouth daily  , Historical Med      potassium chloride (K-DUR,KLOR-CON) 10 mEq tablet Take 10 mEq by mouth daily, Starting Wed 12/27/2017, Historical Med      predniSONE 10 mg tablet 40 mg daily X 3 days, then 30 mg by mouth daily for 3 days, then 20 mg by mouth daily for 3 days, then 10 mg by mouth daily for 3 days, then stop, Print      pregabalin (LYRICA) 300 MG capsule Take 100 mg by mouth 2 (two) times a day , Historical Med       !! - Potential duplicate medications found  Please discuss with provider  No discharge procedures on file      ED Provider  Electronically Signed by           Kasia Ziegler, DO  02/26/19 6120 [facility], DO  02/26/19 3016

## 2019-02-28 ENCOUNTER — TRANSCRIBE ORDERS (OUTPATIENT)
Dept: ADMINISTRATIVE | Facility: HOSPITAL | Age: 84
End: 2019-02-28

## 2019-02-28 DIAGNOSIS — R60.9 EDEMA, UNSPECIFIED TYPE: ICD-10-CM

## 2019-02-28 DIAGNOSIS — G45.9 TIA (TRANSIENT ISCHEMIC ATTACK): ICD-10-CM

## 2019-02-28 DIAGNOSIS — R41.82 ALTERED MENTAL STATUS, UNSPECIFIED ALTERED MENTAL STATUS TYPE: Primary | ICD-10-CM

## 2019-02-28 DIAGNOSIS — R90.89 ABNORMAL CT OF BRAIN: ICD-10-CM

## 2019-02-28 LAB
ATRIAL RATE: 77 BPM
QRS AXIS: -17 DEGREES
QRSD INTERVAL: 84 MS
QT INTERVAL: 352 MS
QTC INTERVAL: 432 MS
T WAVE AXIS: -1 DEGREES
VENTRICULAR RATE: 91 BPM

## 2019-02-28 PROCEDURE — 93010 ELECTROCARDIOGRAM REPORT: CPT | Performed by: INTERNAL MEDICINE

## 2019-03-04 ENCOUNTER — TELEPHONE (OUTPATIENT)
Dept: INPATIENT UNIT | Facility: HOSPITAL | Age: 84
End: 2019-03-04

## 2019-03-04 NOTE — TELEPHONE ENCOUNTER
Called [facility], spoke with Arlene Hernandez, Nurse  Patient was not available   Arlene Hernandez reported patient is stable no increased SOB at rest nor at exertion, denies decrease in energy (patient participating in activities like Bingo at [facility]) and denies increase need of supplemental oxygen

## 2019-03-05 ENCOUNTER — OFFICE VISIT (OUTPATIENT)
Dept: PULMONOLOGY | Facility: MEDICAL CENTER | Age: 84
End: 2019-03-05
Payer: MEDICARE

## 2019-03-05 VITALS
HEART RATE: 84 BPM | WEIGHT: 198 LBS | BODY MASS INDEX: 36.44 KG/M2 | RESPIRATION RATE: 12 BRPM | OXYGEN SATURATION: 91 % | DIASTOLIC BLOOD PRESSURE: 76 MMHG | HEIGHT: 62 IN | SYSTOLIC BLOOD PRESSURE: 124 MMHG | TEMPERATURE: 98.2 F

## 2019-03-05 DIAGNOSIS — E66.9 OBESITY (BMI 30-39.9): Chronic | ICD-10-CM

## 2019-03-05 DIAGNOSIS — G47.10 HYPERSOMNIA: Primary | ICD-10-CM

## 2019-03-05 DIAGNOSIS — G47.33 OBSTRUCTIVE SLEEP APNEA: Chronic | ICD-10-CM

## 2019-03-05 DIAGNOSIS — J45.30 MILD PERSISTENT ASTHMA WITHOUT COMPLICATION: ICD-10-CM

## 2019-03-05 DIAGNOSIS — R06.02 SOB (SHORTNESS OF BREATH): ICD-10-CM

## 2019-03-05 PROBLEM — J45.31 MILD PERSISTENT ASTHMA WITH EXACERBATION: Status: RESOLVED | Noted: 2018-09-29 | Resolved: 2019-03-05

## 2019-03-05 PROCEDURE — 99214 OFFICE O/P EST MOD 30 MIN: CPT | Performed by: INTERNAL MEDICINE

## 2019-03-05 PROCEDURE — 94010 BREATHING CAPACITY TEST: CPT | Performed by: INTERNAL MEDICINE

## 2019-03-05 NOTE — PROGRESS NOTES
Assessment/Plan:     Problem List Items Addressed This Visit        Respiratory    Obstructive sleep apnea (Chronic)     Estelle Padilla has excessive daytime somnolence as evidence by [place] sleepiness score of 12  During our office visit today she fell asleep least twice when she was in exam room not being stimulated  I spoke to the medical assistant who accompanied the patient today states the patient often does fall asleep and sitting in her chair at the nursing home at [place]  Also last visit she had even more excessive sleepiness Mohs fall asleep repeatedly on the nurse practitioner  Patient does have Mallampati score of 4 and is morbidly obese  She does have snoring  I suspect she does have significant obstructive sleep apnea and have order a split night sleep study  Patient was initially reluctant the pursue any type of CPAP therapy but she was agreeable after I discussed the diagnosis and treatment of DAMON with her  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  Mild persistent asthma without complication     Patient does have mild persistent asthma and is doing well at present time  She will stay on Breo 100 mcg 1 puff daily and since she is on a steroid inhaler ready I will discontinue do nebulizer treatments with budesonide 0 5 mg b i d  She can continue with nebulized treatment DuoNeb q i d  Spirometry done today shows a mixed picture  There appears to be some mild airflow obstruction and there is a some moderate restrictive impairment restrictive impairment could be due to obesity but also could be due to component of air trapping  Other    Obesity (BMI 30-39 9) (Chronic)     I did encourage patient to lose weight           Hypersomnia    Relevant Orders    Split Study      Other Visit Diagnoses     SOB (shortness of breath)    -  Primary    Relevant Orders    POCT spirometry (Completed)            Return in about 2 months (around 5/5/2019)  All questions are answered to the patient's satisfaction and understanding  She verbalizes understanding  She is encouraged to call with any further questions or concerns  Portions of the record may have been created with voice recognition software  Occasional wrong word or "sound a like" substitutions may have occurred due to the inherent limitations of voice recognition software  Read the chart carefully and recognize, using context, where substitutions have occurred  Electronically Signed by Ольга Degroots, DO    ______________________________________________________________________    Chief Complaint:   Chief Complaint   Patient presents with    Wheezing     occurs at times     Cough     non productive     Shortness of Breath     during activities        Patient ID: Simeon Edwards is a 81 y o  y o  female has a past medical history of Asthma, Atrial fibrillation ([facility] [address] ), Cancer ([facility] [address] ), Cardiac disease, COPD (chronic obstructive pulmonary disease) ([facility] [address] ), Fibromyalgia, Fibromyalgia, primary, GERD (gastroesophageal reflux disease), History of methicillin resistant staphylococcus aureus (MRSA), Hypertension, Hypokalemia, and 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[facility] [address] )  3/5/2019  Patient presents today for follow-up visit  HPI     Simeon Edwards presents for a follow-up visit today  She had recent hospitalization from February 10 to 15, 2018 for difficulty breathing  She was treated for acute exacerbation of her mild persistent asthma felt secondary to RSV infection  CT of her chest at that time showed no evidence of pneumonia  She did have positive blood cultures then that showed Staph epidermidis 2 of 2 sets  S felt this possibly could be related to a infected Port-A-Cath which she has had in place for some time  She was treated vancomycin and then was to have repeat blood cultures done in 2 weeks  She possibly need to have this Port-A-Cath removed the later time    She does have history of diastolic cardiac dysfunction and she has been having problems with hypersomnolence  She can easily fall asleep  The medical assistant accompanied today states that she frankly see will fall asleep while sitting in her chair at [facility]  She is not having any new cough or shortness of breath  For asthma she is on Breo 100 mcg 1 puff daily and using nebulized budesonide 0 5 mg b i d  She does get nebulized treatments with DuoNeb  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 has chronic lymphedema of her left lower extremity  She also has history of chronic atrial fibrillation hypertension  She was diagnosed with this Merkel cell carcinoma several years ago and this presented mass 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  She subsequent had radiation therapy for that  Later she was found have metastasis to the groin possibly lung  She was then was treated with chemo and radiation therapy for 2 years  She had a right-sided Port-A-Cath placed at that time  She has history of asthma for several years  Review of Systems   Constitutional: Positive for fatigue  Negative for activity change, appetite change and fever  HENT: Negative for congestion  Eyes: Negative for redness  Respiratory: Negative for cough  She does have chronic shortness of breath with activity  Cardiovascular: Negative for chest pain  Endocrine: Negative for polydipsia  Musculoskeletal: Negative for gait problem  Neurological: Negative for light-headedness  Psychiatric/Behavioral: Positive for sleep disturbance  She does fall asleep easily during the day       Smoking history: She reports that she has never smoked   She has never used smokeless tobacco     The following portions of the patient's history were reviewed and updated as appropriate: allergies, current medications, past family history, past medical history, past social history, past surgical history and problem list     Immunization History Administered Date(s) Administered    Influenza, high dose seasonal 0 5 mL 10/03/2018     Current Outpatient Medications   Medication Sig Dispense Refill    acetaminophen (TYLENOL) 325 mg tablet Take 2 tablets (650 mg total) by mouth every 6 (six) hours as needed for fever 30 tablet 0    albuterol (2 5 mg/3 mL) 0 083 % nebulizer solution Take 2 5 mg by nebulization every 6 (six) hours as needed for wheezing      ALPRAZolam (XANAX) 0 5 mg tablet Take 0 5 mg by mouth daily at bedtime      ALPRAZolam (XANAX) 0 5 mg tablet Take 0 5 mg by mouth daily      amLODIPine (NORVASC) 5 mg tablet Take 5 mg by mouth daily      apixaban (ELIQUIS) 5 mg Take 5 mg by mouth every 12 (twelve) hours        aspirin (ECOTRIN LOW STRENGTH) 81 mg EC tablet Take 81 mg by mouth daily      atorvastatin (LIPITOR) 10 mg tablet Take 10 mg by mouth daily      atorvastatin (LIPITOR) 40 mg tablet Take 40 mg by mouth daily      calcium carbonate (OS-MEGHA) 600 MG tablet Take 600 mg by mouth 2 (two) times a day with meals      cholecalciferol (VITAMIN D3) 400 units tablet Take 400 Units by mouth 2 (two) times a day      docusate sodium (COLACE) 100 mg capsule Take 200 mg by mouth daily      esomeprazole (NexIUM) 40 MG capsule Take 40 mg by mouth daily      ferrous sulfate 325 (65 Fe) mg tablet Take 65 mg by mouth      Fesoterodine Fumarate ER (TOVIAZ) 8 MG TB24 Take by mouth daily at bedtime        Fesoterodine Fumarate ER 8 MG TB24 Take 1 tablet by mouth daily      fluticasone (FLONASE) 50 mcg/act nasal spray 1 spray into each nostril daily 16 g 0    fluticasone-salmeterol (ADVAIR DISKUS) 250-50 mcg/dose inhaler Inhale 1 puff 2 (two) times a day      fluticasone-salmeterol (ADVAIR) 250-50 mcg/dose inhaler Inhale 1 puff every 12 (twelve) hours This is home medication  0    folic acid (FOLVITE) 1 mg tablet Take 1 mg by mouth      furosemide (LASIX) 20 mg tablet Take 20 mg by mouth daily      furosemide (LASIX) 40 mg tablet Take 40 mg by mouth daily        ipratropium-albuterol (COMBIVENT RESPIMAT) inhaler Inhale 1 puff      ipratropium-albuterol (DUO-NEB) 0 5-2 5 mg/3 mL nebulizer solution Take 3 mL by nebulization every 6 (six) hours while awake      loratadine (CLARITIN) 10 mg tablet Take 10 mg by mouth daily      magnesium oxide (MAG-OX) 400 mg Take 400 mg by mouth 2 (two) times a day      metoprolol tartrate (LOPRESSOR) 25 mg tablet Take 25 mg by mouth 2 (two) times a day      metoprolol tartrate (LOPRESSOR) 50 mg tablet Take 50 mg by mouth every 12 (twelve) hours      nystatin (MYCOSTATIN) powder Apply 1 application topically 2 (two) times a day 15 g 0    omeprazole (PriLOSEC) 20 mg delayed release capsule Take 20 mg by mouth daily      polyethylene glycol (GLYCOLAX) powder Take 17 g by mouth      potassium chloride (K-DUR) 10 mEq tablet Take 10 mEq by mouth daily   potassium chloride (K-DUR,KLOR-CON) 10 mEq tablet Take 10 mEq by mouth daily      predniSONE 10 mg tablet 40 mg daily X 3 days, then 30 mg by mouth daily for 3 days, then 20 mg by mouth daily for 3 days, then 10 mg by mouth daily for 3 days, then stop  0    pregabalin (LYRICA) 300 MG capsule Take 100 mg by mouth 2 (two) times a day        No current facility-administered medications for this visit  Allergies: Fentanyl and related; Latex; and Penicillins    Objective:  Vitals:    03/05/19 0819   BP: 124/76   BP Location: Left arm   Patient Position: Sitting   Cuff Size: Extra-Large   Pulse: 84   Resp: 12   Temp: 98 2 °F (36 8 °C)   TempSrc: Oral   SpO2: 91%   Weight: 89 8 kg (198 lb)   Height: 5' 2" (1 575 m)   Oxygen Therapy  SpO2: 91 %    Wt Readings from Last 3 Encounters:   03/05/19 89 8 kg (198 lb)   02/26/19 89 8 kg (198 lb)   02/15/19 94 1 kg (207 lb 7 3 oz)     Body mass index is 36 21 kg/m²  Physical Exam   Constitutional: She is oriented to person, place, and time  She appears well-developed and well-nourished  No distress     Patient is overweight  Patient did fall asleep a few times during her time in the exam room  Often when not stimulated she did fall asleep +sleeping in her wheelchair  She was easy to arouse and coherent when spoken to  HENT:   Head: Normocephalic  Nose: Nose normal    Mouth/Throat: Oropharynx is clear and moist  No oropharyngeal exudate  Eyes: Pupils are equal, round, and reactive to light  Conjunctivae are normal    Neck: Neck supple  No JVD present  No tracheal deviation present  Cardiovascular: Normal rate, regular rhythm and normal heart sounds  Pulmonary/Chest: Effort normal    Lung sounds are clear to auscultation  No wheezes, crackles or rhonchi   Abdominal: Soft  She exhibits no distension  There is no tenderness  There is no guarding  Musculoskeletal: She exhibits no edema  She does have edema of both lower extremities at least +2   Lymphadenopathy:     She has no cervical adenopathy  Neurological: She is alert and oriented to person, place, and time  Skin: Skin is warm and dry  No rash noted  Psychiatric: She has a normal mood and affect  Her behavior is normal  Thought content normal        Office Spirometry Results: done today  FVC - 1 36 L    59%  FEV1 - 0 92 L   55%  FEV1/FVC% - 68%    Mild airflow obstruction and moderate restrictive impairment    Cannot exclude underlying year trapping     ESS: Total score: 12

## 2019-03-06 ENCOUNTER — HOSPITAL ENCOUNTER (OUTPATIENT)
Dept: RADIOLOGY | Facility: HOSPITAL | Age: 84
Discharge: HOME | End: 2019-03-06
Attending: FAMILY MEDICINE
Payer: MEDICARE

## 2019-03-06 DIAGNOSIS — G45.9 TIA (TRANSIENT ISCHEMIC ATTACK): ICD-10-CM

## 2019-03-06 DIAGNOSIS — R41.82 ALTERED MENTAL STATUS, UNSPECIFIED ALTERED MENTAL STATUS TYPE: ICD-10-CM

## 2019-03-06 PROCEDURE — 70450 CT HEAD/BRAIN W/O DYE: CPT

## 2019-03-13 ENCOUNTER — HOSPITAL ENCOUNTER (OUTPATIENT)
Dept: RADIOLOGY | Facility: HOSPITAL | Age: 84
Discharge: HOME | End: 2019-03-13
Attending: FAMILY MEDICINE
Payer: MEDICARE

## 2019-03-13 DIAGNOSIS — R60.9 EDEMA, UNSPECIFIED TYPE: ICD-10-CM

## 2019-03-13 DIAGNOSIS — R90.89 ABNORMAL CT OF BRAIN: ICD-10-CM

## 2019-03-13 DIAGNOSIS — R41.82 ALTERED MENTAL STATUS, UNSPECIFIED ALTERED MENTAL STATUS TYPE: ICD-10-CM

## 2019-03-13 PROCEDURE — A9585 GADOBUTROL INJECTION: HCPCS | Performed by: RADIOLOGY

## 2019-03-13 PROCEDURE — 70553 MRI BRAIN STEM W/O & W/DYE: CPT

## 2019-03-13 RX ADMIN — GADOBUTROL 9 ML: 604.72 INJECTION INTRAVENOUS at 10:59

## 2019-03-15 ENCOUNTER — OFFICE VISIT (OUTPATIENT)
Dept: NEUROSURGERY | Facility: CLINIC | Age: 84
End: 2019-03-15
Payer: MEDICARE

## 2019-03-15 ENCOUNTER — TELEPHONE (OUTPATIENT)
Dept: INPATIENT UNIT | Facility: HOSPITAL | Age: 84
End: 2019-03-15

## 2019-03-15 VITALS
HEART RATE: 80 BPM | HEIGHT: 62 IN | TEMPERATURE: 98.6 F | SYSTOLIC BLOOD PRESSURE: 118 MMHG | RESPIRATION RATE: 16 BRPM | BODY MASS INDEX: 36.21 KG/M2 | DIASTOLIC BLOOD PRESSURE: 72 MMHG

## 2019-03-15 DIAGNOSIS — D32.9 MENINGIOMA (HCC): ICD-10-CM

## 2019-03-15 DIAGNOSIS — R90.89 ABNORMAL BRAIN MRI: Primary | ICD-10-CM

## 2019-03-15 DIAGNOSIS — D33.0 BENIGN NEOPLASM OF SUPRATENTORIAL REGION OF BRAIN (HCC): ICD-10-CM

## 2019-03-15 DIAGNOSIS — R56.9 SEIZURES (HCC): ICD-10-CM

## 2019-03-15 PROCEDURE — 99203 OFFICE O/P NEW LOW 30 MIN: CPT | Performed by: PHYSICIAN ASSISTANT

## 2019-03-15 NOTE — PROGRESS NOTES
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 meningioma, left frontal temporal, which is been followed since approximately 2016  Other than her syncopal episode she offers no complaints, she denies motor or sensory difficulties in the upper or lower extremities although she does have difficulty with her left leg secondary to hip issues  She denies difficulty with memory or mentation, difficulty with executive function  She does report that she has some speech issues, her speech is slightly slurred she reports particularly on the"left side"  MRI of the brain 3/13/19 carefully reviewed in detail by Dr Freddy Rdz, and compared with prior studies, 5/11/18, and 9/23/16  There has been some minimal growth in the lesion overall left frontotemporal   However with comparison to last year's study MRI 5/11/18 there is marked increase in vasogenic edema, the studies were reviewed by Dr Freddy Rdz with the patient and her daughter  This is concerning for possible seizure activity  On exam today there is no pronator drift, finger-nose is intact, rapid alternating motions are intact, cranial nerves are grossly intact, motor exam of the upper and lower extremities is intact for power, reflexes are intact and symmetric  Rick Mariana Further follow-up is planned with EEG  In addition Dr Freddy Rdz discussed management options for this mass with the patient and her daughter these included continued follow-up with serial MRI of the brain, stereotactic radiosurgery, or surgical excision  The patient expresses she has no interest in surgical excision    She is willing to consider stereotactic radiosurgery    Further follow-up at [facility] to discuss stereotactic radiosurgery as well as results of the EEG are planned, with Dr Freddy Rdz  These findings, impressions and recommendations are reviewed in great detail with the patient and her daughter, they expressed understanding and agreement, their questions were answered completely and to their satisfaction  Follow up has been scheduled  Diagnoses and all orders for this visit:    Abnormal brain MRI  -     EEG Routine and awake; Future    Benign neoplasm of supratentorial region of brain ([address] )  -     EEG Routine and awake; Future    Meningioma ([address] )  -     EEG Routine and awake; Future    Seizures ([address] )  -     EEG Routine and awake; Future          Return in about 2 weeks (around 3/29/2019) for Review EEG, discuss radiosurgery  Subjective:      Patient ID: Kathryn Paniagua is a 81 y o  female  HPI    The following portions of the patient's history were reviewed and updated as appropriate: allergies, current medications, past family history, past medical history, past social history and past surgical history  Review of Systems   Constitutional: Negative  HENT: Negative  Eyes: Positive for visual disturbance (left eye blurred)  Respiratory: Negative  Cardiovascular: Negative  Gastrointestinal: Negative  Endocrine: Negative  Genitourinary: Negative  Musculoskeletal: Negative  Skin: Negative  Allergic/Immunologic: Negative  Neurological: Positive for dizziness, syncope and numbness (on left cheek)  Hematological: Negative  Psychiatric/Behavioral: Negative  All other systems reviewed and are negative  Objective:    Physical Exam   Constitutional: She is oriented to person, place, and time  She appears well-developed and well-nourished  HENT:   Head: Normocephalic and atraumatic  Eyes: Pupils are equal, round, and reactive to light  EOM are normal    Cardiovascular: Normal rate     Pulmonary/Chest: Effort normal and breath sounds normal  Neurological: She is alert and oriented to person, place, and time  Skin: Skin is warm and dry  Psychiatric: She has a normal mood and affect  Neurologic Exam     Mental Status   Oriented to person, place, and time  Cranial Nerves     CN III, IV, VI   Pupils are equal, round, and reactive to light  Extraocular motions are normal      Gait, Coordination, and Reflexes     Gait  Gait: (Wheelchair at time of examination)       MRI BRAIN WITH AND WITHOUT CONTRAST   3/13/19     INDICATION: R41 82: Altered mental status, unspecified  R90 89: Other abnormal findings on diagnostic imaging of central nervous system  R60 9: Edema, unspecified      COMPARISON:  Prior MRI May 11, 2018     TECHNIQUE:  Sagittal T1, axial T2, axial FLAIR, axial T1, axial Gradient, axial diffusion  Sagittal, axial and coronal T1 postcontrast   Axial BRAVO post contrast        IV Contrast:  9 mL of gadobutrol injection (MULTI-DOSE)      IMAGE QUALITY:   Diagnostic      FINDINGS:     BRAIN PARENCHYMA:  Briskly enhancing mass again identified in left frontotemporal junction slightly increased in size from the prior study, surrounding vasogenic edema has also increased from the prior study  Findings compatible with atypical   meningioma  Neurosurgical consultation is recommended    Mass measures 2 4 x 2 2 x 2 7 cm as compared to 2 2 x 2 1 x 2 3 cm      Small scattered hyperintensities on T2/FLAIR imaging are noted in the periventricular and subcortical white matter demonstrating an appearance that is statistically most likely to represent moderate microangiopathic change           VENTRICLES:  Normal      SELLA AND PITUITARY GLAND:  Normal      ORBITS:  Normal      PARANASAL SINUSES:  Normal      VASCULATURE:  Evaluation of the major intracranial vasculature demonstrates appropriate flow voids      CALVARIUM AND SKULL BASE:  Normal      EXTRACRANIAL SOFT TISSUES:  Normal      IMPRESSION:     Increased vasogenic edema surrounds left frontotemporal extra-axial briskly enhancing mass with findings compatible with atypical meningioma  Slight interval increase size of the mass is noted  Slight mass effect noted  Neurosurgical consultation is   recommended

## 2019-03-15 NOTE — LETTER
March 15, 2019     Carmen Peacock, DO  [facility]    Patient: Annabelle Miller   YOB: 1935   Date of Visit: 3/15/2019       Dear Dr Breanne Baptiste:    Thank you for referring Maggie Haynes to me for evaluation  Below are my notes for this consultation  If you have questions, please do not hesitate to call me  I look forward to following your patient along with you  Sincerely,        Pita Velasquez MD        CC: Kathya Sosa Stage  3/15/2019  3:57 PM  Sign at close encounter  Assessment/Plan:    Very pleasant 60-year-old female, accompanied by her daughter, presents to review MRI brain, 3/13/19  Over the last 1 year she has had 3 syncopal episodes it is unclear as to their Radiology, are these associated with seizure activity, a cardiac event or a hypotensive event  She has a known brain mass most likely a meningioma, left frontal temporal, which is been followed since approximately 2016  Other than her syncopal episode she offers no complaints, she denies motor or sensory difficulties in the upper or lower extremities although she does have difficulty with her left leg secondary to hip issues  She denies difficulty with memory or mentation, difficulty with executive function  She does report that she has some speech issues, her speech is slightly slurred she reports particularly on the"left side"  MRI of the brain 3/13/19 carefully reviewed in detail by Dr Kenneth Martinez, and compared with prior studies, 5/11/18, and 9/23/16  There has been some minimal growth in the lesion overall left frontotemporal   However with comparison to last year's study MRI 5/11/18 there is marked increase in vasogenic edema, the studies were reviewed by Dr Kenneth Martinez with the patient and her daughter  This is concerning for possible seizure activity      On exam today there is no pronator drift, finger-nose is intact, rapid alternating motions are intact, cranial nerves are grossly intact, motor exam of the upper and lower extremities is intact for power, reflexes are intact and symmetric  Edmund Goody Further follow-up is planned with EEG  In addition Dr Cally Xiong discussed management options for this mass with the patient and her daughter these included continued follow-up with serial MRI of the brain, stereotactic radiosurgery, or surgical excision  The patient expresses she has no interest in surgical excision  She is willing to consider stereotactic radiosurgery    Further follow-up at [facility] to discuss stereotactic radiosurgery as well as results of the EEG are planned, with Dr Cally Xiong  These findings, impressions and recommendations are reviewed in great detail with the patient and her daughter, they expressed understanding and agreement, their questions were answered completely and to their satisfaction  Follow up has been scheduled  Diagnoses and all orders for this visit:    Abnormal brain MRI  -     EEG Routine and awake; Future    Benign neoplasm of supratentorial region of brain ([address] )  -     EEG Routine and awake; Future    Meningioma ([address] )  -     EEG Routine and awake; Future    Seizures ([address] )  -     EEG Routine and awake; Future          Return in about 2 weeks (around 3/29/2019) for Review EEG, discuss radiosurgery  Subjective:      Patient ID: Harry Tate is a 81 y o  female  HPI    The following portions of the patient's history were reviewed and updated as appropriate: allergies, current medications, past family history, past medical history, past social history and past surgical history  Review of Systems   Constitutional: Negative  HENT: Negative  Eyes: Positive for visual disturbance (left eye blurred)  Respiratory: Negative  Cardiovascular: Negative  Gastrointestinal: Negative  Endocrine: Negative  Genitourinary: Negative  Musculoskeletal: Negative  Skin: Negative  Allergic/Immunologic: Negative  Neurological: Positive for dizziness, syncope and numbness (on left cheek)  Hematological: Negative  Psychiatric/Behavioral: Negative  All other systems reviewed and are negative  Objective:    Physical Exam   Constitutional: She is oriented to person, place, and time  She appears well-developed and well-nourished  HENT:   Head: Normocephalic and atraumatic  Eyes: Pupils are equal, round, and reactive to light  EOM are normal    Cardiovascular: Normal rate  Pulmonary/Chest: Effort normal and breath sounds normal    Neurological: She is alert and oriented to person, place, and time  Skin: Skin is warm and dry  Psychiatric: She has a normal mood and affect  Neurologic Exam     Mental Status   Oriented to person, place, and time  Cranial Nerves     CN III, IV, VI   Pupils are equal, round, and reactive to light  Extraocular motions are normal      Gait, Coordination, and Reflexes     Gait  Gait: (Wheelchair at time of examination)       MRI BRAIN WITH AND WITHOUT CONTRAST   3/13/19     INDICATION: R41 82: Altered mental status, unspecified  R90 89: Other abnormal findings on diagnostic imaging of central nervous system  R60 9: Edema, unspecified      COMPARISON:  Prior MRI May 11, 2018     TECHNIQUE:  Sagittal T1, axial T2, axial FLAIR, axial T1, axial Gradient, axial diffusion  Sagittal, axial and coronal T1 postcontrast   Axial BRAVO post contrast        IV Contrast:  9 mL of gadobutrol injection (MULTI-DOSE)      IMAGE QUALITY:   Diagnostic      FINDINGS:     BRAIN PARENCHYMA:  Briskly enhancing mass again identified in left frontotemporal junction slightly increased in size from the prior study, surrounding vasogenic edema has also increased from the prior study  Findings compatible with atypical   meningioma  Neurosurgical consultation is recommended    Mass measures 2 4 x 2 2 x 2 7 cm as compared to 2 2 x 2 1 x 2 3 cm      Small scattered hyperintensities on T2/FLAIR imaging are noted in the periventricular and subcortical white matter demonstrating an appearance that is statistically most likely to represent moderate microangiopathic change           VENTRICLES:  Normal      SELLA AND PITUITARY GLAND:  Normal      ORBITS:  Normal      PARANASAL SINUSES:  Normal      VASCULATURE:  Evaluation of the major intracranial vasculature demonstrates appropriate flow voids      CALVARIUM AND SKULL BASE:  Normal      EXTRACRANIAL SOFT TISSUES:  Normal      IMPRESSION:     Increased vasogenic edema surrounds left frontotemporal extra-axial briskly enhancing mass with findings compatible with atypical meningioma  Slight interval increase size of the mass is noted  Slight mass effect noted  Neurosurgical consultation is   recommended

## 2019-03-15 NOTE — PATIENT INSTRUCTIONS
Proceed for EEG as discussed by Dr Pavel Albrecht  Return to neurosurgery, ([facility]), Dr Pavel Albrecht, to review EEG and discuss stereotactic radio surgery  Return sooner with any problems

## 2019-03-18 ENCOUNTER — OFFICE VISIT (OUTPATIENT)
Dept: HEMATOLOGY ONCOLOGY | Facility: MEDICAL CENTER | Age: 84
End: 2019-03-18
Payer: MEDICARE

## 2019-03-18 VITALS
DIASTOLIC BLOOD PRESSURE: 78 MMHG | SYSTOLIC BLOOD PRESSURE: 130 MMHG | OXYGEN SATURATION: 99 % | HEART RATE: 62 BPM | RESPIRATION RATE: 18 BRPM | TEMPERATURE: 97.5 F

## 2019-03-18 DIAGNOSIS — D32.9 MENINGIOMA (HCC): Primary | ICD-10-CM

## 2019-03-18 DIAGNOSIS — Z85.821 HISTORY OF MERKEL CELL CARCINOMA: Chronic | ICD-10-CM

## 2019-03-18 PROCEDURE — 99213 OFFICE O/P EST LOW 20 MIN: CPT | Performed by: INTERNAL MEDICINE

## 2019-03-18 RX ORDER — BUDESONIDE 0.5 MG/2ML
0.5 INHALANT ORAL 2 TIMES DAILY
COMMUNITY

## 2019-03-18 RX ORDER — FLUTICASONE FUROATE AND VILANTEROL 100; 25 UG/1; UG/1
1 POWDER RESPIRATORY (INHALATION) DAILY
COMMUNITY

## 2019-03-18 NOTE — PROGRESS NOTES
Gladis Lawrence  1935  [address] [facility] [place] [phone]    DISCUSSION  SUMMARY:    64-year-old female diagnosed with a neuroendocrine tumor/Merkel cell approximately 10 years ago with resection, lymph node sampling, radiation and chemotherapy  issues: 1  Merkel cell carcinoma  From an oncology standpoint, patient feels well and clinically there are no signs for recurrence  The plan is to continue with surveillance  Patient is to return in 6 months with blood work before  Eventually a CT scan of the abdomen/pelvis will need to be repeated  2  Left lower extremity lymphedema - chronic but stable  Surveillance is ongoing  3  Previous abdominal discharge - resolved  Patient had this for many years  Patient was recently seen by surgery and a fistulous track was removed  Pathology results did not demonstrate any evidence for malignancy  The discharge has stopped  Patient is monitoring  4  Meningioma  Recent scans demonstrated the possibility of tumor growth  Mrs Benito Rivers is asymptomatic  Patient has been seen by neurosurgery in the plan is continued observation  Patient is to return in 6 months, earlier if issues  Patient knows to call the hematology/oncology office if there are any other questions or concerns  Carefully review your medication list and verify that the list is accurate and up-to-date  Please call the hematology/oncology office if there are medications missing from the list, medications on the list that you are not currently taking or if there is a dosage or instruction that is different from how you're taking that medication      Patient goals and areas of care: cancer surveillance  Barriers to care:  Comorbidities, patient's performance status  Patient is able to self-care   _____________________________________________________________________________________    Chief Complaint   Patient presents with    Follow-up     History of Merkel cell carcinoma on surveillance     History of Present Illness:    81-year-old female who was previously referred for continued oncology surveillance  Approximately 11 years ago, Mrs Yanci Rob was found to have a lesion 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  Although the pathology results were not entirely clear (or available), patient was treated for high-grade neuroendocrine tumor/Merkel cell and received resection (possibly with positive margins) and lymph node dissection of the left groin  Apparently 2 lymph nodes were positive  Patient subsequently received radiation to the groin and tumor bed and also received intravenous chemotherapy (specifics not available)  Patient was followed at [facility] for many years without evidence of recurrence  Mrs Yanci Rob now lives at [facility] - permanent resident  Patient returns for follow-up  Mrs Yanci Rob states feeling okay, baseline  Activities are extremely limited, patient gets about in wheelchair  Generalized body aches are the same as before  Patient has oxygen 24/7, no respiratory distress, mild dyspnea on exertion  No pain control issues  Left lower extremity swollen but no different than before  No recent fevers or signs of infection  Appetite is good, no GI,  or GYN issues  Review of Systems   Constitutional: Positive for fatigue  HENT: Negative  Eyes: Negative  Respiratory: Negative  Cardiovascular: Positive for leg swelling  Gastrointestinal: Negative  Endocrine: Negative  Genitourinary: Negative  Musculoskeletal: Positive for arthralgias  Skin: Negative  Allergic/Immunologic: Negative  Neurological: Negative  Hematological: Negative  Psychiatric/Behavioral: Negative  All other systems reviewed and are negative  Patient Active Problem List   Diagnosis    Meningioma ([facility] [address] )    Stenosis of left internal carotid artery    Obesity (BMI 30-39  9)    GERD (gastroesophageal reflux disease)  Hyperlipemia    Fibromyalgia    Lymphedema of left leg    Bacteremia    History of Merkel cell carcinoma    Port-A-Cath in place    Iron deficiency anemia    Allergic rhinitis    Arteriosclerotic cardiovascular disease    Atherosclerotic peripheral vascular disease (HCC)    Obstructive sleep apnea    Stenosis of left carotid artery    Hypertension    Chronic atrial fibrillation (HCC)    RSV bronchitis    Somnolence    Hypersomnia    Mild persistent asthma without complication     Past Medical History:   Diagnosis Date    Asthma     Atrial fibrillation (HCC)     Cancer (HCC)     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    Cardiac disease     a fib    COPD (chronic obstructive pulmonary disease) (HCC)     Fibromyalgia     Fibromyalgia, primary     GERD (gastroesophageal reflux disease)     History of methicillin resistant staphylococcus aureus (MRSA)     Positive MRSA (nares) 9/21/2016  Negative nasal culture 9/29/2018 - Isolation discontinued 10/4/2018    Hypertension     Hypokalemia     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[address] )     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, left groin, lung and bladder  Patient treated with chemotherapy and radiation     Past Surgical History:   Procedure Laterality Date    APPENDECTOMY      CATARACT EXTRACTION      CHEST WALL BIOPSY N/A 10/27/2017    Procedure: SINUS EXCISION AND REMOVAL OF FOREIGN BODY, ABDOMEN (WOUND EXPLORATION); Surgeon: Jaskaran Raphael MD;  Location: [address] [place] [address];  Service: General    EYE SURGERY Bilateral     cataracts     HIP FRACTURE SURGERY Left     HYSTERECTOMY      JOINT REPLACEMENT Left     hip    LYMPHADENECTOMY      PORTACATH PLACEMENT Right      Family History   Problem Relation Age of Onset    Pneumonia Mother     Heart disease Father      Social History     Socioeconomic History    Marital status:       Spouse name: Not on file    Number of children: Not on file    Years of education: Not on file    Highest education level: Not on file   Occupational History    Occupation: retired   Social Needs    Financial resource strain: Not on file    Food insecurity:     Worry: Not on file     Inability: Not on file   SideTour needs:     Medical: Not on file     Non-medical: Not on file   Tobacco Use    Smoking status: Never Smoker    Smokeless tobacco: Never Used    Tobacco comment: 0 5 PPD for 4 year   Substance and Sexual Activity    Alcohol use: Not Currently    Drug use: No    Sexual activity: Not on file   Lifestyle    Physical activity:     Days per week: Not on file     Minutes per session: Not on file    Stress: Not on file   Relationships    Social connections:     Talks on phone: Not on file     Gets together: Not on file     Attends Mandaeism service: Not on file     Active member of club or organization: Not on file     Attends meetings of clubs or organizations: Not on file     Relationship status: Not on file    Intimate partner violence:     Fear of current or ex partner: Not on file     Emotionally abused: Not on file     Physically abused: Not on file     Forced sexual activity: Not on file   Other Topics Concern    Not on file   Social History Narrative    [facility]ides at [facility]       Current Outpatient Medications:     albuterol (2 5 mg/3 mL) 0 083 % nebulizer solution, Take 2 5 mg by nebulization every 6 (six) hours as needed for wheezing, Disp: , Rfl:     ALPRAZolam (XANAX) 0 5 mg tablet, Take 0 5 mg by mouth daily at bedtime, Disp: , Rfl:     ALPRAZolam (XANAX) 0 5 mg tablet, Take 0 5 mg by mouth daily, Disp: , Rfl:     amLODIPine (NORVASC) 5 mg tablet, Take 5 mg by mouth daily, Disp: , Rfl:     apixaban (ELIQUIS) 5 mg, Take 5 mg by mouth every 12 (twelve) hours  , Disp: , Rfl:     aspirin (ECOTRIN LOW STRENGTH) 81 mg EC tablet, Take 81 mg by mouth daily, Disp: , Rfl:     atorvastatin (LIPITOR) 10 mg tablet, Take 10 mg by mouth daily, Disp: , Rfl:    budesonide (PULMICORT) 0 5 mg/2 mL nebulizer solution, Take 0 5 mg by nebulization 2 (two) times a day Rinse mouth after use , Disp: , Rfl:     calcium carbonate (OS-MEGHA) 600 MG tablet, Take 600 mg by mouth 2 (two) times a day with meals, Disp: , Rfl:     cholecalciferol (VITAMIN D3) 400 units tablet, Take 400 Units by mouth 2 (two) times a day, Disp: , Rfl:     docusate sodium (COLACE) 100 mg capsule, Take 200 mg by mouth daily, Disp: , Rfl:     Fesoterodine Fumarate ER (TOVIAZ) 8 MG TB24, Take by mouth daily at bedtime  , Disp: , Rfl:     fluticasone-salmeterol (ADVAIR DISKUS) 250-50 mcg/dose inhaler, Inhale 1 puff 2 (two) times a day, Disp: , Rfl:     fluticasone-vilanterol (BREO ELLIPTA) 100-25 mcg/inh inhaler, Inhale 1 puff daily Rinse mouth after use , Disp: , Rfl:     furosemide (LASIX) 40 mg tablet, Take 40 mg by mouth daily  , Disp: , Rfl:     guaiFENesin (ROBITUSSIN) 100 MG/5ML oral liquid, Take 200 mg by mouth 3 (three) times a day as needed for cough, Disp: , Rfl:     ipratropium-albuterol (DUO-NEB) 0 5-2 5 mg/3 mL nebulizer solution, Take 3 mL by nebulization every 6 (six) hours while awake, Disp: , Rfl:     loratadine (CLARITIN) 10 mg tablet, Take 10 mg by mouth daily, Disp: , Rfl:     magnesium oxide (MAG-OX) 400 mg, Take 400 mg by mouth 2 (two) times a day, Disp: , Rfl:     metoprolol tartrate (LOPRESSOR) 25 mg tablet, Take 25 mg by mouth 2 (two) times a day, Disp: , Rfl:     omeprazole (PriLOSEC) 20 mg delayed release capsule, Take 20 mg by mouth daily, Disp: , Rfl:     potassium chloride (K-DUR,KLOR-CON) 10 mEq tablet, Take 10 mEq by mouth daily, Disp: , Rfl:     pregabalin (LYRICA) 300 MG capsule, Take 100 mg by mouth 2 (two) times a day , Disp: , Rfl:     acetaminophen (TYLENOL) 325 mg tablet, Take 2 tablets (650 mg total) by mouth every 6 (six) hours as needed for fever (Patient not taking: Reported on 3/18/2019), Disp: 30 tablet, Rfl: 0    esomeprazole (NexIUM) 40 MG capsule, Take 40 mg by mouth daily, Disp: , Rfl:     ferrous sulfate 325 (65 Fe) mg tablet, Take 65 mg by mouth, Disp: , Rfl:     fluticasone (FLONASE) 50 mcg/act nasal spray, 1 spray into each nostril daily, Disp: 16 g, Rfl: 0    folic acid (FOLVITE) 1 mg tablet, Take 1 mg by mouth, Disp: , Rfl:     ipratropium-albuterol (COMBIVENT RESPIMAT) inhaler, Inhale 1 puff, Disp: , Rfl:     nystatin (MYCOSTATIN) powder, Apply 1 application topically 2 (two) times a day (Patient not taking: Reported on 3/18/2019), Disp: 15 g, Rfl: 0    polyethylene glycol (GLYCOLAX) powder, Take 17 g by mouth, Disp: , Rfl:     Allergies   Allergen Reactions    Fentanyl And Related Other (See Comments)     Passed out    Latex Rash    Penicillins Rash       Vitals:    03/18/19 1312   BP: 130/78   Pulse: 62   Resp: 18   Temp: 97 5 °F (36 4 °C)   SpO2: 99%     Physical Exam   Constitutional: She is oriented to person, place, and time  She appears well-developed and well-nourished  Elderly female, nasal cannula O2 in place, no respiratory distress   HENT:   Head: Normocephalic and atraumatic  Right Ear: External ear normal    Left Ear: External ear normal    Nose: Nose normal    Mouth/Throat: Oropharynx is clear and moist    Eyes: Pupils are equal, round, and reactive to light  Conjunctivae and EOM are normal    Neck: Normal range of motion  Neck supple  Neck is obese, decreased flexion and extension   Cardiovascular: Normal rate, regular rhythm, normal heart sounds and intact distal pulses  Pulmonary/Chest: Effort normal    Lungs with decreased breath sounds bilaterally, scattered rhonchi, no rales   Abdominal: Soft  Bowel sounds are normal    Abdomen is obese, nontender, +bowel sounds, cannot palpate liver or spleen, no rigidity or rebound   Musculoskeletal: Normal range of motion  She exhibits edema     Decreased range of motion in all 4 extremities, more so in the left lower extremity   Neurological: She is alert and oriented to person, place, and time  She has normal reflexes  Skin: Skin is warm  Relatively good color, warm, moist, few scattered purpura, no ecchymoses, no hematomas, no petechiae   Psychiatric: She has a normal mood and affect  Her behavior is normal  Judgment and thought content normal    Lymphatics: No adenopathy in the neck, supraclavicular region, axilla and groin bilaterally  Extremities:  3+ left lower extremity edema, no obvious cords, chronic venous changes, pulses are decreased, 1+ right lower extremity edema, no cords, pulses are decreased also    Performance Status: 1 - Symptomatic but completely ambulatory    Labs    02/26/2019 WBC = 10 8 hemoglobin = 13 6 hematocrit = 45 3 MCV = 88 platelet = 149 neutrophil = 90% BUN = 19 creatinine = 1 00 glucose = 225 alkaline phosphatase = 119 total protein = 6 1 total bilirubin = 0 3 AST = 12 ALT = 18    08/13/2018 BUN = 10 creatinine = 0 6 calcium = 7 9 AST = 13 ALT = 5 alkaline phosphatase = 77 total bilirubin = 0 30 WBC = 4 2 hemoglobin = 10 1 hematocrit = 33 MCV = 80 platelet = 702    0/63/72 BUN = 18 creatinine = 0 8 calcium = 8 2 WBC = 6 1 hemoglobin = 11 3 hematocrit = 35 6 MCV = 80 RDW = 18 2 platelet = 219    Imaging    03/13/2019 MRI of the brain    Increased vasogenic edema surrounds left frontotemporal extra-axial briskly enhancing mass with findings compatible with atypical meningioma  Slight interval increase size of the mass is noted  Slight mass effect noted  Neurosurgical consultation is recommended  02/11/2019 CT scan of the chest    1  Scattered consolidations in both lungs have morphologies most suggestive of atelectasis  Multifocal pneumonia is less likely  Otherwise, no acute thoracic findings  2   No evidence for interstitial lung disease  10/12/17 chest x-ray demonstrated no active pulmonary disease    10/2/17 MRI of tibia/fibula impression stated that there was postoperative changes and scar in the proximal posterior lateral calf but no evidence of tumor recurrence  Patient had diffuse of cutaneous edema throughout the left lower leg  10/2/17 CAT scan of the chest and abdomen/pelvis did not demonstrate any metastatic disease and no significant anasarca throughout the subcutaneous fat of abdomen or pelvis  Patient had gallstones and scattered diverticulosis  Pathology    Case Report   Surgical Pathology Report                         Case: [id number]                                    Authorizing Provider: Queen Elizabeth MD       Collected:           10/27/2017 0848               Ordering Location:     [facility] Received:            10/27/2017 1404                                      Operating Room                                                                Pathologist:           Washington Cadena MD                                                               Specimen:    Soft Tissue, Other, abdominal sinus tract                                                  Final Diagnosis   A   Soft Tissue, Other, abdominal sinus tract:  -Benign skin and subcutaneous tissue with mild chronic inflammation and reactive fibrosis   -No evidence of malignancy       Electronically signed by Washington Cadena MD on 10/31/2017

## 2019-03-21 ENCOUNTER — PATIENT OUTREACH (OUTPATIENT)
Dept: CASE MANAGEMENT | Facility: OTHER | Age: 84
End: 2019-03-21

## 2019-03-21 NOTE — PROGRESS NOTES
3/21/19-pt LTC  Updates/changes 3/22/19 pt LTC  Pt eval by Neuro for behavioral changes  Pt had MRI, prior dx w/ brain tumor, no change in size  Shayne Jeter unsure of appt date w neuro, will notify CM with any other changes and results of neuro appt  Will continue to monitor

## 2019-04-02 ENCOUNTER — HOSPITAL ENCOUNTER (OUTPATIENT)
Dept: NEUROLOGY | Facility: AMBULATORY SURGERY CENTER | Age: 84
Discharge: HOME | End: 2019-04-02
Payer: MEDICARE

## 2019-04-02 DIAGNOSIS — R56.9 SEIZURES (HCC): ICD-10-CM

## 2019-04-02 DIAGNOSIS — R90.89 ABNORMAL BRAIN MRI: ICD-10-CM

## 2019-04-02 DIAGNOSIS — D33.0 BENIGN NEOPLASM OF SUPRATENTORIAL REGION OF BRAIN (HCC): ICD-10-CM

## 2019-04-02 DIAGNOSIS — D32.9 MENINGIOMA (HCC): ICD-10-CM

## 2019-04-02 PROCEDURE — 95816 EEG AWAKE AND DROWSY: CPT | Performed by: PSYCHIATRY & NEUROLOGY

## 2019-04-02 PROCEDURE — 95816 EEG AWAKE AND DROWSY: CPT

## 2019-04-06 LAB — HBA1C MFR BLD HPLC: 8.3 %

## 2019-04-10 ENCOUNTER — OFFICE VISIT (OUTPATIENT)
Dept: NEUROSURGERY | Facility: CLINIC | Age: 84
End: 2019-04-10
Payer: MEDICARE

## 2019-04-10 ENCOUNTER — RADIATION ONCOLOGY CONSULT (OUTPATIENT)
Dept: RADIATION ONCOLOGY | Facility: HOSPITAL | Age: 84
End: 2019-04-10
Attending: RADIOLOGY

## 2019-04-10 VITALS
DIASTOLIC BLOOD PRESSURE: 70 MMHG | TEMPERATURE: 97.6 F | SYSTOLIC BLOOD PRESSURE: 114 MMHG | RESPIRATION RATE: 18 BRPM | HEIGHT: 62 IN | HEART RATE: 85 BPM | BODY MASS INDEX: 36.21 KG/M2 | OXYGEN SATURATION: 97 %

## 2019-04-10 VITALS
TEMPERATURE: 97.6 F | BODY MASS INDEX: 36.21 KG/M2 | HEART RATE: 85 BPM | DIASTOLIC BLOOD PRESSURE: 70 MMHG | HEIGHT: 62 IN | RESPIRATION RATE: 18 BRPM | SYSTOLIC BLOOD PRESSURE: 114 MMHG

## 2019-04-10 DIAGNOSIS — D32.9 MENINGIOMA (HCC): Primary | ICD-10-CM

## 2019-04-10 DIAGNOSIS — G93.6 CEREBRAL EDEMA (HCC): ICD-10-CM

## 2019-04-10 PROCEDURE — 99214 OFFICE O/P EST MOD 30 MIN: CPT | Performed by: NEUROLOGICAL SURGERY

## 2019-04-10 RX ORDER — DEXAMETHASONE 4 MG/1
4 TABLET ORAL 3 TIMES DAILY
COMMUNITY

## 2019-04-10 RX ORDER — SODIUM CHLORIDE 0.9 % (FLUSH) 0.9 %
5 SYRINGE (ML) INJECTION AS NEEDED
COMMUNITY

## 2019-04-15 NOTE — PROGRESS NOTES
4/11/19-received email from [employer] stating, LTC bundle pt has brain tumor w/ increased swelling  On steroids- increasing blood sugar    Started on low dose insulin and ss/ accuchecks  Went to neurosurgeon today- does not recommend surgery or radiation  Also decreased the decadron to 2 mg tid x 5 days  Will continue to monitor throughout bundle episode

## 2019-04-23 ENCOUNTER — PATIENT OUTREACH (OUTPATIENT)
Dept: CASE MANAGEMENT | Facility: OTHER | Age: 84
End: 2019-04-23

## 2019-04-30 ENCOUNTER — PATIENT OUTREACH (OUTPATIENT)
Dept: CASE MANAGEMENT | Facility: OTHER | Age: 84
End: 2019-04-30

## 2019-05-08 ENCOUNTER — PATIENT OUTREACH (OUTPATIENT)
Dept: CASE MANAGEMENT | Facility: OTHER | Age: 84
End: 2019-05-08

## 2019-05-15 ENCOUNTER — PATIENT OUTREACH (OUTPATIENT)
Dept: CASE MANAGEMENT | Facility: OTHER | Age: 84
End: 2019-05-15

## 2019-12-07 NOTE — PROGRESS NOTES
History of Present Illness  Care Coordination Encounter Information:   Type of Encounter: Telephonic    Spoke to Other   Office on Aging and Dr Melissa Smith  Care Coordination SL Nurse [facility]:   The reason for call is to discuss outreach for follow up/needed services and coordination of meeting care plan treatment goals  I have found the number that I called for service evaluation in previous note is incorrect  So, I then called the Office on Aging in [facility], [phone], and made a referral  I also called the PCP office and to alert them of Colleen's recent hospital episode  Active Problems    1  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) (M21 969)   2  Allergic rhinitis (477 9) (J30 9)   3  Asthma (493 90) (J45 909)   4  Atherosclerotic peripheral vascular disease (440 20) (I70 209)   5  Callus (700) (L84)   6  Chronic obstructive pulmonary disease (496) (J44 9)   7  Coronary artery disease (414 00) (I25 10)   8  Edema (782 3) (R60 9)   9  Esophageal reflux (530 81) (K21 9)   10  Hyperlipidemia (272 4) (E78 5)   11  Limb pain (729 5) (M79 609)   12  Obstructive sleep apnea (327 23) (G47 33)   13  Onychomycosis (110 1) (B35 1)   14  Xerosis cutis (706 8) (L85 3)    Past Medical History    1  History of Aortic sclerosis (440 0) (I70 0)   2  History of Atelectasis (518 0) (J98 11)   3  History of Cellulitis (682 9) (L03 90)   4  History of Cervical Cancer (V10 41)   5  History of Fibromyalgia (729 1) (M79 7)   6  History of cardiomegaly (V12 59) (Z86 79)   7  History of cholelithiasis (V12 79) (Z87 19)   8  History of hiatal hernia (V12 79) (Z87 19)   9  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)   10  History of Pleural Effusion (511 9)   11  History of Pneumonia (V12 61)   12  History of Renal cyst, acquired (593 2) (N28 1)   13  History of Respiratory Failure With Hypercapnia (518 81)   14  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)   15   History of Septicemia (038 9) (A41 9)    Surgical History    1  History of Appendectomy   2  History of Bronchoscopy (Diagnostic)   3  History of Enteroscopic Polypectomy   4  History of Hip Surgery   5  History of Knee Arthroplasty   6  History of Percutaneous Portal Vein Catheter Placement   7  History of Radiation Therapy   8  History of Vaginal Hysterectomy    Family History  Mother    1  Family history of Acute Myocardial Infarction (V17 3)   2  Family history of Pneumonia  Father    3  Family history of Prostate Cancer (V16 42)  Sister    4  Family history of Breast Cancer (V16 3)    Social History    · Never A Smoker   · Non-smoker (V49 89) (Z78 9)   · Occupation:    Current Meds    1  Advair Diskus 250-50 MCG/DOSE Inhalation Aerosol Powder Breath Activated; Therapy: (Carina Cantu) to Recorded   2  Advair HFA AERO; Therapy: (Recorded:21Oct2014) to Recorded   3  Albuterol Sulfate (2 5 MG/3ML) 0 083% Inhalation Nebulization Solution; Therapy: (Carina Cantu) to Recorded   4  Flonase 50 MCG/ACT SUSP (Fluticasone Propionate); Therapy: (Carina Cantu) to Recorded   5  Lyrica CAPS; Therapy: ([id number]) to Recorded   6  NexIUM 40 MG Oral Capsule Delayed Release (Esomeprazole Magnesium); Therapy: (Carina Cantu) to Recorded   7  NexIUM CPDR (Esomeprazole Magnesium); Therapy: ([id number]) to Recorded   8  Potassium Chloride ER 10 MEQ Oral Capsule Extended Release; Therapy: (Carina Cantu) to Recorded   9  ProAir  (90 Base) MCG/ACT Inhalation Aerosol Solution; Therapy: (Carina Cantu) to Recorded   10  Toprol XL TB24 (Metoprolol Succinate ER); Therapy: ([id number]) to Recorded   11  Toviaz TB24;    Therapy: ([id number]) to Recorded   12  Triamterene-HCTZ 37 5-25 MG Oral Capsule; Therapy: (Carina Cantu) to Recorded   13  Xanax 0 5 MG Oral Tablet (ALPRAZolam); Therapy: (Carina Cantu) to Recorded    Allergies    1  Penicillins   2   Adhesive Tape TAPE   3  Latex Gloves MISC   4  Penicillins    5  Latex    End of Encounter Meds    1  Advair Diskus 250-50 MCG/DOSE Inhalation Aerosol Powder Breath Activated; Therapy: (Garret Capps) to Recorded   2  Advair HFA AERO; Therapy: (Recorded:21Oct2014) to Recorded   3  Albuterol Sulfate (2 5 MG/3ML) 0 083% Inhalation Nebulization Solution; Therapy: (Garret Capps) to Recorded   4  Flonase 50 MCG/ACT SUSP (Fluticasone Propionate); Therapy: (Garret Capps) to Recorded   5  Lyrica CAPS; Therapy: (Michele Borja) to Recorded   6  NexIUM 40 MG Oral Capsule Delayed Release (Esomeprazole Magnesium); Therapy: (Garret Capps) to Recorded   7  NexIUM CPDR (Esomeprazole Magnesium); Therapy: (Michele Borja) to Recorded   8  Potassium Chloride ER 10 MEQ Oral Capsule Extended Release; Therapy: (Garret Capps) to Recorded   9  ProAir  (90 Base) MCG/ACT Inhalation Aerosol Solution; Therapy: (Garret Capps) to Recorded   10  Toprol XL TB24 (Metoprolol Succinate ER); Therapy: (Michele Borja) to Recorded   11  Toviaz TB24;    Therapy: ([id number]) to Recorded   12  Triamterene-HCTZ 37 5-25 MG Oral Capsule; Therapy: (Garret Capps) to Recorded   13  Xanax 0 5 MG Oral Tablet (ALPRAZolam); Therapy: (Garret Capps) to Recorded    Future Appointments    Date/Time Provider Specialty Site   10/11/2016 10:15 AM Adrian Martinez MD Vascular Surgery [facility] VASCULAR     Patient Care Team    Care Team Member Role Specialty Office Number   Michelle Chino MD  Cardiology [phone]   [address] [place] [address]  Pulmonary Medicine [phone]     Signatures   Electronically signed by :  Juan Sparrow RN; Sep 29 2016 11:09AM EST                       (Author) acetaminophen 325 mg oral tablet: 3 tab(s) orally every 8 hours, As Needed  Advair  mcg-21 mcg/inh inhalation aerosol: 2 puff(s) inhaled 2 times a day  albuterol 90 mcg/inh inhalation aerosol: 2 puff(s) inhaled 4 times a day, As Needed  apixaban 5 mg oral tablet: 1 tab(s) orally 2 times a day MDD:2  docusate sodium 100 mg oral capsule: 1 cap(s) orally 3 times a day  Flomax 0.4 mg oral capsule: 1 cap(s) orally once a day  gabapentin 100 mg oral capsule: 1 cap(s) orally once a day  Incruse Ellipta 62.5 mcg/inh inhalation powder:   losartan 50 mg oral tablet: 1 tab(s) orally once a day  Naprosyn 500 mg oral tablet: 1 tab(s) orally 2 times a day  Norvasc 5 mg oral tablet: 1 tab(s) orally once a day  pantoprazole 40 mg oral delayed release tablet: 1 tab(s) orally once a day  simvastatin 20 mg oral tablet: 1 tab(s) orally once a day (at bedtime)

## 2021-06-22 NOTE — ED PROCEDURE NOTE
PROCEDURE  Intubation  Date/Time: 9/29/2018 10:37 PM  Performed by: Nenita Hudson by: Amando Trotter     Patient location:  ED  Other Assisting Provider: Yes (comment)    Consent:     Consent obtained:  Emergent situation  Universal protocol:     Patient identity confirmed:  Arm band  Pre-procedure details:     Patient status:  Unresponsive    Mallampati score:  2    Pretreatment medications:  Etomidate    Paralytics:  Succinylcholine  Indications:     Indications for intubation: hypercapnia    Procedure details:     Preoxygenation:  BiPAP    CPR in progress: no      Intubation method:  Oral    Oral intubation technique:  Glidescope    Tube size (mm):  7 5    Tube type:  Hi-lo    Number of attempts:  1    Tube visualized through cords: yes    Placement assessment:     ETT to lip:  23    Tube secured with:  ETT morley    Breath sounds:  Equal and absent over the epigastrium    Placement verification: chest rise, condensation, CXR verification, direct visualization, equal breath sounds, ETCO2 detector and tube exhalation    Post-procedure details:     Patient tolerance of procedure:   Tolerated well, no immediate complications         Debora Fry DO  09/29/18 0830 Well appearing, awake, alert, oriented to person, place, time/situation and in no apparent distress+ diaphoresis and febrile normal...

## 2021-11-02 NOTE — ASSESSMENT & PLAN NOTE
Report called to [facility]. D/C [place]ledo  Will be due to void. 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

## 2022-02-16 NOTE — ASSESSMENT & PLAN NOTE
Ean Velázquez has excessive daytime somnolence as evidence by [place] sleepiness score of 12  During our office visit today she fell asleep least twice when she was in exam room not being stimulated  I spoke to the medical assistant who accompanied the patient today states the patient often does fall asleep and sitting in her chair at the nursing home at [place]  Also last visit she had even more excessive sleepiness Mohs fall asleep repeatedly on the nurse practitioner  Patient does have Mallampati score of 4 and is morbidly obese  She does have snoring  I suspect she does have significant obstructive sleep apnea and have order a split night sleep study  Patient was initially reluctant the pursue any type of CPAP therapy but she was agreeable after I discussed the diagnosis and treatment of DAMON with her  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 
I did encourage patient to lose weight 
Patient does have mild persistent asthma and is doing well at present time  She will stay on Breo 100 mcg 1 puff daily and since she is on a steroid inhaler ready I will discontinue do nebulizer treatments with budesonide 0 5 mg b i d  She can continue with nebulized treatment DuoNeb q i d  Spirometry done today shows a mixed picture  There appears to be some mild airflow obstruction and there is a some moderate restrictive impairment restrictive impairment could be due to obesity but also could be due to component of air trapping 
Not applicable

## (undated) DEVICE — NEEDLE 25G X 1 1/2

## (undated) DEVICE — CHLORAPREP HI-LITE 26ML ORANGE

## (undated) DEVICE — GLOVE SRG BIOGEL 7

## (undated) DEVICE — LABEL STERILE (RSC) (2-SENSOR CAINE 2-LIDOCAINE 2-HEPARIN)

## (undated) DEVICE — ADHESIVE SKN CLSR HISTOACRYL FLEX 0.5ML LF

## (undated) DEVICE — ASTOUND IMPERVIOUS SURGICAL GOWN: Brand: CONVERTORS

## (undated) DEVICE — PACK GENERAL LF

## (undated) DEVICE — GLOVE INDICATOR PI UNDERGLOVE SZ 7.5 BLUE

## (undated) DEVICE — BASIC DOUBLE BASIN 2-LF: Brand: MEDLINE INDUSTRIES, INC.

## (undated) DEVICE — SCD SEQUENTIAL COMPRESSION COMFORT SLEEVE MEDIUM KNEE LENGTH: Brand: KENDALL SCD

## (undated) DEVICE — TIBURON LAPAROTOMY DRAPE: Brand: CONVERTORS

## (undated) DEVICE — ABDOMINAL PAD: Brand: DERMACEA

## (undated) DEVICE — SYRINGE 10ML LL CONTROL TOP

## (undated) DEVICE — INTENDED FOR TISSUE SEPARATION, AND OTHER PROCEDURES THAT REQUIRE A SHARP SURGICAL BLADE TO PUNCTURE OR CUT.: Brand: BARD-PARKER ® CARBON RIB-BACK BLADES

## (undated) DEVICE — DISPOSABLE BRIEF/UNDERWEAR